# Patient Record
Sex: FEMALE | Race: WHITE | NOT HISPANIC OR LATINO | Employment: OTHER | URBAN - METROPOLITAN AREA
[De-identification: names, ages, dates, MRNs, and addresses within clinical notes are randomized per-mention and may not be internally consistent; named-entity substitution may affect disease eponyms.]

---

## 2017-01-13 ENCOUNTER — OFFICE VISIT (OUTPATIENT)
Dept: OPTOMETRY | Facility: CLINIC | Age: 67
End: 2017-01-13
Payer: MEDICARE

## 2017-01-13 DIAGNOSIS — H04.123 DRY EYE SYNDROME, BILATERAL: ICD-10-CM

## 2017-01-13 DIAGNOSIS — H52.7 REFRACTIVE ERROR: ICD-10-CM

## 2017-01-13 DIAGNOSIS — Z98.890 S/P LASIK (LASER ASSISTED IN SITU KERATOMILEUSIS) OF BOTH EYES: ICD-10-CM

## 2017-01-13 DIAGNOSIS — H25.13 NUCLEAR SCLEROSIS, BILATERAL: Primary | ICD-10-CM

## 2017-01-13 PROCEDURE — 99212 OFFICE O/P EST SF 10 MIN: CPT | Mod: PBBFAC,PO | Performed by: OPTOMETRIST

## 2017-01-13 PROCEDURE — 99999 PR PBB SHADOW E&M-EST. PATIENT-LVL II: CPT | Mod: PBBFAC,,, | Performed by: OPTOMETRIST

## 2017-01-13 PROCEDURE — 92015 DETERMINE REFRACTIVE STATE: CPT | Mod: ,,, | Performed by: OPTOMETRIST

## 2017-01-13 PROCEDURE — 92014 COMPRE OPH EXAM EST PT 1/>: CPT | Mod: S$PBB,,, | Performed by: OPTOMETRIST

## 2017-01-13 NOTE — PATIENT INSTRUCTIONS
What Are Dry Eyes?  Do your eyes ever sting, burn, or feel scratchy? To be comfortable, your eyes need to be lubricated, or bathed, with tears. Normally, there is always a film of tears on the surface of your eyes. But if your eyes dont produce enough tears, the surface gets irritated. This is known as dry eyes.    Not Enough Lubricating Tears  When you cry, your eyes make reflex tears. Each time you blink, another kind of tears, called lubricating tears, spread over the surface of your eyes. These tears keep the eyes moist and comfortable. You arent aware of these tears because they stay on the surface of the eyes. But without them, your eyes get dry. Then they burn or sting and feel scratchy. They may also water. This doesnt relieve the dryness, however, because the eyes water with reflex tears, not lubricating tears.  What Causes Dry Eyes?  · Aging  · Heaters and air conditioners  · Wind, smoke, or dry weather  · Allergies such as hay fever  · Medications  · Eyelid problems, injuries to the eye, or diseases like rheumatoid arthritis  How Lubricating Tears Flow  Lubricating tears flow from glands in the upper eyelid over the surface of the eye. From the eye, the tears drain into canals that lead to the nose.  Treating Dry Eye  Artificial tears are the most common treatment for dry eyes. If they dont relieve your symptoms, your eye doctor may put in plugs. Or you may have surgery to stop the draining and increase the tear film.  Artificial tears  Artificial tears, or lubricating eye drops, replace your natural lubricating tears. You can buy most lubricating eye drops without a prescription. And you can use them as often as needed. Lubricating eye drops are not the same as eye drops used to relieve redness or itching. Check with your eye doctor or pharmacist to be sure you buy the right drops.  Some lubricating eye drops have chemicals called preservatives. This makes them last longer. Your eyes may be  sensitive to these drops. Or you may need to use them often. If so, you may want to buy lubricating eye drops made without preservatives. Your eye doctor may also suggest using a lubricating eye ointment at night.  Medicine  Your doctor may prescribe medicine such as cyclosporine to treat your eye condition. It can help increase your eyes' ability to make tears.  Plugs         Closing the puncta with plugs can help keep the tear film on your eye. The plug acts like a stopper in a sink. It allows only a small amount of tears to drain out of your eye. Your eye doctor may first try short-term (temporary) plugs that dissolve in a few days. If these help, he or she may then put in long-term plugs. Your eyes will be numbed with drops when the plugs are inserted. You shouldnt feel any pain. And you shouldnt feel the plugs once theyre in.   Surgery  If artificial tears or plugs dont relieve your dry eyes, surgery may be an option. Your eye doctor may do minor outpatient surgery to narrow or block the openings to the drainage canals. If your dry eyes are caused by eyelid problems, your eye doctor may recommend other kinds of surgery.  © 8668-3591 [employer]. [address] [place] [address], [place] [address]. All rights reserved. This information is not intended as a substitute for professional medical care. Always follow your healthcare professional's instructions.

## 2017-04-10 RX ORDER — ALENDRONATE SODIUM 35 MG/1
TABLET ORAL
Qty: 12 TABLET | Refills: 0 | Status: SHIPPED | OUTPATIENT
Start: 2017-04-10 | End: 2017-07-06 | Stop reason: SDUPTHER

## 2017-04-13 ENCOUNTER — OFFICE VISIT (OUTPATIENT)
Dept: SPORTS MEDICINE | Facility: CLINIC | Age: 67
End: 2017-04-13
Payer: MEDICARE

## 2017-04-13 ENCOUNTER — HOSPITAL ENCOUNTER (OUTPATIENT)
Dept: RADIOLOGY | Facility: HOSPITAL | Age: 67
Discharge: HOME | End: 2017-04-13
Attending: ORTHOPAEDIC SURGERY
Payer: MEDICARE

## 2017-04-13 VITALS
BODY MASS INDEX: 25.58 KG/M2 | DIASTOLIC BLOOD PRESSURE: 91 MMHG | HEART RATE: 79 BPM | SYSTOLIC BLOOD PRESSURE: 148 MMHG | WEIGHT: 163 LBS | HEIGHT: 67 IN

## 2017-04-13 DIAGNOSIS — M17.10 ARTHRITIS OF KNEE: Primary | ICD-10-CM

## 2017-04-13 DIAGNOSIS — M25.561 RIGHT KNEE PAIN, UNSPECIFIED CHRONICITY: ICD-10-CM

## 2017-04-13 PROCEDURE — 99999 PR PBB SHADOW E&M-EST. PATIENT-LVL III: CPT | Mod: PBBFAC,,, | Performed by: ORTHOPAEDIC SURGERY

## 2017-04-13 PROCEDURE — 73564 X-RAY EXAM KNEE 4 OR MORE: CPT | Mod: 26,50,, | Performed by: RADIOLOGY

## 2017-04-13 PROCEDURE — 99214 OFFICE O/P EST MOD 30 MIN: CPT | Mod: S$PBB,,, | Performed by: ORTHOPAEDIC SURGERY

## 2017-04-13 PROCEDURE — 99213 OFFICE O/P EST LOW 20 MIN: CPT | Mod: PBBFAC,PO | Performed by: ORTHOPAEDIC SURGERY

## 2017-04-13 PROCEDURE — 73564 X-RAY EXAM KNEE 4 OR MORE: CPT | Mod: TC,50,PO

## 2017-04-13 RX ORDER — MELOXICAM 15 MG/1
15 TABLET ORAL DAILY
Qty: 30 TABLET | Refills: 0 | Status: SHIPPED | OUTPATIENT
Start: 2017-04-13 | End: 2017-05-13

## 2017-04-13 NOTE — MR AVS SNAPSHOT
[facility]  [address]  [facility] [address]  Phone: [phone]                  Windy Lindo   2017 11:30 AM   Appointment    Description:  Female : 1950   Provider:  Gavino Canales MD   Department:  [facility]                To Do List           Future Appointments        Provider Department Dept Phone    2017 11:30 AM Gavino Canales MD [facility] [phone]      Goals (5 Years of Data)     None      [facility]     [facility] On Call Nurse Care Line -  Assistance  Unless otherwise directed by your provider, please contact [facility], our nurse care line that is available for  assistance.     Registered nurses in the [facility] Center provide: appointment scheduling, clinical advisement, health education, and other advisory services.  Call: [phone] (toll free)               Medications           Message regarding Medications     Verify the changes and/or additions to your medication regime listed below are the same as discussed with your clinician today.  If any of these changes or additions are incorrect, please notify your healthcare provider.             Verify that the below list of medications is an accurate representation of the medications you are currently taking.  If none reported, the list may be blank. If incorrect, please contact your healthcare provider. Carry this list with you in case of emergency.           Current Medications     alendronate (FOSAMAX) 35 MG tablet TAKE 1 TABLET (35 MG TOTAL) BY MOUTH EVERY 7 DAYS.    fish oil-omega-3 fatty acids 300-1,000 mg capsule Take 2 g by mouth once daily.    glucosamine-chondroitin 500-400 mg tablet Take 1 tablet by mouth Daily.    LACTOBACILLUS ACIDOPHILUS (PROBIOTIC ORAL) Take by mouth once daily.    MULTIVITAMIN W-MINERALS/LUTEIN (CENTRUM SILVER ORAL) Take by mouth.           Clinical Reference Information           Allergies as  of 4/13/2017     No Known Allergies      Immunizations Administered on Date of Encounter - 4/13/2017     None      Language Assistance Services     ATTENTION: Language assistance services are available, free of charge. Please call [phone].      ATENCIÓN: Si dimitri bradshaw, tiene a engel disposición servicios gratuitos de asistencia lingüística. Llame al [phone].     CHÚ Ý: N?u b?n nói Ti?ng Vi?t, có các d?ch v? h? tr? ngôn ng? mi?n phí dành cho b?n. G?i s? [phone].         [facility] complies with applicable Federal civil rights laws and does not discriminate on the basis of race, color, national origin, age, disability, or sex.

## 2017-04-18 NOTE — PROGRESS NOTES
CC: Right knee pain    66 y.o. Female with a history of right knee pain who is hear today requesting visco injections for her knee pain    She has tried cortisone injections and physical therapy.      She reports that the pain and weakness. It does not bother her at night.    + mechanical symptoms, no instability    Is affecting ADLs.  Pain is 2/10 at it's worst.     Review of Systems   Constitution: Negative. Negative for chills, fever and night sweats.   HENT: Negative for congestion and headaches.    Eyes: Negative for blurred vision, left vision loss and right vision loss.   Cardiovascular: Negative for chest pain and syncope.   Respiratory: Negative for cough and shortness of breath.    Endocrine: Negative for polydipsia, polyphagia and polyuria.   Hematologic/Lymphatic: Negative for bleeding problem. Does not bruise/bleed easily.   Skin: Negative for dry skin, itching and rash.   Musculoskeletal: Negative for falls. Positive for right knee pain and  muscle weakness.   Gastrointestinal: Negative for abdominal pain and bowel incontinence.   Genitourinary: Negative for bladder incontinence and nocturia.   Neurological: Negative for disturbances in coordination, loss of balance and seizures.   Psychiatric/Behavioral: Negative for depression. The patient does not have insomnia.    Allergic/Immunologic: Negative for hives and persistent infections.     PAST MEDICAL HISTORY:   Past Medical History:   Diagnosis Date    Arthritis     Eye injury 4 yrs    hit od     PAST SURGICAL HISTORY:   Past Surgical History:   Procedure Laterality Date    APPENDECTOMY      LASIK  7 yrs    ou    TONSILLECTOMY       FAMILY HISTORY:   Family History   Problem Relation Age of Onset    Hypertension Mother     Glaucoma Mother     Diabetes Mother     Cataracts Mother     Cancer Mother 74     lung    Hypertension Father     Diabetes Brother     Cancer Brother 66     mesothelioma    No Known Problems Sister     No Known  "Problems Maternal Aunt     No Known Problems Maternal Uncle     No Known Problems Paternal Aunt     No Known Problems Paternal Uncle     No Known Problems Maternal Grandmother     No Known Problems Maternal Grandfather     No Known Problems Paternal Grandmother     No Known Problems Paternal Grandfather     Amblyopia Neg Hx     Blindness Neg Hx     Macular degeneration Neg Hx     Retinal detachment Neg Hx     Strabismus Neg Hx     Stroke Neg Hx     Thyroid disease Neg Hx      SOCIAL HISTORY:   Social History     Social History    Marital status: Single     Spouse name: N/A    Number of children: N/A    Years of education: N/A     Occupational History     [employer]     Social History Main Topics    Smoking status: Never Smoker    Smokeless tobacco: Never Used    Alcohol use Yes    Drug use: No    Sexual activity: No     Other Topics Concern    Not on file     Social History Narrative       MEDICATIONS:   Current Outpatient Prescriptions:     alendronate (FOSAMAX) 35 MG tablet, TAKE 1 TABLET (35 MG TOTAL) BY MOUTH EVERY 7 DAYS., Disp: 12 tablet, Rfl: 0    fish oil-omega-3 fatty acids 300-1,000 mg capsule, Take 2 g by mouth once daily., Disp: , Rfl:     glucosamine-chondroitin 500-400 mg tablet, Take 1 tablet by mouth Daily., Disp: , Rfl:     LACTOBACILLUS ACIDOPHILUS (PROBIOTIC ORAL), Take by mouth once daily., Disp: , Rfl:     MULTIVITAMIN W-MINERALS/LUTEIN (CENTRUM SILVER ORAL), Take by mouth., Disp: , Rfl:     meloxicam (MOBIC) 15 MG tablet, Take 1 tablet (15 mg total) by mouth once daily., Disp: 30 tablet, Rfl: 0  ALLERGIES: Review of patient's allergies indicates:  No Known Allergies    VITAL SIGNS:   BP (!) 148/91  Pulse 79  Ht 5' 7" (1.702 m)  Wt 73.9 kg (163 lb)  BMI 25.53 kg/m2     PHYSICAL EXAMINATION  VITAL SIGNS:   BP (!) 148/91  Pulse 79  Ht 5' 7" (1.702 m)  Wt 73.9 kg (163 lb)  BMI 25.53 kg/m2   General:  The patient is alert and oriented x 3.  Mood is pleasant.  " Observation of ears, eyes and nose reveal no gross abnormalities.  No labored breathing observed.    Right KNEE EXAMINATION     OBSERVATION / INSPECTION   Gait:   Nonantalgic   Alignment:  Neutral   Scars:   None   Muscle atrophy: Mild  Effusion:  None   Warmth:  None   Discoloration:   none     TENDERNESS / CREPITUS (T / C):          T / C      T / C   Patella   - / -   Lateral joint line   + / -   Peripatellar medial  -  Medial joint line    - / -   Peripatellar lateral +  Medial plica   - / -   Patellar tendon -   Popliteal fossa   - / -   Quad tendon   -   Gastrocnemius   -   Prepatellar Bursa - / -   Quadricep   -   Tibial tubercle  -  Thigh/hamstring  -   Pes anserine/HS -  Fibula    -   ITB   - / -  Tibia     -   Tib/fib joint  - / -  LCL    -     MFC   - / -   MCL: Proximal  -    LFC   - / -    Distal   -          ROM: (* = pain)  PASSIVE   ACTIVE    Left :   5 / 0 / 145   5 / 0 / 145     Right :    5 / 0 / 145   5 / 0 / 145    Patellofemoral examination:  See above noted areas of tenderness.   Patella position    Subluxation / dislocation: Centered           Sup. / Inf;   Normal   Crepitus (PF):    Absent   Patellar Mobility:       Medial-lateral:   Normal    Superior-inferior:  Normal    Inferior tilt   Normal    Patellar tendon:  Normal   Lateral tilt:    Normal   J-sign:     None   Patellofemoral grind:   No pain       MENISCAL SIGNS:     Pain on terminal extension:  +  Pain on terminal flexion:  +  Leonels maneuver:  + for pain  Squat     + posterior joint pain    LIGAMENT EXAMINATION:  ACL / Lachman:  normal (-1 to 2mm)    PCL-Post.  drawer: normal 0 to 2mm  MCL- Valgus:  normal 0 to 2mm  LCL- Varus:  normal 0 to 2mm  Pivot shift: normal (Equal)   Dial Test: difference c/w other side   At 30° flexion: normal (< 5°)    At 90° flexion: normal (< 5°)   Reverse Pivot Shift:   normal (Equal)     STRENGTH: (* = with pain) PAINFUL SIDE   Quadricep   5/5   Hamstrin/5    EXTREMITY NEURO-VASCULAR  EXAMINATION:   Sensation:  Grossly intact to light touch all dermatomal regions.   Motor Function:  Fully intact motor function at hip, knee, foot and ankle    DTRs;  quadriceps and  achilles 2+.  No clonus and downgoing Babinski.    Vascular status:  DP and PT pulses 2+, brisk capillary refill, symmetric.     Other Findings:    X-rays (5/12/16): Moderate to severe DJD and the joint space narrowing bilaterally.  No acute fractures or dislocation.  No bone destruction identified        ASSESSMENT:    Right Knee Pain  Right Knee DJD    PLAN:   1. Euflexxa injections  2. PT  3. NSAID    All questions were answered, pt will contact us for questions or concerns in the interim.

## 2017-04-26 ENCOUNTER — OFFICE VISIT (OUTPATIENT)
Dept: SPORTS MEDICINE | Facility: CLINIC | Age: 67
End: 2017-04-26
Payer: MEDICARE

## 2017-04-26 VITALS — HEIGHT: 67 IN | WEIGHT: 163 LBS | BODY MASS INDEX: 25.58 KG/M2

## 2017-04-26 DIAGNOSIS — M17.11 PRIMARY OSTEOARTHRITIS OF RIGHT KNEE: Primary | ICD-10-CM

## 2017-04-26 PROCEDURE — 99999 PR PBB SHADOW E&M-EST. PATIENT-LVL III: CPT | Mod: PBBFAC,,, | Performed by: PHYSICIAN ASSISTANT

## 2017-04-26 PROCEDURE — 20610 DRAIN/INJ JOINT/BURSA W/O US: CPT | Mod: S$PBB,RT,, | Performed by: PHYSICIAN ASSISTANT

## 2017-04-26 PROCEDURE — 20610 DRAIN/INJ JOINT/BURSA W/O US: CPT | Mod: PBBFAC,PO | Performed by: PHYSICIAN ASSISTANT

## 2017-04-26 PROCEDURE — 99499 UNLISTED E&M SERVICE: CPT | Mod: S$PBB,,, | Performed by: PHYSICIAN ASSISTANT

## 2017-04-26 PROCEDURE — 99213 OFFICE O/P EST LOW 20 MIN: CPT | Mod: PBBFAC,PO | Performed by: PHYSICIAN ASSISTANT

## 2017-04-26 RX ORDER — HYALURONATE SODIUM 20 MG/2 ML
2 SYRINGE (ML) INTRAARTICULAR
Status: COMPLETED | OUTPATIENT
Start: 2017-04-26 | End: 2017-04-26

## 2017-04-26 RX ADMIN — Medication 20 MG: at 03:04

## 2017-04-26 NOTE — PROGRESS NOTES
Patient is here for follow up of knee arthritis. Pt is requesting Euflexxa right knee injection #1.  [facility] Has failed other conservative modalities including NSAIDS, activity modification, weight loss.    The prior shot was tolerated well.    PHYSICAL EXAMINATION:     General: The patient is alert and oriented x 3. Mood is pleasant.   Observation of ears, eyes and nose reveals no gross abnormalities. No   labored breathing observed.     No signs of infection or adverse reaction to knee.    PROCEDURE NOTE:  Injection Procedure  A time out was performed, including verification of patient ID, procedure, site and side, availability of information and equipment, review of safety issues, and agreement with consent, the procedure site was marked.    After time out was performed, the patient was prepped aseptically with povidone-iodine swabsticks. A diagnostic and therapeutic injection of 2cc Euflexxa was given under sterile technique using a 22g x 1.5 needle from the Superolateral  aspect of the right Knee Joint in the supine position.      Windy Lindo had no adverse reactions to the medication. Pain decreased. She was instructed to apply ice to the joint for 20 minutes and avoid strenuous activities for 24-36 hours following the injection. She was warned of possible blood sugar and/or blood pressure changes during that time. Following that time, she can resume regular activities.    She was reminded to call the clinic immediately for any adverse side effects as explained in clinic today.      RTC 1 week for 2nd injection.  All questions were answered, pt will contact us for questions or concerns in the interim.

## 2017-04-26 NOTE — LETTER
April 26, 2017      Gavino Canales MD  [address]  [facility] [address]           [facility]  [address]  [facility] [address]  Phone: [phone]          Patient: Windy Lindo   MR Number: [id number]   YOB: 1950   Date of Visit: 4/26/2017       Dear Dr. Gavino Canales:    Thank you for referring Windy Lindo to me for evaluation. Attached you will find relevant portions of my assessment and plan of care.    If you have questions, please do not hesitate to call me. I look forward to following Windy Lindo along with you.    Sincerely,    Skye Christie PA-C    Enclosure  CC:  No Recipients    If you would like to receive this communication electronically, please contact [email or web address] or [phone] to request more information on [employer] Link access.    For providers and/or their staff who would like to refer a patient to [facility], please contact us through our one-stop-shop provider referral line, [facility], at [phone].    If you feel you have received this communication in error or would no longer like to receive these types of communications, please e-mail [email or web address]

## 2017-05-03 ENCOUNTER — OFFICE VISIT (OUTPATIENT)
Dept: SPORTS MEDICINE | Facility: CLINIC | Age: 67
End: 2017-05-03
Payer: MEDICARE

## 2017-05-03 VITALS
HEART RATE: 86 BPM | DIASTOLIC BLOOD PRESSURE: 79 MMHG | BODY MASS INDEX: 25.58 KG/M2 | WEIGHT: 163 LBS | HEIGHT: 67 IN | SYSTOLIC BLOOD PRESSURE: 130 MMHG

## 2017-05-03 DIAGNOSIS — M17.11 PRIMARY OSTEOARTHRITIS OF RIGHT KNEE: Primary | ICD-10-CM

## 2017-05-03 PROCEDURE — 99999 PR PBB SHADOW E&M-EST. PATIENT-LVL III: CPT | Mod: PBBFAC,,, | Performed by: PHYSICIAN ASSISTANT

## 2017-05-03 PROCEDURE — 20610 DRAIN/INJ JOINT/BURSA W/O US: CPT | Mod: S$PBB,RT,, | Performed by: PHYSICIAN ASSISTANT

## 2017-05-03 PROCEDURE — 99213 OFFICE O/P EST LOW 20 MIN: CPT | Mod: PBBFAC,25,PO | Performed by: PHYSICIAN ASSISTANT

## 2017-05-03 PROCEDURE — 99499 UNLISTED E&M SERVICE: CPT | Mod: S$PBB,,, | Performed by: PHYSICIAN ASSISTANT

## 2017-05-03 PROCEDURE — 20610 DRAIN/INJ JOINT/BURSA W/O US: CPT | Mod: PBBFAC,PO | Performed by: PHYSICIAN ASSISTANT

## 2017-05-03 RX ORDER — HYALURONATE SODIUM 20 MG/2 ML
2 SYRINGE (ML) INTRAARTICULAR
Status: COMPLETED | OUTPATIENT
Start: 2017-05-03 | End: 2017-05-03

## 2017-05-03 RX ADMIN — Medication 20 MG: at 01:05

## 2017-05-03 NOTE — PROGRESS NOTES
Patient is here for follow up of knee arthritis. Pt is requesting Euflexxa right knee injection #2.  [facility] Has failed other conservative modalities including NSAIDS, activity modification, weight loss.    The prior shot was tolerated well.    PHYSICAL EXAMINATION:     General: The patient is alert and oriented x 3. Mood is pleasant.   Observation of ears, eyes and nose reveals no gross abnormalities. No   labored breathing observed.     No signs of infection or adverse reaction to knee.    PROCEDURE NOTE:  Injection Procedure  A time out was performed, including verification of patient ID, procedure, site and side, availability of information and equipment, review of safety issues, and agreement with consent, the procedure site was marked.    After time out was performed, the patient was prepped aseptically with povidone-iodine swabsticks. A diagnostic and therapeutic injection of 2cc Euflexxa was given under sterile technique using a 22g x 1.5 needle from the Superolateral  aspect of the right Knee Joint in the supine position.      Windy Lindo had no adverse reactions to the medication. Pain decreased. She was instructed to apply ice to the joint for 20 minutes and avoid strenuous activities for 24-36 hours following the injection. She was warned of possible blood sugar and/or blood pressure changes during that time. Following that time, she can resume regular activities.    She was reminded to call the clinic immediately for any adverse side effects as explained in clinic today.      RTC 1 week for 3rd injection.  All questions were answered, pt will contact us for questions or concerns in the interim.

## 2017-05-03 NOTE — MR AVS SNAPSHOT
[facility]  [address]  [facility] [address]  Phone: [phone]                  Windy Lindo   5/3/2017 2:00 PM   Appointment    Description:  Female : 1950   Provider:  Skye Christie PA-C   Department:  [facility]                To Do List           Future Appointments        Provider Department Dept Phone    5/3/2017 2:00 PM Skye Christie PA-C [facility] [phone]    5/10/2017 3:15 PM Skye Christie PA-C [facility] [phone]      Goals (5 Years of Data)     None      [facility] On Call     [facility] On Call Nurse Care Line -  Assistance  Unless otherwise directed by your provider, please contact [facility], our nurse care line that is available for  assistance.     Registered nurses in the [facility] provide: appointment scheduling, clinical advisement, health education, and other advisory services.  Call: [phone] (toll free)               Medications           Message regarding Medications     Verify the changes and/or additions to your medication regime listed below are the same as discussed with your clinician today.  If any of these changes or additions are incorrect, please notify your healthcare provider.             Verify that the below list of medications is an accurate representation of the medications you are currently taking.  If none reported, the list may be blank. If incorrect, please contact your healthcare provider. Carry this list with you in case of emergency.           Current Medications     alendronate (FOSAMAX) 35 MG tablet TAKE 1 TABLET (35 MG TOTAL) BY MOUTH EVERY 7 DAYS.    fish oil-omega-3 fatty acids 300-1,000 mg capsule Take 2 g by mouth once daily.    glucosamine-chondroitin 500-400 mg tablet Take 1 tablet by mouth Daily.    LACTOBACILLUS ACIDOPHILUS (PROBIOTIC ORAL) Take by mouth once daily.    meloxicam (MOBIC) 15 MG tablet Take 1 tablet (15 mg  total) by mouth once daily.    MULTIVITAMIN W-MINERALS/LUTEIN (CENTRUM SILVER ORAL) Take by mouth.           Clinical Reference Information           Allergies as of 5/3/2017     No Known Allergies      Immunizations Administered on Date of Encounter - 5/3/2017     None      Language Assistance Services     ATTENTION: Language assistance services are available, free of charge. Please call [phone].      ATENCIÓN: Si habla kodyañol, tiene a engel disposición servicios gratuitos de asistencia lingüística. Llame al [phone].     CHÚ Ý: N?u b?n nói Ti?ng Vi?t, có các d?ch v? h? tr? ngôn ng? mi?n phí dành cho b?n. G?i s? [phone].         [facility] complies with applicable Federal civil rights laws and does not discriminate on the basis of race, color, national origin, age, disability, or sex.

## 2017-05-03 NOTE — LETTER
May 3, 2017      Gavino Canales MD  [address]  [facility] [address]           [facility]  [address]  [facility] [address]  Phone: [phone]          Patient: Windy Lindo   MR Number: [id number]   YOB: 1950   Date of Visit: 5/3/2017       Dear Dr. Gavino Canales:    Thank you for referring Windy Lindo to me for evaluation. Attached you will find relevant portions of my assessment and plan of care.    If you have questions, please do not hesitate to call me. I look forward to following Windy Lindo along with you.    Sincerely,    Skye Christie PA-C    Enclosure  CC:  No Recipients    If you would like to receive this communication electronically, please contact [email or web address] or [phone] to request more information on [facility] Link access.    For providers and/or their staff who would like to refer a patient to [facility], please contact us through our one-stop-shop provider referral line, [facility], at [phone].    If you feel you have received this communication in error or would no longer like to receive these types of communications, please e-mail [email or web address]

## 2017-05-10 ENCOUNTER — OFFICE VISIT (OUTPATIENT)
Dept: SPORTS MEDICINE | Facility: CLINIC | Age: 67
End: 2017-05-10
Payer: MEDICARE

## 2017-05-10 VITALS
DIASTOLIC BLOOD PRESSURE: 87 MMHG | BODY MASS INDEX: 25.58 KG/M2 | HEIGHT: 67 IN | WEIGHT: 163 LBS | SYSTOLIC BLOOD PRESSURE: 141 MMHG | HEART RATE: 80 BPM

## 2017-05-10 DIAGNOSIS — M17.11 PRIMARY OSTEOARTHRITIS OF RIGHT KNEE: Primary | ICD-10-CM

## 2017-05-10 PROCEDURE — 99213 OFFICE O/P EST LOW 20 MIN: CPT | Mod: PBBFAC,PO | Performed by: PHYSICIAN ASSISTANT

## 2017-05-10 PROCEDURE — 20610 DRAIN/INJ JOINT/BURSA W/O US: CPT | Mod: S$PBB,RT,, | Performed by: PHYSICIAN ASSISTANT

## 2017-05-10 PROCEDURE — 99999 PR PBB SHADOW E&M-EST. PATIENT-LVL III: CPT | Mod: PBBFAC,,, | Performed by: PHYSICIAN ASSISTANT

## 2017-05-10 PROCEDURE — 99499 UNLISTED E&M SERVICE: CPT | Mod: S$PBB,,, | Performed by: PHYSICIAN ASSISTANT

## 2017-05-10 PROCEDURE — 20610 DRAIN/INJ JOINT/BURSA W/O US: CPT | Mod: PBBFAC,PO | Performed by: PHYSICIAN ASSISTANT

## 2017-05-10 RX ORDER — HYALURONATE SODIUM 20 MG/2 ML
2 SYRINGE (ML) INTRAARTICULAR
Status: COMPLETED | OUTPATIENT
Start: 2017-05-10 | End: 2017-05-10

## 2017-05-10 RX ADMIN — Medication 20 MG: at 03:05

## 2017-05-10 NOTE — MR AVS SNAPSHOT
[facility]  [address]  [place] [address]  Phone: [phone]                  Windy Lindo   5/10/2017 3:15 PM   Appointment    Description:  Female : 1950   Provider:  Skye Christie PA-C   Department:  [facility]                To Do List           Future Appointments        Provider Department Dept Phone    5/10/2017 3:15 PM Skye Christie PA-C [facility] [phone]      Goals (5 Years of Data)     None      [facility]     [facility] On Call Nurse Care Line -  Assistance  Unless otherwise directed by your provider, please contact [facility], our nurse care line that is available for  assistance.     Registered nurses in the [facility] provide: appointment scheduling, clinical advisement, health education, and other advisory services.  Call: [phone] (toll free)               Medications           Message regarding Medications     Verify the changes and/or additions to your medication regime listed below are the same as discussed with your clinician today.  If any of these changes or additions are incorrect, please notify your healthcare provider.             Verify that the below list of medications is an accurate representation of the medications you are currently taking.  If none reported, the list may be blank. If incorrect, please contact your healthcare provider. Carry this list with you in case of emergency.           Current Medications     alendronate (FOSAMAX) 35 MG tablet TAKE 1 TABLET (35 MG TOTAL) BY MOUTH EVERY 7 DAYS.    fish oil-omega-3 fatty acids 300-1,000 mg capsule Take 2 g by mouth once daily.    glucosamine-chondroitin 500-400 mg tablet Take 1 tablet by mouth Daily.    LACTOBACILLUS ACIDOPHILUS (PROBIOTIC ORAL) Take by mouth once daily.    meloxicam (MOBIC) 15 MG tablet Take 1 tablet (15 mg total) by mouth once daily.    MULTIVITAMIN W-MINERALS/LUTEIN (CENTRUM SILVER  ORAL) Take by mouth.           Clinical Reference Information           Allergies as of 5/10/2017     No Known Allergies      Immunizations Administered on Date of Encounter - 5/10/2017     None      Language Assistance Services     ATTENTION: Language assistance services are available, free of charge. Please call [phone].      ATENCIÓN: Si dimitri bradshaw, tiene a engel disposición servicios gratuitos de asistencia lingüística. Llame al [phone].     CHÚ Ý: N?u b?n nói Ti?ng Vi?t, có các d?ch v? h? tr? ngôn ng? mi?n phí dành cho b?n. G?i s? [phone].         [facility] complies with applicable Federal civil rights laws and does not discriminate on the basis of race, color, national origin, age, disability, or sex.

## 2017-05-10 NOTE — PROGRESS NOTES
Patient is here for follow up of knee arthritis. Pt is requesting Euflexxa right knee injection #3.  [facility] Has failed other conservative modalities including NSAIDS, activity modification, weight loss.    The prior shot was tolerated well.    PHYSICAL EXAMINATION:     General: The patient is alert and oriented x 3. Mood is pleasant.   Observation of ears, eyes and nose reveals no gross abnormalities. No   labored breathing observed.     No signs of infection or adverse reaction to knee.    PROCEDURE NOTE:  Injection Procedure  A time out was performed, including verification of patient ID, procedure, site and side, availability of information and equipment, review of safety issues, and agreement with consent, the procedure site was marked.    After time out was performed, the patient was prepped aseptically with povidone-iodine swabsticks. A diagnostic and therapeutic injection of 2cc Euflexxa was given under sterile technique using a 22g x 1.5 needle from the Superolateral  aspect of the right Knee Joint in the supine position.      Windy Lindo had no adverse reactions to the medication. Pain decreased. She was instructed to apply ice to the joint for 20 minutes and avoid strenuous activities for 24-36 hours following the injection. She was warned of possible blood sugar and/or blood pressure changes during that time. Following that time, she can resume regular activities.    She was reminded to call the clinic immediately for any adverse side effects as explained in clinic today.      RTC in 6 weeks with Skye Christie PA-C. If no improvement, steroid injection prior to vacation.  All questions were answered, pt will contact us for questions or concerns in the interim.

## 2017-05-10 NOTE — LETTER
May 10, 2017      Gavino Canales MD  [address]  [facility] [address]           [facility]  [address]  [facility] [address]  Phone: [phone]          Patient: Windy Lindo   MR Number: [id number]   YOB: 1950   Date of Visit: 5/10/2017       Dear Dr. Gavino Canales:    Thank you for referring Windy Lindo to me for evaluation. Attached you will find relevant portions of my assessment and plan of care.    If you have questions, please do not hesitate to call me. I look forward to following Windy Lindo along with you.    Sincerely,    Skye Christie PA-C    Enclosure  CC:  No Recipients    If you would like to receive this communication electronically, please contact [email or web address] or [phone] to request more information on [facility] access.    For providers and/or their staff who would like to refer a patient to [facility], please contact us through our one-stop-shop provider referral line, [facility], at [phone].    If you feel you have received this communication in error or would no longer like to receive these types of communications, please e-mail [email or web address]

## 2017-05-14 ENCOUNTER — PATIENT MESSAGE (OUTPATIENT)
Dept: SPORTS MEDICINE | Facility: CLINIC | Age: 67
End: 2017-05-14

## 2017-05-15 DIAGNOSIS — M25.561 RIGHT KNEE PAIN: ICD-10-CM

## 2017-05-15 DIAGNOSIS — M17.11 OSTEOARTHRITIS OF RIGHT KNEE: Primary | ICD-10-CM

## 2017-05-15 RX ORDER — MELOXICAM 15 MG/1
15 TABLET ORAL DAILY
Qty: 30 TABLET | Refills: 0 | Status: SHIPPED | OUTPATIENT
Start: 2017-05-15 | End: 2017-06-12 | Stop reason: SDUPTHER

## 2017-06-12 ENCOUNTER — PATIENT MESSAGE (OUTPATIENT)
Dept: SPORTS MEDICINE | Facility: CLINIC | Age: 67
End: 2017-06-12

## 2017-06-12 DIAGNOSIS — M17.11 OSTEOARTHRITIS OF RIGHT KNEE: ICD-10-CM

## 2017-06-12 DIAGNOSIS — M25.561 RIGHT KNEE PAIN: ICD-10-CM

## 2017-06-12 RX ORDER — MELOXICAM 15 MG/1
15 TABLET ORAL DAILY
Qty: 30 TABLET | Refills: 1 | Status: SHIPPED | OUTPATIENT
Start: 2017-06-12 | End: 2017-09-26 | Stop reason: SDUPTHER

## 2017-06-16 ENCOUNTER — OFFICE VISIT (OUTPATIENT)
Dept: SPORTS MEDICINE | Facility: CLINIC | Age: 67
End: 2017-06-16
Payer: MEDICARE

## 2017-06-16 VITALS
SYSTOLIC BLOOD PRESSURE: 143 MMHG | HEIGHT: 67 IN | WEIGHT: 163 LBS | BODY MASS INDEX: 25.58 KG/M2 | HEART RATE: 89 BPM | DIASTOLIC BLOOD PRESSURE: 91 MMHG

## 2017-06-16 DIAGNOSIS — M25.561 ACUTE PAIN OF RIGHT KNEE: Primary | ICD-10-CM

## 2017-06-16 DIAGNOSIS — M17.11 PRIMARY OSTEOARTHRITIS OF RIGHT KNEE: ICD-10-CM

## 2017-06-16 PROCEDURE — 99214 OFFICE O/P EST MOD 30 MIN: CPT | Mod: PBBFAC,PO | Performed by: PHYSICIAN ASSISTANT

## 2017-06-16 PROCEDURE — 1126F AMNT PAIN NOTED NONE PRSNT: CPT | Mod: ,,, | Performed by: PHYSICIAN ASSISTANT

## 2017-06-16 PROCEDURE — 1159F MED LIST DOCD IN RCRD: CPT | Mod: ,,, | Performed by: PHYSICIAN ASSISTANT

## 2017-06-16 PROCEDURE — 99999 PR PBB SHADOW E&M-EST. PATIENT-LVL IV: CPT | Mod: PBBFAC,,, | Performed by: PHYSICIAN ASSISTANT

## 2017-06-16 PROCEDURE — 99213 OFFICE O/P EST LOW 20 MIN: CPT | Mod: S$PBB,,, | Performed by: PHYSICIAN ASSISTANT

## 2017-06-16 RX ORDER — DICLOFENAC SODIUM 10 MG/G
2 GEL TOPICAL 3 TIMES DAILY
Qty: 1 TUBE | Refills: 2 | Status: SHIPPED | OUTPATIENT
Start: 2017-06-16 | End: 2017-10-25

## 2017-06-16 NOTE — PROGRESS NOTES
CC: Right knee pain    66 y.o. Female with a history of increased right knee pain x 6 months.  She had her Euflexxa series on 5/10/2017 with complete relief of pain. She has been taking Mobic 15mg once a day. She has taken celebrex in the past without relief of pain.     She has tried cortisone injections and physical therapy. She has been working on her HEP.     She reports that the pain and weakness. It does not bother her at night.    + mechanical symptoms, no instability  Is affecting ADLs.  Pain is 0/10  Today    Review of Systems   Constitution: Negative. Negative for chills, fever and night sweats.   HENT: Negative for congestion and headaches.    Eyes: Negative for blurred vision, left vision loss and right vision loss.   Cardiovascular: Negative for chest pain and syncope.   Respiratory: Negative for cough and shortness of breath.    Endocrine: Negative for polydipsia, polyphagia and polyuria.   Hematologic/Lymphatic: Negative for bleeding problem. Does not bruise/bleed easily.   Skin: Negative for dry skin, itching and rash.   Musculoskeletal: Negative for falls. Positive for right knee pain and  muscle weakness.   Gastrointestinal: Negative for abdominal pain and bowel incontinence.   Genitourinary: Negative for bladder incontinence and nocturia.   Neurological: Negative for disturbances in coordination, loss of balance and seizures.   Psychiatric/Behavioral: Negative for depression. The patient does not have insomnia.    Allergic/Immunologic: Negative for hives and persistent infections.     PAST MEDICAL HISTORY:   Past Medical History:   Diagnosis Date    Arthritis     Eye injury 4 yrs    hit od     PAST SURGICAL HISTORY:   Past Surgical History:   Procedure Laterality Date    APPENDECTOMY      LASIK  7 yrs    ou    TONSILLECTOMY       FAMILY HISTORY:   Family History   Problem Relation Age of Onset    Hypertension Mother     Glaucoma Mother     Diabetes Mother     Cataracts Mother     Cancer  "Mother 74     lung    Hypertension Father     Diabetes Brother     Cancer Brother 66     mesothelioma    No Known Problems Sister     No Known Problems Maternal Aunt     No Known Problems Maternal Uncle     No Known Problems Paternal Aunt     No Known Problems Paternal Uncle     No Known Problems Maternal Grandmother     No Known Problems Maternal Grandfather     No Known Problems Paternal Grandmother     No Known Problems Paternal Grandfather     Amblyopia Neg Hx     Blindness Neg Hx     Macular degeneration Neg Hx     Retinal detachment Neg Hx     Strabismus Neg Hx     Stroke Neg Hx     Thyroid disease Neg Hx      SOCIAL HISTORY:   Social History     Social History    Marital status: Single     Spouse name: N/A    Number of children: N/A    Years of education: N/A     Occupational History     [employer]     Social History Main Topics    Smoking status: Never Smoker    Smokeless tobacco: Never Used    Alcohol use Yes    Drug use: No    Sexual activity: No     Other Topics Concern    Not on file     Social History Narrative    No narrative on file       MEDICATIONS:   Current Outpatient Prescriptions:     alendronate (FOSAMAX) 35 MG tablet, TAKE 1 TABLET (35 MG TOTAL) BY MOUTH EVERY 7 DAYS., Disp: 12 tablet, Rfl: 0    fish oil-omega-3 fatty acids 300-1,000 mg capsule, Take 2 g by mouth once daily., Disp: , Rfl:     glucosamine-chondroitin 500-400 mg tablet, Take 1 tablet by mouth Daily., Disp: , Rfl:     LACTOBACILLUS ACIDOPHILUS (PROBIOTIC ORAL), Take by mouth once daily., Disp: , Rfl:     meloxicam (MOBIC) 15 MG tablet, Take 1 tablet (15 mg total) by mouth once daily., Disp: 30 tablet, Rfl: 1    MULTIVITAMIN W-MINERALS/LUTEIN (CENTRUM SILVER ORAL), Take by mouth., Disp: , Rfl:   ALLERGIES: Review of patient's allergies indicates:  No Known Allergies    VITAL SIGNS:   BP (!) 143/91   Pulse 89   Ht 5' 7" (1.702 m)   Wt 73.9 kg (163 lb)   BMI 25.53 kg/m²      PHYSICAL " "EXAMINATION  VITAL SIGNS:   BP (!) 143/91   Pulse 89   Ht 5' 7" (1.702 m)   Wt 73.9 kg (163 lb)   BMI 25.53 kg/m²    General:  The patient is alert and oriented x 3.  Mood is pleasant.  Observation of ears, eyes and nose reveal no gross abnormalities.  No labored breathing observed.    Right KNEE EXAMINATION     OBSERVATION / INSPECTION   Gait:   Nonantalgic   Alignment:  Neutral   Scars:   None   Muscle atrophy: Mild  Effusion:  None   Warmth:  None   Discoloration:   none     TENDERNESS / CREPITUS (T / C):          T / C      T / C   Patella   - / -   Lateral joint line   - / -   Peripatellar medial  -  Medial joint line    - / -   Peripatellar lateral -  Medial plica   - / -   Patellar tendon -   Popliteal fossa   - / -   Quad tendon   -   Gastrocnemius   -   Prepatellar Bursa - / -   Quadricep   -   Tibial tubercle  -  Thigh/hamstring  -   Pes anserine/HS -  Fibula    -   ITB   - / -  Tibia     -   Tib/fib joint  - / -  LCL    -     MFC   - / -   MCL: Proximal  -    LFC   - / -    Distal   -          ROM: (* = pain)  PASSIVE   ACTIVE    Left :   5 / 0 / 145   5 / 0 / 145     Right :    5 / 0 / 130   5 / 0 / 130    Patellofemoral examination:  See above noted areas of tenderness.   Patella position    Subluxation / dislocation: Centered           Sup. / Inf;   Normal   Crepitus (PF):    Absent   Patellar Mobility:       Medial-lateral:   Normal    Superior-inferior:  Normal    Inferior tilt   Normal    Patellar tendon:  Normal   Lateral tilt:    Normal   J-sign:     None   Patellofemoral grind:   No pain       MENISCAL SIGNS:     Pain on terminal extension:  -  Pain on terminal flexion:  -  Leonels maneuver:  + for pain  Squat     + posterior joint pain    LIGAMENT EXAMINATION:  ACL / Lachman:  normal (-1 to 2mm)    PCL-Post.  drawer: normal 0 to 2mm  MCL- Valgus:  normal 0 to 2mm  LCL- Varus:  normal 0 to 2mm  Pivot shift: normal (Equal)   Dial Test: difference c/w other side   At 30° flexion: normal (< " 5°)    At 90° flexion: normal (< 5°)   Reverse Pivot Shift:   normal (Equal)     STRENGTH: (* = with pain) PAINFUL SIDE   Quadricep   5/5   Hamstrin/5    EXTREMITY NEURO-VASCULAR EXAMINATION:   Sensation:  Grossly intact to light touch all dermatomal regions.   Motor Function:  Fully intact motor function at hip, knee, foot and ankle    DTRs;  quadriceps and  achilles 2+.  No clonus and downgoing Babinski.    Vascular status:  DP and PT pulses 2+, brisk capillary refill, symmetric.     Other Findings:    Findings:     XR KNEE ORTHO BILAT WITH FLEXION.    Right knee: Severe joint space narrowing of the lateral compartment with subluxation.  Lateral subluxation of EE patella with joint space narrowing of the lateral patellar compartment.  Moderate marginal osteophyte formation.  No evidence of fracture or dislocation.  No soft tissue abnormality.      Left knee: Moderate joint space narrowing of the lateral compartment and mild joint space narrowing of the medial compartment.  Subluxation of the patella with severe joint space narrowing of the lateral patellar compartment.  Mild marginal osteophyte formation.  No evidence of fracture or dislocation.  No soft tissue abnormality.        ASSESSMENT:    Right Knee Pain  Right Knee DJD    PLAN:   1. RTC in 6 months for Euflexxa series  2. NSAIDs prn pain  3. Voltaren gel    All questions were answered, pt will contact us for questions or concerns in the interim.

## 2017-07-07 RX ORDER — ALENDRONATE SODIUM 35 MG/1
TABLET ORAL
Qty: 4 TABLET | Refills: 0 | Status: SHIPPED | OUTPATIENT
Start: 2017-07-07 | End: 2017-08-02 | Stop reason: SDUPTHER

## 2017-08-02 DIAGNOSIS — M81.0 OSTEOPOROSIS, UNSPECIFIED OSTEOPOROSIS TYPE, UNSPECIFIED PATHOLOGICAL FRACTURE PRESENCE: Primary | ICD-10-CM

## 2017-08-03 RX ORDER — ALENDRONATE SODIUM 35 MG/1
TABLET ORAL
Qty: 4 TABLET | Refills: 0 | Status: SHIPPED | OUTPATIENT
Start: 2017-08-03 | End: 2017-08-28 | Stop reason: SDUPTHER

## 2017-08-06 ENCOUNTER — HOSPITAL ENCOUNTER (EMERGENCY)
Facility: HOSPITAL | Age: 67
Discharge: HOME | End: 2017-08-06
Attending: EMERGENCY MEDICINE
Payer: MEDICARE

## 2017-08-06 ENCOUNTER — PATIENT MESSAGE (OUTPATIENT)
Dept: FAMILY MEDICINE | Facility: CLINIC | Age: 67
End: 2017-08-06

## 2017-08-06 VITALS
SYSTOLIC BLOOD PRESSURE: 165 MMHG | HEART RATE: 89 BPM | DIASTOLIC BLOOD PRESSURE: 77 MMHG | RESPIRATION RATE: 20 BRPM | OXYGEN SATURATION: 100 % | HEIGHT: 67 IN | BODY MASS INDEX: 25.11 KG/M2 | TEMPERATURE: 98 F | WEIGHT: 160 LBS

## 2017-08-06 DIAGNOSIS — K92.2 LOWER GI BLEEDING: Primary | ICD-10-CM

## 2017-08-06 LAB
ABO + RH BLD: NORMAL
ALBUMIN SERPL BCP-MCNC: 3.7 G/DL
ALP SERPL-CCNC: 80 U/L
ALT SERPL W/O P-5'-P-CCNC: 10 U/L
ANION GAP SERPL CALC-SCNC: 12 MMOL/L
ANISOCYTOSIS BLD QL SMEAR: SLIGHT
AST SERPL-CCNC: 15 U/L
BASOPHILS # BLD AUTO: 0.01 K/UL
BASOPHILS NFR BLD: 0.1 %
BILIRUB SERPL-MCNC: 0.3 MG/DL
BLD GP AB SCN CELLS X3 SERPL QL: NORMAL
BUN SERPL-MCNC: 22 MG/DL
CALCIUM SERPL-MCNC: 10 MG/DL
CHLORIDE SERPL-SCNC: 106 MMOL/L
CO2 SERPL-SCNC: 22 MMOL/L
CREAT SERPL-MCNC: 0.8 MG/DL
DIFFERENTIAL METHOD: ABNORMAL
EOSINOPHIL # BLD AUTO: 0 K/UL
EOSINOPHIL NFR BLD: 0.1 %
ERYTHROCYTE [DISTWIDTH] IN BLOOD BY AUTOMATED COUNT: 13.5 %
EST. GFR  (AFRICAN AMERICAN): >60 ML/MIN/1.73 M^2
EST. GFR  (NON AFRICAN AMERICAN): >60 ML/MIN/1.73 M^2
GLUCOSE SERPL-MCNC: 137 MG/DL
HCT VFR BLD AUTO: 41.9 %
HGB BLD-MCNC: 14.3 G/DL
INR PPP: 0.9
LYMPHOCYTES # BLD AUTO: 1.9 K/UL
LYMPHOCYTES NFR BLD: 18.5 %
MCH RBC QN AUTO: 30.7 PG
MCHC RBC AUTO-ENTMCNC: 34.1 G/DL
MCV RBC AUTO: 90 FL
MONOCYTES # BLD AUTO: 0.7 K/UL
MONOCYTES NFR BLD: 6.3 %
NEUTROPHILS # BLD AUTO: 7.8 K/UL
NEUTROPHILS NFR BLD: 75 %
PLATELET # BLD AUTO: 192 K/UL
PLATELET BLD QL SMEAR: ABNORMAL
PMV BLD AUTO: 13.1 FL
POTASSIUM SERPL-SCNC: 4 MMOL/L
PROT SERPL-MCNC: 6.8 G/DL
PROTHROMBIN TIME: 9.6 SEC
RBC # BLD AUTO: 4.66 M/UL
SODIUM SERPL-SCNC: 140 MMOL/L
WBC # BLD AUTO: 10.42 K/UL

## 2017-08-06 PROCEDURE — 86900 BLOOD TYPING SEROLOGIC ABO: CPT

## 2017-08-06 PROCEDURE — 93005 ELECTROCARDIOGRAM TRACING: CPT

## 2017-08-06 PROCEDURE — 94761 N-INVAS EAR/PLS OXIMETRY MLT: CPT

## 2017-08-06 PROCEDURE — 99284 EMERGENCY DEPT VISIT MOD MDM: CPT | Mod: 25

## 2017-08-06 PROCEDURE — 86901 BLOOD TYPING SEROLOGIC RH(D): CPT

## 2017-08-06 PROCEDURE — 85025 COMPLETE CBC W/AUTO DIFF WBC: CPT

## 2017-08-06 PROCEDURE — 99284 EMERGENCY DEPT VISIT MOD MDM: CPT | Mod: ,,, | Performed by: EMERGENCY MEDICINE

## 2017-08-06 PROCEDURE — 85610 PROTHROMBIN TIME: CPT

## 2017-08-06 PROCEDURE — 93010 ELECTROCARDIOGRAM REPORT: CPT | Mod: ,,, | Performed by: INTERNAL MEDICINE

## 2017-08-06 PROCEDURE — 87045 FECES CULTURE AEROBIC BACT: CPT

## 2017-08-06 PROCEDURE — 87046 STOOL CULTR AEROBIC BACT EA: CPT | Mod: 59

## 2017-08-06 PROCEDURE — 87427 SHIGA-LIKE TOXIN AG IA: CPT | Mod: 59

## 2017-08-06 PROCEDURE — 80053 COMPREHEN METABOLIC PANEL: CPT

## 2017-08-06 RX ORDER — CHOLECALCIFEROL (VITAMIN D3) 25 MCG
1000 TABLET ORAL DAILY
COMMUNITY
End: 2018-09-14

## 2017-08-06 RX ORDER — ASPIRIN 81 MG/1
81 TABLET ORAL DAILY
COMMUNITY
End: 2017-10-25

## 2017-08-06 NOTE — ED TRIAGE NOTES
Windy Lindo, a 66 y.o. female presents to the ED with complaints of diarrhea since 5 pm yesterday and states she has had 3 episodes since; pt states first episode was dark red and every episode the color gets lighter. Pt states she is not hungry but her stomach is growling. Denies emesis. Denies any physical pain.    Chief Complaint   Patient presents with    Rectal Bleeding     pt with c/o diarrhea and rectal bleeding x3.     Diarrhea     Review of patient's allergies indicates:  No Known Allergies  Past Medical History:   Diagnosis Date    Arthritis     Eye injury 4 yrs    hit od

## 2017-08-06 NOTE — ED PROVIDER NOTES
Encounter Date: 8/6/2017    SCRIBE #1 NOTE: I, Jagdish Freeman, am scribing for, and in the presence of,  Dr. Oneil. I have scribed the following portions of the note - the EKG reading.       History     Chief Complaint   Patient presents with    Rectal Bleeding     pt with c/o diarrhea and rectal bleeding x3.     Diarrhea     67 yo F w/ PMH HLD coming in with LGIB since 1700 this evening. She reported initial diarrhea episode was dark red. Subsequent episodes have become more bright. Denies any abdominal pain or cramping. Pt does take meloxicam chronically for lower extremity joint pain. Never had any episodes like this before. Pt otherwise feels well. Never had colonoscopy.           Review of patient's allergies indicates:  No Known Allergies  Past Medical History:   Diagnosis Date    Arthritis     Eye injury 4 yrs    hit od     Past Surgical History:   Procedure Laterality Date    APPENDECTOMY      LASIK  7 yrs    ou    TONSILLECTOMY       Family History   Problem Relation Age of Onset    Hypertension Mother     Glaucoma Mother     Diabetes Mother     Cataracts Mother     Cancer Mother 74     lung    Hypertension Father     Diabetes Brother     Cancer Brother 66     mesothelioma    No Known Problems Sister     No Known Problems Maternal Aunt     No Known Problems Maternal Uncle     No Known Problems Paternal Aunt     No Known Problems Paternal Uncle     No Known Problems Maternal Grandmother     No Known Problems Maternal Grandfather     No Known Problems Paternal Grandmother     No Known Problems Paternal Grandfather     Amblyopia Neg Hx     Blindness Neg Hx     Macular degeneration Neg Hx     Retinal detachment Neg Hx     Strabismus Neg Hx     Stroke Neg Hx     Thyroid disease Neg Hx      Social History   Substance Use Topics    Smoking status: Never Smoker    Smokeless tobacco: Never Used    Alcohol use Yes     Review of Systems   Constitutional: Negative for fever.   HENT:  Negative for sore throat.    Respiratory: Negative for shortness of breath.    Cardiovascular: Negative for chest pain.   Gastrointestinal: Negative for nausea.   Genitourinary: Negative for dysuria.   Musculoskeletal: Negative for back pain.   Skin: Negative for rash.   Neurological: Negative for weakness.   Hematological: Does not bruise/bleed easily.       Physical Exam     Initial Vitals [08/06/17 0018]   BP Pulse Resp Temp SpO2   (!) 196/104 99 18 98.1 °F (36.7 °C) (!) 94 %      MAP       134.67         Physical Exam    Constitutional: She appears well-developed and well-nourished. She is not diaphoretic. No distress.   HENT:   Head: Normocephalic and atraumatic.   Eyes: EOM are normal. Pupils are equal, round, and reactive to light.   Neck: Normal range of motion. Neck supple. No thyromegaly present. No tracheal deviation present.   Cardiovascular: Normal rate, regular rhythm, normal heart sounds and intact distal pulses. Exam reveals no gallop and no friction rub.    No murmur heard.  Pulmonary/Chest: Breath sounds normal. No stridor. No respiratory distress. She has no wheezes. She has no rales.   Abdominal: Soft. Bowel sounds are normal. She exhibits no distension. There is no tenderness. There is no rebound.   Genitourinary: Rectal exam shows guaiac positive stool. Guaiac positive stool. : Acceptable.  Musculoskeletal: Normal range of motion. She exhibits no edema.   Neurological: She is alert and oriented to person, place, and time. No cranial nerve deficit.   Skin: Skin is warm and dry. No erythema.   Psychiatric: She has a normal mood and affect. Her behavior is normal. Thought content normal.         ED Course   Procedures  Labs Reviewed   CBC W/ AUTO DIFFERENTIAL - Abnormal; Notable for the following:        Result Value    MPV 13.1 (*)     Gran # 7.8 (*)     Gran% 75.0 (*)     All other components within normal limits   COMPREHENSIVE METABOLIC PANEL - Abnormal; Notable for the  "following:     CO2 22 (*)     Glucose 137 (*)     All other components within normal limits   ENTEROHEMORRHAGIC E.COLI   CULTURE, STOOL   PROTIME-INR   STOOL EXAM-OVA,CYSTS,PARASITES   TYPE & SCREEN     EKG Readings: (Independently Interpreted)   NSR with no ST abnormalities.          Medical Decision Making:   History:   Old Medical Records: I decided to obtain old medical records.  Initial Assessment:   67 yo F with LGIB  Differential Diagnosis:   Diverticulosis vs hemorrhoid vs other LGIB source.   Independently Interpreted Test(s):   I have ordered and independently interpreted EKG Reading(s) - see prior notes  Clinical Tests:   Lab Tests: Ordered and Reviewed  Medical Tests: Ordered and Reviewed            Scribe Attestation:   Scribe #1: I performed the above scribed service and the documentation accurately describes the services I performed. I attest to the accuracy of the note.    Attending Attestation:   Physician Attestation Statement for Resident:  As the supervising MD   Physician Attestation Statement: I have personally seen and examined this patient.   I agree with the above history. -: 67 yo f, on NSAIDS and ASA, here for 3 episodes BRBPR.  Initial episode "dark" - and when asked by pt if it was black and tarry, she says' "maybe" but told my resident that first episode fredi.  Subsequent episodes bloody diarrhea.  + lower abd cramping.  No recent raw meat or travel.  On exam, abd soft/nt/nd.  Rectal exam with bright red blood, no melena.  Suspect that this is likely bloody diarrhea, ? Colitis vs infectious diarrhea.  Diverticular bleed seems less likely.  Also, upper GI bleed seems less likely given that pt with BRBPR and normal H/H and normal BUN/creatinine ratio.  Will discuss risk/benefits of admit to obs for serial H/H vs d/c home with strict return precautions, close PCP f/u Mon, outpatient colonoscopy   As the supervising MD I agree with the above PE.    As the supervising MD I agree with the " above treatment, course, plan, and disposition.          Physician Attestation for Scribe:  Physician Attestation Statement for Scribe #1: I, Dr. Oneil, reviewed documentation, as scribed by Jagdish Freeman in my presence, and it is both accurate and complete.                 ED Course     Clinical Impression:   The encounter diagnosis was Lower GI bleeding.                           Naya Oneil MD  08/07/17 0863

## 2017-08-07 LAB
E COLI SXT1 STL QL IA: NEGATIVE
E COLI SXT2 STL QL IA: NEGATIVE

## 2017-08-08 ENCOUNTER — OFFICE VISIT (OUTPATIENT)
Dept: FAMILY MEDICINE | Facility: CLINIC | Age: 67
End: 2017-08-08
Payer: MEDICARE

## 2017-08-08 VITALS
HEIGHT: 67 IN | BODY MASS INDEX: 25.07 KG/M2 | SYSTOLIC BLOOD PRESSURE: 120 MMHG | HEART RATE: 97 BPM | OXYGEN SATURATION: 97 % | TEMPERATURE: 99 F | DIASTOLIC BLOOD PRESSURE: 80 MMHG | WEIGHT: 159.75 LBS

## 2017-08-08 DIAGNOSIS — K92.1 BLOODY STOOL: Primary | ICD-10-CM

## 2017-08-08 DIAGNOSIS — Z23 NEED FOR PNEUMOCOCCAL VACCINATION: ICD-10-CM

## 2017-08-08 PROCEDURE — 99214 OFFICE O/P EST MOD 30 MIN: CPT | Mod: S$PBB,,, | Performed by: NURSE PRACTITIONER

## 2017-08-08 PROCEDURE — 3008F BODY MASS INDEX DOCD: CPT | Mod: ,,, | Performed by: NURSE PRACTITIONER

## 2017-08-08 PROCEDURE — 1159F MED LIST DOCD IN RCRD: CPT | Mod: ,,, | Performed by: NURSE PRACTITIONER

## 2017-08-08 PROCEDURE — 99999 PR PBB SHADOW E&M-EST. PATIENT-LVL IV: CPT | Mod: PBBFAC,,, | Performed by: NURSE PRACTITIONER

## 2017-08-08 PROCEDURE — 99214 OFFICE O/P EST MOD 30 MIN: CPT | Mod: PBBFAC,PO | Performed by: NURSE PRACTITIONER

## 2017-08-08 PROCEDURE — 90732 PPSV23 VACC 2 YRS+ SUBQ/IM: CPT | Mod: PBBFAC,PO

## 2017-08-08 PROCEDURE — G0009 ADMIN PNEUMOCOCCAL VACCINE: HCPCS | Mod: PBBFAC,PO

## 2017-08-08 PROCEDURE — 1126F AMNT PAIN NOTED NONE PRSNT: CPT | Mod: ,,, | Performed by: NURSE PRACTITIONER

## 2017-08-08 RX ORDER — AMOXICILLIN 500 MG/1
CAPSULE ORAL
COMMUNITY
Start: 2017-05-16 | End: 2017-09-26 | Stop reason: SDUPTHER

## 2017-08-08 RX ORDER — OXYCODONE AND ACETAMINOPHEN 5; 325 MG/1; MG/1
TABLET ORAL
COMMUNITY
Start: 2017-05-16 | End: 2017-08-08

## 2017-08-08 NOTE — PROGRESS NOTES
Subjective:       Patient ID: Windy Lindo is a 66 y.o. female.    Chief Complaint: Hospital Follow Up    65 yo female presents for follow up for bloody diarrhea. She went to the ER and test results were negative. She was taking aspirin and Mobic, but has since stopped using Mobic. She has not seen any blood in the stool for the last day. She states she has not had a colonoscopy. She has no other concerns at this time. She denies chest pain, SOB or dizziness.       Rectal Bleeding   This is a recurrent problem. The current episode started in the past 7 days. The problem has been rapidly improving. Pertinent negatives include no arthralgias, chest pain, headaches, joint swelling, neck pain, vomiting or weakness. The symptoms are aggravated by eating (she recently started eating just salads to lose weight). She has tried nothing for the symptoms.       Past Medical History:   Diagnosis Date    Arthritis     Eye injury 4 yrs    hit od       Social History     Social History    Marital status: Single     Spouse name: N/A    Number of children: N/A    Years of education: N/A     Occupational History     C[employer]     Social History Main Topics    Smoking status: Never Smoker    Smokeless tobacco: Never Used    Alcohol use Yes    Drug use: No    Sexual activity: No     Other Topics Concern    Not on file     Social History Narrative    No narrative on file       Past Surgical History:   Procedure Laterality Date    APPENDECTOMY      LASIK  7 yrs    ou    TONSILLECTOMY         Review of Systems   Constitutional: Negative for activity change and unexpected weight change.   HENT: Negative for hearing loss, rhinorrhea and trouble swallowing.    Eyes: Negative for discharge and visual disturbance.   Respiratory: Negative for chest tightness and wheezing.    Cardiovascular: Negative for chest pain and palpitations.   Gastrointestinal: Positive for blood in stool, diarrhea and hematochezia. Negative for constipation  "and vomiting.   Endocrine: Negative for polydipsia and polyuria.   Genitourinary: Negative for difficulty urinating, dysuria, hematuria and menstrual problem.   Musculoskeletal: Negative for arthralgias, joint swelling and neck pain.   Neurological: Negative for weakness and headaches.   Psychiatric/Behavioral: Negative for confusion and dysphoric mood.   All other systems reviewed and are negative.      Objective:   /80 (BP Location: Right arm, Patient Position: Sitting, BP Method: Manual)   Pulse 97   Temp 98.7 °F (37.1 °C) (Oral)   Ht 5' 7" (1.702 m)   Wt 72.4 kg (159 lb 11.6 oz)   SpO2 97%   BMI 25.02 kg/m²      Physical Exam   Constitutional: She is oriented to person, place, and time. She appears well-developed and well-nourished.   HENT:   Head: Normocephalic and atraumatic.   Cardiovascular: Normal rate, regular rhythm and normal heart sounds.    Pulmonary/Chest: Effort normal and breath sounds normal. No respiratory distress. She has no decreased breath sounds.   Abdominal: Soft. Bowel sounds are normal. She exhibits no distension and no mass. There is no tenderness. There is no rigidity and no guarding.   Neurological: She is alert and oriented to person, place, and time.   Skin: She is not diaphoretic. No pallor.   Psychiatric: She has a normal mood and affect. Her speech is normal and behavior is normal.       Assessment:       1. Bloody stool    2. Need for pneumococcal vaccination        Plan:       Windy was seen today for hospital follow up.    Diagnoses and all orders for this visit:    Bloody stool  -     Case request GI: ESOPHAGOGASTRODUODENOSCOPY (EGD)    Need for pneumococcal vaccination  -     (In Office Administered) Pneumococcal Polysaccharide Vaccine (23 Valent) (SQ/IM)        Will order colonoscopy. Encouraged to take medication with food, if she decides to restart NSAIDs.   Return if symptoms worsen or fail to improve.    "

## 2017-08-08 NOTE — PATIENT INSTRUCTIONS
Uncertain Causes of Diarrhea (Adult)    Diarrhea is when stools are loose and watery. This can be caused by:  · Viral infections  · Bacterial infections  · Food poisoning  · Parasites  · Irritable bowel syndrome (IBS)  · Inflammatory bowel diseases such as ulcerative colitis, Crohn's disease, and celiac disease  · Food intolerance, such as to lactose, the sugar found in milk and milk products  · Reaction to medicines like antibiotics, laxatives, cancer drugs, and antacids  Along with diarrhea, you may also have:  · Abdominal pain and cramping  · Nausea and vomiting  · Loss of bowel control  · Fever and chills  · Bloody stools  In some cases, antibiotics may help to treat diarrhea. You may have a stool sample test. This is done to see what is causing your diarrhea, and if antibiotics will help treat it. The results of a stool sample test may take up to 2 days. The healthcare provider may not give you antibiotics until he or she has the stool test results.  Diarrhea can cause dehydration. This is the loss of too much water and other fluids from the body. When this occurs, body fluid must be replaced. This can be done with oral rehydration solutions. Oral rehydration solutions are available at drugstores and grocery stores without a prescription.  Home care  Follow all instructions given by your healthcare provider. Rest at home for the next 24 hours, or until you feel better. Avoid caffeine, tobacco, and alcohol. These can make diarrhea, cramping, and pain worse.  If taking medicines:  · Dont take over-the-counter diarrhea or nausea medicines unless your healthcare provider tells you to.  · You may use acetaminophen or NSAID medicines like ibuprofen or naproxen to reduce pain and fever. Dont use these if you have chronic liver or kidney disease, or ever had a stomach ulcer or gastrointestinal bleeding. Don't use NSAID medicines if you are already taking one for another condition (like arthritis) or are on daily  aspirin therapy (such as for heart disease or after a stroke). Talk with your healthcare provider first.  · If antibiotics were prescribed, be sure you take them until they are finished. Dont stop taking them even when you feel better. Antibiotics must be taken as a full course.  To prevent the spread of illness:  · Remember that washing with soap and water and using alcohol-based  is the best way to prevent the spread of infection.  · Clean the toilet after each use.  · Wash your hands before eating.  · Wash your hands before and after preparing food. Keep in mind that people with diarrhea or vomiting should not prepare food for others.  · Wash your hands after using cutting boards, countertops, and knives that have been in contact with raw foods.  · Wash and then peel fruits and vegetables.  · Keep uncooked meats away from cooked and ready-to-eat foods.  · Use a food thermometer when cooking. Cook poultry to at least 165°F (74°C). Cook ground meat (beef, veal, pork, lamb) to at least 160°F (71°C). Cook fresh beef, veal, lamb, and pork to at least 145°F (63°C).  · Dont eat raw or undercooked eggs (poached or ac side up), poultry, meat, or unpasteurized milk and juices.  Food and drinks  The main goal while treating vomiting or diarrhea is to prevent dehydration. This is done by taking small amounts of liquids often.  · Keep in mind that liquids are more important than food right now.  · Drink only small amounts of liquids at a time.  · Dont force yourself to eat, especially if you are having cramping, vomiting, or diarrhea. Dont eat large amounts at a time, even if you are hungry.  · If you eat, avoid fatty, greasy, spicy, or fried foods.  · Dont eat dairy foods or drink milk if you have diarrhea. These can make diarrhea worse.  During the first 24 hours you can try:  · Oral rehydration solutions. Do not use sports drinks. They have too much sugar and not enough electrolytes.  · Soft drinks without  caffeine  · Ginger ale  · Water (plain or flavored)  · Decaf tea or coffee  · Clear broth, consommé, or bouillon  · Gelatin, popsicles, or frozen fruit juice bars  The second 24 hours, if you are feeling better, you can add:  · Hot cereal, plain toast, bread, rolls, or crackers  · Plain noodles, rice, mashed potatoes, chicken noodle soup, or rice soup  · Unsweetened canned fruit (no pineapple)  · Bananas  As you recover:  · Limit fat intake to less than 15 grams per day. Dont eat margarine, butter, oils, mayonnaise, sauces, gravies, fried foods, peanut butter, meat, poultry, or fish.  · Limit fiber. Dont eat raw or cooked vegetables, fresh fruits except bananas, or bran cereals.  · Limit caffeine and chocolate.  · Limit dairy.  · Dont use spices or seasonings except salt.  · Go back to your normal diet over time, as you feel better and your symptoms improve.  · If the symptoms come back, go back to a simple diet or clear liquids.  Follow-up care  Follow up with your healthcare provider, or as advised. If a stool sample was taken or cultures were done, call the healthcare provider for the results as instructed.  Call 911  Call 911 if you have any of these symptoms:  · Trouble breathing  · Confusion  · Extreme drowsiness or trouble walking  · Loss of consciousness  · Rapid heart rate  · Chest pain  · Stiff neck  · Seizure  When to seek medical advice  Call your healthcare provider right away if any of these occur:  · Abdominal pain that gets worse  · Constant lower right abdominal pain  · Continued vomiting and inability to keep liquids down  · Diarrhea more than 5 times a day  · Blood in vomit or stool  · Dark urine or no urine for 8 hours, dry mouth and tongue, tiredness, weakness, or dizziness  · Drowsiness  · New rash  · You dont get better in 2 to 3 days  · Fever of 100.4°F (38°C) or higher that doesnt get lower with medicine  Date Last Reviewed: 1/3/2016  © 5747-7443 [employer]. [address] [place] [address]  [place] [address]. All rights reserved. This information is not intended as a substitute for professional medical care. Always follow your healthcare professional's instructions.        Treating Diarrhea    Diarrhea happens when you have loose, watery, or frequent bowel movements. It is a common problem with many causes. Most cases of diarrhea clear up on their own. But certain cases may need treatment. Be sure to see your healthcare provider if your symptoms do not improve within a few days.  Getting relief  Treatment of diarrhea depends on its cause. Diarrhea caused by bacterial or parasite infection is often treated with antibiotics. Diarrhea caused by other factors, such as a stomach virus, often improves with simple home treatment. The tips below may also help relieve your symptoms.  · Drink plenty of fluids. This helps prevent too much fluid loss (dehydration). Water, clear soups, and electrolyte solutions are good choices. Avoid alcohol, coffee, tea, and milk. These can irritate your intestines and make symptoms worse.  · Suck on ice chips if drinking makes you queasy.  · Return to your normal diet slowly. You may want to eat bland foods at first, such as rice and toast. Also, you may need to avoid certain foods for a while, such as dairy products. These can make symptoms worse. Ask your healthcare provider if there are any other foods you should avoid.  · If you were prescribed antibiotics, take them as directed.  · Do not take anti-diarrhea medicines without asking your healthcare provider first.  Call your healthcare provider   Call your healthcare provider if you have any of the following:   · A fever of 100.4°F (38.0°C) or higher, or as directed by your healthcare provider  · Severe pain  · Worsening diarrhea or diarrhea for more than 2 days  · Bloody vomit or stool  · Signs of dehydration (dizziness, dry mouth and tongue, rapid pulse, dark urine)  Date Last Reviewed: 7/1/2016  © 7684-4533 [employer]. [address] [place] [address], [place] [address]. All rights reserved. This information is not intended as a substitute for professional medical care. Always follow your healthcare professional's instructions.        Lower GI Bleeding (Stable)  You have signs of blood in your stool. This is called rectal bleeding. The bleeding may have begun in another part of your gastrointestinal (GI) tract. If the blood is bright red, it is likely coming from the lower part of the GI tract. If the blood is black or dark, it might be coming from higher up in the GI tract. Very small amounts of GI bleeding may not be visible and can only be discovered during a test on your stool. Possible causes of lower GI bleeding include:  · Hemorrhoids  · Anal fissures  · Diverticulitis  · Inflammatory bowel disease (Crohn's disease or ulcerative colitis)  · Polyps (growths) in the intestine  Note: Iron supplements and medicines for diarrhea or upset stomach can cause black stools. Foods such as licorice and red beets can also discolor the stool and be mistaken for bleeding. These are not bleeding and are not a cause for alarm.  Home care  You have not lost a large amount of blood and your condition appears stable at this time. You may resume normal activity as long as you feel well.  Avoid NSAIDs, such as aspirin, ibuprofen, or naproxen. They can irritate the stomach and cause further bleeding. If you are taking these medicines for other medical reasons, talk to your healthcare provider before you stop them.   Follow-up care  Follow up with your healthcare provider as advised. Further tests may be needed to find the cause of your bleeding.  When to seek medical advice  Call your healthcare provider for any of the following:  · Large amount of rectal bleeding   · Increasing abdominal pain  · Weakness, dizziness  Call 911  Get emergency medical care if any of the following occur:  · Loss of consciousness  · Vomiting blood  Date  Last Reviewed: 6/24/2015  © 6178-0178 [employer]. [address] [place] [address], [place] [address]. All rights reserved. This information is not intended as a substitute for professional medical care. Always follow your healthcare professional's instructions.

## 2017-08-09 ENCOUNTER — PATIENT MESSAGE (OUTPATIENT)
Dept: FAMILY MEDICINE | Facility: CLINIC | Age: 67
End: 2017-08-09

## 2017-08-09 LAB — BACTERIA STL CULT: NORMAL

## 2017-08-12 ENCOUNTER — PATIENT MESSAGE (OUTPATIENT)
Dept: FAMILY MEDICINE | Facility: CLINIC | Age: 67
End: 2017-08-12

## 2017-08-16 DIAGNOSIS — K92.1 BLOODY STOOL: Primary | ICD-10-CM

## 2017-08-16 RX ORDER — POLYETHYLENE GLYCOL 3350, SODIUM SULFATE ANHYDROUS, SODIUM BICARBONATE, SODIUM CHLORIDE, POTASSIUM CHLORIDE 236; 22.74; 6.74; 5.86; 2.97 G/4L; G/4L; G/4L; G/4L; G/4L
POWDER, FOR SOLUTION ORAL
Qty: 1 BOTTLE | Refills: 0 | Status: ON HOLD | OUTPATIENT
Start: 2017-08-16 | End: 2017-09-07 | Stop reason: ALTCHOICE

## 2017-08-28 DIAGNOSIS — M81.0 OSTEOPOROSIS, UNSPECIFIED OSTEOPOROSIS TYPE, UNSPECIFIED PATHOLOGICAL FRACTURE PRESENCE: ICD-10-CM

## 2017-08-28 RX ORDER — ALENDRONATE SODIUM 35 MG/1
35 TABLET ORAL WEEKLY
Qty: 4 TABLET | Refills: 0 | Status: SHIPPED | OUTPATIENT
Start: 2017-08-28 | End: 2017-09-23 | Stop reason: SDUPTHER

## 2017-09-07 ENCOUNTER — ANESTHESIA EVENT (OUTPATIENT)
Dept: ENDOSCOPY | Facility: HOSPITAL | Age: 67
End: 2017-09-07
Payer: MEDICARE

## 2017-09-07 ENCOUNTER — SURGERY (OUTPATIENT)
Age: 67
End: 2017-09-07

## 2017-09-07 ENCOUNTER — HOSPITAL ENCOUNTER (OUTPATIENT)
Facility: HOSPITAL | Age: 67
Discharge: HOME | End: 2017-09-07
Attending: INTERNAL MEDICINE | Admitting: INTERNAL MEDICINE
Payer: MEDICARE

## 2017-09-07 ENCOUNTER — ANESTHESIA (OUTPATIENT)
Dept: ENDOSCOPY | Facility: HOSPITAL | Age: 67
End: 2017-09-07
Payer: MEDICARE

## 2017-09-07 VITALS
DIASTOLIC BLOOD PRESSURE: 77 MMHG | RESPIRATION RATE: 16 BRPM | BODY MASS INDEX: 24.91 KG/M2 | SYSTOLIC BLOOD PRESSURE: 147 MMHG | OXYGEN SATURATION: 100 % | TEMPERATURE: 98 F | WEIGHT: 155 LBS | HEART RATE: 62 BPM | HEIGHT: 66 IN

## 2017-09-07 DIAGNOSIS — K92.1 BLOODY STOOLS: Primary | ICD-10-CM

## 2017-09-07 PROCEDURE — 63600175 PHARM REV CODE 636 W HCPCS: Performed by: NURSE ANESTHETIST, CERTIFIED REGISTERED

## 2017-09-07 PROCEDURE — 43235 EGD DIAGNOSTIC BRUSH WASH: CPT | Performed by: INTERNAL MEDICINE

## 2017-09-07 PROCEDURE — 27201012 HC FORCEPS, HOT/COLD, DISP: Performed by: INTERNAL MEDICINE

## 2017-09-07 PROCEDURE — 37000008 HC ANESTHESIA 1ST 15 MINUTES: Performed by: INTERNAL MEDICINE

## 2017-09-07 PROCEDURE — 45385 COLONOSCOPY W/LESION REMOVAL: CPT | Mod: ,,, | Performed by: INTERNAL MEDICINE

## 2017-09-07 PROCEDURE — D9220A PRA ANESTHESIA: Mod: ANES,,, | Performed by: ANESTHESIOLOGY

## 2017-09-07 PROCEDURE — 45380 COLONOSCOPY AND BIOPSY: CPT | Performed by: INTERNAL MEDICINE

## 2017-09-07 PROCEDURE — 25000003 PHARM REV CODE 250: Performed by: INTERNAL MEDICINE

## 2017-09-07 PROCEDURE — 37000009 HC ANESTHESIA EA ADD 15 MINS: Performed by: INTERNAL MEDICINE

## 2017-09-07 PROCEDURE — 27201089 HC SNARE, DISP (ANY): Performed by: INTERNAL MEDICINE

## 2017-09-07 PROCEDURE — 88305 TISSUE EXAM BY PATHOLOGIST: CPT | Performed by: PATHOLOGY

## 2017-09-07 PROCEDURE — 45380 COLONOSCOPY AND BIOPSY: CPT | Mod: 59,,, | Performed by: INTERNAL MEDICINE

## 2017-09-07 PROCEDURE — D9220A PRA ANESTHESIA: Mod: CRNA,,, | Performed by: NURSE ANESTHETIST, CERTIFIED REGISTERED

## 2017-09-07 PROCEDURE — 43235 EGD DIAGNOSTIC BRUSH WASH: CPT | Mod: 51,,, | Performed by: INTERNAL MEDICINE

## 2017-09-07 PROCEDURE — 45385 COLONOSCOPY W/LESION REMOVAL: CPT | Performed by: INTERNAL MEDICINE

## 2017-09-07 RX ORDER — LIDOCAINE HCL/PF 100 MG/5ML
SYRINGE (ML) INTRAVENOUS
Status: DISCONTINUED | OUTPATIENT
Start: 2017-09-07 | End: 2017-09-07

## 2017-09-07 RX ORDER — PROPOFOL 10 MG/ML
VIAL (ML) INTRAVENOUS
Status: DISCONTINUED | OUTPATIENT
Start: 2017-09-07 | End: 2017-09-07

## 2017-09-07 RX ORDER — SODIUM CHLORIDE 9 MG/ML
INJECTION, SOLUTION INTRAVENOUS CONTINUOUS
Status: DISCONTINUED | OUTPATIENT
Start: 2017-09-07 | End: 2017-09-07 | Stop reason: HOSPADM

## 2017-09-07 RX ORDER — PROPOFOL 10 MG/ML
VIAL (ML) INTRAVENOUS CONTINUOUS PRN
Status: DISCONTINUED | OUTPATIENT
Start: 2017-09-07 | End: 2017-09-07

## 2017-09-07 RX ADMIN — PROPOFOL 160 MCG/KG/MIN: 10 INJECTION, EMULSION INTRAVENOUS at 11:09

## 2017-09-07 RX ADMIN — SODIUM CHLORIDE: 900 INJECTION, SOLUTION INTRAVENOUS at 10:09

## 2017-09-07 RX ADMIN — LIDOCAINE HYDROCHLORIDE 60 MG: 20 INJECTION, SOLUTION INTRAVENOUS at 11:09

## 2017-09-07 RX ADMIN — PROPOFOL 60 MG: 10 INJECTION, EMULSION INTRAVENOUS at 11:09

## 2017-09-07 NOTE — ANESTHESIA POSTPROCEDURE EVALUATION
"Anesthesia Post Evaluation    Patient: Windy Lindo    Procedure(s) Performed: Procedure(s) (LRB):  ESOPHAGOGASTRODUODENOSCOPY (EGD) (N/A)  COLONOSCOPY (N/A)    Final Anesthesia Type: general  Patient location during evaluation: PACU  Patient participation: Yes- Able to Participate  Level of consciousness: awake and alert and oriented  Post-procedure vital signs: reviewed and stable  Pain management: adequate  Airway patency: patent  PONV status at discharge: No PONV  Anesthetic complications: no      Cardiovascular status: stable  Respiratory status: unassisted, spontaneous ventilation and room air  Hydration status: euvolemic  Follow-up not needed.        Visit Vitals  BP (!) 147/77 (Patient Position: Lying)   Pulse 62   Temp 36.8 °C (98.2 °F) (Oral)   Resp 16   Ht 5' 6" (1.676 m)   Wt 70.3 kg (155 lb)   SpO2 100%   Breastfeeding? No   BMI 25.02 kg/m²       Pain/Cortney Score: Pain Assessment Performed: Yes (9/7/2017 12:24 PM)  Cortney Score: 10 (9/7/2017 12:24 PM)      "

## 2017-09-07 NOTE — PATIENT INSTRUCTIONS
Discharge Summary/Instructions after an Endoscopic Procedure  Patient Name: Windy Lindo  Patient MRN: [id number]  Patient YOB: 1950 Thursday, September 07, 2017  Albino Fenton MD  RESTRICTIONS:  During your procedure today, you received medications for sedation.  These   medications may affect your judgment, balance and coordination.  Therefore,   for 24 hours, you have the following restrictions:   - DO NOT drive a car, operate machinery, make legal/financial decisions,   sign important papers or drink alcohol.    ACTIVITY:  The following day: return to full activity including work, except no heavy   lifting, straining or running for 3 days if polyps were removed.  DIET:  Eat and drink normally unless instructed otherwise.  TREATMENT FOR COMMON SIDE EFFECTS:  - Mild abdominal pain, belching, bloating or excessive gas: rest, eat   lightly and use a heating pad.  - Sore Throat: treat with throat lozenges and/or gargle with warm salt   water.  SYMPTOMS TO WATCH FOR AND REPORT TO YOUR PHYSICIAN:  1. Abdominal pain or bloating, other than gas cramps.  2. Chest pain.  3. Back pain.  4. Chills or fever occurring within 24 hours after the procedure.  5. Rectal bleeding, which would show as bright red, maroon, or black stools.   (A tablespoon of blood from the rectum is not serious, especially if   hemorrhoids are present.)  6. Vomiting.  7. Weakness or dizziness.  8. Because air was used during the procedure, expelling large amounts of air   from your rectum or belching is normal.  9. If a bowel prep was taken, you may not have a bowel movement for 1-3   days.  This is normal.  GO DIRECTLY TO THE EMERGENCY ROOM IF YOU HAVE ANY OF THE FOLLOWING:   Difficulty breathing   Chills and/or fever over 101 F   Persistent vomiting and/or vomiting blood   Severe abdominal pain   Severe chest pain   Black, tarry stools   Bleeding- more than one tablespoon  Your doctor recommends these additional instructions:  If any  biopsies were taken, your doctors clinic will call you in 1 to 2   weeks with any results.  You have a contact number available for emergencies.  The signs and symptoms   of potential delayed complications were discussed with you.  You may return   to normal activities tomorrow.  Written discharge instructions were   provided to you.   You are being discharged to home.   Resume your previous diet.   Continue your present medications.   We are waiting for your pathology results.   Your physician has recommended a repeat colonoscopy in five years for   surveillance.  For questions, problems or results please call your physician - Albino Fenton MD at Work:  [phone].  [facility], EMERGENCY ROOM PHONE NUMBER: [phone]  IF A COMPLICATION OR EMERGENCY SITUATION ARISES AND YOU ARE UNABLE TO REACH   YOUR PHYSICIAN - GO DIRECTLY TO THE EMERGENCY ROOM.  Albino Fenton MD  9/7/2017 11:51:18 AM  This report has been verified and signed electronically.

## 2017-09-07 NOTE — H&P
Short Stay Endoscopy History and Physical    PCP - Trisha Higginbotham MD    Procedure - EGD/Colonoscopy  Sedation: GA  ASA - per anesthesia  Mallampati - per anesthesia  History of Anesthesia problems - no  Family history Anesthesia problems -  no     HPI:  This is a 67 y.o. female here for evaluation of : Recent episode of bloody stool    Reflux - no  Dysphagia - no  Abdominal pain - no  Diarrhea - no    ROS:  Constitutional: No fevers, chills, No weight loss  ENT: No allergies  CV: No chest pain  Pulm: No cough, No shortness of breath  Ophtho: No vision changes  GI: see HPI    Medical History:  has a past medical history of Arthritis and Eye injury (4 yrs).    Surgical History:  has a past surgical history that includes LASIK (7 yrs); Appendectomy; and Tonsillectomy.    Family History: family history includes Cancer (age of onset: 66) in her brother; Cancer (age of onset: 74) in her mother; Cataracts in her mother; Diabetes in her brother and mother; Glaucoma in her mother; Hypertension in her father and mother; No Known Problems in her maternal aunt, maternal grandfather, maternal grandmother, maternal uncle, paternal aunt, paternal grandfather, paternal grandmother, paternal uncle, and sister.. Otherwise no colon cancer, inflammatory bowel disease, or GI malignancies.    Social History:  reports that she has never smoked. She has never used smokeless tobacco. She reports that she drinks alcohol. She reports that she does not use drugs.    Review of patient's allergies indicates:  No Known Allergies    Medications:   Prescriptions Prior to Admission   Medication Sig Dispense Refill Last Dose    alendronate (FOSAMAX) 35 MG tablet Take 1 tablet (35 mg total) by mouth once a week. 4 tablet 0     amoxicillin (AMOXIL) 500 MG capsule    Taking    aspirin (ECOTRIN) 81 MG EC tablet Take 81 mg by mouth once daily.   Not Taking    diclofenac sodium 1 % Gel Apply 2 g topically 3 (three) times daily. 1 Tube 2 Taking    fish  oil-omega-3 fatty acids 300-1,000 mg capsule Take 2 g by mouth once daily.   Taking    glucosamine-chondroitin 500-400 mg tablet Take 1 tablet by mouth Daily.   Taking    LACTOBACILLUS ACIDOPHILUS (PROBIOTIC ORAL) Take by mouth once daily.   Taking    meloxicam (MOBIC) 15 MG tablet Take 1 tablet (15 mg total) by mouth once daily. (Patient taking differently: Take 7.5 mg by mouth once daily. ) 30 tablet 1 Taking    MULTIVITAMIN W-MINERALS/LUTEIN (CENTRUM SILVER ORAL) Take by mouth.   Taking    vitamin D 1000 units Tab Take 1,000 Units by mouth once daily.   Taking       Objective Findings:    Vital Signs: Per nursing notes.    Physical Exam:  General Appearance: Well appearing in no acute distress  Head:   Normocephalic, without obvious abnormality  Eyes:    No scleral icterus  Airway: Open  Neck: No restriction in mobility  Lungs: CTA bilaterally in anterior and posterior fields, no wheezes, no crackles.  Heart:  Regular rate and rhythm, S1, S2 normal, no murmurs heard  Abdomen: Soft, non tender, non distended      Labs:  Lab Results   Component Value Date    WBC 10.42 08/06/2017    HGB 14.3 08/06/2017    HCT 41.9 08/06/2017     08/06/2017    CHOL 173 05/06/2016    TRIG 63 05/06/2016    HDL 74 05/06/2016    ALT 10 08/06/2017    AST 15 08/06/2017     08/06/2017    K 4.0 08/06/2017     08/06/2017    CREATININE 0.8 08/06/2017    BUN 22 08/06/2017    CO2 22 (L) 08/06/2017    TSH 1.924 05/06/2016    INR 0.9 08/06/2017         I have explained the risks and benefits of endoscopy procedures to the patient including but not limited to bleeding, perforation, infection, and death.    Thank you so much for allowing me to participate in the care of Windy Fenton MD

## 2017-09-07 NOTE — PATIENT INSTRUCTIONS
Discharge Summary/Instructions after an Endoscopic Procedure  Patient Name: Windy Lindo  Patient MRN: [id number]  Patient YOB: 1950 Thursday, September 07, 2017  Albino Fenton MD  RESTRICTIONS:  During your procedure today, you received medications for sedation.  These   medications may affect your judgment, balance and coordination.  Therefore,   for 24 hours, you have the following restrictions:   - DO NOT drive a car, operate machinery, make legal/financial decisions,   sign important papers or drink alcohol.    ACTIVITY:  The following day: return to full activity including work, except no heavy   lifting, straining or running for 3 days if polyps were removed.  DIET:  Eat and drink normally unless instructed otherwise.  TREATMENT FOR COMMON SIDE EFFECTS:  - Mild abdominal pain, belching, bloating or excessive gas: rest, eat   lightly and use a heating pad.  - Sore Throat: treat with throat lozenges and/or gargle with warm salt   water.  SYMPTOMS TO WATCH FOR AND REPORT TO YOUR PHYSICIAN:  1. Abdominal pain or bloating, other than gas cramps.  2. Chest pain.  3. Back pain.  4. Chills or fever occurring within 24 hours after the procedure.  5. Rectal bleeding, which would show as bright red, maroon, or black stools.   (A tablespoon of blood from the rectum is not serious, especially if   hemorrhoids are present.)  6. Vomiting.  7. Weakness or dizziness.  8. Because air was used during the procedure, expelling large amounts of air   from your rectum or belching is normal.  9. If a bowel prep was taken, you may not have a bowel movement for 1-3   days.  This is normal.  GO DIRECTLY TO THE EMERGENCY ROOM IF YOU HAVE ANY OF THE FOLLOWING:   Difficulty breathing   Chills and/or fever over 101 F   Persistent vomiting and/or vomiting blood   Severe abdominal pain   Severe chest pain   Black, tarry stools   Bleeding- more than one tablespoon  Your doctor recommends these additional instructions:  If any  biopsies were taken, your doctors clinic will call you in 1 to 2   weeks with any results.  You have a contact number available for emergencies.  The signs and symptoms   of potential delayed complications were discussed with you.  You may return   to normal activities tomorrow.  Written discharge instructions were   provided to you.   You are being discharged to home.   Resume your previous diet.   Continue your present medications.  For questions, problems or results please call your physician - Albino Fenton MD at Work:  [phone].  [facility], EMERGENCY ROOM PHONE NUMBER: [phone]  IF A COMPLICATION OR EMERGENCY SITUATION ARISES AND YOU ARE UNABLE TO REACH   YOUR PHYSICIAN - GO DIRECTLY TO THE EMERGENCY ROOM.  Albino Fenton MD  9/7/2017 11:34:50 AM  This report has been verified and signed electronically.

## 2017-09-07 NOTE — ANESTHESIA PREPROCEDURE EVALUATION
09/07/2017  Windy Lindo is a 67 y.o., female.    Anesthesia Evaluation    I have reviewed the Patient Summary Reports.     I have reviewed the Medications.     Review of Systems  Anesthesia Hx:  History of prior surgery of interest to airway management or planning:  Denies Personal Hx of Anesthesia complications.   Social:  Non-Smoker, No Alcohol Use    Hematology/Oncology:  Hematology Normal   Oncology Normal     EENT/Dental:EENT/Dental Normal   Cardiovascular:  Cardiovascular Normal Exercise tolerance: good     Pulmonary:  Pulmonary Normal    Renal/:  Renal/ Normal     Hepatic/GI:   Bloody stool   Musculoskeletal:   Arthritis     Neurological:  Neurology Normal    Dermatological:  Skin Normal    Psych:  Psychiatric Normal           Physical Exam  General:  Well nourished    Airway/Jaw/Neck:  Airway Findings: Mouth Opening: Normal Tongue: Normal  General Airway Assessment: Adult, Good  Mallampati: II  TM Distance: Normal, at least 6 cm     Eyes/Ears/Nose:  EYES/EARS/NOSE FINDINGS: Normal   Dental:  Dental Findings: In tact   Chest/Lungs:  Chest/Lungs Findings: Clear to auscultation, Normal Respiratory Rate     Heart/Vascular:  Heart Findings: Rate: Normal  Rhythm: Regular Rhythm  Sounds: Normal  Heart murmur: negative       Mental Status:  Mental Status Findings:  Cooperative, Alert and Oriented         Anesthesia Plan  Type of Anesthesia, risks & benefits discussed:  Anesthesia Type:  general  Patient's Preference:   Intra-op Monitoring Plan:   Intra-op Monitoring Plan Comments:   Post Op Pain Control Plan:   Post Op Pain Control Plan Comments:   Induction:   IV  Beta Blocker:  Patient is not currently on a Beta-Blocker (No further documentation required).       Informed Consent: Patient understands risks and agrees with Anesthesia plan.  Questions answered. Anesthesia consent signed with  patient.  ASA Score: 1     Day of Surgery Review of History & Physical:    H&P update referred to the provider.     Anesthesia Plan Notes:   67F NSAID use, bloody stool for EGD/Cscope under GA NC TIVA        Ready For Surgery From Anesthesia Perspective.

## 2017-09-07 NOTE — TRANSFER OF CARE
"Anesthesia Transfer of Care Note    Patient: Windy Lindo    Procedure(s) Performed: Procedure(s) (LRB):  ESOPHAGOGASTRODUODENOSCOPY (EGD) (N/A)  COLONOSCOPY (N/A)    Patient location: PACU    Anesthesia Type: general    Transport from OR: Transported from OR on room air with adequate spontaneous ventilation    Post pain: adequate analgesia    Post assessment: tolerated procedure well and no apparent anesthetic complications    Post vital signs: stable    Level of consciousness: awake, alert and oriented    Nausea/Vomiting: no nausea/vomiting    Complications: none    Transfer of care protocol was followed      Last vitals:   Visit Vitals  BP (!) 163/86 (BP Location: Left arm, Patient Position: Lying)   Pulse 83   Temp 37.1 °C (98.8 °F) (Temporal)   Resp 16   Ht 5' 6" (1.676 m)   Wt 70.3 kg (155 lb)   SpO2 100%   Breastfeeding? No   BMI 25.02 kg/m²     "

## 2017-09-12 ENCOUNTER — TELEPHONE (OUTPATIENT)
Dept: GASTROENTEROLOGY | Facility: CLINIC | Age: 67
End: 2017-09-12

## 2017-09-12 NOTE — TELEPHONE ENCOUNTER
----- Message from Albino Fenton MD sent at 9/12/2017  2:53 PM CDT -----  Please notify patient, the polyps were benign.

## 2017-09-14 ENCOUNTER — TELEPHONE (OUTPATIENT)
Dept: ENDOSCOPY | Facility: HOSPITAL | Age: 67
End: 2017-09-14

## 2017-09-23 DIAGNOSIS — M81.0 OSTEOPOROSIS, UNSPECIFIED OSTEOPOROSIS TYPE, UNSPECIFIED PATHOLOGICAL FRACTURE PRESENCE: ICD-10-CM

## 2017-09-23 RX ORDER — ALENDRONATE SODIUM 35 MG/1
35 TABLET ORAL WEEKLY
Qty: 4 TABLET | Refills: 0 | Status: SHIPPED | OUTPATIENT
Start: 2017-09-23 | End: 2017-10-09 | Stop reason: SDUPTHER

## 2017-09-26 ENCOUNTER — OFFICE VISIT (OUTPATIENT)
Dept: SPORTS MEDICINE | Facility: CLINIC | Age: 67
End: 2017-09-26
Payer: MEDICARE

## 2017-09-26 VITALS
HEIGHT: 66 IN | DIASTOLIC BLOOD PRESSURE: 87 MMHG | WEIGHT: 155 LBS | SYSTOLIC BLOOD PRESSURE: 160 MMHG | HEART RATE: 76 BPM | BODY MASS INDEX: 24.91 KG/M2

## 2017-09-26 DIAGNOSIS — M25.561 CHRONIC PAIN OF RIGHT KNEE: Primary | ICD-10-CM

## 2017-09-26 DIAGNOSIS — G89.29 CHRONIC PAIN OF RIGHT KNEE: Primary | ICD-10-CM

## 2017-09-26 DIAGNOSIS — M17.11 OSTEOARTHRITIS OF RIGHT KNEE: ICD-10-CM

## 2017-09-26 PROCEDURE — 99214 OFFICE O/P EST MOD 30 MIN: CPT | Mod: S$PBB,,, | Performed by: ORTHOPAEDIC SURGERY

## 2017-09-26 PROCEDURE — 1125F AMNT PAIN NOTED PAIN PRSNT: CPT | Mod: ,,, | Performed by: ORTHOPAEDIC SURGERY

## 2017-09-26 PROCEDURE — 99999 PR PBB SHADOW E&M-EST. PATIENT-LVL III: CPT | Mod: PBBFAC,,, | Performed by: ORTHOPAEDIC SURGERY

## 2017-09-26 PROCEDURE — 99213 OFFICE O/P EST LOW 20 MIN: CPT | Mod: PBBFAC,PO | Performed by: ORTHOPAEDIC SURGERY

## 2017-09-26 PROCEDURE — 1159F MED LIST DOCD IN RCRD: CPT | Mod: ,,, | Performed by: ORTHOPAEDIC SURGERY

## 2017-09-26 NOTE — PROGRESS NOTES
CC: Right knee pain    67 y.o. Female with a history of chronic right knee pain.     She has tried Euflexxa series and received 6 weeks relieve.  She has tried cortisone injections and has received 4 weeks relief.  She has failed physical therapy.  She has failed Mobic and Cellebrex.  She is not allowed to take NSAIDs, because a month ago she had blood in her stool.  She is not taking tyenol for the pain, without relief.    She reports that the pain and weakness. It does not bother her at night.    + mechanical symptoms, no instability    Is affecting ADLs.  Pain is 4/10 today    Review of Systems   Constitution: Negative. Negative for chills, fever and night sweats.   HENT: Negative for congestion and headaches.    Eyes: Negative for blurred vision, left vision loss and right vision loss.   Cardiovascular: Negative for chest pain and syncope.   Respiratory: Negative for cough and shortness of breath.    Endocrine: Negative for polydipsia, polyphagia and polyuria.   Hematologic/Lymphatic: Negative for bleeding problem. Does not bruise/bleed easily.   Skin: Negative for dry skin, itching and rash.   Musculoskeletal: Negative for falls. Positive for right knee pain and  muscle weakness.   Gastrointestinal: Negative for abdominal pain and bowel incontinence.   Genitourinary: Negative for bladder incontinence and nocturia.   Neurological: Negative for disturbances in coordination, loss of balance and seizures.   Psychiatric/Behavioral: Negative for depression. The patient does not have insomnia.    Allergic/Immunologic: Negative for hives and persistent infections.     PAST MEDICAL HISTORY:   Past Medical History:   Diagnosis Date    Arthritis     Eye injury 4 yrs    hit od     PAST SURGICAL HISTORY:   Past Surgical History:   Procedure Laterality Date    APPENDECTOMY      COLONOSCOPY N/A 9/7/2017    Procedure: COLONOSCOPY;  Surgeon: Albino Fenton MD;  Location: [facility] ([address] [place] [address]);  Service: Endoscopy;   Laterality: N/A;    LASIK  7 yrs    ou    TONSILLECTOMY       FAMILY HISTORY:   Family History   Problem Relation Age of Onset    Hypertension Mother     Glaucoma Mother     Diabetes Mother     Cataracts Mother     Cancer Mother 74     lung    Hypertension Father     Diabetes Brother     Cancer Brother 66     mesothelioma    No Known Problems Sister     No Known Problems Maternal Aunt     No Known Problems Maternal Uncle     No Known Problems Paternal Aunt     No Known Problems Paternal Uncle     No Known Problems Maternal Grandmother     No Known Problems Maternal Grandfather     No Known Problems Paternal Grandmother     No Known Problems Paternal Grandfather     Amblyopia Neg Hx     Blindness Neg Hx     Macular degeneration Neg Hx     Retinal detachment Neg Hx     Strabismus Neg Hx     Stroke Neg Hx     Thyroid disease Neg Hx      SOCIAL HISTORY:   Social History     Social History    Marital status: Single     Spouse name: N/A    Number of children: N/A    Years of education: N/A     Occupational History     [employer]     Social History Main Topics    Smoking status: Never Smoker    Smokeless tobacco: Never Used    Alcohol use 0.6 oz/week     1 Glasses of wine per week    Drug use: No    Sexual activity: No     Other Topics Concern    Not on file     Social History Narrative    No narrative on file       MEDICATIONS:   Current Outpatient Prescriptions:     acetaminophen (TYLENOL) suppository, Place 325 mg rectally every 4 (four) hours as needed for Temperature greater than., Disp: , Rfl:     alendronate (FOSAMAX) 35 MG tablet, TAKE 1 TABLET (35 MG TOTAL) BY MOUTH ONCE A WEEK., Disp: 4 tablet, Rfl: 0    aspirin (ECOTRIN) 81 MG EC tablet, Take 81 mg by mouth once daily., Disp: , Rfl:     diclofenac sodium 1 % Gel, Apply 2 g topically 3 (three) times daily., Disp: 1 Tube, Rfl: 2    fish oil-omega-3 fatty acids 300-1,000 mg capsule, Take 2 g by mouth once daily., Disp: , Rfl:      "glucosamine-chondroitin 500-400 mg tablet, Take 1 tablet by mouth Daily., Disp: , Rfl:     LACTOBACILLUS ACIDOPHILUS (PROBIOTIC ORAL), Take by mouth once daily., Disp: , Rfl:     MULTIVITAMIN W-MINERALS/LUTEIN (CENTRUM SILVER ORAL), Take by mouth., Disp: , Rfl:     vitamin D 1000 units Tab, Take 1,000 Units by mouth once daily., Disp: , Rfl:   ALLERGIES: Review of patient's allergies indicates:  No Known Allergies    VITAL SIGNS:   BP (!) 160/87   Pulse 76   Ht 5' 6" (1.676 m)   Wt 70.3 kg (155 lb)   BMI 25.02 kg/m²      PHYSICAL EXAMINATION  VITAL SIGNS:   BP (!) 160/87   Pulse 76   Ht 5' 6" (1.676 m)   Wt 70.3 kg (155 lb)   BMI 25.02 kg/m²    General:  The patient is alert and oriented x 3.  Mood is pleasant.  Observation of ears, eyes and nose reveal no gross abnormalities.  No labored breathing observed.    Right KNEE EXAMINATION     OBSERVATION / INSPECTION   Gait:   Antalgic   Alignment:  Neutral   Scars:   None   Muscle atrophy: Mild  Effusion:  None   Warmth:  None   Discoloration:   none     TENDERNESS / CREPITUS (T / C):          T / C      T / C   Patella   - / -   Lateral joint line   + / -   Peripatellar medial  -  Medial joint line    - / -   Peripatellar lateral -  Medial plica   - / -   Patellar tendon -   Popliteal fossa   - / -   Quad tendon   -   Gastrocnemius   -   Prepatellar Bursa - / -   Quadricep   -   Tibial tubercle  -  Thigh/hamstring  -   Pes anserine/HS -  Fibula    -   ITB   - / -  Tibia     -   Tib/fib joint  - / -  LCL    -     MFC   - / -   MCL: Proximal  -    LFC   - / -    Distal   -          ROM: (* = pain)  PASSIVE   ACTIVE    Left :   5 / 0 / 145   5 / 0 / 145     Right :    5 / 0 / 130   5 / 0 / 130    Patellofemoral examination:  See above noted areas of tenderness.   Patella position    Subluxation / dislocation: Centered           Sup. / Inf;   Normal   Crepitus (PF):    Absent   Patellar Mobility:       Medial-lateral:   Normal    Superior-inferior:  Normal "    Inferior tilt   Normal    Patellar tendon:  Normal   Lateral tilt:    Normal   J-sign:     None   Patellofemoral grind:   No pain       MENISCAL SIGNS:     Pain on terminal extension:  -  Pain on terminal flexion:  -  Leonels maneuver:  + for pain  Squat     + posterior joint pain    LIGAMENT EXAMINATION:  ACL / Lachman:  normal (-1 to 2mm)    PCL-Post.  drawer: normal 0 to 2mm  MCL- Valgus:  normal 0 to 2mm  LCL- Varus:  normal 0 to 2mm  Pivot shift: normal (Equal)   Dial Test: difference c/w other side   At 30° flexion: normal (< 5°)    At 90° flexion: normal (< 5°)   Reverse Pivot Shift:   normal (Equal)     STRENGTH: (* = with pain) PAINFUL SIDE   Quadricep   5/5   Hamstrin/5    EXTREMITY NEURO-VASCULAR EXAMINATION:   Sensation:  Grossly intact to light touch all dermatomal regions.   Motor Function:  Fully intact motor function at hip, knee, foot and ankle    DTRs;  quadriceps and  achilles 2+.  No clonus and downgoing Babinski.    Vascular status:  DP and PT pulses 2+, brisk capillary refill, symmetric.     XRAY (17):  Right knee: Severe joint space narrowing of the lateral compartment with subluxation.  Lateral subluxation of EE patella with joint space narrowing of the lateral patellar compartment.  Moderate marginal osteophyte formation.  No evidence of fracture or dislocation.  No soft tissue abnormality.    Left knee: Moderate joint space narrowing of the lateral compartment and mild joint space narrowing of the medial compartment.  Subluxation of the patella with severe joint space narrowing of the lateral patellar compartment.  Mild marginal osteophyte formation.  No evidence of fracture or dislocation.  No soft tissue abnormality.     ASSESSMENT:    Right Knee Pain  Right Knee DJD, worse in lateral compartment    PLAN:    1. Refer to Dr. Evans Busch to discuss right TKA    All questions were answered, pt will contact us for questions or concerns in the interim.

## 2017-10-09 DIAGNOSIS — M81.0 OSTEOPOROSIS, UNSPECIFIED OSTEOPOROSIS TYPE, UNSPECIFIED PATHOLOGICAL FRACTURE PRESENCE: ICD-10-CM

## 2017-10-09 RX ORDER — ALENDRONATE SODIUM 35 MG/1
35 TABLET ORAL WEEKLY
Qty: 4 TABLET | Refills: 0 | Status: SHIPPED | OUTPATIENT
Start: 2017-10-09 | End: 2017-11-16 | Stop reason: SDUPTHER

## 2017-10-25 ENCOUNTER — OFFICE VISIT (OUTPATIENT)
Dept: ORTHOPEDICS | Facility: CLINIC | Age: 67
End: 2017-10-25
Payer: MEDICARE

## 2017-10-25 VITALS — WEIGHT: 160 LBS | BODY MASS INDEX: 25.71 KG/M2 | HEIGHT: 66 IN

## 2017-10-25 DIAGNOSIS — M17.11 PRIMARY OSTEOARTHRITIS OF RIGHT KNEE: Primary | ICD-10-CM

## 2017-10-25 PROCEDURE — 99213 OFFICE O/P EST LOW 20 MIN: CPT | Mod: PBBFAC | Performed by: ORTHOPAEDIC SURGERY

## 2017-10-25 PROCEDURE — 99203 OFFICE O/P NEW LOW 30 MIN: CPT | Mod: S$PBB,,, | Performed by: ORTHOPAEDIC SURGERY

## 2017-10-25 PROCEDURE — 99999 PR PBB SHADOW E&M-EST. PATIENT-LVL III: CPT | Mod: PBBFAC,,, | Performed by: ORTHOPAEDIC SURGERY

## 2017-10-25 RX ORDER — DEXTROMETHORPHAN HYDROBROMIDE, GUAIFENESIN 5; 100 MG/5ML; MG/5ML
650 LIQUID ORAL EVERY 8 HOURS
Status: ON HOLD | COMMUNITY
End: 2017-12-05 | Stop reason: HOSPADM

## 2017-10-25 NOTE — LETTER
October 25, 2017      Gavino Canales MD  [address]  [facility] [address]           [facility] - Orthoped[facility]  [address]  [facility] [address]  Phone: [phone]          Patient: Windy Lindo   MR Number: [id number]   YOB: 1950   Date of Visit: 10/25/2017       Dear Dr. Gavino Canales:    Thank you for referring Windy Lindo to me for evaluation. Attached you will find relevant portions of my assessment and plan of care.    If you have questions, please do not hesitate to call me. I look forward to following Windy Lindo along with you.    Sincerely,    Evans Busch MD    Enclosure  CC:  No Recipients    If you would like to receive this communication electronically, please contact [email or web address] or [phone] to request more information on [employer] Link access.    For providers and/or their staff who would like to refer a patient to [facility], please contact us through our one-stop-shop provider referral line, [facility], at [phone].    If you feel you have received this communication in error or would no longer like to receive these types of communications, please e-mail [email or web address]

## 2017-10-25 NOTE — PROGRESS NOTES
CHIEF COMPLAINT: Right knee pain.                                                          HISTORY OF PRESENT ILLNESS:  The patient is a 67 y.o. female  who presents  for evaluation of her right knee pain. Her ADL's are now markedly limited.    Pain Duration:  > 30 years  Pain Quality: dull  Pain Context:worsening  Pain Timing: intermittent - with any weight bearing activities  Pain Location:lateral  Pain Severity: severe  Modifying Factors: failed NSAID's, injections, and PT  Associated Signs and Symptoms: recurrent swelling    She  presents for further treatment recommendations.    She denies radicular pain or low back pain.  She denies distal paresthesias, lower extremity edema, or calf pain concerning for vascular claudication.  She has no history of discreet prior trauma.                                                                                                                       PAST MEDICAL HISTORY:    Past Medical History:   Diagnosis Date    Arthritis     Eye injury 4 yrs    hit od                                                                                                            PAST SURGICAL HISTORY:    Past Surgical History:   Procedure Laterality Date    APPENDECTOMY      COLONOSCOPY N/A 9/7/2017    Procedure: COLONOSCOPY;  Surgeon: Albino Fenton MD;  Location: [facility] ([address] [place] [address]);  Service: Endoscopy;  Laterality: N/A;    ESOPHAGOGASTRODUODENOSCOPY  09/07/2017    LASIK  7 yrs    ou    TONSILLECTOMY                                                                                                                 SOCIAL HISTORY:  Reviewed per EPIC history for tobacco or alcohol use and she   is an active  67 y.o.  female                                                                             FAMILY MEDICAL HISTORY:  family history includes Cancer (age of onset: 66) in her brother; Cancer (age of onset: 74) in her mother; Cataracts in her mother; Diabetes in her brother and  mother; Glaucoma in her mother; Hypertension in her father and mother; No Known Problems in her maternal aunt, maternal grandfather, maternal grandmother, maternal uncle, paternal aunt, paternal grandfather, paternal grandmother, paternal uncle, and sister.                                                                                                                                                   PHYSICAL EXAMINATION:                                                        GENERAL:  A well-developed, well-nourished 67 y.o. female who is alert and       oriented in no acute distress.      Gait: She  walks with a limp and a markedly hypervalgus alignment.                   EXTREMITIES:  Examination of lower extremities reveals there is no visible mass or deformity.    Left knee:  ROM 5-120    Ligamentously stable to varus/valgus stress.    Anterior and posterior drawers negative.    No pain over pes bursa.    No warmth    No erythema    Effusion Yes    Right knee:  ROM  - marked hypervalgus    Ligamentously stable to varus/valgus stress. Marked lateral crepitation    Anterior and posterior drawers negative.    No pain over pes bursa.    No warmth    No erythema    Effusion Yes    The skin over both lower extremities is normal and unremarkable.  She has a  painless range of motion of the hips and ankles bilaterally.   Sensation is intact in both lower extremities.    There are no motor deficits in the lower extremities bilaterally.   Pedal pulses are palpable distally bilaterally.    She has no calf tenderness to palpation nor edema.                                    She has imaging which was reviewed with the patient and shows severe osteoarthritis.  Radiographs show advanced DJD with joint space narrowing, sclerosis, and osteophytes.                                                                               IMPRESSION: Osteoarthritis right knee.                             The conservative options  including NSAIDs, activity modification, physical therapy, corticosteroid injection, and viscosupplimentation were discussed.  These have failed.  She is interested in knee replacement.  The surgical process of knee replacement was discussed in detail with the patient including a detailed discussion of the procedure itself (including visual model, x-ray review, and literature review). The typical perioperative and post-operative course was discussed and perioperative risks were discussed to the patient's satisfaction.  Risks and complications discussed included but were not limited to the risks of anesthetic complications, infection, bleeding, wound healing complications, aseptic loosening, instability, limb length inequality, neurologic dysfunction including numbness,  DVT, pulmonary embolism, perioperative medical risks (cardiac, pulmonary, renal, neurologic), and death and the patient elects to proceed.   I have discussed anticoagulation with aspirin and coumadin and in low risk patients I have recommended aspirin twice a day. We will initiate pre-operative medical evaluation and clearance and set a provisional date for surgical intervention according to the patient's schedule on 12/5/17 for right knee replacement.

## 2017-11-14 NOTE — PRE ADMISSION SCREENING
Anesthesia Assessment: Preoperative EQUATION    Planned Procedure: Procedure(s) (LRB):  REPLACEMENT-KNEE-TOTAL (Right)  Requested Anesthesia Type:Regional  Surgeon: Evans Busch MD  Service: Orthopedics  Known or anticipated Date of Surgery:12/5/2017    Other important co-morbidities: GI bleed 8/2017         Plan:    Testing:  Hematology Profile, BMP and PT/INR   Pre-anesthesia  visit       Visit focus: possible regional anesthesia and/or nerve block and discharge disposition     Consultation: Perioperative Hospitalist     Meli Merchant RN 11/16/2017

## 2017-11-16 ENCOUNTER — ANESTHESIA EVENT (OUTPATIENT)
Dept: SURGERY | Facility: HOSPITAL | Age: 67
DRG: 470 | End: 2017-11-16
Payer: MEDICARE

## 2017-11-16 ENCOUNTER — PATIENT MESSAGE (OUTPATIENT)
Dept: FAMILY MEDICINE | Facility: CLINIC | Age: 67
End: 2017-11-16

## 2017-11-16 DIAGNOSIS — M81.0 OSTEOPOROSIS, UNSPECIFIED OSTEOPOROSIS TYPE, UNSPECIFIED PATHOLOGICAL FRACTURE PRESENCE: ICD-10-CM

## 2017-11-16 DIAGNOSIS — M79.606 PAIN OF LOWER EXTREMITY, UNSPECIFIED LATERALITY: Primary | ICD-10-CM

## 2017-11-16 RX ORDER — ALENDRONATE SODIUM 35 MG/1
35 TABLET ORAL WEEKLY
Qty: 4 TABLET | Refills: 0 | Status: SHIPPED | OUTPATIENT
Start: 2017-11-16 | End: 2017-12-15 | Stop reason: SDUPTHER

## 2017-11-16 NOTE — ANESTHESIA PREPROCEDURE EVALUATION
Anesthesia Assessment: Preoperative EQUATION    Planned Procedure: Procedure(s) (LRB):  REPLACEMENT-KNEE-TOTAL (Right)  Requested Anesthesia Type:Regional  Surgeon: Evans Busch MD  Service: Orthopedics  Known or anticipated Date of Surgery:12/5/2017    Other important co-morbidities: GI bleed 8/2017         Plan:    Testing:  Hematology Profile, BMP and PT/INR   Pre-anesthesia  visit       Visit focus: possible regional anesthesia and/or nerve block and discharge disposition     Consultation:IM Perioperative Hospitalist     Meli Merchant RN 11/16/2017 11/16/2017  Windy Lindo is a 67 y.o., female.    Pre-op Assessment         Review of Systems  Anesthesia Hx:  No problems with previous Anesthesia Oral surgeries-last sx several months ago and appendix/tonsil(50 years ago)   Social:  Non-Smoker, Social Alcohol Use Wine per night but none in 2 months   Hematology/Oncology:  Hematology Normal   Oncology Normal     EENT/Dental:  EENT/Dental Normal Active Dental Problems (root amputaion in 3/2017)    Cardiovascular:   Exercise tolerance: good Housework, cooks, grocery shopping, mow the lawn.  Denies chest pain or sob   Pulmonary:  Pulmonary Normal  Possible Obstructive Sleep Apnea , (STOP/BANG) Symptoms A - Age > 50    Renal/:  Renal/ Normal     Hepatic/GI:   Denies GERD. Denies Liver Disease. Bloody stool  9/2017. EGD and colonoscopy in chart-no real source of the bleeding per patient.  Attributed to aleve and meloxicam use   Musculoskeletal:   Arthritis   Musculoskeletal General/Symptoms: Functional capacity is ambulatory without assistance. Leg instability-fell x2 Joint Disease:  Arthritis, Osteoarthritis    Neurological:   Denies CVA. Denies Seizures.  Pain , onset is chronic , location of knee , quality of aching/dull, sharp , severity is a 2 , precipitating factors are walking , alleviating  factors are rest. Osteoarthritis    Endocrine:  Endocrine Normal    Dermatological:  Skin Normal    Psych:  Psychiatric Normal           Physical Exam  General:  Well nourished    Airway/Jaw/Neck:  Airway Findings: Mouth Opening: Normal Tongue: Normal  General Airway Assessment: Adult, Good  Jaw/Neck Findings:  Neck ROM: Normal ROM      Dental:  Dental Findings: (lower fixed bridge bottom left and right) In tact        Mental Status:  Mental Status Findings:  Cooperative, Alert and Oriented           Labs reviewed    Discharge disposition: Dawn Etienne [phone] and [phone]    Please refer to , Internal Medicine, perioperative risk assessment and recommendations.     Meli Merchant, RN 11/29/2017

## 2017-11-17 DIAGNOSIS — M81.0 OSTEOPOROSIS, UNSPECIFIED OSTEOPOROSIS TYPE, UNSPECIFIED PATHOLOGICAL FRACTURE PRESENCE: ICD-10-CM

## 2017-11-17 RX ORDER — ALENDRONATE SODIUM 35 MG/1
35 TABLET ORAL WEEKLY
Qty: 4 TABLET | Refills: 0 | Status: SHIPPED | OUTPATIENT
Start: 2017-11-17 | End: 2018-02-12

## 2017-11-29 ENCOUNTER — INITIAL CONSULT (OUTPATIENT)
Dept: INTERNAL MEDICINE | Facility: CLINIC | Age: 67
End: 2017-11-29
Payer: MEDICARE

## 2017-11-29 ENCOUNTER — HOSPITAL ENCOUNTER (OUTPATIENT)
Dept: PREADMISSION TESTING | Facility: HOSPITAL | Age: 67
Discharge: HOME | End: 2017-11-29
Attending: ANESTHESIOLOGY
Payer: MEDICARE

## 2017-11-29 ENCOUNTER — HOSPITAL ENCOUNTER (OUTPATIENT)
Dept: RADIOLOGY | Facility: HOSPITAL | Age: 67
Discharge: HOME | End: 2017-11-29
Attending: HOSPITALIST
Payer: MEDICARE

## 2017-11-29 VITALS
HEIGHT: 66 IN | DIASTOLIC BLOOD PRESSURE: 79 MMHG | OXYGEN SATURATION: 98 % | BODY MASS INDEX: 25.81 KG/M2 | SYSTOLIC BLOOD PRESSURE: 154 MMHG | WEIGHT: 160.63 LBS | HEART RATE: 78 BPM | TEMPERATURE: 98 F

## 2017-11-29 DIAGNOSIS — R03.0 ELEVATED BP WITHOUT DIAGNOSIS OF HYPERTENSION: ICD-10-CM

## 2017-11-29 DIAGNOSIS — R09.89 CHEST SOUNDS ABNORMAL ON PERCUSSION OR AUSCULTATION: ICD-10-CM

## 2017-11-29 DIAGNOSIS — I83.93 VARICOSE VEINS OF BOTH LOWER EXTREMITIES: ICD-10-CM

## 2017-11-29 PROBLEM — K92.1 BLOODY STOOLS: Status: RESOLVED | Noted: 2017-09-07 | Resolved: 2017-11-29

## 2017-11-29 PROCEDURE — 99999 PR PBB SHADOW E&M-EST. PATIENT-LVL III: CPT | Mod: PBBFAC,,, | Performed by: HOSPITALIST

## 2017-11-29 PROCEDURE — 99213 OFFICE O/P EST LOW 20 MIN: CPT | Mod: PBBFAC,25 | Performed by: HOSPITALIST

## 2017-11-29 PROCEDURE — 71020 XR CHEST PA AND LATERAL: CPT | Mod: 26,,, | Performed by: RADIOLOGY

## 2017-11-29 PROCEDURE — 99214 OFFICE O/P EST MOD 30 MIN: CPT | Mod: S$PBB,,, | Performed by: HOSPITALIST

## 2017-11-29 PROCEDURE — 71020 XR CHEST PA AND LATERAL: CPT | Mod: TC

## 2017-11-29 RX ORDER — MELOXICAM 7.5 MG/1
7.5 TABLET ORAL
Status: ON HOLD | COMMUNITY
End: 2017-12-05 | Stop reason: HOSPADM

## 2017-11-29 NOTE — LETTER
November 29, 2017      Rhonda G Leopold, MD  [address]  [place] [address]           [facility]  [address] [facility] [address]  Phone: [phone]          Patient: Windy Lindo   MR Number: [id number]   YOB: 1950   Date of Visit: 11/29/2017       Dear Dr. Rhonda G Leopold:    Thank you for referring Windy Lindo to me for evaluation. Attached you will find relevant portions of my assessment and plan of care.    If you have questions, please do not hesitate to call me. I look forward to following Windy Lindo along with you.    Sincerely,    Nahed Diana MD    Enclosure  CC:  MD Evans Lawrence MD    If you would like to receive this communication electronically, please contact [email or web address] or [phone] to request more information on [employer] Link access.    For providers and/or their staff who would like to refer a patient to [facility], please contact us through our one-stop-shop provider referral line, [facility], at [phone].    If you feel you have received this communication in error or would no longer like to receive these types of communications, please e-mail [email or web address]

## 2017-11-29 NOTE — HPI
History of present illness- I had the pleasure of meeting this pleasant 67 y.o. lady in the pre op clinic prior to her elective Orthopedic surgery. The patient is new to me .  I have obtained the history by speaking to the patient and by reviewing the electronic health records.    Events leading up to surgery / History of presenting illness -    She has been troubled with moderate-severe  Rt knee   pain for 3-4 years . Pain increases with activity and decreases with resting.    Relevant health conditions of significance for the perioperative period/ History of presenting illness -    Aug 2017 -painless  rectal bleeding Bloody stools  Had EGD , colonoscopy   Colonoscopy- Diverticulosis  EGD- Small hiatal hernia  Bleeding was attributed to  Meloxicam, Aleeve  Since then off of Aleeve   Uses as needed Meloxicam     Occasional acid reflux with certain foods , late night eating   Pepcid helps    Checks BP every morning   //75  Meloxicam use, Coffee could be contributing    Gets yearly Physicals and labs    Not known to have heart disease , Diabetes Mellitus, Lung disease

## 2017-11-29 NOTE — PROGRESS NOTES
[facility] - Pre Op Consult  Progress Note    Patient Name: Windy Lindo  MRN: [id number]  Date of Evaluation- 11/29/2017  PCP- Trisha Higginbotham MD    Future cases for Windy Lindo [[id number]]     Case ID Status Date Time Win Procedure Provider Location    [id number] [facility] 12/5/2017  7:00  REPLACEMENT-KNEE-TOTAL Evans Busch MD [[id number]] [facility] OR 2ND FLR      Rt     HPI:  History of present illness- I had the pleasure of meeting this pleasant 67 y.o. lady in the pre op clinic prior to her elective Orthopedic surgery. The patient is new to me .  I have obtained the history by speaking to the patient and by reviewing the electronic health records.    Events leading up to surgery / History of presenting illness -    She has been troubled with moderate-severe  Rt knee   pain for 3-4 years . Pain increases with activity and decreases with resting.    Relevant health conditions of significance for the perioperative period/ History of presenting illness -    Aug 2017 -painless  rectal bleeding Bloody stools  Had EGD , colonoscopy   Colonoscopy- Diverticulosis  EGD- Small hiatal hernia  Bleeding was attributed to  Meloxicam, Aleeve  Since then off of Aleeve   Uses as needed Meloxicam     Occasional acid reflux with certain foods , late night eating   Pepcid helps    Checks BP every morning   //75  Meloxicam use, Coffee could be contributing    Gets yearly Physicals and labs    Not known to have heart disease , Diabetes Mellitus, Lung disease         Subjective/ Objective:          Chief complaint-Preoperative evaluation, Perioperative Medical management, complication reduction plan     Active cardiac conditions- none    Revised cardiac risk index predictors- none    Functional capacity -Examples of physical activity, does a lot of house work, loves yard work, cooking , road trips , goes to casinos  can take a flight of stairs holding on to the railing, does a stationary bike ,less active due to knee problems- She can  undertake all the above activities without  chest pain,chest tightness, Shortness of breath ,dizziness,lightheadedness making her exercise tolerance more,than 4 Mets.       Review of Systems   Constitutional: Negative for chills and fever.        No unusual weight changes   HENT:        STOPBANG score 1/8    Age over 50      Eyes:        No new visual changes   Respiratory:        No cough , phlegm  No hemoptysis    Cardiovascular:        As noted   Gastrointestinal:        No overt GI/ blood losses  Bowel movements- Regular  Had Fresh rectal bleeding Aug 2017    Endocrine:        Prednisone use > 20 mg daily for 3 weeks   Genitourinary: Negative for dysuria.        No urinary hesitancy    Musculoskeletal:        As above      Skin: Negative for rash.   Neurological: Negative for syncope.        No unilateral weakness   Hematological:        Current use of Anticoagulants  None   Psychiatric/Behavioral:        No Depression       No vascular stenting     Past Surgical History:   Procedure Laterality Date    APPENDECTOMY      COLONOSCOPY N/A 9/7/2017    Procedure: COLONOSCOPY;  Surgeon: Albino Fenton MD;  Location: [address];  Service: Endoscopy;  Laterality: N/A;    ESOPHAGOGASTRODUODENOSCOPY  09/07/2017    LASIK  7 yrs    ou    TONSILLECTOMY         No anesthesia, bleeding, cardiac problems , PONV with previous surgeries/procedures.  Medications and Allergies reviewed in epic.   FH- No anesthesia, thrombosis / bleeding ,  in family   Lives alone, friend going to stay for about a week post op     Physical Exam   HENT:   Head: Normocephalic.       Physical Exam   HENT:   Head: Normocephalic.     Constitutional- Vitals - Body mass index is 25.92 kg/m².,   Vitals:    11/29/17 0955   BP: (!) 154/79   Pulse: 78   Temp: 98 °F (36.7 °C)     General appearance-Conscious,Coherent  Eyes- No conjunctival icterus,pupils  round , reactive to light  and  Bilateral cataracts  ENT-Oral cavity- moist  , Hearing  "grossly normal   Neck- No thyromegaly ,Trachea -central, No jugular venous distension,   No Carotid Bruit   Cardiovascular -Heart Sounds- Normal  and  no murmur   , No gallop rhythm   Respiratory - Decreased Air entry Rt mid , lower , Normal Respiratory Effort and  no wheeze    Peripheral pitting pedal edema-- varicose veins right lower extremity  and  varicose veins left lower extremity, no calf pain   Gastrointestinal -Soft abdomen, No palpable masses, Non Tender,Liver,Spleen not palpable. No-- free fluid and shifting dullness  Musculoskeletal- No finger Clubbing. Strength grossly normal   Lymphatic-No Palpable cervical, axillary,Inguinal lymphadenopathy   Psychiatric - normal effect,Orientation  Rt Dorsalis pedis pulses-palpable    Lt Dorsalis pedis pulses- palpable   Rt Posterior tibial pulses -palpable   Left posterior tibial pulses -palpable   Miscellaneous -  no renal bruit and osteoarthritic nodes   Blood pressure (!) 154/79, pulse 78, temperature 98 °F (36.7 °C), temperature source Oral, height 5' 6" (1.676 m), weight 72.8 kg (160 lb 9.6 oz), SpO2 98 %.      Investigations  Lab and Imaging have been reviewed in [facility].    Review of Medicine tests    EKG- I had independently reviewed the EKG from--8/6/2017   It was reported to be showing     Normal sinus rhythm  Normal ECG  No previous ECGs available    Review of clinical lab tests-Date--- 8/6/2017 Creatinine-0.8   Glucose monitor at the house  ( 80- before ,100-  1-2 hours post breakfast )   Date--8/6/2017 Hemoglobin--N  Platelet count--N      Review of old records- Was done and information gathered regards to events leading to surgery and health conditions of significance in the perioperative period.        Preoperative cardiac risk assessment-  The patient does not have any active cardiac conditions . Revised cardiac risk index predictors-0 ---.Functional capacity is more than 4 Mets. She will be undergoing a Orthopedic procedure that carries a intermediate " "risk     The estimated risk of the rate of adverse cardiac outcomes  0.4%    No further cardiac work up is indicated prior to proceeding with the surgery     Orders Placed This Encounter    X-Ray Chest PA And Lateral       American Society of Anesthesiologists Physical status classification ( ASA ) class: 2     Postoperative pulmonary complication risk assessment:     BP (!) 154/79 Comment: rt  Pulse 78   Temp 98 °F (36.7 °C) (Oral)   Ht 5' 6" (1.676 m)   Wt 72.8 kg (160 lb 9.6 oz)   SpO2 98%   BMI 25.92 kg/m²      ARISCAT ( Canet) risk index- risk class -  Low, if duration of surgery is under 3 hours, intermediate, if duration of surgery is over 3 hours      [facility] failure [facility]- percentage risk of respiratory failure: 0.5 %    Assessment/Plan:     Elevated BP without diagnosis of hypertension  Suggested reduction of salt use     Chest sounds abnormal on percussion or auscultation  Decreased Air entry Rt mid , lower   Normal basal percussion    Varicose veins of both lower extremities  Increased risk of thrombosis   Compression stocking use discussed        Preventive perioperative care    Thromboembolic prophylaxis:  Her risk factors for thrombosis include surgical procedure and age.I suggest  thromboembolic prophylaxis ( mechanical/pharmacological, weighing the risk benefits of pharmacological agent use considering yessica procedural bleeding )  during the perioperative period.I suggested being active in the post operative period. The patient is a candidate for extended DVT prophylaxis     Postoperative pulmonary complication prophylaxis-Risk factors for post operative pulmonary complications include age over 65 years- I suggest incentive spirometry use, early ambulation and end tidal carbon dioxide monitoring  , oral care , head end of bed elevation     Renal complication prophylaxis- I suggest keeping her well hydrated .I suggested drinking 2 litre's of water a day      Surgical site " Infection Prophylaxis-I  suggest appropriate antibiotic for Prophylaxis against Surgical site infections      In view of Regional Anesthesia, Joint replacement  the patient  is at risk of postoperative urinary retention.  I suggest avoidance / minimizing the of  Benzodiazepines,Anticholinergic medication,antihistamines ( Benadryl) , if possible in the perioperative period. I suggest using the minimum possible use of opioids for the minimum period of time in the perioperative period. Benadryl avoidance suggested      This visit was focused on Preoperative evaluation, Perioperative Medical management, complication reduction plans. I suggest that the patient follows up with primary care or relevant sub specialists for ongoing health care.    I appreciate the opportunity to be involved in this patients care. Please feel free to contact me if there were any questions about this consultation.    Patient is optimized    Nahed Diana MD  Perioperative Medicine  [facility]   Pager [phone]  ------    11/29- 18 43     CBC - Hb,HCT,PLT- N  BMP - Creat - 0.8  INR -N    CXR - personally reviewed reportedly showed   Heart size is normal. Lungs are clear. There is aortic plaque and DJD.  No acute process seen  Spoke to patient     Suggested hydrated     Patient was instructed to call and update me about any changes to health, changes to medication, office visits , hospitalizations between now and surgery   -----    12/4- 7 52     Called to follow up   Doing good   No changes to Medication, Health   /77 - yesterday   Call, if needed - suggested

## 2017-11-29 NOTE — OUTPATIENT SUBJECTIVE & OBJECTIVE
Outpatient Subjective & Objective     Chief complaint-Preoperative evaluation, Perioperative Medical management, complication reduction plan     Active cardiac conditions- none    Revised cardiac risk index predictors- none    Functional capacity -Examples of physical activity, does a lot of house work, loves yard work, cooking , road trips , goes to casinos  can take a flight of stairs holding on to the railing, does a stationary bike ,less active due to knee problems- She can undertake all the above activities without  chest pain,chest tightness, Shortness of breath ,dizziness,lightheadedness making her exercise tolerance more,than 4 Mets.       Review of Systems   Constitutional: Negative for chills and fever.        No unusual weight changes   HENT:        STOPBANG score 1/8    Age over 50      Eyes:        No new visual changes   Respiratory:        No cough , phlegm  No hemoptysis    Cardiovascular:        As noted   Gastrointestinal:        No overt GI/ blood losses  Bowel movements- Regular  Had Fresh rectal bleeding Aug 2017    Endocrine:        Prednisone use > 20 mg daily for 3 weeks   Genitourinary: Negative for dysuria.        No urinary hesitancy    Musculoskeletal:        As above      Skin: Negative for rash.   Neurological: Negative for syncope.        No unilateral weakness   Hematological:        Current use of Anticoagulants  None   Psychiatric/Behavioral:        No Depression       No vascular stenting     Past Surgical History:   Procedure Laterality Date    APPENDECTOMY      COLONOSCOPY N/A 9/7/2017    Procedure: COLONOSCOPY;  Surgeon: Albino Fenton MD;  Location: [address]);  Service: Endoscopy;  Laterality: N/A;    ESOPHAGOGASTRODUODENOSCOPY  09/07/2017    LASIK  7 yrs    ou    TONSILLECTOMY         No anesthesia, bleeding, cardiac problems , PONV with previous surgeries/procedures.  Medications and Allergies reviewed in epic.   FH- No anesthesia, thrombosis / bleeding ,   "in family   Lives alone, friend going to stay for about a week post op     Physical Exam   HENT:   Head: Normocephalic.       Physical Exam   HENT:   Head: Normocephalic.     Constitutional- Vitals - Body mass index is 25.92 kg/m².,   Vitals:    11/29/17 0955   BP: (!) 154/79   Pulse: 78   Temp: 98 °F (36.7 °C)     General appearance-Conscious,Coherent  Eyes- No conjunctival icterus,pupils  round , reactive to light  and  Bilateral cataracts  ENT-Oral cavity- moist  , Hearing grossly normal   Neck- No thyromegaly ,Trachea -central, No jugular venous distension,   No Carotid Bruit   Cardiovascular -Heart Sounds- Normal  and  no murmur   , No gallop rhythm   Respiratory - Decreased Air entry Rt mid , lower , Normal Respiratory Effort and  no wheeze    Peripheral pitting pedal edema-- varicose veins right lower extremity  and  varicose veins left lower extremity, no calf pain   Gastrointestinal -Soft abdomen, No palpable masses, Non Tender,Liver,Spleen not palpable. No-- free fluid and shifting dullness  Musculoskeletal- No finger Clubbing. Strength grossly normal   Lymphatic-No Palpable cervical, axillary,Inguinal lymphadenopathy   Psychiatric - normal effect,Orientation  Rt Dorsalis pedis pulses-palpable    Lt Dorsalis pedis pulses- palpable   Rt Posterior tibial pulses -palpable   Left posterior tibial pulses -palpable   Miscellaneous -  no renal bruit and osteoarthritic nodes   Blood pressure (!) 154/79, pulse 78, temperature 98 °F (36.7 °C), temperature source Oral, height 5' 6" (1.676 m), weight 72.8 kg (160 lb 9.6 oz), SpO2 98 %.      Investigations  Lab and Imaging have been reviewed in epic.    Review of Medicine tests    EKG- I had independently reviewed the EKG from--8/6/2017   It was reported to be showing     Normal sinus rhythm  Normal ECG  No previous ECGs available    Review of clinical lab tests-Date--- 8/6/2017 Creatinine-0.8   Glucose monitor at the house  ( 80- before ,100-  1-2 hours post breakfast " )   Date--8/6/2017 Hemoglobin--N  Platelet count--N      Review of old records- Was done and information gathered regards to events leading to surgery and health conditions of significance in the perioperative period.    Outpatient Subjective & Objective

## 2017-11-29 NOTE — DISCHARGE INSTRUCTIONS
Your surgery has been scheduled for:__________________________________________    You should report to:  ____[facility], located on the [place] side of the first floor of the           [facility] ([phone])  ____[facility], located on the [facility] side of the            Second floor of the [facility] ([phone])  ____3rd Floor [facility] located on the [facility] side of the [facility] [phone]  Please Note   - Tell your doctor if you take Aspirin, products containing Aspirin, herbal medications  or blood thinners, such as Coumadin, Ticlid, or Plavix.  (Consult your provider regarding holding or stopping before surgery).  - Arrange for someone to drive you home following surgery.  You will not be allowed to leave the surgical facility alone or drive yourself home following sedation and anesthesia.  Before Surgery  - Stop taking all herbal medications 14days prior to surgery  - No Motrin/Advil (Ibuprofen) 7 days before surgery  - No Aleve (Naproxen) 7 days before surgery  - Stop Taking Asprin, products containing Asprin _____days before surgery  - Stop taking blood thinners_______days before surgery  - Refrain from drinking alcoholic beverages for 24hours before and after surgery  - Stop or limit smoking _________days before surgery  Night before Surgery  - DO NOT EAT OR DRINK ANYTHING AFTER MIDNIGHT, INCLUDING GUM, HARD CANDY, MINTS, OR CHEWING TOBACCO.  - Take a shower or bath (shower is recommended).  Bathe with Hibiclens soap or an antibacterial soap from the neck down.  If not supplied by your surgeon, hibiclens soap will need to be purchased over the counter in pharmacy.  Rinse soap off thoroughly.  - Shampoo your hair with your regular shampoo  The Day of Surgery  - Take another bath or shower with hibiclens or any antibacterial soap, to reduce the chance of infection.  - Take heart and blood  pressure medications with a small sip of water, as advised by the perioperative team.  - Do not take fluid pills  - You may brush your teeth and rinse your mouth, but do not swall any additional water.   - Do not apply perfumes, powder, body lotions or deodorant on the day of surgery.  - Nail polish should be removed.  - Do not wear makeup or moisturizer  - Wear comfortable clothes, such as a button front shirt and loose fitting pants.  - Leave all jewelry, including body piercings, and valuables at home.    - Bring any devices you will neeed after surgery such as crutches or canes.  - If you have sleep apnea, please bring your CPAP machine  In the event that your physical condition changes including the onset of a cold or respiratory illness, or if you have to delay or cancel your surgery, please notify your surgeon.Anesthesia: Regional Anesthesia  Youre scheduled for surgery. During surgery, youll receive medication called anesthesia to keep you comfortable and pain-free. Your surgeon has decided that youll receive regional anesthesia. This sheet tells you what to expect with this type of anesthesia.  What Is Regional Anesthesia?  Regional anesthesia numbs one region of your body. The anesthesia may be given around nerves or into veins in your arms, neck, or legs (nerve block or Lennie block). Or it may be sent into the spinal fluid (spinal anesthesia) or into the space just outside the spinal fluid (epidural anesthesia). Sedatives may also be given to relax you.  Nerve Block or Lennie Block  A small area of the body, such as an arm or leg, can be numbed using a nerve block or Yznaga block.  · Nerve block: During a nerve block, your skin is numbed. A needle is then inserted near nerves that serve the area to be numbed. Anesthetic is sent through the needle.  · IV regional or Lennie block: For this type of block, an IV line is put into a vein. The blood flow to the area to be numbed is blocked for a short time.  Anesthetic is sent through the IV.  Spinal Anesthesia  Spinal anesthesia numbs your body from about the waist down.  · Anesthetic is injected into the spinal fluid. This is a substance that surrounds the spinal cord in your spinal column. The anesthetic blocks pain traveling from the body to the brain.  · To receive the anesthetic, your skin is numbed at the injection site.  · A needle is then inserted into the spinal fluid. Anesthetic is sent through the needle.  Anesthesia Tools and Medications  · Local anesthetic given through a needle numbs one region of your body.  · Electrocardiography leads (electrodes) record your heart rate and rhythm.  · A blood pressure cuff monitors your blood pressure.  · A pulse oximeter on the end of the finger measures your blood oxygen level.  · Sedatives may be given through an IV to relax you and keep you comfortable. You may stay awake or sleep slightly.  · Oxygen may be given to you through a facemask.  Risks and Possible Complications  Regional anesthesia carries some risks. These include:  · Nausea and vomiting  · Headache  · Backache  · Decreased blood pressure  · Allergic reaction to the anesthetic  · Ongoing numbness (rare)  · Irregular heartbeat (rare)  · Cardiac arrest (rare)   © 7268-1688 [facility], [address] [place] [address], [place] [address]. All rights reserved. This information is not intended as a substitute for professional medical care. Always follow your healthcare professional's instructions.

## 2017-11-30 ENCOUNTER — OFFICE VISIT (OUTPATIENT)
Dept: ORTHOPEDICS | Facility: CLINIC | Age: 67
End: 2017-11-30
Payer: MEDICARE

## 2017-11-30 VITALS — WEIGHT: 161.63 LBS | HEIGHT: 66 IN | BODY MASS INDEX: 25.97 KG/M2

## 2017-11-30 DIAGNOSIS — Z01.818 PREOP EXAMINATION: ICD-10-CM

## 2017-11-30 DIAGNOSIS — M17.11 PRIMARY OSTEOARTHRITIS OF RIGHT KNEE: Primary | ICD-10-CM

## 2017-11-30 PROCEDURE — 99999 PR PBB SHADOW E&M-EST. PATIENT-LVL III: CPT | Mod: PBBFAC,,, | Performed by: NURSE PRACTITIONER

## 2017-11-30 PROCEDURE — 99499 UNLISTED E&M SERVICE: CPT | Mod: S$PBB,,, | Performed by: NURSE PRACTITIONER

## 2017-11-30 PROCEDURE — 99213 OFFICE O/P EST LOW 20 MIN: CPT | Mod: PBBFAC | Performed by: NURSE PRACTITIONER

## 2017-11-30 RX ORDER — ACETAMINOPHEN 500 MG
1000 TABLET ORAL EVERY 6 HOURS
Status: CANCELLED | OUTPATIENT
Start: 2017-11-30 | End: 2017-12-02

## 2017-11-30 RX ORDER — SODIUM CHLORIDE 9 MG/ML
INJECTION, SOLUTION INTRAVENOUS CONTINUOUS
Status: CANCELLED | OUTPATIENT
Start: 2017-11-30 | End: 2017-12-01

## 2017-11-30 RX ORDER — MIDAZOLAM HYDROCHLORIDE 5 MG/ML
1 INJECTION INTRAMUSCULAR; INTRAVENOUS EVERY 5 MIN PRN
Status: CANCELLED | OUTPATIENT
Start: 2017-11-30

## 2017-11-30 RX ORDER — RAMELTEON 8 MG/1
8 TABLET ORAL NIGHTLY PRN
Status: CANCELLED | OUTPATIENT
Start: 2017-11-30

## 2017-11-30 RX ORDER — OXYCODONE HYDROCHLORIDE 5 MG/1
15 TABLET ORAL
Status: CANCELLED | OUTPATIENT
Start: 2017-11-30

## 2017-11-30 RX ORDER — OXYCODONE HYDROCHLORIDE 5 MG/1
10 TABLET ORAL
Status: CANCELLED | OUTPATIENT
Start: 2017-11-30

## 2017-11-30 RX ORDER — SODIUM CHLORIDE 9 MG/ML
INJECTION, SOLUTION INTRAVENOUS
Status: CANCELLED | OUTPATIENT
Start: 2017-11-30

## 2017-11-30 RX ORDER — PREGABALIN 25 MG/1
75 CAPSULE ORAL NIGHTLY
Status: CANCELLED | OUTPATIENT
Start: 2017-11-30

## 2017-11-30 RX ORDER — POLYETHYLENE GLYCOL 3350 17 G/17G
17 POWDER, FOR SOLUTION ORAL DAILY
Status: CANCELLED | OUTPATIENT
Start: 2017-11-30

## 2017-11-30 RX ORDER — ONDANSETRON 2 MG/ML
4 INJECTION INTRAMUSCULAR; INTRAVENOUS EVERY 12 HOURS PRN
Status: CANCELLED | OUTPATIENT
Start: 2017-11-30

## 2017-11-30 RX ORDER — ASPIRIN 325 MG
325 TABLET, DELAYED RELEASE (ENTERIC COATED) ORAL 2 TIMES DAILY
Status: CANCELLED | OUTPATIENT
Start: 2017-11-30

## 2017-11-30 RX ORDER — MORPHINE SULFATE 10 MG/ML
2 INJECTION, SOLUTION INTRAMUSCULAR; INTRAVENOUS
Status: CANCELLED | OUTPATIENT
Start: 2017-11-30

## 2017-11-30 RX ORDER — ONDANSETRON 2 MG/ML
4 INJECTION INTRAMUSCULAR; INTRAVENOUS EVERY 8 HOURS PRN
Status: CANCELLED | OUTPATIENT
Start: 2017-11-30

## 2017-11-30 RX ORDER — AMOXICILLIN 250 MG
1 CAPSULE ORAL 2 TIMES DAILY
Status: CANCELLED | OUTPATIENT
Start: 2017-11-30

## 2017-11-30 RX ORDER — OXYCODONE HYDROCHLORIDE 5 MG/1
5 TABLET ORAL
Status: CANCELLED | OUTPATIENT
Start: 2017-11-30

## 2017-11-30 RX ORDER — PREGABALIN 25 MG/1
75 CAPSULE ORAL
Status: CANCELLED | OUTPATIENT
Start: 2017-11-30

## 2017-11-30 RX ORDER — CELECOXIB 100 MG/1
400 CAPSULE ORAL
Status: CANCELLED | OUTPATIENT
Start: 2017-11-30

## 2017-11-30 RX ORDER — BISACODYL 10 MG
10 SUPPOSITORY, RECTAL RECTAL EVERY 12 HOURS PRN
Status: CANCELLED | OUTPATIENT
Start: 2017-11-30

## 2017-11-30 RX ORDER — MUPIROCIN 20 MG/G
1 OINTMENT TOPICAL
Status: CANCELLED | OUTPATIENT
Start: 2017-11-30

## 2017-11-30 RX ORDER — ACETAMINOPHEN 10 MG/ML
1000 INJECTION, SOLUTION INTRAVENOUS ONCE
Status: CANCELLED | OUTPATIENT
Start: 2017-11-30 | End: 2017-11-30

## 2017-11-30 RX ORDER — FENTANYL CITRATE 50 UG/ML
25 INJECTION, SOLUTION INTRAMUSCULAR; INTRAVENOUS EVERY 5 MIN PRN
Status: CANCELLED | OUTPATIENT
Start: 2017-11-30

## 2017-11-30 RX ORDER — CELECOXIB 100 MG/1
200 CAPSULE ORAL DAILY
Status: CANCELLED | OUTPATIENT
Start: 2017-11-30

## 2017-11-30 RX ORDER — SODIUM CHLORIDE 0.9 % (FLUSH) 0.9 %
3 SYRINGE (ML) INJECTION EVERY 8 HOURS PRN
Status: CANCELLED | OUTPATIENT
Start: 2017-11-30

## 2017-11-30 RX ORDER — LIDOCAINE HYDROCHLORIDE 10 MG/ML
1 INJECTION, SOLUTION EPIDURAL; INFILTRATION; INTRACAUDAL; PERINEURAL
Status: CANCELLED | OUTPATIENT
Start: 2017-11-30

## 2017-11-30 RX ORDER — FAMOTIDINE 20 MG/1
20 TABLET, FILM COATED ORAL 2 TIMES DAILY
Status: CANCELLED | OUTPATIENT
Start: 2017-11-30

## 2017-11-30 RX ORDER — ROPIVACAINE HYDROCHLORIDE 2 MG/ML
8 INJECTION, SOLUTION EPIDURAL; INFILTRATION; PERINEURAL CONTINUOUS
Status: CANCELLED | OUTPATIENT
Start: 2017-11-30

## 2017-11-30 RX ORDER — NALOXONE HCL 0.4 MG/ML
0.02 VIAL (ML) INJECTION
Status: CANCELLED | OUTPATIENT
Start: 2017-11-30

## 2017-11-30 NOTE — H&P
CC: Right knee pain    Windy Lindo is a 67 y.o. female with a several year history of Right knee pain. Pain is worse with activity and weight bearing.  Patient has experienced interference of activities of daily living due to decreased range of motion and an increase in joint pain and swelling.  Patient has failed non-operative treatment including NSAIDs, corticosteroid injections, viscosupplement injections, and activity modification.  Windy Lindo currently ambulates independently.     Past Medical History:   Diagnosis Date    Arthritis     Eye injury 4 yrs    hit od       Past Surgical History:   Procedure Laterality Date    APPENDECTOMY      COLONOSCOPY N/A 9/7/2017    Procedure: COLONOSCOPY;  Surgeon: Albino Fenton MD;  Location: [facility] ([address] [place] [address]);  Service: Endoscopy;  Laterality: N/A;    ESOPHAGOGASTRODUODENOSCOPY  09/07/2017    LASIK  7 yrs    ou    TONSILLECTOMY         Family History   Problem Relation Age of Onset    Hypertension Mother     Glaucoma Mother     Diabetes Mother     Cataracts Mother     Cancer Mother 74     lung    Heart disease Mother 55    Hypertension Father     Heart disease Father      onset early 60;s, Aortic valve replacement ,Rheumatic fever as a child     Diabetes Brother     Cancer Brother 66     mesothelioma    No Known Problems Sister     No Known Problems Maternal Aunt     No Known Problems Maternal Uncle     No Known Problems Paternal Aunt     No Known Problems Paternal Uncle     No Known Problems Maternal Grandmother     No Known Problems Maternal Grandfather     No Known Problems Paternal Grandmother     No Known Problems Paternal Grandfather     Amblyopia Neg Hx     Blindness Neg Hx     Macular degeneration Neg Hx     Retinal detachment Neg Hx     Strabismus Neg Hx     Stroke Neg Hx     Thyroid disease Neg Hx        Review of patient's allergies indicates:  No Known Allergies      Current Outpatient Prescriptions:      "acetaminophen (TYLENOL ARTHRITIS PAIN) 650 MG TbSR, Take 650 mg by mouth every 8 (eight) hours. Pt states takes two every morning and 2 in evening if needed, Disp: , Rfl:     alendronate (FOSAMAX) 35 MG tablet, Take 1 tablet (35 mg total) by mouth once a week. Needs appointment for future refills (Patient taking differently: Take 35 mg by mouth once a week. Needs appointment for future refills Takes on Monday), Disp: 4 tablet, Rfl: 0    meloxicam (MOBIC) 7.5 MG tablet, Take 7.5 mg by mouth as needed for Pain., Disp: , Rfl:     vitamin D 1000 units Tab, Take 1,000 Units by mouth once daily., Disp: , Rfl:     Review of Systems:  Constitutional: no fever or chills  Eyes: no visual changes  ENT: no nasal congestion or sore throat  Respiratory: no cough or shortness of breath  Cardiovascular: no chest pain or palpitations  Gastrointestinal: no nausea or vomiting, tolerating diet  Genitourinary: no hematuria or dysuria  Integument/Breast: no rash or pruritis  Hematologic/Lymphatic: no easy bruising or lymphadenopathy  Musculoskeletal: positive for right knee pain worse with activity  Neurological: no seizures or tremors  Behavioral/Psych: no auditory or visual hallucinations  Endocrine: no heat or cold intolerance    PE:  Ht 5' 6" (1.676 m)   Wt 73.3 kg (161 lb 9.6 oz)   BMI 26.08 kg/m²   General: Pleasant, cooperative, NAD   Gait: antalgic  HEENT: NCAT, sclera nonicteric   Lungs: Respirations clear bilaterally; equal and unlabored.   CV: S1S2; 2+ bilateral upper and lower extremity pulses.   Skin: Intact throughout with no rashes, erythema, or lesions  Extremities: No LE edema,  no erythema or warmth of the skin in either lower extremity.    Right knee exam:  Knee Range of Motion:normal, pain with passive range of motion  Effusion:none  Condition of skin:intact  Location of tenderness:Medial joint line   Strength:normal  Stability: stable to testing    Hip Examination:normal    Radiographs: Radiographs reveal Severe " joint space narrowing of the lateral compartment with subluxation.  Lateral subluxation of EE patella with joint space narrowing of the lateral patellar compartment.  Moderate marginal osteophyte formation     Knee Alignment: normal    Diagnosis: osteoarthritis Right knee    Plan: Right total knee arthroplasty    Due to the serious nature of total joint infection and the high prevalence of community acquired MRSA, vancomycin will be used perioperatively.

## 2017-11-30 NOTE — PROGRESS NOTES
Windy Lindo is a 67 y.o. year old here today for a pre-operative visit in preparation for a Right total knee arthroplasty to be performed by  Dr. Busch on 12/5/17.  she was last seen and treated in the clinic on 10/25/2017. she will be medically optimized by the pre op center. There has been no significant change in medical status since last visit. No fever, chills, malaise, or unexplained weight change.      Allergies, Medications, past medical and surgical history reviewed.    Focused examination performed.    Patient declined to see Dr. Busch today in clinic. All questions answered. Patient encouraged to call with questions. Contact information given.     Pre, yessica, and post operative procedures and expectations discussed. Questions were answered. Windy Lindo has been educated and is ready to proceed with surgery. Approximately 30 minutes was spent discussing surgical outcomes, plans, procedures pre, yessica, and post operative expections and care.  Surgical consent signed.    Windy VANESSA Guicho will contact us if there are any questions, concerns, or changes in medical status prior to surgery.

## 2017-12-05 ENCOUNTER — HOSPITAL ENCOUNTER (INPATIENT)
Facility: HOSPITAL | Age: 67
LOS: 1 days | Discharge: HOME HEALTH | DRG: 470 | End: 2017-12-06
Attending: ORTHOPAEDIC SURGERY | Admitting: ORTHOPAEDIC SURGERY
Payer: MEDICARE

## 2017-12-05 ENCOUNTER — ANESTHESIA (OUTPATIENT)
Dept: SURGERY | Facility: HOSPITAL | Age: 67
DRG: 470 | End: 2017-12-05
Payer: MEDICARE

## 2017-12-05 DIAGNOSIS — M17.11 PRIMARY OSTEOARTHRITIS OF RIGHT KNEE: Primary | ICD-10-CM

## 2017-12-05 DIAGNOSIS — Z01.818 PREOP EXAMINATION: ICD-10-CM

## 2017-12-05 LAB
ANION GAP SERPL CALC-SCNC: 7 MMOL/L
BUN SERPL-MCNC: 14 MG/DL
CALCIUM SERPL-MCNC: 8.8 MG/DL
CHLORIDE SERPL-SCNC: 112 MMOL/L
CO2 SERPL-SCNC: 23 MMOL/L
CREAT SERPL-MCNC: 0.7 MG/DL
EST. GFR  (AFRICAN AMERICAN): >60 ML/MIN/1.73 M^2
EST. GFR  (NON AFRICAN AMERICAN): >60 ML/MIN/1.73 M^2
GLUCOSE SERPL-MCNC: 112 MG/DL
POTASSIUM SERPL-SCNC: 4.2 MMOL/L
SODIUM SERPL-SCNC: 142 MMOL/L

## 2017-12-05 PROCEDURE — C1713 ANCHOR/SCREW BN/BN,TIS/BN: HCPCS | Performed by: ORTHOPAEDIC SURGERY

## 2017-12-05 PROCEDURE — 88305 TISSUE EXAM BY PATHOLOGIST: CPT | Performed by: PATHOLOGY

## 2017-12-05 PROCEDURE — 80048 BASIC METABOLIC PNL TOTAL CA: CPT

## 2017-12-05 PROCEDURE — 0SRC0J9 REPLACEMENT OF RIGHT KNEE JOINT WITH SYNTHETIC SUBSTITUTE, CEMENTED, OPEN APPROACH: ICD-10-PCS | Performed by: ORTHOPAEDIC SURGERY

## 2017-12-05 PROCEDURE — 97165 OT EVAL LOW COMPLEX 30 MIN: CPT

## 2017-12-05 PROCEDURE — 63600175 PHARM REV CODE 636 W HCPCS: Performed by: NURSE PRACTITIONER

## 2017-12-05 PROCEDURE — 37000009 HC ANESTHESIA EA ADD 15 MINS: Performed by: ORTHOPAEDIC SURGERY

## 2017-12-05 PROCEDURE — 76942 ECHO GUIDE FOR BIOPSY: CPT | Mod: 26,,, | Performed by: ANESTHESIOLOGY

## 2017-12-05 PROCEDURE — 27447 TOTAL KNEE ARTHROPLASTY: CPT | Mod: RT,GC,, | Performed by: ORTHOPAEDIC SURGERY

## 2017-12-05 PROCEDURE — 76942 ECHO GUIDE FOR BIOPSY: CPT | Performed by: ANESTHESIOLOGY

## 2017-12-05 PROCEDURE — 94761 N-INVAS EAR/PLS OXIMETRY MLT: CPT

## 2017-12-05 PROCEDURE — 94799 UNLISTED PULMONARY SVC/PX: CPT

## 2017-12-05 PROCEDURE — 11000001 HC ACUTE MED/SURG PRIVATE ROOM

## 2017-12-05 PROCEDURE — 25000003 PHARM REV CODE 250: Performed by: NURSE ANESTHETIST, CERTIFIED REGISTERED

## 2017-12-05 PROCEDURE — 64450 NJX AA&/STRD OTHER PN/BRANCH: CPT | Performed by: ANESTHESIOLOGY

## 2017-12-05 PROCEDURE — 64448 NJX AA&/STRD FEM NRV NFS IMG: CPT | Mod: 59,RT,, | Performed by: ANESTHESIOLOGY

## 2017-12-05 PROCEDURE — 36000710: Performed by: ORTHOPAEDIC SURGERY

## 2017-12-05 PROCEDURE — 71000033 HC RECOVERY, INTIAL HOUR: Performed by: ORTHOPAEDIC SURGERY

## 2017-12-05 PROCEDURE — 36000711: Performed by: ORTHOPAEDIC SURGERY

## 2017-12-05 PROCEDURE — 97530 THERAPEUTIC ACTIVITIES: CPT

## 2017-12-05 PROCEDURE — 27200688 HC TRAY, SPINAL-HYPER/ ISOBARIC: Performed by: ANESTHESIOLOGY

## 2017-12-05 PROCEDURE — 97535 SELF CARE MNGMENT TRAINING: CPT

## 2017-12-05 PROCEDURE — 25000003 PHARM REV CODE 250: Performed by: NURSE PRACTITIONER

## 2017-12-05 PROCEDURE — 36415 COLL VENOUS BLD VENIPUNCTURE: CPT

## 2017-12-05 PROCEDURE — D9220A PRA ANESTHESIA: Mod: ANES,,, | Performed by: ANESTHESIOLOGY

## 2017-12-05 PROCEDURE — 37000008 HC ANESTHESIA 1ST 15 MINUTES: Performed by: ORTHOPAEDIC SURGERY

## 2017-12-05 PROCEDURE — 25000003 PHARM REV CODE 250: Performed by: ANESTHESIOLOGY

## 2017-12-05 PROCEDURE — 27800517 HC TRAY,EPIDURAL-CONTINUOUS: Performed by: ANESTHESIOLOGY

## 2017-12-05 PROCEDURE — 63600175 PHARM REV CODE 636 W HCPCS

## 2017-12-05 PROCEDURE — D9220A PRA ANESTHESIA: Mod: CRNA,,, | Performed by: NURSE ANESTHETIST, CERTIFIED REGISTERED

## 2017-12-05 PROCEDURE — 27200750 HC INSULATED NEEDLE/ STIMUPLEX: Performed by: ANESTHESIOLOGY

## 2017-12-05 PROCEDURE — 88305 TISSUE EXAM BY PATHOLOGIST: CPT | Mod: 26,,, | Performed by: PATHOLOGY

## 2017-12-05 PROCEDURE — C1776 JOINT DEVICE (IMPLANTABLE): HCPCS | Performed by: ORTHOPAEDIC SURGERY

## 2017-12-05 PROCEDURE — 3E0T3BZ INTRODUCTION OF ANESTHETIC AGENT INTO PERIPHERAL NERVES AND PLEXI, PERCUTANEOUS APPROACH: ICD-10-PCS | Performed by: ANESTHESIOLOGY

## 2017-12-05 PROCEDURE — 88311 DECALCIFY TISSUE: CPT | Mod: 26,,, | Performed by: PATHOLOGY

## 2017-12-05 PROCEDURE — 71000039 HC RECOVERY, EACH ADD'L HOUR: Performed by: ORTHOPAEDIC SURGERY

## 2017-12-05 PROCEDURE — 25000003 PHARM REV CODE 250

## 2017-12-05 PROCEDURE — 97162 PT EVAL MOD COMPLEX 30 MIN: CPT

## 2017-12-05 PROCEDURE — 27201423 OPTIME MED/SURG SUP & DEVICES STERILE SUPPLY: Performed by: ORTHOPAEDIC SURGERY

## 2017-12-05 PROCEDURE — 63600175 PHARM REV CODE 636 W HCPCS: Performed by: NURSE ANESTHETIST, CERTIFIED REGISTERED

## 2017-12-05 DEVICE — CEMENT BONE SIMPLE P INDIVID: Type: IMPLANTABLE DEVICE | Site: KNEE | Status: FUNCTIONAL

## 2017-12-05 DEVICE — INSERT TRIATHLON SZ5 11MM: Type: IMPLANTABLE DEVICE | Site: KNEE | Status: FUNCTIONAL

## 2017-12-05 DEVICE — COMP FEM POST STAB CEM SZ 4 RT: Type: IMPLANTABLE DEVICE | Site: KNEE | Status: FUNCTIONAL

## 2017-12-05 DEVICE — BASEPLATE TIB TOTAL STAB SZ 5: Type: IMPLANTABLE DEVICE | Site: KNEE | Status: FUNCTIONAL

## 2017-12-05 DEVICE — PATELLA TRIATHLON 33X9 SYMTRC: Type: IMPLANTABLE DEVICE | Site: KNEE | Status: FUNCTIONAL

## 2017-12-05 RX ORDER — ONDANSETRON HYDROCHLORIDE 8 MG/1
8 TABLET, FILM COATED ORAL EVERY 8 HOURS PRN
Qty: 60 TABLET | Refills: 0 | Status: SHIPPED | OUTPATIENT
Start: 2017-12-05 | End: 2018-06-15

## 2017-12-05 RX ORDER — SODIUM CHLORIDE 9 MG/ML
INJECTION, SOLUTION INTRAVENOUS CONTINUOUS
Status: ACTIVE | OUTPATIENT
Start: 2017-12-05 | End: 2017-12-06

## 2017-12-05 RX ORDER — SODIUM CHLORIDE 0.9 % (FLUSH) 0.9 %
3 SYRINGE (ML) INJECTION
Status: DISCONTINUED | OUTPATIENT
Start: 2017-12-05 | End: 2017-12-06 | Stop reason: HOSPADM

## 2017-12-05 RX ORDER — NALOXONE HCL 0.4 MG/ML
0.02 VIAL (ML) INJECTION
Status: DISCONTINUED | OUTPATIENT
Start: 2017-12-05 | End: 2017-12-06 | Stop reason: HOSPADM

## 2017-12-05 RX ORDER — RAMELTEON 8 MG/1
8 TABLET ORAL NIGHTLY PRN
Status: DISCONTINUED | OUTPATIENT
Start: 2017-12-05 | End: 2017-12-06 | Stop reason: HOSPADM

## 2017-12-05 RX ORDER — OXYCODONE HYDROCHLORIDE 5 MG/1
10 TABLET ORAL
Status: DISCONTINUED | OUTPATIENT
Start: 2017-12-05 | End: 2017-12-06 | Stop reason: HOSPADM

## 2017-12-05 RX ORDER — LABETALOL HYDROCHLORIDE 5 MG/ML
5 INJECTION, SOLUTION INTRAVENOUS
Status: COMPLETED | OUTPATIENT
Start: 2017-12-05 | End: 2017-12-05

## 2017-12-05 RX ORDER — ASPIRIN 325 MG
325 TABLET, DELAYED RELEASE (ENTERIC COATED) ORAL 2 TIMES DAILY
Status: DISCONTINUED | OUTPATIENT
Start: 2017-12-05 | End: 2017-12-06 | Stop reason: HOSPADM

## 2017-12-05 RX ORDER — OXYCODONE HYDROCHLORIDE 5 MG/1
15 TABLET ORAL
Status: DISCONTINUED | OUTPATIENT
Start: 2017-12-05 | End: 2017-12-06 | Stop reason: HOSPADM

## 2017-12-05 RX ORDER — AMOXICILLIN 250 MG
1 CAPSULE ORAL 2 TIMES DAILY
Status: DISCONTINUED | OUTPATIENT
Start: 2017-12-05 | End: 2017-12-06 | Stop reason: HOSPADM

## 2017-12-05 RX ORDER — SODIUM CHLORIDE 9 MG/ML
INJECTION, SOLUTION INTRAVENOUS
Status: COMPLETED | OUTPATIENT
Start: 2017-12-05 | End: 2017-12-05

## 2017-12-05 RX ORDER — FENTANYL CITRATE 50 UG/ML
25 INJECTION, SOLUTION INTRAMUSCULAR; INTRAVENOUS EVERY 5 MIN PRN
Status: DISCONTINUED | OUTPATIENT
Start: 2017-12-05 | End: 2017-12-05 | Stop reason: HOSPADM

## 2017-12-05 RX ORDER — ONDANSETRON 2 MG/ML
4 INJECTION INTRAMUSCULAR; INTRAVENOUS EVERY 8 HOURS PRN
Status: DISCONTINUED | OUTPATIENT
Start: 2017-12-05 | End: 2017-12-06 | Stop reason: HOSPADM

## 2017-12-05 RX ORDER — PROPOFOL 10 MG/ML
VIAL (ML) INTRAVENOUS
Status: DISCONTINUED | OUTPATIENT
Start: 2017-12-05 | End: 2017-12-05

## 2017-12-05 RX ORDER — FAMOTIDINE 20 MG/1
20 TABLET, FILM COATED ORAL 2 TIMES DAILY
Status: DISCONTINUED | OUTPATIENT
Start: 2017-12-05 | End: 2017-12-06 | Stop reason: HOSPADM

## 2017-12-05 RX ORDER — OXYCODONE HYDROCHLORIDE 5 MG/1
5 TABLET ORAL
Status: DISCONTINUED | OUTPATIENT
Start: 2017-12-05 | End: 2017-12-06 | Stop reason: HOSPADM

## 2017-12-05 RX ORDER — MORPHINE SULFATE 2 MG/ML
2 INJECTION, SOLUTION INTRAMUSCULAR; INTRAVENOUS
Status: DISCONTINUED | OUTPATIENT
Start: 2017-12-05 | End: 2017-12-06 | Stop reason: HOSPADM

## 2017-12-05 RX ORDER — ASPIRIN 325 MG
325 TABLET, DELAYED RELEASE (ENTERIC COATED) ORAL 2 TIMES DAILY
Qty: 60 TABLET | Refills: 0 | Status: SHIPPED | OUTPATIENT
Start: 2017-12-05 | End: 2018-06-15 | Stop reason: ALTCHOICE

## 2017-12-05 RX ORDER — BISACODYL 10 MG
10 SUPPOSITORY, RECTAL RECTAL EVERY 12 HOURS PRN
Status: DISCONTINUED | OUTPATIENT
Start: 2017-12-05 | End: 2017-12-06 | Stop reason: HOSPADM

## 2017-12-05 RX ORDER — SODIUM CHLORIDE 9 MG/ML
INJECTION, SOLUTION INTRAVENOUS CONTINUOUS PRN
Status: DISCONTINUED | OUTPATIENT
Start: 2017-12-05 | End: 2017-12-05

## 2017-12-05 RX ORDER — PREGABALIN 150 MG/1
150 CAPSULE ORAL NIGHTLY
Status: DISCONTINUED | OUTPATIENT
Start: 2017-12-05 | End: 2017-12-06 | Stop reason: HOSPADM

## 2017-12-05 RX ORDER — ASPIRIN 81 MG
100 TABLET, DELAYED RELEASE (ENTERIC COATED) ORAL 2 TIMES DAILY
Qty: 60 TABLET | Refills: 0 | Status: SHIPPED | OUTPATIENT
Start: 2017-12-05 | End: 2018-06-15

## 2017-12-05 RX ORDER — ROPIVACAINE HYDROCHLORIDE 2 MG/ML
INJECTION, SOLUTION EPIDURAL; INFILTRATION; PERINEURAL
Status: COMPLETED
Start: 2017-12-05 | End: 2017-12-05

## 2017-12-05 RX ORDER — HYDROCODONE BITARTRATE AND ACETAMINOPHEN 10; 325 MG/1; MG/1
1 TABLET ORAL EVERY 4 HOURS PRN
Qty: 45 TABLET | Refills: 0 | Status: SHIPPED | OUTPATIENT
Start: 2017-12-05 | End: 2017-12-19 | Stop reason: SDUPTHER

## 2017-12-05 RX ORDER — ACETAMINOPHEN 10 MG/ML
INJECTION, SOLUTION INTRAVENOUS
Status: COMPLETED
Start: 2017-12-05 | End: 2017-12-05

## 2017-12-05 RX ORDER — KETAMINE HYDROCHLORIDE 100 MG/ML
INJECTION, SOLUTION INTRAMUSCULAR; INTRAVENOUS
Status: DISCONTINUED | OUTPATIENT
Start: 2017-12-05 | End: 2017-12-05

## 2017-12-05 RX ORDER — CELECOXIB 100 MG/1
200 CAPSULE ORAL DAILY
Status: DISCONTINUED | OUTPATIENT
Start: 2017-12-06 | End: 2017-12-06 | Stop reason: HOSPADM

## 2017-12-05 RX ORDER — CELECOXIB 200 MG/1
400 CAPSULE ORAL
Status: COMPLETED | OUTPATIENT
Start: 2017-12-05 | End: 2017-12-05

## 2017-12-05 RX ORDER — MUPIROCIN 20 MG/G
1 OINTMENT TOPICAL
Status: COMPLETED | OUTPATIENT
Start: 2017-12-05 | End: 2017-12-05

## 2017-12-05 RX ORDER — DEXAMETHASONE SODIUM PHOSPHATE 4 MG/ML
INJECTION, SOLUTION INTRA-ARTICULAR; INTRALESIONAL; INTRAMUSCULAR; INTRAVENOUS; SOFT TISSUE
Status: DISCONTINUED | OUTPATIENT
Start: 2017-12-05 | End: 2017-12-05

## 2017-12-05 RX ORDER — POLYETHYLENE GLYCOL 3350 17 G/17G
17 POWDER, FOR SOLUTION ORAL DAILY
Status: DISCONTINUED | OUTPATIENT
Start: 2017-12-05 | End: 2017-12-06 | Stop reason: HOSPADM

## 2017-12-05 RX ORDER — LIDOCAINE HYDROCHLORIDE 10 MG/ML
1 INJECTION, SOLUTION EPIDURAL; INFILTRATION; INTRACAUDAL; PERINEURAL
Status: COMPLETED | OUTPATIENT
Start: 2017-12-05 | End: 2017-12-05

## 2017-12-05 RX ORDER — LIDOCAINE HCL/PF 100 MG/5ML
SYRINGE (ML) INTRAVENOUS
Status: DISCONTINUED | OUTPATIENT
Start: 2017-12-05 | End: 2017-12-05

## 2017-12-05 RX ORDER — ACETAMINOPHEN 10 MG/ML
1000 INJECTION, SOLUTION INTRAVENOUS ONCE
Status: COMPLETED | OUTPATIENT
Start: 2017-12-05 | End: 2017-12-05

## 2017-12-05 RX ORDER — CEFAZOLIN SODIUM 2 G/50ML
2 SOLUTION INTRAVENOUS
Status: COMPLETED | OUTPATIENT
Start: 2017-12-05 | End: 2017-12-05

## 2017-12-05 RX ORDER — ACETAMINOPHEN 500 MG
1000 TABLET ORAL EVERY 6 HOURS
Status: DISCONTINUED | OUTPATIENT
Start: 2017-12-05 | End: 2017-12-06 | Stop reason: HOSPADM

## 2017-12-05 RX ORDER — PREGABALIN 75 MG/1
75 CAPSULE ORAL
Status: COMPLETED | OUTPATIENT
Start: 2017-12-05 | End: 2017-12-05

## 2017-12-05 RX ORDER — ROPIVACAINE HYDROCHLORIDE 2 MG/ML
8 INJECTION, SOLUTION EPIDURAL; INFILTRATION; PERINEURAL CONTINUOUS
Status: DISCONTINUED | OUTPATIENT
Start: 2017-12-05 | End: 2017-12-06 | Stop reason: HOSPADM

## 2017-12-05 RX ORDER — MIDAZOLAM HYDROCHLORIDE 1 MG/ML
1 INJECTION INTRAMUSCULAR; INTRAVENOUS EVERY 5 MIN PRN
Status: DISCONTINUED | OUTPATIENT
Start: 2017-12-05 | End: 2017-12-05 | Stop reason: HOSPADM

## 2017-12-05 RX ORDER — PROPOFOL 10 MG/ML
VIAL (ML) INTRAVENOUS CONTINUOUS PRN
Status: DISCONTINUED | OUTPATIENT
Start: 2017-12-05 | End: 2017-12-05

## 2017-12-05 RX ORDER — CHOLECALCIFEROL (VITAMIN D3) 25 MCG
1000 TABLET ORAL DAILY
Status: DISCONTINUED | OUTPATIENT
Start: 2017-12-06 | End: 2017-12-06 | Stop reason: HOSPADM

## 2017-12-05 RX ORDER — ONDANSETRON 2 MG/ML
4 INJECTION INTRAMUSCULAR; INTRAVENOUS EVERY 8 HOURS PRN
Status: DISCONTINUED | OUTPATIENT
Start: 2017-12-05 | End: 2017-12-05 | Stop reason: HOSPADM

## 2017-12-05 RX ORDER — MIDAZOLAM HYDROCHLORIDE 1 MG/ML
INJECTION, SOLUTION INTRAMUSCULAR; INTRAVENOUS
Status: DISCONTINUED | OUTPATIENT
Start: 2017-12-05 | End: 2017-12-05

## 2017-12-05 RX ORDER — ONDANSETRON 2 MG/ML
INJECTION INTRAMUSCULAR; INTRAVENOUS
Status: DISCONTINUED | OUTPATIENT
Start: 2017-12-05 | End: 2017-12-05

## 2017-12-05 RX ORDER — LABETALOL HYDROCHLORIDE 5 MG/ML
INJECTION, SOLUTION INTRAVENOUS
Status: COMPLETED
Start: 2017-12-05 | End: 2017-12-05

## 2017-12-05 RX ORDER — ONDANSETRON 2 MG/ML
4 INJECTION INTRAMUSCULAR; INTRAVENOUS EVERY 12 HOURS PRN
Status: DISCONTINUED | OUTPATIENT
Start: 2017-12-05 | End: 2017-12-05

## 2017-12-05 RX ORDER — SODIUM CHLORIDE 0.9 % (FLUSH) 0.9 %
3 SYRINGE (ML) INJECTION EVERY 8 HOURS PRN
Status: DISCONTINUED | OUTPATIENT
Start: 2017-12-05 | End: 2017-12-06 | Stop reason: HOSPADM

## 2017-12-05 RX ADMIN — MIDAZOLAM HYDROCHLORIDE 1 MG: 1 INJECTION, SOLUTION INTRAMUSCULAR; INTRAVENOUS at 06:12

## 2017-12-05 RX ADMIN — PREGABALIN 150 MG: 150 CAPSULE ORAL at 08:12

## 2017-12-05 RX ADMIN — ROPIVACAINE HYDROCHLORIDE 5 ML: 2 INJECTION, SOLUTION EPIDURAL; INFILTRATION at 09:12

## 2017-12-05 RX ADMIN — PROPOFOL 75 MCG/KG/MIN: 10 INJECTION, EMULSION INTRAVENOUS at 07:12

## 2017-12-05 RX ADMIN — SODIUM CHLORIDE: 0.9 INJECTION, SOLUTION INTRAVENOUS at 06:12

## 2017-12-05 RX ADMIN — ACETAMINOPHEN 1000 MG: 10 INJECTION, SOLUTION INTRAVENOUS at 09:12

## 2017-12-05 RX ADMIN — CELECOXIB 400 MG: 200 CAPSULE ORAL at 05:12

## 2017-12-05 RX ADMIN — OXYCODONE HYDROCHLORIDE 5 MG: 5 TABLET ORAL at 08:12

## 2017-12-05 RX ADMIN — LIDOCAINE HYDROCHLORIDE 20 MG: 10 INJECTION, SOLUTION EPIDURAL; INFILTRATION; INTRACAUDAL; PERINEURAL at 05:12

## 2017-12-05 RX ADMIN — FAMOTIDINE 20 MG: 20 TABLET, FILM COATED ORAL at 08:12

## 2017-12-05 RX ADMIN — SODIUM CHLORIDE: 0.9 INJECTION, SOLUTION INTRAVENOUS at 05:12

## 2017-12-05 RX ADMIN — ASPIRIN 325 MG: 325 TABLET, DELAYED RELEASE ORAL at 04:12

## 2017-12-05 RX ADMIN — CEFAZOLIN SODIUM 2 G: 2 SOLUTION INTRAVENOUS at 11:12

## 2017-12-05 RX ADMIN — PROPOFOL 30 MG: 10 INJECTION, EMULSION INTRAVENOUS at 07:12

## 2017-12-05 RX ADMIN — ROPIVACAINE HYDROCHLORIDE 8 ML/HR: 2 INJECTION, SOLUTION EPIDURAL; INFILTRATION at 08:12

## 2017-12-05 RX ADMIN — SODIUM CHLORIDE: 0.9 INJECTION, SOLUTION INTRAVENOUS at 08:12

## 2017-12-05 RX ADMIN — VANCOMYCIN HYDROCHLORIDE 1000 MG: 1 INJECTION, POWDER, LYOPHILIZED, FOR SOLUTION INTRAVENOUS at 05:12

## 2017-12-05 RX ADMIN — LABETALOL HYDROCHLORIDE 5 MG: 5 INJECTION, SOLUTION INTRAVENOUS at 10:12

## 2017-12-05 RX ADMIN — OXYCODONE HYDROCHLORIDE 10 MG: 5 TABLET ORAL at 09:12

## 2017-12-05 RX ADMIN — ONDANSETRON 4 MG: 2 INJECTION INTRAMUSCULAR; INTRAVENOUS at 06:12

## 2017-12-05 RX ADMIN — OXYCODONE HYDROCHLORIDE 5 MG: 5 TABLET ORAL at 04:12

## 2017-12-05 RX ADMIN — ACETAMINOPHEN 1000 MG: 500 TABLET ORAL at 06:12

## 2017-12-05 RX ADMIN — SODIUM CHLORIDE, SODIUM GLUCONATE, SODIUM ACETATE, POTASSIUM CHLORIDE, MAGNESIUM CHLORIDE, SODIUM PHOSPHATE, DIBASIC, AND POTASSIUM PHOSPHATE: .53; .5; .37; .037; .03; .012; .00082 INJECTION, SOLUTION INTRAVENOUS at 08:12

## 2017-12-05 RX ADMIN — RAMELTEON 8 MG: 8 TABLET, FILM COATED ORAL at 11:12

## 2017-12-05 RX ADMIN — CEFAZOLIN SODIUM 2 G: 2 SOLUTION INTRAVENOUS at 03:12

## 2017-12-05 RX ADMIN — DEXAMETHASONE SODIUM PHOSPHATE 8 MG: 4 INJECTION, SOLUTION INTRAMUSCULAR; INTRAVENOUS at 07:12

## 2017-12-05 RX ADMIN — KETAMINE HYDROCHLORIDE 20 MG: 100 INJECTION, SOLUTION, CONCENTRATE INTRAMUSCULAR; INTRAVENOUS at 07:12

## 2017-12-05 RX ADMIN — LIDOCAINE HYDROCHLORIDE 40 MG: 20 INJECTION, SOLUTION INTRAVENOUS at 07:12

## 2017-12-05 RX ADMIN — STANDARDIZED SENNA CONCENTRATE AND DOCUSATE SODIUM 1 TABLET: 8.6; 5 TABLET, FILM COATED ORAL at 09:12

## 2017-12-05 RX ADMIN — FAMOTIDINE 20 MG: 20 TABLET, FILM COATED ORAL at 09:12

## 2017-12-05 RX ADMIN — SODIUM CHLORIDE: 0.9 INJECTION, SOLUTION INTRAVENOUS at 04:12

## 2017-12-05 RX ADMIN — STANDARDIZED SENNA CONCENTRATE AND DOCUSATE SODIUM 1 TABLET: 8.6; 5 TABLET, FILM COATED ORAL at 08:12

## 2017-12-05 RX ADMIN — POLYETHYLENE GLYCOL 3350 17 G: 17 POWDER, FOR SOLUTION ORAL at 09:12

## 2017-12-05 RX ADMIN — OXYCODONE HYDROCHLORIDE 10 MG: 5 TABLET ORAL at 11:12

## 2017-12-05 RX ADMIN — Medication 2 G: at 07:12

## 2017-12-05 RX ADMIN — PREGABALIN 75 MG: 75 CAPSULE ORAL at 05:12

## 2017-12-05 RX ADMIN — SODIUM CHLORIDE, SODIUM GLUCONATE, SODIUM ACETATE, POTASSIUM CHLORIDE, MAGNESIUM CHLORIDE, SODIUM PHOSPHATE, DIBASIC, AND POTASSIUM PHOSPHATE: .53; .5; .37; .037; .03; .012; .00082 INJECTION, SOLUTION INTRAVENOUS at 07:12

## 2017-12-05 RX ADMIN — LABETALOL HYDROCHLORIDE 5 MG: 5 INJECTION, SOLUTION INTRAVENOUS at 11:12

## 2017-12-05 RX ADMIN — VANCOMYCIN HYDROCHLORIDE 1000 MG: 1 INJECTION, POWDER, LYOPHILIZED, FOR SOLUTION INTRAVENOUS at 06:12

## 2017-12-05 RX ADMIN — MUPIROCIN 1 G: 20 OINTMENT TOPICAL at 05:12

## 2017-12-05 RX ADMIN — ACETAMINOPHEN 1000 MG: 500 TABLET ORAL at 11:12

## 2017-12-05 RX ADMIN — MIDAZOLAM HYDROCHLORIDE 2 MG: 1 INJECTION, SOLUTION INTRAMUSCULAR; INTRAVENOUS at 06:12

## 2017-12-05 NOTE — ANESTHESIA PREPROCEDURE EVALUATION
12/05/2017  Windy Lindo is a 67 y.o., female     Pre-operative evaluation for Procedure(s) (LRB):  REPLACEMENT-KNEE-TOTAL (Right)    Windy Lindo is a 67 y.o. female     LDA:     Prev airway:     Drips:     Patient Active Problem List   Diagnosis    Right knee pain    Elevated BP without diagnosis of hypertension    Chest sounds abnormal on percussion or auscultation    Varicose veins of both lower extremities    Primary osteoarthritis of right knee       Review of patient's allergies indicates:  No Known Allergies     No current facility-administered medications on file prior to encounter.      Current Outpatient Prescriptions on File Prior to Encounter   Medication Sig Dispense Refill    acetaminophen (TYLENOL ARTHRITIS PAIN) 650 MG TbSR Take 650 mg by mouth every 8 (eight) hours. Pt states takes two every morning and 2 in evening if needed      vitamin D 1000 units Tab Take 1,000 Units by mouth once daily.         Past Surgical History:   Procedure Laterality Date    APPENDECTOMY      COLONOSCOPY N/A 9/7/2017    Procedure: COLONOSCOPY;  Surgeon: Albino Fenton MD;  Location: [address];  Service: Endoscopy;  Laterality: N/A;    ESOPHAGOGASTRODUODENOSCOPY  09/07/2017    LASIK  7 yrs    ou    TONSILLECTOMY         Social History     Social History    Marital status: Single     Spouse name: N/A    Number of children: N/A    Years of education: N/A     Occupational History     [employer]     Social History Main Topics    Smoking status: Never Smoker    Smokeless tobacco: Never Used    Alcohol use 0.6 oz/week     1 Glasses of wine per week      Comment: glass of wine a night     Drug use: No    Sexual activity: No     Other Topics Concern    Not on file     Social History Narrative    No narrative on file         Vital Signs Range (Last 24H):  Temp:  [36.9 °C (98.5 °F)]    Pulse:  [90]   Resp:  [16]   BP: (156)/(83)   SpO2:  [95 %]       CBC: No results for input(s): WBC, RBC, HGB, HCT, PLT, MCV, MCH, MCHC in the last 72 hours.    CMP: No results for input(s): NA, K, CL, CO2, BUN, CREATININE, GLU, MG, PHOS, CALCIUM, ALBUMIN, PROT, ALKPHOS, ALT, AST, BILITOT in the last 72 hours.    INR  No results for input(s): INR, PROTIME, APTT in the last 72 hours.    Invalid input(s): PT        Diagnostic Studies:      EKD Echo:      .    Anesthesia Evaluation         Review of Systems      Physical Exam  General:  Well nourished    Airway/Jaw/Neck:  Airway Findings: Mouth Opening: Normal Tongue: Normal  General Airway Assessment: Adult  Mallampati: II  Improves to II with phonation.  TM Distance: Normal, at least 6 cm            Mental Status:  Mental Status Findings:  Cooperative, Alert and Oriented         Anesthesia Plan  Type of Anesthesia, risks & benefits discussed:  Anesthesia Type:  general, CSE, spinal  Patient's Preference:   Intra-op Monitoring Plan: standard ASA monitors  Intra-op Monitoring Plan Comments:   Post Op Pain Control Plan: multimodal analgesia and peripheral nerve block  Post Op Pain Control Plan Comments:   Induction:   IV  Beta Blocker:  Patient is not currently on a Beta-Blocker (No further documentation required).       Informed Consent: Patient understands risks and agrees with Anesthesia plan.  Questions answered. Anesthesia consent signed with patient.  ASA Score: 2     Day of Surgery Review of History & Physical:    H&P update referred to the surgeon.         Ready For Surgery From Anesthesia Perspective.

## 2017-12-05 NOTE — PLAN OF CARE
Op day s/p right TKA. PT/OT ordered to eval and treat. PT/OT recommended HH and DME. Patient currently lives alone but stated she has a friend (Dawn Etienne) that can assist her at home after discharge if needed. CM completed discharge assessment and planning with patient. Patient verbalized understanding. All questions and concerns addressed. SW and CM will continue to follow for any additional needs. Plan A to discharge home with home health as soon as medically stable. Plan B to discharge home with support and plans for outpatient rehab.    Patient requested [facility].    PCP: Trisha Higginbotham MD    Pharmacy:   [facility]/pharmacy #[id number] - [place], [place] - [address]  [address] [address]  Phone: [phone] Fax: [phone]    Payor: MEDICARE / Plan: MEDICARE PART A & B / Product Type: [facility] /      12/05/17 1320   Discharge Assessment   Assessment Type Discharge Planning Assessment   Confirmed/corrected address and phone number on facesheet? Yes   Assessment information obtained from? Patient;Medical Record   Expected Length of Stay (days) 2   Communicated expected length of stay with patient/caregiver yes   Prior to hospitilization cognitive status: Alert/Oriented   Prior to hospitalization functional status: Independent   Current cognitive status: Alert/Oriented   Current Functional Status: Assistive Equipment   Lives With alone   Able to Return to Prior Arrangements yes   Is patient able to care for self after discharge? Yes   Who are your caregiver(s) and their phone number(s)? friend- Dawn Etienne [phone]   Patient's perception of discharge disposition home health   Readmission Within The Last 30 Days no previous admission in last 30 days   Patient currently being followed by outpatient case management? No   Patient currently receives any other outside agency services? No   Equipment Currently Used at Home raised toilet   Do you have any problems affording any of your  prescribed medications? No   Is the patient taking medications as prescribed? yes   Does the patient have transportation home? Yes   Transportation Available family or friend will provide   Does the patient receive services at the [facility]? No   Discharge Plan A Home Health;Home   Discharge Plan B Home;Other  (outpatient rehab)   Patient/Family In Agreement With Plan yes

## 2017-12-05 NOTE — PLAN OF CARE
VSS. Patient states pain is tolerable. No N&V. Foot pumps  in place throughout duration in PACU. Ropivacaine infusing to adductor catheter. Polar ice in place.  Report given to MEGAN Bar on 5th floor.

## 2017-12-05 NOTE — PLAN OF CARE
CM met with patient to discuss DME. Patient requested a rolling walker and transfer tub bench. Patient verbalized understanding of out of pocket costs for tub bench. Patient refused bedside commode and stated she has a raised toilet seat.    CM ordered a rolling walker and transfer tub bench from [facility] with [facility]. DME for bedside delivery.    Summer Hayden RN, CM [facility]  [phone]

## 2017-12-05 NOTE — ANESTHESIA RELEASE NOTE
"Anesthesia Release from PACU Note    Patient: Windy Lindo    Procedure(s) Performed: Procedure(s) (LRB):  REPLACEMENT-KNEE-TOTAL (Right)    Anesthesia type: regional and spinal    Post pain: Adequate analgesia    Post assessment: no apparent anesthetic complications    Last Vitals:   Visit Vitals  BP (!) 152/77   Pulse 63   Temp 36.7 °C (98.1 °F) (Oral)   Resp 20   Ht 5' 6" (1.676 m)   Wt 73.5 kg (162 lb)   SpO2 97%   BMI 26.15 kg/m²       Post vital signs: stable    Level of consciousness: awake, alert  and oriented    Nausea/Vomiting: no nausea/no vomiting    Complications: none    Airway Patency: patent    Respiratory: unassisted, spontaneous ventilation, room air    Cardiovascular: stable and blood pressure at baseline    Hydration: euvolemic  "

## 2017-12-05 NOTE — ANESTHESIA PROCEDURE NOTES
Spinal    Diagnosis: knee pain  Patient location during procedure: OR  Start time: 12/5/2017 7:06 AM  Timeout: 12/5/2017 7:05 AM  End time: 12/5/2017 7:10 AM  Staffing  Anesthesiologist: ADALBERTO MCRAE  Resident/CRNA: SADI CASTELLANOS  Performed: anesthesiologist   Preanesthetic Checklist  Completed: patient identified, site marked, surgical consent, pre-op evaluation, timeout performed, IV checked, risks and benefits discussed and monitors and equipment checked  Spinal Block  Patient position: sitting  Prep: ChloraPrep  Patient monitoring: heart rate, cardiac monitor and continuous pulse ox  Approach: midline  Location: L3-4  Injection technique: single shot  CSF Fluid: clear free-flowing CSF  Needle  Needle type: Amol   Needle gauge: 25 G  Needle length: 3.5 in  Additional Documentation: negative aspiration for heme and no paresthesia on injection  Needle localization: anatomical landmarks  Assessment  Sensory level: T8   Dermatomal levels determined by alcohol wipe  Ease of block: easy  Patient's tolerance of the procedure: comfortable throughout block and no complaints  Medications:  Bolus administered: 2.2 mL of 2.0 mepivacaine

## 2017-12-05 NOTE — ANESTHESIA PROCEDURE NOTES
IPACK Single Injection Block    Patient location during procedure: pre-op   Block not for primary anesthetic.  Reason for block: at surgeon's request and post-op pain management   Post-op Pain Location: right knee pain  Start time: 12/5/2017 6:45 AM  Timeout: 12/5/2017 6:45 AM   End time: 12/5/2017 6:49 AM  Staffing  Anesthesiologist: GRACIELA LION  Other anesthesia staff: AMANDEEP CAMPBELL  Performed: other anesthesia staff   Preanesthetic Checklist  Completed: patient identified, site marked, surgical consent, pre-op evaluation, timeout performed, IV checked, risks and benefits discussed and monitors and equipment checked  Peripheral Block  Patient position: supine  Prep: ChloraPrep  Patient monitoring: heart rate, cardiac monitor, continuous pulse ox, continuous capnometry and frequent blood pressure checks  Block type: I PACK  Laterality: right  Injection technique: single shot  Needle  Needle type: Stimuplex   Needle gauge: 21 G  Needle length: 4 in  Needle localization: anatomical landmarks and ultrasound guidance   -ultrasound image captured on disc.  Assessment  Injection assessment: negative aspiration, negative parasthesia and local visualized surrounding nerve  Paresthesia pain: none  Heart rate change: no  Slow fractionated injection: yes  Medications:  Bolus administered: 20 mL of 0.25 ropivacaine  Epinephrine added: 3.75 mcg/mL (1/300,000)  Additional Notes  VSS.  [facility] RN monitoring vitals throughout procedure.  Patient tolerated procedure well.

## 2017-12-05 NOTE — PROGRESS NOTES
Dr. Brewer with [facility] notified of pts -190. MD aware pt does not take HTN medication at home and that pt states pain is minimal. New orders received, See MAR.   VSS otherwise. Will continue to monitor.

## 2017-12-05 NOTE — PLAN OF CARE
Problem: Physical Therapy Goal  Goal: Physical Therapy Goal  Goals to be met by: 17     Patient will increase functional independence with mobility by performin. Supine to sit with Supervision  2. Sit to supine with Supervision   3. Sit to stand transfer with Stand-by Assistance  4. Gait  x 150 feet with Stand-by Assistance using Rolling Walker.   5. Ascend/descend 4 stair with bilateral Handrails Contact Guard Assistance    6. Lower extremity exercise program x 30 reps per handout, with independence    Outcome: Ongoing (interventions implemented as appropriate)  PT eval complete and POC initiated.

## 2017-12-05 NOTE — OP NOTE
12/05/2017    PREOPERATIVE DIAGNOSIS:  Osteoarthritis right knee.    POSTOPERATIVE DIAGNOSIS:  Osteoarthritis right knee.    PROCEDURE:  Right total knee replacement. (CPT# 34745)    SURGEON:  Evans Busch M.D.    ASSISTANT:   Carlos A.    ANESTHESIA:  spinal.    ESTIMATED BLOOD LOSS:  100 mL    Complications: None    Specimen: Bone    INDICATIONS:  Windy Lindo is a 67 y.o. year-old with a longstanding history of right knee pain.  She has failed extensive conservative care to this point.  Preoperative imaging revealed degenerative joint disease and treatment options were discussed.  She elected to proceed with knee replacement.    Risks and complications were discussed including, but not limited to risks of anesthetic complications, infections, wound healing complications, aseptic loosening, instability, DVT, pulmonary embolism and death among others and she elected to proceed.    COMPONENTS USED:  The CrossCurrent triathlon system with size femur 4, tibia universal 5, 11  mm polyethylene, 33 mm patella.    DESCRIPTION OF PROCEDURE:  The patient was taken to the Operating Room where anesthesia was administered by the Anesthesia Department. She was then placed in the supine position and all superficial neurovascular structures were well padded.  The right lower extremity was then sterilely prepped and draped in the normal fashion.    Under tourniquet control, a 20 cm longitudinal incision was made over the anterior aspect of the knee.  Subcutaneous tissue was sharply dissected down to the deep fascia, which was incised along the line of the incision.  A standard medial parapatellar arthrotomy was made.  The proximal medial tibial plateau was then subperiosteally exposed protecting the medial collateral ligament.  A portion of the infrapatellar fat pad was excised.  The patella was everted and the knee was flexed to 90 degrees.    The extramedullary tibial alignment guide was then placed and the proximal tibial osteotomy  was made approximately 2 mm distal to the most shallow surface of the tibial plateau.  This was done perpendicular to the axis of the tibia with approximately 3 degrees posterior slope.    A drill was then used to gain access into the intramedullary canal of the distal femur. The distal femoral cutting guide was placed and the 10 mm distal femoral cut was made in 5 degrees of valgus.  Femur was sized to a size 4.  The size 4 cutting guide was applied and the posterior femoral condyle cuts were made.  At this time, the cutting guide was removed and the cruciate ligaments, osteophytes, menisci and any debris was removed from the joint space.  Flexion and extension gaps were checked and found to be equal and stable at 11 mm. The femoral cutting guide was reapplied and the posterior chamfer, anterior, and anterior chamfer cuts were made. The notch cutting guide for the posterior stabilize housing was placed and the notch cuts were made.    We then turned our attention back towards the proximal tibia.  This was sized to a size 5, placed in neutral rotation, and the keel was punched and reamed in the standard fashion.  Trial sizes of the 4 femur, 5 tibia, and 11 mm polyethylene liner were then placed.    The patellar cutting guide was then used to remove the dorsal 8 mm of the patellar surface to a final thickness of 14 mm.  This was sized to a size 33. Drill holes were placed and patellar trial was placed.    At this time, the knee was placed through range of motion.  Excellent patellofemoral tracking and stability were noted throughout.  Full extension was easily obtained and intraoperative range of motion was from approximately 0 to 130 degrees.    Trial components were removed and the bony surfaces were thoroughly irrigated in a pulse lavage fashion and dried.  Two batches of cement were mixed and the tibial, femoral and patellar components were cemented into position, impacted and held in place as the cement  polymerized.  Any excess cement was removed using Veyo elevators.  The 11 mm polyethylene was then locked into position.    The wound was once again thoroughly irrigated.  The tourniquet was deflated and any significant bleeding was stopped using electrocautery.  Tranexamic acid was placed in the wound as well to aid in hemostasis.    The quadriceps tendon and parapatellar retinaculum were then closed with interrupted figure-of-eight sutures of #1 Vicryl.  Subcutaneous tissue was closed with interrupted inverted stitches #3-0 Vicryl.  Skin was approximated using staples.  Sterile dressing was applied and she was returned to the Postanesthesia Care Unit in stable condition.    As the attending surgeon I was physically present for the key/critical portions of the procedure.

## 2017-12-05 NOTE — ANESTHESIA POSTPROCEDURE EVALUATION
"Anesthesia Post Evaluation    Patient: Windy Lindo    Procedure(s) Performed: Procedure(s) (LRB):  REPLACEMENT-KNEE-TOTAL (Right)    Final Anesthesia Type: spinal  Patient location during evaluation: PACU  Patient participation: Yes- Able to Participate  Level of consciousness: awake and alert and oriented  Post-procedure vital signs: reviewed and stable  Pain management: adequate  Airway patency: patent  PONV status at discharge: No PONV  Anesthetic complications: no      Cardiovascular status: blood pressure returned to baseline and hemodynamically stable  Respiratory status: unassisted, spontaneous ventilation and room air  Hydration status: euvolemic          Visit Vitals  BP (!) 152/77   Pulse 63   Temp 36.7 °C (98.1 °F) (Oral)   Resp 20   Ht 5' 6" (1.676 m)   Wt 73.5 kg (162 lb)   SpO2 97%   BMI 26.15 kg/m²       Pain/Cortney Score: Pain Assessment Performed: Yes (12/5/2017 11:30 AM)  Presence of Pain: complains of pain/discomfort (12/5/2017 11:30 AM)  Pain Rating Prior to Med Admin: 7 (12/5/2017  9:53 AM)  Cortney Score: 10 (12/5/2017 11:30 AM)      "

## 2017-12-05 NOTE — PLAN OF CARE
Medicare discharge appeals right discussed with patient. IMM signed and placed in patient's chart. Patient verbalized understanding of IMM rights.     12/05/17 1325   Medicare Message   Important Message from Medicare regarding Discharge Appeal Rights Given to patient/caregiver;Explained to patient/caregiver;Signed/date by patient/caregiver   Date IMM was signed 12/05/17   Time IMM was signed 1329

## 2017-12-05 NOTE — PT/OT/SLP EVAL
Occupational Therapy   Evaluation    Name: Windy Lindo  MRN: [id number]  Admitting Diagnosis:  Primary osteoarthritis of right knee Day of Surgery    Recommendations:     Discharge recommendations: Home with HH     Barriers to discharge: Inaccessible home environment     Equipment recommendations: rolling walker and bedside commode    History:     Occupational Profile:  Living Environment: Friend is staying with patient in H with 4 ANDERSON with R HRs    Previous level of function: (I)    Equipment Owned: none     Past Medical History:   Diagnosis Date    Arthritis     Eye injury 4 yrs    hit od       Past Surgical History:   Procedure Laterality Date    APPENDECTOMY      COLONOSCOPY N/A 9/7/2017    Procedure: COLONOSCOPY;  Surgeon: Albino Fenton MD;  [facility] [place] [address]);  Service: Endoscopy;  Laterality: N/A;    ESOPHAGOGASTRODUODENOSCOPY  09/07/2017    LASIK  7 yrs    ou    TONSILLECTOMY         Subjective     Communicated with: RN prior to session.    Pt agreeable to Evaluation.    Pain/Comfort:  Pain level: 4/10 in R knee    Objective:     Pt found supine in bed with blood pressure cuff, schaefer catheter, telemetry, PNC, PCEA, pulse ox, Peripheral IV and FCD.    Orthopedic Precautions: RLE weight bearing as tolerated    Occupational Performance:    Bed Mobility:    Supine to sit: David  Sit to supine: David    Transfers:    Sit<>Stand: David with SW    Ambulation:    David with SW 3 forward, backward, and side steps    Activities of Daily Living:  UE dressing: Maximum Assistance to andre gown around back  LE dressing: Total Assistance to andre socks   Pt educated on LE dressing techniques and need for AD with LE dressing  Toileting: Maximum Assistance    Patient left supine in bed with all lines intact and RN notified.    [facility] 6 Click:  [facility] Total Score: 16    Education:    Assessment:     Windy Lindo is a 67 y.o. female with a medical diagnosis of Primary osteoarthritis of right knee.  She  presents with decreased function of R LE impeding her ability to perform ADLs and functional mobility and would benefit from OT services to maximize functional (I) and safety.     Performance deficits affecting function are weakness, impaired endurance, impaired self care skills, impaired functional mobilty, gait instability, impaired balance, decreased lower extremity function, decreased safety awareness, pain, impaired skin, decreased ROM, edema, orthopedic precautions.    Rehab Prognosis:  Good    Plan:     Patient to be seen QD to address the above listed problems via self-care/home management, therapeutic activities, therapeutic exercises    Plan of Care Reviewed with: patient    This Plan of care has been discussed with the patient who was involved in its development and understands and is in agreement with the identified goals and treatment plan    GOALS:    Occupational Therapy Goals        Problem: Occupational Therapy Goal    Goal Priority Disciplines Outcome Interventions   Occupational Therapy Goal     OT, PT/OT     Description:  Goals to be met by: 7 days    Patient will increase functional independence with ADLs by performing:    UE Dressing with Supervision.  LE Dressing with Supervision with AD as needed.  Grooming while standing with Supervision.  Toileting from bedside commode with Supervision for hygiene and clothing management.   Stand pivot transfers with Supervision.  Toilet transfer to bedside commode with Supervision.                         Time Tracking:     OT Received On: 12/5/2017  OT Start Time: 1025  OT Stop Time: 1045   OT Total Time: 20 minutes     Billable Minutes:  Evaluation 12 minutes  Self Care/Home Management 8 minutes    CHRISTA Austin  12/5/2017

## 2017-12-05 NOTE — PLAN OF CARE
Problem: Occupational Therapy Goal  Goal: Occupational Therapy Goal  Goals to be met by: 7 days    Patient will increase functional independence with ADLs by performing:    UE Dressing with Supervision.  LE Dressing with Supervision with AD as needed.  Grooming while standing with Supervision.  Toileting from bedside commode with Supervision for hygiene and clothing management.   Stand pivot transfers with Supervision.  Toilet transfer to bedside commode with Supervision.       CHRISTA Austin  12/5/2017

## 2017-12-05 NOTE — H&P (VIEW-ONLY)
CC: Right knee pain    Windy Lindo is a 67 y.o. female with a several year history of Right knee pain. Pain is worse with activity and weight bearing.  Patient has experienced interference of activities of daily living due to decreased range of motion and an increase in joint pain and swelling.  Patient has failed non-operative treatment including NSAIDs, corticosteroid injections, viscosupplement injections, and activity modification.  Windy Lindo currently ambulates independently.     Past Medical History:   Diagnosis Date    Arthritis     Eye injury 4 yrs    hit od       Past Surgical History:   Procedure Laterality Date    APPENDECTOMY      COLONOSCOPY N/A 9/7/2017    Procedure: COLONOSCOPY;  Surgeon: Albino Fenton MD;  Location: [facility] ([address] [place] [address]);  Service: Endoscopy;  Laterality: N/A;    ESOPHAGOGASTRODUODENOSCOPY  09/07/2017    LASIK  7 yrs    ou    TONSILLECTOMY         Family History   Problem Relation Age of Onset    Hypertension Mother     Glaucoma Mother     Diabetes Mother     Cataracts Mother     Cancer Mother 74     lung    Heart disease Mother 55    Hypertension Father     Heart disease Father      onset early 60;s, Aortic valve replacement ,Rheumatic fever as a child     Diabetes Brother     Cancer Brother 66     mesothelioma    No Known Problems Sister     No Known Problems Maternal Aunt     No Known Problems Maternal Uncle     No Known Problems Paternal Aunt     No Known Problems Paternal Uncle     No Known Problems Maternal Grandmother     No Known Problems Maternal Grandfather     No Known Problems Paternal Grandmother     No Known Problems Paternal Grandfather     Amblyopia Neg Hx     Blindness Neg Hx     Macular degeneration Neg Hx     Retinal detachment Neg Hx     Strabismus Neg Hx     Stroke Neg Hx     Thyroid disease Neg Hx        Review of patient's allergies indicates:  No Known Allergies      Current Outpatient Prescriptions:      "acetaminophen (TYLENOL ARTHRITIS PAIN) 650 MG TbSR, Take 650 mg by mouth every 8 (eight) hours. Pt states takes two every morning and 2 in evening if needed, Disp: , Rfl:     alendronate (FOSAMAX) 35 MG tablet, Take 1 tablet (35 mg total) by mouth once a week. Needs appointment for future refills (Patient taking differently: Take 35 mg by mouth once a week. Needs appointment for future refills Takes on Monday), Disp: 4 tablet, Rfl: 0    meloxicam (MOBIC) 7.5 MG tablet, Take 7.5 mg by mouth as needed for Pain., Disp: , Rfl:     vitamin D 1000 units Tab, Take 1,000 Units by mouth once daily., Disp: , Rfl:     Review of Systems:  Constitutional: no fever or chills  Eyes: no visual changes  ENT: no nasal congestion or sore throat  Respiratory: no cough or shortness of breath  Cardiovascular: no chest pain or palpitations  Gastrointestinal: no nausea or vomiting, tolerating diet  Genitourinary: no hematuria or dysuria  Integument/Breast: no rash or pruritis  Hematologic/Lymphatic: no easy bruising or lymphadenopathy  Musculoskeletal: positive for right knee pain worse with activity  Neurological: no seizures or tremors  Behavioral/Psych: no auditory or visual hallucinations  Endocrine: no heat or cold intolerance    PE:  Ht 5' 6" (1.676 m)   Wt 73.3 kg (161 lb 9.6 oz)   BMI 26.08 kg/m²   General: Pleasant, cooperative, NAD   Gait: antalgic  HEENT: NCAT, sclera nonicteric   Lungs: Respirations clear bilaterally; equal and unlabored.   CV: S1S2; 2+ bilateral upper and lower extremity pulses.   Skin: Intact throughout with no rashes, erythema, or lesions  Extremities: No LE edema,  no erythema or warmth of the skin in either lower extremity.    Right knee exam:  Knee Range of Motion:normal, pain with passive range of motion  Effusion:none  Condition of skin:intact  Location of tenderness:Medial joint line   Strength:normal  Stability: stable to testing    Hip Examination:normal    Radiographs: Radiographs reveal Severe " joint space narrowing of the lateral compartment with subluxation.  Lateral subluxation of EE patella with joint space narrowing of the lateral patellar compartment.  Moderate marginal osteophyte formation     Knee Alignment: normal    Diagnosis: osteoarthritis Right knee    Plan: Right total knee arthroplasty    Due to the serious nature of total joint infection and the high prevalence of community acquired MRSA, vancomycin will be used perioperatively.

## 2017-12-05 NOTE — TRANSFER OF CARE
"Anesthesia Transfer of Care Note    Patient: Windy Lindo    Procedure(s) Performed: Procedure(s) (LRB):  REPLACEMENT-KNEE-TOTAL (Right)    Patient location: PACU    Anesthesia Type: spinal    Transport from OR: Transported from OR on room air with adequate spontaneous ventilation    Post pain: adequate analgesia    Post assessment: no apparent anesthetic complications    Post vital signs: stable    Level of consciousness: awake, alert and oriented    Nausea/Vomiting: no nausea/vomiting    Complications: none    Transfer of care protocol was followed      Last vitals:   Visit Vitals  /71 (BP Location: Right arm, Patient Position: Lying)   Pulse 79   Temp 36.9 °C (98.5 °F) (Oral)   Resp 19   Ht 5' 6" (1.676 m)   Wt 73.5 kg (162 lb)   SpO2 100%   BMI 26.15 kg/m²     "

## 2017-12-05 NOTE — ANESTHESIA PROCEDURE NOTES
Adductor Canal Catheter    Patient location during procedure: pre-op   Block not for primary anesthetic.  Reason for block: at surgeon's request and post-op pain management   Post-op Pain Location: right knee pain  Start time: 12/5/2017 6:35 AM  Timeout: 12/5/2017 6:34 AM   End time: 12/5/2017 6:45 AM  Staffing  Anesthesiologist: GRACIELA LION  Other anesthesia staff: AMANDEEP CAMPBELL  Performed: other anesthesia staff   Preanesthetic Checklist  Completed: patient identified, site marked, surgical consent, pre-op evaluation, timeout performed, IV checked, risks and benefits discussed and monitors and equipment checked  Peripheral Block  Patient position: supine  Prep: ChloraPrep and site prepped and draped  Patient monitoring: heart rate, cardiac monitor, continuous pulse ox, continuous capnometry and frequent blood pressure checks  Block type: adductor canal  Laterality: right  Injection technique: continuous  Needle  Needle type: Tuohy   Needle gauge: 17 G  Needle length: 3.5 in  Needle localization: anatomical landmarks and ultrasound guidance  Catheter type: spring wound  Catheter size: 19 G  Test dose: lidocaine 1.5% with Epi 1-to-200,000 and negative   -ultrasound image captured on disc.  Assessment  Injection assessment: negative aspiration, negative parasthesia and local visualized surrounding nerve  Paresthesia pain: none  Heart rate change: no  Slow fractionated injection: yes  Medications:  Bolus administered: 20 mL of 0.25 ropivacaine  Epinephrine added: 3.75 mcg/mL (1/300,000)  Additional Notes  VSS.  [facility] RN monitoring vitals throughout procedure.  Patient tolerated procedure well.

## 2017-12-06 VITALS
WEIGHT: 162 LBS | OXYGEN SATURATION: 98 % | BODY MASS INDEX: 26.03 KG/M2 | TEMPERATURE: 98 F | DIASTOLIC BLOOD PRESSURE: 64 MMHG | HEIGHT: 66 IN | HEART RATE: 75 BPM | SYSTOLIC BLOOD PRESSURE: 111 MMHG | RESPIRATION RATE: 18 BRPM

## 2017-12-06 PROCEDURE — 25000003 PHARM REV CODE 250: Performed by: NURSE PRACTITIONER

## 2017-12-06 PROCEDURE — 99231 SBSQ HOSP IP/OBS SF/LOW 25: CPT | Mod: ,,, | Performed by: ANESTHESIOLOGY

## 2017-12-06 PROCEDURE — 63600175 PHARM REV CODE 636 W HCPCS: Performed by: NURSE PRACTITIONER

## 2017-12-06 PROCEDURE — 25000003 PHARM REV CODE 250: Performed by: STUDENT IN AN ORGANIZED HEALTH CARE EDUCATION/TRAINING PROGRAM

## 2017-12-06 PROCEDURE — 97535 SELF CARE MNGMENT TRAINING: CPT

## 2017-12-06 RX ADMIN — OXYCODONE HYDROCHLORIDE 10 MG: 5 TABLET ORAL at 05:12

## 2017-12-06 RX ADMIN — ROPIVACAINE HYDROCHLORIDE 8 ML/HR: 2 INJECTION, SOLUTION EPIDURAL; INFILTRATION at 06:12

## 2017-12-06 RX ADMIN — ASPIRIN 325 MG: 325 TABLET, DELAYED RELEASE ORAL at 08:12

## 2017-12-06 RX ADMIN — ACETAMINOPHEN 1000 MG: 500 TABLET ORAL at 11:12

## 2017-12-06 RX ADMIN — ACETAMINOPHEN 1000 MG: 500 TABLET ORAL at 05:12

## 2017-12-06 RX ADMIN — OXYCODONE HYDROCHLORIDE 10 MG: 5 TABLET ORAL at 08:12

## 2017-12-06 RX ADMIN — VITAMIN D, TAB 1000IU (100/BT) 1000 UNITS: 25 TAB at 08:12

## 2017-12-06 RX ADMIN — CELECOXIB 200 MG: 100 CAPSULE ORAL at 08:12

## 2017-12-06 RX ADMIN — FAMOTIDINE 20 MG: 20 TABLET, FILM COATED ORAL at 08:12

## 2017-12-06 RX ADMIN — OXYCODONE HYDROCHLORIDE 10 MG: 5 TABLET ORAL at 02:12

## 2017-12-06 RX ADMIN — Medication: at 12:12

## 2017-12-06 RX ADMIN — OXYCODONE HYDROCHLORIDE 10 MG: 5 TABLET ORAL at 12:12

## 2017-12-06 NOTE — PLAN OF CARE
Problem: Physical Therapy Goal  Goal: Physical Therapy Goal  Goals to be met by: 17     Patient will increase functional independence with mobility by performin. Supine to sit with Supervision met  2. Sit to supine with Supervision met  3. Sit to stand transfer with Stand-by Assistance met  4. Gait  x 150 feet with Stand-by Assistance using Rolling Walker.   5. Ascend/descend 4 stair with bilateral Handrails Contact Guard Assistance  met  6. Lower extremity exercise program x 30 reps per handout, with independence     Patient goals remain appropriate.  Patient will continue to benefit from further PT services.  Basilio Amato, PTA

## 2017-12-06 NOTE — ASSESSMENT & PLAN NOTE
67 y.o. female POD1 s/p R TKA    Pain control: per APS  PT/OT: WBAT RLE  DVT PPx:  BID, FCDs at all times when not ambulating  Abx: postop Ancef    Dispo: f/u PT recs

## 2017-12-06 NOTE — PLAN OF CARE
8:55 AM  SW received notification that pt will need home health at discharge. Pt's preference is [facility]. Faxed facesheet to [facility].     Pending orders.     Rabia Kay LMSW   [facility]  Ext [phone]

## 2017-12-06 NOTE — SUBJECTIVE & OBJECTIVE
"Principal Problem:Primary osteoarthritis of right knee    Principal Orthopedic Problem: Same    Interval History: Patient seen and examined at bedside.  No acute events overnight.  Pain controlled.      Review of patient's allergies indicates:  No Known Allergies    Current Facility-Administered Medications   Medication    0.9%  NaCl infusion    acetaminophen tablet 1,000 mg    aspirin EC tablet 325 mg    bisacodyl suppository 10 mg    celecoxib capsule 200 mg    famotidine tablet 20 mg    morphine injection 2 mg    morphine injection 2 mg    morphine injection 2 mg    naloxone 0.4 mg/mL injection 0.02 mg    ondansetron injection 4 mg    oxyCODONE immediate release tablet 10 mg    oxyCODONE immediate release tablet 15 mg    oxyCODONE immediate release tablet 5 mg    polyethylene glycol packet 17 g    pregabalin capsule 150 mg    ramelteon tablet 8 mg    ropivacaine (PF) 2 mg/ml (0.2%) infusion    ropivacaine 0.2% ON-Q C-BLOC 400 ML (SELECT A FLOW)    senna-docusate 8.6-50 mg per tablet 1 tablet    sodium chloride 0.9% flush 3 mL    sodium chloride 0.9% flush 3 mL    vitamin D 1000 units tablet 1,000 Units     Objective:     Vital Signs (Most Recent):  Temp: 97.8 °F (36.6 °C) (12/06/17 0451)  Pulse: 68 (12/06/17 0451)  Resp: 18 (12/06/17 0451)  BP: (!) 148/74 (12/06/17 0451)  SpO2: 98 % (12/06/17 0451) Vital Signs (24h Range):  Temp:  [97.8 °F (36.6 °C)-98.6 °F (37 °C)] 97.8 °F (36.6 °C)  Pulse:  [62-91] 68  Resp:  [16-21] 18  SpO2:  [93 %-100 %] 98 %  BP: (123-194)/() 148/74     Weight: 73.5 kg (162 lb)  Height: 5' 6" (167.6 cm)  Body mass index is 26.15 kg/m².      Intake/Output Summary (Last 24 hours) at 12/06/17 0559  Last data filed at 12/05/17 1130   Gross per 24 hour   Intake             2923 ml   Output              950 ml   Net             1973 ml       Ortho/SPM Exam     AAOx4  NAD  RRR  No increased WOB  Aquacel c/d/i  SILT and motor intact T/SP/DP  WWP extremities  FCDs in " place and functioning      Significant Labs: All pertinent labs within the past 24 hours have been reviewed.    Significant Imaging: I have reviewed and interpreted all pertinent imaging results/findings.

## 2017-12-06 NOTE — NURSING
Pt meets all discharge criteria and plan of care. Pt given prescriptions and verbalizes understanding discharge instructions. All questions and concerns  answered. Pt leaving in wheelchair with hospital staff.

## 2017-12-06 NOTE — PROGRESS NOTES
Pt and family present, consent to converting adductor PNC to On Q.  Site CDI.  Educated regarding continued pain management, fall risk, signs of complications, continued monitoring, as well as discontinuing catheter on 12/8  Two numbers obtained for APS to follow up.  Understanding verbalized.

## 2017-12-06 NOTE — PLAN OF CARE
POD 1 s/p right TKA. PT/OT recommended HH and DME. Plans to discharge patient home today with [facility] and DME. Patient verbalized understanding of today's discharge plan. All questions and concerns addressed. SW and CM will continue to follow for any additional needs. Discharge and follow-up instructions to be completed by the bedside nurse.    Future Appointments  Date Time Provider Department Center   12/19/2017 10:15 AM Bita Pena NP [facility]      12/06/17 1243   Final Note   Assessment Type Final Discharge Note   Discharge Disposition Home-Health  ([facility])   What phone number can be called within the next 1-3 days to see how you are doing after discharge? ([phone])   Hospital Follow Up  Appt(s) scheduled? Yes   Discharge plans and expectations educations in teach back method with documentation complete? Yes

## 2017-12-06 NOTE — PROGRESS NOTES
"[facility]  Orthopedics  Progress Note    Patient Name: Windy Lindo  MRN: [id number]  Admission Date: 12/5/2017  Hospital Length of Stay: 1 days  Attending Provider: Evans Busch MD  Primary Care Provider: Trisha Higginbotham MD  Follow-up For: Procedure(s) (LRB):  REPLACEMENT-KNEE-TOTAL (Right)    Post-Operative Day: 1 Day Post-Op  Subjective:     Principal Problem:Primary osteoarthritis of right knee    Principal Orthopedic Problem: Same    Interval History: Patient seen and examined at bedside.  No acute events overnight.  Pain controlled.      Review of patient's allergies indicates:  No Known Allergies    Current Facility-Administered Medications   Medication    0.9%  NaCl infusion    acetaminophen tablet 1,000 mg    aspirin EC tablet 325 mg    bisacodyl suppository 10 mg    celecoxib capsule 200 mg    famotidine tablet 20 mg    morphine injection 2 mg    morphine injection 2 mg    morphine injection 2 mg    naloxone 0.4 mg/mL injection 0.02 mg    ondansetron injection 4 mg    oxyCODONE immediate release tablet 10 mg    oxyCODONE immediate release tablet 15 mg    oxyCODONE immediate release tablet 5 mg    polyethylene glycol packet 17 g    pregabalin capsule 150 mg    ramelteon tablet 8 mg    ropivacaine (PF) 2 mg/ml (0.2%) infusion    ropivacaine 0.2% ON-Q C-BLOC 400 ML (SELECT A FLOW)    senna-docusate 8.6-50 mg per tablet 1 tablet    sodium chloride 0.9% flush 3 mL    sodium chloride 0.9% flush 3 mL    vitamin D 1000 units tablet 1,000 Units     Objective:     Vital Signs (Most Recent):  Temp: 97.8 °F (36.6 °C) (12/06/17 0451)  Pulse: 68 (12/06/17 0451)  Resp: 18 (12/06/17 0451)  BP: (!) 148/74 (12/06/17 0451)  SpO2: 98 % (12/06/17 0451) Vital Signs (24h Range):  Temp:  [97.8 °F (36.6 °C)-98.6 °F (37 °C)] 97.8 °F (36.6 °C)  Pulse:  [62-91] 68  Resp:  [16-21] 18  SpO2:  [93 %-100 %] 98 %  BP: (123-194)/() 148/74     Weight: 73.5 kg (162 lb)  Height: 5' 6" (167.6 " cm)  Body mass index is 26.15 kg/m².      Intake/Output Summary (Last 24 hours) at 12/06/17 0559  Last data filed at 12/05/17 1130   Gross per 24 hour   Intake             2923 ml   Output              950 ml   Net             1973 ml       Ortho/SPM Exam     AAOx4  NAD  RRR  No increased WOB  Aquacel c/d/i  SILT and motor intact T/SP/DP  WWP extremities  FCDs in place and functioning      Significant Labs: All pertinent labs within the past 24 hours have been reviewed.    Significant Imaging: I have reviewed and interpreted all pertinent imaging results/findings.    Assessment/Plan:     * Primary osteoarthritis of right knee    67 y.o. female POD1 s/p R TKA    Pain control: per APS  PT/OT: WBAT RLE  DVT PPx:  BID, FCDs at all times when not ambulating  Abx: postop Ancef    Dispo: f/u PT recs                Jagdish Strauss MD  Orthopedics  [facility]

## 2017-12-06 NOTE — PROGRESS NOTES
Acute Pain Service and Perioperative Surgical Home Progress Note    HPI  Windy Lindo is a 67 y.o., female, who presented for right TKR.    Interval history    No acute events overnight. Pt had no complaints this     Surgery:  Procedure(s) (LRB):  REPLACEMENT-KNEE-TOTAL (Right)    Post Op Day #: 1    Catheter type: Perineural Adductor Canal    Infusion type: Ropivacaine 0.2%  8 ml/hr basal    Problem List:    Active Hospital Problems    Diagnosis  POA    *Primary osteoarthritis of right knee [M17.11]  Yes      Resolved Hospital Problems    Diagnosis Date Resolved POA   No resolved problems to display.       Subjective:       General appearance of alert, oriented, no complaints   Pain with rest: 1    Numbers   Pain with movement: 1    Numbers   Side Effects    1. Pruritis No    2. Nausea No    3. Motor Blockade No, 0=Ability to raise lower extremities off bed    4. Sedation No, 1=awake and alert    Schedule Medications:    acetaminophen  1,000 mg Oral Q6H    aspirin  325 mg Oral BID    celecoxib  200 mg Oral Daily    famotidine  20 mg Oral BID    polyethylene glycol  17 g Oral Daily    pregabalin  150 mg Oral QHS    senna-docusate 8.6-50 mg  1 tablet Oral BID    vitamin D  1,000 Units Oral Daily        Continuous Infusions:   ropivacaine (PF) 2 mg/ml (0.2%) 8 mL/hr (12/06/17 0624)    ropivacaine          PRN Medications:  bisacodyl, morphine, morphine, morphine, naloxone, ondansetron, oxyCODONE, oxyCODONE, oxyCODONE, ramelteon, ropivacaine, sodium chloride 0.9%, sodium chloride 0.9%       Antibiotics:  Antibiotics     None             Objective:     Catheter site clean, dry, intact          Vital Signs (Most Recent):  Temp: 36.7 °C (98 °F) (12/06/17 1138)  Pulse: 70 (12/06/17 1138)  Resp: 18 (12/06/17 1138)  BP: (!) 150/66 (12/06/17 1138)  SpO2: 97 % (12/06/17 1138) Vital Signs Range (Last 24H):  Temp:  [35.8 °C (96.4 °F)-37 °C (98.6 °F)]   Pulse:  [68-78]   Resp:  [16-18]   BP: (123-191)/(64-88)    SpO2:  [97 %-100 %]          I & O (Last 24H):No intake or output data in the 24 hours ending 12/06/17 1239    Physical Exam:    GA: Alert, comfortable, no acute distress.   Pulmonary: Clear to auscultation A/P/L. No wheezing, crackles, or rhonchi.  Cardiac: RRR S1 & S2 w/o rubs/murmurs/gallops.   Abdominal:Bowel sounds present. No tenderness to palpation or distension. No appreciable hepatosplenomegaly.   Skin: No jaundice, rashes, or visible lesions.         Laboratory:  CBC: No results for input(s): WBC, RBC, HGB, HCT, PLT, MCV, MCH, MCHC in the last 72 hours.    BMP:   Recent Labs      12/05/17   0853   NA  142   K  4.2   CO2  23   CL  112*   BUN  14   CREATININE  0.7   GLU  112*   CALCIUM  8.8       No results for input(s): PT, INR, PROTIME, APTT in the last 72 hours.      Anticoagulant dose aspirin 325 BID.    Assessment:         Pain control adequate    Plan:     1) Pain:    Adductor canal perineural catheter in place and infusing. Good level. Multimodal pain regimen with acetaminophen, Celebrex, Lyrica, and prn oxycodone given  Will continue to monitor. Plan to D/C perineural catheter in AM or home with On-Q if patient discharged today.   2) FEN/GI: Tolerating liquids, advance diet as tolerated. + flatus. - BM.   3) Dispo: Pt working well with PT/OT. Plan for D/C today. Pt will go home with ON-Q ball.           Evaluator Jagdish Brewer Jr., MD

## 2017-12-06 NOTE — DISCHARGE SUMMARY
[facility]  Orthopedics  Discharge Summary      Patient Name: Windy Lindo  MRN: [id number]  Admission Date: 12/5/2017  Hospital Length of Stay: 1 days  Discharge Date and Time: 12/6/2017  2:05 PM  Attending Physician: Christina att. providers found   Discharging Provider: Jagdish Strauss MD  Primary Care Provider: Trisha Higginbotham MD    HPI: Windy Lindo is a 67 y.o. female with a several year history of Right knee pain. Pain is worse with activity and weight bearing.  Patient has experienced interference of activities of daily living due to decreased range of motion and an increase in joint pain and swelling.  Patient has failed non-operative treatment including NSAIDs, corticosteroid injections, viscosupplement injections, and activity modification.  Windy Lindo currently ambulates independently.     Procedure(s) (LRB):  REPLACEMENT-KNEE-TOTAL (Right)      Hospital Course: Patient presented for above procedure.  Tolerated it well and was discharged home POD1 after voiding, tolerating diet, ambulating, pain controlled.  Discharge instructions, follow-up appointment, and med rec are below.      Consults         Status Ordering Provider     Inpatient consult to Social Work  Once     Provider:  (Not yet assigned)    Acknowledged BITA PENA          Significant Diagnostic Studies: Labs:   BMP:   Recent Labs  Lab 12/05/17  0853   *      K 4.2   *   CO2 23   BUN 14   CREATININE 0.7   CALCIUM 8.8    and CBC No results for input(s): WBC, HGB, HCT, PLT in the last 48 hours.    Pending Diagnostic Studies:     None        Final Active Diagnoses:    Diagnosis Date Noted POA    PRINCIPAL PROBLEM:  Primary osteoarthritis of right knee [M17.11] 12/05/2017 Yes      Problems Resolved During this Admission:    Diagnosis Date Noted Date Resolved POA      Discharged Condition: good    Disposition: Home or Self Care    Follow Up:  Follow-up Information     Bita Pena NP On  "12/19/2017.    Specialty:  Orthopedic Surgery  Contact information:  [address]  [facility] [id number]  [phone]                 Patient Instructions:     WALKER FOR HOME USE   Order Specific Question Answer Comments   Type of Walker: Adult (5'4"-6'6")    With wheels? Yes    Height: 5' 6" (1.676 m)    Weight: 73.5 kg (162 lb)    Length of need (1-99 months): 99    Does patient have medical equipment at home? raised toilet    Please check all that apply: Patient is unable to safely ambulate without equipment.    Please check all that apply: Walker will be used for gait training.    Vendor: [employer]ME [employer]   Expected Date of Delivery: 12/5/2017      TRANSFER TUB BENCH FOR HOME USE   Order Specific Question Answer Comments   Type of Transfer Tub Bench: Unpadded    Height: 5' 6" (1.676 m)    Weight: 73.5 kg (162 lb)    Does patient have medical equipment at home? raised toilet    Length of need (1-99 months): 99    Vendor: [employer]   Expected Date of Delivery: 12/5/2017      3 IN 1 COMMODE FOR HOME USE   Order Specific Question Answer Comments   Type: Standard    Height: 5' 6" (1.676 m)    Weight: 73.3 kg (161 lb 9.6 oz)    Does patient have medical equipment at home? raised toilet    Length of need (1-99 months): 99    Vendor: Other (use comments)    Expected Date of Delivery: 12/6/2017      TRANSFER TUB BENCH FOR HOME USE   Order Specific Question Answer Comments   Type of Transfer Tub Bench: Unpadded    Height: 5' 6" (1.676 m)    Weight: 73.3 kg (161 lb 9.6 oz)    Does patient have medical equipment at home? raised toilet    Length of need (1-99 months): 99    Vendor: Other (use comments) Duplicate   Expected Date of Delivery: 12/6/2017      WALKER FOR HOME USE   Order Specific Question Answer Comments   Type of Walker: Adult (5'4"-6'6")    With wheels? No    Height: 5' 6" (1.676 m)    Weight: 73.3 kg (161 lb 9.6 oz)    Length of need (1-99 months): 99    Does patient have medical " equipment at home? raised toilet    Please check all that apply: Walker will be used for gait training.    Vendor: Other (use comments) Duplicate   Expected Date of Delivery: 12/6/2017      Diet general     Call MD for:  temperature >100.4     Call MD for:  persistent nausea and vomiting or diarrhea     Call MD for:  severe uncontrolled pain     Call MD for:  redness, tenderness, or signs of infection (pain, swelling, redness, odor or green/yellow discharge around incision site)     Call MD for:  difficulty breathing or increased cough     Call MD for:  severe persistent headache     Call MD for:  worsening rash     Call MD for:  persistent dizziness, light-headedness, or visual disturbances     Call MD for:  increased confusion or weakness       Medications:  Reconciled Home Medications:   Discharge Medication List as of 12/6/2017 11:13 AM      START taking these medications    Details   aspirin (ECOTRIN) 325 MG EC tablet Take 1 tablet (325 mg total) by mouth 2 (two) times daily., Starting Tue 12/5/2017, Until u 1/4/2018, Print      docusate sodium 100 mg capsule Take 100 mg by mouth 2 (two) times daily., Starting Tue 12/5/2017, Print      hydrocodone-acetaminophen 10-325mg (NORCO)  mg Tab Take 1 tablet by mouth every 4 (four) hours as needed for Pain., Starting Tue 12/5/2017, Print      ondansetron (ZOFRAN) 8 MG tablet Take 1 tablet (8 mg total) by mouth every 8 (eight) hours as needed for Nausea., Starting Tue 12/5/2017, Print         CONTINUE these medications which have NOT CHANGED    Details   alendronate (FOSAMAX) 35 MG tablet Take 1 tablet (35 mg total) by mouth once a week. Needs appointment for future refills, Starting Fri 11/17/2017, Normal      vitamin D 1000 units Tab Take 1,000 Units by mouth once daily., Historical Med         STOP taking these medications       acetaminophen (TYLENOL ARTHRITIS PAIN) 650 MG TbSR Comments:   Reason for Stopping:         meloxicam (MOBIC) 7.5 MG tablet Comments:    Reason for Stopping:               Jagdish Strauss MD  Orthopedics  [facility]

## 2017-12-06 NOTE — PT/OT/SLP PROGRESS
Occupational Therapy   Treatment    Name: Windy Lindo  MRN: [id number]  Admitting Diagnosis:  Primary osteoarthritis of right knee  1 Day Post-Op    Recommendations:     Discharge Recommendations: home health OT  Discharge Equipment Recommendations:  walker, rolling  Barriers to discharge:  None    Subjective     Communicated with: RN prior to session.  Pain/Comfort:  · Pain Rating 1: 0/10    Objective:     Patient found with: FCD, perineural catheter    General Precautions: Standard, fall   Orthopedic Precautions:RLE weight bearing as tolerated   Braces: N/A     Occupational Performance:    Bed Mobility:    · Patient completed Supine to Sit with stand by assistance     Functional Mobility/Transfers:  · Patient completed Sit <> Stand Transfer with stand by assistance  with  rolling walker   · Patient completed Bed <> Chair Transfer using Stand Pivot technique with stand by assistance with rolling walker  · Patient completed Toilet Transfer Stand Pivot technique with stand by assistance with  rolling walker  · Patient completed  Shower Transfer Stand Pivot technique with stand by assistance with rolling walker    Activities of Daily Living:  · UB Dressing: modified independence to don t-shirt.  · LB Dressing: minimum assistance to don underwear, pants, socks, and shoes.    Patient left up in chair with all lines intact, call button in reach and RN notified    AMPA 6 Click:  AMPA Total Score: 23    Treatment & Education:  Educated pt on car T/F's and bathing.  Education:    Assessment:     Windy Lindo is a 67 y.o. female with a medical diagnosis of Primary osteoarthritis of right knee.  She was able to perform supine/sit, sit/stand, bed/chair, shower chair, and toilet T/F c SBA and RW.  Was able to perform UB/LB dressing c SBA/min A.  Educated pt on car T/F's and bathing.  Performance deficits affecting function are weakness, impaired self care skills, impaired functional mobilty, orthopedic precautions.       Rehab Prognosis:  Good; patient would benefit from acute skilled OT services to address these deficits and reach maximum level of function.       Plan:     Patient to be seen daily to address the above listed problems via self-care/home management, therapeutic activities, therapeutic exercises  · Plan of Care Expires:    · Plan of Care Reviewed with: patient    This Plan of care has been discussed with the patient who was involved in its development and understands and is in agreement with the identified goals and treatment plan    GOALS:    Occupational Therapy Goals        Problem: Occupational Therapy Goal    Goal Priority Disciplines Outcome Interventions   Occupational Therapy Goal     OT, PT/OT     Description:  Goals to be met by: 7 days    Patient will increase functional independence with ADLs by performing:    UE Dressing with Supervision.  LE Dressing with Supervision with AD as needed.  Grooming while standing with Supervision.  Toileting from bedside commode with Supervision for hygiene and clothing management.   Stand pivot transfers with Supervision.  Toilet transfer to bedside commode with Supervision.                         Time Tracking:     OT Date of Treatment: 12/06/17  OT Start Time: 1005  OT Stop Time: 1030  OT Total Time (min): 25 min    Billable Minutes:Self Care/Home Management CHRISTA Black  12/6/2017

## 2017-12-06 NOTE — ADDENDUM NOTE
Addendum  created 12/06/17 1247 by Kiarra Arora RN    Sign clinical note, Visit Navigator SmartForm Flowsheet section accepted

## 2017-12-06 NOTE — PLAN OF CARE
[facility]    HOME HEALTH ORDERS  FACE TO FACE ENCOUNTER    Patient Name: Windy Lindo  YOB: 1950    PCP: Trisha Higginbotham MD   PCP Address: [address] / [place] [address]  PCP Phone Number: [phone]  PCP Fax: [phone]    Encounter Date: 12/06/2017    Admit to Home Health    Diagnoses:  Active Hospital Problems    Diagnosis  POA    *Primary osteoarthritis of right knee [M17.11]  Yes      Resolved Hospital Problems    Diagnosis Date Resolved POA   No resolved problems to display.       Future Appointments  Date Time Provider Department Center   12/19/2017 10:15 AM Bita Pena NP [facility]     Follow-up Information     Bita Pena NP On 12/19/2017.    Specialty:  Orthopedic Surgery  Contact information:  [address]  [facility] [address]  [phone]                     I have seen and examined this patient face to face today. My clinical findings that support the need for the home health skilled services and home bound status are the following:  Weakness/numbness causing balance and gait disturbance due to Joint Replacement making it taxing to leave home.    Allergies:Review of patient's allergies indicates:  No Known Allergies    Diet: regular diet    Activities: activity as tolerated    Home Health Admitting Clinician:   SN/PT to complete comprehensive assessment including routine vital signs. Instruct on disease process and s/s of complications to report to MD. Follow specific home health arthoplasty protocol. Review/verify medication list sent home with the patient at time of discharge  and instruct patient/caregiver as needed. If coumadin ordered, coumadin clinic to manage INR with INR draws 2x per week with a goal to maintain INR between 1.8 and 2.2. Frequency may be adjusted depending on start of care date.    Notify MD if SBP > 160 or < 90; DBP > 90 or < 50; HR > 120 or < 50; Temp > 101    Home Medical Equipment:  Walker, 3-1  bedside commode, transfer tub bench    CONSULTS:    Physical Therapy may admit if patient not on coumadin, PT to perform comprehensive assessment if performing admit visit and generate therapy plan of care. Evaluate for home safety and equipment needs; Establish/upgrade home exercise program. Perform/instruct on therapeutic exercises, gait training, transfer training, and Range of Motion.      OTHER: (only select if patient needs other therapy disciplines)  Occupational Therapy to evaluate and treat. Evaluate home environment for safety and equipment needs. Perform/Instruct on transfers, ADL training, ROM, and therapeutic exercises.    MISCELLANEOUS CARE:  Routine Skin for Bedridden Patients: Instruct patient/caregiver to apply moisture barrier cream to all skin folds and wet areas in perineal area daily and after baths and all bowel movements.    WOUND CARE ORDERS:  Assess Surgical Incision/DSRG each TX  Aquacel AG drsg applied post-op leave on 14 days post op. Call MD if any drainage reaches border to border of drsg horizontally, s/s of infection, temp >101, induration, swelling or redness.  If dressing is removed per MD order, then apply island dressing, change/teach caregiver to perform daily dressing change if island dressing present.    Medications: Review discharge medications with patient and family and provide education.      Discharge Medication List as of 12/6/2017 11:13 AM      START taking these medications    Details   aspirin (ECOTRIN) 325 MG EC tablet Take 1 tablet (325 mg total) by mouth 2 (two) times daily., Starting Tue 12/5/2017, Until Thu 1/4/2018, Print      docusate sodium 100 mg capsule Take 100 mg by mouth 2 (two) times daily., Starting Tue 12/5/2017, Print      hydrocodone-acetaminophen 10-325mg (NORCO)  mg Tab Take 1 tablet by mouth every 4 (four) hours as needed for Pain., Starting Tue 12/5/2017, Print      ondansetron (ZOFRAN) 8 MG tablet Take 1 tablet (8 mg total) by mouth every 8  (eight) hours as needed for Nausea., Starting Tue 12/5/2017, Print         CONTINUE these medications which have NOT CHANGED    Details   alendronate (FOSAMAX) 35 MG tablet Take 1 tablet (35 mg total) by mouth once a week. Needs appointment for future refills, Starting Fri 11/17/2017, Normal      vitamin D 1000 units Tab Take 1,000 Units by mouth once daily., Historical Med         STOP taking these medications       acetaminophen (TYLENOL ARTHRITIS PAIN) 650 MG TbSR Comments:   Reason for Stopping:         meloxicam (MOBIC) 7.5 MG tablet Comments:   Reason for Stopping:               I certify that this patient is confined to her home and needs intermittent skilled nursing care, physical therapy and occupational therapy.

## 2017-12-06 NOTE — PLAN OF CARE
Problem: Patient Care Overview  Goal: Plan of Care Review  Outcome: Ongoing (interventions implemented as appropriate)  Patient is awake, alert and oriented. Patient had some pain and BP issues in the beginning of the shift.  After pain medication and relaxation patient BP has lowered and she is resting comfertably . Patient is ambulatory and independent. Remained free from injury and falls this shift. Bed is in the low and locked position with side rail up x 2. Call light is within reach. Informed to call if need assistance. Will continue to monitor.

## 2017-12-06 NOTE — PT/OT/SLP PROGRESS
Physical Therapy Treatment    Patient Name:  Windy Lindo   MRN:  [id number]    Recommendations:     Discharge Recommendations:  home health PT   Discharge Equipment Recommendations: walker, rolling (patient has elevated seat for commode)   Barriers to discharge: None    Assessment:     Windy Lindo is a 67 y.o. female admitted with a medical diagnosis of Primary osteoarthritis of right knee.  She presents with the following impairments/functional limitations:  weakness, gait instability, impaired balance, impaired functional mobilty, decreased lower extremity function, decreased ROM Patient tolerated increase distance with gait training well. Patient demonstrated good R LE stability with gait.  Patient required less assistance with mobility and transfers.  Patient will require family assistance upon discharge from hospital.      Rehab Prognosis:  Good; patient would benefit from acute skilled PT services to address these deficits and reach maximum level of function.      Recent Surgery: Procedure(s) (LRB):  REPLACEMENT-KNEE-TOTAL (Right) 1 Day Post-Op    Plan:     During this hospitalization, patient to be seen BID to address the above listed problems via gait training, therapeutic activities, neuromuscular re-education, therapeutic exercises  · Plan of Care Expires:      Plan of Care Reviewed with: patient    Subjective     Communicated with NSG prior to session.  Patient found supine upon PT entry to room, agreeable to treatment.      Chief Complaint: none  Patient comments/goals: going home  Pain/Comfort:  · Pain Rating 1: 0/10    Patients cultural, spiritual, [employer]ism conflicts given the current situation:      Objective:     Patient found with: FCD, perineural catheter (polar ice)     General Precautions: Standard, fall   Orthopedic Precautions:RLE weight bearing as tolerated   Braces:       Functional Mobility:  · Bed Mobility:     · Supine to Sit: supervision  · Sit to Supine: supervision  · Transfers:      · Sit to Stand:  stand by assistance with rolling walker  · Gait: amb 30 feet, 10 feet and 105 feet with RW CGA/SBA.  No LOB, occasional VC for walker mgmnt, sequence, safety and heel strike  · Balance: patient demonstrated good seated and standing balance  Stairs:  Ascend and descend 6 steps with R HR B UE support CGA. VC's for sequence and technique        AM-PAC 6 CLICK MOBILITY  Turning over in bed (including adjusting bedclothes, sheets and blankets)?: 4  Sitting down on and standing up from a chair with arms (e.g., wheelchair, bedside commode, etc.): 4  Moving from lying on back to sitting on the side of the bed?: 4  Moving to and from a bed to a chair (including a wheelchair)?: 4  Need to walk in hospital room?: 3  Climbing 3-5 steps with a railing?: 3  Total Score: 22       Therapeutic Activities and Exercises:   Patient performed TKR protocol exercises x15-20 reps   Patient given HEP  R knee PROM 92 degrees flexion  Patient educated on car transfers.  Patient stated she felt comfortable going home and had no concerns        Patient left up in chair with NSG present..    GOALS:    Physical Therapy Goals        Problem: Physical Therapy Goal    Goal Priority Disciplines Outcome Goal Variances Interventions   Physical Therapy Goal     PT/OT, PT Ongoing (interventions implemented as appropriate)     Description:  Goals to be met by: 17     Patient will increase functional independence with mobility by performin. Supine to sit with Supervision met  2. Sit to supine with Supervision met  3. Sit to stand transfer with Stand-by Assistance met  4. Gait  x 150 feet with Stand-by Assistance using Rolling Walker.   5. Ascend/descend 4 stair with bilateral Handrails Contact Guard Assistance  met  6. Lower extremity exercise program x 30 reps per handout, with independence                       Time Tracking:     PT Received On: 17  PT Start Time: 0815     PT Stop Time: 0900  PT Total Time (min): 45  min     Billable Minutes: Gait Training 20, Therapeutic Activity 15 and Therapeutic Exercise 10    Treatment Type: Treatment  PT/PTA: PTA     PTA Visit Number: 1     Basilio Amato II, PTA  12/06/2017

## 2017-12-07 NOTE — TREATMENT PLAN
Called Ms. Lindo and left a message. Wanted to follow up to make sure that her pain was under control and the PNC is infusing well. Will follow up tomorrow to make she the nerve catheter will be removed.    Jagdish Brewer MD  CA-3

## 2017-12-08 NOTE — TREATMENT PLAN
Called Ms. Lindo Pt has removed the catheter with the tip intact. She states everything is going well.     Jagdish Brewer MD  CA-3

## 2017-12-13 ENCOUNTER — TELEPHONE (OUTPATIENT)
Dept: ORTHOPEDICS | Facility: CLINIC | Age: 67
End: 2017-12-13

## 2017-12-13 DIAGNOSIS — Z96.651 STATUS POST RIGHT KNEE REPLACEMENT: Primary | ICD-10-CM

## 2017-12-13 NOTE — TELEPHONE ENCOUNTER
----- Message from Darian Ribeiro sent at 12/12/2017  4:27 PM CST -----  Contact: [place], [facility]/[phone]      ----- Message -----  From: Veronica Brewer  Sent: 12/12/2017   3:34 PM  To: Narayan MADRIGAL Staff    Gavino called requesting for an order for this patient to be sent over for Outpatient Physical Therapy at the [facility] for the week of 12/25. Requesting to speak with Carleen Mancia could be reached at [phone]

## 2017-12-15 DIAGNOSIS — M81.0 OSTEOPOROSIS, UNSPECIFIED OSTEOPOROSIS TYPE, UNSPECIFIED PATHOLOGICAL FRACTURE PRESENCE: ICD-10-CM

## 2017-12-15 RX ORDER — ALENDRONATE SODIUM 35 MG/1
35 TABLET ORAL WEEKLY
Qty: 4 TABLET | Refills: 0 | Status: SHIPPED | OUTPATIENT
Start: 2017-12-15 | End: 2018-01-13 | Stop reason: SDUPTHER

## 2017-12-19 ENCOUNTER — OFFICE VISIT (OUTPATIENT)
Dept: ORTHOPEDICS | Facility: CLINIC | Age: 67
End: 2017-12-19
Payer: MEDICARE

## 2017-12-19 ENCOUNTER — HOSPITAL ENCOUNTER (OUTPATIENT)
Dept: RADIOLOGY | Facility: HOSPITAL | Age: 67
Discharge: HOME | End: 2017-12-19
Attending: NURSE PRACTITIONER
Payer: MEDICARE

## 2017-12-19 VITALS
HEIGHT: 66 IN | DIASTOLIC BLOOD PRESSURE: 78 MMHG | WEIGHT: 162.81 LBS | SYSTOLIC BLOOD PRESSURE: 133 MMHG | HEART RATE: 73 BPM | TEMPERATURE: 97 F | BODY MASS INDEX: 26.17 KG/M2

## 2017-12-19 DIAGNOSIS — Z96.651 S/P TOTAL KNEE REPLACEMENT USING CEMENT, RIGHT: ICD-10-CM

## 2017-12-19 DIAGNOSIS — G89.18 POST-OP PAIN: Primary | ICD-10-CM

## 2017-12-19 PROCEDURE — 99213 OFFICE O/P EST LOW 20 MIN: CPT | Mod: PBBFAC,25 | Performed by: NURSE PRACTITIONER

## 2017-12-19 PROCEDURE — 73560 X-RAY EXAM OF KNEE 1 OR 2: CPT | Mod: TC,LT

## 2017-12-19 PROCEDURE — 73562 X-RAY EXAM OF KNEE 3: CPT | Mod: 26,RT,, | Performed by: RADIOLOGY

## 2017-12-19 PROCEDURE — 99024 POSTOP FOLLOW-UP VISIT: CPT | Mod: ,,, | Performed by: NURSE PRACTITIONER

## 2017-12-19 PROCEDURE — 73560 X-RAY EXAM OF KNEE 1 OR 2: CPT | Mod: 26,59,LT, | Performed by: RADIOLOGY

## 2017-12-19 PROCEDURE — 99999 PR PBB SHADOW E&M-EST. PATIENT-LVL III: CPT | Mod: PBBFAC,,, | Performed by: NURSE PRACTITIONER

## 2017-12-19 RX ORDER — HYDROCODONE BITARTRATE AND ACETAMINOPHEN 10; 325 MG/1; MG/1
1 TABLET ORAL EVERY 4 HOURS PRN
Qty: 60 TABLET | Refills: 0 | Status: SHIPPED | OUTPATIENT
Start: 2017-12-19 | End: 2018-06-15

## 2017-12-27 ENCOUNTER — CLINICAL SUPPORT (OUTPATIENT)
Dept: REHABILITATION | Facility: HOSPITAL | Age: 67
End: 2017-12-27
Payer: MEDICARE

## 2017-12-27 DIAGNOSIS — R53.1 WEAKNESS: ICD-10-CM

## 2017-12-27 PROCEDURE — G8979 MOBILITY GOAL STATUS: HCPCS | Mod: CJ | Performed by: PHYSICAL THERAPIST

## 2017-12-27 PROCEDURE — 97014 ELECTRIC STIMULATION THERAPY: CPT | Performed by: PHYSICAL THERAPIST

## 2017-12-27 PROCEDURE — 97161 PT EVAL LOW COMPLEX 20 MIN: CPT | Performed by: PHYSICAL THERAPIST

## 2017-12-27 PROCEDURE — G8978 MOBILITY CURRENT STATUS: HCPCS | Mod: CK | Performed by: PHYSICAL THERAPIST

## 2017-12-27 NOTE — PROGRESS NOTES
"Physician:Bita Pena NP  Diagnosis: s/p R TKA (DOS: 12-05-17)  Encounter Diagnosis   Name Primary?    Weakness       Orders:  Physical Therapy evaluate and treat  Treatment start time: 10:50  Treatment end time: 11:40    Subjective:  Pt states R knee feels "good" and rates pain as 0/10.  States having had 2 wks home PT.  States doing HEP and icing as instructed.    Chief complaint:  pain  Radicular symptoms:  none  Aggravating factors:   Flexion; walking  Easing factors:  rest     Current functional status:   ADL limited by walker    Patients structured exercise routine:    Home PT ex  Exercise routine prior to onset :     walking    Special tests:  na    Work:     retired                             Pts goals:  Independent gait; full ROM    OBJECTIVE:  Postural examination:  Pelvis level     Functional assessment:   - walking:   Ambulates with a RW, mild limp present             AROM:  0-105 deg flexion, 115 passive     MMT:   4/5 hip abductors, 4-/5 quads and 4+/5 hams    Tone:  Decreased quads; POOR QS ability; MIN lag with SLR    Flexibility testing:   Tight hams    Special tests:   Neg Pedro's    Palpation:   No TTP    Joint mobility: good    Swelling:  mild    Other: incision nearly healed      History  Co-morbidities and personal factors that may impact the plan of care Examination  Body Structures and Functions, activity limitations and participation restrictions that may impact the plan of care    Clinical Presentation   Co-morbidities:   none        Personal Factors:   no deficits Body Regions:   lower extremities    Body Systems:    ROM  strength  balance            Participation Restrictions:   None       Activity limitations:   Learning and applying knowledge  no deficits    General Tasks and Commands  no deficits    Communication  no deficits    Mobility  no deficits    Self care  no deficits    Domestic Life  no deficits    Interactions/Relationships  no deficits    Life Areas  no " deficits    Community and Social Life  no deficits         stable and uncomplicated                      low   low  low Decision Making/ Complexity Score:  Low/52% limitation           TREATMENT:    Today's treatment:  Pat MOBS/PROM x 5 min, NMES quads x 10 min, HEP and CP x 10 min.    Pt was provided with a written copy of exercises to perform as tolerated, including: SLR x 3 ways, QS, AP, HSS, GS, hams curls, adductor squeeze and heel slides.    Exercises were reviewed and pt was able to demonstrate them prior to the end of the session.     Pt was provided educational information, including: icing for pain.    Discussed insurance limitations with pt.     ASSESSMENT:  PT diagnosis: weakness and decreased ROM s/p R TKA   Patient can benefit from outpatient physical therapy and a home program  Prognosis is Good.    No cultural, environmental or spiritual barriers identified to treatment or learning.     Medical necessity is demonstrated by the following  IMPAIRMENTS:  Pain limits function of effected part for some activities, Unable to participate fully in daily activities, Weakness and Edema    GOALS:    4_   weeks. Pt agrees with goals set.  1. Independent with HEP.  2. Report decreased    R knee    pain  <   / =  2/10 with adls such as walking  3. Increased MMT  for  R LE to 4+/5 to 5/5 with ADL    4. Increased arom  for  R knee to 0-120 deg flexion with functional activities    PLAN:  Outpatient physical therapy 2 times weekly to include: pt ed, hep, therapeutic exercises, neuromuscular re-education/ balance exercises, joint mobilizations, modalities prn, and aquatic therapy.    Cont PT for  12         weeks.   I certify the need for these services   furnished under this plan of treatment and while under my   care.____________________________________ Physician/Referring Practitioner Date   of Signature

## 2017-12-29 ENCOUNTER — CLINICAL SUPPORT (OUTPATIENT)
Dept: REHABILITATION | Facility: HOSPITAL | Age: 67
End: 2017-12-29
Payer: MEDICARE

## 2017-12-29 DIAGNOSIS — R53.1 WEAKNESS: Primary | ICD-10-CM

## 2017-12-29 PROCEDURE — 97110 THERAPEUTIC EXERCISES: CPT

## 2017-12-29 PROCEDURE — 97140 MANUAL THERAPY 1/> REGIONS: CPT

## 2017-12-29 NOTE — PROGRESS NOTES
"                                                    Physical Therapy Progress Note     Name: Windy Lindo  Clinic Number: [id number]  Diagnosis:   Encounter Diagnosis   Name Primary?    Weakness Yes     Physician: Bita Pena NP  Treatment Orders: PT Evaluation and Treatment  Past Medical History:   Diagnosis Date    Arthritis     Eye injury 4 yrs    hit od       Precautions: standard    Evaluation date: 5/20/16  Visit #: 5/20  Authorization period expiration: 12/31/16    Subjective     Pt reports: no pain when arrived for tx.     Pain Scale: 0/10    Objective     PROM R knee 0-115'    Therapeutic exercise: Windy received therapeutic exercises to develop LLE strengthening/flexibility and hip stabilization     Supine:  HS 30x/3" in flexion  Quad Sets 30x/3"  Stationary bike x 10' half revolution to full revolution  SAQ 3x10 2#/30x   SLR hip flexion 2x15 with 3 sec hold  DKTC with red Tball with downward force for HS activation 3x10/3"  Bridges 2x10/3" core engagement   Knee fall outs with green TB 2x10   CP x 10' R knee     Piriformis stretch 2x45 sec np    Sidelying:  Reverse clams 2x15 np    Prone:    Sitting:  Hip ER with YTB 2x10    Standing:  Step ups in 4" steps 1x10    Manual therapy: Patella MOBS and PROM x 10'    Written Home Exercises Provided:   Pt demo good understanding of the education provided during the initial evaluation. Karen demonstrated good return demonstration of activities.     Pt. education:  · Posture reeducation, body mechanics, HEP,activity modification/avoidance  · No spiritual or educational barriers to learning provided  · Pt has no cultural, educational or language barriers to learning provided.    Assessment      Pt tolerating tx well. Demonstrating improved PROM knee flexion and extention. Continued tx focused on BLE strengthening/flexibility and hip stabilization. VC/TC for quad activation and core engagement.  Pt will increase weight bearing interventions during the next " tx visit.  Pt will continue to benefit from skilled outpatient physical therapy to address the remaining functional deficits, provide pt/family education, and to maximize pt's level of independence in the home and community environment.     Anticipated barriers to physical therapy: None    Goals:    Short Term Goals: 4 weeks  1. Pt. to report decreased right knee pain </= 6/10 at worst to increase tolerance for prolonged standing  2. Pt. to demonstrate proper gait mechanics requiring min. to no verbal cues from PT  3. Pt. to demonstrate an increase in right knee A/PROM to 2-120 deg. to improve ease with transitional activities.  4. Pt. to demonstrate increased MMT for bilateral gluteus medius to 3/5 to increase tolerance for community ambulation  5. Pt. to demonstrate increased MMT for right quadriceps to 3/5 to increase tolerance to squatting  6. Pt. to demonstrate increased MMT for right hip flexor to 3/5 to increase toe clearance during gait/stair negotiation.  7. Pt to report increased south. to community ambulation with less pain and LOB  8. Pt to tolerate HEP to improve ROM and independence with ADL's  Long Term Goals: 8 weeks  1. Pt. to report decreased right knee pain </= 3/10 at worst to increase tolerance for stair negotiation  2. Pt. to demonstrate proper gait mechanics requiring no verbal cues from PT  3. Pt. to demonstrate an increase in right knee A/PROM to 2-130 deg. to improve ease with transitional activities.  4. Pt. to demonstrate increased MMT for bilateral gluteus medius to 3+/5 to increase tolerance for community ambulation  5. Pt. to demonstrate increased MMT for right quadriceps to 3+/5 to increase tolerance to squatting  6. Pt. to demonstrate increased MMT for right hip flexor to 3+/5 to increase toe clearance during gait/stair negotiation.  7. Pt to report increased south. to community ambulation with less pain and LOB  8. Pt will report at CK at least 40% < 60% impaired, limited or restricted  on LE FOTO score to demonstrate increase in LE function with every day tasks.   9. Pt to be Independent with HEP to improve ROM and independence with ADL's    · Pt's spiritual, cultural and educational needs considered and pt agreeable to plan of care and goals as stated below:        Plan   Continue with established Plan of Care towards PT goals.  Gavino Osman PTA, STS

## 2018-01-03 ENCOUNTER — CLINICAL SUPPORT (OUTPATIENT)
Dept: REHABILITATION | Facility: HOSPITAL | Age: 68
End: 2018-01-03
Payer: MEDICARE

## 2018-01-03 DIAGNOSIS — R53.1 WEAKNESS: Primary | ICD-10-CM

## 2018-01-03 PROCEDURE — 97110 THERAPEUTIC EXERCISES: CPT

## 2018-01-03 PROCEDURE — 97140 MANUAL THERAPY 1/> REGIONS: CPT

## 2018-01-03 NOTE — PROGRESS NOTES
"                                                    Physical Therapy Progress Note     Name: Windy Lindo  Clinic Number: [id number]  Diagnosis:   Encounter Diagnosis   Name Primary?    Weakness Yes     Physician: Bita Pena NP  Treatment Orders: PT Evaluation and Treatment  Past Medical History:   Diagnosis Date    Arthritis     Eye injury 4 yrs    hit od   Time in 1055  Time out 1155    Precautions: standard    Evaluation date: 5/20/16  Visit #: 6/20  Authorization period expiration: 12/31/16    Subjective     Pt reports: no pain in R knee when arrived for tx. Stated compliant with HEP     Pain Scale: 0/10    Objective     PROM R knee 0-115'    Therapeutic exercise: Windy received therapeutic exercises to develop LLE strengthening/flexibility and hip stabilization     Supine:    Stationary bike x 10' half revolution to full revolution    HS 30x/3" in flexion  Quad Sets 30x/3"  SAQ 3x10 3#/30x   SLR hip flexion 1# 2x15 with 3 sec hold  DKTC with red Tball with downward force for HS activation 3x10/3"  Bridges 2x10/3" core engagement   Knee fall outs with green TB 2x10   CP x 10' R knee     Piriformis stretch 2x45 sec np    Sidelying:  Reverse clams 2x15 np    Prone:    Sitting:  Hip ER with YTB 2x10    Standing:  Step ups in 4" steps 1x10    Manual therapy: Patella MOBS and PROM x 10'    Written Home Exercises Provided:   Pt demo good understanding of the education provided during the initial evaluation. Karen demonstrated good return demonstration of activities.     Pt. education:  · Posture reeducation, body mechanics, HEP,activity modification/avoidance  · No spiritual or educational barriers to learning provided  · Pt has no cultural, educational or language barriers to learning provided.    Assessment      Pt tolerating tx well. Demonstrating improved PROM knee flexion and extention along with increased wt with several therapeutic exercise w/o problems,. Continued tx focused on BLE " strengthening/flexibility and hip stabilization. VC/TC for quad activation and core engagement.  Pt will increase weight bearing interventions during the next tx visit.  Pt will continue to benefit from skilled outpatient physical therapy to address the remaining functional deficits, provide pt/family education, and to maximize pt's level of independence in the home and community environment.     Anticipated barriers to physical therapy: None    Goals:    Short Term Goals: 4 weeks  1. Pt. to report decreased right knee pain </= 6/10 at worst to increase tolerance for prolonged standing  2. Pt. to demonstrate proper gait mechanics requiring min. to no verbal cues from PT  3. Pt. to demonstrate an increase in right knee A/PROM to 2-120 deg. to improve ease with transitional activities.  4. Pt. to demonstrate increased MMT for bilateral gluteus medius to 3/5 to increase tolerance for community ambulation  5. Pt. to demonstrate increased MMT for right quadriceps to 3/5 to increase tolerance to squatting  6. Pt. to demonstrate increased MMT for right hip flexor to 3/5 to increase toe clearance during gait/stair negotiation.  7. Pt to report increased south. to community ambulation with less pain and LOB  8. Pt to tolerate HEP to improve ROM and independence with ADL's  Long Term Goals: 8 weeks  1. Pt. to report decreased right knee pain </= 3/10 at worst to increase tolerance for stair negotiation  2. Pt. to demonstrate proper gait mechanics requiring no verbal cues from PT  3. Pt. to demonstrate an increase in right knee A/PROM to 2-130 deg. to improve ease with transitional activities.  4. Pt. to demonstrate increased MMT for bilateral gluteus medius to 3+/5 to increase tolerance for community ambulation  5. Pt. to demonstrate increased MMT for right quadriceps to 3+/5 to increase tolerance to squatting  6. Pt. to demonstrate increased MMT for right hip flexor to 3+/5 to increase toe clearance during gait/stair  negotiation.  7. Pt to report increased south. to community ambulation with less pain and LOB  8. Pt will report at CK at least 40% < 60% impaired, limited or restricted on LE FOTO score to demonstrate increase in LE function with every day tasks.   9. Pt to be Independent with HEP to improve ROM and independence with ADL's    · Pt's spiritual, cultural and educational needs considered and pt agreeable to plan of care and goals as stated below:        Plan   Continue with established Plan of Care towards PT goals.  Gavino Osman PTA, STS

## 2018-01-10 ENCOUNTER — CLINICAL SUPPORT (OUTPATIENT)
Dept: REHABILITATION | Facility: HOSPITAL | Age: 68
End: 2018-01-10
Payer: MEDICARE

## 2018-01-10 DIAGNOSIS — R53.1 WEAKNESS: Primary | ICD-10-CM

## 2018-01-10 PROCEDURE — 97110 THERAPEUTIC EXERCISES: CPT

## 2018-01-10 PROCEDURE — 97140 MANUAL THERAPY 1/> REGIONS: CPT

## 2018-01-10 PROCEDURE — 97014 ELECTRIC STIMULATION THERAPY: CPT

## 2018-01-10 NOTE — PROGRESS NOTES
"                                                    Physical Therapy Progress Note     Name: Windy Lindo  Clinic Number: [id number]  Diagnosis:   Encounter Diagnosis   Name Primary?    Weakness Yes     Physician: Bita Pena NP  Treatment Orders: PT Evaluation and Treatment  Past Medical History:   Diagnosis Date    Arthritis     Eye injury 4 yrs    hit od   Time in 1055  Time out 1155    Precautions: standard    Evaluation date: 5/20/16  Visit #: 6/20  Authorization period expiration: 12/31/16    Subjective     Pt reports: no pain in R knee today. " Im feeling great".     Pain Scale: 0/10    Objective     PROM R knee 0-120'    Therapeutic exercise: Windy received therapeutic exercises to develop LLE strengthening/flexibility and hip stabilization     Supine:    Stationary bike x 10' full revolution    Manual therapy: [place]ella MOBS, PROM and prone contract/relax knee flexion x 10'    HS 30x/3" in flexion  Quad Sets 30x/3"  SAQ 3x10 3#/30x   SL R hip abd AA  2x10  SLR hip flexion 2# 2x15 with 3 sec hold  GTB B clamshells 3x10/3"   Bridges 2x10/3" core engagement   DKTC with red Tball with downward force for HS activation 3x10/3"        CP x 10' R knee     Written Home Exercises Provided:   Pt demo good understanding of the education provided during the initial evaluation. Karen demonstrated good return demonstration of activities.     Pt. education:  · Posture reeducation, body mechanics, HEP,activity modification/avoidance  · No spiritual or educational barriers to learning provided  · Pt has no cultural, educational or language barriers to learning provided.    Assessment      Pt tolerating tx well. Demonstrating improved ROM tolerating full revolution on bike entire 10' w/o problems. Improved PROM knee flexion. Continued tx focused on BLE strengthening/flexibility and hip stabilization. VC/TC for quad activation and core engagement.  Pt will increase weight bearing interventions during the next tx visit. "  Pt will continue to benefit from skilled outpatient physical therapy to address the remaining functional deficits, provide pt/family education, and to maximize pt's level of independence in the home and community environment.     Anticipated barriers to physical therapy: None    Goals:    Short Term Goals: 4 weeks  1. Pt. to report decreased right knee pain </= 6/10 at worst to increase tolerance for prolonged standing  2. Pt. to demonstrate proper gait mechanics requiring min. to no verbal cues from PT  3. Pt. to demonstrate an increase in right knee A/PROM to 2-120 deg. to improve ease with transitional activities.  4. Pt. to demonstrate increased MMT for bilateral gluteus medius to 3/5 to increase tolerance for community ambulation  5. Pt. to demonstrate increased MMT for right quadriceps to 3/5 to increase tolerance to squatting  6. Pt. to demonstrate increased MMT for right hip flexor to 3/5 to increase toe clearance during gait/stair negotiation.  7. Pt to report increased south. to community ambulation with less pain and LOB  8. Pt to tolerate HEP to improve ROM and independence with ADL's  Long Term Goals: 8 weeks  1. Pt. to report decreased right knee pain </= 3/10 at worst to increase tolerance for stair negotiation  2. Pt. to demonstrate proper gait mechanics requiring no verbal cues from PT  3. Pt. to demonstrate an increase in right knee A/PROM to 2-130 deg. to improve ease with transitional activities.  4. Pt. to demonstrate increased MMT for bilateral gluteus medius to 3+/5 to increase tolerance for community ambulation  5. Pt. to demonstrate increased MMT for right quadriceps to 3+/5 to increase tolerance to squatting  6. Pt. to demonstrate increased MMT for right hip flexor to 3+/5 to increase toe clearance during gait/stair negotiation.  7. Pt to report increased south. to community ambulation with less pain and LOB  8. Pt will report at CK at least 40% < 60% impaired, limited or restricted on LE FOTO  score to demonstrate increase in LE function with every day tasks.   9. Pt to be Independent with HEP to improve ROM and independence with ADL's    · Pt's spiritual, cultural and educational needs considered and pt agreeable to plan of care and goals as stated below:        Plan   Continue with established Plan of Care towards PT goals.  Gavino Osman PTA, STS

## 2018-01-12 ENCOUNTER — CLINICAL SUPPORT (OUTPATIENT)
Dept: REHABILITATION | Facility: HOSPITAL | Age: 68
End: 2018-01-12
Payer: MEDICARE

## 2018-01-12 DIAGNOSIS — R53.1 WEAKNESS: ICD-10-CM

## 2018-01-12 PROCEDURE — 97110 THERAPEUTIC EXERCISES: CPT | Performed by: PHYSICAL THERAPIST

## 2018-01-12 PROCEDURE — 97014 ELECTRIC STIMULATION THERAPY: CPT | Performed by: PHYSICAL THERAPIST

## 2018-01-12 NOTE — PROGRESS NOTES
" Outpatient Physical Therapy Progress Note     Time in: 10:30  Time out: 11:30  Ther ex time: 35 min    Visit # 4    Subjective:  Pt states R knee feels "better" and rates pain as 0/10.  States doing HEP as instructed.    Objective:  Mild swelling.  AROM is 0-110 deg flexion, 118 passive.  QS ability increased.  Mild lag with SLR.  Gait is independent with a MIN limp.    Treatment:  There ex and modalities for increased ROM/strength and improved ADL to include:  bike x 10 min, pat MOBS/PROM x 8 min, leg press and toe raises with 70#, mini squats, standing hip abd/ext with 3#, hams curls with 3#, SAQ with 3#, SLR with 3#, QS, standing theraband TKE, GTR, HEP review and NMES quads/CP x 10 min.     Assessment:  Progressing well.     Will the patient continue to benefit from skilled PT intervention?     yes    Medical necessity is demonstrated by:   Pain limits function of effected part for all activities  Unable to participate fully in daily activities  weakness      Progress towards goals: good    New/Revised Goals:    Plan:  Continue with established Plan of Care toward PT goals.        "

## 2018-01-13 DIAGNOSIS — M81.0 OSTEOPOROSIS, UNSPECIFIED OSTEOPOROSIS TYPE, UNSPECIFIED PATHOLOGICAL FRACTURE PRESENCE: ICD-10-CM

## 2018-01-13 RX ORDER — ALENDRONATE SODIUM 35 MG/1
35 TABLET ORAL WEEKLY
Qty: 2 TABLET | Refills: 0 | Status: SHIPPED | OUTPATIENT
Start: 2018-01-13 | End: 2018-02-12 | Stop reason: SDUPTHER

## 2018-01-15 ENCOUNTER — PATIENT MESSAGE (OUTPATIENT)
Dept: ORTHOPEDICS | Facility: CLINIC | Age: 68
End: 2018-01-15

## 2018-01-15 DIAGNOSIS — Z96.651 STATUS POST RIGHT KNEE REPLACEMENT: Primary | ICD-10-CM

## 2018-01-15 DIAGNOSIS — Z96.659 STATUS POST KNEE REPLACEMENT, UNSPECIFIED LATERALITY: Primary | ICD-10-CM

## 2018-01-15 RX ORDER — AMOXICILLIN 500 MG/1
2000 CAPSULE ORAL
Qty: 4 CAPSULE | Refills: 0 | Status: SHIPPED | OUTPATIENT
Start: 2018-01-15 | End: 2018-04-04 | Stop reason: SDUPTHER

## 2018-01-15 NOTE — PROGRESS NOTES
Orders for physical therapy continuation entered as requested at [facility].

## 2018-01-15 NOTE — PROGRESS NOTES
Amoxil rx sent to pharmacy for dental appt. Will continue physical therapy when she sends me the location.

## 2018-01-23 ENCOUNTER — CLINICAL SUPPORT (OUTPATIENT)
Dept: REHABILITATION | Facility: HOSPITAL | Age: 68
End: 2018-01-23
Payer: MEDICARE

## 2018-01-23 DIAGNOSIS — R53.1 WEAKNESS: Primary | ICD-10-CM

## 2018-01-23 PROCEDURE — 97014 ELECTRIC STIMULATION THERAPY: CPT

## 2018-01-23 PROCEDURE — 97140 MANUAL THERAPY 1/> REGIONS: CPT

## 2018-01-23 PROCEDURE — 97110 THERAPEUTIC EXERCISES: CPT

## 2018-01-23 NOTE — PROGRESS NOTES
" Outpatient Physical Therapy Progress Note     Time in: 1020  Time out: 1120  Ther ex time: 35 min    Visit # 6    Subjective:  Pt reporting no pain in R knee when arrived for tx. Pain scale 0/10.  States doing HEP as instructed.    Objective:  Mild swelling.  AROM is 0-115 deg flexion, 120 passive.  QS ability increased.    Gait is independent with a MIN limp.    Treatment:  There ex and modalities for increased ROM/strength and improved ADL to include:QS 30x/3", HS 30x/3", SLR with 3#, SAQ w/3#, pat MOBS/PROM x 10', bike x 10 min for increased ROM and endurance, leg press and toe raises with 70#, mini squats, standing hip abd/ext with 3#, hams curls with 3#, standing theraband TKE, HEP review and NMES quads/CP x 10 min.     Assessment:  Pt tolerating tx well. VC/TC for correcting form/technique along with quad activation. Improved AROM and PROM knee flexion. Increased activation on R quad with improved SLR. Continue progress patient as tolerated.      Will the patient continue to benefit from skilled PT intervention?     yes    Medical necessity is demonstrated by:   Pain limits function of effected part for all activities  Unable to participate fully in daily activities  weakness      Progress towards goals: good    New/Revised Goals:    Plan:  Continue with established Plan of Care toward PT goals.        "

## 2018-01-25 ENCOUNTER — CLINICAL SUPPORT (OUTPATIENT)
Dept: REHABILITATION | Facility: HOSPITAL | Age: 68
End: 2018-01-25
Payer: MEDICARE

## 2018-01-25 PROCEDURE — 97110 THERAPEUTIC EXERCISES: CPT

## 2018-01-25 PROCEDURE — 97014 ELECTRIC STIMULATION THERAPY: CPT

## 2018-01-25 NOTE — PROGRESS NOTES
" Outpatient Physical Therapy Progress Note     Time in: 1020  Time out: 1120  Ther ex time: 35 min    Visit # 7    Subjective:  Pt reporting no pain in R knee when arrived for tx. Pain scale 0/10.  States doing HEP as instructed.    Objective:  Mild swelling.  AROM is 0-115 deg flexion, 120 passive.  QS ability increased.  Gait is independent with a MIN limp.    Treatment:  There ex and modalities for increased ROM/strength and improved ADL to include: bike x 10 min for increased ROM and endurance seat level 3, QS 30x/3", HS 30x/3", SLR with 3#, SAQ w/3#,  Prone TKE bolster 30x/3", B RTB clamshells, SL R hip external rotation, pat MOBS/PROM x 5',  leg press and toe raises with 70#, mini squats np, standing hip abd/ext with 3# np, hams curls with 3# np,  HEP review and NMES quads/CP x 10 min.     Assessment:  Pt tolerating tx well with decrease in seat level on stationary bike for increased knee flexion. VC/TC for correcting form/technique along with quad activation.  Increased activation on R quad with improved SLR. Continue progress patient as tolerated.      Will the patient continue to benefit from skilled PT intervention?     yes    Medical necessity is demonstrated by:   Pain limits function of effected part for all activities  Unable to participate fully in daily activities  weakness      Progress towards goals: good    New/Revised Goals:    Plan:  Continue with established Plan of Care toward PT goals.        "

## 2018-01-30 ENCOUNTER — PATIENT MESSAGE (OUTPATIENT)
Dept: RESEARCH | Facility: HOSPITAL | Age: 68
End: 2018-01-30

## 2018-01-30 ENCOUNTER — CLINICAL SUPPORT (OUTPATIENT)
Dept: REHABILITATION | Facility: HOSPITAL | Age: 68
End: 2018-01-30
Payer: MEDICARE

## 2018-01-30 DIAGNOSIS — R53.1 WEAKNESS: ICD-10-CM

## 2018-01-30 PROCEDURE — 97110 THERAPEUTIC EXERCISES: CPT | Performed by: PHYSICAL THERAPIST

## 2018-01-30 PROCEDURE — 97014 ELECTRIC STIMULATION THERAPY: CPT | Performed by: PHYSICAL THERAPIST

## 2018-01-30 NOTE — PROGRESS NOTES
" Outpatient Physical Therapy Progress Note     Time in: 10:20  Time out: 11:30  Ther ex time: 40 min    Visit # 7    Subjective:  Pt states R knee feels "OK" and rates pain as 2/10.  States having difficulty standing a long time due to pain. States doing HEP as instructed.    Objective:  Mild swelling; no redness or warmth.  AROM is 0-118 deg flexion, 125 passive.  QS ability increased.  No lag with SLR.  Gait is independent with a MIN limp.  Gross strength is 4/5 R LE.    Treatment:  There ex and modalities for increased ROM/strength and improved ADL to include:  bike x 10 min, pat MOBS/PROM x 5 min, leg press and toe raises with 80#, mini squats, standing hip abd/ext with 3#, hams curls with 3#, SAQ with 3#, SLR with 3#, QS, MH abd/TKE with 2/4 plates, standing theraband TKE, pilates [place] abd/add, ball mini squats, LAQ with 3#, HEP review and NMES quads/CP x 10 min.     Assessment:  Tolerated exercises well.  Continues to have swelling with activity.     Will the patient continue to benefit from skilled PT intervention?     yes    Medical necessity is demonstrated by:   Pain limits function of effected part for all activities  Unable to participate fully in daily activities  weakness      Progress towards goals: good    New/Revised Goals:    Plan:  Continue with established Plan of Care toward PT goals.        "

## 2018-02-01 ENCOUNTER — CLINICAL SUPPORT (OUTPATIENT)
Dept: REHABILITATION | Facility: HOSPITAL | Age: 68
End: 2018-02-01
Payer: MEDICARE

## 2018-02-01 DIAGNOSIS — R53.1 WEAKNESS: Primary | ICD-10-CM

## 2018-02-01 PROCEDURE — 97140 MANUAL THERAPY 1/> REGIONS: CPT

## 2018-02-01 PROCEDURE — 97110 THERAPEUTIC EXERCISES: CPT

## 2018-02-01 NOTE — PROGRESS NOTES
" Outpatient Physical Therapy Progress Note     Time in: 1420  Time out: 1520  Ther ex time: 40 min    Visit # 7    Subjective:  Pt reporting no pain in R knee when arrived for tx.  States doing HEP as instructed. Pain scale 0/10    Objective:  Mild swelling; no redness or warmth.  AROM is 0-118 deg flexion, 125 passive.  QS ability increased.  No lag with SLR.  Gait is independent with a MIN limp.  Gross strength is 4/5 R LE.    Treatment:  There ex and modalities for increased ROM/strength and improved ADL to include:  bike x 10 min,  QS 3x10/3", pat MOBS/PROM x 10 min, leg press and toe raises with 90#, mini squats, standing hip abd/ext with 3#, hams curls with 3#, SAQ with 3#, SLR with 3#, QS, MH abd/TKE with 2/4 plates, standing theraband TKE, pilates [place] abd/add, ball mini squats, LAQ with 3#, HEP review and NMES quads/CP x 10 min.     Assessment:  Pt tolerating tx well with increased wt with several exerice. Continues with min swelling with activity. VC/TC for correcting form and technique. Progress as tolerated.      Will the patient continue to benefit from skilled PT intervention?     yes    Medical necessity is demonstrated by:   Pain limits function of effected part for all activities  Unable to participate fully in daily activities  weakness      Progress towards goals: good    New/Revised Goals:    Plan:  Continue with established Plan of Care toward PT goals.        "

## 2018-02-05 ENCOUNTER — OFFICE VISIT (OUTPATIENT)
Dept: ORTHOPEDICS | Facility: CLINIC | Age: 68
End: 2018-02-05
Payer: MEDICARE

## 2018-02-05 ENCOUNTER — PATIENT MESSAGE (OUTPATIENT)
Dept: ORTHOPEDICS | Facility: CLINIC | Age: 68
End: 2018-02-05

## 2018-02-05 VITALS — WEIGHT: 166 LBS | BODY MASS INDEX: 26.79 KG/M2

## 2018-02-05 DIAGNOSIS — Z98.890 POSTOPERATIVE STATE: Primary | ICD-10-CM

## 2018-02-05 PROCEDURE — 99212 OFFICE O/P EST SF 10 MIN: CPT | Mod: PBBFAC | Performed by: ORTHOPAEDIC SURGERY

## 2018-02-05 PROCEDURE — 99024 POSTOP FOLLOW-UP VISIT: CPT | Mod: POP,,, | Performed by: ORTHOPAEDIC SURGERY

## 2018-02-05 PROCEDURE — 99999 PR PBB SHADOW E&M-EST. PATIENT-LVL II: CPT | Mod: PBBFAC,,, | Performed by: ORTHOPAEDIC SURGERY

## 2018-02-05 RX ORDER — MELOXICAM 7.5 MG/1
7.5 TABLET ORAL DAILY
COMMUNITY
End: 2018-06-15

## 2018-02-05 NOTE — PROGRESS NOTES
Windy Lindo presents for post-operative evaluation.  She is status-post  Right TKA 8 weeks ago. She reports continued numbness at the surgical site and swelling after standing for 10 or more minutes. She has been icing it with some relief. Still in therapy 2 days a week. Taking meloxicam for swelling and pain.     The wound is healing well with no signs of erythema or warmth.  There is no drainage.  No clinical signs or symptoms of infection are present. .  ROM 0-120 and painless.    Post-operative radiographs were obtained at her last visit and show satisfactory position of the prosthesis.    We will continue post-operative physical therapy.    Follow-up in 6 weeks.    I have personally interviewed and evaluated the patient.  I have reviewed the resident physician's note and I concur with the findings.

## 2018-02-06 ENCOUNTER — CLINICAL SUPPORT (OUTPATIENT)
Dept: REHABILITATION | Facility: HOSPITAL | Age: 68
End: 2018-02-06
Payer: MEDICARE

## 2018-02-06 DIAGNOSIS — R53.1 WEAKNESS: Primary | ICD-10-CM

## 2018-02-06 PROCEDURE — 97014 ELECTRIC STIMULATION THERAPY: CPT

## 2018-02-06 PROCEDURE — 97110 THERAPEUTIC EXERCISES: CPT

## 2018-02-06 PROCEDURE — 97140 MANUAL THERAPY 1/> REGIONS: CPT

## 2018-02-06 NOTE — PROGRESS NOTES
" Outpatient Physical Therapy Progress Note     Time in: 1025  Time out: 1125  Ther ex time: 40 min    Visit # 10    Subjective:  Pt reporting no pain in R knee when arrived for tx.  States doing HEP as instructed. Pain scale 0/10    Objective:  min swelling; no redness or warmth.  AROM is 0-118 deg flexion, 125 passive.  QS ability increased.  No lag with SLR.  Gait is independent with a MIN limp.  Gross strength is 4/5 R LE.    Treatment:  There ex and modalities for increased ROM/strength and improved ADL to include:  Bike for increased ROM and endurance  x 10 min,  QS 3x10/3", pat MOBS/PROM x 10 min, leg press and toe raises with 90#, mini squats, standing hip abd/ext with 3#, hams curls with 3#, SAQ with 3#, SLR with 3#, QS, MH abd/TKE with 2/4 plates, standing theraband TKE np, pilates [place] abd/add np, ball mini squats, LAQ with 3#, HEP review and NMES quads/CP x 10 min.     Assessment:  Pt tolerating tx well. Pt still with min apprehension with FWB R LE with stairs. Planned to start with step ups next tx.  VC/TC for correcting form and technique. Progress as tolerated.      Will the patient continue to benefit from skilled PT intervention?     yes    Medical necessity is demonstrated by:   Pain limits function of effected part for all activities  Unable to participate fully in daily activities  weakness      Progress towards goals: good    New/Revised Goals:    Plan:  Continue with established Plan of Care toward PT goals.        "

## 2018-02-08 ENCOUNTER — CLINICAL SUPPORT (OUTPATIENT)
Dept: REHABILITATION | Facility: HOSPITAL | Age: 68
End: 2018-02-08
Payer: MEDICARE

## 2018-02-08 DIAGNOSIS — R53.1 WEAKNESS: Primary | ICD-10-CM

## 2018-02-08 PROCEDURE — 97110 THERAPEUTIC EXERCISES: CPT

## 2018-02-08 PROCEDURE — 97014 ELECTRIC STIMULATION THERAPY: CPT

## 2018-02-08 PROCEDURE — 97140 MANUAL THERAPY 1/> REGIONS: CPT

## 2018-02-08 NOTE — PROGRESS NOTES
" Outpatient Physical Therapy Progress Note     Time in: 1020  Time out: 1120  Ther ex time: 40 min    Visit # 12    Subjective:  Pt reporting no pain in R knee when arrived for tx.  States doing HEP as instructed. Pain scale 0/10    Objective:  min swelling; no redness or warmth.  AROM is 0-118 deg flexion, 125 passive.  QS ability increased.  No lag with SLR.  Gait is independent with a MIN limp.  Gross strength is 4/5 R LE.    Treatment:  There ex and modalities for increased ROM/strength and improved ADL to include:  QS 3x10/3", pat MOBS/PROM x 10 min, R LE lateral step ups,  leg press and toe raises with 90#, R SL press 40#, stair trg - up/down 1 flight 2x reciprocal pattern, SLR with 3#,  R SL hip abd with AA, R calm shells,  R SL bridges 2x10,  mini squats, standing hip abd/ext with 3#, hams curls with 3#,  pilates [place] abd/add, LAQ with 3#, NMES quads/CP x 10 min.     Assessment:  Pt tolerating tx well. Improved stair technique with no apprehension. VC for technique. R hip weakness noted on stairs. Increased hip strengthening activity today.   Planned to start with step ups next tx.  VC/TC for correcting form and technique. Progress as tolerated.      Will the patient continue to benefit from skilled PT intervention?     yes    Medical necessity is demonstrated by:   Pain limits function of effected part for all activities  Unable to participate fully in daily activities  weakness      Progress towards goals: good    New/Revised Goals:    Plan:  Continue with established Plan of Care toward PT goals.        "

## 2018-02-09 ENCOUNTER — PATIENT MESSAGE (OUTPATIENT)
Dept: SPORTS MEDICINE | Facility: CLINIC | Age: 68
End: 2018-02-09

## 2018-02-12 DIAGNOSIS — M81.0 OSTEOPOROSIS, UNSPECIFIED OSTEOPOROSIS TYPE, UNSPECIFIED PATHOLOGICAL FRACTURE PRESENCE: ICD-10-CM

## 2018-02-12 RX ORDER — ALENDRONATE SODIUM 35 MG/1
35 TABLET ORAL WEEKLY
Qty: 4 TABLET | Refills: 0 | Status: SHIPPED | OUTPATIENT
Start: 2018-02-12 | End: 2018-03-12 | Stop reason: SDUPTHER

## 2018-02-14 ENCOUNTER — OFFICE VISIT (OUTPATIENT)
Dept: OPTOMETRY | Facility: CLINIC | Age: 68
End: 2018-02-14
Payer: MEDICARE

## 2018-02-14 DIAGNOSIS — Z98.890 S/P LASIK (LASER ASSISTED IN SITU KERATOMILEUSIS) OF BOTH EYES: ICD-10-CM

## 2018-02-14 DIAGNOSIS — H52.7 REFRACTIVE ERROR: ICD-10-CM

## 2018-02-14 DIAGNOSIS — H25.13 NUCLEAR SCLEROSIS, BILATERAL: Primary | ICD-10-CM

## 2018-02-14 PROCEDURE — 92014 COMPRE OPH EXAM EST PT 1/>: CPT | Mod: S$PBB,,, | Performed by: OPTOMETRIST

## 2018-02-14 PROCEDURE — 99211 OFF/OP EST MAY X REQ PHY/QHP: CPT | Mod: PBBFAC,PO | Performed by: OPTOMETRIST

## 2018-02-14 PROCEDURE — 99999 PR PBB SHADOW E&M-EST. PATIENT-LVL I: CPT | Mod: PBBFAC,,, | Performed by: OPTOMETRIST

## 2018-02-14 NOTE — PROGRESS NOTES
Subjective:       Patient ID: Windy Lindo is a 67 y.o. female      Chief Complaint   Patient presents with    Concerns About Ocular Health     History of Present Illness  Dls: 1/13/17 Dr. Vela    Pt states no changes in vision. Pt wears pal's.   Pt states no tearing no itching no burning no pain no ha's no floaters.     Eye meds:  systane ou prn        Assessment/Plan:     1. Nuclear sclerosis, bilateral  Educated pt on presence of cataracts and effects on vision. No surgery at this time. Recheck in one year.    2. S/P LASIK (laser assisted in situ keratomileusis) of both eyes  Stable, no signs of ectasia.    3. Refractive error  Pt declined refraction. Continue with current spectacles.    Follow-up in about 1 year (around 2/14/2019) for Annual eye exam.

## 2018-02-15 ENCOUNTER — CLINICAL SUPPORT (OUTPATIENT)
Dept: REHABILITATION | Facility: HOSPITAL | Age: 68
End: 2018-02-15
Payer: MEDICARE

## 2018-02-15 DIAGNOSIS — R53.1 WEAKNESS: Primary | ICD-10-CM

## 2018-02-15 PROCEDURE — 97110 THERAPEUTIC EXERCISES: CPT

## 2018-02-15 PROCEDURE — 97014 ELECTRIC STIMULATION THERAPY: CPT

## 2018-02-15 NOTE — PROGRESS NOTES
" Outpatient Physical Therapy Progress Note     Time in: 1020  Time out: 1120  Ther ex time: 40 min    Visit # 13    Subjective:  Pt stated no pain in R knee when arrived for tx.  States doing HEP as instructed. Pain scale 0/10    Objective:  min swelling; no redness or warmth.  AROM is 0-118 deg flexion, 125 passive.  QS ability increased.  No lag with SLR.  Gait is independent with a MIN limp.  Gross strength is 4/5 R LE.    Treatment:  There ex and modalities for increased ROM/strength and improved ADL to include:  Stationary bike x 10' QS 3x10/3", stair trg - up/down 1 flight 1x reciprocal pattern, GTB R clamshells, Standing SLS on R with L hip hikes VC/TC, R SL hip abd with AA, R SL bridges, leg press and toe raises with 80#, R SL press 60#,  SLR with 3#, R SL bridges 2x10, B standing hip abd/ext with 3#, hams curls with 3#,  pilates [place] abd/add, LAQ with 3#, NMES quads/CP x 10 min.     Assessment:  Pt tolerating tx well. Improved stair technique but continues with noted R trendelenburg. Continue with R hip and quad strengthening. Progressing well with R Knee, VC/TC for correcting form and technique. Progress as tolerated.      Will the patient continue to benefit from skilled PT intervention?     yes    Medical necessity is demonstrated by:   Pain limits function of effected part for all activities  Unable to participate fully in daily activities  weakness      Progress towards goals: good    New/Revised Goals:    Plan:  Continue with established Plan of Care toward PT goals.        "

## 2018-02-20 ENCOUNTER — CLINICAL SUPPORT (OUTPATIENT)
Dept: REHABILITATION | Facility: HOSPITAL | Age: 68
End: 2018-02-20
Payer: MEDICARE

## 2018-02-20 DIAGNOSIS — R53.1 WEAKNESS: ICD-10-CM

## 2018-02-20 PROCEDURE — 97110 THERAPEUTIC EXERCISES: CPT | Performed by: PHYSICAL THERAPIST

## 2018-02-20 PROCEDURE — 97164 PT RE-EVAL EST PLAN CARE: CPT | Performed by: PHYSICAL THERAPIST

## 2018-02-20 PROCEDURE — 97014 ELECTRIC STIMULATION THERAPY: CPT | Performed by: PHYSICAL THERAPIST

## 2018-02-20 NOTE — PROGRESS NOTES
" Outpatient Physical Therapy Progress Note     Time in: 10:20  Time out: 11:30  Ther ex time: 30 min    Visit # 12    Subjective:  Pt states R knee feels "pretty good" and rates pain as 0/10.  States doing HEP as instructed.    Objective:  Mild swelling; no redness or warmth.  AROM is 0-120 deg flexion, 125 passive.  QS ability increased.  No lag with SLR.  Gait is independent with a MIN limp.  Gross strength is 4/5 R hip abductors, 4+/5 quads and 5/5 hams.  Decreased quad tone.  No lag with SLR.    Treatment:  There ex and modalities for increased ROM/strength and improved ADL to include:  bike x 10 min, pat MOBS/PROM x 5 min, leg press and toe raises with 80#, mini squats, standing hip abd/ext with 3#, hams curls with 3#, SAQ with 3#, SLR with 3#, QS, MH abd/TKE with 2/4 plates, standing theraband TKE, pilates [place] abd/add, ball mini squats, LAQ with 3#, HEP review and NMES quads/CP x 10 min.     Assessment:  Progressing well and without significant pain.     Will the patient continue to benefit from skilled PT intervention?     yes    Medical necessity is demonstrated by:   Pain limits function of effected part for all activities  Unable to participate fully in daily activities  weakness      Progress towards goals: good    New/Revised Goals:  4+/5 to 5/5 strength  Plan:  Continue with established Plan of Care toward PT goals.        "

## 2018-02-22 ENCOUNTER — CLINICAL SUPPORT (OUTPATIENT)
Dept: REHABILITATION | Facility: HOSPITAL | Age: 68
End: 2018-02-22
Payer: MEDICARE

## 2018-02-22 DIAGNOSIS — R53.1 WEAKNESS: Primary | ICD-10-CM

## 2018-02-22 PROCEDURE — 97014 ELECTRIC STIMULATION THERAPY: CPT

## 2018-02-22 PROCEDURE — 97140 MANUAL THERAPY 1/> REGIONS: CPT

## 2018-02-22 PROCEDURE — 97110 THERAPEUTIC EXERCISES: CPT

## 2018-02-22 NOTE — PROGRESS NOTES
Outpatient Physical Therapy Progress Note     Time in: 0920  Time out: 1020      Visit # 13    Subjective:  Pt denies pain R knee pain when arrived for tx.  States doing HEP as instructed.    Objective:  Mild swelling; no redness or warmth.  AROM is 0-120 deg flexion, 125 passive.  QS ability increased.  No lag with SLR.  Gait is independent with a MIN limp.  Gross strength is 4/5 R hip abductors, 4+/5 quads and 5/5 hams.  Decreased quad tone.  No lag with SLR.    Treatment:  There ex and modalities for increased ROM/strength and improved ADL to include:  bike x 10 min, pat MOBS/PROM x 15 min, leg press and toe raises with 80#, R SL press 50#, GTB hip abd walk 2 laps, Bridges, pilates [place] abd/add,  mini squats, standing hip abd/ext with 3# np, hams curls with 3# np, SAQ with 3# np, SLR with 3# np, QS, MH abd/TKE with 2/4 plates np, standing theraband TKE, ball mini squats np, LAQ with 3# np, HEP review and NMES quads/CP x 10 min.     Assessment:  Pt tolerating tx well. Continued R knee with progressing with R Knee ROM and knee/hip/core strengthening. VC/TC for correcting form/technique with core and quad activation. Continue to progress as tolerated.      Will the patient continue to benefit from skilled PT intervention?     yes    Medical necessity is demonstrated by:   Pain limits function of effected part for all activities  Unable to participate fully in daily activities  weakness      Progress towards goals: good    New/Revised Goals:  4+/5 to 5/5 strength  Plan:  Continue with established Plan of Care toward PT goals.

## 2018-02-27 ENCOUNTER — CLINICAL SUPPORT (OUTPATIENT)
Dept: REHABILITATION | Facility: HOSPITAL | Age: 68
End: 2018-02-27
Payer: MEDICARE

## 2018-02-27 DIAGNOSIS — R53.1 WEAKNESS: Primary | ICD-10-CM

## 2018-02-27 PROCEDURE — 97014 ELECTRIC STIMULATION THERAPY: CPT

## 2018-02-27 PROCEDURE — 97110 THERAPEUTIC EXERCISES: CPT

## 2018-02-27 PROCEDURE — 97140 MANUAL THERAPY 1/> REGIONS: CPT

## 2018-02-27 NOTE — PROGRESS NOTES
Outpatient Physical Therapy Progress Note     Time in: 1015  Time out: 1115      Visit # 14    Subjective:  Pt denies pain R knee pain when arrived for tx.  States doing HEP as instructed.    Objective:  AROM is 0-120 deg flexion, 125 passive.  Gait is independent with a MIN limp.       Treatment:  There ex and modalities for increased ROM/strength and improved ADL to include:  bike x 10 min for increased ROM, pat MOBS/PROM x 10 min, leg press and toe raises with 80#, R SL press 60#, B Tandem stance ball bounce 20x ea, GTB hip abd walk 2 laps, GTB B clamshells, B SL hip abd AA R LE,  pilates [place] abd/add,  mini squats, standing hip abd/ext with 3# np, hams curls with 3# np, SAQ with 3# np, SLR with 3# np, QS, MH abd/TKE with 2/4 plates np, standing theraband TKE, ball mini squats np, LAQ with 3# np, HEP review and NMES quads/CP x 10 min.     Assessment:  Pt tolerating tx well. Continued with R Knee, hip and core strengthening and stabilization. Demonstrating min improvement with R hip strengthening but still weak. VC/TC for correcting form/technique with core and quad activation. Continue to progress as tolerated.      Will the patient continue to benefit from skilled PT intervention?     yes    Medical necessity is demonstrated by:   Pain limits function of effected part for all activities  Unable to participate fully in daily activities  weakness      Progress towards goals: good    New/Revised Goals:  4+/5 to 5/5 strength  Plan:  Continue with established Plan of Care toward PT goals.

## 2018-03-01 ENCOUNTER — CLINICAL SUPPORT (OUTPATIENT)
Dept: REHABILITATION | Facility: HOSPITAL | Age: 68
End: 2018-03-01
Payer: MEDICARE

## 2018-03-01 DIAGNOSIS — R53.1 WEAKNESS: Primary | ICD-10-CM

## 2018-03-01 PROCEDURE — 97110 THERAPEUTIC EXERCISES: CPT

## 2018-03-01 PROCEDURE — 97140 MANUAL THERAPY 1/> REGIONS: CPT

## 2018-03-01 PROCEDURE — 97014 ELECTRIC STIMULATION THERAPY: CPT

## 2018-03-01 NOTE — PROGRESS NOTES
" Outpatient Physical Therapy Progress Note     Time in: 1025  Time out: 1125      Visit # 15    Subjective:  Pt denies pain R knee pain when arrived for tx.  States doing HEP as instructed.    Objective:  AROM is 0-120 deg flexion, 125 passive.  Gait is independent with a MIN limp.       Treatment:  There ex and modalities for increased ROM/strength and improved ADL to include:  Bike x 10 min for increased ROM, SLR with 3#, SL R hip abd AA R LE, Standing B LE kickouts 45' RTB,  leg press and toe raises with 80#, R SL press 60#, B side steups 6" 30x ea, GTB B clamshells,  Bridges GTB hip abd 15x,  B Tandem stance ball bounce 20x ea, GTB hip abd walk 2 laps, pilates [place] abd/add,  HEP review and NMES quads/CP x 10 min.     Assessment:  Pt tolerating tx well. Slow progress with R hip and glut strengthening.  VC/TC for correcting form/technique along with continued core, glut medius strengthening. No R knee pain during tx  Continue to progress as tolerated.      Will the patient continue to benefit from skilled PT intervention?     yes    Medical necessity is demonstrated by:   Pain limits function of effected part for all activities  Unable to participate fully in daily activities  weakness      Progress towards goals: good    New/Revised Goals:  4+/5 to 5/5 strength  Plan:  Continue with established Plan of Care toward PT goals.        "

## 2018-03-02 ENCOUNTER — OFFICE VISIT (OUTPATIENT)
Dept: ORTHOPEDICS | Facility: CLINIC | Age: 68
End: 2018-03-02
Payer: MEDICARE

## 2018-03-02 VITALS — HEIGHT: 66 IN | BODY MASS INDEX: 26.22 KG/M2 | WEIGHT: 163.13 LBS

## 2018-03-02 DIAGNOSIS — Z96.659 STATUS POST KNEE REPLACEMENT, UNSPECIFIED LATERALITY: Primary | ICD-10-CM

## 2018-03-02 PROCEDURE — 99213 OFFICE O/P EST LOW 20 MIN: CPT | Mod: PBBFAC | Performed by: NURSE PRACTITIONER

## 2018-03-02 PROCEDURE — 99999 PR PBB SHADOW E&M-EST. PATIENT-LVL III: CPT | Mod: PBBFAC,,, | Performed by: NURSE PRACTITIONER

## 2018-03-02 PROCEDURE — 99024 POSTOP FOLLOW-UP VISIT: CPT | Mod: POP,,, | Performed by: NURSE PRACTITIONER

## 2018-03-05 NOTE — PROGRESS NOTES
"Windy Lindo presents for 3 month post-operative visit following a right total knee arthroplasty performed by Dr. Busch on 12/5/2017. Pt reports that her pain is better, but she continues to be as limited in her activity as she was prior to surgery.      Exam:   Height 5' 6" (1.676 m), weight 74 kg (163 lb 2.3 oz).   Ambulating well without assistive device.  Incision is completley healed. ROM:0-120    Post-operative radiographs reviewed today revealing a well fixed and aligned prosthesis.    A/P:  10 weeks s/p right total knee replacement  - Outpatient PT: completed  - Follow up in 2 months with Dr. Busch as requested by patient to ensure that's she's improving. Pt will call clinic with problems/concerns.     "

## 2018-03-12 DIAGNOSIS — M81.0 OSTEOPOROSIS, UNSPECIFIED OSTEOPOROSIS TYPE, UNSPECIFIED PATHOLOGICAL FRACTURE PRESENCE: ICD-10-CM

## 2018-03-12 RX ORDER — ALENDRONATE SODIUM 35 MG/1
35 TABLET ORAL WEEKLY
Qty: 2 TABLET | Refills: 0 | Status: SHIPPED | OUTPATIENT
Start: 2018-03-12 | End: 2018-04-07 | Stop reason: SDUPTHER

## 2018-04-04 DIAGNOSIS — Z96.659 STATUS POST KNEE REPLACEMENT, UNSPECIFIED LATERALITY: ICD-10-CM

## 2018-04-04 RX ORDER — AMOXICILLIN 500 MG/1
2000 CAPSULE ORAL
Qty: 4 CAPSULE | Refills: 0 | Status: CANCELLED | OUTPATIENT
Start: 2018-04-04

## 2018-04-04 RX ORDER — AMOXICILLIN 500 MG/1
2000 CAPSULE ORAL
Qty: 4 CAPSULE | Refills: 0 | Status: SHIPPED | OUTPATIENT
Start: 2018-04-04 | End: 2018-06-15 | Stop reason: ALTCHOICE

## 2018-04-07 DIAGNOSIS — M81.0 OSTEOPOROSIS, UNSPECIFIED OSTEOPOROSIS TYPE, UNSPECIFIED PATHOLOGICAL FRACTURE PRESENCE: ICD-10-CM

## 2018-04-07 RX ORDER — ALENDRONATE SODIUM 35 MG/1
35 TABLET ORAL
Qty: 4 TABLET | Refills: 0 | Status: SHIPPED | OUTPATIENT
Start: 2018-04-07 | End: 2018-05-04 | Stop reason: SDUPTHER

## 2018-04-11 ENCOUNTER — OFFICE VISIT (OUTPATIENT)
Dept: PODIATRY | Facility: CLINIC | Age: 68
End: 2018-04-11
Payer: MEDICARE

## 2018-04-11 VITALS
HEIGHT: 66 IN | BODY MASS INDEX: 26.2 KG/M2 | SYSTOLIC BLOOD PRESSURE: 134 MMHG | WEIGHT: 163 LBS | DIASTOLIC BLOOD PRESSURE: 78 MMHG

## 2018-04-11 DIAGNOSIS — M19.079 ARTHRITIS OF FOOT: ICD-10-CM

## 2018-04-11 DIAGNOSIS — S93.402S INVERSION SPRAIN OF LEFT ANKLE, SEQUELA: ICD-10-CM

## 2018-04-11 DIAGNOSIS — M21.6X9 ACQUIRED CAVUS FOOT DEFORMITY: ICD-10-CM

## 2018-04-11 DIAGNOSIS — M79.672 FOOT PAIN, LEFT: Primary | ICD-10-CM

## 2018-04-11 PROCEDURE — 99999 PR PBB SHADOW E&M-EST. PATIENT-LVL III: CPT | Mod: PBBFAC,,, | Performed by: PODIATRIST

## 2018-04-11 PROCEDURE — 99214 OFFICE O/P EST MOD 30 MIN: CPT | Mod: S$PBB,,, | Performed by: PODIATRIST

## 2018-04-11 PROCEDURE — 99213 OFFICE O/P EST LOW 20 MIN: CPT | Mod: PBBFAC,PO | Performed by: PODIATRIST

## 2018-04-11 NOTE — PROGRESS NOTES
Subjective:      Patient ID: Windy Lindo is a 67 y.o. female.    Chief Complaint: Ankle Pain (left pcp Dr. Higginbotham 8-8-17) and Foot Pain (left on top )  Windy Lindo is a 67 y.o. female who presents to the podiatry clinic tenderness to the lateral aspect of the Lt. Foot.  Relates total knee replacement 4 months ago for chronic right knee pain.  Patient relates that both the right knee and left ankle pain have been present for several years.  She states that last week her midfoot was painful and swollen.  Patient also relates that she joined the gym 1 month ago.  Describes pain as aching and rates as a 0/10 currently.  States symptoms are exacerbated with direct pressure to the site with wearing shoe gear and alleviated with removal of shoes.  She has tried to self treat with bracing but relates it made ankles feel worse.  Previously seen by my colleague, new to me.    Current shoe gear:  SAS    Past Medical History:   Diagnosis Date    Arthritis     Eye injury 4 yrs    hit od       Past Surgical History:   Procedure Laterality Date    APPENDECTOMY      COLONOSCOPY N/A 9/7/2017    Procedure: COLONOSCOPY;  Surgeon: Albino Fenton MD;  Location: [facility] ([address] [place] [address]);  Service: Endoscopy;  Laterality: N/A;    ESOPHAGOGASTRODUODENOSCOPY  09/07/2017    KNEE SURGERY Right 12/05/2017    TKR    LASIK  7 yrs    ou    TONSILLECTOMY         Family History   Problem Relation Age of Onset    Hypertension Mother     Glaucoma Mother     Diabetes Mother     Cataracts Mother     Cancer Mother 74     lung    Heart disease Mother 55    Hypertension Father     Heart disease Father      onset early 60;s, Aortic valve replacement ,Rheumatic fever as a child     Diabetes Brother     Cancer Brother 66     mesothelioma    No Known Problems Sister     No Known Problems Maternal Aunt     No Known Problems Maternal Uncle     No Known Problems Paternal Aunt     No Known Problems Paternal Uncle     No Known Problems  Maternal Grandmother     No Known Problems Maternal Grandfather     No Known Problems Paternal Grandmother     No Known Problems Paternal Grandfather     Amblyopia Neg Hx     Blindness Neg Hx     Macular degeneration Neg Hx     Retinal detachment Neg Hx     Strabismus Neg Hx     Stroke Neg Hx     Thyroid disease Neg Hx        Social History     Social History    Marital status: Single     Spouse name: N/A    Number of children: N/A    Years of education: N/A     Occupational History     [employer]     Social History Main Topics    Smoking status: Never Smoker    Smokeless tobacco: Never Used    Alcohol use 0.6 oz/week     1 Glasses of wine per week      Comment: glass of wine a night     Drug use: No    Sexual activity: No     Other Topics Concern    None     Social History Narrative    None       Current Outpatient Prescriptions   Medication Sig Dispense Refill    alendronate (FOSAMAX) 35 MG tablet Take 1 tablet (35 mg total) by mouth every 7 days. Needs appointment for future refills 4 tablet 0    amoxicillin (AMOXIL) 500 MG capsule Take 4 capsules (2,000 mg total) by mouth On call Procedure. 4 capsule 0    vitamin D 1000 units Tab Take 1,000 Units by mouth once daily.      aspirin (ECOTRIN) 325 MG EC tablet Take 1 tablet (325 mg total) by mouth 2 (two) times daily. 60 tablet 0    docusate sodium 100 mg capsule Take 100 mg by mouth 2 (two) times daily. 60 tablet 0    hydrocodone-acetaminophen 10-325mg (NORCO)  mg Tab Take 1 tablet by mouth every 4 (four) hours as needed for Pain. 60 tablet 0    meloxicam (MOBIC) 7.5 MG tablet Take 7.5 mg by mouth once daily.      ondansetron (ZOFRAN) 8 MG tablet Take 1 tablet (8 mg total) by mouth every 8 (eight) hours as needed for Nausea. 60 tablet 0     No current facility-administered medications for this visit.        Review of patient's allergies indicates:  No Known Allergies    Review of Systems   Constitution: Negative for chills, fever and  "weakness.   Cardiovascular: Negative for claudication and leg swelling.   Respiratory: Negative for cough and shortness of breath.    Skin: Negative for color change, dry skin, itching, nail changes and rash.   Musculoskeletal: Positive for arthritis, joint pain and myalgias. Negative for falls, joint swelling and muscle weakness.   Gastrointestinal: Negative for diarrhea, nausea and vomiting.   Neurological: Negative for numbness, paresthesias and tremors.   Psychiatric/Behavioral: Negative for altered mental status and hallucinations.           Objective:       Vitals:    04/11/18 0843   BP: 134/78   Weight: 73.9 kg (163 lb)   Height: 5' 6" (1.676 m)   PainSc: 0-No pain       Physical Exam   Constitutional: She is oriented to person, place, and time. She appears well-developed and well-nourished.  Non-toxic appearance. She does not have a sickly appearance. No distress.   Cardiovascular:   Pulses:       Dorsalis pedis pulses are 2+ on the right side, and 2+ on the left side.        Posterior tibial pulses are 2+ on the right side, and 2+ on the left side.   CFT <3 seconds bilateral.  Pedal hair growth present bilateral.   No varicosities noted bilateral.  No bilateral lower extremity edema.  Toes are warm to touch bilateral.     Pulmonary/Chest: No respiratory distress.   Musculoskeletal: She exhibits no edema or tenderness.        Right ankle: She exhibits decreased range of motion. She exhibits no swelling. No tenderness. No lateral malleolus, no medial malleolus, no AITFL, no CF ligament and no posterior TFL tenderness found. Achilles tendon exhibits no pain, no defect and normal Reynolds's test results.        Left ankle: She exhibits decreased range of motion. She exhibits no swelling. No tenderness. No lateral malleolus, no medial malleolus, no AITFL, no CF ligament and no posterior TFL tenderness found. Achilles tendon exhibits no pain, no defect and normal Reynolds's test results.        Right foot: There " is deformity. There is no bony tenderness.        Left foot: There is deformity. There is no bony tenderness.   Muscle strength 5/5 in all muscle groups bilateral.  No tenderness nor crepitation with ROM of foot/ankle joints bilateral.  No tenderness with palpation of bilateral foot and ankle.  Bilateral pes cavus foot type. Visibly and palpably prominent styloid process of the Lt. 5th metatarsal.  No pain at the site of the Lt. Peroneus brevis insertion.  Bony prominences noted about bilateral dorsal midfoot.  Bilateral hallux abducto valgus with the Rt. >Lt.  Rigid contracture of toes 2-5 bilateral.      Neurological: She is alert and oriented to person, place, and time. She has normal strength and normal reflexes. She displays no atrophy and no tremor. No sensory deficit. She exhibits normal muscle tone.   Light touch intact bilateral.     Skin: Skin is warm, dry and intact. No abrasion, no bruising, no burn, no ecchymosis, no laceration, no lesion, no petechiae and no rash noted. She is not diaphoretic. No cyanosis or erythema. No pallor. Nails show no clubbing.   Pedal skin has normal turgor, temperature, and texture bilateral.  Toenails x 10 appear normotrophic.  Focal hyperkeratosis noted to the plantar and lateral aspect of the Lt. Styloid process.  No intralesional petechiae noted.   Examination of the skin reveals no evidence of significant maceration, rashes, open lesions, suspicious appearing nevi or other concerning lesions.    Psychiatric: Her mood appears not anxious. Her affect is not inappropriate. Her speech is not slurred. She is not combative. She is communicative. She is attentive.   Nursing note reviewed.            Assessment:       Encounter Diagnoses   Name Primary?    Foot pain, left Yes    Arthritis of foot     Acquired cavus foot deformity     Inversion sprain of left ankle, sequela          Plan:       Windy was seen today for ankle pain and foot pain.    Diagnoses and all orders for  this visit:    Foot pain, left    Arthritis of foot    Acquired cavus foot deformity    Inversion sprain of left ankle, sequela      I counseled the patient on her conditions, their implications and medical management.    Patient education. Discussed non-surgical treatment options, including injection, supportive shoegear, inserts.     Recommend icing the lateral aspect of the Lt. Foot and ankle up to 20 minutes daily when symptomatic.    Discussed avoidance of wearing flats as this will provide no support and will exacerbate symptoms.      Recommended wearing insoles that accommodate pes cavus foot type and to resist over supination of the foot.  Recommended OTC insoles and more supportive shoe types.      Instructed to continue wearing the SAS shoes for both stability purposes and to accommodate multiple foot deformities.      Rx topical pain cream from [facility] to be delivered to patient home.      RTC in 1-2 month for follow up.

## 2018-04-11 NOTE — PATIENT INSTRUCTIONS
Over the counter pain cream: Arnica Gel, Biofreeze, Bengay, tiger balm, two old goat, lidocaine gel,  Absorbine Veterinary Liniment Gel Topical Analgesic Sore Muscle and Joint Pain Relief    Recommend lotions: eucerin, eucerin for diabetics, aquaphor, A&D ointment, gold bond for diabetics, sween, Parrott's Bees all purpose baby ointment,  urea 40 with aloe (found on amazon.com)    Shoe recommendations: (try 6pm.com, zappos.Wireless Generation , nordstromrack.Wireless Generation, or shoes.Wireless Generation for discounted prices) you can visit DSW shoes in [employer]  or [employer] in the [facility] (there are also several shoe brand outlets in the [facility])    Running shoes for supinators: Asics Gel Cumulus or Gel Numbus; Saucony Grid Cohesion or Xodus, Mizuno Wave [place] or Wave [place]; Dickerson Run Intuition or Instinct; Hoka One Challenger, or New Balance Vazee Pave    sofft brand, clarks, crocs, aerosoles, naturalizers, SAS, ecco, born, kumar christopher, rockports (dress shoes)    Vionic, burkenstocks, fitflops, propet (sandals)  Nike comfort thong sandals, crocs, propet (house shoes)    Nail Home remedy:  Vicks Vapor rub to nails for easier managability    Occasional soaks for 15-20 mins in luke warm water with 1 cup of listerine and 1 cup of apple cider vinegar are ok You may add several drops of oil of oregano or tea tree oil as well        What Is Arthritis in the Foot?  Degenerative arthritis is a condition that slowly wears away joints, the area where bones meet and move. In the beginning, you may notice that the affected joint seems stiff. It may even ache. As the joint lining (cartilage) breaks down, the bones rub against each other, causing pain and swelling. Over time, small pieces of rough or splintered bone (bone spurs) develop, and the joints range of motion becomes limited. But movement doesnt have to cause pain. The effects of arthritis can be reduced.    The big-toe joint  When arthritis affects your big toe, your foot hurts when it pushes off the ground.  Arthritis often appears in the big-toe joint along with a bunion (a bony bump at the side of the joint) or a bone spur on top of the joint.    Other joints  When arthritis affects the rear or midfoot joints, you feel pain when you put weight on your foot. Arthritis may affect the joint where the ankle and foot meet. It may also affect other joints nearby.  Date Last Reviewed: 7/1/2016 © 2000-2017 [employer]. [address] [place] [address], [place] [address]. All rights reserved. This information is not intended as a substitute for professional medical care. Always follow your healthcare professional's instructions.      Strain, Sprain, or Contusion  Strains, sprains, and contusions are common injuries. These injuries are similar, but involve different types of body tissue. Most of these injuries happen during sports or active play. But they can occur at any time. A strain, sprain, or contusion can be painful. With the right treatment, most heal with no lasting problems.              What is a strain?  A strain is an injury to a muscle or to a tendon (tissue that connects muscle to bone). It is sometimes called a pulled muscle. A strain happens when a muscle or tendon is stretched too far or is partially torn. Symptoms of a strain are pain, swelling, and having a problem moving or using the injured area. The hamstring (thigh muscle), calf muscle, and Achilles tendon are commonly strained.   What is a sprain?  A sprain is an injury to a ligament (tissue that connects bones to other bones). Joints contain many ligaments. A sprain happens when a joint is twisted or pulled and the ligament stretches or tears. Symptoms of a sprain are pain, swelling, and having a problem moving or using the injured area. Ankles, knees, and wrists are the joints most commonly sprained.   What is a contusion?  A contusion is commonly called a bruise. It is injury to tissue that causes bleeding without breaking the skin. It is  often a result of being hit by a blunt object such as a ball or bat. Symptoms of a contusion are discoloration of the skin, pain (which can be severe), and swelling. Contusions usually arent serious and dont need medical attention. But a large, painful, or very swollen bruise, or a bruise that limits movement of a joint such as the knee should be seen by a doctor.   How are strains, sprains, and contusions diagnosed?  An examination is also done. An X-ray (test that creates images of bones) may be done to rule out broken bones.  How are strains, sprains, and contusions treated?  · Strains and sprains can take up to months to heal. If not treated and allowed to heal, a strain or sprain can lead to long-term problems. These include lasting pain and stiffness. So it is important to follow the doctors instructions.  · The pain of a contusion often resolves within the first week. But the swelling and discoloration may take weeks to go away.  Treatment consists of one or more of the following:  · RICE (which stands for Rest, Ice, Compression, and Elevation)  ¨ Rest. As much as possible, you should not use the injured area.  ¨ Ice. Put ice on the injured area 3-4 times a day for 20 minutes at a time. Use an ice pack or bag of frozen peas wrapped in a thin towel.   ¨ Compression. If instructed, wrap the area to keep swelling down. Use an elastic bandage. .  ¨ Elevation.  Raise the injured body part above the level of your heart.  · Medications to relieve inflammation and pain. These will likely be NSAIDs (non-steroidal anti-inflammatory drugs). NSAIDs include ibuprofen and naproxen.   · Physical therapy (PT) to strengthen the injured area. This is especially helpful for moderate to severe strains or sprains.  · Casting of the affected area to keep it still and allow the strain or sprain to heal.  · Surgery may be needed if the strain or sprain is severe and there is tearing. During surgery, the torn muscle, tendon, or  ligament is repaired.  What are the long-term concerns?  If allowed to heal, most strains, sprains, and contusions cause no further problems. Strains or sprains that are not treated and dont heal properly can lead to pain or stiffness that doesnt go away. Be sure to follow your childs treatment plan. Your childs doctor can tell you more about the expected outcome based on your childs injury.     Preventing strains, sprains, and contusions  If playing sports or doing other athletic activity, be sure you:  · Has proper training.  · Wears protective gear.  · Warms up before activity and cools down afterward.  · Uses proper equipment.   © 4428-7703 [employer]. [address] [place] [address]. All rights reserved. This information is not intended as a substitute for professional medical care. Always follow your healthcare professional's instructions.          Toe Extension (Flexibility)    These instructions are for your right foot. Switch sides for your left foot.  1. Sit in a chair. Rest your right ankle on your left knee.  2. Hold your toes with your right hand. Gently bend the toes backward. Feel a stretch in the undersides of the toes and ball of the foot. Hold for 30 to 60 seconds.  3. Then gently bend the toes in the other direction. Gently press on them until your foot is pointed. Hold for 30 to 60 seconds.  4. Repeat 5 times, or as instructed.  © 9810-2975 [employer]. [address] [place] [address]. All rights reserved. This information is not intended as a substitute for professional medical care. Always follow your healthcare professional's instructions.      Ankle Alphabet (Flexibility)    These instructions are for your right foot. Switch sides for your left foot.  5. Sit on the floor with your legs straight in front of you.  6. Rest your right calf on a rolled-up towel. Use your foot to write the letters of the alphabet in mid-air.  7. Repeat this  exercise 3 times a day, or as instructed.  Date Last Reviewed: 3/10/2016  © 8103-6276 [employer]. [address] [place] [address]. All rights reserved. This information is not intended as a substitute for professional medical care. Always follow your healthcare professional's instructions.        Calf Raise (Strength)    8. Stand up straight with both feet flat on the floor, slightly apart. Hold onto a sturdy chair, railing, counter, or table.  9. Raise both heels so youre standing on the balls of your feet. Dont lock your knees or arch your back. Hold for 5 seconds. Then slowly lower your heels back down to the floor.  10. Repeat 10 times, or as instructed.  11. Do this exercise 3 times a day, or as instructed.     Challenge yourself  As you become stronger, do this exercise on one foot at a time.   Date Last Reviewed: 3/10/2016  © 0557-4503 [employer]. [address] [place] [address]. All rights reserved. This information is not intended as a substitute for professional medical care. Always follow your healthcare professional's instructions.        Bent-Knee Calf Stretch  This exercise is designed to stretch and strengthen your feet and ankles. Before beginning the exercise, read through all the instructions. While exercising, breathe normally and dont bounce. If you feel any pain, stop the exercise. If pain persists, inform your healthcare provider:    · Stand an arms length away from a wall. Place the palms of your hands on the wall. Step forward about 12 inches with your ______ foot.  · Keeping toes pointed forward and both heels on the floor, bend both knees and lean forward. Hold for ______ seconds. Relax.  · Repeat ______ times. Do ______ sets a day.  Date Last Reviewed: 8/16/2015  © 9225-9686 [employer]. [address] [place] [address]. All rights reserved. This information is not intended as a substitute for professional medical  care. Always follow your healthcare professional's instructions.        Arch retraining    These exercises are for your right foot. Switch sides for your left foot.  12. Sit in a chair or stand with both feet flat on the floor. Press down with the ball of your right foot, but only on the left side of the foot, just under the big toe.  13. Then pull the bottom of your big toe back toward your heel. This should pull up the arch of your foot. Dont flex your toes while doing this. It is a subtle movement of the arch.  14. Hold for 5 seconds. Relax.  Date Last Reviewed: 3/10/2016  © 8909-3101 [employer]. [address] [place] [address]. All rights reserved. This information is not intended as a substitute for professional medical care. Always follow your healthcare professional's instructions.        Ankle Dorsiflexion/Plantarflexion (Flexibility)    15. Sit on the floor or in bed with your legs straight in front of you.  16. Point both feet. Then flex both feet.  17. Do this 10 to 30 times in a row.  18. Repeat this exercise 2 times a day, or as instructed.  Date Last Reviewed: 5/1/2016  © 5254-1750 [employer]. [address] [place] [address]. All rights reserved. This information is not intended as a substitute for professional medical care. Always follow your healthcare professional's instructions.

## 2018-05-04 DIAGNOSIS — M81.0 OSTEOPOROSIS, UNSPECIFIED OSTEOPOROSIS TYPE, UNSPECIFIED PATHOLOGICAL FRACTURE PRESENCE: ICD-10-CM

## 2018-05-04 RX ORDER — ALENDRONATE SODIUM 35 MG/1
TABLET ORAL
Qty: 4 TABLET | Refills: 0 | Status: SHIPPED | OUTPATIENT
Start: 2018-05-04 | End: 2018-05-29 | Stop reason: SDUPTHER

## 2018-05-07 ENCOUNTER — OFFICE VISIT (OUTPATIENT)
Dept: ORTHOPEDICS | Facility: CLINIC | Age: 68
End: 2018-05-07
Payer: MEDICARE

## 2018-05-07 DIAGNOSIS — Z96.651 STATUS POST TOTAL RIGHT KNEE REPLACEMENT: Primary | ICD-10-CM

## 2018-05-07 PROCEDURE — 99212 OFFICE O/P EST SF 10 MIN: CPT | Mod: S$PBB,,, | Performed by: ORTHOPAEDIC SURGERY

## 2018-05-07 NOTE — PROGRESS NOTES
CC:  Right knee replacement    Hx:  Windy Lindo presents for routine follow up of right knee replacement.  Her procedure was performed 5 months ago.  She has done well postoperatively and she denies effusions, warmth, or fever.  No complaints of pain.  No complaints of swelling.    ROS:  Denies fevers/chills.  Denies distal edema or distal paresthesias.  Denies warmth or erythema in the knee.      PE:  Skin is unremarkable over both knees other than well healed anterior incision.  No warmth, erythema, or effusion bilaterally.  No distal edema bilaterally.  No pain ROM either hip.  ROM 0-125      IMP: S/P Knee replacement    Plan: Dental prophylaxis was discussed.    She may follow up in 6 months for PRO's.

## 2018-05-23 ENCOUNTER — OFFICE VISIT (OUTPATIENT)
Dept: PODIATRY | Facility: CLINIC | Age: 68
End: 2018-05-23
Payer: MEDICARE

## 2018-05-23 VITALS
BODY MASS INDEX: 26.2 KG/M2 | WEIGHT: 163 LBS | DIASTOLIC BLOOD PRESSURE: 68 MMHG | HEIGHT: 66 IN | SYSTOLIC BLOOD PRESSURE: 158 MMHG

## 2018-05-23 DIAGNOSIS — M79.672 FOOT PAIN, LEFT: Primary | ICD-10-CM

## 2018-05-23 DIAGNOSIS — M21.6X9 ACQUIRED CAVUS FOOT DEFORMITY: ICD-10-CM

## 2018-05-23 DIAGNOSIS — S93.402S INVERSION SPRAIN OF LEFT ANKLE, SEQUELA: ICD-10-CM

## 2018-05-23 DIAGNOSIS — M19.079 ARTHRITIS OF FOOT: ICD-10-CM

## 2018-05-23 PROCEDURE — 99213 OFFICE O/P EST LOW 20 MIN: CPT | Mod: PBBFAC,PO | Performed by: PODIATRIST

## 2018-05-23 PROCEDURE — 99213 OFFICE O/P EST LOW 20 MIN: CPT | Mod: S$PBB,,, | Performed by: PODIATRIST

## 2018-05-23 PROCEDURE — 99999 PR PBB SHADOW E&M-EST. PATIENT-LVL III: CPT | Mod: PBBFAC,,, | Performed by: PODIATRIST

## 2018-05-23 RX ORDER — LIDOCAINE AND PRILOCAINE 25; 25 MG/G; MG/G
CREAM TOPICAL
COMMUNITY
Start: 2018-04-12 | End: 2018-06-15

## 2018-05-23 NOTE — PATIENT INSTRUCTIONS
Recommend lotions: eucerin, eucerin for diabetics, aquaphor, A&D ointment, gold bond for diabetics, sween, Yermo's Bees all purpose baby ointment,  urea 40 with aloe (found on amazon.com)    Shoe recommendations: (try 6pm.com, zappos.com , nordstromrack.[place], or shoes.[place] [employer] for discounted prices) you can visit DSW shoes in [place]  or [employer] in the [facility] (there are also several shoe brand outlets in the [facility])    Asics (GT 2000 or gel foundations), new balance stability type shoes, saucony (stabil c3),  Mack (GTS or Beast or transcend), vionic, propet (tennis shoe)    sofft brand, clarks, crocs, aerosoles, naturalizers, SAS, ecco, born, kumar christopher, rockports (dress shoes)    Vionic, burkenstocks, fitflops, propet (sandals)  Nike comfort thong sandals, crocs, propet (house shoes)    Nail Home remedy:  Vicks Vapor rub to nails for easier managability    Occasional soaks for 15-20 mins in luke warm water with 1 cup of listerine and 1 cup of apple cider vinegar are ok You may add several drops of oil of oregano or tea tree oil as well      Wearing Proper Shoes                    You walk on your feet every day, forcing them to support the weight of your body. Repeated stress on your feet can cause damage over time. The right shoes can help protect your feet. The wrong shoes can cause more foot problems. Read the information below to help you find a shoe that fits your foot needs.      A good shoe fit will cover your foot outline. A shoe that doesnt cover the outline is a bad fit.   Whats your foot shape?  To get a good fit, you need to know the shape of your foot. Do this simple test: While standing, place your foot on a piece of paper and trace around it. Is your foot straight or curved? Do you have a foot problem, such as a bunion, that causes your foot outline to show a bulge on the side of your big toe?  Finding your fit  Bring your foot outline to the shoe store to help you find the right shoe. Place  a shoe you like on top of the outline to see if it matches the shape. The shoe should cover the outline. (If you have a bunion, the shoe may not cover the bulge on the outline. Look for soft leather shoes to stretch over the bunion.) Once youve found a pair of proper shoes, put them on. Walk around. Be sure the shoes dont rub or pinch. If the shoes feel good, youve found your fit!  The right shoe for you  A good shoe has features that provide comfort and support. It must also be the right size and shape for your feet. Look for a shoe made of breathable fabric and lining, such as leather or canvas. Be sure that shoes have enough tread to prevent slipping. Go to a good shoe store for help finding the right shoe.  Good shoe features  An ideal shoe has the following:  · Laces for support. If tying laces is a problem for you, try shoes with Velcro fasteners or stephen.  · A front of the shoe (toe box) with ½ inch space in front of your longest toes.  · An arch shape that supports your foot.  · No more than 1½ inches of heel.  · A stiff, snug back of the shoe to keep your foot from sliding around.  · A smooth lining with no rough seams.  Shoe shopping tips  Below are some dos and donts for when you go to the shoe store.  Do:  · Select the shoes that feel right. Wear them around the house. Then bring them to your foot healthcare provider to check for fit. If they dont fit well, return them.  · Shop late in the day, when your feet will be slightly bigger.  · Each time you buy shoes, have both your feet measured while you are standing. Foot size changes with time.  · Pick shoes to suit their purpose. High heels are OK for an occasional night on the town. But for everyday wear, choose a more sensible shoe.  · Try on shoes while wearing any inserts specially made for your feet (orthoses).  · Try on both the right and left shoes. If your feet are different sizes, pick a pair that fits the larger foot.  Dont:  · Dont buy  shoes based on shoe size alone. Always try on shoes, as sizes differ from brand to brand and within brands.  · Dont expect shoes to break in. If they dont fit at the store, dont buy them.  · Dont buy a shoe that doesnt match your foot shape.  What about socks?  Always wear socks with shoes. Socks help absorb sweat and reduce friction and blistering. When shopping for shoes, choose soft, padded socks with seams that dont irritate your feet.  If you have foot problems  Some foot problems cause deformities. This can make it hard to find a good fit. Look for shoes made of soft leather to stretch over the deformity. If you have bunions, buy shoes with a wider toe box. To fit hammertoes, look for shoes with a tall toe box. If you have arch problems, you may need inserts. In some cases, youll need to have custom footwear or orthoses made for your feet.  Suggested footwear  Ask your healthcare provider what kind of footwear you need. He or she may recommend a certain brand or shoe store.  Date Last Reviewed: 8/1/2016  © 2731-8652 [employer]. [address] [place] [address]. All rights reserved. This information is not intended as a substitute for professional medical care. Always follow your healthcare professional's instructions.        Step-by-Step:  Inspecting Your Feet   Date Last Reviewed: 10/1/2016  © 0435-3627 [employer]. [address] [place] [address]. All rights reserved. This information is not intended as a substitute for professional medical care. Always follow your healthcare professional's instructions.

## 2018-05-23 NOTE — PROGRESS NOTES
Subjective:      Patient ID: Windy Lindo is a 67 y.o. female.    Chief Complaint: Foot Pain (left foot;11/29/17 [place]) and Ankle Pain (left ankle)  Windy Lindo is a 67 y.o. female who RTC for follow up of pain to the lateral aspect of the Lt. Foot.   She has been stretching and bracing and relates 85 % improvement.    Current shoe gear:  SAS    Past Medical History:   Diagnosis Date    Arthritis     Eye injury 4 yrs    hit od       Past Surgical History:   Procedure Laterality Date    APPENDECTOMY      COLONOSCOPY N/A 9/7/2017    Procedure: COLONOSCOPY;  Surgeon: Albino Fenton MD;  Location: [facility] ([address] [place] [address]);  Service: Endoscopy;  Laterality: N/A;    ESOPHAGOGASTRODUODENOSCOPY  09/07/2017    KNEE SURGERY Right 12/05/2017    TKR    LASIK  7 yrs    ou    TONSILLECTOMY         Family History   Problem Relation Age of Onset    Hypertension Mother     Glaucoma Mother     Diabetes Mother     Cataracts Mother     Cancer Mother 74        lung    Heart disease Mother 55    Hypertension Father     Heart disease Father         onset early 60;s, Aortic valve replacement ,Rheumatic fever as a child     Diabetes Brother     Cancer Brother 66        mesothelioma    No Known Problems Sister     No Known Problems Maternal Aunt     No Known Problems Maternal Uncle     No Known Problems Paternal Aunt     No Known Problems Paternal Uncle     No Known Problems Maternal Grandmother     No Known Problems Maternal Grandfather     No Known Problems Paternal Grandmother     No Known Problems Paternal Grandfather     Amblyopia Neg Hx     Blindness Neg Hx     Macular degeneration Neg Hx     Retinal detachment Neg Hx     Strabismus Neg Hx     Stroke Neg Hx     Thyroid disease Neg Hx        Social History     Social History    Marital status: Single     Spouse name: N/A    Number of children: N/A    Years of education: N/A     Occupational History     [employer]     Social History Main Topics     Smoking status: Never Smoker    Smokeless tobacco: Never Used    Alcohol use 0.6 oz/week     1 Glasses of wine per week      Comment: glass of wine a night     Drug use: No    Sexual activity: No     Other Topics Concern    None     Social History Narrative    None       Current Outpatient Prescriptions   Medication Sig Dispense Refill    alendronate (FOSAMAX) 35 MG tablet TAKE 1 TABLET BY MOUTH EVERY 7 DAYS 4 tablet 0    amoxicillin (AMOXIL) 500 MG capsule Take 4 capsules (2,000 mg total) by mouth On call Procedure. 4 capsule 0    docusate sodium 100 mg capsule Take 100 mg by mouth 2 (two) times daily. 60 tablet 0    hydrocodone-acetaminophen 10-325mg (NORCO)  mg Tab Take 1 tablet by mouth every 4 (four) hours as needed for Pain. 60 tablet 0    lidocaine-prilocaine (EMLA) cream       meloxicam (MOBIC) 7.5 MG tablet Take 7.5 mg by mouth once daily.      ondansetron (ZOFRAN) 8 MG tablet Take 1 tablet (8 mg total) by mouth every 8 (eight) hours as needed for Nausea. 60 tablet 0    vitamin D 1000 units Tab Take 1,000 Units by mouth once daily.      aspirin (ECOTRIN) 325 MG EC tablet Take 1 tablet (325 mg total) by mouth 2 (two) times daily. 60 tablet 0     No current facility-administered medications for this visit.        Review of patient's allergies indicates:  No Known Allergies    Review of Systems   Constitution: Negative for chills, fever and weakness.   Cardiovascular: Negative for claudication and leg swelling.   Respiratory: Negative for cough and shortness of breath.    Skin: Negative for color change, dry skin, itching, nail changes and rash.   Musculoskeletal: Positive for arthritis, joint pain and myalgias. Negative for falls, joint swelling and muscle weakness.   Gastrointestinal: Negative for diarrhea, nausea and vomiting.   Neurological: Negative for numbness, paresthesias and tremors.   Psychiatric/Behavioral: Negative for altered mental status and hallucinations.          "  Objective:       Vitals:    05/23/18 0951 05/23/18 1014   BP: (!) 160/86 (!) 158/68   Weight: 73.9 kg (163 lb)    Height: 5' 6" (1.676 m)    PainSc: 0-No pain        Physical Exam   Constitutional: She is oriented to person, place, and time. She appears well-developed and well-nourished.  Non-toxic appearance. She does not have a sickly appearance. No distress.   Cardiovascular:   Pulses:       Dorsalis pedis pulses are 2+ on the right side, and 2+ on the left side.        Posterior tibial pulses are 2+ on the right side, and 2+ on the left side.   CFT <3 seconds bilateral.  Pedal hair growth present bilateral.   No varicosities noted bilateral.  No bilateral lower extremity edema.  Toes are warm to touch bilateral.     Pulmonary/Chest: No respiratory distress.   Musculoskeletal: She exhibits no edema or tenderness.        Right ankle: She exhibits decreased range of motion. She exhibits no swelling. No tenderness. No lateral malleolus, no medial malleolus, no AITFL, no CF ligament and no posterior TFL tenderness found. Achilles tendon exhibits no pain, no defect and normal Reynolds's test results.        Left ankle: She exhibits decreased range of motion. She exhibits no swelling. No tenderness. No lateral malleolus, no medial malleolus, no AITFL, no CF ligament and no posterior TFL tenderness found. Achilles tendon exhibits no pain, no defect and normal Reynolds's test results.        Right foot: There is deformity. There is no bony tenderness.        Left foot: There is deformity. There is no bony tenderness.   Muscle strength 5/5 in all muscle groups bilateral.  No tenderness nor crepitation with ROM of foot/ankle joints bilateral.  No tenderness with palpation of bilateral foot and ankle.  Bilateral pes cavus foot type. Visibly and palpably prominent styloid process of the Lt. 5th metatarsal.  No pain at the site of the Lt. Peroneus brevis insertion.  Bony prominences noted about bilateral dorsal midfoot.  " Bilateral hallux abducto valgus with the Rt. >Lt.  Rigid contracture of toes 2-5 bilateral.      Neurological: She is alert and oriented to person, place, and time. She has normal strength and normal reflexes. She displays no atrophy and no tremor. No sensory deficit. She exhibits normal muscle tone.   Light touch intact bilateral.     Skin: Skin is warm, dry and intact. No abrasion, no bruising, no burn, no ecchymosis, no laceration, no lesion, no petechiae and no rash noted. She is not diaphoretic. No cyanosis or erythema. No pallor. Nails show no clubbing.   Pedal skin has normal turgor, temperature, and texture bilateral.  Toenails x 10 appear normotrophic.  Focal hyperkeratosis noted to the plantar and lateral aspect of the Lt. Styloid process.  No intralesional petechiae noted.   Examination of the skin reveals no evidence of significant maceration, rashes, open lesions, suspicious appearing nevi or other concerning lesions.    Psychiatric: Her mood appears not anxious. Her affect is not inappropriate. Her speech is not slurred. She is not combative. She is communicative. She is attentive.   Nursing note reviewed.            Assessment:       Encounter Diagnoses   Name Primary?    Foot pain, left Yes    Arthritis of foot     Acquired cavus foot deformity     Inversion sprain of left ankle, sequela          Plan:       Windy was seen today for foot pain and ankle pain.    Diagnoses and all orders for this visit:    Foot pain, left    Arthritis of foot    Acquired cavus foot deformity    Inversion sprain of left ankle, sequela      I counseled the patient on her conditions, their implications and medical management.    Patient education. Discussed non-surgical treatment options, including injection, supportive shoegear, inserts.     Significant difference in pain reaction on physical exam today than on previous encounter.    Encouraged patient to stretch aggressively and continue to ice as needed. Mobic PRN  pain.  Discussed wearing appropriate shoe gear and avoiding flats, slippers, sandals, and going barefoot.    RTC PRN

## 2018-05-29 DIAGNOSIS — M81.0 OSTEOPOROSIS, UNSPECIFIED OSTEOPOROSIS TYPE, UNSPECIFIED PATHOLOGICAL FRACTURE PRESENCE: ICD-10-CM

## 2018-05-29 RX ORDER — ALENDRONATE SODIUM 35 MG/1
TABLET ORAL
Qty: 4 TABLET | Refills: 0 | Status: SHIPPED | OUTPATIENT
Start: 2018-05-29 | End: 2018-06-15 | Stop reason: SDUPTHER

## 2018-05-30 ENCOUNTER — PATIENT MESSAGE (OUTPATIENT)
Dept: FAMILY MEDICINE | Facility: CLINIC | Age: 68
End: 2018-05-30

## 2018-05-30 DIAGNOSIS — E78.5 HYPERLIPIDEMIA, UNSPECIFIED HYPERLIPIDEMIA TYPE: Primary | ICD-10-CM

## 2018-06-12 ENCOUNTER — LAB VISIT (OUTPATIENT)
Dept: LAB | Facility: HOSPITAL | Age: 68
End: 2018-06-12
Attending: FAMILY MEDICINE
Payer: MEDICARE

## 2018-06-12 DIAGNOSIS — E78.5 HYPERLIPIDEMIA, UNSPECIFIED HYPERLIPIDEMIA TYPE: ICD-10-CM

## 2018-06-12 LAB
ALBUMIN SERPL BCP-MCNC: 3.6 G/DL
ALP SERPL-CCNC: 80 U/L
ALT SERPL W/O P-5'-P-CCNC: 7 U/L
ANION GAP SERPL CALC-SCNC: 5 MMOL/L
AST SERPL-CCNC: 15 U/L
BASOPHILS # BLD AUTO: 0.02 K/UL
BASOPHILS NFR BLD: 0.4 %
BILIRUB SERPL-MCNC: 0.7 MG/DL
BUN SERPL-MCNC: 16 MG/DL
CALCIUM SERPL-MCNC: 9.8 MG/DL
CHLORIDE SERPL-SCNC: 109 MMOL/L
CHOLEST SERPL-MCNC: 196 MG/DL
CHOLEST/HDLC SERPL: 2.7 {RATIO}
CO2 SERPL-SCNC: 28 MMOL/L
CREAT SERPL-MCNC: 0.7 MG/DL
DIFFERENTIAL METHOD: ABNORMAL
EOSINOPHIL # BLD AUTO: 0.1 K/UL
EOSINOPHIL NFR BLD: 1.1 %
ERYTHROCYTE [DISTWIDTH] IN BLOOD BY AUTOMATED COUNT: 13.8 %
EST. GFR  (AFRICAN AMERICAN): >60 ML/MIN/1.73 M^2
EST. GFR  (NON AFRICAN AMERICAN): >60 ML/MIN/1.73 M^2
GLUCOSE SERPL-MCNC: 85 MG/DL
HCT VFR BLD AUTO: 45.2 %
HDLC SERPL-MCNC: 72 MG/DL
HDLC SERPL: 36.7 %
HGB BLD-MCNC: 14.2 G/DL
IMM GRANULOCYTES # BLD AUTO: 0.03 K/UL
IMM GRANULOCYTES NFR BLD AUTO: 0.6 %
LDLC SERPL CALC-MCNC: 111.6 MG/DL
LYMPHOCYTES # BLD AUTO: 1.6 K/UL
LYMPHOCYTES NFR BLD: 29.6 %
MCH RBC QN AUTO: 30.2 PG
MCHC RBC AUTO-ENTMCNC: 31.4 G/DL
MCV RBC AUTO: 96 FL
MONOCYTES # BLD AUTO: 0.6 K/UL
MONOCYTES NFR BLD: 11 %
NEUTROPHILS # BLD AUTO: 3.1 K/UL
NEUTROPHILS NFR BLD: 57.3 %
NONHDLC SERPL-MCNC: 124 MG/DL
NRBC BLD-RTO: 0 /100 WBC
PLATELET # BLD AUTO: 165 K/UL
PMV BLD AUTO: 13.4 FL
POTASSIUM SERPL-SCNC: 4.5 MMOL/L
PROT SERPL-MCNC: 6.4 G/DL
RBC # BLD AUTO: 4.7 M/UL
SODIUM SERPL-SCNC: 142 MMOL/L
TRIGL SERPL-MCNC: 62 MG/DL
TSH SERPL DL<=0.005 MIU/L-ACNC: 2.37 UIU/ML
WBC # BLD AUTO: 5.37 K/UL

## 2018-06-12 PROCEDURE — 80061 LIPID PANEL: CPT

## 2018-06-12 PROCEDURE — 36415 COLL VENOUS BLD VENIPUNCTURE: CPT | Mod: PO

## 2018-06-12 PROCEDURE — 84443 ASSAY THYROID STIM HORMONE: CPT

## 2018-06-12 PROCEDURE — 80053 COMPREHEN METABOLIC PANEL: CPT

## 2018-06-12 PROCEDURE — 85025 COMPLETE CBC W/AUTO DIFF WBC: CPT

## 2018-06-15 ENCOUNTER — OFFICE VISIT (OUTPATIENT)
Dept: FAMILY MEDICINE | Facility: CLINIC | Age: 68
End: 2018-06-15
Payer: MEDICARE

## 2018-06-15 VITALS
WEIGHT: 170.88 LBS | BODY MASS INDEX: 27.46 KG/M2 | HEART RATE: 75 BPM | DIASTOLIC BLOOD PRESSURE: 80 MMHG | HEIGHT: 66 IN | TEMPERATURE: 98 F | SYSTOLIC BLOOD PRESSURE: 120 MMHG | OXYGEN SATURATION: 99 %

## 2018-06-15 DIAGNOSIS — Z23 NEED FOR TD VACCINE: ICD-10-CM

## 2018-06-15 DIAGNOSIS — M17.11 PRIMARY OSTEOARTHRITIS OF RIGHT KNEE: ICD-10-CM

## 2018-06-15 DIAGNOSIS — Z12.31 ENCOUNTER FOR SCREENING MAMMOGRAM FOR BREAST CANCER: ICD-10-CM

## 2018-06-15 DIAGNOSIS — M81.0 OSTEOPOROSIS, UNSPECIFIED OSTEOPOROSIS TYPE, UNSPECIFIED PATHOLOGICAL FRACTURE PRESENCE: Primary | ICD-10-CM

## 2018-06-15 PROCEDURE — 99214 OFFICE O/P EST MOD 30 MIN: CPT | Mod: PBBFAC,PO,25 | Performed by: FAMILY MEDICINE

## 2018-06-15 PROCEDURE — 99214 OFFICE O/P EST MOD 30 MIN: CPT | Mod: S$PBB,,, | Performed by: FAMILY MEDICINE

## 2018-06-15 PROCEDURE — 90714 TD VACC NO PRESV 7 YRS+ IM: CPT | Mod: PBBFAC,PO

## 2018-06-15 PROCEDURE — 99999 PR PBB SHADOW E&M-EST. PATIENT-LVL IV: CPT | Mod: PBBFAC,,, | Performed by: FAMILY MEDICINE

## 2018-06-15 RX ORDER — ALENDRONATE SODIUM 35 MG/1
35 TABLET ORAL
Qty: 12 TABLET | Refills: 4 | Status: SHIPPED | OUTPATIENT
Start: 2018-06-15 | End: 2018-09-14

## 2018-06-18 NOTE — PROGRESS NOTES
Office Visit for Annual Exam    Patient Name: Windy Lindo    : 1950  MRN: [id number]    Subjective:  Windy is a 67 y.o. female who presents today for     1. Annual physical - no issues/complaints. Pt is doing well on current medication regimen  2. Osteoporosis - pt has been on fosamax for many years without break. Pt was never advised that she may need to stop fosamax.       Review of Systems   Constitutional: Negative for chills and fever.   HENT: Negative for congestion and hearing loss.    Eyes: Negative for blurred vision and discharge.   Respiratory: Negative for cough and wheezing.    Cardiovascular: Negative for chest pain and palpitations.   Gastrointestinal: Negative for abdominal pain, blood in stool, constipation, diarrhea, heartburn, nausea and vomiting.   Genitourinary: Negative for dysuria and hematuria.   Musculoskeletal: Negative for myalgias and neck pain.   Skin: Negative for itching and rash.   Neurological: Positive for weakness. Negative for dizziness and headaches.   Endo/Heme/Allergies: Negative for polydipsia.   Psychiatric/Behavioral: Negative for depression.       Active Problem List  Patient Active Problem List   Diagnosis    Right knee pain    Elevated BP without diagnosis of hypertension    Chest sounds abnormal on percussion or auscultation    Varicose veins of both lower extremities    Primary osteoarthritis of right knee    Weakness       Past Surgical History  Past Surgical History:   Procedure Laterality Date    APPENDECTOMY      COLONOSCOPY N/A 2017    Procedure: COLONOSCOPY;  Surgeon: Albino Fenton MD;  Location: [address];  Service: Endoscopy;  Laterality: N/A;    ESOPHAGOGASTRODUODENOSCOPY  2017    KNEE SURGERY Right 2017    TKR    LASIK  7 yrs    ou    TONSILLECTOMY         Family History  Family History   Problem Relation Age of Onset    Hypertension Mother     Glaucoma Mother     Diabetes Mother     Cataracts Mother      "Cancer Mother 74        lung    Heart disease Mother 55    Hypertension Father     Heart disease Father         onset early 60;s, Aortic valve replacement ,Rheumatic fever as a child     Diabetes Brother     Cancer Brother 66        mesothelioma    No Known Problems Sister     No Known Problems Maternal Aunt     No Known Problems Maternal Uncle     No Known Problems Paternal Aunt     No Known Problems Paternal Uncle     No Known Problems Maternal Grandmother     No Known Problems Maternal Grandfather     No Known Problems Paternal Grandmother     No Known Problems Paternal Grandfather     Amblyopia Neg Hx     Blindness Neg Hx     Macular degeneration Neg Hx     Retinal detachment Neg Hx     Strabismus Neg Hx     Stroke Neg Hx     Thyroid disease Neg Hx        Social History  Social History     Social History    Marital status: Single     Spouse name: N/A    Number of children: N/A    Years of education: N/A     Occupational History     [employer]     Social History Main Topics    Smoking status: Never Smoker    Smokeless tobacco: Never Used    Alcohol use 0.6 oz/week     1 Glasses of wine per week      Comment: glass of wine a night     Drug use: No    Sexual activity: No     Other Topics Concern    Not on file     Social History Narrative    No narrative on file       Medications and Allergies  Reviewed and updated.   Current Outpatient Prescriptions   Medication Sig    alendronate (FOSAMAX) 35 MG tablet Take 1 tablet (35 mg total) by mouth every 7 days.    vitamin D 1000 units Tab Take 1,000 Units by mouth once daily.     No current facility-administered medications for this visit.        Physical Exam  /80 (BP Location: Left arm, Patient Position: Sitting, BP Method: Medium (Manual))   Pulse 75   Temp 98 °F (36.7 °C) (Oral)   Ht 5' 6" (1.676 m)   Wt 77.5 kg (170 lb 13.7 oz)   SpO2 99%   BMI 27.58 kg/m²   Physical Exam   Constitutional: She is oriented to person, place, and " time. She appears well-developed and well-nourished.   HENT:   Head: Normocephalic and atraumatic.   Eyes: Conjunctivae and EOM are normal. Pupils are equal, round, and reactive to light.   Neck: Normal range of motion. Neck supple.   Cardiovascular: Normal rate, regular rhythm and normal heart sounds.  Exam reveals no gallop and no friction rub.    No murmur heard.  Pulmonary/Chest: Breath sounds normal. No respiratory distress.   Abdominal: Soft. Bowel sounds are normal. She exhibits no distension. There is no tenderness.   Musculoskeletal: Normal range of motion.   Lymphadenopathy:     She has no cervical adenopathy.   Neurological: She is alert and oriented to person, place, and time.   Skin: Skin is warm.   Psychiatric: She has a normal mood and affect.         Assessment/Plan:  Windy Lindo is a 67 y.o. female who presents today for :    Osteoporosis, unspecified osteoporosis type, unspecified pathological fracture presence  -     alendronate (FOSAMAX) 35 MG tablet; Take 1 tablet (35 mg total) by mouth every 7 days.  Dispense: 12 tablet; Refill: 4  -     DXA Bone Density Spine And Hip; Future; Expected date: 06/18/2018  F/u with bone scan and see if pt needs to continue fosamax  The current medical regimen is effective;  continue present plan and medications.    Encounter for screening mammogram for breast cancer  -     Mammo Digital Screening Bilat with CAD; Future; Expected date: 06/15/2018    Need for Td vaccine  -     (In Office Administered) Td Vaccine - Preservative Free    Primary osteoarthritis of right knee  Conservative treatment   Continue strengthening exercise   Keep active.       Follow-up in about 6 months (around 12/15/2018), or if symptoms worsen or fail to improve.      Answers for HPI/ROS submitted by the patient on 6/10/2018   activity change: Yes  unexpected weight change: No  rhinorrhea: No  trouble swallowing: No  visual disturbance: No  chest tightness: No  polyuria: No  difficulty  urinating: No  menstrual problem: No  joint swelling: No  arthralgias: Yes  confusion: No  dysphoric mood: No

## 2018-06-28 ENCOUNTER — PATIENT MESSAGE (OUTPATIENT)
Dept: FAMILY MEDICINE | Facility: CLINIC | Age: 68
End: 2018-06-28

## 2018-07-18 ENCOUNTER — OFFICE VISIT (OUTPATIENT)
Dept: DERMATOLOGY | Facility: CLINIC | Age: 68
End: 2018-07-18
Payer: MEDICARE

## 2018-07-18 DIAGNOSIS — L91.8 SKIN TAG: ICD-10-CM

## 2018-07-18 DIAGNOSIS — L82.1 SEBORRHEIC KERATOSIS: Primary | ICD-10-CM

## 2018-07-18 DIAGNOSIS — D22.9 BENIGN NEVUS: ICD-10-CM

## 2018-07-18 PROCEDURE — 99201 PR OFFICE/OUTPT VISIT,NEW,LEVL I: CPT | Mod: S$PBB,,, | Performed by: DERMATOLOGY

## 2018-07-18 PROCEDURE — 99999 PR PBB SHADOW E&M-EST. PATIENT-LVL II: CPT | Mod: PBBFAC,,, | Performed by: DERMATOLOGY

## 2018-07-18 PROCEDURE — 99212 OFFICE O/P EST SF 10 MIN: CPT | Mod: PBBFAC | Performed by: DERMATOLOGY

## 2018-07-18 NOTE — PROGRESS NOTES
Subjective:       Patient ID:  Windy Lindo is a 67 y.o. female who presents for   Chief Complaint   Patient presents with    Lesion     neck and chest     Patient complains of lesion(s)  Location: spots on chest and neck  Duration: years  Symptoms: some on neck catch on jewelry and neighbor just had skin cancer so pt concerned  Relieving factors/Previous treatments: none          Review of Systems   Skin: Positive for activity-related sunscreen use. Negative for daily sunscreen use and recent sunburn.   Hematologic/Lymphatic: Does not bruise/bleed easily.        Objective:    Physical Exam   Constitutional: She appears well-developed and well-nourished. No distress.   Neurological: She is alert and oriented to person, place, and time. She is not disoriented.   Psychiatric: She has a normal mood and affect.   Skin:   Areas Examined (abnormalities noted in diagram):   Head / Face Inspection Performed  Neck Inspection Performed  Chest / Axilla Inspection Performed                   Diagram Legend     Erythematous scaling macule/papule c/w actinic keratosis       Vascular papule c/w angioma      Pigmented verrucoid papule/plaque c/w seborrheic keratosis      Yellow umbilicated papule c/w sebaceous hyperplasia      Irregularly shaped tan macule c/w lentigo     1-2 mm smooth white papules consistent with Milia      Movable subcutaneous cyst with punctum c/w epidermal inclusion cyst      Subcutaneous movable cyst c/w pilar cyst      Firm pink to brown papule c/w dermatofibroma      Pedunculated fleshy papule(s) c/w skin tag(s)      Evenly pigmented macule c/w junctional nevus     Mildly variegated pigmented, slightly irregular-bordered macule c/w mildly atypical nevus      Flesh colored to evenly pigmented papule c/w intradermal nevus       Pink pearly papule/plaque c/w basal cell carcinoma      Erythematous hyperkeratotic cursted plaque c/w SCC      Surgical scar with no sign of skin cancer recurrence      Open and  closed comedones      Inflammatory papules and pustules      Verrucoid papule consistent consistent with wart     Erythematous eczematous patches and plaques     Dystrophic onycholytic nail with subungual debris c/w onychomycosis     Umbilicated papule    Erythematous-base heme-crusted tan verrucoid plaque consistent with inflamed seborrheic keratosis     Erythematous Silvery Scaling Plaque c/w Psoriasis     See annotation      Assessment / Plan:        Seborrheic keratosis  These are benign inherited growths without a malignant potential. Reassurance given to patient. No treatment is necessary.       Skin tags  Reassurance given to patient. No treatment is necessary.   Treatment of benign, asymptomatic lesions may be considered cosmetic.      Benign nevus - right neck  Reassurance given to patient. No treatment is necessary.                Follow-up if symptoms worsen or fail to improve.

## 2018-08-08 NOTE — PLAN OF CARE
Problem: Occupational Therapy Goal  Goal: Occupational Therapy Goal  Goals to be met by: 7 days    Patient will increase functional independence with ADLs by performing:    UE Dressing with Supervision.  LE Dressing with Supervision with AD as needed.  Grooming while standing with Supervision.  Toileting from bedside commode with Supervision for hygiene and clothing management.   Stand pivot transfers with Supervision.  Toilet transfer to bedside commode with Supervision.        Cont. POC       Detail Level: Zone Detail Level: Generalized

## 2018-09-14 ENCOUNTER — OFFICE VISIT (OUTPATIENT)
Dept: PODIATRY | Facility: CLINIC | Age: 68
End: 2018-09-14
Payer: MEDICARE

## 2018-09-14 ENCOUNTER — PATIENT MESSAGE (OUTPATIENT)
Dept: PODIATRY | Facility: CLINIC | Age: 68
End: 2018-09-14

## 2018-09-14 ENCOUNTER — PATIENT MESSAGE (OUTPATIENT)
Dept: ORTHOPEDICS | Facility: CLINIC | Age: 68
End: 2018-09-14

## 2018-09-14 ENCOUNTER — HOSPITAL ENCOUNTER (OUTPATIENT)
Dept: RADIOLOGY | Facility: HOSPITAL | Age: 68
Discharge: HOME | End: 2018-09-14
Attending: PODIATRIST
Payer: MEDICARE

## 2018-09-14 VITALS — WEIGHT: 170 LBS | BODY MASS INDEX: 27.32 KG/M2 | HEIGHT: 66 IN

## 2018-09-14 DIAGNOSIS — M79.672 LEFT FOOT PAIN: Primary | ICD-10-CM

## 2018-09-14 DIAGNOSIS — M79.672 LEFT FOOT PAIN: ICD-10-CM

## 2018-09-14 DIAGNOSIS — M21.6X9 ACQUIRED CAVUS FOOT DEFORMITY: ICD-10-CM

## 2018-09-14 PROCEDURE — 99213 OFFICE O/P EST LOW 20 MIN: CPT | Mod: S$PBB,,, | Performed by: PODIATRIST

## 2018-09-14 PROCEDURE — 73630 X-RAY EXAM OF FOOT: CPT | Mod: 26,LT,, | Performed by: RADIOLOGY

## 2018-09-14 PROCEDURE — 73630 X-RAY EXAM OF FOOT: CPT | Mod: TC,FY,PO,LT

## 2018-09-14 PROCEDURE — 99213 OFFICE O/P EST LOW 20 MIN: CPT | Mod: PBBFAC,25,PO | Performed by: PODIATRIST

## 2018-09-14 PROCEDURE — 99999 PR PBB SHADOW E&M-EST. PATIENT-LVL III: CPT | Mod: PBBFAC,,, | Performed by: PODIATRIST

## 2018-09-14 RX ORDER — TRAMADOL HYDROCHLORIDE 50 MG/1
50 TABLET ORAL EVERY 12 HOURS PRN
Qty: 14 TABLET | Refills: 0 | Status: SHIPPED | OUTPATIENT
Start: 2018-09-14 | End: 2018-09-24

## 2018-09-14 RX ORDER — ALENDRONATE SODIUM 35 MG/1
35 TABLET ORAL
COMMUNITY
End: 2019-12-04

## 2018-09-14 RX ORDER — CHOLECALCIFEROL (VITAMIN D3) 25 MCG
1000 TABLET ORAL DAILY
COMMUNITY

## 2018-09-15 NOTE — PROGRESS NOTES
Subjective:      Patient ID: Windy Lindo is a 68 y.o. female.    Chief Complaint: Foot Pain (left) and Ankle Pain  Windy Lindo is a 68 y.o. female Presents with left anterior ankle pain worse when she stands. States this started 5 days ago when she heard a pop on her left foot. Pain is worse during WB. Rates pain at 8/10 when standing ambulating with cane.     Current shoe gear:  SAS    Past Medical History:   Diagnosis Date    Arthritis     Eye injury 4 yrs    hit od       Past Surgical History:   Procedure Laterality Date    APPENDECTOMY      COLONOSCOPY N/A 9/7/2017    Procedure: COLONOSCOPY;  Surgeon: Albino Fenton MD;  Location: [facility] (4TH FLR);  Service: Endoscopy;  Laterality: N/A;    COLONOSCOPY N/A 9/7/2017    Performed by Albino Fenton MD at [facility] ENDO (4TH FLR)    ESOPHAGOGASTRODUODENOSCOPY  09/07/2017    ESOPHAGOGASTRODUODENOSCOPY (EGD) N/A 9/7/2017    Performed by Albino Fenton MD at [facility] (4TH FLR)    KNEE SURGERY Right 12/05/2017    TKR    LASIK  7 yrs    ou    REPLACEMENT-KNEE-TOTAL Right 12/5/2017    Performed by Evans Busch MD at [facility] OR 2ND FLR    TONSILLECTOMY         Family History   Problem Relation Age of Onset    Hypertension Mother     Glaucoma Mother     Diabetes Mother     Cataracts Mother     Cancer Mother 74        lung    Heart disease Mother 55    Hypertension Father     Heart disease Father         onset early 60;s, Aortic valve replacement ,Rheumatic fever as a child     Diabetes Brother     Cancer Brother 66        mesothelioma    No Known Problems Sister     No Known Problems Maternal Aunt     No Known Problems Maternal Uncle     No Known Problems Paternal Aunt     No Known Problems Paternal Uncle     No Known Problems Maternal Grandmother     No Known Problems Maternal Grandfather     No Known Problems Paternal Grandmother     No Known Problems Paternal Grandfather     Amblyopia Neg Hx     Blindness Neg Hx     Macular  degeneration Neg Hx     Retinal detachment Neg Hx     Strabismus Neg Hx     Stroke Neg Hx     Thyroid disease Neg Hx     Melanoma Neg Hx        Social History     Socioeconomic History    Marital status: Single     Spouse name: None    Number of children: None    Years of education: None    Highest education level: None   Social Needs    Financial resource strain: None    Food insecurity - worry: None    Food insecurity - inability: None    Transportation needs - medical: None    Transportation needs - non-medical: None   Occupational History     Employer: [employer] – [place]   Tobacco Use    Smoking status: Never Smoker    Smokeless tobacco: Never Used   Substance and Sexual Activity    Alcohol use: Yes     Alcohol/week: 0.6 oz     Types: 1 Glasses of wine per week     Comment: glass of wine a night     Drug use: No    Sexual activity: No   Other Topics Concern    Are you pregnant or think you may be? Not Asked    Breast-feeding Not Asked   Social History Narrative    None       Current Outpatient Medications   Medication Sig Dispense Refill    alendronate (FOSAMAX) 35 MG tablet Take 35 mg by mouth every 7 days.      vitamin D (VITAMIN D3) 1000 units Tab Take 1,000 Units by mouth once daily.      traMADol (ULTRAM) 50 mg tablet Take 1 tablet (50 mg total) by mouth every 12 (twelve) hours as needed for Pain. 14 tablet 0     No current facility-administered medications for this visit.        Review of patient's allergies indicates:  No Known Allergies    Review of Systems   Constitution: Negative for chills, fever and weakness.   Cardiovascular: Negative for claudication and leg swelling.   Respiratory: Negative for cough and shortness of breath.    Skin: Negative for color change, dry skin, itching, nail changes and rash.   Musculoskeletal: Positive for arthritis, joint pain and myalgias. Negative for falls, joint swelling and muscle weakness.   Gastrointestinal: Negative for diarrhea, nausea and vomiting.  "  Neurological: Negative for numbness, paresthesias and tremors.   Psychiatric/Behavioral: Negative for altered mental status and hallucinations.           Objective:       Vitals:    09/14/18 1000   Weight: 77.1 kg (170 lb)   Height: 5' 6" (1.676 m)   PainSc: 10-Worst pain ever   PainLoc: Ankle       Physical Exam   Constitutional: She is oriented to person, place, and time. She appears well-developed and well-nourished.  Non-toxic appearance. She does not have a sickly appearance. No distress.   Cardiovascular:   Pulses:       Dorsalis pedis pulses are 2+ on the right side, and 2+ on the left side.        Posterior tibial pulses are 2+ on the right side, and 2+ on the left side.   CFT <3 seconds bilateral.  Pedal hair growth present bilateral.   No varicosities noted bilateral.  No bilateral lower extremity edema.  Toes are warm to touch bilateral.     Pulmonary/Chest: No respiratory distress.   Musculoskeletal: She exhibits no edema or tenderness.        Right ankle: She exhibits normal range of motion and no swelling. No tenderness. No lateral malleolus, no medial malleolus, no AITFL, no CF ligament and no posterior TFL tenderness found. Achilles tendon exhibits no pain, no defect and normal Reynolds's test results.        Left ankle: She exhibits normal range of motion and no swelling. No tenderness. No lateral malleolus, no medial malleolus, no AITFL, no CF ligament and no posterior TFL tenderness found. Achilles tendon exhibits no pain, no defect and normal Reynolds's test results.        Right foot: There is deformity. There is no bony tenderness.        Left foot: There is deformity. There is no bony tenderness.   Muscle strength 5/5 in all muscle groups bilateral.  No tenderness nor crepitation with ROM of foot/ankle joints bilateral.  No tenderness with palpation of bilateral foot and ankle.  Bilateral pes cavus foot type. Visibly and palpably prominent styloid process of the Lt. 5th metatarsal.  No pain " at the site of the Lt. Peroneus brevis insertion.  Bony prominences noted about bilateral dorsal midfoot.  Bilateral hallux abducto valgus with the Rt. >Lt.  Rigid contracture of toes 2-5 bilateral.      Neurological: She is alert and oriented to person, place, and time. She has normal strength and normal reflexes. She displays no atrophy and no tremor. No sensory deficit. She exhibits normal muscle tone.   Light touch intact bilateral.     Skin: Skin is warm, dry and intact. No abrasion, no bruising, no burn, no ecchymosis, no laceration, no lesion, no petechiae and no rash noted. She is not diaphoretic. No cyanosis or erythema. No pallor. Nails show no clubbing.   Pedal skin has normal turgor, temperature, and texture bilateral.  Toenails x 10 appear normotrophic.  Focal hyperkeratosis noted to the plantar and lateral aspect of the Lt. Styloid process.  No intralesional petechiae noted.   Examination of the skin reveals no evidence of significant maceration, rashes, open lesions, suspicious appearing nevi or other concerning lesions.    Psychiatric: Her mood appears not anxious. Her affect is not inappropriate. Her speech is not slurred. She is not combative. She is communicative. She is attentive.   Nursing note reviewed.            Assessment:       Encounter Diagnoses   Name Primary?    Left foot pain Yes    Acquired cavus foot deformity          Plan:       Windy was seen today for foot pain and ankle pain.    Diagnoses and all orders for this visit:    Left foot pain  -     X-Ray Foot Complete Left; Future  -     US Extremity Non Vascular Complete Left; Future    Acquired cavus foot deformity    Other orders  -     traMADol (ULTRAM) 50 mg tablet; Take 1 tablet (50 mg total) by mouth every 12 (twelve) hours as needed for Pain.      I counseled the patient on her conditions, their implications and medical management.    Weightbearing left foot xray to assess underlying deformity and for underlying osseous  pathology.     Nonvascular US ordered to r/o tendon rupture. Unable to order MRI 2/2 right knee replacement.    Tramadol prescribed for pain, cannot take meloxicam 2/2 h/o GI Bleed    CAM boot dispensed and applied to left  foot. Advised to use at all times while weightbearing and ambulating even for few minutes. Advised to use sneaker style shoe in opposite foot to maintain balance. Ok to remove  at night but reapply if patient is to get up in the middle of the night. Verbalized understanding. Advised to use for 3 -4 weeks.     F/u 6 weeks.     Karen Thompson DPM             Yes

## 2018-09-17 ENCOUNTER — HOSPITAL ENCOUNTER (OUTPATIENT)
Dept: RADIOLOGY | Facility: HOSPITAL | Age: 68
Discharge: HOME | End: 2018-09-17
Attending: PODIATRIST
Payer: MEDICARE

## 2018-09-17 DIAGNOSIS — M79.672 LEFT FOOT PAIN: ICD-10-CM

## 2018-09-17 PROCEDURE — 76881 US COMPL JOINT R-T W/IMG: CPT | Mod: TC

## 2018-09-17 PROCEDURE — 76881 US COMPL JOINT R-T W/IMG: CPT | Mod: 26,,, | Performed by: RADIOLOGY

## 2018-09-18 ENCOUNTER — TELEPHONE (OUTPATIENT)
Dept: PODIATRY | Facility: CLINIC | Age: 68
End: 2018-09-18

## 2018-09-19 ENCOUNTER — PATIENT MESSAGE (OUTPATIENT)
Dept: PODIATRY | Facility: CLINIC | Age: 68
End: 2018-09-19

## 2018-09-20 ENCOUNTER — TELEPHONE (OUTPATIENT)
Dept: PODIATRY | Facility: CLINIC | Age: 68
End: 2018-09-20

## 2018-09-20 NOTE — TELEPHONE ENCOUNTER
Contacted patient regarding nonvascular US results.Pt. Reports pain is overall improving. Instructed to continue icing area.

## 2018-09-30 ENCOUNTER — PATIENT MESSAGE (OUTPATIENT)
Dept: ADMINISTRATIVE | Facility: OTHER | Age: 68
End: 2018-09-30

## 2018-10-26 ENCOUNTER — OFFICE VISIT (OUTPATIENT)
Dept: PODIATRY | Facility: CLINIC | Age: 68
End: 2018-10-26
Payer: MEDICARE

## 2018-10-26 VITALS
DIASTOLIC BLOOD PRESSURE: 76 MMHG | HEIGHT: 66 IN | WEIGHT: 170 LBS | SYSTOLIC BLOOD PRESSURE: 124 MMHG | BODY MASS INDEX: 27.32 KG/M2

## 2018-10-26 DIAGNOSIS — M25.572 CHRONIC PAIN OF LEFT ANKLE: Primary | ICD-10-CM

## 2018-10-26 DIAGNOSIS — M77.50 TENDONITIS OF ANKLE: ICD-10-CM

## 2018-10-26 DIAGNOSIS — G89.29 CHRONIC PAIN OF LEFT ANKLE: Primary | ICD-10-CM

## 2018-10-26 PROCEDURE — 99999 PR PBB SHADOW E&M-EST. PATIENT-LVL III: CPT | Mod: PBBFAC,,, | Performed by: PODIATRIST

## 2018-10-26 PROCEDURE — 99213 OFFICE O/P EST LOW 20 MIN: CPT | Mod: PBBFAC,PO | Performed by: PODIATRIST

## 2018-10-26 PROCEDURE — 99213 OFFICE O/P EST LOW 20 MIN: CPT | Mod: S$PBB,,, | Performed by: PODIATRIST

## 2018-10-29 NOTE — PROGRESS NOTES
Subjective:      Patient ID: Windy Lindo is a 68 y.o. female.    Chief Complaint: Foot Pain (left PCp Dr. Higginbotham 6/15/18)  Windy Lindo is a 68 y.o. female Presents with left anterior ankle pain worse when she stands. States this started 5 days ago when she heard a pop on her left foot. Pain is worse during WB. Rates pain at 8/10 when standing ambulating with cane.     10/26/18: F/u left anterior ankle pain. Nonvascular US ordered prior visit, unremarkable for rupture. Reports pain improved 2/10 to left anterior ankle. Still has some residual pain after long periods of WB. Cannot tolerate CAM boot.     Current shoe gear:  SAS    Past Medical History:   Diagnosis Date    Arthritis     Eye injury 4 yrs    hit od       Past Surgical History:   Procedure Laterality Date    APPENDECTOMY      COLONOSCOPY N/A 9/7/2017    Procedure: COLONOSCOPY;  Surgeon: Albino Fenton MD;  Location: [facility] ([address] [place] [address]);  Service: Endoscopy;  Laterality: N/A;    COLONOSCOPY N/A 9/7/2017    Performed by Albino Fenton MD at [facility] (4TH FLR)    ESOPHAGOGASTRODUODENOSCOPY  09/07/2017    ESOPHAGOGASTRODUODENOSCOPY (EGD) N/A 9/7/2017    Performed by Albino Fenton MD at [facility] (4TH FLR)    KNEE SURGERY Right 12/05/2017    TKR    LASIK  7 yrs    ou    REPLACEMENT-KNEE-TOTAL Right 12/5/2017    Performed by Evans Busch MD at [facility] OR 2ND FLR    TONSILLECTOMY         Family History   Problem Relation Age of Onset    Hypertension Mother     Glaucoma Mother     Diabetes Mother     Cataracts Mother     Cancer Mother 74        lung    Heart disease Mother 55    Hypertension Father     Heart disease Father         onset early 60;s, Aortic valve replacement ,Rheumatic fever as a child     Diabetes Brother     Cancer Brother 66        mesothelioma    No Known Problems Sister     No Known Problems Maternal Aunt     No Known Problems Maternal Uncle     No Known Problems Paternal Aunt     No Known Problems  Paternal Uncle     No Known Problems Maternal Grandmother     No Known Problems Maternal Grandfather     No Known Problems Paternal Grandmother     No Known Problems Paternal Grandfather     Amblyopia Neg Hx     Blindness Neg Hx     Macular degeneration Neg Hx     Retinal detachment Neg Hx     Strabismus Neg Hx     Stroke Neg Hx     Thyroid disease Neg Hx     Melanoma Neg Hx        Social History     Socioeconomic History    Marital status: Single     Spouse name: None    Number of children: None    Years of education: None    Highest education level: None   Social Needs    Financial resource strain: None    Food insecurity - worry: None    Food insecurity - inability: None    Transportation needs - medical: None    Transportation needs - non-medical: None   Occupational History     Employer: [employer] [place]   Tobacco Use    Smoking status: Never Smoker    Smokeless tobacco: Never Used   Substance and Sexual Activity    Alcohol use: Yes     Alcohol/week: 0.6 oz     Types: 1 Glasses of wine per week     Comment: glass of wine a night     Drug use: No    Sexual activity: No   Other Topics Concern    Are you pregnant or think you may be? Not Asked    Breast-feeding Not Asked   Social History Narrative    None       Current Outpatient Medications   Medication Sig Dispense Refill    alendronate (FOSAMAX) 35 MG tablet Take 35 mg by mouth every 7 days.      multivit-min-FA-lycopen-lutein (CENTRUM SILVER) 0.4-300-250 mg-mcg-mcg Tab Take 1 tablet by mouth once daily.      vitamin D (VITAMIN D3) 1000 units Tab Take 1,000 Units by mouth once daily.       No current facility-administered medications for this visit.        Review of patient's allergies indicates:  No Known Allergies    Review of Systems   Constitution: Negative for chills, fever and weakness.   Cardiovascular: Negative for claudication and leg swelling.   Respiratory: Negative for cough and shortness of breath.    Skin: Negative for color  "change, dry skin, itching, nail changes and rash.   Musculoskeletal: Positive for arthritis, joint pain and myalgias. Negative for falls, joint swelling and muscle weakness.   Gastrointestinal: Negative for diarrhea, nausea and vomiting.   Neurological: Negative for numbness, paresthesias and tremors.   Psychiatric/Behavioral: Negative for altered mental status and hallucinations.           Objective:       Vitals:    10/26/18 1022   BP: 124/76   Weight: 77.1 kg (170 lb)   Height: 5' 6" (1.676 m)   PainSc:   3   PainLoc: Foot       Physical Exam   Constitutional: She is oriented to person, place, and time. She appears well-developed and well-nourished.  Non-toxic appearance. She does not have a sickly appearance. No distress.   Cardiovascular:   Pulses:       Dorsalis pedis pulses are 2+ on the right side, and 2+ on the left side.        Posterior tibial pulses are 2+ on the right side, and 2+ on the left side.   CFT <3 seconds bilateral.  Pedal hair growth present bilateral.   No varicosities noted bilateral.  No bilateral lower extremity edema.  Toes are warm to touch bilateral.     Pulmonary/Chest: No respiratory distress.   Musculoskeletal: She exhibits no edema or tenderness.        Right ankle: She exhibits normal range of motion and no swelling. No tenderness. No lateral malleolus, no medial malleolus, no AITFL, no CF ligament and no posterior TFL tenderness found. Achilles tendon exhibits no pain, no defect and normal Reynolds's test results.        Left ankle: She exhibits normal range of motion and no swelling. No tenderness. No lateral malleolus, no medial malleolus, no AITFL, no CF ligament and no posterior TFL tenderness found. Achilles tendon exhibits no pain, no defect and normal Reynolds's test results.        Right foot: There is deformity. There is no bony tenderness.        Left foot: There is deformity. There is no bony tenderness.   Muscle strength 5/5 in all muscle groups bilateral.  No " tenderness nor crepitation with ROM of foot/ankle joints bilateral.  No tenderness with palpation of bilateral foot and ankle.  Bilateral pes cavus foot type. Visibly and palpably prominent styloid process of the Lt. 5th metatarsal.  No pain at the site of the Lt. Peroneus brevis insertion.  Bony prominences noted about bilateral dorsal midfoot.  Bilateral hallux abducto valgus with the Rt. >Lt.  Rigid contracture of toes 2-5 bilateral.      Neurological: She is alert and oriented to person, place, and time. She has normal strength and normal reflexes. She displays no atrophy and no tremor. No sensory deficit. She exhibits normal muscle tone.   Light touch intact bilateral.     Skin: Skin is warm, dry and intact. No abrasion, no bruising, no burn, no ecchymosis, no laceration, no lesion, no petechiae and no rash noted. She is not diaphoretic. No cyanosis or erythema. No pallor. Nails show no clubbing.   Pedal skin has normal turgor, temperature, and texture bilateral.  Toenails x 10 appear normotrophic.  Focal hyperkeratosis noted to the plantar and lateral aspect of the Lt. Styloid process.  No intralesional petechiae noted.   Examination of the skin reveals no evidence of significant maceration, rashes, open lesions, suspicious appearing nevi or other concerning lesions.    Psychiatric: Her mood appears not anxious. Her affect is not inappropriate. Her speech is not slurred. She is not combative. She is communicative. She is attentive.   Nursing note reviewed.            Assessment:       Encounter Diagnoses   Name Primary?    Chronic pain of left ankle Yes    Tendonitis of ankle          Plan:       Windy was seen today for foot pain.    Diagnoses and all orders for this visit:    Chronic pain of left ankle  -     Ambulatory consult to Physical Therapy    Tendonitis of ankle      I counseled the patient on her conditions, their implications and medical management.    Pain overall improved to left anterior  ankle.    PT referral placed for ROM exercises.     Discussed conservative treatment with shoes of adequate dimensions, material, and style to alleviate symptoms and delay or prevent surgical intervention.    F/u 6 weeks.     Karen Thompson DPM

## 2018-11-12 ENCOUNTER — CLINICAL SUPPORT (OUTPATIENT)
Dept: REHABILITATION | Facility: HOSPITAL | Age: 68
End: 2018-11-12
Attending: PODIATRIST
Payer: MEDICARE

## 2018-11-12 DIAGNOSIS — G89.29 CHRONIC PAIN OF LEFT ANKLE: Primary | ICD-10-CM

## 2018-11-12 DIAGNOSIS — M25.572 CHRONIC PAIN OF LEFT ANKLE: Primary | ICD-10-CM

## 2018-11-12 PROCEDURE — 97110 THERAPEUTIC EXERCISES: CPT

## 2018-11-12 PROCEDURE — 97161 PT EVAL LOW COMPLEX 20 MIN: CPT

## 2018-11-12 PROCEDURE — 97140 MANUAL THERAPY 1/> REGIONS: CPT

## 2018-11-12 PROCEDURE — G8978 MOBILITY CURRENT STATUS: HCPCS | Mod: CL

## 2018-11-12 PROCEDURE — G8979 MOBILITY GOAL STATUS: HCPCS | Mod: CK

## 2018-11-12 NOTE — PROGRESS NOTES
Physical Therapy Initial Evaluation     Name: Windy Lindo  Clinic Number: [id number]    Windy is a 68 y.o. female evaluated on 11/12/2018.     Diagnosis:   Encounter Diagnosis   Name Primary?    Chronic pain of left ankle Yes     Physician: Karen Thompson DPM  Treatment Orders: PT Eval and Treat    Past Medical History:   Diagnosis Date    Arthritis     Eye injury 4 yrs    hit od     Current Outpatient Medications   Medication Sig    alendronate (FOSAMAX) 35 MG tablet Take 35 mg by mouth every 7 days.    multivit-min-FA-lycopen-lutein (CENTRUM SILVER) 0.4-300-250 mg-mcg-mcg Tab Take 1 tablet by mouth once daily.    vitamin D (VITAMIN D3) 1000 units Tab Take 1,000 Units by mouth once daily.     No current facility-administered medications for this visit.      Review of patient's allergies indicates:  No Known Allergies  Precautions: standard    Time In: 9:56  Time Out: 10:57    Evaluation Date: 11/12/18  Visit # authorized: 1/6  Authorization period: 12/31/18  Plan of care Expiration: 12/28/18    Subjective     Patient reports that 3 months ago she felt something pop and went to [facility].  She had ultrasound and x-ray and MD said it was a ligament but it wasn't torn.  Pt was walking from neighbor's house on even ground when it popped.  Pt reports that she does not have much control of 3rd, 4th, and 5th toes.  Pt can feel and has sensation but she just can't move them, ever since it happened. No previous injuries or surgeries.  Pt reports she had a R TKA 11 months ago, which is doing great.  Pt was a member at a gym but hasn't been since the ankle injury.  Pt likes hot baths c/ epsom salt.    Diagnostic Imaging: x-ray- L ankle 9/14/18  FINDINGS:  There is generalized osteopenia.  No acute fractures or dislocations or erosive changes.  There is DJD of the 1st metatarsophalangeal joint.  There is also arthritic changes of the tarsal joints.  Mild plantar  calcaneal spur as well as Achilles enthesopathy noted.    Onset: sudden  Pain Scale: Windy rates pain on a scale of 0-10 to be 10 at worst; 3 currently; 0 at best.  Aggravating Factors: walking  Relieving Factors: ice and rest  Prior Therapy: PT for knee  Functional Deficits Leading to Referral: pain and limitations with functional mobility  Prior functional status: Independent  Occupation:  Retired                      Patient Goals: Get back to moving around and relieve pain.    Objective     Observation: L ankle inverted during standing.      Palpation: NO TTP noted    Range of Motion: Ankle (degrees)   Left Right   Dorsiflexion: 10 WNL   Plantarflexion 30 WNL   Inversion 20 WNL   Eversion 15 WNL     Strength: Ankle    Left Right   Gastrocnemius 3-/5 *pain 4+/5   Soleus 3+/5 *pain 4+/5   Dorsiflexion 5/5 5/5   Inversion 4-/5 5/5   Eversion 4-/5 5/5   EDL trace 5/5     Strength: Knee   Left Right   Quadriceps 4+/5 4+/5   Hamstrings 4+/5 4+/5       Strength: Hip    Left Right   Iliopsoas 4/5 4/5   PGM 4/5 4/5   IR 4/5 4/5   ER 4/5 4/5   Ext 4/5 4/5         Sensation: intact to light touch      Gait: [place] ambulated with none.  Level of Assistance: independent  Patient displays antalgic gait c/ trendelenburg noted and decreased step length when LLE in stance 2/2 to pain.       TREATMENT     PT Evaluation Completed? Yes  Discussed Plan of Care with patient: Yes    Windy received 10 minutes of therapeutic exercise:     Ankle 4-way c/ OTB x 20  Standing heel raises 2 x 10    Windy received 10 minutes of manual therapy:     Kinesiotaping applied to L ankle to provide support  PROM to L ankle      Written Home Exercises Provided: ankle 4 way c/ OTB and standing heel raises (see handout)  Windy demo good understanding of the education provided. Patient demo good return demo of skill of exercises.    ASSESSMENT     History  Co-morbidities and personal factors that may impact the plan of care Examination  Body Structures  and Functions, activity limitations and participation restrictions that may impact the plan of care    Clinical Presentation   Co-morbidities:   HTN        Personal Factors:   no deficits Body Regions:   lower extremities    Body Systems:    gross symmetry  ROM  strength  balance  gait            Participation Restrictions:   walking     Activity limitations:   Mobility  walking               stable and uncomplicated                      low   low  low Decision Making/ Complexity Score:  low     Pt is a 68 year old female that presents to clinic c/ antalgic gait patter.  Pt has a PT diagnosis of L ankle pain and weakness.  During static standing and NWB, pt's L ankle is supinated.  PT applied kinesiotape to provide support.  Pt has a MMT grade of trace for L EDL.  Pt is highly motivated to return to PLOF.  Pt prognosis is Good.  Pt will benefit from skilled outpatient physical therapy to address the above stated deficits, provide pt/family education and to maximize pt's level of independence.     Medical necessity is demonstrated by the following IMPAIRMENTS/PROBLEMS:  1. Decreased ROM/joint mobility   2. Decreased strength  3. Pain/tenderness  4. Functional limitations     Pt's spiritual, cultural and educational needs considered and pt agreeable to plan of care and goals as stated below:     Anticipated Barriers for physical therapy: none    Short Term GOALS: 3 weeks. Pt agrees with goals set.  1. Patient demonstrates independence with HEP.   2. Patient demonstrates independence with Postural Awareness.   3. Patient demonstrates independence with body mechanics.     Long Term GOALS: 6 weeks. Pt agrees with goals set.  1. Patient demonstrates increased ankle ROM to WNL to improve tolerance to functional activities pain free.   2. Patient demonstrates increased strength BLE's to 4+/5 or greater to improve tolerance to functional activities pain free.   3. Patient demonstrates improved overall function per FOTO Ankle  Survey to 41% Limitation or less.    Functional Limitations Reports - G Codes  Category: mobility  Tool: FOTO Ankle Survey   Score: 62% Limitation    TEST SCORE  Modifier  Impairment Limitation Restriction    80/80  CH  0 % impaired, limited or restricted   64-79/80  CI  @ least 1% but less than 20% impaired, limited or restricted   49-63/80  CJ  @ least 20%<40% impaired, limited or restricted   33-48/80  CK  @ least 40%<60% impaired, limited or restricted   17-32/80  CL  @ least 60% <80% impaired, limited or restricted   1-16/80  CM  @ least 80%<100% impaired limited or restricted   0/80  CN  100% impaired, limited or restricted     Current/  CL = 62% Limitation  Goal/ : CK = 41% Limitation    PLAN     Outpatient physical therapy 1 times weekly to include: pt ed, hep, therapeutic exercises, neuromuscular re-education/ balance exercises, joint mobilizations, aquatic therapy and modalities prn. Cont PT for  6 weeks. Pt may be seen by PTA as part of the rehabilitation team.     Therapist: Toñito Jay, PT,DPT  11/12/2018

## 2018-11-12 NOTE — PLAN OF CARE
Physical Therapy Initial Evaluation     Name: Windy Lindo  Clinic Number: [id number]    Windy is a 68 y.o. female evaluated on 11/12/2018.     Diagnosis:   Encounter Diagnosis   Name Primary?    Chronic pain of left ankle Yes     Physician: Karen Thompson DPM  Treatment Orders: PT Eval and Treat    Past Medical History:   Diagnosis Date    Arthritis     Eye injury 4 yrs    hit od     Current Outpatient Medications   Medication Sig    alendronate (FOSAMAX) 35 MG tablet Take 35 mg by mouth every 7 days.    multivit-min-FA-lycopen-lutein (CENTRUM SILVER) 0.4-300-250 mg-mcg-mcg Tab Take 1 tablet by mouth once daily.    vitamin D (VITAMIN D3) 1000 units Tab Take 1,000 Units by mouth once daily.     No current facility-administered medications for this visit.      Review of patient's allergies indicates:  No Known Allergies  Precautions: standard    Time In: 9:56  Time Out: 10:57    Evaluation Date: 11/12/18  Visit # authorized: 1/6  Authorization period: 12/31/18  Plan of care Expiration: 12/28/18    Subjective     Patient reports that 3 months ago she felt something pop and went to [facility].  She had ultrasound and x-ray and MD said it was a ligament but it wasn't torn.  Pt was walking from neighbor's house on even ground when it popped.  Pt reports that she does not have much control of 3rd, 4th, and 5th toes.  Pt can feel and has sensation but she just can't move them, ever since it happened. No previous injuries or surgeries.  Pt reports she had a R TKA 11 months ago, which is doing great.  Pt was a member at a gym but hasn't been since the ankle injury.  Pt likes hot baths c/ epsom salt.    Diagnostic Imaging: x-ray- L ankle 9/14/18  FINDINGS:  There is generalized osteopenia.  No acute fractures or dislocations or erosive changes.  There is DJD of the 1st metatarsophalangeal joint.  There is also arthritic changes of the tarsal joints.  Mild plantar  calcaneal spur as well as Achilles enthesopathy noted.    Onset: sudden  Pain Scale: Windy rates pain on a scale of 0-10 to be 10 at worst; 3 currently; 0 at best.  Aggravating Factors: walking  Relieving Factors: ice and rest  Prior Therapy: PT for knee  Functional Deficits Leading to Referral: pain and limitations with functional mobility  Prior functional status: Independent  Occupation:  Retired                      Patient Goals: Get back to moving around and relieve pain.    Objective     Observation: L ankle inverted during standing.      Palpation: NO TTP noted    Range of Motion: Ankle (degrees)   Left Right   Dorsiflexion: 10 WNL   Plantarflexion 30 WNL   Inversion 20 WNL   Eversion 15 WNL     Strength: Ankle    Left Right   Gastrocnemius 3-/5 *pain 4+/5   Soleus 3+/5 *pain 4+/5   Dorsiflexion 5/5 5/5   Inversion 4-/5 5/5   Eversion 4-/5 5/5   EDL trace 5/5     Strength: Knee   Left Right   Quadriceps 4+/5 4+/5   Hamstrings 4+/5 4+/5       Strength: Hip    Left Right   Iliopsoas 4/5 4/5   PGM 4/5 4/5   IR 4/5 4/5   ER 4/5 4/5   Ext 4/5 4/5         Sensation: intact to light touch      Gait: [place] ambulated with none.  Level of Assistance: independent  Patient displays antalgic gait c/ trendelenburg noted and decreased step length when LLE in stance 2/2 to pain.       TREATMENT     PT Evaluation Completed? Yes  Discussed Plan of Care with patient: Yes    Windy received 10 minutes of therapeutic exercise:     Ankle 4-way c/ OTB x 20  Standing heel raises 2 x 10    Windy received 10 minutes of manual therapy:     Kinesiotaping applied to L ankle to provide support  PROM to L ankle      Written Home Exercises Provided: ankle 4 way c/ OTB and standing heel raises (see handout)  Windy demo good understanding of the education provided. Patient demo good return demo of skill of exercises.    ASSESSMENT     History  Co-morbidities and personal factors that may impact the plan of care Examination  Body Structures  and Functions, activity limitations and participation restrictions that may impact the plan of care    Clinical Presentation   Co-morbidities:   HTN        Personal Factors:   no deficits Body Regions:   lower extremities    Body Systems:    gross symmetry  ROM  strength  balance  gait            Participation Restrictions:   walking     Activity limitations:   Mobility  walking               stable and uncomplicated                      low   low  low Decision Making/ Complexity Score:  low     Pt is a 68 year old female that presents to clinic c/ antalgic gait patter.  Pt has a PT diagnosis of L ankle pain and weakness.  During static standing and NWB, pt's L ankle is supinated.  PT applied kinesiotape to provide support.  Pt has a MMT grade of trace for L EDL.  Pt is highly motivated to return to PLOF.  Pt prognosis is Good.  Pt will benefit from skilled outpatient physical therapy to address the above stated deficits, provide pt/family education and to maximize pt's level of independence.     Medical necessity is demonstrated by the following IMPAIRMENTS/PROBLEMS:  1. Decreased ROM/joint mobility   2. Decreased strength  3. Pain/tenderness  4. Functional limitations     Pt's spiritual, cultural and educational needs considered and pt agreeable to plan of care and goals as stated below:     Anticipated Barriers for physical therapy: none    Short Term GOALS: 3 weeks. Pt agrees with goals set.  1. Patient demonstrates independence with HEP.   2. Patient demonstrates independence with Postural Awareness.   3. Patient demonstrates independence with body mechanics.     Long Term GOALS: 6 weeks. Pt agrees with goals set.  1. Patient demonstrates increased ankle ROM to WNL to improve tolerance to functional activities pain free.   2. Patient demonstrates increased strength BLE's to 4+/5 or greater to improve tolerance to functional activities pain free.   3. Patient demonstrates improved overall function per FOTO Ankle  Survey to 41% Limitation or less.    Functional Limitations Reports - G Codes  Category: mobility  Tool: FOTO Ankle Survey   Score: 62% Limitation    TEST SCORE  Modifier  Impairment Limitation Restriction    80/80  CH  0 % impaired, limited or restricted   64-79/80  CI  @ least 1% but less than 20% impaired, limited or restricted   49-63/80  CJ  @ least 20%<40% impaired, limited or restricted   33-48/80  CK  @ least 40%<60% impaired, limited or restricted   17-32/80  CL  @ least 60% <80% impaired, limited or restricted   1-16/80  CM  @ least 80%<100% impaired limited or restricted   0/80  CN  100% impaired, limited or restricted     Current/  CL = 62% Limitation  Goal/ : CK = 41% Limitation    PLAN     Outpatient physical therapy 1 times weekly to include: pt ed, hep, therapeutic exercises, neuromuscular re-education/ balance exercises, joint mobilizations, aquatic therapy and modalities prn. Cont PT for  6 weeks. Pt may be seen by PTA as part of the rehabilitation team.     Therapist: Toñito Jay, PT,DPT  11/12/2018

## 2018-11-21 ENCOUNTER — CLINICAL SUPPORT (OUTPATIENT)
Dept: REHABILITATION | Facility: HOSPITAL | Age: 68
End: 2018-11-21
Attending: PODIATRIST
Payer: MEDICARE

## 2018-11-21 PROCEDURE — 97110 THERAPEUTIC EXERCISES: CPT

## 2018-11-21 NOTE — PROGRESS NOTES
Physical Therapy Daily Treatment Note     Name: Windy Lindo  Clinic Number: [id number]    Therapy Diagnosis: No diagnosis found.  Physician: Karen Thompson DPM    Visit Date: 11/21/2018    Physician Orders: PT eval and treat  Medical Diagnosis: M25.572,G89.29 (ICD-10-CM) - Chronic pain of left ankle  Evaluation Date: 11/12/18  Authorization Period Expiration: 12/31/18  Plan of Care Certification Period: 12/28/18  Visit #/Visits authorized: 2/6    Time In: 1400  Time Out: 1505  Total Billable Time: 60 minutes    Precautions: Standard    Subjective     Pt reports: taping felt good.  Lasted 4 days.  No new c/o.  Denies pain currently.  Relays only has pain with standing extended periods of time  She was compliant with home exercise program.  Response to previous treatment: no adverse reaction  Functional change: none    Pain: 0/10  Location: left ankles     Objective     Windy received therapeutic exercises to develop strength, endurance and ROM for 45 minutes including:    Ankle 4-way c/ OTB x 20  Standing heel raises 2 x 10  Towel crunches L 2x1'  Toe yoga L 2x1'  Inv/ev 2x1'  Rocker board (sitting) PF/DF/IV/EV 2x1'  Step ups on airex L leading 2x10    Windy received the following manual therapy techniques: PROM were applied to the: L ankle for 10 minutes, including:    PROM to L ankle    Kinesiotaping applied to L ankle to provide support    Windy received cold pack for 10 minutes to L ankle.      Home Exercises Provided and Patient Education Provided     Education provided:   - taping; cont HEP regularly    Written Home Exercises Provided: Patient instructed to cont prior HEP.  Exercises were reviewed and Windy was able to demonstrate them prior to the end of the session.  Windy demonstrated good  understanding of the education provided.     See EMR under Patient Instructions for exercises provided prior visit.    Assessment     Pt tolerates new therex without difficulites or c/o increased pain.    Windy is  progressing well towards her goals.   Pt prognosis is Good.     Pt will continue to benefit from skilled outpatient physical therapy to address the deficits listed in the problem list box on initial evaluation, provide pt/family education and to maximize pt's level of independence in the home and community environment.     Pt's spiritual, cultural and educational needs considered and pt agreeable to plan of care and goals.    Anticipated barriers to physical therapy: none    Goals:   Short Term GOALS: 3 weeks. Pt agrees with goals set.  1. Patient demonstrates independence with HEP.   2. Patient demonstrates independence with Postural Awareness.   3. Patient demonstrates independence with body mechanics.      Long Term GOALS: 6 weeks. Pt agrees with goals set.  1. Patient demonstrates increased ankle ROM to WNL to improve tolerance to functional activities pain free.   2. Patient demonstrates increased strength BLE's to 4+/5 or greater to improve tolerance to functional activities pain free.   3. Patient demonstrates improved overall function per FOTO Ankle Survey to 41% Limitation or less.    Plan     Cont POC to progress towards established goals    Suzanna Zamora, PTA

## 2018-11-28 ENCOUNTER — CLINICAL SUPPORT (OUTPATIENT)
Dept: REHABILITATION | Facility: HOSPITAL | Age: 68
End: 2018-11-28
Attending: PODIATRIST
Payer: MEDICARE

## 2018-11-28 DIAGNOSIS — M25.572 CHRONIC PAIN OF LEFT ANKLE: Primary | ICD-10-CM

## 2018-11-28 DIAGNOSIS — G89.29 CHRONIC PAIN OF LEFT ANKLE: Primary | ICD-10-CM

## 2018-11-28 PROCEDURE — 97110 THERAPEUTIC EXERCISES: CPT

## 2018-11-28 PROCEDURE — 97140 MANUAL THERAPY 1/> REGIONS: CPT

## 2018-11-28 PROCEDURE — G8978 MOBILITY CURRENT STATUS: HCPCS | Mod: CK

## 2018-11-28 PROCEDURE — G8979 MOBILITY GOAL STATUS: HCPCS | Mod: CK

## 2018-11-28 NOTE — PROGRESS NOTES
Physical Therapy Daily Note         Name: Windy Lindo  Clinic Number: [id number]  Diagnosis: No diagnosis found.  Physician: Karen Thompson DPM  Treatment Orders: PT Eval and Treat  Past Medical History:   Diagnosis Date    Arthritis     Eye injury 4 yrs    hit od     Current Outpatient Medications   Medication Sig    alendronate (FOSAMAX) 35 MG tablet Take 35 mg by mouth every 7 days.    multivit-min-FA-lycopen-lutein (CENTRUM SILVER) 0.4-300-250 mg-mcg-mcg Tab Take 1 tablet by mouth once daily.    vitamin D (VITAMIN D3) 1000 units Tab Take 1,000 Units by mouth once daily.     No current facility-administered medications for this visit.      Review of patient's allergies indicates:  No Known Allergies    Medical Diagnosis: M25.572,G89.29 (ICD-10-CM) - Chronic pain of left ankle  Evaluation Date: 11/12/18  Authorization Period Expiration: 12/31/18  Plan of Care Certification Period: 12/28/18  Visit #/Visits authorized: 3/6     Time In: 11:35  Time Out: 12:25  Total Billable Time: 30 minutes     Precautions: Standard    Subjective     Pt reports no pain today. She has been doing exercises at home and feels she is improving.   Pain Scale: Windy rates pain at L on a scale of 0-10 to be 0 currently.    Objective     Windy received individual therapeutic exercises to develop strength, endurance, ROM, flexibility, posture and core stabilization for 40 minutes including:    Ankle 4-way c/ OTB x 20  Standing heel raises 2 x 10  Towel crunches L 2x1'  Toe yoga L 2x1'  Inv/ev 2x1'  Rocker board (sitting) PF/DF/IV/EV 2x1'  Step ups on airex L leading 2x10  Balance tandem airex 2xea 30 sec  Calf stretch 2x1 min  palloff press otb      Windy received the following manual therapy techniques: PROM were applied to the: L ankle for 10 minutes including:  PROM to L ankle    Kinesiotaping applied to L ankle to provide support    Home Exercises Provided and Patient Education  Provided      Education provided:   - taping; cont HEP regularly     Written Home Exercises Provided: Patient instructed to cont prior HEP.  Exercises were reviewed and Windy was able to demonstrate them prior to the end of the session.  Windy demonstrated good  understanding of the education provided.      See EMR under Patient Instructions for exercises provided prior visit.    Functional Limitations Reports - G Codes  Category: mobility  Tool: FOTO Ankle Survey   Score: 62% Limitation    Current/  CL = 62% Limitation  Goal/ : CK = 41% Limitation      Assessment     Patient tolerated there-ex without difficulty or pain. Pt demos decreased balance during tandem stance and requires several taps of UE support. Added calf stretching and palloff press this visit as well with good tolerance. This is a 68 y.o. female referred to outpatient physical therapy and presents with a medical diagnosis of L ankle pain and demonstrates limitations as described in the problem list. Pt prognosis is Good. Pt will continue to benefit from skilled outpatient physical therapy to address the deficits listed in the problem list, provide pt/family education and to maximize pt's level of independence in the home and community environment.     Anticipated barriers to physical therapy: none     Goals:   Short Term GOALS: 3 weeks. Pt agrees with goals set.  1. Patient demonstrates independence with HEP.   2. Patient demonstrates independence with Postural Awareness.   3. Patient demonstrates independence with body mechanics.      Long Term GOALS: 6 weeks. Pt agrees with goals set.  1. Patient demonstrates increased ankle ROM to WNL to improve tolerance to functional activities pain free.   2. Patient demonstrates increased strength BLE's to 4+/5 or greater to improve tolerance to functional activities pain free.   3. Patient demonstrates improved overall function per FOTO Ankle Survey to 41% Limitation or less.   Plan     Continue  with established Plan of Care towards PT goals.    Therapist: Laura Desouza, PT, DPT  11/28/2018

## 2018-12-04 ENCOUNTER — CLINICAL SUPPORT (OUTPATIENT)
Dept: REHABILITATION | Facility: HOSPITAL | Age: 68
End: 2018-12-04
Attending: PODIATRIST
Payer: MEDICARE

## 2018-12-04 DIAGNOSIS — G89.29 CHRONIC PAIN OF LEFT ANKLE: Primary | ICD-10-CM

## 2018-12-04 DIAGNOSIS — M25.572 CHRONIC PAIN OF LEFT ANKLE: Primary | ICD-10-CM

## 2018-12-04 PROCEDURE — 97110 THERAPEUTIC EXERCISES: CPT

## 2018-12-04 PROCEDURE — 97140 MANUAL THERAPY 1/> REGIONS: CPT

## 2018-12-04 NOTE — PROGRESS NOTES
Physical Therapy Daily Note         Name: Windy Lindo  Clinic Number: [id number]  Diagnosis:   Encounter Diagnosis   Name Primary?    Chronic pain of left ankle Yes     Physician: Karen Thompson DPM  Treatment Orders: PT Eval and Treat  Past Medical History:   Diagnosis Date    Arthritis     Eye injury 4 yrs    hit od     Current Outpatient Medications   Medication Sig    alendronate (FOSAMAX) 35 MG tablet Take 35 mg by mouth every 7 days.    multivit-min-FA-lycopen-lutein (CENTRUM SILVER) 0.4-300-250 mg-mcg-mcg Tab Take 1 tablet by mouth once daily.    vitamin D (VITAMIN D3) 1000 units Tab Take 1,000 Units by mouth once daily.     No current facility-administered medications for this visit.      Review of patient's allergies indicates:  No Known Allergies    Medical Diagnosis: M25.572,G89.29 (ICD-10-CM) - Chronic pain of left ankle  Evaluation Date: 11/12/18  Authorization Period Expiration: 12/31/18  Plan of Care Certification Period: 12/28/18  Visit #/Visits authorized: 4/6     Time In: 10:54  Time Out: 12:00  Total Billable Time: 66 minutes     Precautions: Standard    Subjective     Pt reports no pain today. She has been doing exercises at home and feels she is improving.   Pain Scale: Windy rates pain at L on a scale of 0-10 to be 0 currently.    Objective     Windy received individual therapeutic exercises to develop strength, endurance, ROM, flexibility, posture and core stabilization for 50 minutes including:    Bike x 5 minutes-    Ankle 4-way c/ GTB x 20-  Standing heel raises 3 x 10-  Towel crunches L 2x1'-  Toe yoga L 2x1'-  Inv/ev 2x1' NP  Rocker board (Standing) PF/DF/IV/EV 2x1'-  Step ups on airex L leading 2x10-  Lateral step ups on airex 2 x 10-  Balance tandem airex 2xea 30 sec-  Calf stretch 2x1 min-  palloff press otb      Windy received the following manual therapy techniques: PROM were applied to the: L ankle for 16 minutes  including:  PROM to L ankle    Kinesiotaping applied to L ankle to provide support    Home Exercises Provided and Patient Education Provided      Education provided:   - taping; cont HEP regularly     Written Home Exercises Provided: Patient instructed to cont prior HEP.  Exercises were reviewed and Windy was able to demonstrate them prior to the end of the session.  Windy demonstrated good  understanding of the education provided.      See EMR under Patient Instructions for exercises provided prior visit.    Functional Limitations Reports - G Codes  Category: mobility  Tool: FOTO Ankle Survey   Score: 62% Limitation (intake)    42% limitation (12/4/18)   Current/  CK = 42% Limitation  Goal/ : CK = 41% Limitation      Assessment     Patient tolerated there-ex without difficulty or pain. Pt still has difficulty c/ tandem stance balance but this has definitely improved.  PT progressed resistance to GTB for ankle 4 way and also provided pt c/ a GTB for HEP. This is a 68 y.o. female referred to outpatient physical therapy and presents with a medical diagnosis of L ankle pain and demonstrates limitations as described in the problem list. Pt prognosis is Good. Pt will continue to benefit from skilled outpatient physical therapy to address the deficits listed in the problem list, provide pt/family education and to maximize pt's level of independence in the home and community environment.     Anticipated barriers to physical therapy: none     Goals:   Short Term GOALS: 3 weeks. Pt agrees with goals set.  1. Patient demonstrates independence with HEP.   2. Patient demonstrates independence with Postural Awareness.   3. Patient demonstrates independence with body mechanics.      Long Term GOALS: 6 weeks. Pt agrees with goals set.  1. Patient demonstrates increased ankle ROM to WNL to improve tolerance to functional activities pain free.   2. Patient demonstrates increased strength BLE's to 4+/5 or greater to improve  tolerance to functional activities pain free.   3. Patient demonstrates improved overall function per FOTO Ankle Survey to 41% Limitation or less.     Plan     Continue with established Plan of Care towards PT goals.    Therapist: Toñito Jay PT, DPT  12/4/2018

## 2018-12-07 ENCOUNTER — OFFICE VISIT (OUTPATIENT)
Dept: PODIATRY | Facility: CLINIC | Age: 68
End: 2018-12-07
Payer: MEDICARE

## 2018-12-07 VITALS — BODY MASS INDEX: 27.32 KG/M2 | HEIGHT: 66 IN | WEIGHT: 170 LBS

## 2018-12-07 DIAGNOSIS — M25.572 CHRONIC PAIN OF LEFT ANKLE: Primary | ICD-10-CM

## 2018-12-07 DIAGNOSIS — M77.50 TENDONITIS OF ANKLE: ICD-10-CM

## 2018-12-07 DIAGNOSIS — G89.29 CHRONIC PAIN OF LEFT ANKLE: Primary | ICD-10-CM

## 2018-12-07 PROCEDURE — 99212 OFFICE O/P EST SF 10 MIN: CPT | Mod: S$PBB,,, | Performed by: PODIATRIST

## 2018-12-07 PROCEDURE — 99999 PR PBB SHADOW E&M-EST. PATIENT-LVL III: CPT | Mod: PBBFAC,,, | Performed by: PODIATRIST

## 2018-12-07 PROCEDURE — 99213 OFFICE O/P EST LOW 20 MIN: CPT | Mod: PBBFAC,PO | Performed by: PODIATRIST

## 2018-12-11 ENCOUNTER — CLINICAL SUPPORT (OUTPATIENT)
Dept: REHABILITATION | Facility: HOSPITAL | Age: 68
End: 2018-12-11
Attending: PODIATRIST
Payer: MEDICARE

## 2018-12-11 DIAGNOSIS — G89.29 CHRONIC PAIN OF LEFT ANKLE: ICD-10-CM

## 2018-12-11 DIAGNOSIS — M25.572 CHRONIC PAIN OF LEFT ANKLE: ICD-10-CM

## 2018-12-11 PROCEDURE — 97110 THERAPEUTIC EXERCISES: CPT

## 2018-12-11 PROCEDURE — 97140 MANUAL THERAPY 1/> REGIONS: CPT

## 2018-12-11 NOTE — PROGRESS NOTES
Physical Therapy Daily Note         Name: Windy Lindo  Clinic Number: [id number]  Diagnosis:   Encounter Diagnosis   Name Primary?    Chronic pain of left ankle      Physician: Karen Thompson DPM  Treatment Orders: PT Eval and Treat  Past Medical History:   Diagnosis Date    Arthritis     Eye injury 4 yrs    hit od     Current Outpatient Medications   Medication Sig    alendronate (FOSAMAX) 35 MG tablet Take 35 mg by mouth every 7 days.    multivit-min-FA-lycopen-lutein (CENTRUM SILVER) 0.4-300-250 mg-mcg-mcg Tab Take 1 tablet by mouth once daily.    vitamin D (VITAMIN D3) 1000 units Tab Take 1,000 Units by mouth once daily.     No current facility-administered medications for this visit.      Review of patient's allergies indicates:  No Known Allergies    Medical Diagnosis: M25.572,G89.29 (ICD-10-CM) - Chronic pain of left ankle  Evaluation Date: 11/12/18  Authorization Period Expiration: 12/31/18  Plan of Care Certification Period: 12/28/18  Visit #/Visits authorized: 5/6     Time In: 11:00  Time Out: 11:57   Total Billable Time: 35 minutes     Precautions: Standard    Subjective     Pt reports that she took the tape off yesterday, because she knew she was coming to therapy.  Pain Scale: Windy rates pain at L on a scale of 0-10 to be 2 currently.    Objective     Windy received individual therapeutic exercises to develop strength, endurance, ROM, flexibility, posture and core stabilization for 41 minutes including:    Bike x 6 minutes    Ankle 4-way c/ GTB x 20  Standing heel raises 3 x 10  Towel crunches L 2x1'  Toe yoga L 2x1'  Inv/ev 2x1' NP  Rocker board (Standing) PF/DF/IV/EV 2x1'  Step ups on airex L leading 2x10  Lateral step ups on airex 2 x 10  Balance tandem airex 2xea 30 sec  Calf stretch 2x1 min  palloff press otb      Windy received the following manual therapy techniques: PROM were applied to the: L ankle for 16 minutes including:  PROM to  L ankle    Kinesiotaping applied to L ankle to provide support    Home Exercises Provided and Patient Education Provided      Education provided:   - taping; cont HEP regularly     Written Home Exercises Provided: Patient instructed to cont prior HEP.  Exercises were reviewed and Windy was able to demonstrate them prior to the end of the session.  Windy demonstrated good  understanding of the education provided.      See EMR under Patient Instructions for exercises provided prior visit.    Functional Limitations Reports - G Codes  Category: mobility  Tool: FOTO Ankle Survey   Score: 62% Limitation (intake)    42% limitation (12/4/18)   Current/  CK = 42% Limitation  Goal/ : CK = 41% Limitation      Assessment     Patient tolerated there-ex without difficulty or pain. Pt has improved tandem balance since last visit. This is a 68 y.o. female referred to outpatient physical therapy and presents with a medical diagnosis of L ankle pain and demonstrates limitations as described in the problem list. Pt prognosis is Good. Pt will continue to benefit from skilled outpatient physical therapy to address the deficits listed in the problem list, provide pt/family education and to maximize pt's level of independence in the home and community environment.     Anticipated barriers to physical therapy: none     Goals:   Short Term GOALS: 3 weeks. Pt agrees with goals set.  1. Patient demonstrates independence with HEP.   2. Patient demonstrates independence with Postural Awareness.   3. Patient demonstrates independence with body mechanics.      Long Term GOALS: 6 weeks. Pt agrees with goals set.  1. Patient demonstrates increased ankle ROM to WNL to improve tolerance to functional activities pain free.   2. Patient demonstrates increased strength BLE's to 4+/5 or greater to improve tolerance to functional activities pain free.   3. Patient demonstrates improved overall function per FOTO Ankle Survey to 41% Limitation or  less.     Plan     Discharge at next session    Therapist: Toñito Jay PT, DPT  12/11/2018

## 2018-12-16 NOTE — PROGRESS NOTES
Subjective:      Patient ID: Windy Lindo is a 68 y.o. female.    Chief Complaint: Foot Pain (left foot (PCP Dr Higginbotham)) and Foot Problem  Windy Lindo is a 68 y.o. female Presents with left anterior ankle pain worse when she stands. States this started 5 days ago when she heard a pop on her left foot. Pain is worse during WB. Rates pain at 8/10 when standing ambulating with cane.     10/26/18: F/u left anterior ankle pain. Nonvascular US ordered prior visit, unremarkable for rupture. Reports pain improved 2/10 to left anterior ankle. Still has some residual pain after long periods of WB. Cannot tolerate CAM boot.     12/7/18: Presents for f/u left anterior ankle pain. Reports pain 80% improved. States PT has helped a lot. Presents today with strapping to left anterior ankle.     Current shoe gear:  SAS    Past Medical History:   Diagnosis Date    Arthritis     Eye injury 4 yrs    hit od       Past Surgical History:   Procedure Laterality Date    APPENDECTOMY      COLONOSCOPY N/A 9/7/2017    Procedure: COLONOSCOPY;  Surgeon: Albino Fenton MD;  Location: [facility] ([address] [place] [address]);  Service: Endoscopy;  Laterality: N/A;    COLONOSCOPY N/A 9/7/2017    Performed by Albino Fenton MD at [facility] (4TH FLR)    ESOPHAGOGASTRODUODENOSCOPY  09/07/2017    ESOPHAGOGASTRODUODENOSCOPY (EGD) N/A 9/7/2017    Performed by Albino Fenton MD at [facility] (4TH FLR)    KNEE SURGERY Right 12/05/2017    TKR    LASIK  7 yrs    ou    REPLACEMENT-KNEE-TOTAL Right 12/5/2017    Performed [facility] Evans [facility] at [facility] OR 2ND FLR    TONSILLECTOMY         Family History   Problem Relation Age of Onset    Hypertension Mother     Glaucoma Mother     Diabetes Mother     Cataracts Mother     Cancer Mother 74        lung    Heart disease Mother 55    Hypertension Father     Heart disease Father         onset early 60;s, Aortic valve replacement ,Rheumatic fever as a child     Diabetes Brother     Cancer Brother 66         mesothelioma    No Known Problems Sister     No Known Problems Maternal Aunt     No Known Problems Maternal Uncle     No Known Problems Paternal Aunt     No Known Problems Paternal Uncle     No Known Problems Maternal Grandmother     No Known Problems Maternal Grandfather     No Known Problems Paternal Grandmother     No Known Problems Paternal Grandfather     Amblyopia Neg Hx     Blindness Neg Hx     Macular degeneration Neg Hx     Retinal detachment Neg Hx     Strabismus Neg Hx     Stroke Neg Hx     Thyroid disease Neg Hx     Melanoma Neg Hx        Social History     Socioeconomic History    Marital status: Single     Spouse name: None    Number of children: None    Years of education: None    Highest education level: None   Social Needs    Financial resource strain: None    Food insecurity - worry: None    Food insecurity - inability: None    Transportation needs - medical: None    Transportation needs - non-medical: None   Occupational History     [place] [employer] [place]   Tobacco Use    Smoking status: Never Smoker    Smokeless tobacco: Never Used   Substance and Sexual Activity    Alcohol use: Yes     Alcohol/week: 0.6 oz     Types: 1 Glasses of wine per week     Comment: glass of wine a night     Drug use: No    Sexual activity: No   Other Topics Concern    Are you pregnant or think you may be? Not Asked    Breast-feeding Not Asked   Social History Narrative    None       Current Outpatient Medications   Medication Sig Dispense Refill    alendronate (FOSAMAX) 35 MG tablet Take 35 mg by mouth every 7 days.      multivit-min-FA-lycopen-lutein (CENTRUM SILVER) 0.4-300-250 mg-mcg-mcg Tab Take 1 tablet by mouth once daily.      vitamin D (VITAMIN D3) 1000 units Tab Take 1,000 Units by mouth once daily.       No current facility-administered medications for this visit.        Review of patient's allergies indicates:  No Known Allergies    Review of Systems   Constitution: Negative for  "chills, fever and weakness.   Cardiovascular: Negative for claudication and leg swelling.   Respiratory: Negative for cough and shortness of breath.    Skin: Negative for color change, dry skin, itching, nail changes and rash.   Musculoskeletal: Positive for arthritis, joint pain and myalgias. Negative for falls, joint swelling and muscle weakness.   Gastrointestinal: Negative for diarrhea, nausea and vomiting.   Neurological: Negative for numbness, paresthesias and tremors.   Psychiatric/Behavioral: Negative for altered mental status and hallucinations.           Objective:       Vitals:    12/07/18 0912   Weight: 77.1 kg (170 lb)   Height: 5' 6" (1.676 m)   PainSc:   2       Physical Exam   Constitutional: She is oriented to person, place, and time. She appears well-developed and well-nourished.  Non-toxic appearance. She does not have a sickly appearance. No distress.   Cardiovascular:   Pulses:       Dorsalis pedis pulses are 2+ on the right side, and 2+ on the left side.        Posterior tibial pulses are 2+ on the right side, and 2+ on the left side.   CFT <3 seconds bilateral.  Pedal hair growth present bilateral.   No varicosities noted bilateral.  No bilateral lower extremity edema.  Toes are warm to touch bilateral.     Pulmonary/Chest: No respiratory distress.   Musculoskeletal: She exhibits no edema or tenderness.        Right ankle: She exhibits normal range of motion and no swelling. No tenderness. No lateral malleolus, no medial malleolus, no AITFL, no CF ligament and no posterior TFL tenderness found. Achilles tendon exhibits no pain, no defect and normal Reynolds's test results.        Left ankle: She exhibits normal range of motion and no swelling. No tenderness. No lateral malleolus, no medial malleolus, no AITFL, no CF ligament and no posterior TFL tenderness found. Achilles tendon exhibits no pain, no defect and normal Reynolds's test results.        Right foot: There is deformity. There is no " bony tenderness.        Left foot: There is deformity. There is no bony tenderness.   Muscle strength 5/5 in all muscle groups bilateral.  No tenderness nor crepitation with ROM of foot/ankle joints bilateral.  No tenderness with palpation of bilateral foot and ankle.  Bilateral pes cavus foot type. Visibly and palpably prominent styloid process of the Lt. 5th metatarsal.  No pain at the site of the Lt. Peroneus brevis insertion.  Bony prominences noted about bilateral dorsal midfoot.  Bilateral hallux abducto valgus with the Rt. >Lt.  Rigid contracture of toes 2-5 bilateral.      Neurological: She is alert and oriented to person, place, and time. She has normal strength and normal reflexes. She displays no atrophy and no tremor. No sensory deficit. She exhibits normal muscle tone.   Light touch intact bilateral.     Skin: Skin is warm, dry and intact. No abrasion, no bruising, no burn, no ecchymosis, no laceration, no lesion, no petechiae and no rash noted. She is not diaphoretic. No cyanosis or erythema. No pallor. Nails show no clubbing.   Pedal skin has normal turgor, temperature, and texture bilateral.  Toenails x 10 appear normotrophic.  Focal hyperkeratosis noted to the plantar and lateral aspect of the Lt. Styloid process.  No intralesional petechiae noted.   Examination of the skin reveals no evidence of significant maceration, rashes, open lesions, suspicious appearing nevi or other concerning lesions.    Psychiatric: Her mood appears not anxious. Her affect is not inappropriate. Her speech is not slurred. She is not combative. She is communicative. She is attentive.   Nursing note reviewed.            Assessment:       Encounter Diagnoses   Name Primary?    Chronic pain of left ankle Yes    Tendonitis of ankle          Plan:       Windy was seen today for foot pain and foot problem.    Diagnoses and all orders for this visit:    Chronic pain of left ankle    Tendonitis of ankle      I counseled the  patient on her conditions, their implications and medical management.    Pain overall improved to left anterior ankle.    Continue with PT.     Discussed conservative treatment with shoes of adequate dimensions, material, and style to alleviate symptoms and delay or prevent surgical intervention.    F/u 6 weeks.     Karen Thompson DPM

## 2018-12-18 ENCOUNTER — CLINICAL SUPPORT (OUTPATIENT)
Dept: REHABILITATION | Facility: HOSPITAL | Age: 68
End: 2018-12-18
Attending: PODIATRIST
Payer: MEDICARE

## 2018-12-18 DIAGNOSIS — M25.572 CHRONIC PAIN OF LEFT ANKLE: ICD-10-CM

## 2018-12-18 DIAGNOSIS — G89.29 CHRONIC PAIN OF LEFT ANKLE: ICD-10-CM

## 2018-12-18 PROCEDURE — 97110 THERAPEUTIC EXERCISES: CPT

## 2018-12-18 PROCEDURE — G8980 MOBILITY D/C STATUS: HCPCS | Mod: CK

## 2018-12-18 PROCEDURE — 97140 MANUAL THERAPY 1/> REGIONS: CPT

## 2018-12-18 PROCEDURE — G8979 MOBILITY GOAL STATUS: HCPCS | Mod: CK

## 2018-12-18 NOTE — PROGRESS NOTES
Physical Therapy Daily Note         Name: Windy Lindo  Clinic Number: [id number]  Diagnosis:   Encounter Diagnosis   Name Primary?    Chronic pain of left ankle      Physician: Karen Thompson DPM  Treatment Orders: PT Eval and Treat  Past Medical History:   Diagnosis Date    Arthritis     Eye injury 4 yrs    hit od     Current Outpatient Medications   Medication Sig    alendronate (FOSAMAX) 35 MG tablet Take 35 mg by mouth every 7 days.    multivit-min-FA-lycopen-lutein (CENTRUM SILVER) 0.4-300-250 mg-mcg-mcg Tab Take 1 tablet by mouth once daily.    vitamin D (VITAMIN D3) 1000 units Tab Take 1,000 Units by mouth once daily.     No current facility-administered medications for this visit.      Review of patient's allergies indicates:  No Known Allergies    Medical Diagnosis: M25.572,G89.29 (ICD-10-CM) - Chronic pain of left ankle  Evaluation Date: 11/12/18  Authorization Period Expiration: 12/31/18  Plan of Care Certification Period: 12/28/18  Visit #/Visits authorized: 6/6     Time In: 11:00  Time Out: 12:00   Total Billable Time: 53 minutes     Precautions: Standard    Subjective     Pt reports that she definitely has felt improvements since beginning therapy.  She is now able to walk more than a mile without symptoms.  Pain Scale: Windy rates pain at L on a scale of 0-10 to be 0 currently.    Objective    Initial  Range of Motion: Ankle (degrees)    Left Right   Dorsiflexion: 10 WNL   Plantarflexion 30 WNL   Inversion 20 WNL   Eversion 15 WNL      Strength: Ankle     Left Right   Gastrocnemius 3-/5 *pain 4+/5   Soleus 3+/5 *pain 4+/5   Dorsiflexion 5/5 5/5   Inversion 4-/5 5/5   Eversion 4-/5 5/5   EDL trace 5/5        Discharge    Range of Motion: Ankle (degrees)    Left Right   Dorsiflexion: 18 WNL   Plantarflexion 40 WNL   Inversion 25 WNL   Eversion 20 WNL      Strength: Ankle     Left Right   Gastrocnemius 4+/5  4+/5   Soleus 4+/5  4+/5    Dorsiflexion 5/5 5/5   Inversion 4+/5 5/5   Eversion 4+/5 5/5   EDL 4/5 5/5              Windy received individual therapeutic exercises to develop strength, endurance, ROM, flexibility, posture and core stabilization for 38 minutes including:    Bike x 6 minutes-    Ankle 4-way c/ GTB x 20  Standing heel raises 3 x 10-  Towel crunches L 2x1'  Toe yoga L 2x1'  Inv/ev 2x1' NP  Rocker board (Standing) PF/DF/IV/EV 2x1'-  Step ups on airex L leading 2x10-  Lateral step ups on airex 2 x 10-  Balance tandem airex 2xea 30 sec  Calf stretch 2x1 min-  palloff press otb      Windy received the following manual therapy techniques: PROM were applied to the: L ankle for 15 minutes including:  PROM to L ankle    Kinesiotaping applied to L ankle to provide support    Home Exercises Provided and Patient Education Provided      Education provided:   - taping; cont HEP regularly     Written Home Exercises Provided: Patient instructed to cont prior HEP.  Exercises were reviewed and Windy was able to demonstrate them prior to the end of the session.  Windy demonstrated good  understanding of the education provided.      See EMR under Patient Instructions for exercises provided prior visit.    Functional Limitations Reports - G Codes  Category: mobility  Tool: FOTO Ankle Survey   Score: 62% Limitation (intake)    42% limitation (12/4/18)   Discharge/  CK = 42% Limitation   Goal/ : CK = 41% Limitation      Assessment     Pt has made significant improvements since initial evaluation.  Pt has met all goals established by PT and is at previous level of function.  Pt has improved LE strength and ankle ROM and is now able to ambulate for over a mile without symptoms.  Pt is now discharged from outpatient PT c/ HEP.  PT encouraged pt to contact PT c/ any questions or concerns.  Anticipated barriers to physical therapy: none     Goals:   Short Term GOALS: 3 weeks. Pt agrees with goals set.  1. Patient demonstrates independence with  HEP.  MET 12/18/18  2. Patient demonstrates independence with Postural Awareness. MET 12/18/18  3. Patient demonstrates independence with body mechanics. MET 12/18/18     Long Term GOALS: 6 weeks. Pt agrees with goals set.  1. Patient demonstrates increased ankle ROM to WNL to improve tolerance to functional activities pain free. MET 12/18/18  2. Patient demonstrates increased strength BLE's to 4+/5 or greater to improve tolerance to functional activities pain free.  MET 12/18/18  3. Patient demonstrates improved overall function per FOTO Ankle Survey to 41% Limitation or less. MET 12/18/18    Plan     Discharge from outpatient PT c/ HEP    Therapist: Toñito Jay, PT, DPT  12/18/2018

## 2019-01-18 ENCOUNTER — OFFICE VISIT (OUTPATIENT)
Dept: PODIATRY | Facility: CLINIC | Age: 69
End: 2019-01-18
Payer: MEDICARE

## 2019-01-18 VITALS — BODY MASS INDEX: 27.32 KG/M2 | WEIGHT: 170 LBS | HEIGHT: 66 IN

## 2019-01-18 DIAGNOSIS — M77.50 TENDONITIS OF ANKLE: Primary | ICD-10-CM

## 2019-01-18 DIAGNOSIS — M20.40 HAMMER TOE, UNSPECIFIED LATERALITY: ICD-10-CM

## 2019-01-18 PROCEDURE — 99999 PR PBB SHADOW E&M-EST. PATIENT-LVL II: CPT | Mod: PBBFAC,,, | Performed by: PODIATRIST

## 2019-01-18 PROCEDURE — 99212 PR OFFICE/OUTPT VISIT, EST, LEVL II, 10-19 MIN: ICD-10-PCS | Mod: S$PBB,,, | Performed by: PODIATRIST

## 2019-01-18 PROCEDURE — 99212 OFFICE O/P EST SF 10 MIN: CPT | Mod: S$PBB,,, | Performed by: PODIATRIST

## 2019-01-18 PROCEDURE — 99999 PR PBB SHADOW E&M-EST. PATIENT-LVL II: ICD-10-PCS | Mod: PBBFAC,,, | Performed by: PODIATRIST

## 2019-01-18 PROCEDURE — 99212 OFFICE O/P EST SF 10 MIN: CPT | Mod: PBBFAC,PO | Performed by: PODIATRIST

## 2019-01-24 NOTE — PROGRESS NOTES
Subjective:      Patient ID: Windy Lindo is a 68 y.o. female.    Chief Complaint: Foot Pain (left ankle pain (PCP Dr Chairez)) and Ankle Pain  Windy Lindo is a 68 y.o. female Presents with left anterior ankle pain worse when she stands. States this started 5 days ago when she heard a pop on her left foot. Pain is worse during WB. Rates pain at 8/10 when standing ambulating with cane.     10/26/18: F/u left anterior ankle pain. Nonvascular US ordered prior visit, unremarkable for rupture. Reports pain improved 2/10 to left anterior ankle. Still has some residual pain after long periods of WB. Cannot tolerate CAM boot.     12/7/18: Presents for f/u left anterior ankle pain. Reports pain 80% improved. States PT has helped a lot. Presents today with strapping to left anterior ankle.     1/18/19: F/u left anterior ankle pain. Reports pain has resolved. States PT helped a great deal, has completed PT.     Current shoe gear:  SAS    Past Medical History:   Diagnosis Date    Arthritis     Eye injury 4 yrs    hit od       Past Surgical History:   Procedure Laterality Date    APPENDECTOMY      COLONOSCOPY N/A 9/7/2017    [facility]formed [facility] at [facility] (4TH FLR)    ESOPHAGOGASTRODUODENOSCOPY  09/07/2017    ESOPHAGOGASTRODUODENOSCOPY (EGD) N/A 9/7/2017    Performed by Alb[facility]enton [facility] at [facility] (4TH FLR)    KNEE SURGERY Right 12/05/2017    TKR    LASIK  7 yrs    ou    REPLACEMENT-KNEE-TOTAL Right 12/5/2017    Performed by Evans Busch MD at [facility] OR 2ND FLR    TONSILLECTOMY         Family History   Problem Relation Age of Onset    Hypertension Mother     Glaucoma Mother     Diabetes Mother     Cataracts Mother     Cancer Mother 74        lung    Heart disease Mother 55    Hypertension Father     Heart disease Father         onset early 60;s, Aortic valve replacement ,Rheumatic fever as a child     Diabetes Brother     Cancer Brother 66        mesothelioma    No Known Problems  Sister     No Known Problems Maternal Aunt     No Known Problems Maternal Uncle     No Known Problems Paternal Aunt     No Known Problems Paternal Uncle     No Known Problems Maternal Grandmother     No Known Problems Maternal Grandfather     No Known Problems Paternal Grandmother     No Known Problems Paternal Grandfather     Amblyopia Neg Hx     Blindness Neg Hx     Macular degeneration Neg Hx     Retinal detachment Neg Hx     Strabismus Neg Hx     Stroke Neg Hx     Thyroid disease Neg Hx     Melanoma Neg Hx        Social History     Socioeconomic History    Marital status: Single     Spouse name: None    Number of children: None    Years of education: None    Highest education level: None   Social Needs    Financial resource strain: None    Food insecurity - worry: None    Food insecurity - inability: None    Transportation needs - medical: None    Transportation needs - non-medical: None   Occupational History     Employer: [employer] [place]   Tobacco Use    Smoking status: Never Smoker    Smokeless tobacco: Never Used   Substance and Sexual Activity    Alcohol use: Yes     Alcohol/week: 0.6 oz     Types: 1 Glasses of wine per week     Comment: glass of wine a night     Drug use: No    Sexual activity: No   Other Topics Concern    Are you pregnant or think you may be? Not Asked    Breast-feeding Not Asked   Social History Narrative    None       Current Outpatient Medications   Medication Sig Dispense Refill    alendronate (FOSAMAX) 35 MG tablet Take 35 mg by mouth every 7 days.      multivit-min-FA-lycopen-lutein (CENTRUM SILVER) 0.4-300-250 mg-mcg-mcg Tab Take 1 tablet by mouth once daily.      vitamin D (VITAMIN D3) 1000 units Tab Take 1,000 Units by mouth once daily.       No current facility-administered medications for this visit.        Review of patient's allergies indicates:  No Known Allergies    Review of Systems   Constitution: Negative for chills, fever and weakness.  "  Cardiovascular: Negative for claudication and leg swelling.   Respiratory: Negative for cough and shortness of breath.    Skin: Negative for color change, dry skin, itching, nail changes and rash.   Musculoskeletal: Positive for arthritis, joint pain and myalgias. Negative for falls, joint swelling and muscle weakness.   Gastrointestinal: Negative for diarrhea, nausea and vomiting.   Neurological: Negative for numbness, paresthesias and tremors.   Psychiatric/Behavioral: Negative for altered mental status and hallucinations.           Objective:       Vitals:    01/18/19 0842   Weight: 77.1 kg (170 lb)   Height: 5' 6" (1.676 m)   PainSc: 0-No pain       Physical Exam   Constitutional: She is oriented to person, place, and time. She appears well-developed and well-nourished.  Non-toxic appearance. She does not have a sickly appearance. No distress.   Cardiovascular:   Pulses:       Dorsalis pedis pulses are 2+ on the right side, and 2+ on the left side.        Posterior tibial pulses are 2+ on the right side, and 2+ on the left side.   CFT <3 seconds bilateral.  Pedal hair growth present bilateral.   No varicosities noted bilateral.  No bilateral lower extremity edema.  Toes are warm to touch bilateral.     Pulmonary/Chest: No respiratory distress.   Musculoskeletal: She exhibits no edema or tenderness.        Right ankle: She exhibits normal range of motion and no swelling. No tenderness. No lateral malleolus, no medial malleolus, no AITFL, no CF ligament and no posterior TFL tenderness found. Achilles tendon exhibits no pain, no defect and normal Reynolds's test results.        Left ankle: She exhibits normal range of motion and no swelling. No tenderness. No lateral malleolus, no medial malleolus, no AITFL, no CF ligament and no posterior TFL tenderness found. Achilles tendon exhibits no pain, no defect and normal Reynolds's test results.        Right foot: There is deformity. There is no bony tenderness.        " Left foot: There is deformity. There is no bony tenderness.   Muscle strength 5/5 in all muscle groups bilateral.  No tenderness nor crepitation with ROM of foot/ankle joints bilateral.  No tenderness with palpation of bilateral foot and ankle.  Bilateral pes cavus foot type. Visibly and palpably prominent styloid process of the Lt. 5th metatarsal.  No pain at the site of the Lt. Peroneus brevis insertion.  Bony prominences noted about bilateral dorsal midfoot.  Bilateral hallux abducto valgus with the Rt. >Lt.  Rigid contracture of toes 2-5 bilateral.      Neurological: She is alert and oriented to person, place, and time. She has normal strength and normal reflexes. She displays no atrophy and no tremor. No sensory deficit. She exhibits normal muscle tone.   Light touch intact bilateral.     Skin: Skin is warm, dry and intact. No abrasion, no bruising, no burn, no ecchymosis, no laceration, no lesion, no petechiae and no rash noted. She is not diaphoretic. No cyanosis or erythema. No pallor. Nails show no clubbing.   Pedal skin has normal turgor, temperature, and texture bilateral.  Toenails x 10 appear normotrophic.  Focal hyperkeratosis noted to the plantar and lateral aspect of the Lt. Styloid process.  No intralesional petechiae noted.   Examination of the skin reveals no evidence of significant maceration, rashes, open lesions, suspicious appearing nevi or other concerning lesions.    Psychiatric: Her mood appears not anxious. Her affect is not inappropriate. Her speech is not slurred. She is not combative. She is communicative. She is attentive.   Nursing note reviewed.            Assessment:       Encounter Diagnoses   Name Primary?    Tendonitis of ankle Yes    Hammer toe, unspecified laterality          Plan:       Windy was seen today for foot pain and ankle pain.    Diagnoses and all orders for this visit:    Tendonitis of ankle    Hammer toe, unspecified laterality      I counseled the patient on her  conditions, their implications and medical management.    Pain resolved left anterior ankle     Discussed conservative treatment with shoes of adequate dimensions, material, and style to alleviate symptoms and delay or prevent surgical intervention.    Conservative treatments for HT deformity discussed include padding, hammertoe crest  Pads, extra-depth shoes; Surgical treatments discussed, including HT correction and generic post-op course. Patient opting to continue with conservative care.     F/u prn     Karen Thompson DPM

## 2019-02-13 ENCOUNTER — OFFICE VISIT (OUTPATIENT)
Dept: OPTOMETRY | Facility: CLINIC | Age: 69
End: 2019-02-13
Payer: MEDICARE

## 2019-02-13 DIAGNOSIS — H04.123 DRY EYE SYNDROME, BILATERAL: ICD-10-CM

## 2019-02-13 DIAGNOSIS — H25.13 NUCLEAR SCLEROSIS, BILATERAL: Primary | ICD-10-CM

## 2019-02-13 PROCEDURE — 99999 PR PBB SHADOW E&M-EST. PATIENT-LVL II: CPT | Mod: PBBFAC,,, | Performed by: OPTOMETRIST

## 2019-02-13 PROCEDURE — 99212 OFFICE O/P EST SF 10 MIN: CPT | Mod: PBBFAC,PO | Performed by: OPTOMETRIST

## 2019-02-13 PROCEDURE — 99999 PR PBB SHADOW E&M-EST. PATIENT-LVL II: ICD-10-PCS | Mod: PBBFAC,,, | Performed by: OPTOMETRIST

## 2019-02-13 PROCEDURE — 92014 PR EYE EXAM, EST PATIENT,COMPREHESV: ICD-10-PCS | Mod: S$PBB,,, | Performed by: OPTOMETRIST

## 2019-02-13 PROCEDURE — 92014 COMPRE OPH EXAM EST PT 1/>: CPT | Mod: S$PBB,,, | Performed by: OPTOMETRIST

## 2019-02-13 NOTE — PROGRESS NOTES
Subjective:       Patient ID: Windy Lindo is a 68 y.o. female      Chief Complaint   Patient presents with    Concerns About Ocular Health     History of Present Illness  Dls: 2/14/18 Dr. Vela     67 y/o female presents today with c/o burning os x 1 week on and off.   Pt states no changes in vision. Pt wears pal''s     No tearing  No itching  No pain  No ha's  No floaters  No flashes    Eye meds   systane OU BID PRN        Assessment/Plan:     1. Nuclear sclerosis, bilateral  Educated pt on presence of cataracts and effects on vision. No surgery at this time. Recheck in one year.    2. Dry eye syndrome, bilateral  Recommend artificial tears (Systane/Refresh/Blink/Thera Tears). 1 drop 4x per day and PRN. Discussed different drop options - AT/PFAT/gel/ointment. Chronicity of disease and treatment discussed.      Follow-up in about 1 year (around 2/13/2020).

## 2019-06-10 ENCOUNTER — HOSPITAL ENCOUNTER (OUTPATIENT)
Dept: RADIOLOGY | Facility: CLINIC | Age: 69
Discharge: HOME | End: 2019-06-10
Attending: FAMILY MEDICINE
Payer: MEDICARE

## 2019-06-10 ENCOUNTER — HOSPITAL ENCOUNTER (OUTPATIENT)
Dept: RADIOLOGY | Facility: HOSPITAL | Age: 69
Discharge: HOME | End: 2019-06-10
Attending: FAMILY MEDICINE
Payer: MEDICARE

## 2019-06-10 DIAGNOSIS — M81.0 OSTEOPOROSIS, UNSPECIFIED OSTEOPOROSIS TYPE, UNSPECIFIED PATHOLOGICAL FRACTURE PRESENCE: ICD-10-CM

## 2019-06-10 DIAGNOSIS — Z12.31 ENCOUNTER FOR SCREENING MAMMOGRAM FOR BREAST CANCER: ICD-10-CM

## 2019-06-10 PROCEDURE — 77063 BREAST TOMOSYNTHESIS BI: CPT | Mod: 26,,, | Performed by: RADIOLOGY

## 2019-06-10 PROCEDURE — 77080 DXA BONE DENSITY AXIAL: CPT | Mod: 26,,, | Performed by: INTERNAL MEDICINE

## 2019-06-10 PROCEDURE — 77063 MAMMO DIGITAL SCREENING BILAT WITH TOMOSYNTHESIS_CAD: ICD-10-PCS | Mod: 26,,, | Performed by: RADIOLOGY

## 2019-06-10 PROCEDURE — 77067 SCR MAMMO BI INCL CAD: CPT | Mod: 26,,, | Performed by: RADIOLOGY

## 2019-06-10 PROCEDURE — 77067 MAMMO DIGITAL SCREENING BILAT WITH TOMOSYNTHESIS_CAD: ICD-10-PCS | Mod: 26,,, | Performed by: RADIOLOGY

## 2019-06-10 PROCEDURE — 77080 DXA BONE DENSITY AXIAL: CPT | Mod: TC

## 2019-06-10 PROCEDURE — 77080 DEXA BONE DENSITY SPINE HIP: ICD-10-PCS | Mod: 26,,, | Performed by: INTERNAL MEDICINE

## 2019-06-10 PROCEDURE — 77067 SCR MAMMO BI INCL CAD: CPT | Mod: TC

## 2019-07-18 ENCOUNTER — PATIENT MESSAGE (OUTPATIENT)
Dept: FAMILY MEDICINE | Facility: CLINIC | Age: 69
End: 2019-07-18

## 2019-07-18 DIAGNOSIS — E78.5 HYPERLIPIDEMIA, UNSPECIFIED HYPERLIPIDEMIA TYPE: Primary | ICD-10-CM

## 2019-08-20 ENCOUNTER — LAB VISIT (OUTPATIENT)
Dept: LAB | Facility: HOSPITAL | Age: 69
End: 2019-08-20
Attending: FAMILY MEDICINE
Payer: MEDICARE

## 2019-08-20 DIAGNOSIS — E78.5 HYPERLIPIDEMIA, UNSPECIFIED HYPERLIPIDEMIA TYPE: ICD-10-CM

## 2019-08-20 LAB
ALBUMIN SERPL BCP-MCNC: 3.7 G/DL (ref 3.5–5.2)
ALP SERPL-CCNC: 78 U/L (ref 55–135)
ALT SERPL W/O P-5'-P-CCNC: 9 U/L (ref 10–44)
ANION GAP SERPL CALC-SCNC: 9 MMOL/L (ref 8–16)
AST SERPL-CCNC: 15 U/L (ref 10–40)
BASOPHILS # BLD AUTO: 0.02 K/UL (ref 0–0.2)
BASOPHILS NFR BLD: 0.3 % (ref 0–1.9)
BILIRUB SERPL-MCNC: 0.5 MG/DL (ref 0.1–1)
BUN SERPL-MCNC: 16 MG/DL (ref 8–23)
CALCIUM SERPL-MCNC: 10.1 MG/DL (ref 8.7–10.5)
CHLORIDE SERPL-SCNC: 106 MMOL/L (ref 95–110)
CHOLEST SERPL-MCNC: 201 MG/DL (ref 120–199)
CHOLEST/HDLC SERPL: 2.5 {RATIO} (ref 2–5)
CO2 SERPL-SCNC: 27 MMOL/L (ref 23–29)
CREAT SERPL-MCNC: 0.7 MG/DL (ref 0.5–1.4)
DIFFERENTIAL METHOD: ABNORMAL
EOSINOPHIL # BLD AUTO: 0.1 K/UL (ref 0–0.5)
EOSINOPHIL NFR BLD: 1.3 % (ref 0–8)
ERYTHROCYTE [DISTWIDTH] IN BLOOD BY AUTOMATED COUNT: 13.2 % (ref 11.5–14.5)
EST. GFR  (AFRICAN AMERICAN): >60 ML/MIN/1.73 M^2
EST. GFR  (NON AFRICAN AMERICAN): >60 ML/MIN/1.73 M^2
GLUCOSE SERPL-MCNC: 87 MG/DL (ref 70–110)
HCT VFR BLD AUTO: 45.2 % (ref 37–48.5)
HDLC SERPL-MCNC: 79 MG/DL (ref 40–75)
HDLC SERPL: 39.3 % (ref 20–50)
HGB BLD-MCNC: 14.4 G/DL (ref 12–16)
IMM GRANULOCYTES # BLD AUTO: 0.02 K/UL (ref 0–0.04)
IMM GRANULOCYTES NFR BLD AUTO: 0.3 % (ref 0–0.5)
LDLC SERPL CALC-MCNC: 106.8 MG/DL (ref 63–159)
LYMPHOCYTES # BLD AUTO: 1.8 K/UL (ref 1–4.8)
LYMPHOCYTES NFR BLD: 28.2 % (ref 18–48)
MCH RBC QN AUTO: 30.6 PG (ref 27–31)
MCHC RBC AUTO-ENTMCNC: 31.9 G/DL (ref 32–36)
MCV RBC AUTO: 96 FL (ref 82–98)
MONOCYTES # BLD AUTO: 0.6 K/UL (ref 0.3–1)
MONOCYTES NFR BLD: 9.5 % (ref 4–15)
NEUTROPHILS # BLD AUTO: 3.7 K/UL (ref 1.8–7.7)
NEUTROPHILS NFR BLD: 60.4 % (ref 38–73)
NONHDLC SERPL-MCNC: 122 MG/DL
NRBC BLD-RTO: 0 /100 WBC
PLATELET # BLD AUTO: 162 K/UL (ref 150–350)
PMV BLD AUTO: 13.8 FL (ref 9.2–12.9)
POTASSIUM SERPL-SCNC: 4.2 MMOL/L (ref 3.5–5.1)
PROT SERPL-MCNC: 6.6 G/DL (ref 6–8.4)
RBC # BLD AUTO: 4.71 M/UL (ref 4–5.4)
SODIUM SERPL-SCNC: 142 MMOL/L (ref 136–145)
TRIGL SERPL-MCNC: 76 MG/DL (ref 30–150)
TSH SERPL DL<=0.005 MIU/L-ACNC: 1.77 UIU/ML (ref 0.4–4)
WBC # BLD AUTO: 6.2 K/UL (ref 3.9–12.7)

## 2019-08-20 PROCEDURE — 80053 COMPREHEN METABOLIC PANEL: CPT

## 2019-08-20 PROCEDURE — 36415 COLL VENOUS BLD VENIPUNCTURE: CPT | Mod: PO

## 2019-08-20 PROCEDURE — 80061 LIPID PANEL: CPT

## 2019-08-20 PROCEDURE — 85025 COMPLETE CBC W/AUTO DIFF WBC: CPT

## 2019-08-20 PROCEDURE — 84443 ASSAY THYROID STIM HORMONE: CPT

## 2019-08-26 ENCOUNTER — OFFICE VISIT (OUTPATIENT)
Dept: FAMILY MEDICINE | Facility: CLINIC | Age: 69
End: 2019-08-26
Payer: MEDICARE

## 2019-08-26 ENCOUNTER — TELEPHONE (OUTPATIENT)
Dept: FAMILY MEDICINE | Facility: CLINIC | Age: 69
End: 2019-08-26

## 2019-08-26 VITALS
HEIGHT: 66 IN | HEART RATE: 73 BPM | SYSTOLIC BLOOD PRESSURE: 124 MMHG | BODY MASS INDEX: 28.74 KG/M2 | OXYGEN SATURATION: 99 % | WEIGHT: 178.81 LBS | DIASTOLIC BLOOD PRESSURE: 78 MMHG | TEMPERATURE: 98 F

## 2019-08-26 DIAGNOSIS — M17.11 PRIMARY OSTEOARTHRITIS OF RIGHT KNEE: ICD-10-CM

## 2019-08-26 DIAGNOSIS — M81.0 OSTEOPOROSIS, UNSPECIFIED OSTEOPOROSIS TYPE, UNSPECIFIED PATHOLOGICAL FRACTURE PRESENCE: ICD-10-CM

## 2019-08-26 DIAGNOSIS — H61.21 EXCESSIVE CERUMEN IN RIGHT EAR CANAL: Primary | ICD-10-CM

## 2019-08-26 DIAGNOSIS — R01.1 HEART MURMUR: ICD-10-CM

## 2019-08-26 PROCEDURE — 69210 EAR CERUMEN REMOVAL: ICD-10-PCS | Mod: S$PBB,,, | Performed by: FAMILY MEDICINE

## 2019-08-26 PROCEDURE — 99214 PR OFFICE/OUTPT VISIT, EST, LEVL IV, 30-39 MIN: ICD-10-PCS | Mod: S$PBB,25,, | Performed by: FAMILY MEDICINE

## 2019-08-26 PROCEDURE — 69210 REMOVE IMPACTED EAR WAX UNI: CPT | Mod: PBBFAC,PO | Performed by: FAMILY MEDICINE

## 2019-08-26 PROCEDURE — 99214 OFFICE O/P EST MOD 30 MIN: CPT | Mod: S$PBB,25,, | Performed by: FAMILY MEDICINE

## 2019-08-26 PROCEDURE — 99999 PR PBB SHADOW E&M-EST. PATIENT-LVL III: CPT | Mod: PBBFAC,,, | Performed by: FAMILY MEDICINE

## 2019-08-26 PROCEDURE — 99213 OFFICE O/P EST LOW 20 MIN: CPT | Mod: PBBFAC,PO | Performed by: FAMILY MEDICINE

## 2019-08-26 PROCEDURE — 99999 PR PBB SHADOW E&M-EST. PATIENT-LVL III: ICD-10-PCS | Mod: PBBFAC,,, | Performed by: FAMILY MEDICINE

## 2019-08-26 RX ORDER — MELOXICAM 7.5 MG/1
7.5 TABLET ORAL DAILY
Qty: 90 TABLET | Refills: 1 | Status: SHIPPED | OUTPATIENT
Start: 2019-08-26 | End: 2019-12-30 | Stop reason: SDUPTHER

## 2019-08-26 RX ORDER — MELOXICAM 7.5 MG/1
7.5 TABLET ORAL DAILY
COMMUNITY
End: 2019-08-26 | Stop reason: SDUPTHER

## 2019-08-26 NOTE — PROGRESS NOTES
Routine Office Visit    Patient Name: Windy Lindo    : 1950  MRN: [id number]    Subjective:  Windy is a 69 y.o. female who presents today for     1. Annual physical  2. Arthritis - Patient states that she takes her medication as needed for her arthritis. She wakes up with finger and joint stiffness. She takes 1/2 meloxicam 7.5 daily for pain. No side effects from medication. Patient recently joined a gym to start exercising regularly.     Answers for HPI/ROS submitted by the patient on 2019   activity change: No  unexpected weight change: No  rhinorrhea: No  trouble swallowing: No  visual disturbance: Yes  chest tightness: No  polyuria: No  difficulty urinating: No  menstrual problem: No  joint swelling: No  arthralgias: Yes  confusion: No  dysphoric mood: No    Review of Systems   Constitutional: Negative for chills and fever.   HENT: Negative for congestion and hearing loss.    Eyes: Negative for blurred vision and discharge.   Respiratory: Negative for cough and wheezing.    Cardiovascular: Negative for chest pain and palpitations.   Gastrointestinal: Negative for abdominal pain, blood in stool, constipation, diarrhea, heartburn, nausea and vomiting.   Genitourinary: Negative for dysuria and hematuria.   Musculoskeletal: Negative for myalgias and neck pain.   Skin: Negative for itching and rash.   Neurological: Positive for weakness. Negative for dizziness and headaches.   Endo/Heme/Allergies: Negative for polydipsia.   Psychiatric/Behavioral: Negative for depression.       Active Problem List  Patient Active Problem List   Diagnosis    Right knee pain    Elevated BP without diagnosis of hypertension    Chest sounds abnormal on percussion or auscultation    Varicose veins of both lower extremities    Primary osteoarthritis of right knee    Weakness    Chronic pain of left ankle    Nuclear sclerosis, bilateral       Past Surgical History  Past Surgical History:   Procedure Laterality Date     APPENDECTOMY      COLONOSCOPY N/A 9/7/2017    Performed by [facility]enton [facility] at [facility] ENDO (4TH FLR)    ESOPHAGOGASTRODUODENOSCOPY  09/07/2017    ESOPHAGOGASTRODUODENOSCOPY (EGD) N/A 9/7/2017    Performed by Albino Fenton MD at [facility] ENDO (4TH FLR)    KNEE SURGERY Right 12/05/2017    TKR    LASIK  7 yrs    ou    REPLACEMENT-KNEE-TOTAL Right 12/5/2017    Performed by Evans Busch MD at [facility] OR 2ND FLR    TONSILLECTOMY         Family History  Family History   Problem Relation Age of Onset    Hypertension Mother     Glaucoma Mother     Diabetes Mother     Cataracts Mother     Cancer Mother 74        lung    Heart disease Mother 55    Hypertension Father     Heart disease Father         onset early 60;s, Aortic valve replacement ,Rheumatic fever as a child     Diabetes Brother     Cancer Brother 66        mesothelioma    No Known Problems Sister     No Known Problems Maternal Aunt     No Known Problems Maternal Uncle     No Known Problems Paternal Aunt     No Known Problems Paternal Uncle     No Known Problems Maternal Grandmother     No Known Problems Maternal Grandfather     No Known Problems Paternal Grandmother     No Known Problems Paternal Grandfather     Amblyopia Neg Hx     Blindness Neg Hx     Macular degeneration Neg Hx     Retinal detachment Neg Hx     Strabismus Neg Hx     Stroke Neg Hx     Thyroid disease Neg Hx     Melanoma Neg Hx        Social History  Social History     Socioeconomic History    Marital status: Single     Spouse name: Not on file    Number of children: Not on file    Years of education: Not on file    Highest education level: Not on file   Occupational History     Employer: [employer]   Social Needs    Financial resource strain: Not hard at all    Food insecurity:     Worry: Never true     Inability: Never true    Transportation needs:     Medical: No     Non-medical: No   Tobacco Use    Smoking status: Never Smoker    Smokeless tobacco: Never  "Used   Substance and Sexual Activity    Alcohol use: Yes     Alcohol/week: 0.6 oz     Types: 1 Glasses of wine per week     Frequency: 4 or more times a week     Drinks per session: 1 or 2     Binge frequency: Never     Comment: glass of wine a night     Drug use: No    Sexual activity: Never   Lifestyle    Physical activity:     Days per week: 4 days     Minutes per session: 150+ min    Stress: Not at all   Relationships    Social connections:     Talks on phone: More than three times a week     Gets together: More than three times a week     Attends [employer] service: Not on file     Active member of club or organization: No     Attends meetings of clubs or organizations: Never     Relationship status: Never    Other Topics Concern    Are you pregnant or think you may be? Not Asked    Breast-feeding Not Asked   Social History Narrative    Not on file       Medications and Allergies  Reviewed and updated.   Current Outpatient Medications   Medication Sig    alendronate (FOSAMAX) 35 MG tablet Take 35 mg by mouth every 7 days.    meloxicam (MOBIC) 7.5 MG tablet Take 1 tablet (7.5 mg total) by mouth once daily. Takes half of one pill daily.    multivit-min-FA-lycopen-lutein (CENTRUM SILVER) 0.4-300-250 mg-mcg-mcg Tab Take 1 tablet by mouth once daily.    vitamin D (VITAMIN D3) 1000 units Tab Take 1,000 Units by mouth once daily.     No current facility-administered medications for this visit.        Physical Exam  /78 (BP Location: Right arm, Patient Position: Sitting, BP Method: Medium (Manual))   Pulse 73   Temp 97.9 °F (36.6 °C) (Oral)   Ht 5' 6" (1.676 m)   Wt 81.1 kg (178 lb 12.7 oz)   SpO2 99%   BMI 28.86 kg/m²   Physical Exam   Constitutional: She is oriented to person, place, and time. She appears well-developed and well-nourished.   HENT:   Head: Normocephalic and atraumatic.   Eyes: Pupils are equal, round, and reactive to light. Conjunctivae and EOM are normal.   Neck: Normal " range of motion. Neck supple.   Cardiovascular: Normal rate, regular rhythm and normal heart sounds. Exam reveals no gallop and no friction rub.   No murmur heard.  Pulmonary/Chest: Breath sounds normal. No respiratory distress.   Abdominal: Soft. Bowel sounds are normal. She exhibits no distension. There is no tenderness.   Musculoskeletal: Normal range of motion.   Lymphadenopathy:     She has no cervical adenopathy.   Neurological: She is alert and oriented to person, place, and time.   Skin: Skin is warm.   Psychiatric: She has a normal mood and affect.         Assessment/Plan:  Windy Lindo is a 69 y.o. female who presents today for :    Problem List Items Addressed This Visit        Orthopedic    Primary osteoarthritis of right knee    Relevant Medications    meloxicam (MOBIC) 7.5 MG tablet  The current medical regimen is effective;  continue present plan and medications.        Other Visit Diagnoses     Excessive cerumen in right ear canal    -  Primary    Relevant Orders    Ear Cerumen Removal (Completed)    Heart murmur        Relevant Orders    Transthoracic echo (TTE) 2D with Color Flow    Osteoporosis, unspecified osteoporosis type, unspecified pathological fracture presence      Noted in chart  Continue exercises and strength training          I addressed all major concerns as it related to health maintenance.  All were ordered and scheduled based on the patients wishes.  Any additional health maintenance will be readdressed at the next physical if declined or deferred by the patient.  I reviewed patient's labs with patient   The patient is asked to make an attempt to improve diet and exercise patterns to aid in medical management of this problem.        Follow up in about 1 year (around 8/26/2020) for chronic conditions follow-up.

## 2019-08-26 NOTE — PROCEDURES
Ear Cerumen Removal  Date/Time: 8/26/2019 1:26 PM  Performed by: Trisha Higginbotham MD  Authorized by: Trisha Higginbotham MD     Consent Done?:  Yes (Verbal)    Local anesthetic:  None  Location details:  Right ear  Procedure type: curette    Cerumen  Removal Results:  Cerumen completely removed  Patient tolerance:  Patient tolerated the procedure well with no immediate complications

## 2019-08-26 NOTE — TELEPHONE ENCOUNTER
----- Message from Trisha Higginbotham MD sent at 8/26/2019  2:19 PM CDT -----  Please contact patient to notify that dexa bone scan and mammogram are in the chart.

## 2019-09-13 DIAGNOSIS — M81.0 OSTEOPOROSIS, UNSPECIFIED OSTEOPOROSIS TYPE, UNSPECIFIED PATHOLOGICAL FRACTURE PRESENCE: ICD-10-CM

## 2019-09-13 RX ORDER — ALENDRONATE SODIUM 35 MG/1
TABLET ORAL
Qty: 12 TABLET | Refills: 0 | Status: SHIPPED | OUTPATIENT
Start: 2019-09-13 | End: 2019-12-04 | Stop reason: SDUPTHER

## 2019-09-23 ENCOUNTER — HOSPITAL ENCOUNTER (OUTPATIENT)
Dept: CARDIOLOGY | Facility: CLINIC | Age: 69
Discharge: HOME | End: 2019-09-23
Attending: FAMILY MEDICINE
Payer: MEDICARE

## 2019-09-23 VITALS
SYSTOLIC BLOOD PRESSURE: 132 MMHG | DIASTOLIC BLOOD PRESSURE: 74 MMHG | HEART RATE: 70 BPM | HEIGHT: 66 IN | BODY MASS INDEX: 28.61 KG/M2 | WEIGHT: 178 LBS

## 2019-09-23 DIAGNOSIS — R01.1 HEART MURMUR: ICD-10-CM

## 2019-09-23 LAB
ASCENDING AORTA: 3.83 CM
BSA FOR ECHO PROCEDURE: 1.94 M2
CV ECHO LV RWT: 0.27 CM
DOP CALC LVOT AREA: 3 CM2
DOP CALC LVOT DIAMETER: 1.95 CM
DOP CALC LVOT PEAK VEL: 1.27 M/S
DOP CALC LVOT STROKE VOLUME: 62.8 CM3
DOP CALCLVOT PEAK VEL VTI: 21.04 CM
E WAVE DECELERATION TIME: 110.58 MSEC
E/A RATIO: 0.74
E/E' RATIO: 8.75 M/S
ECHO LV POSTERIOR WALL: 0.67 CM (ref 0.6–1.1)
FRACTIONAL SHORTENING: 41 % (ref 28–44)
INTERVENTRICULAR SEPTUM: 0.72 CM (ref 0.6–1.1)
IVRT: 0.06 MSEC
LA MAJOR: 5.18 CM
LA MINOR: 5.11 CM
LA WIDTH: 4.19 CM
LEFT ATRIUM SIZE: 4.36 CM
LEFT ATRIUM VOLUME INDEX: 42 ML/M2
LEFT ATRIUM VOLUME: 79.89 CM3
LEFT INTERNAL DIMENSION IN SYSTOLE: 2.89 CM (ref 2.1–4)
LEFT VENTRICLE DIASTOLIC VOLUME INDEX: 58.72 ML/M2
LEFT VENTRICLE DIASTOLIC VOLUME: 111.77 ML
LEFT VENTRICLE MASS INDEX: 57 G/M2
LEFT VENTRICLE SYSTOLIC VOLUME INDEX: 16.7 ML/M2
LEFT VENTRICLE SYSTOLIC VOLUME: 31.85 ML
LEFT VENTRICULAR INTERNAL DIMENSION IN DIASTOLE: 4.88 CM (ref 3.5–6)
LEFT VENTRICULAR MASS: 108.99 G
LV LATERAL E/E' RATIO: 7.78 M/S
LV SEPTAL E/E' RATIO: 10 M/S
MV PEAK A VEL: 0.94 M/S
MV PEAK E VEL: 0.7 M/S
PISA TR MAX VEL: 2.48 M/S
PULM VEIN S/D RATIO: 1.5
PV PEAK D VEL: 0.56 M/S
PV PEAK S VEL: 0.84 M/S
RA MAJOR: 4.19 CM
RA PRESSURE: 3 MMHG
RA WIDTH: 3.89 CM
RIGHT VENTRICULAR END-DIASTOLIC DIMENSION: 3.12 CM
RV TISSUE DOPPLER FREE WALL SYSTOLIC VELOCITY 1 (APICAL 4 CHAMBER VIEW): 13.37 CM/S
SINUS: 2.91 CM
STJ: 2.61 CM
TDI LATERAL: 0.09 M/S
TDI SEPTAL: 0.07 M/S
TDI: 0.08 M/S
TR MAX PG: 25 MMHG
TRICUSPID ANNULAR PLANE SYSTOLIC EXCURSION: 2.03 CM
TV REST PULMONARY ARTERY PRESSURE: 28 MMHG

## 2019-09-23 PROCEDURE — 93306 TRANSTHORACIC ECHO (TTE) COMPLETE (CUPID ONLY): ICD-10-PCS | Mod: 26,S$PBB,, | Performed by: INTERNAL MEDICINE

## 2019-09-23 PROCEDURE — 93306 TTE W/DOPPLER COMPLETE: CPT | Mod: PBBFAC | Performed by: INTERNAL MEDICINE

## 2019-09-26 ENCOUNTER — TELEPHONE (OUTPATIENT)
Dept: FAMILY MEDICINE | Facility: CLINIC | Age: 69
End: 2019-09-26

## 2019-09-26 NOTE — TELEPHONE ENCOUNTER
----- Message from Trisha Higginbotham MD sent at 9/26/2019  2:33 PM CDT -----  Ultrasound of the heart shows normal ejection fraction.   There is some changes in the heart due to age

## 2019-09-26 NOTE — PROGRESS NOTES
Ultrasound of the heart shows normal ejection fraction.   There is some changes in the heart due to age

## 2019-12-04 DIAGNOSIS — M81.0 OSTEOPOROSIS, UNSPECIFIED OSTEOPOROSIS TYPE, UNSPECIFIED PATHOLOGICAL FRACTURE PRESENCE: ICD-10-CM

## 2019-12-04 RX ORDER — ALENDRONATE SODIUM 35 MG/1
TABLET ORAL
Qty: 12 TABLET | Refills: 0 | Status: SHIPPED | OUTPATIENT
Start: 2019-12-04 | End: 2020-02-23

## 2019-12-30 ENCOUNTER — PATIENT MESSAGE (OUTPATIENT)
Dept: FAMILY MEDICINE | Facility: CLINIC | Age: 69
End: 2019-12-30

## 2019-12-30 DIAGNOSIS — M17.11 PRIMARY OSTEOARTHRITIS OF RIGHT KNEE: ICD-10-CM

## 2019-12-31 RX ORDER — MELOXICAM 7.5 MG/1
7.5 TABLET ORAL DAILY
Qty: 90 TABLET | Refills: 1 | Status: SHIPPED | OUTPATIENT
Start: 2019-12-31 | End: 2020-06-23

## 2020-01-14 ENCOUNTER — OFFICE VISIT (OUTPATIENT)
Dept: OPTOMETRY | Facility: CLINIC | Age: 70
End: 2020-01-14
Payer: MEDICARE

## 2020-01-14 DIAGNOSIS — H25.13 NUCLEAR SCLEROSIS, BILATERAL: Primary | ICD-10-CM

## 2020-01-14 DIAGNOSIS — H52.7 REFRACTIVE ERROR: ICD-10-CM

## 2020-01-14 DIAGNOSIS — Z98.890 S/P LASIK (LASER ASSISTED IN SITU KERATOMILEUSIS) OF BOTH EYES: ICD-10-CM

## 2020-01-14 PROCEDURE — 99212 OFFICE O/P EST SF 10 MIN: CPT | Mod: PBBFAC,PO | Performed by: OPTOMETRIST

## 2020-01-14 PROCEDURE — 92015 DETERMINE REFRACTIVE STATE: CPT | Mod: ,,, | Performed by: OPTOMETRIST

## 2020-01-14 PROCEDURE — 92015 PR REFRACTION: ICD-10-PCS | Mod: ,,, | Performed by: OPTOMETRIST

## 2020-01-14 PROCEDURE — 99999 PR PBB SHADOW E&M-EST. PATIENT-LVL II: CPT | Mod: PBBFAC,,, | Performed by: OPTOMETRIST

## 2020-01-14 PROCEDURE — 99999 PR PBB SHADOW E&M-EST. PATIENT-LVL II: ICD-10-PCS | Mod: PBBFAC,,, | Performed by: OPTOMETRIST

## 2020-01-14 PROCEDURE — 92014 PR EYE EXAM, EST PATIENT,COMPREHESV: ICD-10-PCS | Mod: S$PBB,,, | Performed by: OPTOMETRIST

## 2020-01-14 PROCEDURE — 92014 COMPRE OPH EXAM EST PT 1/>: CPT | Mod: S$PBB,,, | Performed by: OPTOMETRIST

## 2020-01-14 NOTE — PROGRESS NOTES
Subjective:       Patient ID: Windy Lindo is a 69 y.o. female      Chief Complaint   Patient presents with    Concerns About Ocular Health     History of Present Illness  Dls: 2/13/19 Dr. Vela     70 y/o female presents today with c/o c/o blurry vision at distance and near od.   Pt wears pal's.    + tearing  No itching  No burning  + pain  No ha's  + floaters  No flashes    Eye meds  systane ou bid       Assessment/Plan:     1. Nuclear sclerosis, bilateral  Educated pt on presence of cataracts and effects on vision. No surgery at this time. Recheck in one year.    2. S/P LASIK (laser assisted in situ keratomileusis) of both eyes  Stable, no signs of ectasia.    3. Refractive error  Educated patient on refractive error and discussed lens options. Dispensed updated spectacle Rx. Educated about adaptation period to new specs.    Eyeglass Final Rx     Eyeglass Final Rx       Sphere Cylinder Axis Add    Right -2.00 +2.00 180 +2.50    Left +0.50 +1.25 110 +2.50    Expiration Date:  1/14/2021    Comments:  ANTIMETROPIA                    Follow up in about 1 year (around 1/14/2021) for Cataract check.

## 2020-02-22 DIAGNOSIS — M81.0 OSTEOPOROSIS, UNSPECIFIED OSTEOPOROSIS TYPE, UNSPECIFIED PATHOLOGICAL FRACTURE PRESENCE: ICD-10-CM

## 2020-02-23 RX ORDER — ALENDRONATE SODIUM 35 MG/1
TABLET ORAL
Qty: 12 TABLET | Refills: 0 | Status: SHIPPED | OUTPATIENT
Start: 2020-02-23 | End: 2020-05-13

## 2020-04-09 ENCOUNTER — PATIENT MESSAGE (OUTPATIENT)
Dept: ADMINISTRATIVE | Facility: HOSPITAL | Age: 70
End: 2020-04-09

## 2020-05-13 DIAGNOSIS — M81.0 OSTEOPOROSIS, UNSPECIFIED OSTEOPOROSIS TYPE, UNSPECIFIED PATHOLOGICAL FRACTURE PRESENCE: ICD-10-CM

## 2020-05-13 RX ORDER — ALENDRONATE SODIUM 35 MG/1
TABLET ORAL
Qty: 12 TABLET | Refills: 0 | Status: SHIPPED | OUTPATIENT
Start: 2020-05-13 | End: 2020-08-03

## 2020-08-11 ENCOUNTER — PATIENT OUTREACH (OUTPATIENT)
Dept: ADMINISTRATIVE | Facility: HOSPITAL | Age: 70
End: 2020-08-11

## 2020-08-11 ENCOUNTER — PATIENT MESSAGE (OUTPATIENT)
Dept: FAMILY MEDICINE | Facility: CLINIC | Age: 70
End: 2020-08-11

## 2020-08-11 DIAGNOSIS — E78.5 HYPERLIPIDEMIA, UNSPECIFIED HYPERLIPIDEMIA TYPE: Primary | ICD-10-CM

## 2020-08-11 DIAGNOSIS — Z12.31 ENCOUNTER FOR SCREENING MAMMOGRAM FOR MALIGNANT NEOPLASM OF BREAST: Primary | ICD-10-CM

## 2020-08-11 RX ORDER — ALENDRONATE SODIUM 35 MG/1
TABLET ORAL
COMMUNITY
Start: 2020-05-13 | End: 2022-08-16

## 2020-08-11 NOTE — PROGRESS NOTES
CLARITA not seen in 12 months - patient declined to schedule at this time. [facility]

## 2020-09-08 ENCOUNTER — PATIENT MESSAGE (OUTPATIENT)
Dept: FAMILY MEDICINE | Facility: CLINIC | Age: 70
End: 2020-09-08

## 2020-09-10 ENCOUNTER — LAB VISIT (OUTPATIENT)
Dept: LAB | Facility: HOSPITAL | Age: 70
End: 2020-09-10
Attending: FAMILY MEDICINE
Payer: MEDICARE

## 2020-09-10 DIAGNOSIS — E78.5 HYPERLIPIDEMIA, UNSPECIFIED HYPERLIPIDEMIA TYPE: ICD-10-CM

## 2020-09-10 LAB
ALBUMIN SERPL BCP-MCNC: 3.7 G/DL (ref 3.5–5.2)
ALP SERPL-CCNC: 83 U/L (ref 55–135)
ALT SERPL W/O P-5'-P-CCNC: 11 U/L (ref 10–44)
ANION GAP SERPL CALC-SCNC: 5 MMOL/L (ref 8–16)
AST SERPL-CCNC: 15 U/L (ref 10–40)
BASOPHILS # BLD AUTO: 0.03 K/UL (ref 0–0.2)
BASOPHILS NFR BLD: 0.5 % (ref 0–1.9)
BILIRUB SERPL-MCNC: 0.7 MG/DL (ref 0.1–1)
BUN SERPL-MCNC: 20 MG/DL (ref 8–23)
CALCIUM SERPL-MCNC: 9.4 MG/DL (ref 8.7–10.5)
CHLORIDE SERPL-SCNC: 108 MMOL/L (ref 95–110)
CHOLEST SERPL-MCNC: 196 MG/DL (ref 120–199)
CHOLEST/HDLC SERPL: 2.5 {RATIO} (ref 2–5)
CO2 SERPL-SCNC: 28 MMOL/L (ref 23–29)
CREAT SERPL-MCNC: 0.7 MG/DL (ref 0.5–1.4)
DIFFERENTIAL METHOD: ABNORMAL
EOSINOPHIL # BLD AUTO: 0.1 K/UL (ref 0–0.5)
EOSINOPHIL NFR BLD: 1 % (ref 0–8)
ERYTHROCYTE [DISTWIDTH] IN BLOOD BY AUTOMATED COUNT: 13.4 % (ref 11.5–14.5)
EST. GFR  (AFRICAN AMERICAN): >60 ML/MIN/1.73 M^2
EST. GFR  (NON AFRICAN AMERICAN): >60 ML/MIN/1.73 M^2
GLUCOSE SERPL-MCNC: 91 MG/DL (ref 70–110)
HCT VFR BLD AUTO: 45.2 % (ref 37–48.5)
HDLC SERPL-MCNC: 80 MG/DL (ref 40–75)
HDLC SERPL: 40.8 % (ref 20–50)
HGB BLD-MCNC: 14 G/DL (ref 12–16)
IMM GRANULOCYTES # BLD AUTO: 0.01 K/UL (ref 0–0.04)
IMM GRANULOCYTES NFR BLD AUTO: 0.2 % (ref 0–0.5)
LDLC SERPL CALC-MCNC: 100.2 MG/DL (ref 63–159)
LYMPHOCYTES # BLD AUTO: 1.6 K/UL (ref 1–4.8)
LYMPHOCYTES NFR BLD: 26.4 % (ref 18–48)
MCH RBC QN AUTO: 31 PG (ref 27–31)
MCHC RBC AUTO-ENTMCNC: 31 G/DL (ref 32–36)
MCV RBC AUTO: 100 FL (ref 82–98)
MONOCYTES # BLD AUTO: 0.7 K/UL (ref 0.3–1)
MONOCYTES NFR BLD: 11.7 % (ref 4–15)
NEUTROPHILS # BLD AUTO: 3.7 K/UL (ref 1.8–7.7)
NEUTROPHILS NFR BLD: 60.2 % (ref 38–73)
NONHDLC SERPL-MCNC: 116 MG/DL
NRBC BLD-RTO: 0 /100 WBC
PLATELET # BLD AUTO: 166 K/UL (ref 150–350)
PMV BLD AUTO: 13.4 FL (ref 9.2–12.9)
POTASSIUM SERPL-SCNC: 4.8 MMOL/L (ref 3.5–5.1)
PROT SERPL-MCNC: 6.5 G/DL (ref 6–8.4)
RBC # BLD AUTO: 4.52 M/UL (ref 4–5.4)
SODIUM SERPL-SCNC: 141 MMOL/L (ref 136–145)
TRIGL SERPL-MCNC: 79 MG/DL (ref 30–150)
TSH SERPL DL<=0.005 MIU/L-ACNC: 1.32 UIU/ML (ref 0.4–4)
WBC # BLD AUTO: 6.13 K/UL (ref 3.9–12.7)

## 2020-09-10 PROCEDURE — 80061 LIPID PANEL: CPT

## 2020-09-10 PROCEDURE — 85025 COMPLETE CBC W/AUTO DIFF WBC: CPT

## 2020-09-10 PROCEDURE — 36415 COLL VENOUS BLD VENIPUNCTURE: CPT | Mod: PO

## 2020-09-10 PROCEDURE — 80053 COMPREHEN METABOLIC PANEL: CPT

## 2020-09-10 PROCEDURE — 84443 ASSAY THYROID STIM HORMONE: CPT

## 2020-10-23 ENCOUNTER — PATIENT MESSAGE (OUTPATIENT)
Dept: FAMILY MEDICINE | Facility: CLINIC | Age: 70
End: 2020-10-23

## 2020-10-23 DIAGNOSIS — M81.0 OSTEOPOROSIS, UNSPECIFIED OSTEOPOROSIS TYPE, UNSPECIFIED PATHOLOGICAL FRACTURE PRESENCE: ICD-10-CM

## 2020-10-23 RX ORDER — ALENDRONATE SODIUM 35 MG/1
TABLET ORAL
Qty: 12 TABLET | Refills: 0 | Status: SHIPPED | OUTPATIENT
Start: 2020-10-23 | End: 2021-01-14

## 2020-10-26 ENCOUNTER — OFFICE VISIT (OUTPATIENT)
Dept: FAMILY MEDICINE | Facility: CLINIC | Age: 70
End: 2020-10-26
Payer: MEDICARE

## 2020-10-26 VITALS
HEART RATE: 85 BPM | DIASTOLIC BLOOD PRESSURE: 76 MMHG | OXYGEN SATURATION: 98 % | HEIGHT: 66 IN | TEMPERATURE: 98 F | BODY MASS INDEX: 29.12 KG/M2 | WEIGHT: 181.19 LBS | SYSTOLIC BLOOD PRESSURE: 130 MMHG

## 2020-10-26 DIAGNOSIS — Z00.00 ANNUAL PHYSICAL EXAM: Primary | ICD-10-CM

## 2020-10-26 DIAGNOSIS — Z23 NEED FOR PROPHYLACTIC VACCINATION AND INOCULATION AGAINST INFLUENZA: ICD-10-CM

## 2020-10-26 DIAGNOSIS — Z23 NEED FOR SHINGLES VACCINE: ICD-10-CM

## 2020-10-26 DIAGNOSIS — E78.5 HYPERLIPIDEMIA, UNSPECIFIED HYPERLIPIDEMIA TYPE: ICD-10-CM

## 2020-10-26 DIAGNOSIS — R01.1 HEART MURMUR: ICD-10-CM

## 2020-10-26 DIAGNOSIS — M17.11 PRIMARY OSTEOARTHRITIS OF RIGHT KNEE: ICD-10-CM

## 2020-10-26 PROCEDURE — 99214 OFFICE O/P EST MOD 30 MIN: CPT | Mod: PBBFAC,PO | Performed by: FAMILY MEDICINE

## 2020-10-26 PROCEDURE — 99397 PER PM REEVAL EST PAT 65+ YR: CPT | Mod: S$PBB,,, | Performed by: FAMILY MEDICINE

## 2020-10-26 PROCEDURE — 99397 PR PREVENTIVE VISIT,EST,65 & OVER: ICD-10-PCS | Mod: S$PBB,,, | Performed by: FAMILY MEDICINE

## 2020-10-26 PROCEDURE — 90750 HZV VACC RECOMBINANT IM: CPT | Mod: PBBFAC,PO

## 2020-10-26 PROCEDURE — G0008 ADMIN INFLUENZA VIRUS VAC: HCPCS | Mod: PBBFAC,PO

## 2020-10-26 PROCEDURE — 99999 PR PBB SHADOW E&M-EST. PATIENT-LVL IV: ICD-10-PCS | Mod: PBBFAC,,, | Performed by: FAMILY MEDICINE

## 2020-10-26 PROCEDURE — 99999 PR PBB SHADOW E&M-EST. PATIENT-LVL IV: CPT | Mod: PBBFAC,,, | Performed by: FAMILY MEDICINE

## 2020-10-26 PROCEDURE — 90694 VACC AIIV4 NO PRSRV 0.5ML IM: CPT | Mod: PBBFAC,PO

## 2020-10-26 RX ORDER — VITAMIN B COMPLEX
1 CAPSULE ORAL DAILY
COMMUNITY

## 2020-10-26 RX ORDER — MELOXICAM 15 MG/1
15 TABLET ORAL DAILY
Qty: 90 TABLET | Refills: 1 | Status: SHIPPED | OUTPATIENT
Start: 2020-10-26 | End: 2021-09-08

## 2020-10-26 NOTE — PROGRESS NOTES
Patient tolerated Shingrix and Flu Shot well. Patient advised to wait 15 minutes. Gave patient VIS information.

## 2020-10-26 NOTE — PROGRESS NOTES
Routine Office Visit    Patient Name: Windy Lindo    : 1950  MRN: [id number]    Subjective:  Windy is a 70 y.o. female who presents today for     1. Annual physical  2. Arthritis - Patient states that she takes her medication as needed for her arthritis. She wakes up with finger and joint stiffness. She has been taking meloxicam daily. She continues to have pain. She would like to try to increase meloxicam. No side effects from medication. Patient recently joined a gym to start exercising regularly.        Review of Systems   Constitutional: Negative for chills and fever.   HENT: Negative for congestion.    Eyes: Negative for blurred vision.   Respiratory: Negative for cough.    Cardiovascular: Negative for chest pain.   Gastrointestinal: Negative for abdominal pain, constipation, diarrhea, heartburn, nausea and vomiting.   Genitourinary: Negative for dysuria.   Musculoskeletal: Negative for myalgias.   Skin: Negative for itching and rash.   Neurological: Negative for dizziness and headaches.   Psychiatric/Behavioral: Negative for depression.       Active Problem List  Patient Active Problem List   Diagnosis    Right knee pain    Elevated BP without diagnosis of hypertension    Chest sounds abnormal on percussion or auscultation    Varicose veins of both lower extremities    Primary osteoarthritis of right knee    Weakness    Chronic pain of left ankle    Nuclear sclerosis, bilateral    Refractive error    S/P LASIK (laser assisted in situ keratomileusis) of both eyes    Hyperlipidemia    Heart murmur       Past Surgical History  Past Surgical History:   Procedure Laterality Date    APPENDECTOMY      COLONOSCOPY N/A 2017    Procedure: COLONOSCOPY;  Surgeon: Albino Fenton MD;  Location: [address]);  Service: Endoscopy;  Laterality: N/A;    ESOPHAGOGASTRODUODENOSCOPY  2017    KNEE SURGERY Right 2017    TKR    LASIK  7 yrs    ou    TONSILLECTOMY         Family  History  Family History   Problem Relation Age of Onset    Hypertension Mother     Glaucoma Mother     Diabetes Mother     Cataracts Mother     Cancer Mother 74        lung    Heart disease Mother 55    Hypertension Father     Heart disease Father         onset early 60;s, Aortic valve replacement ,Rheumatic fever as a child     Diabetes Brother     Cancer Brother 66        mesothelioma    No Known Problems Sister     No Known Problems Maternal Aunt     No Known Problems Maternal Uncle     No Known Problems Paternal Aunt     No Known Problems Paternal Uncle     No Known Problems Maternal Grandmother     No Known Problems Maternal Grandfather     No Known Problems Paternal Grandmother     No Known Problems Paternal Grandfather     Amblyopia Neg Hx     Blindness Neg Hx     Macular degeneration Neg Hx     Retinal detachment Neg Hx     Strabismus Neg Hx     Stroke Neg Hx     Thyroid disease Neg Hx     Melanoma Neg Hx        Social History  Social History     Socioeconomic History    Marital status: Single     Spouse name: Not on file    Number of children: Not on file    Years of education: Not on file    Highest education level: Not on file   Occupational History     Employer: [employer] [place] [employer]   Social Needs    Financial resource strain: Not hard at all    Food insecurity     Worry: Never true     Inability: Never true    Transportation needs     Medical: No     Non-medical: No   Tobacco Use    Smoking status: Never Smoker    Smokeless tobacco: Never Used   Substance and Sexual Activity    Alcohol use: Yes     Alcohol/week: 1.0 standard drinks     Types: 1 Glasses of wine per week     Frequency: 4 or more times a week     Drinks per session: 1 or 2     Binge frequency: Never     Comment: glass of wine a night     Drug use: No    Sexual activity: Never   Lifestyle    Physical activity     Days per week: 4 days     Minutes per session: 150+ min    Stress: Not at all   Relationships    Social  "connections     Talks on phone: More than three times a week     Gets together: More than three times a week     Attends Ho[employer] service: Not on file     Active member of club or organization: No     Attends meetings of clubs or organizations: Never     Relationship status: Never    Other Topics Concern    Are you pregnant or think you may be? Not Asked    Breast-feeding Not Asked   Social History Narrative    Not on file       Medications and Allergies  Reviewed and updated.   Current Outpatient Medications   Medication Sig    alendronate (FOSAMAX) 35 MG tablet TAKE 1 TABLET BY MOUTH 1 TIME A WEEK AS DIRECTED    b complex vitamins capsule Take 1 capsule by mouth once daily.    glucosam/chond/hyalu/CF borate (MOVE FREE JOINT HEALTH ORAL) Take by mouth.    meloxicam (MOBIC) 15 MG tablet Take 1 tablet (15 mg total) by mouth once daily.    multivit-min-FA-lycopen-lutein (CENTRUM SILVER) 0.4-300-250 mg-mcg-mcg Tab Take 1 tablet by mouth once daily.    vitamin D (VITAMIN D3) 1000 units Tab Take 1,000 Units by mouth once daily.    alendronate (FOSAMAX) 35 MG tablet      No current facility-administered medications for this visit.        Physical Exam  /76 (BP Location: Left arm, Patient Position: Sitting, BP Method: Large (Manual))   Pulse 85   Temp 98.2 °F (36.8 °C) (Oral)   Ht 5' 6" (1.676 m)   Wt 82.2 kg (181 lb 3.5 oz)   SpO2 98%   BMI 29.25 kg/m²   Physical Exam  Constitutional:       Appearance: She is well-developed.   HENT:      Head: Normocephalic and atraumatic.   Eyes:      Conjunctiva/sclera: Conjunctivae normal.      Pupils: Pupils are equal, round, and reactive to light.   Neck:      Musculoskeletal: Normal range of motion and neck supple.   Cardiovascular:      Rate and Rhythm: Normal rate and regular rhythm.      Heart sounds: Normal heart sounds. No murmur. No friction rub. No gallop.    Pulmonary:      Effort: No respiratory distress.      Breath sounds: Normal breath " sounds.   Abdominal:      General: Bowel sounds are normal. There is no distension.      Palpations: Abdomen is soft.      Tenderness: There is no abdominal tenderness.   Musculoskeletal: Normal range of motion.   Lymphadenopathy:      Cervical: No cervical adenopathy.   Skin:     General: Skin is warm.   Neurological:      Mental Status: She is alert and oriented to person, place, and time.           Assessment/Plan:  Windy Lindo is a 70 y.o. female who presents today for :    Problem List Items Addressed This Visit        Cardiac/Vascular    Heart murmur    Hyperlipidemia  The current medical regimen is effective;  continue present plan and medications.         ID    RESOLVED: Need for prophylactic vaccination and inoculation against influenza    Relevant Orders    Influenza (FLUAD) - Quadrivalent (Adjuvanted) *Preferred* (65+) (PF) (Completed)    RESOLVED: Need for shingles vaccine    Relevant Orders    (In Office Administered) Zoster Recombinant Vaccine (Completed)       Orthopedic    Primary osteoarthritis of right knee    Relevant Medications    meloxicam (MOBIC) 15 MG tablet  Increase meloxicam from 7.5 to 15mg        Other Visit Diagnoses     Annual physical exam    -  Primary  Health Maintenance       Date Due Completion Date    Shingles Vaccine (1 of 2) 08/17/1999 ---    Influenza Vaccine (1) 08/01/2020 9/18/2019    Mammogram 06/10/2021 6/10/2019    Override on 2/4/2013: Done    DEXA SCAN 06/10/2022 6/10/2019    Colorectal Cancer Screening 09/07/2022 9/7/2017    Override on 1/16/2013: Declined    Lipid Panel 09/10/2025 9/10/2020    Override on 2/4/2013: Done    TETANUS VACCINE 06/15/2028 6/15/2018        I addressed all major concerns as it related to health maintenance.  All were ordered and scheduled based on the patients wishes.  Any additional health maintenance will be readdressed at the next physical if declined or deferred by the patient.            Follow up in about 1 year (around 10/26/2021),  or if symptoms worsen or fail to improve, for yearly exam.

## 2020-11-11 ENCOUNTER — PATIENT MESSAGE (OUTPATIENT)
Dept: FAMILY MEDICINE | Facility: CLINIC | Age: 70
End: 2020-11-11

## 2020-12-28 ENCOUNTER — CLINICAL SUPPORT (OUTPATIENT)
Dept: FAMILY MEDICINE | Facility: CLINIC | Age: 70
End: 2020-12-28
Payer: MEDICARE

## 2020-12-28 DIAGNOSIS — Z23 NEED FOR SHINGLES VACCINE: Primary | ICD-10-CM

## 2020-12-28 PROCEDURE — 90750 HZV VACC RECOMBINANT IM: CPT | Mod: PBBFAC,PO | Performed by: FAMILY MEDICINE

## 2020-12-28 PROCEDURE — 99499 UNLISTED E&M SERVICE: CPT | Mod: S$PBB,,, | Performed by: FAMILY MEDICINE

## 2020-12-28 PROCEDURE — 99499 NO LOS: ICD-10-PCS | Mod: S$PBB,,, | Performed by: FAMILY MEDICINE

## 2020-12-28 NOTE — PROGRESS NOTES
Shingrix vaccination administered. Tolerated well, instructed to wait 15 min for observation. No reaction noted at discharge.

## 2021-01-19 ENCOUNTER — OFFICE VISIT (OUTPATIENT)
Dept: OPTOMETRY | Facility: CLINIC | Age: 71
End: 2021-01-19
Payer: MEDICARE

## 2021-01-19 DIAGNOSIS — H25.13 NUCLEAR SCLEROSIS, BILATERAL: Primary | ICD-10-CM

## 2021-01-19 DIAGNOSIS — H52.7 REFRACTIVE ERROR: ICD-10-CM

## 2021-01-19 DIAGNOSIS — Z98.890 S/P LASIK (LASER ASSISTED IN SITU KERATOMILEUSIS) OF BOTH EYES: ICD-10-CM

## 2021-01-19 PROCEDURE — 99999 PR PBB SHADOW E&M-EST. PATIENT-LVL II: CPT | Mod: PBBFAC,,, | Performed by: OPTOMETRIST

## 2021-01-19 PROCEDURE — 99999 PR PBB SHADOW E&M-EST. PATIENT-LVL II: ICD-10-PCS | Mod: PBBFAC,,, | Performed by: OPTOMETRIST

## 2021-01-19 PROCEDURE — 99212 OFFICE O/P EST SF 10 MIN: CPT | Mod: PBBFAC,PO | Performed by: OPTOMETRIST

## 2021-01-19 PROCEDURE — 92014 PR EYE EXAM, EST PATIENT,COMPREHESV: ICD-10-PCS | Mod: S$PBB,,, | Performed by: OPTOMETRIST

## 2021-01-19 PROCEDURE — 92014 COMPRE OPH EXAM EST PT 1/>: CPT | Mod: S$PBB,,, | Performed by: OPTOMETRIST

## 2021-02-02 ENCOUNTER — HOSPITAL ENCOUNTER (OUTPATIENT)
Dept: RADIOLOGY | Facility: HOSPITAL | Age: 71
Discharge: HOME | End: 2021-02-02
Attending: FAMILY MEDICINE
Payer: MEDICARE

## 2021-02-02 DIAGNOSIS — Z12.31 ENCOUNTER FOR SCREENING MAMMOGRAM FOR MALIGNANT NEOPLASM OF BREAST: ICD-10-CM

## 2021-02-02 PROCEDURE — 77063 BREAST TOMOSYNTHESIS BI: CPT | Mod: 26,,, | Performed by: RADIOLOGY

## 2021-02-02 PROCEDURE — 77067 MAMMO DIGITAL SCREENING BILAT WITH TOMO: ICD-10-PCS | Mod: 26,,, | Performed by: RADIOLOGY

## 2021-02-02 PROCEDURE — 77067 SCR MAMMO BI INCL CAD: CPT | Mod: 26,,, | Performed by: RADIOLOGY

## 2021-02-02 PROCEDURE — 77063 MAMMO DIGITAL SCREENING BILAT WITH TOMO: ICD-10-PCS | Mod: 26,,, | Performed by: RADIOLOGY

## 2021-02-02 PROCEDURE — 77067 SCR MAMMO BI INCL CAD: CPT | Mod: TC,PO

## 2021-02-25 ENCOUNTER — IMMUNIZATION (OUTPATIENT)
Dept: INTERNAL MEDICINE | Facility: CLINIC | Age: 71
End: 2021-02-25
Payer: MEDICARE

## 2021-02-25 DIAGNOSIS — Z23 NEED FOR VACCINATION: Primary | ICD-10-CM

## 2021-02-25 PROCEDURE — 91300 COVID-19, MRNA, LNP-S, PF, 30 MCG/0.3 ML DOSE VACCINE: CPT | Mod: PBBFAC | Performed by: INTERNAL MEDICINE

## 2021-03-11 ENCOUNTER — PATIENT MESSAGE (OUTPATIENT)
Dept: FAMILY MEDICINE | Facility: CLINIC | Age: 71
End: 2021-03-11

## 2021-03-18 ENCOUNTER — IMMUNIZATION (OUTPATIENT)
Dept: INTERNAL MEDICINE | Facility: CLINIC | Age: 71
End: 2021-03-18
Payer: MEDICARE

## 2021-03-18 DIAGNOSIS — Z23 NEED FOR VACCINATION: Primary | ICD-10-CM

## 2021-03-18 PROCEDURE — 91300 COVID-19, MRNA, LNP-S, PF, 30 MCG/0.3 ML DOSE VACCINE: CPT | Mod: PBBFAC

## 2021-03-18 PROCEDURE — 0002A COVID-19, MRNA, LNP-S, PF, 30 MCG/0.3 ML DOSE VACCINE: CPT | Mod: PBBFAC

## 2021-10-01 ENCOUNTER — IMMUNIZATION (OUTPATIENT)
Dept: INTERNAL MEDICINE | Facility: CLINIC | Age: 71
End: 2021-10-01
Payer: MEDICARE

## 2021-10-01 DIAGNOSIS — Z23 NEED FOR VACCINATION: Primary | ICD-10-CM

## 2021-10-01 PROCEDURE — 0003A COVID-19, MRNA, LNP-S, PF, 30 MCG/0.3 ML DOSE VACCINE: CPT | Mod: CV19,PBBFAC | Performed by: INTERNAL MEDICINE

## 2021-10-01 PROCEDURE — 91300 COVID-19, MRNA, LNP-S, PF, 30 MCG/0.3 ML DOSE VACCINE: CPT | Mod: PBBFAC

## 2021-11-04 ENCOUNTER — OFFICE VISIT (OUTPATIENT)
Dept: PODIATRY | Facility: CLINIC | Age: 71
End: 2021-11-04
Payer: MEDICARE

## 2021-11-04 VITALS — HEIGHT: 66 IN | BODY MASS INDEX: 29.12 KG/M2 | WEIGHT: 181.19 LBS

## 2021-11-04 DIAGNOSIS — M79.671 RIGHT FOOT PAIN: ICD-10-CM

## 2021-11-04 DIAGNOSIS — L84 CORN OR CALLUS: Primary | ICD-10-CM

## 2021-11-04 PROCEDURE — 99213 OFFICE O/P EST LOW 20 MIN: CPT | Mod: S$PBB,,, | Performed by: PODIATRIST

## 2021-11-04 PROCEDURE — 99213 OFFICE O/P EST LOW 20 MIN: CPT | Mod: PBBFAC,PO | Performed by: PODIATRIST

## 2021-11-04 PROCEDURE — 99213 PR OFFICE/OUTPT VISIT, EST, LEVL III, 20-29 MIN: ICD-10-PCS | Mod: S$PBB,,, | Performed by: PODIATRIST

## 2021-11-04 PROCEDURE — 99999 PR PBB SHADOW E&M-EST. PATIENT-LVL III: ICD-10-PCS | Mod: PBBFAC,,, | Performed by: PODIATRIST

## 2021-11-04 PROCEDURE — 99999 PR PBB SHADOW E&M-EST. PATIENT-LVL III: CPT | Mod: PBBFAC,,, | Performed by: PODIATRIST

## 2021-11-04 RX ORDER — DOXYCYCLINE 100 MG/1
100 CAPSULE ORAL EVERY 12 HOURS
Qty: 20 CAPSULE | Refills: 0 | Status: SHIPPED | OUTPATIENT
Start: 2021-11-04 | End: 2022-04-04

## 2021-11-16 NOTE — ASSESSMENT & PLAN NOTE
Decreased Air entry Rt mid , lower   Normal basal percussion   Island Pedicle Flap Text: The defect edges were debeveled with a #15 scalpel blade.  Given the location of the defect, shape of the defect and the proximity to free margins an island pedicle advancement flap was deemed most appropriate.  Using a sterile surgical marker, an appropriate advancement flap was drawn incorporating the defect, outlining the appropriate donor tissue and placing the expected incisions within the relaxed skin tension lines where possible.    The area thus outlined was incised deep to adipose tissue with a #15 scalpel blade.  The skin margins were undermined to an appropriate distance in all directions around the primary defect and laterally outward around the island pedicle utilizing iris scissors.  There was minimal undermining beneath the pedicle flap.

## 2021-11-17 ENCOUNTER — OFFICE VISIT (OUTPATIENT)
Dept: PODIATRY | Facility: CLINIC | Age: 71
End: 2021-11-17
Payer: MEDICARE

## 2021-11-17 VITALS — WEIGHT: 181.19 LBS | HEIGHT: 66 IN | BODY MASS INDEX: 29.12 KG/M2

## 2021-11-17 DIAGNOSIS — L84 CORN OR CALLUS: Primary | ICD-10-CM

## 2021-11-17 PROCEDURE — 99213 OFFICE O/P EST LOW 20 MIN: CPT | Mod: PBBFAC,PO | Performed by: PODIATRIST

## 2021-11-17 PROCEDURE — 99212 OFFICE O/P EST SF 10 MIN: CPT | Mod: S$PBB,,, | Performed by: PODIATRIST

## 2021-11-17 PROCEDURE — 99999 PR PBB SHADOW E&M-EST. PATIENT-LVL III: ICD-10-PCS | Mod: PBBFAC,,, | Performed by: PODIATRIST

## 2021-11-17 PROCEDURE — 99212 PR OFFICE/OUTPT VISIT, EST, LEVL II, 10-19 MIN: ICD-10-PCS | Mod: S$PBB,,, | Performed by: PODIATRIST

## 2021-11-17 PROCEDURE — 99999 PR PBB SHADOW E&M-EST. PATIENT-LVL III: CPT | Mod: PBBFAC,,, | Performed by: PODIATRIST

## 2021-12-10 DIAGNOSIS — E78.5 HYPERLIPIDEMIA, UNSPECIFIED HYPERLIPIDEMIA TYPE: Primary | ICD-10-CM

## 2021-12-10 DIAGNOSIS — M81.0 OSTEOPOROSIS, UNSPECIFIED OSTEOPOROSIS TYPE, UNSPECIFIED PATHOLOGICAL FRACTURE PRESENCE: ICD-10-CM

## 2021-12-10 DIAGNOSIS — M17.11 PRIMARY OSTEOARTHRITIS OF RIGHT KNEE: ICD-10-CM

## 2021-12-10 RX ORDER — MELOXICAM 15 MG/1
TABLET ORAL
Qty: 90 TABLET | Refills: 0 | Status: SHIPPED | OUTPATIENT
Start: 2021-12-10 | End: 2022-03-08 | Stop reason: SDUPTHER

## 2021-12-10 RX ORDER — ALENDRONATE SODIUM 35 MG/1
TABLET ORAL
Qty: 12 TABLET | Refills: 0 | Status: SHIPPED | OUTPATIENT
Start: 2021-12-10 | End: 2022-03-08 | Stop reason: SDUPTHER

## 2022-01-07 ENCOUNTER — PATIENT OUTREACH (OUTPATIENT)
Dept: ADMINISTRATIVE | Facility: HOSPITAL | Age: 72
End: 2022-01-07
Payer: MEDICARE

## 2022-01-07 DIAGNOSIS — N95.8 OTHER SPECIFIED MENOPAUSAL AND PERIMENOPAUSAL DISORDERS: Primary | ICD-10-CM

## 2022-01-07 RX ORDER — AMOXICILLIN 500 MG/1
1000 CAPSULE ORAL 2 TIMES DAILY
COMMUNITY
Start: 2021-08-18 | End: 2023-09-10

## 2022-01-07 NOTE — PROGRESS NOTES
Medicare Not Seen in 12 months gap report - patient already scheduled in 03/2022.    Overdue Bone Mineral Density and Fasting Labs - Left message for patient to call our office. Please schedule.

## 2022-01-10 ENCOUNTER — HOSPITAL ENCOUNTER (OUTPATIENT)
Dept: RADIOLOGY | Facility: CLINIC | Age: 72
Discharge: HOME | End: 2022-01-10
Attending: FAMILY MEDICINE
Payer: MEDICARE

## 2022-01-10 DIAGNOSIS — N95.8 OTHER SPECIFIED MENOPAUSAL AND PERIMENOPAUSAL DISORDERS: ICD-10-CM

## 2022-01-10 PROCEDURE — 77080 DXA BONE DENSITY AXIAL: CPT | Mod: 26,,, | Performed by: INTERNAL MEDICINE

## 2022-01-10 PROCEDURE — 77080 DXA BONE DENSITY AXIAL: CPT | Mod: TC,PO

## 2022-01-10 PROCEDURE — 77080 DEXA BONE DENSITY SPINE HIP: ICD-10-PCS | Mod: 26,,, | Performed by: INTERNAL MEDICINE

## 2022-02-16 ENCOUNTER — OFFICE VISIT (OUTPATIENT)
Dept: OPTOMETRY | Facility: CLINIC | Age: 72
End: 2022-02-16
Payer: MEDICARE

## 2022-02-16 DIAGNOSIS — H52.7 REFRACTIVE ERROR: ICD-10-CM

## 2022-02-16 DIAGNOSIS — H25.13 NUCLEAR SCLEROSIS, BILATERAL: Primary | ICD-10-CM

## 2022-02-16 DIAGNOSIS — H04.123 DRY EYE SYNDROME, BILATERAL: ICD-10-CM

## 2022-02-16 DIAGNOSIS — Z98.890 S/P LASIK (LASER ASSISTED IN SITU KERATOMILEUSIS) OF BOTH EYES: ICD-10-CM

## 2022-02-16 PROCEDURE — 92014 PR EYE EXAM, EST PATIENT,COMPREHESV: ICD-10-PCS | Mod: S$GLB,,, | Performed by: OPTOMETRIST

## 2022-02-16 PROCEDURE — 99999 PR PBB SHADOW E&M-EST. PATIENT-LVL III: ICD-10-PCS | Mod: PBBFAC,,, | Performed by: OPTOMETRIST

## 2022-02-16 PROCEDURE — 92015 PR REFRACTION: ICD-10-PCS | Mod: S$GLB,,, | Performed by: OPTOMETRIST

## 2022-02-16 PROCEDURE — 92015 DETERMINE REFRACTIVE STATE: CPT | Mod: S$GLB,,, | Performed by: OPTOMETRIST

## 2022-02-16 PROCEDURE — 99999 PR PBB SHADOW E&M-EST. PATIENT-LVL III: CPT | Mod: PBBFAC,,, | Performed by: OPTOMETRIST

## 2022-02-16 PROCEDURE — 92014 COMPRE OPH EXAM EST PT 1/>: CPT | Mod: S$GLB,,, | Performed by: OPTOMETRIST

## 2022-02-16 NOTE — PROGRESS NOTES
Subjective:       Patient ID: Windy Lindo is a 71 y.o. female      Chief Complaint   Patient presents with    Concerns About Ocular Health     History of Present Illness  DLS: 01/19/2021 Dr. Vela     72 Y/o female is here for ocular health check. Pt with C/o pt states change in vision when playing games on computer pt states she is losing focus and is starting to see double pt also states eyes are getting tired a lot while on computer   Pt wears progressive lens   Pt denies pain and discomfort   Pt see's floaters occasionally     Eye med: Systane complete OU BID         Assessment/Plan:     1. Nuclear sclerosis, bilateral  NVS per pt. .gvplanac    2. S/P LASIK (laser assisted in situ keratomileusis) of both eyes  Stable, no signs of ectasia.    3. Dry eye syndrome, bilateral  AT BID - QID OU. 20/20/20 rule with prolonged near work/digital devices.    4. Refractive error  Educated patient on refractive error and discussed lens options. Dispensed updated spectacle Rx. Educated about adaptation period to new specs.    Eyeglass Final Rx     Eyeglass Final Rx       Sphere Cylinder Axis Add    Right -2.75 +2.00 165 +2.50    Left [place] +1.50 110 +2.50    Expiration Date: 2/17/2023                  Follow up in about 1 year (around 2/16/2023).

## 2022-02-21 ENCOUNTER — LAB VISIT (OUTPATIENT)
Dept: LAB | Facility: HOSPITAL | Age: 72
End: 2022-02-21
Attending: FAMILY MEDICINE
Payer: MEDICARE

## 2022-02-21 DIAGNOSIS — E78.5 HYPERLIPIDEMIA, UNSPECIFIED HYPERLIPIDEMIA TYPE: ICD-10-CM

## 2022-02-21 LAB
ALBUMIN SERPL BCP-MCNC: 3.7 G/DL (ref 3.5–5.2)
ALP SERPL-CCNC: 97 U/L (ref 55–135)
ALT SERPL W/O P-5'-P-CCNC: 12 U/L (ref 10–44)
ANION GAP SERPL CALC-SCNC: 9 MMOL/L (ref 8–16)
AST SERPL-CCNC: 15 U/L (ref 10–40)
BASOPHILS # BLD AUTO: 0.02 K/UL (ref 0–0.2)
BASOPHILS NFR BLD: 0.3 % (ref 0–1.9)
BILIRUB SERPL-MCNC: 0.6 MG/DL (ref 0.1–1)
BUN SERPL-MCNC: 18 MG/DL (ref 8–23)
CALCIUM SERPL-MCNC: 10 MG/DL (ref 8.7–10.5)
CHLORIDE SERPL-SCNC: 103 MMOL/L (ref 95–110)
CHOLEST SERPL-MCNC: 205 MG/DL (ref 120–199)
CHOLEST/HDLC SERPL: 3 {RATIO} (ref 2–5)
CO2 SERPL-SCNC: 27 MMOL/L (ref 23–29)
CREAT SERPL-MCNC: 0.8 MG/DL (ref 0.5–1.4)
DIFFERENTIAL METHOD: ABNORMAL
EOSINOPHIL # BLD AUTO: 0.1 K/UL (ref 0–0.5)
EOSINOPHIL NFR BLD: 1.8 % (ref 0–8)
ERYTHROCYTE [DISTWIDTH] IN BLOOD BY AUTOMATED COUNT: 13.1 % (ref 11.5–14.5)
EST. GFR  (AFRICAN AMERICAN): >60 ML/MIN/1.73 M^2
EST. GFR  (NON AFRICAN AMERICAN): >60 ML/MIN/1.73 M^2
GLUCOSE SERPL-MCNC: 84 MG/DL (ref 70–110)
HCT VFR BLD AUTO: 45.6 % (ref 37–48.5)
HDLC SERPL-MCNC: 68 MG/DL (ref 40–75)
HDLC SERPL: 33.2 % (ref 20–50)
HGB BLD-MCNC: 14.5 G/DL (ref 12–16)
IMM GRANULOCYTES # BLD AUTO: 0.02 K/UL (ref 0–0.04)
IMM GRANULOCYTES NFR BLD AUTO: 0.3 % (ref 0–0.5)
LDLC SERPL CALC-MCNC: 117.8 MG/DL (ref 63–159)
LYMPHOCYTES # BLD AUTO: 1.8 K/UL (ref 1–4.8)
LYMPHOCYTES NFR BLD: 29.6 % (ref 18–48)
MCH RBC QN AUTO: 30.5 PG (ref 27–31)
MCHC RBC AUTO-ENTMCNC: 31.8 G/DL (ref 32–36)
MCV RBC AUTO: 96 FL (ref 82–98)
MONOCYTES # BLD AUTO: 0.7 K/UL (ref 0.3–1)
MONOCYTES NFR BLD: 11.9 % (ref 4–15)
NEUTROPHILS # BLD AUTO: 3.4 K/UL (ref 1.8–7.7)
NEUTROPHILS NFR BLD: 56.1 % (ref 38–73)
NONHDLC SERPL-MCNC: 137 MG/DL
NRBC BLD-RTO: 0 /100 WBC
PLATELET # BLD AUTO: 190 K/UL (ref 150–450)
PMV BLD AUTO: 13.5 FL (ref 9.2–12.9)
POTASSIUM SERPL-SCNC: 4.3 MMOL/L (ref 3.5–5.1)
PROT SERPL-MCNC: 6.7 G/DL (ref 6–8.4)
RBC # BLD AUTO: 4.76 M/UL (ref 4–5.4)
SODIUM SERPL-SCNC: 139 MMOL/L (ref 136–145)
TRIGL SERPL-MCNC: 96 MG/DL (ref 30–150)
TSH SERPL DL<=0.005 MIU/L-ACNC: 1.8 UIU/ML (ref 0.4–4)
WBC # BLD AUTO: 6.04 K/UL (ref 3.9–12.7)

## 2022-02-21 PROCEDURE — 80053 COMPREHEN METABOLIC PANEL: CPT | Performed by: FAMILY MEDICINE

## 2022-02-21 PROCEDURE — 84443 ASSAY THYROID STIM HORMONE: CPT | Performed by: FAMILY MEDICINE

## 2022-02-21 PROCEDURE — 36415 COLL VENOUS BLD VENIPUNCTURE: CPT | Mod: PO | Performed by: FAMILY MEDICINE

## 2022-02-21 PROCEDURE — 80061 LIPID PANEL: CPT | Performed by: FAMILY MEDICINE

## 2022-02-21 PROCEDURE — 85025 COMPLETE CBC W/AUTO DIFF WBC: CPT | Performed by: FAMILY MEDICINE

## 2022-03-08 DIAGNOSIS — M17.11 PRIMARY OSTEOARTHRITIS OF RIGHT KNEE: ICD-10-CM

## 2022-03-08 DIAGNOSIS — M81.0 OSTEOPOROSIS, UNSPECIFIED OSTEOPOROSIS TYPE, UNSPECIFIED PATHOLOGICAL FRACTURE PRESENCE: ICD-10-CM

## 2022-03-08 RX ORDER — MELOXICAM 15 MG/1
15 TABLET ORAL DAILY
Qty: 90 TABLET | Refills: 0 | Status: SHIPPED | OUTPATIENT
Start: 2022-03-08 | End: 2022-08-30

## 2022-03-08 RX ORDER — ALENDRONATE SODIUM 35 MG/1
TABLET ORAL
Qty: 12 TABLET | Refills: 0 | Status: SHIPPED | OUTPATIENT
Start: 2022-03-08 | End: 2022-04-04 | Stop reason: SDUPTHER

## 2022-04-04 ENCOUNTER — OFFICE VISIT (OUTPATIENT)
Dept: FAMILY MEDICINE | Facility: CLINIC | Age: 72
End: 2022-04-04
Payer: MEDICARE

## 2022-04-04 VITALS
HEIGHT: 66 IN | SYSTOLIC BLOOD PRESSURE: 130 MMHG | DIASTOLIC BLOOD PRESSURE: 80 MMHG | OXYGEN SATURATION: 95 % | HEART RATE: 65 BPM | TEMPERATURE: 98 F | BODY MASS INDEX: 27.99 KG/M2 | WEIGHT: 174.19 LBS

## 2022-04-04 DIAGNOSIS — E78.5 HYPERLIPIDEMIA, UNSPECIFIED HYPERLIPIDEMIA TYPE: ICD-10-CM

## 2022-04-04 DIAGNOSIS — Z12.31 ENCOUNTER FOR SCREENING MAMMOGRAM FOR BREAST CANCER: ICD-10-CM

## 2022-04-04 DIAGNOSIS — Z00.00 ANNUAL PHYSICAL EXAM: Primary | ICD-10-CM

## 2022-04-04 PROCEDURE — 99999 PR PBB SHADOW E&M-EST. PATIENT-LVL IV: CPT | Mod: PBBFAC,,, | Performed by: FAMILY MEDICINE

## 2022-04-04 PROCEDURE — 99999 PR PBB SHADOW E&M-EST. PATIENT-LVL IV: ICD-10-PCS | Mod: PBBFAC,,, | Performed by: FAMILY MEDICINE

## 2022-04-04 PROCEDURE — 99397 PR PREVENTIVE VISIT,EST,65 & OVER: ICD-10-PCS | Mod: S$GLB,,, | Performed by: FAMILY MEDICINE

## 2022-04-04 PROCEDURE — 99397 PER PM REEVAL EST PAT 65+ YR: CPT | Mod: S$GLB,,, | Performed by: FAMILY MEDICINE

## 2022-04-04 NOTE — PROGRESS NOTES
"Routine Office Visit    Windy Lindo  1950  [id number]      Subjective     Windy is a 71 y.o. female who presents today for:      1. Annual physical  2. Arthritis - Patient states that she takes her medication as needed for her arthritis. She wakes up with finger and joint stiffness. She also takes tumeric and glucosamine chondroitin.  She has also followed up with podiatry. She has some neuropathy of her feet after walking or standing throughout the day.     Objective     Review of Systems   Constitutional: Negative for chills and fever.   HENT: Negative for congestion.    Eyes: Negative for blurred vision.   Respiratory: Negative for cough.    Cardiovascular: Negative for chest pain.   Gastrointestinal: Negative for abdominal pain, constipation, diarrhea, heartburn, nausea and vomiting.   Genitourinary: Negative for dysuria.   Musculoskeletal: Negative for myalgias.   Skin: Negative for itching and rash.   Neurological: Negative for dizziness and headaches.   Psychiatric/Behavioral: Negative for depression.       /80 (BP Location: Left arm, Patient Position: Sitting, BP Method: Medium (Manual))   Pulse 65   Temp 97.6 °F (36.4 °C) (Oral)   Ht 5' 6" (1.676 m)   Wt 79 kg (174 lb 2.6 oz)   SpO2 95%   BMI 28.11 kg/m²   Physical Exam  Constitutional:       Appearance: She is well-developed.   HENT:      Head: Normocephalic and atraumatic.   Eyes:      Conjunctiva/sclera: Conjunctivae normal.      Pupils: Pupils are equal, round, and reactive to light.   Cardiovascular:      Rate and Rhythm: Normal rate and regular rhythm.      Heart sounds: Normal heart sounds. No murmur heard.    No friction rub. No gallop.   Pulmonary:      Effort: No respiratory distress.      Breath sounds: Normal breath sounds.   Abdominal:      General: Bowel sounds are normal. There is no distension.      Palpations: Abdomen is soft.      Tenderness: There is no abdominal tenderness.   Musculoskeletal:         General: Normal " range of motion.      Cervical back: Normal range of motion and neck supple.   Lymphadenopathy:      Cervical: No cervical adenopathy.   Skin:     General: Skin is warm.   Neurological:      Mental Status: She is alert and oriented to person, place, and time.           Assessment     Problem List Items Addressed This Visit        Cardiac/Vascular    Hyperlipidemia  The patient is asked to make an attempt to improve diet and exercise patterns to aid in medical management of this problem.        Other Visit Diagnoses     Annual physical exam    -  Primary  Health Maintenance       Date Due Completion Date    Influenza Vaccine (1) 09/01/2021 10/26/2020    Mammogram 02/02/2022 2/2/2021    COVID-19 Vaccine (4 - Booster for Pfizer series) 03/01/2022 10/1/2021    Colorectal Cancer Screening 09/07/2022 9/7/2017    DEXA Scan 01/10/2024 1/10/2022    Lipid Panel 02/21/2027 2/21/2022    Override on 2/4/2013: Done    TETANUS VACCINE 06/15/2028 6/15/2018        I addressed all major concerns as it related to health maintenance.  All were ordered and scheduled based on the patients wishes.  Any additional health maintenance will be readdressed at the next physical if declined or deferred by the patient.      Encounter for screening mammogram for breast cancer        Relevant Orders    Mammo Digital Screening Bilat w/ Aldo          Follow up in about 1 year (around 4/4/2023), or if symptoms worsen or fail to improve, for yearly exam.

## 2022-05-04 ENCOUNTER — HOSPITAL ENCOUNTER (OUTPATIENT)
Dept: RADIOLOGY | Facility: HOSPITAL | Age: 72
Discharge: HOME | End: 2022-05-04
Attending: FAMILY MEDICINE
Payer: MEDICARE

## 2022-05-04 DIAGNOSIS — Z12.31 ENCOUNTER FOR SCREENING MAMMOGRAM FOR BREAST CANCER: ICD-10-CM

## 2022-05-04 PROCEDURE — 77063 BREAST TOMOSYNTHESIS BI: CPT | Mod: 26,,, | Performed by: RADIOLOGY

## 2022-05-04 PROCEDURE — 77067 SCR MAMMO BI INCL CAD: CPT | Mod: TC,PO

## 2022-05-04 PROCEDURE — 77067 MAMMO DIGITAL SCREENING BILAT WITH TOMO: ICD-10-PCS | Mod: 26,,, | Performed by: RADIOLOGY

## 2022-05-04 PROCEDURE — 77067 SCR MAMMO BI INCL CAD: CPT | Mod: 26,,, | Performed by: RADIOLOGY

## 2022-05-04 PROCEDURE — 77063 MAMMO DIGITAL SCREENING BILAT WITH TOMO: ICD-10-PCS | Mod: 26,,, | Performed by: RADIOLOGY

## 2022-05-04 PROCEDURE — 77063 BREAST TOMOSYNTHESIS BI: CPT | Mod: TC,PO

## 2022-05-06 ENCOUNTER — OFFICE VISIT (OUTPATIENT)
Dept: OPTOMETRY | Facility: CLINIC | Age: 72
End: 2022-05-06
Payer: MEDICARE

## 2022-05-06 ENCOUNTER — PATIENT MESSAGE (OUTPATIENT)
Dept: OPTOMETRY | Facility: CLINIC | Age: 72
End: 2022-05-06

## 2022-05-06 DIAGNOSIS — H11.32 SUBCONJUNCTIVAL HEMORRHAGE OF LEFT EYE: Primary | ICD-10-CM

## 2022-05-06 PROCEDURE — 92014 PR EYE EXAM, EST PATIENT,COMPREHESV: ICD-10-PCS | Mod: S$GLB,,, | Performed by: OPTOMETRIST

## 2022-05-06 PROCEDURE — 92014 COMPRE OPH EXAM EST PT 1/>: CPT | Mod: S$GLB,,, | Performed by: OPTOMETRIST

## 2022-05-06 PROCEDURE — 99999 PR PBB SHADOW E&M-EST. PATIENT-LVL II: ICD-10-PCS | Mod: PBBFAC,,, | Performed by: OPTOMETRIST

## 2022-05-06 PROCEDURE — 99999 PR PBB SHADOW E&M-EST. PATIENT-LVL II: CPT | Mod: PBBFAC,,, | Performed by: OPTOMETRIST

## 2022-05-06 NOTE — PATIENT INSTRUCTIONS
Subconjunctival Hemorrhage    A subconjunctival hemorrhage is when a blood vessel breaks open in the white of the eye. It causes a bright red patch in the white of the eye. It is similar to a bruise on the skin. This type of hemorrhage is common. It can look quite alarming, but it is usually harmless.    Understanding the conjunctiva  The conjunctiva is the thin layer that covers the inside of the eyelids and the surface of the eye. It has many tiny blood vessels that bring oxygen and nutrients to the eye. The sclera is the white part of the eye that lies beneath the conjunctiva. Sometimes a blood vessel in the conjunctiva breaks open and bleeds. The blood then collects under the conjunctiva and turns part of the eye red. Over several weeks, your body then absorbs the blood.    What causes subconjunctival hemorrhage?  In many cases the cause isnt known. But some health conditions may make it more likely. These include:  Eye injury  Eye surgery  High blood pressure  Inflammation of the conjunctiva  Contact lens use  Diabetes  Arteriosclerosis  Tumor of the conjunctiva  Diseases that affect blood clotting  Violent sneezing, coughing, or vomiting  Certain medicines that can increase bleeding, such as aspirin  Pushing hard during childbirth  Straining during constipation    Symptoms of subconjunctival hemorrhage  The main symptom is a red patch on the eye. You may notice it after waking up in the morning. In most cases just 1 eye will have a hemorrhage. It usually happens once and then goes away. But some health conditions may cause repeat hemorrhages. You may feel like you have something in your eye, but this is not common.    Diagnosing subconjunctival hemorrhage  Your health care provider will ask about your health history. You may have a physical exam. This includes a basic eye exam. Your health care provider will make sure you dont have other causes of red eye that may need other treatment.  You will need to see  an eye doctor if you have had an eye injury. This doctor might use a special lighted microscope to look closely at your eye. This helps show the doctor if the injury hurt the eye itself and not just its outer layer.  If this is not your first subconjunctival hemorrhage, your doctor may need to find the cause. For example, you may need blood tests to check for a blood clotting disorder.  Treatment for subconjunctival hemorrhage  In most cases you will not need treatment. The red patch will usually go away on its own in a few weeks. It will turn from red to brown and then yellow. There are no treatments to speed up this process. Your doctor may suggest you use a warm compress and artificial tears eye drops to help relieve some of the redness.  If your subconjunctival hemorrhage was caused by a health condition, that condition will be treated. For example, you may need a blood pressure medicine to treat high blood pressure.    When to call your health care provider  Call your health care provider right away if you have any of these:  Hemorrhage that doesnt go away in 2 to 3 weeks  Eye pain  Loss of eyesight  Another subconjunctival hemorrhage       © 3061-4757 [employer]. [address] [place] [address], [place] [address]. All rights reserved. This information is not intended as a substitute for professional medical care. Always follow your healthcare professional's instructions.

## 2022-05-06 NOTE — PROGRESS NOTES
Subjective:       Patient ID: Windy Lindo is a 71 y.o. female      Chief Complaint   Patient presents with    Eye Problem     History of Present Illness     Dls 02/16/22 Dr. Vela    Pt states she woke up this morning with red eye OS. Has been rinsing with systane. Watery/itchy OS. No pain. No va changes.          Assessment/Plan:     1. Subconjunctival hemorrhage of left eye  Discussed diagnosis with patient. Educated patient that it can take 2-3 weeks for symptoms to resolve. Artificial tears QID for comfort. Can alternate between warm and cold compresses. RTC if no improvement in symptoms.       Follow up if symptoms worsen or fail to improve.

## 2022-05-10 ENCOUNTER — PES CALL (OUTPATIENT)
Dept: ADMINISTRATIVE | Facility: CLINIC | Age: 72
End: 2022-05-10
Payer: MEDICARE

## 2022-05-12 ENCOUNTER — PES CALL (OUTPATIENT)
Dept: ADMINISTRATIVE | Facility: CLINIC | Age: 72
End: 2022-05-12
Payer: MEDICARE

## 2022-05-13 ENCOUNTER — PES CALL (OUTPATIENT)
Dept: ADMINISTRATIVE | Facility: CLINIC | Age: 72
End: 2022-05-13
Payer: MEDICARE

## 2022-06-06 ENCOUNTER — PES CALL (OUTPATIENT)
Dept: ADMINISTRATIVE | Facility: CLINIC | Age: 72
End: 2022-06-06
Payer: MEDICARE

## 2022-07-05 ENCOUNTER — PES CALL (OUTPATIENT)
Dept: ADMINISTRATIVE | Facility: CLINIC | Age: 72
End: 2022-07-05
Payer: MEDICARE

## 2022-07-19 ENCOUNTER — PES CALL (OUTPATIENT)
Dept: ADMINISTRATIVE | Facility: CLINIC | Age: 72
End: 2022-07-19
Payer: MEDICARE

## 2022-07-22 ENCOUNTER — PES CALL (OUTPATIENT)
Dept: ADMINISTRATIVE | Facility: CLINIC | Age: 72
End: 2022-07-22
Payer: MEDICARE

## 2022-07-23 ENCOUNTER — PES CALL (OUTPATIENT)
Dept: ADMINISTRATIVE | Facility: CLINIC | Age: 72
End: 2022-07-23
Payer: MEDICARE

## 2022-08-16 RX ORDER — ALENDRONATE SODIUM 35 MG/1
TABLET ORAL
Qty: 12 TABLET | Refills: 0 | Status: SHIPPED | OUTPATIENT
Start: 2022-08-16 | End: 2022-11-07

## 2022-09-27 ENCOUNTER — PATIENT MESSAGE (OUTPATIENT)
Dept: FAMILY MEDICINE | Facility: CLINIC | Age: 72
End: 2022-09-27
Payer: MEDICARE

## 2022-09-28 NOTE — TELEPHONE ENCOUNTER
Sounds like Patient wants an injection.   Please schedule an appointment with me for tomorrow at 1:20

## 2022-09-29 ENCOUNTER — OFFICE VISIT (OUTPATIENT)
Dept: FAMILY MEDICINE | Facility: CLINIC | Age: 72
End: 2022-09-29
Payer: MEDICARE

## 2022-09-29 VITALS
BODY MASS INDEX: 28.53 KG/M2 | OXYGEN SATURATION: 97 % | WEIGHT: 177.5 LBS | TEMPERATURE: 98 F | SYSTOLIC BLOOD PRESSURE: 140 MMHG | DIASTOLIC BLOOD PRESSURE: 88 MMHG | HEIGHT: 66 IN | HEART RATE: 75 BPM

## 2022-09-29 DIAGNOSIS — M25.511 PAIN IN JOINT OF RIGHT SHOULDER: ICD-10-CM

## 2022-09-29 DIAGNOSIS — Z23 FLU VACCINE NEED: Primary | ICD-10-CM

## 2022-09-29 DIAGNOSIS — E78.5 HYPERLIPIDEMIA, UNSPECIFIED HYPERLIPIDEMIA TYPE: ICD-10-CM

## 2022-09-29 DIAGNOSIS — M17.11 PRIMARY OSTEOARTHRITIS OF RIGHT KNEE: ICD-10-CM

## 2022-09-29 PROCEDURE — 1125F PR PAIN SEVERITY QUANTIFIED, PAIN PRESENT: ICD-10-PCS | Mod: CPTII,S$GLB,, | Performed by: FAMILY MEDICINE

## 2022-09-29 PROCEDURE — 90694 FLU VACCINE - QUADRIVALENT - ADJUVANTED: ICD-10-PCS | Mod: S$GLB,,, | Performed by: FAMILY MEDICINE

## 2022-09-29 PROCEDURE — G0008 ADMIN INFLUENZA VIRUS VAC: HCPCS | Mod: S$GLB,,, | Performed by: FAMILY MEDICINE

## 2022-09-29 PROCEDURE — 99214 PR OFFICE/OUTPT VISIT, EST, LEVL IV, 30-39 MIN: ICD-10-PCS | Mod: 25,S$GLB,, | Performed by: FAMILY MEDICINE

## 2022-09-29 PROCEDURE — 3008F PR BODY MASS INDEX (BMI) DOCUMENTED: ICD-10-PCS | Mod: CPTII,S$GLB,, | Performed by: FAMILY MEDICINE

## 2022-09-29 PROCEDURE — 1160F RVW MEDS BY RX/DR IN RCRD: CPT | Mod: CPTII,S$GLB,, | Performed by: FAMILY MEDICINE

## 2022-09-29 PROCEDURE — 1159F PR MEDICATION LIST DOCUMENTED IN MEDICAL RECORD: ICD-10-PCS | Mod: CPTII,S$GLB,, | Performed by: FAMILY MEDICINE

## 2022-09-29 PROCEDURE — 3008F BODY MASS INDEX DOCD: CPT | Mod: CPTII,S$GLB,, | Performed by: FAMILY MEDICINE

## 2022-09-29 PROCEDURE — 3288F PR FALLS RISK ASSESSMENT DOCUMENTED: ICD-10-PCS | Mod: CPTII,S$GLB,, | Performed by: FAMILY MEDICINE

## 2022-09-29 PROCEDURE — 1101F PR PT FALLS ASSESS DOC 0-1 FALLS W/OUT INJ PAST YR: ICD-10-PCS | Mod: CPTII,S$GLB,, | Performed by: FAMILY MEDICINE

## 2022-09-29 PROCEDURE — 1159F MED LIST DOCD IN RCRD: CPT | Mod: CPTII,S$GLB,, | Performed by: FAMILY MEDICINE

## 2022-09-29 PROCEDURE — 90694 VACC AIIV4 NO PRSRV 0.5ML IM: CPT | Mod: S$GLB,,, | Performed by: FAMILY MEDICINE

## 2022-09-29 PROCEDURE — 90471 IMMUNIZATION ADMIN: CPT | Mod: S$GLB,,, | Performed by: FAMILY MEDICINE

## 2022-09-29 PROCEDURE — 20605 DRAIN/INJ JOINT/BURSA W/O US: CPT | Mod: RT,S$GLB,, | Performed by: FAMILY MEDICINE

## 2022-09-29 PROCEDURE — 99999 PR PBB SHADOW E&M-EST. PATIENT-LVL IV: ICD-10-PCS | Mod: PBBFAC,,, | Performed by: FAMILY MEDICINE

## 2022-09-29 PROCEDURE — 1160F PR REVIEW ALL MEDS BY PRESCRIBER/CLIN PHARMACIST DOCUMENTED: ICD-10-PCS | Mod: CPTII,S$GLB,, | Performed by: FAMILY MEDICINE

## 2022-09-29 PROCEDURE — 1101F PT FALLS ASSESS-DOCD LE1/YR: CPT | Mod: CPTII,S$GLB,, | Performed by: FAMILY MEDICINE

## 2022-09-29 PROCEDURE — 3288F FALL RISK ASSESSMENT DOCD: CPT | Mod: CPTII,S$GLB,, | Performed by: FAMILY MEDICINE

## 2022-09-29 PROCEDURE — G0008 PR ADMIN INFLUENZA VIRUS VAC: ICD-10-PCS | Mod: S$GLB,,, | Performed by: FAMILY MEDICINE

## 2022-09-29 PROCEDURE — 99999 PR PBB SHADOW E&M-EST. PATIENT-LVL IV: CPT | Mod: PBBFAC,,, | Performed by: FAMILY MEDICINE

## 2022-09-29 PROCEDURE — 90471 PR IMMUNIZ ADMIN,1 SINGLE/COMB VAC/TOXOID: ICD-10-PCS | Mod: S$GLB,,, | Performed by: FAMILY MEDICINE

## 2022-09-29 PROCEDURE — 20605 INTERMEDIATE JOINT ASPIRATION/INJECTION: R ACROMIOCLAVICULAR: ICD-10-PCS | Mod: RT,S$GLB,, | Performed by: FAMILY MEDICINE

## 2022-09-29 PROCEDURE — 1125F AMNT PAIN NOTED PAIN PRSNT: CPT | Mod: CPTII,S$GLB,, | Performed by: FAMILY MEDICINE

## 2022-09-29 PROCEDURE — 99214 OFFICE O/P EST MOD 30 MIN: CPT | Mod: 25,S$GLB,, | Performed by: FAMILY MEDICINE

## 2022-09-29 RX ORDER — NABUMETONE 500 MG/1
500 TABLET, FILM COATED ORAL 2 TIMES DAILY
Qty: 60 TABLET | Refills: 0 | Status: SHIPPED | OUTPATIENT
Start: 2022-09-29 | End: 2023-04-04

## 2022-09-29 RX ORDER — MELOXICAM 15 MG/1
15 TABLET ORAL DAILY
Qty: 90 TABLET | Refills: 0 | Status: SHIPPED | OUTPATIENT
Start: 2022-09-29 | End: 2023-02-22

## 2022-09-29 RX ORDER — TRIAMCINOLONE ACETONIDE 40 MG/ML
80 INJECTION, SUSPENSION INTRA-ARTICULAR; INTRAMUSCULAR
Status: DISCONTINUED | OUTPATIENT
Start: 2022-09-29 | End: 2022-09-29 | Stop reason: HOSPADM

## 2022-09-29 RX ADMIN — TRIAMCINOLONE ACETONIDE 80 MG: 40 INJECTION, SUSPENSION INTRA-ARTICULAR; INTRAMUSCULAR at 01:09

## 2022-09-29 NOTE — PROGRESS NOTES
Patient tolerated injection well, instructed to remain in clinic for 15mins.to monitor for allergic reaction. Verbalized understanding.

## 2022-09-29 NOTE — PROGRESS NOTES
"Routine Office Visit    Windy Lindo  1950  [id number]      Subjective     Windy is a 72 y.o. female who presents today for:    Right shoulder pain - chronic condition for patient. She has noticed some popping sensation. Patient has days when pain is severe and days when it is ok. Patient has been doing home exercises at home. She has been using resistant band exercises. Patient has taken tylenol with relief of symptoms. She feels it may be related to weather. No trauma. No injuries. No lifting.   Arthritis - Patient feels mobic is no longer controlling her flare ups. She has symptoms that periodically change. It causes difficulties with ADLs    Objective     Review of Systems   Constitutional:  Negative for chills and fever.   HENT:  Negative for congestion.    Eyes:  Negative for blurred vision.   Respiratory:  Negative for cough.    Cardiovascular:  Negative for chest pain.   Gastrointestinal:  Negative for abdominal pain, constipation, diarrhea, heartburn, nausea and vomiting.   Genitourinary:  Negative for dysuria.   Musculoskeletal:  Negative for myalgias.   Skin:  Negative for itching and rash.   Neurological:  Negative for dizziness and headaches.   Psychiatric/Behavioral:  Negative for depression.      BP (!) 140/88 (BP Location: Left arm, Patient Position: Sitting, BP Method: Medium (Manual))   Pulse 75   Temp 97.9 °F (36.6 °C) (Oral)   Ht 5' 6" (1.676 m)   Wt 80.5 kg (177 lb 7.5 oz)   SpO2 97%   BMI 28.64 kg/m²   Physical Exam  Constitutional:       Appearance: She is well-developed.   HENT:      Head: Normocephalic and atraumatic.   Eyes:      Conjunctiva/sclera: Conjunctivae normal.      Pupils: Pupils are equal, round, and reactive to light.   Cardiovascular:      Rate and Rhythm: Normal rate and regular rhythm.      Heart sounds: Normal heart sounds. No murmur heard.    No friction rub. No gallop.   Pulmonary:      Effort: No respiratory distress.      Breath sounds: Normal breath sounds. "   Abdominal:      General: Bowel sounds are normal. There is no distension.      Palpations: Abdomen is soft.      Tenderness: There is no abdominal tenderness.   Musculoskeletal:         General: Normal range of motion.      Cervical back: Normal range of motion and neck supple.   Lymphadenopathy:      Cervical: No cervical adenopathy.   Skin:     General: Skin is warm.   Neurological:      Mental Status: She is alert and oriented to person, place, and time.           Assessment     Health Maintenance         Date Due Completion Date    COVID-19 Vaccine (4 - Booster for Pfizer series) 11/26/2021 10/1/2021    Influenza Vaccine (1) 09/01/2022 10/26/2020    Colorectal Cancer Screening 09/07/2022 9/7/2017    Mammogram 05/04/2023 5/4/2022    DEXA Scan 01/10/2024 1/10/2022    Lipid Panel 02/21/2027 2/21/2022    Override on 2/4/2013: Done    TETANUS VACCINE 06/15/2028 6/15/2018              Problem List Items Addressed This Visit          Cardiac/Vascular    Hyperlipidemia    Relevant Orders    Comprehensive Metabolic Panel    CBC Auto Differential    Lipid Panel    TSH  The current medical regimen is effective;  continue present plan and medications.         Orthopedic    Primary osteoarthritis of right knee    Relevant Medications    meloxicam (MOBIC) 15 MG tablet  Advised to use relafen prn severe pain  Common side effects of this medication were discussed with the patient. Questions regarding medications were discussed during this visit.        Other Visit Diagnoses       Flu vaccine need    -  Primary    Relevant Orders    Influenza - Quadrivalent (Adjuvanted) (Completed)    Pain in joint of right shoulder        Relevant Medications    triamcinolone acetonide injection 80 mg    Other Relevant Orders    Intermediate Joint Aspiration/Injection: R acromioclavicular (Completed)                  Follow up in about 6 months (around 3/29/2023), or if symptoms worsen or fail to improve.

## 2022-09-29 NOTE — PROCEDURES
Intermediate Joint Aspiration/Injection: R acromioclavicular    Date/Time: 9/29/2022 1:20 PM  Performed by: Trisha Higginbotham MD  Authorized by: Trisha Higginbotham MD     Consent Done?:  Yes (Verbal)  Indications:  Arthritis and pain  Timeout: Prior to procedure the correct patient, procedure, and site was verified      Location:  Shoulder  Site:  R acromioclavicular  Ultrasonic Guidance for needle placement: No  Needle size:  25 G  Medications:  80 mg triamcinolone acetonide 40 mg/mL  Patient tolerance:  Patient tolerated the procedure well with no immediate complications

## 2022-10-05 ENCOUNTER — TELEPHONE (OUTPATIENT)
Dept: FAMILY MEDICINE | Facility: CLINIC | Age: 72
End: 2022-10-05
Payer: MEDICARE

## 2022-10-28 ENCOUNTER — PES CALL (OUTPATIENT)
Dept: ADMINISTRATIVE | Facility: CLINIC | Age: 72
End: 2022-10-28
Payer: MEDICARE

## 2022-10-30 NOTE — PROGRESS NOTES
"Windy Lindo presents for initial post-operative visit following a right total knee arthroplasty performed by Dr. Busch on 12/5/2017. Tolerating pain medication well.    //  Exam:   Blood pressure 133/78, pulse 73, temperature 97 °F (36.1 °C), height 5' 6" (1.676 m), weight 73.9 kg (162 lb 13 oz).   Ambulating well with assistive device.  Incision is clean and dry without drainage or erythema. ROM:-5-100    Initial post-operative radiographs reviewed today revealing a well fixed and aligned prosthesis.    A/P:  2 weeks s/p right total knee replacement  - The patient was advised to keep the incision clean and dry for the next 24 hours after which she may wash the area with antibacterial soap in the shower. Will not submerge until the incision is completely healed.   - Outpatient PT: ongoing  - Continue aspirin for 1 month from surgery.  - Pain medication refilled  - Follow up in 4 weeks with Dr. Busch. Pt will call clinic with problems/concerns.     " [id number]

## 2022-11-05 ENCOUNTER — HOSPITAL ENCOUNTER (EMERGENCY)
Facility: HOSPITAL | Age: 72
Discharge: HOME | End: 2022-11-05
Attending: EMERGENCY MEDICINE
Payer: MEDICARE

## 2022-11-05 ENCOUNTER — PATIENT MESSAGE (OUTPATIENT)
Dept: FAMILY MEDICINE | Facility: CLINIC | Age: 72
End: 2022-11-05
Payer: MEDICARE

## 2022-11-05 ENCOUNTER — NURSE TRIAGE (OUTPATIENT)
Dept: ADMINISTRATIVE | Facility: CLINIC | Age: 72
End: 2022-11-05
Payer: MEDICARE

## 2022-11-05 VITALS
OXYGEN SATURATION: 98 % | DIASTOLIC BLOOD PRESSURE: 80 MMHG | WEIGHT: 175 LBS | RESPIRATION RATE: 18 BRPM | HEART RATE: 86 BPM | TEMPERATURE: 98 F | HEIGHT: 66 IN | BODY MASS INDEX: 28.12 KG/M2 | SYSTOLIC BLOOD PRESSURE: 182 MMHG

## 2022-11-05 DIAGNOSIS — R42 DIZZINESS: ICD-10-CM

## 2022-11-05 DIAGNOSIS — H81.10 BENIGN PAROXYSMAL POSITIONAL VERTIGO, UNSPECIFIED LATERALITY: Primary | ICD-10-CM

## 2022-11-05 LAB
ALBUMIN SERPL BCP-MCNC: 3.6 G/DL (ref 3.5–5.2)
ALP SERPL-CCNC: 82 U/L (ref 55–135)
ALT SERPL W/O P-5'-P-CCNC: 12 U/L (ref 10–44)
ANION GAP SERPL CALC-SCNC: 11 MMOL/L (ref 8–16)
AST SERPL-CCNC: 15 U/L (ref 10–40)
BASOPHILS # BLD AUTO: 0.01 K/UL (ref 0–0.2)
BASOPHILS NFR BLD: 0.1 % (ref 0–1.9)
BILIRUB SERPL-MCNC: 0.4 MG/DL (ref 0.1–1)
BUN SERPL-MCNC: 18 MG/DL (ref 8–23)
CALCIUM SERPL-MCNC: 9.7 MG/DL (ref 8.7–10.5)
CHLORIDE SERPL-SCNC: 105 MMOL/L (ref 95–110)
CO2 SERPL-SCNC: 22 MMOL/L (ref 23–29)
CREAT SERPL-MCNC: 0.7 MG/DL (ref 0.5–1.4)
DIFFERENTIAL METHOD: NORMAL
EOSINOPHIL # BLD AUTO: 0 K/UL (ref 0–0.5)
EOSINOPHIL NFR BLD: 0.4 % (ref 0–8)
ERYTHROCYTE [DISTWIDTH] IN BLOOD BY AUTOMATED COUNT: 13.9 % (ref 11.5–14.5)
EST. GFR  (NO RACE VARIABLE): >60 ML/MIN/1.73 M^2
GLUCOSE SERPL-MCNC: 104 MG/DL (ref 70–110)
HCT VFR BLD AUTO: 44.7 % (ref 37–48.5)
HCV AB SERPL QL IA: NORMAL
HGB BLD-MCNC: 14.3 G/DL (ref 12–16)
HIV 1+2 AB+HIV1 P24 AG SERPL QL IA: NORMAL
IMM GRANULOCYTES # BLD AUTO: 0.03 K/UL (ref 0–0.04)
IMM GRANULOCYTES NFR BLD AUTO: 0.4 % (ref 0–0.5)
LYMPHOCYTES # BLD AUTO: 1.6 K/UL (ref 1–4.8)
LYMPHOCYTES NFR BLD: 19.8 % (ref 18–48)
MAGNESIUM SERPL-MCNC: 2 MG/DL (ref 1.6–2.6)
MCH RBC QN AUTO: 30.7 PG (ref 27–31)
MCHC RBC AUTO-ENTMCNC: 32 G/DL (ref 32–36)
MCV RBC AUTO: 96 FL (ref 82–98)
MONOCYTES # BLD AUTO: 0.7 K/UL (ref 0.3–1)
MONOCYTES NFR BLD: 9.2 % (ref 4–15)
NEUTROPHILS # BLD AUTO: 5.5 K/UL (ref 1.8–7.7)
NEUTROPHILS NFR BLD: 70.1 % (ref 38–73)
NRBC BLD-RTO: 0 /100 WBC
PLATELET # BLD AUTO: 204 K/UL (ref 150–450)
PMV BLD AUTO: 12.6 FL (ref 9.2–12.9)
POTASSIUM SERPL-SCNC: 4.1 MMOL/L (ref 3.5–5.1)
PROT SERPL-MCNC: 6.8 G/DL (ref 6–8.4)
RBC # BLD AUTO: 4.66 M/UL (ref 4–5.4)
SODIUM SERPL-SCNC: 138 MMOL/L (ref 136–145)
TSH SERPL DL<=0.005 MIU/L-ACNC: 1.52 UIU/ML (ref 0.4–4)
WBC # BLD AUTO: 7.91 K/UL (ref 3.9–12.7)

## 2022-11-05 PROCEDURE — 93005 ELECTROCARDIOGRAM TRACING: CPT

## 2022-11-05 PROCEDURE — 85025 COMPLETE CBC W/AUTO DIFF WBC: CPT | Performed by: PHYSICIAN ASSISTANT

## 2022-11-05 PROCEDURE — 25000003 PHARM REV CODE 250: Performed by: PHYSICIAN ASSISTANT

## 2022-11-05 PROCEDURE — 63600175 PHARM REV CODE 636 W HCPCS: Performed by: PHYSICIAN ASSISTANT

## 2022-11-05 PROCEDURE — 93010 ELECTROCARDIOGRAM REPORT: CPT | Mod: ,,, | Performed by: INTERNAL MEDICINE

## 2022-11-05 PROCEDURE — 96361 HYDRATE IV INFUSION ADD-ON: CPT

## 2022-11-05 PROCEDURE — 84443 ASSAY THYROID STIM HORMONE: CPT | Performed by: PHYSICIAN ASSISTANT

## 2022-11-05 PROCEDURE — 93010 EKG 12-LEAD: ICD-10-PCS | Mod: ,,, | Performed by: INTERNAL MEDICINE

## 2022-11-05 PROCEDURE — 86803 HEPATITIS C AB TEST: CPT | Performed by: PHYSICIAN ASSISTANT

## 2022-11-05 PROCEDURE — 80053 COMPREHEN METABOLIC PANEL: CPT | Performed by: PHYSICIAN ASSISTANT

## 2022-11-05 PROCEDURE — 99285 EMERGENCY DEPT VISIT HI MDM: CPT | Mod: 25

## 2022-11-05 PROCEDURE — 83735 ASSAY OF MAGNESIUM: CPT | Performed by: PHYSICIAN ASSISTANT

## 2022-11-05 PROCEDURE — 87389 HIV-1 AG W/HIV-1&-2 AB AG IA: CPT | Performed by: PHYSICIAN ASSISTANT

## 2022-11-05 PROCEDURE — 99284 EMERGENCY DEPT VISIT MOD MDM: CPT | Mod: ,,, | Performed by: PHYSICIAN ASSISTANT

## 2022-11-05 PROCEDURE — 96374 THER/PROPH/DIAG INJ IV PUSH: CPT

## 2022-11-05 PROCEDURE — 99284 PR EMERGENCY DEPT VISIT,LEVEL IV: ICD-10-PCS | Mod: ,,, | Performed by: PHYSICIAN ASSISTANT

## 2022-11-05 RX ORDER — LORAZEPAM 2 MG/ML
0.5 INJECTION INTRAMUSCULAR
Status: COMPLETED | OUTPATIENT
Start: 2022-11-05 | End: 2022-11-05

## 2022-11-05 RX ORDER — MECLIZINE HYDROCHLORIDE 25 MG/1
25 TABLET ORAL EVERY 8 HOURS PRN
Qty: 16 TABLET | Refills: 0 | Status: SHIPPED | OUTPATIENT
Start: 2022-11-05 | End: 2022-11-21 | Stop reason: SDUPTHER

## 2022-11-05 RX ORDER — MECLIZINE HCL 12.5 MG 12.5 MG/1
25 TABLET ORAL
Status: COMPLETED | OUTPATIENT
Start: 2022-11-05 | End: 2022-11-05

## 2022-11-05 RX ADMIN — MECLIZINE HYDROCHLORIDE 25 MG: 12.5 TABLET ORAL at 04:11

## 2022-11-05 RX ADMIN — LORAZEPAM 0.5 MG: 2 INJECTION INTRAMUSCULAR; INTRAVENOUS at 07:11

## 2022-11-05 RX ADMIN — SODIUM CHLORIDE 1000 ML: 0.9 INJECTION, SOLUTION INTRAVENOUS at 05:11

## 2022-11-05 NOTE — TELEPHONE ENCOUNTER
Pt c/o having vertigo/ dizziness for last 3-4 days with headache. Stated that she was underneath her sink tossing and turning 5 days ago and has been feeling off balance since then. Care advice to be seen in UC/ED. Verbalized understanding. Encounter routed to provider.       Reason for Disposition   [1] Dizziness (vertigo) present now AND [2] age > 59  (Exception: prior physician evaluation for this AND no different/worse than usual)    Additional Information   Negative: [1] Weakness (i.e., paralysis, loss of muscle strength) of the face, arm or leg on one side of the body AND [2] sudden onset AND [3] present now   Negative: [1] Numbness (i.e., loss of sensation) of the face, arm or leg on one side of the body AND [2] sudden onset AND [3] present now   Negative: [1] Loss of speech or garbled speech AND [2] sudden onset AND [3] present now   Negative: Difficult to awaken or acting confused (e.g., disoriented, slurred speech)   Negative: Sounds like a life-threatening emergency to the triager   Negative: SEVERE dizziness (vertigo) (e.g., unable to walk without assistance)   Negative: Severe headache (e.g., excruciating)  (Exception: similar to previous migraines)   Negative: [1] Dizziness (vertigo) present now AND [2] one or more STROKE RISK FACTORS (i.e., hypertension, diabetes, prior stroke/TIA, heart attack)  (Exception: prior physician evaluation for this AND no different/worse than usual)    Protocols used: Dizziness - Vertigo-A-

## 2022-11-05 NOTE — ED PROVIDER NOTES
"Encounter Date: 11/5/2022       History     Chief Complaint   Patient presents with    Dizziness     Started 8d ago got better but never went away, cont wobbling when walking     4:20 PM  Patient is a 72-year-old female with a history arthritis, vertigo, nuclear sclerosis, who presents the [facility] ED with headache and dizziness for 3-4 days.    Per triage note "Pt c/o having vertigo/ dizziness for last 3-4 days with headache. Stated that she was underneath her sink tossing and turning 5 days ago and has been feeling off balance since then."    Patient states last week, she was trying to fix something under her sink.  She recalls having acute onset of dizziness.  She notes that she has had this intermittently throughout the past few years.  Lately she has noted it with movement, watching TV for a prolonged period of time, and even just sitting.  She describes it as feeling as if she is spinning.  She has had arthralgias to her left leg and is able to ambulate at her baseline.  She has not lost any consciousness.  She has had associated headaches, but not all the time with her dizziness.  She last had a headache this morning and states that it will come on rapidly and then resolve.  She tried over-the-counter Benadryl as suggested by the pharmacist which sedated her.  She has chronic tinnitus.  She denies any fever, chills, cough, sore throat, vomiting, dysuria, diarrhea.  She is here with neighbor who drove her to the emergency department.    Future Appointments  11/5/2022  4:25 PM    [facility] CT5  LIMIT 650* [facility] CT SCAN        [facility]  4/4/2023   1:20 PM    Trisha Higginbotham MD            [facility]      Review of patient's allergies indicates:  No Known Allergies  Past Medical History:   Diagnosis Date    Arthritis     Eye injury 4 yrs    hit od    Nuclear sclerosis, bilateral 2/13/2019     Past Surgical History:   Procedure Laterality Date    APPENDECTOMY      COLONOSCOPY N/A 9/7/2017    Procedure: " COLONOSCOPY;  Surgeon: Albino Fenton MD;  Location: [facility] ([address] [place] [address]);  Service: Endoscopy;  Laterality: N/A;    ESOPHAGOGASTRODUODENOSCOPY  09/07/2017    KNEE SURGERY Right 12/05/2017    TKR    LASIK  7 yrs    ou    TONSILLECTOMY       Family History   Problem Relation Age of Onset    Hypertension Mother     Glaucoma Mother     Diabetes Mother     Cataracts Mother     Cancer Mother 74        lung    Heart disease Mother 55    Hypertension Father     Heart disease Father         onset early 60;s, Aortic valve replacement ,Rheumatic fever as a child     Diabetes Brother     Cancer Brother 66        mesothelioma    No Known Problems Sister     No Known Problems Maternal Aunt     No Known Problems Maternal Uncle     No Known Problems Paternal Aunt     No Known Problems Paternal Uncle     No Known Problems Maternal Grandmother     No Known Problems Maternal Grandfather     No Known Problems Paternal Grandmother     No Known Problems Paternal Grandfather     Amblyopia Neg Hx     Blindness Neg Hx     Macular degeneration Neg Hx     Retinal detachment Neg Hx     Strabismus Neg Hx     Stroke Neg Hx     Thyroid disease Neg Hx     Melanoma Neg Hx      Social History     Tobacco Use    Smoking status: Never    Smokeless tobacco: Never   Substance Use Topics    Alcohol use: Yes     Alcohol/week: 1.0 standard drink     Types: 1 Glasses of wine per week     Comment: glass of wine a night     Drug use: No     Review of Systems   Constitutional:  Negative for chills, diaphoresis and fever.   HENT:  Positive for tinnitus. Negative for sore throat.    Respiratory:  Negative for cough and shortness of breath.    Cardiovascular:  Negative for chest pain.   Gastrointestinal:  Negative for abdominal pain, diarrhea, nausea and vomiting.   Genitourinary:  Negative for dysuria.   Musculoskeletal:  Negative for back pain.   Skin:  Negative for rash.   Neurological:  Positive for dizziness and headaches. Negative for seizures, weakness  and light-headedness.   Hematological:  Does not bruise/bleed easily.     Physical Exam     Initial Vitals [11/05/22 1531]   BP Pulse Resp Temp SpO2   (!) 208/109 100 18 98.6 °F (37 °C) 99 %      MAP       --         Physical Exam    Vitals reviewed.  Constitutional: She appears well-developed and well-nourished. She is not diaphoretic. She is cooperative.  Non-toxic appearance. She does not have a sickly appearance. She does not appear ill. No distress.   HENT:   Head: Normocephalic and atraumatic. Head is without raccoon's eyes.   Nose: Nose normal. No epistaxis.   Mouth/Throat: No trismus in the jaw.   Eyes: Conjunctivae and EOM are normal. Pupils are equal, round, and reactive to light. Right conjunctiva is not injected. Left conjunctiva is not injected. Right eye exhibits no nystagmus. Left eye exhibits no nystagmus.   Neck:   Normal range of motion.  Pulmonary/Chest: No accessory muscle usage. No tachypnea. No respiratory distress.   Abdominal: She exhibits no distension.   Musculoskeletal:         General: Normal range of motion.      Cervical back: Normal range of motion.     Neurological: She is alert. She has normal strength. Gait (2/2 to LLE arthralgias, baseline per patient and neighbor) abnormal.   Oriented x4.  No facial asymmetry.  Clear speech.  Normal finger-to-nose and rapid alternating hand movements.  Negative pronator and Romberg's.   Skin: Skin is warm and dry. No erythema. No pallor.       ED Course   Procedures  Labs Reviewed   COMPREHENSIVE METABOLIC PANEL - Abnormal; Notable for the following components:       Result Value    CO2 22 (*)     All other components within normal limits   HIV 1 / 2 ANTIBODY    Narrative:     Release to patient->Immediate   HEPATITIS C ANTIBODY    Narrative:     Release to patient->Immediate   CBC W/ AUTO DIFFERENTIAL   TSH   MAGNESIUM          Imaging Results              MRI Brain Without Contrast (Final result)  Result time 11/05/22 19:52:27      Final result  by Didier Kelley MD (11/05/22 19:52:27)                   Impression:      No evidence of acute intracranial pathology.    Electronically signed by resident: Fawad Ferreira  Date:    11/05/2022  Time:    19:37    Electronically signed by: Didier Kelley MD  Date:    11/05/2022  Time:    19:52               Narrative:    EXAMINATION:  MRI BRAIN WITHOUT CONTRAST    CLINICAL HISTORY:  Dizziness, non-specific;    TECHNIQUE:  Multiplanar multisequence MR imaging of the brain was performed without intravenous contrast.    COMPARISON:  None    FINDINGS:  The brain parenchyma demonstrates mild patchy T2/FLAIR hyperintensity in the supratentorial white matter, nonspecific but could reflect chronic microvascular ischemic changes.    No hemorrhage or edema. No mass.  No diffusion restriction to indicate acute infarction. No mass effect or midline shift.    Age-related mild generalized cerebral volume loss.  Ventricles are midline without distortion by mass effect or acute hydrocephalus.    No extra-axial blood or fluid collections are identified.    The sellar region and craniocervical junction are unremarkable.    Limited evaluation of the maxillary sinuses due to susceptibility artifact.  Mastoid air cells and remaining paranasal sinuses are unremarkable.    The T2 skull base flow voids are preserved.    Bone marrow signal intensity unremarkable.  Craniocervical junction is within normal limits.                                       Medications   meclizine tablet 25 mg (25 mg Oral Given 11/5/22 1640)   sodium chloride 0.9% bolus 1,000 mL (0 mLs Intravenous Stopped 11/5/22 1833)   LORazepam injection 0.5 mg (0.5 mg Intravenous Given 11/5/22 1908)     Medical Decision Making:   History:   Old Medical Records: I decided to obtain old medical records.  Old Records Summarized: records from clinic visits and records from previous admission(s).  Initial Assessment:   Patient is a 72-year-old female with a history arthritis, vertigo,  nuclear sclerosis, who presents the [facility] [place] with headache and ataxia for 3-4 days.  Differential Diagnosis:   Includes but is not central vertical, peripheral vertigo, electrolyte abnormalities, dehydration, anemia, CHARLENE, thyroid dysfunction.  Independently Interpreted Test(s):   I have ordered and independently interpreted EKG Reading(s) - see summary below  Clinical Tests:   Lab Tests: Reviewed and Ordered  Radiological Study: Ordered and Reviewed  Medical Tests: Ordered and Reviewed  ED Management:  ECG with sinus rhythm with PACs. Normal intervals. No STEMI.     Will initiate work up, give medication, obtain MRI, and reassess.            ED Course as of 11/06/22 0029   Sat Nov 05, 2022   1619 BP(!): 208/109 [CL]   1619 Temp: 98.6 °F (37 °C) [CL]   1619 Pulse: 100 [CL]   1619 Resp: 18 [CL]   1619 SpO2: 99 % [CL]   1824 WBC: 7.91 [CL]   1824 Hemoglobin: 14.3 [CL]   1824 Platelets: 204 [CL]   1824 Sodium: 138 [CL]   1824 Potassium: 4.1 [CL]   1824 Chloride: 105 [CL]   1824 Glucose: 104 [CL]   1824 BUN: 18 [CL]   1824 Creatinine: 0.7 [CL]   1824 BILIRUBIN TOTAL: 0.4 [CL]   1824 AST: 15 [CL]   1824 ALT: 12 [CL]   1824 TSH: 1.520 [CL]   1824 Magnesium: 2.0 [CL]   1824 Hepatitis C Ab: Non-reactive [CL]   1824 HIV 1/2 Ag/Ab: Non-reactive [CL]   2001 MRI Brain Without Contrast  No evidence of acute intracranial pathology. [CL]      ED Course User Index  [CL] Renetta Roy PA-C          Patient was able to independently ambulate at her baseline to the restroom without any difficulties.  She reports feeling improved after medication here.  We will treat her for peripheral vertigo.  Likely due to benign positional paroxysmal vertigo.  Discussed etiology.  She does not have any dizziness now.  Told her that she can look up somersault technique if her dizziness returns.  Continue meclizine every 8 hours.  Referral placed to ENT.  Follow-up.  Stay hydrated.  Return to ED precautions given.  All of her questions were answered.   Patient and her neighbor are comfortable with plan, and patient is stable for discharge.     I have reviewed patient's chart and discussed this case with my supervising MD.     Renetta Roy PA-C  Emergent Department  [facility]  Spectralink #[phone] or #[phone]    Clinical Impression:   Final diagnoses:  [R42] Dizziness  [H81.10] Benign paroxysmal positional vertigo, unspecified laterality (Primary)        ED Disposition Condition    Discharge Stable          ED Prescriptions       Medication Sig Dispense Start Date End Date Auth. Provider    meclizine (ANTIVERT) 25 mg tablet Take 1 tablet (25 mg total) by mouth every 8 (eight) hours as needed. 16 tablet 11/5/2022 -- Renetta Roy PA-C          Follow-up Information       Follow up With Specialties Details Why Contact Info Additional Information    [facility] Otolaryngology Schedule an appointment as soon as possible for a visit   [address] [facility] [address] [phone] Ear, Nose & Throat Services - [facility], 4th Floor Please park in [facility] and use Clinic elevator    [facility] - Emergency Dept Emergency Medicine  If symptoms worsen [address] [facility] [id number] [phone]           Future Appointments   Date Time Provider Department Center   4/4/2023  1:20 PM [facility]          Renetta Roy PA-C  11/06/22 0029

## 2022-11-06 NOTE — DISCHARGE INSTRUCTIONS
Your MRI was negative.  You do not have any electrolyte abnormalities.  Your are not anemic.  Your kidney function is normal.  Watch [employer] video for vertigo on youtube and perform if your dizziness returns.   Take meclizine every 8 hours as needed.    Stay hydrated by drinking 2-3 Franco on a daily basis.    Follow-up with ENT.  A referral was placed, but you can call sooner if needed.    Return to the emergency department for new or worsening symptoms.  Future Appointments   Date Time Provider Department Center   4/4/2023  1:20 PM Trisha Higginbotham MD [facility]     Imaging Results              MRI Brain Without Contrast (Final result)  Result time 11/05/22 19:52:27      Final result by Didier Kelley MD (11/05/22 19:52:27)                   Impression:      No evidence of acute intracranial pathology.    Electronically signed by resident: Fawad Ferreira  Date:    11/05/2022  Time:    19:37    Electronically signed by: Didier Kelley MD  Date:    11/05/2022  Time:    19:52               Narrative:    EXAMINATION:  MRI BRAIN WITHOUT CONTRAST    CLINICAL HISTORY:  Dizziness, non-specific;    TECHNIQUE:  Multiplanar multisequence MR imaging of the brain was performed without intravenous contrast.    COMPARISON:  None    FINDINGS:  The brain parenchyma demonstrates mild patchy T2/FLAIR hyperintensity in the supratentorial white matter, nonspecific but could reflect chronic microvascular ischemic changes.    No hemorrhage or edema. No mass.  No diffusion restriction to indicate acute infarction. No mass effect or midline shift.    Age-related mild generalized cerebral volume loss.  Ventricles are midline without distortion by mass effect or acute hydrocephalus.    No extra-axial blood or fluid collections are identified.    The sellar region and craniocervical junction are unremarkable.    Limited evaluation of the maxillary sinuses due to susceptibility artifact.  Mastoid air cells and remaining paranasal sinuses  are unremarkable.    The T2 skull base flow voids are preserved.    Bone marrow signal intensity unremarkable.  Craniocervical junction is within normal limits.

## 2022-11-08 ENCOUNTER — PATIENT MESSAGE (OUTPATIENT)
Dept: FAMILY MEDICINE | Facility: CLINIC | Age: 72
End: 2022-11-08
Payer: MEDICARE

## 2022-11-11 ENCOUNTER — PATIENT MESSAGE (OUTPATIENT)
Dept: OTOLARYNGOLOGY | Facility: CLINIC | Age: 72
End: 2022-11-11
Payer: MEDICARE

## 2022-11-15 ENCOUNTER — TELEPHONE (OUTPATIENT)
Dept: OTOLARYNGOLOGY | Facility: CLINIC | Age: 72
End: 2022-11-15
Payer: MEDICARE

## 2022-11-20 ENCOUNTER — PATIENT MESSAGE (OUTPATIENT)
Dept: FAMILY MEDICINE | Facility: CLINIC | Age: 72
End: 2022-11-20
Payer: MEDICARE

## 2022-11-21 ENCOUNTER — PATIENT MESSAGE (OUTPATIENT)
Dept: FAMILY MEDICINE | Facility: CLINIC | Age: 72
End: 2022-11-21
Payer: MEDICARE

## 2022-11-21 RX ORDER — MECLIZINE HYDROCHLORIDE 25 MG/1
25 TABLET ORAL EVERY 8 HOURS PRN
Qty: 90 TABLET | Refills: 0 | Status: SHIPPED | OUTPATIENT
Start: 2022-11-21 | End: 2022-11-21 | Stop reason: SDUPTHER

## 2022-11-21 RX ORDER — MECLIZINE HYDROCHLORIDE 25 MG/1
25 TABLET ORAL EVERY 8 HOURS PRN
Qty: 90 TABLET | Refills: 0 | Status: SHIPPED | OUTPATIENT
Start: 2022-11-21 | End: 2023-04-04

## 2022-11-21 NOTE — TELEPHONE ENCOUNTER
No new care gaps identified.  [facility] Embedded Care Gaps. Reference number: [id number]. 11/21/2022   7:05:24 AM CST

## 2022-12-22 ENCOUNTER — TELEPHONE (OUTPATIENT)
Dept: OTOLARYNGOLOGY | Facility: CLINIC | Age: 72
End: 2022-12-22
Payer: MEDICARE

## 2022-12-22 NOTE — TELEPHONE ENCOUNTER
Tried to call pt to reschedule an appointment. Left message for pt to call back when ready to reschedule.

## 2023-01-26 ENCOUNTER — PES CALL (OUTPATIENT)
Dept: ADMINISTRATIVE | Facility: CLINIC | Age: 73
End: 2023-01-26
Payer: MEDICARE

## 2023-01-26 RX ORDER — ALENDRONATE SODIUM 35 MG/1
TABLET ORAL
Qty: 12 TABLET | Refills: 0 | Status: SHIPPED | OUTPATIENT
Start: 2023-01-26 | End: 2023-04-17

## 2023-02-09 ENCOUNTER — TELEPHONE (OUTPATIENT)
Dept: ADMINISTRATIVE | Facility: CLINIC | Age: 73
End: 2023-02-09
Payer: MEDICARE

## 2023-02-09 NOTE — TELEPHONE ENCOUNTER
Called pt; informed pt I was calling to reschedule pt's canceled virtual EAWV appointment from 2/9/23; pt stated she did not want to reschedule at this time because she has a lot going on; pt stated she would call the office back when she was ready to reschedule; pt stated it would be ok if we call her back in three months if we do not hear from her; provided pt with my name and number so she could call me back directly when she was ready to reschedule.

## 2023-02-14 ENCOUNTER — PES CALL (OUTPATIENT)
Dept: ADMINISTRATIVE | Facility: CLINIC | Age: 73
End: 2023-02-14
Payer: MEDICARE

## 2023-02-22 ENCOUNTER — OFFICE VISIT (OUTPATIENT)
Dept: OPTOMETRY | Facility: CLINIC | Age: 73
End: 2023-02-22
Payer: MEDICARE

## 2023-02-22 DIAGNOSIS — H52.7 REFRACTIVE ERROR: ICD-10-CM

## 2023-02-22 DIAGNOSIS — Z98.890 S/P LASIK (LASER ASSISTED IN SITU KERATOMILEUSIS) OF BOTH EYES: ICD-10-CM

## 2023-02-22 DIAGNOSIS — H25.13 NUCLEAR SCLEROSIS, BILATERAL: Primary | ICD-10-CM

## 2023-02-22 PROCEDURE — 1160F RVW MEDS BY RX/DR IN RCRD: CPT | Mod: CPTII,S$GLB,, | Performed by: OPTOMETRIST

## 2023-02-22 PROCEDURE — 99999 PR PBB SHADOW E&M-EST. PATIENT-LVL II: ICD-10-PCS | Mod: PBBFAC,,, | Performed by: OPTOMETRIST

## 2023-02-22 PROCEDURE — 3288F PR FALLS RISK ASSESSMENT DOCUMENTED: ICD-10-PCS | Mod: CPTII,S$GLB,, | Performed by: OPTOMETRIST

## 2023-02-22 PROCEDURE — 1126F PR PAIN SEVERITY QUANTIFIED, NO PAIN PRESENT: ICD-10-PCS | Mod: CPTII,S$GLB,, | Performed by: OPTOMETRIST

## 2023-02-22 PROCEDURE — 1159F MED LIST DOCD IN RCRD: CPT | Mod: CPTII,S$GLB,, | Performed by: OPTOMETRIST

## 2023-02-22 PROCEDURE — 92014 COMPRE OPH EXAM EST PT 1/>: CPT | Mod: S$GLB,,, | Performed by: OPTOMETRIST

## 2023-02-22 PROCEDURE — 3288F FALL RISK ASSESSMENT DOCD: CPT | Mod: CPTII,S$GLB,, | Performed by: OPTOMETRIST

## 2023-02-22 PROCEDURE — 92015 DETERMINE REFRACTIVE STATE: CPT | Mod: S$GLB,,, | Performed by: OPTOMETRIST

## 2023-02-22 PROCEDURE — 99999 PR PBB SHADOW E&M-EST. PATIENT-LVL II: CPT | Mod: PBBFAC,,, | Performed by: OPTOMETRIST

## 2023-02-22 PROCEDURE — 92014 PR EYE EXAM, EST PATIENT,COMPREHESV: ICD-10-PCS | Mod: S$GLB,,, | Performed by: OPTOMETRIST

## 2023-02-22 PROCEDURE — 1159F PR MEDICATION LIST DOCUMENTED IN MEDICAL RECORD: ICD-10-PCS | Mod: CPTII,S$GLB,, | Performed by: OPTOMETRIST

## 2023-02-22 PROCEDURE — 1126F AMNT PAIN NOTED NONE PRSNT: CPT | Mod: CPTII,S$GLB,, | Performed by: OPTOMETRIST

## 2023-02-22 PROCEDURE — 92015 PR REFRACTION: ICD-10-PCS | Mod: S$GLB,,, | Performed by: OPTOMETRIST

## 2023-02-22 PROCEDURE — 1101F PR PT FALLS ASSESS DOC 0-1 FALLS W/OUT INJ PAST YR: ICD-10-PCS | Mod: CPTII,S$GLB,, | Performed by: OPTOMETRIST

## 2023-02-22 PROCEDURE — 1160F PR REVIEW ALL MEDS BY PRESCRIBER/CLIN PHARMACIST DOCUMENTED: ICD-10-PCS | Mod: CPTII,S$GLB,, | Performed by: OPTOMETRIST

## 2023-02-22 PROCEDURE — 1101F PT FALLS ASSESS-DOCD LE1/YR: CPT | Mod: CPTII,S$GLB,, | Performed by: OPTOMETRIST

## 2023-02-22 NOTE — PROGRESS NOTES
Subjective:       Patient ID: Windy Lindo is a 72 y.o. female      Chief Complaint   Patient presents with    Concerns About Ocular Health     History of Present Illness  Dls: 5/6/22 Dr. Vela     71 y/o female presents today with c/o blurry vision at distance and near ou.  Pt wears pal's.     No tearing  No itching  + ou burning  No pain  +  ha's  + ou floaters  No flashes    Eye meds  Systane ou  BID          Assessment/Plan:     1. Nuclear sclerosis, bilateral  NVS per pt. Educated pt on presence of cataracts and effects on vision. No surgery at this time. Recheck in one year, sooner PRN.    2. S/P LASIK (laser assisted in situ keratomileusis) of both eyes  Stable, no signs of ectasia.    3. Refractive error  Educated patient on refractive error and discussed lens options. Dispensed updated spectacle Rx. Educated about adaptation period to new specs.    Eyeglass Final Rx       Eyeglass Final Rx         Sphere Cylinder Axis Add    Right -2.50 +1.25 180 +2.75    Left -0.25 +1.25 105 +2.75      Expiration Date: 2/22/2024                      Follow up in about 1 year (around 2/22/2024) for Cataract check.

## 2023-02-23 ENCOUNTER — OFFICE VISIT (OUTPATIENT)
Dept: OTOLARYNGOLOGY | Facility: CLINIC | Age: 73
End: 2023-02-23
Payer: MEDICARE

## 2023-02-23 ENCOUNTER — CLINICAL SUPPORT (OUTPATIENT)
Dept: AUDIOLOGY | Facility: CLINIC | Age: 73
End: 2023-02-23
Payer: MEDICARE

## 2023-02-23 ENCOUNTER — PATIENT MESSAGE (OUTPATIENT)
Dept: FAMILY MEDICINE | Facility: CLINIC | Age: 73
End: 2023-02-23
Payer: MEDICARE

## 2023-02-23 VITALS
WEIGHT: 181.19 LBS | HEART RATE: 86 BPM | HEIGHT: 66 IN | BODY MASS INDEX: 29.12 KG/M2 | DIASTOLIC BLOOD PRESSURE: 92 MMHG | SYSTOLIC BLOOD PRESSURE: 186 MMHG

## 2023-02-23 DIAGNOSIS — H81.11 BPPV (BENIGN PAROXYSMAL POSITIONAL VERTIGO), RIGHT: Primary | ICD-10-CM

## 2023-02-23 DIAGNOSIS — R42 DIZZINESS AND GIDDINESS: ICD-10-CM

## 2023-02-23 DIAGNOSIS — H90.3 BILATERAL HIGH FREQUENCY SENSORINEURAL HEARING LOSS: ICD-10-CM

## 2023-02-23 DIAGNOSIS — H90.3 SENSORINEURAL HEARING LOSS (SNHL) OF BOTH EARS: Primary | ICD-10-CM

## 2023-02-23 PROCEDURE — 92567 TYMPANOMETRY: CPT | Mod: S$GLB,,,

## 2023-02-23 PROCEDURE — 3077F PR MOST RECENT SYSTOLIC BLOOD PRESSURE >= 140 MM HG: ICD-10-PCS | Mod: CPTII,S$GLB,, | Performed by: OTOLARYNGOLOGY

## 2023-02-23 PROCEDURE — 1101F PR PT FALLS ASSESS DOC 0-1 FALLS W/OUT INJ PAST YR: ICD-10-PCS | Mod: CPTII,S$GLB,, | Performed by: OTOLARYNGOLOGY

## 2023-02-23 PROCEDURE — 3288F FALL RISK ASSESSMENT DOCD: CPT | Mod: CPTII,S$GLB,, | Performed by: OTOLARYNGOLOGY

## 2023-02-23 PROCEDURE — 3080F PR MOST RECENT DIASTOLIC BLOOD PRESSURE >= 90 MM HG: ICD-10-PCS | Mod: CPTII,S$GLB,, | Performed by: OTOLARYNGOLOGY

## 2023-02-23 PROCEDURE — 1126F AMNT PAIN NOTED NONE PRSNT: CPT | Mod: CPTII,S$GLB,, | Performed by: OTOLARYNGOLOGY

## 2023-02-23 PROCEDURE — 3008F BODY MASS INDEX DOCD: CPT | Mod: CPTII,S$GLB,, | Performed by: OTOLARYNGOLOGY

## 2023-02-23 PROCEDURE — 3008F PR BODY MASS INDEX (BMI) DOCUMENTED: ICD-10-PCS | Mod: CPTII,S$GLB,, | Performed by: OTOLARYNGOLOGY

## 2023-02-23 PROCEDURE — 3288F PR FALLS RISK ASSESSMENT DOCUMENTED: ICD-10-PCS | Mod: CPTII,S$GLB,, | Performed by: OTOLARYNGOLOGY

## 2023-02-23 PROCEDURE — 92567 PR TYMPA2METRY: ICD-10-PCS | Mod: S$GLB,,,

## 2023-02-23 PROCEDURE — 3080F DIAST BP >= 90 MM HG: CPT | Mod: CPTII,S$GLB,, | Performed by: OTOLARYNGOLOGY

## 2023-02-23 PROCEDURE — 92557 PR COMPREHENSIVE HEARING TEST: ICD-10-PCS | Mod: S$GLB,,,

## 2023-02-23 PROCEDURE — 99203 OFFICE O/P NEW LOW 30 MIN: CPT | Mod: S$GLB,,, | Performed by: OTOLARYNGOLOGY

## 2023-02-23 PROCEDURE — 1101F PT FALLS ASSESS-DOCD LE1/YR: CPT | Mod: CPTII,S$GLB,, | Performed by: OTOLARYNGOLOGY

## 2023-02-23 PROCEDURE — 92557 COMPREHENSIVE HEARING TEST: CPT | Mod: S$GLB,,,

## 2023-02-23 PROCEDURE — 99203 PR OFFICE/OUTPT VISIT, NEW, LEVL III, 30-44 MIN: ICD-10-PCS | Mod: S$GLB,,, | Performed by: OTOLARYNGOLOGY

## 2023-02-23 PROCEDURE — 3077F SYST BP >= 140 MM HG: CPT | Mod: CPTII,S$GLB,, | Performed by: OTOLARYNGOLOGY

## 2023-02-23 PROCEDURE — 1126F PR PAIN SEVERITY QUANTIFIED, NO PAIN PRESENT: ICD-10-PCS | Mod: CPTII,S$GLB,, | Performed by: OTOLARYNGOLOGY

## 2023-02-23 PROCEDURE — 1159F MED LIST DOCD IN RCRD: CPT | Mod: CPTII,S$GLB,, | Performed by: OTOLARYNGOLOGY

## 2023-02-23 PROCEDURE — 1159F PR MEDICATION LIST DOCUMENTED IN MEDICAL RECORD: ICD-10-PCS | Mod: CPTII,S$GLB,, | Performed by: OTOLARYNGOLOGY

## 2023-02-23 NOTE — LETTER
February 23, 2023        Renetta Roy PA-C  [address]  [facility] [address]             [facility] - Otolaryngology  [address]   [place] [address]  Phone: [phone]   Patient: Windy Lindo   MR Number: [id number]   YOB: 1950   Date of Visit: 2/23/2023       Dear Dr. Roy:    Thank you for referring Windy Lindo to me for evaluation. Below are the relevant portions of my assessment and plan of care.            If you have questions, please do not hesitate to call me. I look forward to following Windy along with you.    Sincerely,      Flash Quintanilla MD           CC    No Recipients

## 2023-02-23 NOTE — PROGRESS NOTES
Ms. Windy Lindo, a 72 y.o. female, was seen in the clinic today for an audiological evaluation. Ms. Lindo reported 1 episode of vertigo in November 2022 and history of intermittent bilateral tinnitus over the past several years. Denied suspected change in hearing, otalgia, and aural fullness. Previous audiogram from 1/28/2015 revealed normal hearing sloping to moderately severe sensorineural hearing loss for the right ear and normal hearing sloping to severe sensorineural hearing loss for the left ear.    Audiological testing revealed mild low frequency sensorineural hearing loss rising to normal hearing sloping to severe sensorineural hearing loss bilaterally. Conductive component noted at 1000 Hz bilaterally. A speech reception threshold was obtained at 25 dBHL for the right ear and at 20 dBHL for the left ear. Speech discrimination was 90% for the right ear and 100% for the left ear.      Tympanometry testing revealed a Type C tympanogram for the right ear and a Type A tympanogram for the left ear.      Recommendations:  1. Otologic evaluation  2. Annual audiological evaluation or sooner if hearing changes  3. Hearing protection when in noise   4. Hearing aid consultation following medical clearance    Please click on link to view Audiogram:  Document on 2/23/2023 10:41 AM by ADE Ruiz: Audiogram

## 2023-02-23 NOTE — PROGRESS NOTES
Subjective:       Patient ID: Windy Lindo is a 72 y.o. female.    Chief Complaint: Follow-up ([facility]- Audio @ 10- Vertigo)    Follow-up       Windy Lindo is a 72 y.o. female had episode of vertigo. Positional, occurring with turning to right side and laying down. lasted 2 wks.  Resolved with epley maneuver. Episode occurred 3 months ago.  No recurrences since.  Has Vit D def on replacement.  Has had vertigo similar to this in the past.     Review of Systems   Constitutional: Negative.    HENT:  Positive for sinus pressure/congestion.    Eyes:  Positive for photophobia.   Respiratory: Negative.     Gastrointestinal: Negative.    Endocrine: Negative.    Integumentary:  Negative.   Allergic/Immunologic: Negative.    Neurological:  Positive for dizziness.   Hematological: Negative.    Psychiatric/Behavioral: Negative.         Objective:      Physical Exam  Vitals and nursing note reviewed.   Constitutional:       Appearance: Normal appearance.   HENT:      Head: Normocephalic and atraumatic.      Right Ear: Tympanic membrane, ear canal and external ear normal. There is no impacted cerumen.      Left Ear: Tympanic membrane, ear canal and external ear normal. There is no impacted cerumen.   Neurological:      Mental Status: She is alert.         Data Reviewed:    Audiogram tracings independently reviewed and discussed with patient.  There is a high frequency SNHL with overall normal pure tone average, and speech discrimination is 100%.  Tympanometry is type A in both ears.    Assessment:       Problem List Items Addressed This Visit    None  Visit Diagnoses       BPPV (benign paroxysmal positional vertigo), right    -  Primary    Bilateral high frequency sensorineural hearing loss                Plan:        Vertigo resolved at this point   Discussed BPPV and treatment   F/u prn.

## 2023-02-24 ENCOUNTER — TELEPHONE (OUTPATIENT)
Dept: FAMILY MEDICINE | Facility: CLINIC | Age: 73
End: 2023-02-24

## 2023-02-24 VITALS — DIASTOLIC BLOOD PRESSURE: 89 MMHG | SYSTOLIC BLOOD PRESSURE: 141 MMHG

## 2023-02-27 ENCOUNTER — CLINICAL SUPPORT (OUTPATIENT)
Dept: FAMILY MEDICINE | Facility: CLINIC | Age: 73
End: 2023-02-27
Payer: MEDICARE

## 2023-02-27 VITALS — HEART RATE: 70 BPM | SYSTOLIC BLOOD PRESSURE: 142 MMHG | OXYGEN SATURATION: 99 % | DIASTOLIC BLOOD PRESSURE: 70 MMHG

## 2023-02-27 DIAGNOSIS — R00.2 HEART PALPITATIONS: Primary | ICD-10-CM

## 2023-02-27 DIAGNOSIS — R03.0 ELEVATED BLOOD PRESSURE READING WITHOUT DIAGNOSIS OF HYPERTENSION: Primary | ICD-10-CM

## 2023-02-27 DIAGNOSIS — I10 HYPERTENSION, UNSPECIFIED TYPE: ICD-10-CM

## 2023-02-27 PROCEDURE — 99999 PR PBB SHADOW E&M-EST. PATIENT-LVL III: ICD-10-PCS | Mod: PBBFAC,,,

## 2023-02-27 PROCEDURE — 99999 PR PBB SHADOW E&M-EST. PATIENT-LVL III: CPT | Mod: PBBFAC,,,

## 2023-02-27 RX ORDER — HYDROCHLOROTHIAZIDE 12.5 MG/1
12.5 TABLET ORAL DAILY
Qty: 30 TABLET | Refills: 11 | Status: SHIPPED | OUTPATIENT
Start: 2023-02-27 | End: 2023-04-24

## 2023-02-27 NOTE — PROGRESS NOTES
Windy Lindo 72 y.o. female is here today for Blood Pressure check.   History of HTN no.    Review of patient's allergies indicates:  No Known Allergies  Creatinine   Date Value Ref Range Status   11/05/2022 0.7 0.5 - 1.4 mg/dL Final     Sodium   Date Value Ref Range Status   11/05/2022 138 136 - 145 mmol/L Final     Potassium   Date Value Ref Range Status   11/05/2022 4.1 3.5 - 5.1 mmol/L Final   ]  Patient verifies taking blood pressure medications on a regular basis at the same time of the day.     Current Outpatient Medications:     alendronate (FOSAMAX) 35 MG tablet, TAKE 1 TABLET BY MOUTH 1 TIME A WEEK AS DIRECTED, Disp: 12 tablet, Rfl: 0    amoxicillin (AMOXIL) 500 MG capsule, Take 1,000 mg by mouth 2 (two) times daily., Disp: , Rfl:     b complex vitamins capsule, Take 1 capsule by mouth once daily., Disp: , Rfl:     glucosam/chond/hyalu/CF borate (MOVE [facility] JOINT [facility] ORAL), Take by mouth., Disp: , Rfl:     meclizine (ANTIVERT) 25 mg tablet, Take 1 tablet (25 mg total) by mouth every 8 (eight) hours as needed for Dizziness. (Patient not taking: Reported on 2/23/2023), Disp: 90 tablet, Rfl: 0    meloxicam (MOBIC) 15 MG tablet, TAKE 1 TABLET(15 MG) BY MOUTH EVERY DAY, Disp: 90 tablet, Rfl: 0    multivit-min-FA-lycopen-lutein 0.4-300-250 mg-mcg-mcg Tab, Take 1 tablet by mouth once daily., Disp: , Rfl:     nabumetone (RELAFEN) 500 MG tablet, Take 1 tablet (500 mg total) by mouth 2 (two) times daily. (Patient not taking: Reported on 2/23/2023), Disp: 60 tablet, Rfl: 0    vitamin D (VITAMIN D3) 1000 units Tab, Take 1,000 Units by mouth once daily., Disp: , Rfl:   Does patient have record of home blood pressure readings no.   Last dose of blood pressure medication was taken at N/A not  on blood pressure meds.  Patient is asymptomatic.   Complains of none.    Vitals:    02/27/23 0850 02/27/23 0907   BP: (!) 156/86 (!) 142/70   BP Location: Left arm Right arm   Patient Position: Sitting Sitting   BP Method:  Medium (Manual) Medium (Manual)   Pulse: 72 70   SpO2: 99% 99%         Dr. Higginbotham informed of nurse visit.

## 2023-03-01 ENCOUNTER — OFFICE VISIT (OUTPATIENT)
Dept: CARDIOLOGY | Facility: CLINIC | Age: 73
End: 2023-03-01
Payer: MEDICARE

## 2023-03-01 VITALS
BODY MASS INDEX: 29.25 KG/M2 | HEIGHT: 66 IN | OXYGEN SATURATION: 98 % | DIASTOLIC BLOOD PRESSURE: 86 MMHG | SYSTOLIC BLOOD PRESSURE: 142 MMHG | RESPIRATION RATE: 15 BRPM | HEART RATE: 101 BPM

## 2023-03-01 DIAGNOSIS — E78.5 HYPERLIPIDEMIA, UNSPECIFIED HYPERLIPIDEMIA TYPE: Primary | ICD-10-CM

## 2023-03-01 DIAGNOSIS — I10 HYPERTENSION, UNSPECIFIED TYPE: ICD-10-CM

## 2023-03-01 DIAGNOSIS — R00.2 HEART PALPITATIONS: ICD-10-CM

## 2023-03-01 PROCEDURE — 3077F PR MOST RECENT SYSTOLIC BLOOD PRESSURE >= 140 MM HG: ICD-10-PCS | Mod: CPTII,S$GLB,, | Performed by: INTERNAL MEDICINE

## 2023-03-01 PROCEDURE — 1126F PR PAIN SEVERITY QUANTIFIED, NO PAIN PRESENT: ICD-10-PCS | Mod: CPTII,S$GLB,, | Performed by: INTERNAL MEDICINE

## 2023-03-01 PROCEDURE — 3077F SYST BP >= 140 MM HG: CPT | Mod: CPTII,S$GLB,, | Performed by: INTERNAL MEDICINE

## 2023-03-01 PROCEDURE — 3008F BODY MASS INDEX DOCD: CPT | Mod: CPTII,S$GLB,, | Performed by: INTERNAL MEDICINE

## 2023-03-01 PROCEDURE — 1126F AMNT PAIN NOTED NONE PRSNT: CPT | Mod: CPTII,S$GLB,, | Performed by: INTERNAL MEDICINE

## 2023-03-01 PROCEDURE — 1101F PR PT FALLS ASSESS DOC 0-1 FALLS W/OUT INJ PAST YR: ICD-10-PCS | Mod: CPTII,S$GLB,, | Performed by: INTERNAL MEDICINE

## 2023-03-01 PROCEDURE — 1101F PT FALLS ASSESS-DOCD LE1/YR: CPT | Mod: CPTII,S$GLB,, | Performed by: INTERNAL MEDICINE

## 2023-03-01 PROCEDURE — 1160F RVW MEDS BY RX/DR IN RCRD: CPT | Mod: CPTII,S$GLB,, | Performed by: INTERNAL MEDICINE

## 2023-03-01 PROCEDURE — 99999 PR PBB SHADOW E&M-EST. PATIENT-LVL IV: CPT | Mod: PBBFAC,,, | Performed by: INTERNAL MEDICINE

## 2023-03-01 PROCEDURE — 99204 PR OFFICE/OUTPT VISIT, NEW, LEVL IV, 45-59 MIN: ICD-10-PCS | Mod: S$GLB,,, | Performed by: INTERNAL MEDICINE

## 2023-03-01 PROCEDURE — 3079F PR MOST RECENT DIASTOLIC BLOOD PRESSURE 80-89 MM HG: ICD-10-PCS | Mod: CPTII,S$GLB,, | Performed by: INTERNAL MEDICINE

## 2023-03-01 PROCEDURE — 3288F PR FALLS RISK ASSESSMENT DOCUMENTED: ICD-10-PCS | Mod: CPTII,S$GLB,, | Performed by: INTERNAL MEDICINE

## 2023-03-01 PROCEDURE — 3008F PR BODY MASS INDEX (BMI) DOCUMENTED: ICD-10-PCS | Mod: CPTII,S$GLB,, | Performed by: INTERNAL MEDICINE

## 2023-03-01 PROCEDURE — 1159F PR MEDICATION LIST DOCUMENTED IN MEDICAL RECORD: ICD-10-PCS | Mod: CPTII,S$GLB,, | Performed by: INTERNAL MEDICINE

## 2023-03-01 PROCEDURE — 99999 PR PBB SHADOW E&M-EST. PATIENT-LVL IV: ICD-10-PCS | Mod: PBBFAC,,, | Performed by: INTERNAL MEDICINE

## 2023-03-01 PROCEDURE — 1159F MED LIST DOCD IN RCRD: CPT | Mod: CPTII,S$GLB,, | Performed by: INTERNAL MEDICINE

## 2023-03-01 PROCEDURE — 3288F FALL RISK ASSESSMENT DOCD: CPT | Mod: CPTII,S$GLB,, | Performed by: INTERNAL MEDICINE

## 2023-03-01 PROCEDURE — 3079F DIAST BP 80-89 MM HG: CPT | Mod: CPTII,S$GLB,, | Performed by: INTERNAL MEDICINE

## 2023-03-01 PROCEDURE — 1160F PR REVIEW ALL MEDS BY PRESCRIBER/CLIN PHARMACIST DOCUMENTED: ICD-10-PCS | Mod: CPTII,S$GLB,, | Performed by: INTERNAL MEDICINE

## 2023-03-01 PROCEDURE — 99204 OFFICE O/P NEW MOD 45 MIN: CPT | Mod: S$GLB,,, | Performed by: INTERNAL MEDICINE
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
## 2023-03-09 ENCOUNTER — PES CALL (OUTPATIENT)
Dept: ADMINISTRATIVE | Facility: CLINIC | Age: 73
End: 2023-03-09
Payer: MEDICARE

## 2023-03-14 ENCOUNTER — PES CALL (OUTPATIENT)
Dept: ADMINISTRATIVE | Facility: CLINIC | Age: 73
End: 2023-03-14
Payer: MEDICARE

## 2023-03-20 ENCOUNTER — PATIENT MESSAGE (OUTPATIENT)
Dept: FAMILY MEDICINE | Facility: CLINIC | Age: 73
End: 2023-03-20
Payer: MEDICARE

## 2023-03-20 DIAGNOSIS — E78.5 HYPERLIPIDEMIA, UNSPECIFIED HYPERLIPIDEMIA TYPE: ICD-10-CM

## 2023-03-20 DIAGNOSIS — I10 HYPERTENSION, UNSPECIFIED TYPE: Primary | ICD-10-CM

## 2023-03-20 DIAGNOSIS — R01.1 HEART MURMUR: ICD-10-CM

## 2023-03-21 ENCOUNTER — PATIENT OUTREACH (OUTPATIENT)
Dept: ADMINISTRATIVE | Facility: HOSPITAL | Age: 73
End: 2023-03-21
Payer: MEDICARE

## 2023-03-21 ENCOUNTER — OFFICE VISIT (OUTPATIENT)
Dept: PODIATRY | Facility: CLINIC | Age: 73
End: 2023-03-21
Payer: MEDICARE

## 2023-03-21 ENCOUNTER — HOSPITAL ENCOUNTER (OUTPATIENT)
Dept: RADIOLOGY | Facility: HOSPITAL | Age: 73
Discharge: HOME | End: 2023-03-21
Attending: PODIATRIST
Payer: MEDICARE

## 2023-03-21 VITALS — BODY MASS INDEX: 29.09 KG/M2 | WEIGHT: 181 LBS | HEIGHT: 66 IN

## 2023-03-21 DIAGNOSIS — M21.619 BUNION: ICD-10-CM

## 2023-03-21 DIAGNOSIS — M79.671 RIGHT FOOT PAIN: Primary | ICD-10-CM

## 2023-03-21 DIAGNOSIS — M79.671 RIGHT FOOT PAIN: ICD-10-CM

## 2023-03-21 DIAGNOSIS — M21.621 TAILOR'S BUNION OF RIGHT FOOT: ICD-10-CM

## 2023-03-21 PROCEDURE — 73630 X-RAY EXAM OF FOOT: CPT | Mod: TC,FY,PO,RT

## 2023-03-21 PROCEDURE — 3288F PR FALLS RISK ASSESSMENT DOCUMENTED: ICD-10-PCS | Mod: CPTII,S$GLB,, | Performed by: PODIATRIST

## 2023-03-21 PROCEDURE — 73630 XR FOOT COMPLETE 3 VIEW RIGHT: ICD-10-PCS | Mod: 26,RT,, | Performed by: RADIOLOGY

## 2023-03-21 PROCEDURE — 1159F MED LIST DOCD IN RCRD: CPT | Mod: CPTII,S$GLB,, | Performed by: PODIATRIST

## 2023-03-21 PROCEDURE — 1125F AMNT PAIN NOTED PAIN PRSNT: CPT | Mod: CPTII,S$GLB,, | Performed by: PODIATRIST

## 2023-03-21 PROCEDURE — 1101F PT FALLS ASSESS-DOCD LE1/YR: CPT | Mod: CPTII,S$GLB,, | Performed by: PODIATRIST

## 2023-03-21 PROCEDURE — 1159F PR MEDICATION LIST DOCUMENTED IN MEDICAL RECORD: ICD-10-PCS | Mod: CPTII,S$GLB,, | Performed by: PODIATRIST

## 2023-03-21 PROCEDURE — 3288F FALL RISK ASSESSMENT DOCD: CPT | Mod: CPTII,S$GLB,, | Performed by: PODIATRIST

## 2023-03-21 PROCEDURE — 99214 OFFICE O/P EST MOD 30 MIN: CPT | Mod: S$GLB,,, | Performed by: PODIATRIST

## 2023-03-21 PROCEDURE — 99999 PR PBB SHADOW E&M-EST. PATIENT-LVL III: ICD-10-PCS | Mod: PBBFAC,,, | Performed by: PODIATRIST

## 2023-03-21 PROCEDURE — 1125F PR PAIN SEVERITY QUANTIFIED, PAIN PRESENT: ICD-10-PCS | Mod: CPTII,S$GLB,, | Performed by: PODIATRIST

## 2023-03-21 PROCEDURE — 99999 PR PBB SHADOW E&M-EST. PATIENT-LVL III: CPT | Mod: PBBFAC,,, | Performed by: PODIATRIST

## 2023-03-21 PROCEDURE — 73630 X-RAY EXAM OF FOOT: CPT | Mod: 26,RT,, | Performed by: RADIOLOGY

## 2023-03-21 PROCEDURE — 1101F PR PT FALLS ASSESS DOC 0-1 FALLS W/OUT INJ PAST YR: ICD-10-PCS | Mod: CPTII,S$GLB,, | Performed by: PODIATRIST

## 2023-03-21 PROCEDURE — 99214 PR OFFICE/OUTPT VISIT, EST, LEVL IV, 30-39 MIN: ICD-10-PCS | Mod: S$GLB,,, | Performed by: PODIATRIST

## 2023-03-21 PROCEDURE — 3008F PR BODY MASS INDEX (BMI) DOCUMENTED: ICD-10-PCS | Mod: CPTII,S$GLB,, | Performed by: PODIATRIST

## 2023-03-21 PROCEDURE — 3008F BODY MASS INDEX DOCD: CPT | Mod: CPTII,S$GLB,, | Performed by: PODIATRIST

## 2023-03-21 NOTE — PROGRESS NOTES
Subjective:      Patient ID: Windy Lindo is a 72 y.o. female.    Chief Complaint: Bunions (Right foot), Foot Problem, and Foot Pain    Windy is a 72 y.o. female who presents to the podiatry clinic  with complaint of  right foot bunion pain   Onset of the symptoms was several weeks ago. Precipitating event: none known. Current symptoms include: ability to bear weight, but with some pain. Aggravating factors: any weight bearing. Symptoms have progressed to a point and plateaued. Patient has had no prior foot problems. Evaluation to date: none. Treatment to date: none. colin.      Review of Systems   Constitutional: Negative for chills.   Cardiovascular:  Negative for chest pain and claudication.   Respiratory:  Negative for cough.    Skin:  Positive for color change, dry skin and nail changes.   Musculoskeletal:  Positive for joint pain.   Gastrointestinal:  Negative for nausea.   Neurological:  Positive for paresthesias. Negative for numbness.   Psychiatric/Behavioral:  The patient is not nervous/anxious.          Objective:      Physical Exam  Constitutional:       Appearance: She is well-developed.      Comments: Oriented to time, place, and person.   Cardiovascular:      Comments: DP and PT pulses are palpable bilaterally. 3 sec capillary refill time and toes and feet are warm to touch proximally .  There is  hair growth on the feet and toes b/l. There is no edema b/l. No spider veins or varicosities present b/l.     Musculoskeletal:      Comments: Equinus noted b/l ankles with < 10 deg DF noted. MMT 5/5 in DF/PF/Inv/Ev resistance with no reproduction of pain in any direction. Passive range of motion of ankle and pedal joints is painless b/l.    Decreased stride, station of gait.  apropulsive toe off.  Increased angle and base of gait.      Patient has hammertoes of digits 2-5 bilateral partially reducible without symptom today.     Visible and palpable bunion without pain at dorsomedial 1st metatarsal head  right and left.  Hallux abducted right and left partially reducible, tracks laterally without being track bound.  No ecchymosis, erythema, edema, or cardinal signs infection or signs of trauma same foot.     Fat pad atrophy to heels and met heads bilateral       Feet:      Right foot:      Skin integrity: No callus or dry skin.      Left foot:      Skin integrity: No callus or dry skin.   Lymphadenopathy:      Comments: Negative lymphadenopathy bilateral popliteal fossa and tarsal tunnel.   Skin:     Comments: No open lesions, lacerations or wounds noted.Interdigital spaces clean, dry and intact b/l. No erythema noted to b/l foot.    Focal hyperkeratotic lesion right 2nd toe. Mild erythema noted.        Neurological:      Mental Status: She is alert.      Comments: Light touch, proprioception, and sharp/dull sensation are all intact bilaterally. Protective threshold with the [place]-Wienstein monofilament is intact bilaterally.    Psychiatric:         Behavior: Behavior is cooperative.             Assessment:       Encounter Diagnoses   Name Primary?    Right foot pain Yes    Bunion     Tailor's bunion of right foot          Plan:       Windy was seen today for bunions, foot problem and foot pain.    Diagnoses and all orders for this visit:    Right foot pain  -     X-Ray Foot Complete Right; Future    Bunion    Tailor's bunion of right foot      I counseled the patient on her conditions, their implications and medical management.    Right  foot xray to assess underlying deformity and for underlying osseous pathology.     Discussed conservative treatment with shoes of adequate dimensions, material, and style to alleviate symptoms and delay or prevent surgical intervention.    Conservative treatments for bunion deformity  discussed include padding, wide width shoe. ; Surgical treatments discussed and generic post-op course. Patient opting to pursue surgical option. Appt. Scheduled with Dr. Rufina cates.   RTC PRN

## 2023-03-22 ENCOUNTER — LAB VISIT (OUTPATIENT)
Dept: LAB | Facility: HOSPITAL | Age: 73
End: 2023-03-22
Attending: FAMILY MEDICINE
Payer: MEDICARE

## 2023-03-22 DIAGNOSIS — R01.1 HEART MURMUR: ICD-10-CM

## 2023-03-22 DIAGNOSIS — E78.5 HYPERLIPIDEMIA, UNSPECIFIED HYPERLIPIDEMIA TYPE: ICD-10-CM

## 2023-03-22 DIAGNOSIS — I10 HYPERTENSION, UNSPECIFIED TYPE: ICD-10-CM

## 2023-03-22 LAB
ALBUMIN SERPL BCP-MCNC: 3.7 G/DL (ref 3.5–5.2)
ALP SERPL-CCNC: 93 U/L (ref 55–135)
ALT SERPL W/O P-5'-P-CCNC: 11 U/L (ref 10–44)
ANION GAP SERPL CALC-SCNC: 9 MMOL/L (ref 8–16)
AST SERPL-CCNC: 15 U/L (ref 10–40)
BASOPHILS # BLD AUTO: 0.04 K/UL (ref 0–0.2)
BASOPHILS NFR BLD: 0.6 % (ref 0–1.9)
BILIRUB SERPL-MCNC: 0.5 MG/DL (ref 0.1–1)
BUN SERPL-MCNC: 20 MG/DL (ref 8–23)
CALCIUM SERPL-MCNC: 10 MG/DL (ref 8.7–10.5)
CHLORIDE SERPL-SCNC: 108 MMOL/L (ref 95–110)
CHOLEST SERPL-MCNC: 217 MG/DL (ref 120–199)
CHOLEST/HDLC SERPL: 3.1 {RATIO} (ref 2–5)
CO2 SERPL-SCNC: 26 MMOL/L (ref 23–29)
CREAT SERPL-MCNC: 0.7 MG/DL (ref 0.5–1.4)
DIFFERENTIAL METHOD: ABNORMAL
EOSINOPHIL # BLD AUTO: 0.1 K/UL (ref 0–0.5)
EOSINOPHIL NFR BLD: 1.5 % (ref 0–8)
ERYTHROCYTE [DISTWIDTH] IN BLOOD BY AUTOMATED COUNT: 13.3 % (ref 11.5–14.5)
EST. GFR  (NO RACE VARIABLE): >60 ML/MIN/1.73 M^2
GLUCOSE SERPL-MCNC: 86 MG/DL (ref 70–110)
HCT VFR BLD AUTO: 45.9 % (ref 37–48.5)
HDLC SERPL-MCNC: 69 MG/DL (ref 40–75)
HDLC SERPL: 31.8 % (ref 20–50)
HGB BLD-MCNC: 14.4 G/DL (ref 12–16)
IMM GRANULOCYTES # BLD AUTO: 0.02 K/UL (ref 0–0.04)
IMM GRANULOCYTES NFR BLD AUTO: 0.3 % (ref 0–0.5)
LDLC SERPL CALC-MCNC: 132.8 MG/DL (ref 63–159)
LYMPHOCYTES # BLD AUTO: 1.9 K/UL (ref 1–4.8)
LYMPHOCYTES NFR BLD: 28.3 % (ref 18–48)
MCH RBC QN AUTO: 29.8 PG (ref 27–31)
MCHC RBC AUTO-ENTMCNC: 31.4 G/DL (ref 32–36)
MCV RBC AUTO: 95 FL (ref 82–98)
MONOCYTES # BLD AUTO: 0.7 K/UL (ref 0.3–1)
MONOCYTES NFR BLD: 10.6 % (ref 4–15)
NEUTROPHILS # BLD AUTO: 3.9 K/UL (ref 1.8–7.7)
NEUTROPHILS NFR BLD: 58.7 % (ref 38–73)
NONHDLC SERPL-MCNC: 148 MG/DL
NRBC BLD-RTO: 0 /100 WBC
PLATELET # BLD AUTO: 181 K/UL (ref 150–450)
PMV BLD AUTO: 13.4 FL (ref 9.2–12.9)
POTASSIUM SERPL-SCNC: 4.1 MMOL/L (ref 3.5–5.1)
PROT SERPL-MCNC: 6.6 G/DL (ref 6–8.4)
RBC # BLD AUTO: 4.83 M/UL (ref 4–5.4)
SODIUM SERPL-SCNC: 143 MMOL/L (ref 136–145)
TRIGL SERPL-MCNC: 76 MG/DL (ref 30–150)
TSH SERPL DL<=0.005 MIU/L-ACNC: 1.69 UIU/ML (ref 0.4–4)
WBC # BLD AUTO: 6.71 K/UL (ref 3.9–12.7)

## 2023-03-22 PROCEDURE — 85025 COMPLETE CBC W/AUTO DIFF WBC: CPT | Performed by: FAMILY MEDICINE

## 2023-03-22 PROCEDURE — 36415 COLL VENOUS BLD VENIPUNCTURE: CPT | Mod: PO | Performed by: FAMILY MEDICINE

## 2023-03-22 PROCEDURE — 80053 COMPREHEN METABOLIC PANEL: CPT | Performed by: FAMILY MEDICINE

## 2023-03-22 PROCEDURE — 80061 LIPID PANEL: CPT | Performed by: FAMILY MEDICINE

## 2023-03-22 PROCEDURE — 84443 ASSAY THYROID STIM HORMONE: CPT | Performed by: FAMILY MEDICINE

## 2023-03-23 ENCOUNTER — PATIENT MESSAGE (OUTPATIENT)
Dept: ADMINISTRATIVE | Facility: HOSPITAL | Age: 73
End: 2023-03-23
Payer: MEDICARE

## 2023-03-24 ENCOUNTER — TELEPHONE (OUTPATIENT)
Dept: FAMILY MEDICINE | Facility: CLINIC | Age: 73
End: 2023-03-24
Payer: MEDICARE

## 2023-03-24 VITALS — SYSTOLIC BLOOD PRESSURE: 123 MMHG | DIASTOLIC BLOOD PRESSURE: 80 MMHG

## 2023-03-30 ENCOUNTER — OFFICE VISIT (OUTPATIENT)
Dept: PODIATRY | Facility: CLINIC | Age: 73
End: 2023-03-30
Payer: MEDICARE

## 2023-03-30 VITALS — HEIGHT: 66 IN | WEIGHT: 180.63 LBS | BODY MASS INDEX: 29.03 KG/M2

## 2023-03-30 DIAGNOSIS — M21.621 TAILOR'S BUNION OF RIGHT FOOT: ICD-10-CM

## 2023-03-30 DIAGNOSIS — M20.11 HAV (HALLUX ABDUCTO VALGUS), RIGHT: Primary | ICD-10-CM

## 2023-03-30 DIAGNOSIS — M79.671 RIGHT FOOT PAIN: ICD-10-CM

## 2023-03-30 PROCEDURE — 1159F PR MEDICATION LIST DOCUMENTED IN MEDICAL RECORD: ICD-10-PCS | Mod: CPTII,S$GLB,, | Performed by: PODIATRIST

## 2023-03-30 PROCEDURE — 1159F MED LIST DOCD IN RCRD: CPT | Mod: CPTII,S$GLB,, | Performed by: PODIATRIST

## 2023-03-30 PROCEDURE — 1160F RVW MEDS BY RX/DR IN RCRD: CPT | Mod: CPTII,S$GLB,, | Performed by: PODIATRIST

## 2023-03-30 PROCEDURE — 1101F PT FALLS ASSESS-DOCD LE1/YR: CPT | Mod: CPTII,S$GLB,, | Performed by: PODIATRIST

## 2023-03-30 PROCEDURE — 3288F PR FALLS RISK ASSESSMENT DOCUMENTED: ICD-10-PCS | Mod: CPTII,S$GLB,, | Performed by: PODIATRIST

## 2023-03-30 PROCEDURE — 3008F BODY MASS INDEX DOCD: CPT | Mod: CPTII,S$GLB,, | Performed by: PODIATRIST

## 2023-03-30 PROCEDURE — 99214 OFFICE O/P EST MOD 30 MIN: CPT | Mod: S$GLB,,, | Performed by: PODIATRIST

## 2023-03-30 PROCEDURE — 1126F PR PAIN SEVERITY QUANTIFIED, NO PAIN PRESENT: ICD-10-PCS | Mod: CPTII,S$GLB,, | Performed by: PODIATRIST

## 2023-03-30 PROCEDURE — 3008F PR BODY MASS INDEX (BMI) DOCUMENTED: ICD-10-PCS | Mod: CPTII,S$GLB,, | Performed by: PODIATRIST

## 2023-03-30 PROCEDURE — 99999 PR PBB SHADOW E&M-EST. PATIENT-LVL III: ICD-10-PCS | Mod: PBBFAC,,, | Performed by: PODIATRIST

## 2023-03-30 PROCEDURE — 99999 PR PBB SHADOW E&M-EST. PATIENT-LVL III: CPT | Mod: PBBFAC,,, | Performed by: PODIATRIST

## 2023-03-30 PROCEDURE — 99214 PR OFFICE/OUTPT VISIT, EST, LEVL IV, 30-39 MIN: ICD-10-PCS | Mod: S$GLB,,, | Performed by: PODIATRIST

## 2023-03-30 PROCEDURE — 1101F PR PT FALLS ASSESS DOC 0-1 FALLS W/OUT INJ PAST YR: ICD-10-PCS | Mod: CPTII,S$GLB,, | Performed by: PODIATRIST

## 2023-03-30 PROCEDURE — 1126F AMNT PAIN NOTED NONE PRSNT: CPT | Mod: CPTII,S$GLB,, | Performed by: PODIATRIST

## 2023-03-30 PROCEDURE — 3288F FALL RISK ASSESSMENT DOCD: CPT | Mod: CPTII,S$GLB,, | Performed by: PODIATRIST

## 2023-03-30 PROCEDURE — 1160F PR REVIEW ALL MEDS BY PRESCRIBER/CLIN PHARMACIST DOCUMENTED: ICD-10-PCS | Mod: CPTII,S$GLB,, | Performed by: PODIATRIST

## 2023-03-30 NOTE — PROGRESS NOTES
Subjective:      Patient ID: Windy Lindo is a 72 y.o. female.    Chief Complaint: Surgical Consult      72 y.o. female presenting with right foot HAV and tailor's bunion pain. Walking in [place] today. Points 1st MPJ and 5th MPJ area for pain. Aching pain. Pain continues to get worse. Pain gets worse with pressure. Denies acute foot injury. Known PMH of osteoporosis. Not DM. Does not smoke. Known to Dr. Thompson. Here for surgical consultation.     Level of ambulation/Occupation:   Smoking status:   Rebecca-D Def:   DM:  Other:     Review of Systems   Constitutional: Negative for decreased appetite and malaise/fatigue.   Cardiovascular:  Negative for claudication and leg swelling.   Musculoskeletal:  Negative for arthritis, joint pain, joint swelling and muscle weakness.        R foot pain    Neurological:  Negative for numbness and weakness.   Psychiatric/Behavioral:  Negative for altered mental status.            Past Medical History:   Diagnosis Date    Arthritis     Eye injury 4 yrs    hit od    Nuclear sclerosis, bilateral 2/13/2019       Past Surgical History:   Procedure Laterality Date    APPENDECTOMY      COLONOSCOPY N/A 9/7/2017    Procedure: COLONOSCOPY;  Surgeon: Albino Fenton MD;  Location: [facility] ([address] [place] [address]);  Service: Endoscopy;  Laterality: N/A;    ESOPHAGOGASTRODUODENOSCOPY  09/07/2017    KNEE SURGERY Right 12/05/2017    TKR    LASIK  7 yrs    ou    TONSILLECTOMY         Family History   Problem Relation Age of Onset    Hypertension Mother     Glaucoma Mother     Diabetes Mother     Cataracts Mother     Cancer Mother 74        lung    Heart disease Mother 55    Hypertension Father     Heart disease Father         onset early 60;s, Aortic valve replacement ,Rheumatic fever as a child     No Known Problems Sister     Diabetes Brother     Cancer Brother 66        mesothelioma    No Known Problems Maternal Aunt     No Known Problems Maternal Uncle     No Known Problems Paternal Aunt     No Known Problems  Paternal Uncle     No Known Problems Maternal Grandmother     No Known Problems Maternal Grandfather     No Known Problems Paternal Grandmother     No Known Problems Paternal Grandfather     No Known Problems Other     Amblyopia Neg Hx     Blindness Neg Hx     Macular degeneration Neg Hx     Retinal detachment Neg Hx     Strabismus Neg Hx     Stroke Neg Hx     Thyroid disease Neg Hx     Melanoma Neg Hx        Social History     Socioeconomic History    Marital status: Single   Occupational History     Employer: [employer] [place]   Tobacco Use    Smoking status: Never    Smokeless tobacco: Never   Substance and Sexual Activity    Alcohol use: Yes     Alcohol/week: 1.0 standard drink     Types: 1 Glasses of wine per week     Comment: glass of wine a night     Drug use: No    Sexual activity: Never     Social Determinants of Health     Financial Resource Strain: Low Risk     Difficulty of Paying Living Expenses: Not hard at all   Food Insecurity: No Food Insecurity    Worried About Running Out of Food in the Last Year: Never true    Ran Out of Food in the Last Year: Never true   Transportation Needs: No Transportation Needs    Lack of Transportation (Medical): No    Lack of Transportation (Non-Medical): No   Physical Activity: Sufficiently Active    Days of Exercise per Week: 5 days    Minutes of Exercise per Session: 30 min   Stress: No Stress Concern Present    Feeling of Stress : Not at all   Social Connections: Unknown    Frequency of Communication with Friends and Family: More than three times a week    Frequency of Social Gatherings with Friends and Family: More than three times a week    Active Member of Clubs or Organizations: No    Attends Club or Organization Meetings: Patient refused    Marital Status: Never    Housing Stability: Unknown    Unable to Pay for Housing in the Last Year: No    Unstable Housing in the Last Year: No       Current Outpatient Medications   Medication Sig Dispense Refill    alendronate  "(FOSAMAX) 35 MG tablet TAKE 1 TABLET BY MOUTH 1 TIME A WEEK AS DIRECTED 12 tablet 0    amoxicillin (AMOXIL) 500 MG capsule Take 1,000 mg by mouth 2 (two) times daily.      b complex vitamins capsule Take 1 capsule by mouth once daily.      glucosam/chond/hyalu/CF borate (MOVE FREE JOINT HEALTH ORAL) Take by mouth.      hydroCHLOROthiazide (HYDRODIURIL) 12.5 MG Tab Take 1 tablet (12.5 mg total) by mouth once daily. 30 tablet 11    meclizine (ANTIVERT) 25 mg tablet Take 1 tablet (25 mg total) by mouth every 8 (eight) hours as needed for Dizziness. 90 tablet 0    meloxicam (MOBIC) 15 MG tablet TAKE 1 TABLET(15 MG) BY MOUTH EVERY DAY 90 tablet 0    multivit-min-FA-lycopen-lutein 0.4-300-250 mg-mcg-mcg Tab Take 1 tablet by mouth once daily.      nabumetone (RELAFEN) 500 MG tablet Take 1 tablet (500 mg total) by mouth 2 (two) times daily. 60 tablet 0    vitamin D (VITAMIN D3) 1000 units Tab Take 1,000 Units by mouth once daily.       No current facility-administered medications for this visit.       Review of patient's allergies indicates:  No Known Allergies    Vitals:    03/30/23 1322   Weight: 81.9 kg (180 lb 9.6 oz)   Height: 5' 6" (1.676 m)   PainSc: 0-No pain       Objective:      Physical Exam  Constitutional:       General: She is not in acute distress.     Appearance: She is well-developed.   HENT:      Nose: Nose normal.   Eyes:      Conjunctiva/sclera: Conjunctivae normal.   Pulmonary:      Effort: Pulmonary effort is normal.   Chest:      Chest wall: No tenderness.   Abdominal:      Tenderness: There is no abdominal tenderness.   Musculoskeletal:      Cervical back: Normal range of motion.   Neurological:      Mental Status: She is alert and oriented to person, place, and time.   Psychiatric:         Behavior: Behavior normal.       Vascular: Distal DP/PT pulses palpable 1/4. No vericosities noted to LEs. Hair growth present LE, warm to touch LE, No edema noted to LE.    Dermatologic: No open lesions, " lacerations or wounds. Interdigital spaces clean, dry and intact. No erythema, rubor, calor noted LE    Musculoskeletal: MMT 5/5 in DF/PF/Inv/Ev resistance with no reproduction of pain in any direction. Passive range of motion of ankle and pedal joints is painless. No calf tenderness LE, Compartments soft/compressible.   R foot: HAV, trackbound, ttp 1st met head, decreased ROM of 1st MPJ. Pain lateral 5th met head, diffuse pain over ball of foot.     Neurological: Light touch, proprioception, and sharp/dull sensation are all intact. Protective threshold with the [place]-Wienstein monofilament is intact. Vibratory sensation intact.               Assessment:       Encounter Diagnoses   Name Primary?    Hav (hallux abducto valgus), right Yes    Tailor's bunion of right foot     Right foot pain          Plan:       Windy was seen today for surgical consult.    Diagnoses and all orders for this visit:    Hav (hallux abducto valgus), right    Tailor's bunion of right foot    Right foot pain      I counseled the patient on her conditions, their implications and medical management.    72 y.o. female with right foot HAV, tailor's bunion.    -right foot xrays reviewed. Severe DALTON. TSP 6. Osteoporosis. Increased 4th IM. Decreased joint space at 1st MPJ.  -discussed the different treatment options with the patient.  We discussed MIS bunionectomy/1st metatarsal osteotomy versus 1st MPJ fusion with 5th metatarsal osteotomy.  Pros and cons of which procedures were thoroughly explained to patient. She would like to look up those procedures before making the decision.     -I reviewed imaging, clinical history, and diagnosis as above with the patient. I attempted to use layman's terms to educate the patient as well as utilize foot models and/or pictures. I personally went through imaging with the patient.    -The nature of the condition, options for management, as well as potential risks and complications were discussed in detail with  patient. Patient was amenable to my recommendations and left my office fully informed and will follow up as instructed or sooner if necessary.    -f/u prn     Note dictated with voice recognition software, please excuse any grammatical errors.

## 2023-04-04 ENCOUNTER — OFFICE VISIT (OUTPATIENT)
Dept: FAMILY MEDICINE | Facility: CLINIC | Age: 73
End: 2023-04-04
Payer: MEDICARE

## 2023-04-04 VITALS
OXYGEN SATURATION: 96 % | TEMPERATURE: 98 F | HEIGHT: 66 IN | BODY MASS INDEX: 28.63 KG/M2 | SYSTOLIC BLOOD PRESSURE: 120 MMHG | WEIGHT: 178.13 LBS | DIASTOLIC BLOOD PRESSURE: 70 MMHG | HEART RATE: 79 BPM

## 2023-04-04 DIAGNOSIS — M17.11 PRIMARY OSTEOARTHRITIS OF RIGHT KNEE: ICD-10-CM

## 2023-04-04 DIAGNOSIS — Z12.31 ENCOUNTER FOR SCREENING MAMMOGRAM FOR BREAST CANCER: ICD-10-CM

## 2023-04-04 DIAGNOSIS — E78.5 HYPERLIPIDEMIA, UNSPECIFIED HYPERLIPIDEMIA TYPE: ICD-10-CM

## 2023-04-04 DIAGNOSIS — I10 HYPERTENSION, UNSPECIFIED TYPE: Primary | ICD-10-CM

## 2023-04-04 PROCEDURE — 1126F AMNT PAIN NOTED NONE PRSNT: CPT | Mod: CPTII,S$GLB,, | Performed by: FAMILY MEDICINE

## 2023-04-04 PROCEDURE — 3288F PR FALLS RISK ASSESSMENT DOCUMENTED: ICD-10-PCS | Mod: CPTII,S$GLB,, | Performed by: FAMILY MEDICINE

## 2023-04-04 PROCEDURE — 99999 PR PBB SHADOW E&M-EST. PATIENT-LVL V: ICD-10-PCS | Mod: PBBFAC,,, | Performed by: FAMILY MEDICINE

## 2023-04-04 PROCEDURE — 3078F DIAST BP <80 MM HG: CPT | Mod: CPTII,S$GLB,, | Performed by: FAMILY MEDICINE

## 2023-04-04 PROCEDURE — 99214 OFFICE O/P EST MOD 30 MIN: CPT | Mod: S$GLB,,, | Performed by: FAMILY MEDICINE

## 2023-04-04 PROCEDURE — 1101F PT FALLS ASSESS-DOCD LE1/YR: CPT | Mod: CPTII,S$GLB,, | Performed by: FAMILY MEDICINE

## 2023-04-04 PROCEDURE — 1160F RVW MEDS BY RX/DR IN RCRD: CPT | Mod: CPTII,S$GLB,, | Performed by: FAMILY MEDICINE

## 2023-04-04 PROCEDURE — 1101F PR PT FALLS ASSESS DOC 0-1 FALLS W/OUT INJ PAST YR: ICD-10-PCS | Mod: CPTII,S$GLB,, | Performed by: FAMILY MEDICINE

## 2023-04-04 PROCEDURE — 1126F PR PAIN SEVERITY QUANTIFIED, NO PAIN PRESENT: ICD-10-PCS | Mod: CPTII,S$GLB,, | Performed by: FAMILY MEDICINE

## 2023-04-04 PROCEDURE — 99214 PR OFFICE/OUTPT VISIT, EST, LEVL IV, 30-39 MIN: ICD-10-PCS | Mod: S$GLB,,, | Performed by: FAMILY MEDICINE

## 2023-04-04 PROCEDURE — 99999 PR PBB SHADOW E&M-EST. PATIENT-LVL V: CPT | Mod: PBBFAC,,, | Performed by: FAMILY MEDICINE

## 2023-04-04 PROCEDURE — 3074F SYST BP LT 130 MM HG: CPT | Mod: CPTII,S$GLB,, | Performed by: FAMILY MEDICINE

## 2023-04-04 PROCEDURE — 3288F FALL RISK ASSESSMENT DOCD: CPT | Mod: CPTII,S$GLB,, | Performed by: FAMILY MEDICINE

## 2023-04-04 PROCEDURE — 1160F PR REVIEW ALL MEDS BY PRESCRIBER/CLIN PHARMACIST DOCUMENTED: ICD-10-PCS | Mod: CPTII,S$GLB,, | Performed by: FAMILY MEDICINE

## 2023-04-04 PROCEDURE — 3008F PR BODY MASS INDEX (BMI) DOCUMENTED: ICD-10-PCS | Mod: CPTII,S$GLB,, | Performed by: FAMILY MEDICINE

## 2023-04-04 PROCEDURE — 3078F PR MOST RECENT DIASTOLIC BLOOD PRESSURE < 80 MM HG: ICD-10-PCS | Mod: CPTII,S$GLB,, | Performed by: FAMILY MEDICINE

## 2023-04-04 PROCEDURE — 1159F PR MEDICATION LIST DOCUMENTED IN MEDICAL RECORD: ICD-10-PCS | Mod: CPTII,S$GLB,, | Performed by: FAMILY MEDICINE

## 2023-04-04 PROCEDURE — 1159F MED LIST DOCD IN RCRD: CPT | Mod: CPTII,S$GLB,, | Performed by: FAMILY MEDICINE

## 2023-04-04 PROCEDURE — 3008F BODY MASS INDEX DOCD: CPT | Mod: CPTII,S$GLB,, | Performed by: FAMILY MEDICINE

## 2023-04-04 PROCEDURE — 3074F PR MOST RECENT SYSTOLIC BLOOD PRESSURE < 130 MM HG: ICD-10-PCS | Mod: CPTII,S$GLB,, | Performed by: FAMILY MEDICINE

## 2023-04-04 RX ORDER — METHYLPREDNISOLONE 4 MG/1
TABLET ORAL
Qty: 21 EACH | Refills: 0 | Status: SHIPPED | OUTPATIENT
Start: 2023-04-04 | End: 2023-04-25

## 2023-04-04 NOTE — PROGRESS NOTES
Routine Office Visit    Windy Lindo  1950  [id number]      Subjective     Windy is a 72 y.o. female who presents today for:    Hypertension - Patient has been monitoring bp at home. It is usually in the 120s-130s/80s.   Vertigo - Patient has been evaluated by ENT for vertigo.   Right shoulder pain - Right shoulder pain - chronic condition for patient. She had improvement after injection on last visit.  Patient has days when pain is severe and days when it is ok. Patient has been doing home exercises at home. She has been using resistant band exercises. Patient has taken tylenol with relief of symptoms. She feels it may be related to weather. No trauma. No injuries. No lifting. Mobic minimally helps   Knee pain - Patient has history of knee surgery. She has been cycling at home to help keep up her strength. Knee is starting to hurt again, especially after fall due to vertigo. She would like evaluation by orthopedics.     Objective     Answers submitted by the patient for this visit:  Review of Systems Questionnaire (Submitted on 3/30/2023)  activity change: No  unexpected weight change: No  rhinorrhea: No  trouble swallowing: No  visual disturbance: No  chest tightness: No  polyuria: No  difficulty urinating: No  menstrual problem: No  joint swelling: Yes  arthralgias: Yes  confusion: No  dysphoric mood: No    Review of Systems   Constitutional:  Negative for chills and fever.   HENT:  Negative for congestion.    Eyes:  Negative for blurred vision.   Respiratory:  Negative for cough.    Cardiovascular:  Negative for chest pain.   Gastrointestinal:  Negative for abdominal pain, constipation, diarrhea, heartburn, nausea and vomiting.   Genitourinary:  Negative for dysuria.   Musculoskeletal:  Negative for myalgias.   Skin:  Negative for itching and rash.   Neurological:  Negative for dizziness and headaches.   Psychiatric/Behavioral:  Negative for depression.      /70   Pulse 79   Temp 98 °F (36.7 °C)  "(Oral)   Ht 5' 6" (1.676 m)   Wt 80.8 kg (178 lb 2.1 oz)   SpO2 96%   BMI 28.75 kg/m²   Physical Exam  Constitutional:       Appearance: She is well-developed.   HENT:      Head: Normocephalic and atraumatic.   Eyes:      Conjunctiva/sclera: Conjunctivae normal.      Pupils: Pupils are equal, round, and reactive to light.   Cardiovascular:      Rate and Rhythm: Normal rate and regular rhythm.      Heart sounds: Normal heart sounds. No murmur heard.    No friction rub. No gallop.   Pulmonary:      Effort: No respiratory distress.      Breath sounds: Normal breath sounds.   Abdominal:      General: Bowel sounds are normal. There is no distension.      Palpations: Abdomen is soft.      Tenderness: There is no abdominal tenderness.   Musculoskeletal:         General: Normal range of motion.      Cervical back: Normal range of motion and neck supple.   Lymphadenopathy:      Cervical: No cervical adenopathy.   Skin:     General: Skin is warm.   Neurological:      Mental Status: She is alert and oriented to person, place, and time.           Assessment     Health Maintenance         Date Due Completion Date    COVID-19 Vaccine (4 - Booster for Pfizer series) 11/26/2021 10/1/2021    Colorectal Cancer Screening 09/07/2022 9/7/2017    Mammogram 05/04/2023 5/4/2022    DEXA Scan 01/10/2024 1/10/2022    Lipid Panel 03/22/2028 3/22/2023    Override on 2/4/2013: Done    TETANUS VACCINE 06/15/2028 6/15/2018              Problem List Items Addressed This Visit          Cardiac/Vascular    Hyperlipidemia  The current medical regimen is effective;  continue present plan and medications.      Hypertension - Primary  The current medical regimen is effective;  continue present plan and medications.         Orthopedic    Primary osteoarthritis of right knee    Relevant Medications    methylPREDNISolone (MEDROL DOSEPACK) 4 mg tablet  Common side effects of this medication were discussed with the patient. Questions regarding medications " were discussed during this visit.        Other Relevant Orders    Ambulatory referral/consult to Orthopedics     Other Visit Diagnoses       Encounter for screening mammogram for breast cancer        Relevant Orders    Mammo Digital Screening Bilat w/ [facility]          I addressed all major concerns as it related to health maintenance.  All were ordered and scheduled based on the patients wishes.  Any additional health maintenance will be readdressed at the next physical if declined or deferred by the patient.           Follow up in about 6 months (around 10/4/2023), or if symptoms worsen or fail to improve, for yearly exam.

## 2023-04-05 ENCOUNTER — HOSPITAL ENCOUNTER (OUTPATIENT)
Dept: CARDIOLOGY | Facility: HOSPITAL | Age: 73
Discharge: HOME | End: 2023-04-05
Attending: INTERNAL MEDICINE
Payer: MEDICARE

## 2023-04-05 ENCOUNTER — CLINICAL SUPPORT (OUTPATIENT)
Dept: CARDIOLOGY | Facility: HOSPITAL | Age: 73
End: 2023-04-05
Attending: INTERNAL MEDICINE
Payer: MEDICARE

## 2023-04-05 VITALS
DIASTOLIC BLOOD PRESSURE: 70 MMHG | HEIGHT: 66 IN | SYSTOLIC BLOOD PRESSURE: 120 MMHG | HEART RATE: 92 BPM | BODY MASS INDEX: 29.09 KG/M2 | WEIGHT: 181 LBS

## 2023-04-05 DIAGNOSIS — R00.2 HEART PALPITATIONS: ICD-10-CM

## 2023-04-05 LAB
ASCENDING AORTA: 3.32 CM
AV INDEX (PROSTH): 0.89
AV MEAN GRADIENT: 11 MMHG
AV PEAK GRADIENT: 17 MMHG
AV VALVE AREA: 2.95 CM2
AV VELOCITY RATIO: 0.79
BSA FOR ECHO PROCEDURE: 1.96 M2
CV ECHO LV RWT: 0.41 CM
DOP CALC AO PEAK VEL: 2.06 M/S
DOP CALC AO VTI: 39.6 CM
DOP CALC LVOT AREA: 3.3 CM2
DOP CALC LVOT DIAMETER: 2.06 CM
DOP CALC LVOT PEAK VEL: 1.63 M/S
DOP CALC LVOT STROKE VOLUME: 116.96 CM3
DOP CALCLVOT PEAK VEL VTI: 35.11 CM
E WAVE DECELERATION TIME: 246.94 MSEC
E/A RATIO: 0.75
E/E' RATIO: 13.83 M/S
ECHO LV POSTERIOR WALL: 1.05 CM (ref 0.6–1.1)
EJECTION FRACTION: 65 %
FRACTIONAL SHORTENING: 30 % (ref 28–44)
INTERVENTRICULAR SEPTUM: 1.05 CM (ref 0.6–1.1)
LA MAJOR: 4.75 CM
LA MINOR: 4.74 CM
LA WIDTH: 4.32 CM
LEFT ATRIUM SIZE: 3.73 CM
LEFT ATRIUM VOLUME INDEX MOD: 38.2 ML/M2
LEFT ATRIUM VOLUME INDEX: 33.8 ML/M2
LEFT ATRIUM VOLUME MOD: 73.4 CM3
LEFT ATRIUM VOLUME: 64.99 CM3
LEFT INTERNAL DIMENSION IN SYSTOLE: 3.55 CM (ref 2.1–4)
LEFT VENTRICLE DIASTOLIC VOLUME INDEX: 63.89 ML/M2
LEFT VENTRICLE DIASTOLIC VOLUME: 122.66 ML
LEFT VENTRICLE MASS INDEX: 104 G/M2
LEFT VENTRICLE SYSTOLIC VOLUME INDEX: 27.5 ML/M2
LEFT VENTRICLE SYSTOLIC VOLUME: 52.8 ML
LEFT VENTRICULAR INTERNAL DIMENSION IN DIASTOLE: 5.08 CM (ref 3.5–6)
LEFT VENTRICULAR MASS: 199.49 G
LV LATERAL E/E' RATIO: 11.86 M/S
LV SEPTAL E/E' RATIO: 16.6 M/S
MV A" WAVE DURATION": 7.99 MSEC
MV PEAK A VEL: 1.1 M/S
MV PEAK E VEL: 0.83 M/S
MV STENOSIS PRESSURE HALF TIME: 71.61 MS
MV VALVE AREA P 1/2 METHOD: 3.07 CM2
PISA TR MAX VEL: 2.78 M/S
PULM VEIN S/D RATIO: 1.41
PV PEAK D VEL: 0.39 M/S
PV PEAK S VEL: 0.55 M/S
RA MAJOR: 4.13 CM
RA PRESSURE: 3 MMHG
RA WIDTH: 2.87 CM
RIGHT VENTRICULAR END-DIASTOLIC DIMENSION: 3.06 CM
RV TISSUE DOPPLER FREE WALL SYSTOLIC VELOCITY 1 (APICAL 4 CHAMBER VIEW): 10.51 CM/S
SINUS: 2.97 CM
STJ: 2.34 CM
TDI LATERAL: 0.07 M/S
TDI SEPTAL: 0.05 M/S
TDI: 0.06 M/S
TR MAX PG: 31 MMHG
TRICUSPID ANNULAR PLANE SYSTOLIC EXCURSION: 1.67 CM
TV REST PULMONARY ARTERY PRESSURE: 34 MMHG

## 2023-04-05 PROCEDURE — 93306 TTE W/DOPPLER COMPLETE: CPT | Mod: 26,,, | Performed by: INTERNAL MEDICINE

## 2023-04-05 PROCEDURE — 93306 TTE W/DOPPLER COMPLETE: CPT

## 2023-04-05 PROCEDURE — 93226 XTRNL ECG REC<48 HR SCAN A/R: CPT

## 2023-04-05 PROCEDURE — 93227 XTRNL ECG REC<48 HR R&I: CPT | Mod: ,,, | Performed by: INTERNAL MEDICINE

## 2023-04-05 PROCEDURE — 93306 ECHO (CUPID ONLY): ICD-10-PCS | Mod: 26,,, | Performed by: INTERNAL MEDICINE

## 2023-04-05 PROCEDURE — 93227 HOLTER MONITOR - 48 HOUR (CUPID ONLY): ICD-10-PCS | Mod: ,,, | Performed by: INTERNAL MEDICINE

## 2023-04-24 ENCOUNTER — OFFICE VISIT (OUTPATIENT)
Dept: CARDIOLOGY | Facility: CLINIC | Age: 73
End: 2023-04-24
Payer: MEDICARE

## 2023-04-24 VITALS
HEIGHT: 66 IN | WEIGHT: 177.5 LBS | DIASTOLIC BLOOD PRESSURE: 76 MMHG | BODY MASS INDEX: 28.53 KG/M2 | SYSTOLIC BLOOD PRESSURE: 124 MMHG | RESPIRATION RATE: 18 BRPM | HEART RATE: 73 BPM | OXYGEN SATURATION: 97 %

## 2023-04-24 DIAGNOSIS — M25.572 CHRONIC PAIN OF LEFT ANKLE: ICD-10-CM

## 2023-04-24 DIAGNOSIS — E78.5 HYPERLIPIDEMIA, UNSPECIFIED HYPERLIPIDEMIA TYPE: ICD-10-CM

## 2023-04-24 DIAGNOSIS — I10 HYPERTENSION, UNSPECIFIED TYPE: Primary | ICD-10-CM

## 2023-04-24 DIAGNOSIS — R01.1 HEART MURMUR: ICD-10-CM

## 2023-04-24 DIAGNOSIS — G89.29 CHRONIC PAIN OF LEFT ANKLE: ICD-10-CM

## 2023-04-24 LAB
OHS CV EVENT MONITOR DAY: 0
OHS CV HOLTER LENGTH DECIMAL HOURS: 48
OHS CV HOLTER LENGTH HOURS: 48
OHS CV HOLTER LENGTH MINUTES: 0
OHS CV HOLTER SINUS AVERAGE HR: 84
OHS CV HOLTER SINUS MAX HR: 129
OHS CV HOLTER SINUS MIN HR: 59

## 2023-04-24 PROCEDURE — 1160F RVW MEDS BY RX/DR IN RCRD: CPT | Mod: CPTII,S$GLB,, | Performed by: INTERNAL MEDICINE

## 2023-04-24 PROCEDURE — 3288F PR FALLS RISK ASSESSMENT DOCUMENTED: ICD-10-PCS | Mod: CPTII,S$GLB,, | Performed by: INTERNAL MEDICINE

## 2023-04-24 PROCEDURE — 3078F PR MOST RECENT DIASTOLIC BLOOD PRESSURE < 80 MM HG: ICD-10-PCS | Mod: CPTII,S$GLB,, | Performed by: INTERNAL MEDICINE

## 2023-04-24 PROCEDURE — 3074F SYST BP LT 130 MM HG: CPT | Mod: CPTII,S$GLB,, | Performed by: INTERNAL MEDICINE

## 2023-04-24 PROCEDURE — 99214 PR OFFICE/OUTPT VISIT, EST, LEVL IV, 30-39 MIN: ICD-10-PCS | Mod: S$GLB,,, | Performed by: INTERNAL MEDICINE

## 2023-04-24 PROCEDURE — 99999 PR PBB SHADOW E&M-EST. PATIENT-LVL IV: CPT | Mod: PBBFAC,,, | Performed by: INTERNAL MEDICINE

## 2023-04-24 PROCEDURE — 99999 PR PBB SHADOW E&M-EST. PATIENT-LVL IV: ICD-10-PCS | Mod: PBBFAC,,, | Performed by: INTERNAL MEDICINE

## 2023-04-24 PROCEDURE — 1101F PT FALLS ASSESS-DOCD LE1/YR: CPT | Mod: CPTII,S$GLB,, | Performed by: INTERNAL MEDICINE

## 2023-04-24 PROCEDURE — 99214 OFFICE O/P EST MOD 30 MIN: CPT | Mod: S$GLB,,, | Performed by: INTERNAL MEDICINE

## 2023-04-24 PROCEDURE — 1101F PR PT FALLS ASSESS DOC 0-1 FALLS W/OUT INJ PAST YR: ICD-10-PCS | Mod: CPTII,S$GLB,, | Performed by: INTERNAL MEDICINE

## 2023-04-24 PROCEDURE — 3078F DIAST BP <80 MM HG: CPT | Mod: CPTII,S$GLB,, | Performed by: INTERNAL MEDICINE

## 2023-04-24 PROCEDURE — 3008F BODY MASS INDEX DOCD: CPT | Mod: CPTII,S$GLB,, | Performed by: INTERNAL MEDICINE

## 2023-04-24 PROCEDURE — 1159F MED LIST DOCD IN RCRD: CPT | Mod: CPTII,S$GLB,, | Performed by: INTERNAL MEDICINE

## 2023-04-24 PROCEDURE — 93000 ELECTROCARDIOGRAM COMPLETE: CPT | Mod: S$GLB,,, | Performed by: INTERNAL MEDICINE

## 2023-04-24 PROCEDURE — 93000 EKG 12-LEAD: ICD-10-PCS | Mod: S$GLB,,, | Performed by: INTERNAL MEDICINE

## 2023-04-24 PROCEDURE — 3288F FALL RISK ASSESSMENT DOCD: CPT | Mod: CPTII,S$GLB,, | Performed by: INTERNAL MEDICINE

## 2023-04-24 PROCEDURE — 3008F PR BODY MASS INDEX (BMI) DOCUMENTED: ICD-10-PCS | Mod: CPTII,S$GLB,, | Performed by: INTERNAL MEDICINE

## 2023-04-24 PROCEDURE — 1159F PR MEDICATION LIST DOCUMENTED IN MEDICAL RECORD: ICD-10-PCS | Mod: CPTII,S$GLB,, | Performed by: INTERNAL MEDICINE

## 2023-04-24 PROCEDURE — 1160F PR REVIEW ALL MEDS BY PRESCRIBER/CLIN PHARMACIST DOCUMENTED: ICD-10-PCS | Mod: CPTII,S$GLB,, | Performed by: INTERNAL MEDICINE

## 2023-04-24 PROCEDURE — 3074F PR MOST RECENT SYSTOLIC BLOOD PRESSURE < 130 MM HG: ICD-10-PCS | Mod: CPTII,S$GLB,, | Performed by: INTERNAL MEDICINE

## 2023-04-24 RX ORDER — HYDROCHLOROTHIAZIDE 25 MG/1
25 TABLET ORAL DAILY
Qty: 90 TABLET | Refills: 3 | Status: SHIPPED | OUTPATIENT
Start: 2023-04-24

## 2023-04-24 NOTE — PROGRESS NOTES
CARDIOVASCULAR CONSULTATION    REASON FOR CONSULT:   Windy Lindo is a 72 y.o. female who presents for evaluation    HISTORY OF PRESENT ILLNESS:     Patient is a pleasant 72-year-old lady.  Here for evaluation.  States that was sent over because of frequent extra beats which were noticed on examination.  EKG personally reviewed and showed PACs.  Patient does not feel these PACs.  Was recently started on hydrochlorothiazide for mildly elevated blood pressure, took only 1 dose so far as she started it yesterday.  Denies orthopnea, PND, swelling of feet.  States can walk a mi without any symptoms of shortness of breath or chest pains    4/23:  Patient here for follow-up.  Doing fine.  No cardiac symptoms.  Echo showed normal left ventricle systolic function.  EKG done today personally reviewed shows normal sinus rhythm with PAC.  Holter results are pending.      The left ventricle is normal in size with mild eccentric hypertrophy and normal systolic function.  The estimated ejection fraction is 65%.  Normal left ventricular diastolic function.  Normal right ventricular size with normal right ventricular systolic function.  Mild tricuspid regurgitation.  Mild aortic regurgitation.  Normal central venous pressure (3 mmHg).  The estimated PA systolic pressure is 34 mmHg.  Trivial posterior pericardial effusion.      PAST MEDICAL HISTORY:     Past Medical History:   Diagnosis Date    Arthritis     Eye injury 4 yrs    hit od    Hypertension 4/4/2023    Nuclear sclerosis, bilateral 2/13/2019       PAST SURGICAL HISTORY:     Past Surgical History:   Procedure Laterality Date    APPENDECTOMY      COLONOSCOPY N/A 9/7/2017    Procedure: COLONOSCOPY;  Surgeon: Albino Fenton MD;  Location: [address]);  Service: Endoscopy;  Laterality: N/A;    ESOPHAGOGASTRODUODENOSCOPY  09/07/2017    KNEE SURGERY Right 12/05/2017    TKR    LASIK  7 yrs    ou    TONSILLECTOMY         ALLERGIES AND MEDICATION:   Review of patient's  allergies indicates:  No Known Allergies     Medication List            Accurate as of April 24, 2023 10:55 AM. If you have any questions, ask your nurse or doctor.                CONTINUE taking these medications      alendronate 35 MG tablet  Commonly known as: FOSAMAX  TAKE 1 TABLET BY MOUTH 1 TIME A WEEK AS DIRECTED     amoxicillin 500 MG capsule  Commonly known as: AMOXIL     b complex vitamins capsule     hydroCHLOROthiazide 12.5 MG Tab  Commonly known as: HYDRODIURIL  Take 1 tablet (12.5 mg total) by mouth once daily.     meloxicam 15 MG tablet  Commonly known as: MOBIC  TAKE 1 TABLET(15 MG) BY MOUTH EVERY DAY     methylPREDNISolone 4 mg tablet  Commonly known as: MEDROL DOSEPACK  use as directed     MOVE FREE JOINT HEALTH ORAL     multivit-min-FA-lycopen-lutein 0.4 mg-300 mcg- 250 mcg Tab     vitamin D 1000 units Tab  Commonly known as: VITAMIN D3              SOCIAL HISTORY:     Social History     Socioeconomic History    Marital status: Single   Occupational History     Employer: [employer] [place]   Tobacco Use    Smoking status: Never    Smokeless tobacco: Never   Substance and Sexual Activity    Alcohol use: Yes     Alcohol/week: 1.0 standard drink     Types: 1 Glasses of wine per week     Comment: glass of wine a night     Drug use: No    Sexual activity: Never     Social Determinants of Health     Financial Resource Strain: Low Risk     Difficulty of Paying Living Expenses: Not hard at all   Food Insecurity: No Food Insecurity    Worried About Running Out of Food in the Last Year: Never true    Ran Out of Food in the Last Year: Never true   Transportation Needs: No Transportation Needs    Lack of Transportation (Medical): No    Lack of Transportation (Non-Medical): No   Physical Activity: Sufficiently Active    Days of Exercise per Week: 5 days    Minutes of Exercise per Session: 30 min   Stress: No Stress Concern Present    Feeling of Stress : Not at all   Social Connections: Unknown    Frequency of Communication  with Friends and Family: More than three times a week    Frequency of Social Gatherings with Friends and Family: More than three times a week    Active Member of Clubs or Organizations: No    Attends Club or Organization Meetings: Patient refused    Marital Status: Never    Housing Stability: Low Risk     Unable to Pay for Housing in the Last Year: No    Number of Places Lived in the Last Year: 0    Unstable Housing in the Last Year: No       FAMILY HISTORY:     Family History   Problem Relation Age of Onset    Hypertension Mother     Glaucoma Mother     Diabetes Mother     Cataracts Mother     Cancer Mother 74        lung    Heart disease Mother 55    Hypertension Father     Heart disease Father         onset early 60;s, Aortic valve replacement ,Rheumatic fever as a child     No Known Problems Sister     Diabetes Brother     Cancer Brother 66        mesothelioma    No Known Problems Maternal Aunt     No Known Problems Maternal Uncle     No Known Problems Paternal Aunt     No Known Problems Paternal Uncle     No Known Problems Maternal Grandmother     No Known Problems Maternal Grandfather     No Known Problems Paternal Grandmother     No Known Problems Paternal Grandfather     No Known Problems Other     Amblyopia Neg Hx     Blindness Neg Hx     Macular degeneration Neg Hx     Retinal detachment Neg Hx     Strabismus Neg Hx     Stroke Neg Hx     Thyroid disease Neg Hx     Melanoma Neg Hx        REVIEW OF SYSTEMS:   Review of Systems   Constitutional: Negative.   HENT: Negative.     Eyes: Negative.    Cardiovascular: Negative.    Respiratory: Negative.     Endocrine: Negative.    Hematologic/Lymphatic: Negative.    Skin: Negative.    Musculoskeletal: Negative.    Gastrointestinal: Negative.    Genitourinary: Negative.    Neurological: Negative.    Psychiatric/Behavioral: Negative.     Allergic/Immunologic: Negative.      A 10 point review of systems was performed and all the pertinent positives have been  "mentioned. Rest of review of systems was negative.        PHYSICAL EXAM:     Vitals:    04/24/23 1047   BP: (!) 162/82   Pulse: 73   Resp: 18    Body mass index is 28.64 kg/m².  Weight: 80.5 kg (177 lb 7.5 oz)   Height: 5' 6" (167.6 cm)     Physical Exam  Constitutional:       Appearance: Normal appearance. She is well-developed.   HENT:      Head: Normocephalic.   Eyes:      Pupils: Pupils are equal, round, and reactive to light.   Cardiovascular:      Rate and Rhythm: Normal rate and regular rhythm. Extrasystoles are present.  Pulmonary:      Effort: Pulmonary effort is normal.      Breath sounds: Normal breath sounds.   Abdominal:      General: Bowel sounds are normal.      Palpations: Abdomen is soft.      Tenderness: There is no abdominal tenderness.   Musculoskeletal:         General: Normal range of motion.      Cervical back: Normal range of motion and neck supple.   Skin:     General: Skin is warm.   Neurological:      Mental Status: She is alert and oriented to person, place, and time.         DATA:     Laboratory:  CBC:  Recent Labs   Lab 02/21/22  0923 11/05/22 1640 03/22/23  1002   WBC 6.04 7.91 6.71   Hemoglobin 14.5 14.3 14.4   Hematocrit 45.6 44.7 45.9   Platelets 190 204 181         CHEMISTRIES:  Recent Labs   Lab 09/10/20  1200 02/21/22  0923 11/05/22  1640 03/22/23  1002   Glucose 91 84 104 86   Sodium 141 139 138 143   Potassium 4.8 4.3 4.1 4.1   BUN 20 18 18 20   Creatinine 0.7 0.8 0.7 0.7   eGFR if African American >60.0 >60.0  --   --    eGFR if non African American >60.0 >60.0  --   --    Calcium 9.4 10.0 9.7 10.0   Magnesium  --   --  2.0  --          CARDIAC BIOMARKERS:        COAGS:        LIPIDS/LFTS:  Recent Labs   Lab 09/10/20  1200 02/21/22  0923 11/05/22  1640 03/22/23  1002   Cholesterol 196 205 H  --  217 H   Triglycerides 79 96  --  76   HDL 80 H 68  --  69   LDL Cholesterol 100.2 117.8  --  132.8   Non-HDL Cholesterol 116 137  --  148   AST 15 15 15 15   ALT 11 12 12 11 "         No results found for: HGBA1C    TSH  Recent Labs   Lab 02/21/22  0923 11/05/22  1640 03/22/23  1002   TSH 1.801 1.520 1.692         The 10-year ASCVD risk score (Marilee SALAZAR, et al., 2019) is: 26.2%    Values used to calculate the score:      Age: 72 years      Sex: Female      Is Non- : No      Diabetic: No      Tobacco smoker: No      Systolic Blood Pressure: 162 mmHg      Is BP treated: Yes      HDL Cholesterol: 69 mg/dL      Total Cholesterol: 217 mg/dL             ASSESSMENT AND PLAN     Patient Active Problem List   Diagnosis    Right knee pain    Elevated BP without diagnosis of hypertension    Chest sounds abnormal on percussion or auscultation    Varicose veins of both lower extremities    Primary osteoarthritis of right knee    Weakness    Chronic pain of left ankle    Nuclear sclerosis, bilateral    Refractive error    S/P LASIK (laser assisted in situ keratomileusis) of both eyes    Hyperlipidemia    Heart murmur    Hypertension       Patient with frequent PACs.  Echocardiogram did not reveal any significant structural abnormality.  Patient is asymptomatic from her pacs. Holter results are pending.    Fu in 3 m          Thank you very much for involving me in the care of your patient.  Please do not hesitate to contact me if there are any questions.      Alysia Briseno MD, FACC, [facility]  Interventional Cardiologist, [facility].           This note was dictated with the help of speech recognition software.  There might be un-intended errors and/or substitutions.

## 2023-05-19 ENCOUNTER — HOSPITAL ENCOUNTER (OUTPATIENT)
Dept: RADIOLOGY | Facility: HOSPITAL | Age: 73
Discharge: HOME | End: 2023-05-19
Attending: FAMILY MEDICINE
Payer: MEDICARE

## 2023-05-19 DIAGNOSIS — Z12.31 ENCOUNTER FOR SCREENING MAMMOGRAM FOR BREAST CANCER: ICD-10-CM

## 2023-05-19 PROCEDURE — 77067 MAMMO DIGITAL SCREENING BILAT WITH TOMO: ICD-10-PCS | Mod: 26,,, | Performed by: RADIOLOGY

## 2023-05-19 PROCEDURE — 77063 BREAST TOMOSYNTHESIS BI: CPT | Mod: 26,,, | Performed by: RADIOLOGY

## 2023-05-19 PROCEDURE — 77063 MAMMO DIGITAL SCREENING BILAT WITH TOMO: ICD-10-PCS | Mod: 26,,, | Performed by: RADIOLOGY

## 2023-05-19 PROCEDURE — 77067 SCR MAMMO BI INCL CAD: CPT | Mod: 26,,, | Performed by: RADIOLOGY

## 2023-05-19 PROCEDURE — 77067 SCR MAMMO BI INCL CAD: CPT | Mod: TC,PO

## 2023-05-20 DIAGNOSIS — M17.11 PRIMARY OSTEOARTHRITIS OF RIGHT KNEE: ICD-10-CM

## 2023-05-22 RX ORDER — MELOXICAM 15 MG/1
TABLET ORAL
Qty: 90 TABLET | Refills: 0 | Status: SHIPPED | OUTPATIENT
Start: 2023-05-22 | End: 2023-07-25

## 2023-09-10 ENCOUNTER — PATIENT MESSAGE (OUTPATIENT)
Dept: FAMILY MEDICINE | Facility: CLINIC | Age: 73
End: 2023-09-10

## 2023-09-10 ENCOUNTER — HOSPITAL ENCOUNTER (EMERGENCY)
Facility: HOSPITAL | Age: 73
Discharge: HOME | End: 2023-09-10
Attending: EMERGENCY MEDICINE
Payer: MEDICARE

## 2023-09-10 VITALS
SYSTOLIC BLOOD PRESSURE: 155 MMHG | WEIGHT: 170 LBS | BODY MASS INDEX: 27.44 KG/M2 | RESPIRATION RATE: 17 BRPM | OXYGEN SATURATION: 100 % | HEART RATE: 73 BPM | TEMPERATURE: 98 F | DIASTOLIC BLOOD PRESSURE: 83 MMHG

## 2023-09-10 DIAGNOSIS — S41.119A LACERATION OF ARM: Primary | ICD-10-CM

## 2023-09-10 PROCEDURE — 99284 EMERGENCY DEPT VISIT MOD MDM: CPT | Mod: 25,ER

## 2023-09-10 PROCEDURE — 12001 RPR S/N/AX/GEN/TRNK 2.5CM/<: CPT | Mod: ER

## 2023-09-10 PROCEDURE — 63600175 PHARM REV CODE 636 W HCPCS: Mod: ER | Performed by: NURSE PRACTITIONER

## 2023-09-10 PROCEDURE — 90715 TDAP VACCINE 7 YRS/> IM: CPT | Mod: ER | Performed by: NURSE PRACTITIONER

## 2023-09-10 PROCEDURE — 90471 IMMUNIZATION ADMIN: CPT | Mod: ER | Performed by: NURSE PRACTITIONER

## 2023-09-10 PROCEDURE — 25000003 PHARM REV CODE 250: Mod: ER | Performed by: NURSE PRACTITIONER

## 2023-09-10 RX ORDER — MUPIROCIN 20 MG/G
OINTMENT TOPICAL 3 TIMES DAILY
Qty: 30 G | Refills: 0 | Status: SHIPPED | OUTPATIENT
Start: 2023-09-10 | End: 2023-10-05

## 2023-09-10 RX ORDER — LIDOCAINE HYDROCHLORIDE 10 MG/ML
10 INJECTION INFILTRATION; PERINEURAL
Status: COMPLETED | OUTPATIENT
Start: 2023-09-10 | End: 2023-09-10

## 2023-09-10 RX ORDER — CEPHALEXIN 500 MG/1
500 CAPSULE ORAL 4 TIMES DAILY
Qty: 20 CAPSULE | Refills: 0 | Status: SHIPPED | OUTPATIENT
Start: 2023-09-10 | End: 2023-10-05

## 2023-09-10 RX ORDER — MUPIROCIN 20 MG/G
OINTMENT TOPICAL
Status: COMPLETED | OUTPATIENT
Start: 2023-09-10 | End: 2023-09-10

## 2023-09-10 RX ORDER — DEXTROMETHORPHAN HYDROBROMIDE, GUAIFENESIN 5; 100 MG/5ML; MG/5ML
650 LIQUID ORAL EVERY 8 HOURS
Qty: 20 TABLET | Refills: 0 | Status: SHIPPED | OUTPATIENT
Start: 2023-09-10 | End: 2023-09-17

## 2023-09-10 RX ADMIN — LIDOCAINE HYDROCHLORIDE 10 ML: 10 INJECTION, SOLUTION INFILTRATION; PERINEURAL at 02:09

## 2023-09-10 RX ADMIN — MUPIROCIN: 20 OINTMENT TOPICAL at 02:09

## 2023-09-10 RX ADMIN — TETANUS TOXOID, REDUCED DIPHTHERIA TOXOID AND ACELLULAR PERTUSSIS VACCINE, ADSORBED 0.5 ML: 5; 2.5; 8; 8; 2.5 SUSPENSION INTRAMUSCULAR at 02:09

## 2023-09-10 NOTE — DISCHARGE INSTRUCTIONS

## 2023-09-10 NOTE — ED PROVIDER NOTES
Encounter Date: 9/10/2023       History     Chief Complaint   Patient presents with    Laceration     Laceration to right forearm. Bleeding controlled. Unknown tetanus status     73 y.o. female with a PMH of HTN, arthritis who presents to the Emergency Department with complaints of laceration to right wrist/forearm.  She reports that she accidentally dropped a sharp object on it just PTA. Unsure of last tetanus.  She denies rash, fever, chest pain, SOB, numbness, weakness, tingling, abdominal pain, back pain, dysuria, hematuria, nausea, vomiting, diarrhea, or any other complaints.   She rates the pain as 2/10 and has not taken any medications for the symptoms.  No Alleviating/aggravating factors.                The history is provided by the patient.     Review of patient's allergies indicates:  No Known Allergies  Past Medical History:   Diagnosis Date    Arthritis     Eye injury 4 yrs    hit od    Hypertension 4/4/2023    Nuclear sclerosis, bilateral 2/13/2019     Past Surgical History:   Procedure Laterality Date    APPENDECTOMY      COLONOSCOPY N/A 9/7/2017    Procedure: COLONOSCOPY;  Surgeon: Albino Fenton MD;  Location: [facility] ([address] [place] [address]);  Service: Endoscopy;  Laterality: N/A;    ESOPHAGOGASTRODUODENOSCOPY  09/07/2017    KNEE SURGERY Right 12/05/2017    TKR    LASIK  7 yrs    ou    TONSILLECTOMY       Family History   Problem Relation Age of Onset    Hypertension Mother     Glaucoma Mother     Diabetes Mother     Cataracts Mother     Cancer Mother 74        lung    Heart disease Mother 55    Hypertension Father     Heart disease Father         onset early 60;s, Aortic valve replacement ,Rheumatic fever as a child     No Known Problems Sister     Diabetes Brother     Cancer Brother 66        mesothelioma    No Known Problems Maternal Aunt     No Known Problems Maternal Uncle     No Known Problems Paternal Aunt     No Known Problems Paternal Uncle     No Known Problems Maternal Grandmother     No Known  Problems Maternal Grandfather     No Known Problems Paternal Grandmother     No Known Problems Paternal Grandfather     No Known Problems Other     Amblyopia Neg Hx     Blindness Neg Hx     Macular degeneration Neg Hx     Retinal detachment Neg Hx     Strabismus Neg Hx     Stroke Neg Hx     Thyroid disease Neg Hx     Melanoma Neg Hx      Social History     Tobacco Use    Smoking status: Never    Smokeless tobacco: Never   Substance Use Topics    Alcohol use: Yes     Alcohol/week: 1.0 standard drink of alcohol     Types: 1 Glasses of wine per week     Comment: glass of wine a night     Drug use: No     Review of Systems   Constitutional:  Negative for chills and fever.   HENT:  Negative for congestion, ear pain, rhinorrhea, sore throat and trouble swallowing.    Eyes:  Negative for pain, discharge and redness.   Respiratory:  Negative for cough and shortness of breath.    Cardiovascular:  Negative for chest pain.   Gastrointestinal:  Negative for abdominal pain, diarrhea, nausea and vomiting.   Genitourinary:  Negative for decreased urine volume and dysuria.   Musculoskeletal:  Negative for back pain, neck pain and neck stiffness.   Skin:  Positive for wound. Negative for rash.   Neurological:  Negative for dizziness, weakness, light-headedness, numbness and headaches.   Psychiatric/Behavioral:  Negative for confusion.        Physical Exam     Initial Vitals [09/10/23 1330]   BP Pulse Resp Temp SpO2   (!) 166/95 95 17 97.8 °F (36.6 °C) 96 %      MAP       --         Physical Exam    Nursing note and vitals reviewed.  Constitutional: Vital signs are normal. She appears well-developed.  Non-toxic appearance. She does not appear ill.   HENT:   Head: Normocephalic and atraumatic.   Eyes: Conjunctivae are normal.   Neck:   Normal range of motion.  Cardiovascular:  Normal rate and regular rhythm.           Pulmonary/Chest: Effort normal and breath sounds normal. She exhibits no tenderness.   Abdominal: Abdomen is soft.  There is no abdominal tenderness.   Musculoskeletal:      Cervical back: Normal range of motion.     Neurological: She is alert and oriented to person, place, and time. Gait normal. GCS eye subscore is 4. GCS verbal subscore is 5. GCS motor subscore is 6.   Skin: Skin is warm and dry. Laceration noted. No rash noted.   2.5 cm laceration to right wrist/forearm on the dorsal aspect; normal ROM, strength, and sensation; +2 radial pulse; capillary refill < 2 seconds; no FB appreciated   Psychiatric: She has a normal mood and affect. Her speech is normal and behavior is normal. Judgment and thought content normal.         ED Course   Lac Repair    Date/Time: 9/10/2023 4:31 PM    Performed by: Zelda Arana FNP  Authorized by: Tran Horn MD    Consent:     Consent obtained:  Verbal    Consent given by:  Patient    Risks discussed:  Infection, nerve damage, poor wound healing, retained foreign body, tendon damage and vascular damage    Alternatives discussed:  No treatment  Universal protocol:     Patient identity confirmed:  Verbally with patient  Anesthesia:     Anesthesia method:  Local infiltration    Local anesthetic:  Lidocaine 1% w/o epi  Laceration details:     Location:  Shoulder/arm    Shoulder/arm location:  R lower arm    Length (cm):  2.5  Pre-procedure details:     Preparation:  Patient was prepped and draped in usual sterile fashion and imaging obtained to evaluate for foreign bodies  Exploration:     Imaging outcome: foreign body not noted      Wound exploration: wound explored through full range of motion and entire depth of wound visualized    Treatment:     Area cleansed with:  Povidone-iodine and saline    Amount of cleaning:  Standard    Irrigation solution:  Sterile water    Irrigation volume:  250    Irrigation method:  Syringe  Skin repair:     Repair method:  Sutures    Suture size:  4-0    Suture material:  Nylon    Suture technique:  Simple interrupted    Number of sutures:   8  Approximation:     Approximation:  Close  Repair type:     Repair type:  Simple  Post-procedure details:     Dressing:  Antibiotic ointment, bulky dressing and non-adherent dressing    Procedure completion:  Tolerated well, no immediate complications    Labs Reviewed - No data to display       Imaging Results              X-Ray Wrist Complete Right (Final result)  Result time 09/10/23 14:33:32      Final result by Didier Kelley MD (09/10/23 14:33:32)                   Impression:      Distal forearm suspected small focal soft tissue laceration type injury, without acute displaced fracture-dislocation or radiodense retained foreign body identified.      Electronically signed by: Didier Kelley MD  Date:    09/10/2023  Time:    14:33               Narrative:    EXAMINATION:  XR WRIST COMPLETE 3 VIEWS RIGHT    CLINICAL HISTORY:  Laceration without foreign body of unspecified upper arm, initial encounter    TECHNIQUE:  PA, lateral, and oblique views of the right wrist were performed.    COMPARISON:  None    FINDINGS:  Generalized osteopenia.  Overall alignment is within normal limits.  Carpus appears intact.  No displaced fracture, dislocation or destructive osseous process.  Moderate to advanced degenerative change at the base of the thumb.  Baseline minimal to mild DJD elsewhere.    Focal skin irregularity with superficial soft tissue defect measuring approximately 7 mm in craniocaudal dimension along the dorsal aspect of the distal forearm overlying the ulnar metaphysis likely representing laceration type injury correlating with clinical history.  No radiodense retained foreign body and no adjacent significant subcutaneous gas at this time.                        Final result by Didier Kelley MD (09/10/23 14:33:32)                   Impression:      Distal forearm suspected small focal soft tissue laceration type injury, without acute displaced fracture-dislocation or radiodense retained foreign body  identified.      Electronically signed by: Didier Kelley MD  Date:    09/10/2023  Time:    14:33               Narrative:    EXAMINATION:  XR WRIST COMPLETE 3 VIEWS RIGHT    CLINICAL HISTORY:  Laceration without foreign body of unspecified upper arm, initial encounter    TECHNIQUE:  PA, lateral, and oblique views of the right wrist were performed.    COMPARISON:  None    FINDINGS:  Generalized osteopenia.  Overall alignment is within normal limits.  Carpus appears intact.  No displaced fracture, dislocation or destructive osseous process.  Moderate to advanced degenerative change at the base of the thumb.  Baseline minimal to mild DJD elsewhere.    Focal skin irregularity with superficial soft tissue defect measuring approximately 7 mm in craniocaudal dimension along the dorsal aspect of the distal forearm overlying the ulnar metaphysis likely representing laceration type injury correlating with clinical history.  No radiodense retained foreign body and no adjacent significant subcutaneous gas at this time.                                       Medications   LIDOcaine HCL 10 mg/ml (1%) injection 10 mL (10 mLs Infiltration Given by Provider 9/10/23 1431)   mupirocin 2 % ointment ( Topical (Top) Given 9/10/23 1433)   Tdap (BOOSTRIX) vaccine injection 0.5 mL (0.5 mLs Intramuscular Given 9/10/23 1433)     Medical Decision Making  This is an urgent evaluation of a 73 y.o. female that presents to the Emergency Department for a Laceration of the right FA/wrist. Physical Exam shows a non-toxic, afebrile, and well appearing female. There is a 2.5 cm laceration to right wrist/forearm on the dorsal aspect; normal ROM, strength, and sensation; +2 radial pulse; capillary refill < 2 seconds; no FB appreciated There is no surrounding erythema or increased warmth. Compartments are soft. There is full ROM of right arm against resistance. Equal sensation proximally and distally to the wound. No visible tendon lacerations or bony  structures observed on exam. The wound was irrigated and visually inspected prior to closure with no visible foreign bodies identified. Vital Signs Are Reassuring. Tetanus is not up to date.     The wound was closed per the procedure note  Vital Signs: 155/83, 97.8, 73, 17, 100%   If available, previous records reviewed.   I ordered X-rays and reviewed the radiologist interpretation.  Xray significant for Distal forearm suspected small focal soft tissue laceration type injury, without acute displaced fracture-dislocation or radiodense retained foreign body identified      My overall impression is Laceration of the right FA/wrist. I considered, but at this time, do not suspect cellulitis, compartment syndrome, underlying fracture, tendon injury, or suspect any retained foreign body at this time.     During her stay in the ED, the patient has been given Mupirocin with good relief of her symptoms. The patient will be discharged home with Keflex, Mupirocin. Additional home care recommendations include Tylenol, Hydration. The diagnosis, treatment plan, instructions for follow-up, strict return precautions, and reevaluation with her PCP as well as ED return precautions have been discussed with the patient and she has verbalized an understanding of the information.  All questions or concerns from the patient have been addressed.       Amount and/or Complexity of Data Reviewed  Radiology: ordered. Decision-making details documented in ED Course.    Risk  OTC drugs.  Prescription drug management.                               Clinical Impression:   Final diagnoses:  [S41.119A] Laceration of arm (Primary)        ED Disposition Condition    Discharge Stable          ED Prescriptions       Medication Sig Dispense Start Date End Date Auth. Provider    cephALEXin (KEFLEX) 500 MG capsule Take 1 capsule (500 mg total) by mouth 4 (four) times daily. for 5 days 20 capsule 9/10/2023 9/15/2023 Zelda Arana, NOELP    acetaminophen  (TYLENOL) 650 MG TbSR Take 1 tablet (650 mg total) by mouth every 8 (eight) hours. for 7 days 20 tablet 9/10/2023 9/17/2023 Zelda Arana FNP    mupirocin (BACTROBAN) 2 % ointment Apply topically 3 (three) times daily. for 14 days 30 g 9/10/2023 9/24/2023 Zelda Arana FNP          Follow-up Information       Follow up With Specialties Details Why Contact Info    Trisha Higginbotham MD Family Medicine, Wound Care Schedule an appointment as soon as possible for a visit  7-10 days, For suture removal [address] [facility] [address] [phone]      [facility] ED Emergency Medicine Go to  7-10 days, For suture removal [address] [facility] [address] [phone]             Zelda Arana FNP  09/10/23 [id number]

## 2023-09-18 ENCOUNTER — OFFICE VISIT (OUTPATIENT)
Dept: FAMILY MEDICINE | Facility: CLINIC | Age: 73
End: 2023-09-18
Payer: MEDICARE

## 2023-09-18 VITALS
SYSTOLIC BLOOD PRESSURE: 130 MMHG | HEIGHT: 66 IN | WEIGHT: 175.5 LBS | OXYGEN SATURATION: 92 % | BODY MASS INDEX: 28.21 KG/M2 | DIASTOLIC BLOOD PRESSURE: 70 MMHG | TEMPERATURE: 98 F | HEART RATE: 85 BPM

## 2023-09-18 DIAGNOSIS — S61.511D WRIST LACERATION, RIGHT, SUBSEQUENT ENCOUNTER: ICD-10-CM

## 2023-09-18 DIAGNOSIS — I10 HYPERTENSION, UNSPECIFIED TYPE: ICD-10-CM

## 2023-09-18 DIAGNOSIS — Z48.02 VISIT FOR SUTURE REMOVAL: Primary | ICD-10-CM

## 2023-09-18 PROCEDURE — 1126F AMNT PAIN NOTED NONE PRSNT: CPT | Mod: CPTII,S$GLB,,

## 2023-09-18 PROCEDURE — 3075F PR MOST RECENT SYSTOLIC BLOOD PRESS GE 130-139MM HG: ICD-10-PCS | Mod: CPTII,S$GLB,,

## 2023-09-18 PROCEDURE — 99024 SUTURE REMOVAL: ICD-10-PCS | Mod: S$GLB,,,

## 2023-09-18 PROCEDURE — 3008F BODY MASS INDEX DOCD: CPT | Mod: CPTII,S$GLB,,

## 2023-09-18 PROCEDURE — 1101F PR PT FALLS ASSESS DOC 0-1 FALLS W/OUT INJ PAST YR: ICD-10-PCS | Mod: CPTII,S$GLB,,

## 2023-09-18 PROCEDURE — 3078F PR MOST RECENT DIASTOLIC BLOOD PRESSURE < 80 MM HG: ICD-10-PCS | Mod: CPTII,S$GLB,,

## 2023-09-18 PROCEDURE — 3075F SYST BP GE 130 - 139MM HG: CPT | Mod: CPTII,S$GLB,,

## 2023-09-18 PROCEDURE — 99214 PR OFFICE/OUTPT VISIT, EST, LEVL IV, 30-39 MIN: ICD-10-PCS | Mod: S$GLB,,,

## 2023-09-18 PROCEDURE — 1101F PT FALLS ASSESS-DOCD LE1/YR: CPT | Mod: CPTII,S$GLB,,

## 2023-09-18 PROCEDURE — 99214 OFFICE O/P EST MOD 30 MIN: CPT | Mod: S$GLB,,,

## 2023-09-18 PROCEDURE — 3008F PR BODY MASS INDEX (BMI) DOCUMENTED: ICD-10-PCS | Mod: CPTII,S$GLB,,

## 2023-09-18 PROCEDURE — 99999 PR PBB SHADOW E&M-EST. PATIENT-LVL III: ICD-10-PCS | Mod: PBBFAC,,,

## 2023-09-18 PROCEDURE — 1159F PR MEDICATION LIST DOCUMENTED IN MEDICAL RECORD: ICD-10-PCS | Mod: CPTII,S$GLB,,

## 2023-09-18 PROCEDURE — 99999 PR PBB SHADOW E&M-EST. PATIENT-LVL III: CPT | Mod: PBBFAC,,,

## 2023-09-18 PROCEDURE — 3078F DIAST BP <80 MM HG: CPT | Mod: CPTII,S$GLB,,

## 2023-09-18 PROCEDURE — 3288F PR FALLS RISK ASSESSMENT DOCUMENTED: ICD-10-PCS | Mod: CPTII,S$GLB,,

## 2023-09-18 PROCEDURE — 99024 POSTOP FOLLOW-UP VISIT: CPT | Mod: S$GLB,,,

## 2023-09-18 PROCEDURE — 3288F FALL RISK ASSESSMENT DOCD: CPT | Mod: CPTII,S$GLB,,

## 2023-09-18 PROCEDURE — 1159F MED LIST DOCD IN RCRD: CPT | Mod: CPTII,S$GLB,,

## 2023-09-18 PROCEDURE — 1126F PR PAIN SEVERITY QUANTIFIED, NO PAIN PRESENT: ICD-10-PCS | Mod: CPTII,S$GLB,,

## 2023-09-18 NOTE — PROGRESS NOTES
HPI     Chief Complaint:  Chief Complaint   Patient presents with    Suture / Staple Removal     Rigth wrist       Windy Lindo is a 73 y.o. female with multiple medical diagnoses as listed in the medical history and problem list that presents for suture removal.  Pt is not known to me with her last appointment 4/4/2023.      HPI  Pt presents for suture removal. Sharp object fell from wall that cut open wrist. Sutures placed on 9/10 in ED. Right wrist sutured with 8 sutures. Completed Keflex and Mupirocin ointment.      Assessment & Plan     Problem List Items Addressed This Visit          Cardiac/Vascular    Hypertension  BP in clinic today 130/70. The current medical regimen is effective;  continue present plan and medications.     Other Visit Diagnoses       Visit for suture removal    -  Primary  Sutures placed to right wrist for laceration on 9/10. 8 sutures placed by ED. 8 sutures removed from right wrist laceration.    Wrist laceration, right, subsequent encounter      Sharp object fell from wall on 9/10. Sutures placed to right wrist for laceration on 9/10. 8 sutures placed by ED. 8 sutures removed from right wrist laceration.              --------------------------------------------      Health Maintenance:  Health Maintenance         Date Due Completion Date    COVID-19 Vaccine (4 - Pfizer risk series) 11/26/2021 10/1/2021    Colorectal Cancer Screening 09/07/2022 9/7/2017    Influenza Vaccine (1) 09/01/2023 9/29/2022    DEXA Scan 01/10/2024 1/10/2022    Mammogram 05/19/2024 5/19/2023    Lipid Panel 03/22/2028 3/22/2023    Override on 2/4/2013: Done    TETANUS VACCINE 09/10/2033 9/10/2023            Health maintenance reviewed    Follow Up:  No follow-ups on file.    Exam     Review of Systems:  (as noted above)  Review of Systems   Skin:  Positive for wound.       Physical Exam:   Physical Exam  Constitutional:       Appearance: Normal appearance. She is normal weight.   Skin:     Findings:  "Laceration present.      Comments: 8 sutures removed from right wrist       Vitals:    09/18/23 1430   BP: 130/70   Pulse: 85   Temp: 97.7 °F (36.5 °C)   TempSrc: Oral   SpO2: (!) 92%   Weight: 79.6 kg (175 lb 7.8 oz)   Height: 5' 6" (1.676 m)      Body mass index is 28.32 kg/m².        History     Past Medical History:  Past Medical History:   Diagnosis Date    Arthritis     Eye injury 4 yrs    hit od    Hypertension 4/4/2023    Nuclear sclerosis, bilateral 2/13/2019       Past Surgical History:  Past Surgical History:   Procedure Laterality Date    APPENDECTOMY      COLONOSCOPY N/A 9/7/2017    Procedure: COLONOSCOPY;  Surgeon: Albnio Fenton MD;  Location: [facility] ([address] [place] [address]);  Service: Endoscopy;  Laterality: N/A;    ESOPHAGOGASTRODUODENOSCOPY  09/07/2017    KNEE SURGERY Right 12/05/2017    TKR    LASIK  7 yrs    ou    TONSILLECTOMY         Social History:  Social History     Socioeconomic History    Marital status: Single   Occupational History     Employer: [employer] [place]   Tobacco Use    Smoking status: Never    Smokeless tobacco: Never   Substance and Sexual Activity    Alcohol use: Yes     Alcohol/week: 1.0 standard drink of alcohol     Types: 1 Glasses of wine per week     Comment: glass of wine a night     Drug use: No    Sexual activity: Never     Social Determinants of Health     Financial Resource Strain: Low Risk  (9/11/2023)    Overall Financial Resource Strain (CARDIA)     Difficulty of Paying Living Expenses: Not hard at all   Food Insecurity: No Food Insecurity (9/11/2023)    Hunger Vital Sign     Worried About Running Out of Food in the Last Year: Never true     Ran Out of Food in the Last Year: Never true   Transportation Needs: No Transportation Needs (9/11/2023)    PRAPARE - Transportation     Lack of Transportation (Medical): No     Lack of Transportation (Non-Medical): No   Physical Activity: Sufficiently Active (9/11/2023)    Exercise Vital Sign     Days of Exercise per Week: 5 days     Minutes of " Exercise per Session: 150+ min   Stress: No Stress Concern Present (9/11/2023)    [employer] [place] [employer] - Occupational Stress Questionnaire     Feeling of Stress : Not at all   Social Connections: Unknown (9/11/2023)    Social Connection and Isolation Panel [NHANES]     Frequency of Communication with Friends and Family: More than three times a week     Frequency of Social Gatherings with Friends and Family: More than three times a week     Active Member of Clubs or Organizations: No     Attends Club or Organization Meetings: Patient refused     Marital Status: Never    Housing Stability: Low Risk  (9/11/2023)    Housing Stability Vital Sign     Unable to Pay for Housing in the Last Year: No     Number of Places Lived in the Last Year: 0     Unstable Housing in the Last Year: No       Family History:  Family History   Problem Relation Age of Onset    Hypertension Mother     Glaucoma Mother     Diabetes Mother     Cataracts Mother     Cancer Mother 74        lung    Heart disease Mother 55    Hypertension Father     Heart disease Father         onset early 60;s, Aortic valve replacement ,Rheumatic fever as a child     No Known Problems Sister     Diabetes Brother     Cancer Brother 66        mesothelioma    No Known Problems Maternal Aunt     No Known Problems Maternal Uncle     No Known Problems Paternal Aunt     No Known Problems Paternal Uncle     No Known Problems Maternal Grandmother     No Known Problems Maternal Grandfather     No Known Problems Paternal Grandmother     No Known Problems Paternal Grandfather     No Known Problems Other     Amblyopia Neg Hx     Blindness Neg Hx     Macular degeneration Neg Hx     Retinal detachment Neg Hx     Strabismus Neg Hx     Stroke Neg Hx     Thyroid disease Neg Hx     Melanoma Neg Hx        Allergies and Medications: (updated and reviewed)  Review of patient's allergies indicates:  No Known Allergies  Current Outpatient Medications   Medication  Sig Dispense Refill    alendronate (FOSAMAX) 35 MG tablet Take 1 tablet (35 mg total) by mouth once a week. 12 tablet 0    b complex vitamins capsule Take 1 capsule by mouth once daily.      glucosam/chond/hyalu/CF borate (MOVE FREE JOINT HEALTH ORAL) Take by mouth.      hydroCHLOROthiazide (HYDRODIURIL) 25 MG tablet Take 1 tablet (25 mg total) by mouth once daily. 90 tablet 3    multivit-min-FA-lycopen-lutein 0.4-300-250 mg-mcg-mcg Tab Take 1 tablet by mouth once daily.      mupirocin (BACTROBAN) 2 % ointment Apply topically 3 (three) times daily. for 14 days 30 g 0    vitamin D (VITAMIN D3) 1000 units Tab Take 1,000 Units by mouth once daily.       No current facility-administered medications for this visit.       Patient Care Team:  Trisha Higginbotham MD as PCP - General (Family Medicine)  Shawnee Nicholson LPN as Licensed Practical Nurse         - The patient is given an After Visit Summary that lists all medications with directions, allergies, education, orders placed during this encounter and follow-up instructions.      - I have reviewed the patient's medical information including past medical, family, and social history sections including the medications and allergies.      - We discussed the patient's current medications.     This note was created by combination of typed  and MModal dictation.  Transcription errors may be present.  If there are any questions, please contact me.       Clementine Montelongo NP

## 2023-09-18 NOTE — PROCEDURES
Suture Removal    Date/Time: 9/18/2023 2:30 PM  Location procedure was performed: [facility] FAMILY MED/ INTERNAL MED/ PEDS    Performed by: Clementine Montelongo NP  Authorized by: Clementine Montelongo NP  Assisting provider: Clementine Montelongo NP  Pre-operative diagnosis: wrist laceration  Post-operative diagnosis: wrist laceration  Body area: upper extremity  Location details: right wrist  Description of findings: well healed   Wound Appearance: well healed and clean  Sutures Removed: 8  Post-removal: no dressing applied  Technical procedures used: suture removal  Significant surgical tasks conducted by the assistant(s): none  Complications: No  Estimated blood loss (mL): 0  Specimens: No  Implants: No

## 2023-10-05 ENCOUNTER — OFFICE VISIT (OUTPATIENT)
Dept: FAMILY MEDICINE | Facility: CLINIC | Age: 73
End: 2023-10-05
Payer: MEDICARE

## 2023-10-05 VITALS
BODY MASS INDEX: 29.05 KG/M2 | SYSTOLIC BLOOD PRESSURE: 124 MMHG | OXYGEN SATURATION: 99 % | HEIGHT: 65 IN | TEMPERATURE: 98 F | DIASTOLIC BLOOD PRESSURE: 74 MMHG | HEART RATE: 77 BPM | WEIGHT: 174.38 LBS

## 2023-10-05 DIAGNOSIS — E78.5 HYPERLIPIDEMIA, UNSPECIFIED HYPERLIPIDEMIA TYPE: ICD-10-CM

## 2023-10-05 DIAGNOSIS — M25.561 CHRONIC PAIN OF RIGHT KNEE: ICD-10-CM

## 2023-10-05 DIAGNOSIS — I10 HYPERTENSION, UNSPECIFIED TYPE: Primary | ICD-10-CM

## 2023-10-05 DIAGNOSIS — G89.29 CHRONIC PAIN OF RIGHT KNEE: ICD-10-CM

## 2023-10-05 DIAGNOSIS — M25.511 PAIN IN JOINT OF RIGHT SHOULDER: ICD-10-CM

## 2023-10-05 DIAGNOSIS — Z23 NEED FOR PROPHYLACTIC VACCINATION AND INOCULATION AGAINST INFLUENZA: ICD-10-CM

## 2023-10-05 PROCEDURE — 99999 PR PBB SHADOW E&M-EST. PATIENT-LVL IV: ICD-10-PCS | Mod: PBBFAC,,, | Performed by: FAMILY MEDICINE

## 2023-10-05 PROCEDURE — 1125F PR PAIN SEVERITY QUANTIFIED, PAIN PRESENT: ICD-10-PCS | Mod: CPTII,S$GLB,, | Performed by: FAMILY MEDICINE

## 2023-10-05 PROCEDURE — 3008F BODY MASS INDEX DOCD: CPT | Mod: CPTII,S$GLB,, | Performed by: FAMILY MEDICINE

## 2023-10-05 PROCEDURE — 3288F FALL RISK ASSESSMENT DOCD: CPT | Mod: CPTII,S$GLB,, | Performed by: FAMILY MEDICINE

## 2023-10-05 PROCEDURE — 3078F PR MOST RECENT DIASTOLIC BLOOD PRESSURE < 80 MM HG: ICD-10-PCS | Mod: CPTII,S$GLB,, | Performed by: FAMILY MEDICINE

## 2023-10-05 PROCEDURE — 1125F AMNT PAIN NOTED PAIN PRSNT: CPT | Mod: CPTII,S$GLB,, | Performed by: FAMILY MEDICINE

## 2023-10-05 PROCEDURE — 1159F MED LIST DOCD IN RCRD: CPT | Mod: CPTII,S$GLB,, | Performed by: FAMILY MEDICINE

## 2023-10-05 PROCEDURE — 3008F PR BODY MASS INDEX (BMI) DOCUMENTED: ICD-10-PCS | Mod: CPTII,S$GLB,, | Performed by: FAMILY MEDICINE

## 2023-10-05 PROCEDURE — 1159F PR MEDICATION LIST DOCUMENTED IN MEDICAL RECORD: ICD-10-PCS | Mod: CPTII,S$GLB,, | Performed by: FAMILY MEDICINE

## 2023-10-05 PROCEDURE — 3288F PR FALLS RISK ASSESSMENT DOCUMENTED: ICD-10-PCS | Mod: CPTII,S$GLB,, | Performed by: FAMILY MEDICINE

## 2023-10-05 PROCEDURE — 1160F PR REVIEW ALL MEDS BY PRESCRIBER/CLIN PHARMACIST DOCUMENTED: ICD-10-PCS | Mod: CPTII,S$GLB,, | Performed by: FAMILY MEDICINE

## 2023-10-05 PROCEDURE — G0008 FLU VACCINE - QUADRIVALENT - ADJUVANTED: ICD-10-PCS | Mod: S$GLB,,, | Performed by: FAMILY MEDICINE

## 2023-10-05 PROCEDURE — 90694 FLU VACCINE - QUADRIVALENT - ADJUVANTED: ICD-10-PCS | Mod: S$GLB,,, | Performed by: FAMILY MEDICINE

## 2023-10-05 PROCEDURE — 99214 OFFICE O/P EST MOD 30 MIN: CPT | Mod: S$GLB,,, | Performed by: FAMILY MEDICINE

## 2023-10-05 PROCEDURE — 3074F PR MOST RECENT SYSTOLIC BLOOD PRESSURE < 130 MM HG: ICD-10-PCS | Mod: CPTII,S$GLB,, | Performed by: FAMILY MEDICINE

## 2023-10-05 PROCEDURE — G0008 ADMIN INFLUENZA VIRUS VAC: HCPCS | Mod: S$GLB,,, | Performed by: FAMILY MEDICINE

## 2023-10-05 PROCEDURE — 1101F PR PT FALLS ASSESS DOC 0-1 FALLS W/OUT INJ PAST YR: ICD-10-PCS | Mod: CPTII,S$GLB,, | Performed by: FAMILY MEDICINE

## 2023-10-05 PROCEDURE — 3074F SYST BP LT 130 MM HG: CPT | Mod: CPTII,S$GLB,, | Performed by: FAMILY MEDICINE

## 2023-10-05 PROCEDURE — 99214 PR OFFICE/OUTPT VISIT, EST, LEVL IV, 30-39 MIN: ICD-10-PCS | Mod: S$GLB,,, | Performed by: FAMILY MEDICINE

## 2023-10-05 PROCEDURE — 1160F RVW MEDS BY RX/DR IN RCRD: CPT | Mod: CPTII,S$GLB,, | Performed by: FAMILY MEDICINE

## 2023-10-05 PROCEDURE — 90694 VACC AIIV4 NO PRSRV 0.5ML IM: CPT | Mod: S$GLB,,, | Performed by: FAMILY MEDICINE

## 2023-10-05 PROCEDURE — 1101F PT FALLS ASSESS-DOCD LE1/YR: CPT | Mod: CPTII,S$GLB,, | Performed by: FAMILY MEDICINE

## 2023-10-05 PROCEDURE — 3078F DIAST BP <80 MM HG: CPT | Mod: CPTII,S$GLB,, | Performed by: FAMILY MEDICINE

## 2023-10-05 PROCEDURE — 99999 PR PBB SHADOW E&M-EST. PATIENT-LVL IV: CPT | Mod: PBBFAC,,, | Performed by: FAMILY MEDICINE

## 2023-10-05 RX ORDER — TIZANIDINE 4 MG/1
4 TABLET ORAL EVERY 6 HOURS PRN
Qty: 60 TABLET | Refills: 1 | Status: SHIPPED | OUTPATIENT
Start: 2023-10-05

## 2023-10-05 NOTE — PROGRESS NOTES
"Routine Office Visit    Windy Lindo  1950  [id number]      Subjective     Windy is a 73 y.o. female who presents today for:    Hypertension - Patient has been monitoring bp at home. Blood pressure has improved with hctz and exercise. Although, Patient did have to stop exercise due to knee pain.   Right shoulder pain - Right shoulder pain - chronic condition for patient. She had improvement after injection into her shoulder. Patient has days when pain is severe and days when it is ok. Patient has been doing home exercises at home. She has been using resistant band exercises. Patient has taken tylenol with relief of symptoms. She feels it may be related to weather. No trauma. No injuries. No lifting. Antiinflammatories help. She is scheduled with orthopedics.   Knee pain - Patient has history of knee surgery. She has been cycling at home to help keep up her strength. She is scheduled with orthopedic - Dr. Canales in 1 week. She states she plans on getting an injection for her knee before thanksgiving trip to [place].   Sciatica - left side - Patient c/o sciatic nerve pain on left buttock and down leg. Patient has been doing exercises at home with some relief of symptoms. However, symptoms are intermittent.       Objective     Review of Systems   Constitutional:  Negative for chills and fever.   HENT:  Negative for congestion.    Eyes:  Negative for blurred vision.   Respiratory:  Negative for cough.    Cardiovascular:  Negative for chest pain.   Gastrointestinal:  Negative for abdominal pain, constipation, diarrhea, heartburn, nausea and vomiting.   Genitourinary:  Negative for dysuria.   Musculoskeletal:  Negative for myalgias.   Skin:  Negative for itching and rash.   Neurological:  Negative for dizziness and headaches.   Psychiatric/Behavioral:  Negative for depression.      /74   Pulse 77   Temp 98.3 °F (36.8 °C)   Ht 5' 5" (1.651 m)   Wt 79.1 kg (174 lb 6.1 oz)   SpO2 99%   BMI 29.02 kg/m² "   Physical Exam  Constitutional:       Appearance: She is well-developed.   HENT:      Head: Normocephalic and atraumatic.   Eyes:      Conjunctiva/sclera: Conjunctivae normal.      Pupils: Pupils are equal, round, and reactive to light.   Cardiovascular:      Rate and Rhythm: Normal rate and regular rhythm.      Heart sounds: Normal heart sounds. No murmur heard.     No friction rub. No gallop.   Pulmonary:      Effort: No respiratory distress.      Breath sounds: Normal breath sounds.   Abdominal:      General: Bowel sounds are normal. There is no distension.      Palpations: Abdomen is soft.      Tenderness: There is no abdominal tenderness.   Musculoskeletal:         General: Normal range of motion.      Cervical back: Normal range of motion and neck supple.   Lymphadenopathy:      Cervical: No cervical adenopathy.   Skin:     General: Skin is warm.   Neurological:      Mental Status: She is alert and oriented to person, place, and time.             Assessment     Health Maintenance         Date Due Completion Date    COVID-19 Vaccine (4 - Pfizer risk series) 11/26/2021 10/1/2021    Colorectal Cancer Screening 09/07/2022 9/7/2017    DEXA Scan 01/10/2024 1/10/2022    Mammogram 05/19/2024 5/19/2023    Lipid Panel 03/22/2028 3/22/2023    Override on 2/4/2013: Done    TETANUS VACCINE 09/10/2033 9/10/2023              Problem List Items Addressed This Visit          Cardiac/Vascular    Hyperlipidemia    Relevant Orders    CBC Auto Differential    Comprehensive Metabolic Panel    Lipid Panel    TSH  The current medical regimen is effective;  continue present plan and medications.       Hypertension - Primary    Relevant Orders    CBC Auto Differential    Comprehensive Metabolic Panel    Lipid Panel    TSH  The current medical regimen is effective;  continue present plan and medications.          Orthopedic    Right knee pain  Chronic condition   F/u with ortho   Conservative strengthening and stretching exercises       Other Visit Diagnoses       Need for prophylactic vaccination and inoculation against influenza        Relevant Orders    Influenza (FLUAD) - Quadrivalent (Adjuvanted) *Preferred* (65+) (PF) (Completed)    Pain in joint of right shoulder        Relevant Medications    tiZANidine (ZANAFLEX) 4 MG tablet  Common side effects of this medication were discussed with the patient. Questions regarding medications were discussed during this visit.                      Follow up in about 1 year (around 10/5/2024), or if symptoms worsen or fail to improve.

## 2023-10-10 ENCOUNTER — HOSPITAL ENCOUNTER (OUTPATIENT)
Dept: RADIOLOGY | Facility: HOSPITAL | Age: 73
Discharge: HOME | End: 2023-10-10
Attending: ORTHOPAEDIC SURGERY
Payer: MEDICARE

## 2023-10-10 ENCOUNTER — OFFICE VISIT (OUTPATIENT)
Dept: SPORTS MEDICINE | Facility: CLINIC | Age: 73
End: 2023-10-10
Payer: MEDICARE

## 2023-10-10 VITALS
HEART RATE: 74 BPM | DIASTOLIC BLOOD PRESSURE: 72 MMHG | WEIGHT: 174.19 LBS | BODY MASS INDEX: 29.02 KG/M2 | SYSTOLIC BLOOD PRESSURE: 135 MMHG | HEIGHT: 65 IN

## 2023-10-10 DIAGNOSIS — M25.562 PAIN IN BOTH KNEES, UNSPECIFIED CHRONICITY: ICD-10-CM

## 2023-10-10 DIAGNOSIS — M25.561 PAIN IN BOTH KNEES, UNSPECIFIED CHRONICITY: ICD-10-CM

## 2023-10-10 DIAGNOSIS — M17.12 PRIMARY OSTEOARTHRITIS OF LEFT KNEE: Primary | ICD-10-CM

## 2023-10-10 PROCEDURE — 1159F PR MEDICATION LIST DOCUMENTED IN MEDICAL RECORD: ICD-10-PCS | Mod: CPTII,S$GLB,, | Performed by: ORTHOPAEDIC SURGERY

## 2023-10-10 PROCEDURE — 99203 PR OFFICE/OUTPT VISIT, NEW, LEVL III, 30-44 MIN: ICD-10-PCS | Mod: 25,S$GLB,, | Performed by: ORTHOPAEDIC SURGERY

## 2023-10-10 PROCEDURE — 99999 PR PBB SHADOW E&M-EST. PATIENT-LVL III: ICD-10-PCS | Mod: PBBFAC,,, | Performed by: ORTHOPAEDIC SURGERY

## 2023-10-10 PROCEDURE — 73564 X-RAY EXAM KNEE 4 OR MORE: CPT | Mod: 26,50,, | Performed by: RADIOLOGY

## 2023-10-10 PROCEDURE — 20610 DRAIN/INJ JOINT/BURSA W/O US: CPT | Mod: LT,S$GLB,, | Performed by: ORTHOPAEDIC SURGERY

## 2023-10-10 PROCEDURE — 20610 LARGE JOINT ASPIRATION/INJECTION: L KNEE: ICD-10-PCS | Mod: LT,S$GLB,, | Performed by: ORTHOPAEDIC SURGERY

## 2023-10-10 PROCEDURE — 3008F PR BODY MASS INDEX (BMI) DOCUMENTED: ICD-10-PCS | Mod: CPTII,S$GLB,, | Performed by: ORTHOPAEDIC SURGERY

## 2023-10-10 PROCEDURE — 99999 PR PBB SHADOW E&M-EST. PATIENT-LVL III: CPT | Mod: PBBFAC,,, | Performed by: ORTHOPAEDIC SURGERY

## 2023-10-10 PROCEDURE — 1125F PR PAIN SEVERITY QUANTIFIED, PAIN PRESENT: ICD-10-PCS | Mod: CPTII,S$GLB,, | Performed by: ORTHOPAEDIC SURGERY

## 2023-10-10 PROCEDURE — 3078F DIAST BP <80 MM HG: CPT | Mod: CPTII,S$GLB,, | Performed by: ORTHOPAEDIC SURGERY

## 2023-10-10 PROCEDURE — 3078F PR MOST RECENT DIASTOLIC BLOOD PRESSURE < 80 MM HG: ICD-10-PCS | Mod: CPTII,S$GLB,, | Performed by: ORTHOPAEDIC SURGERY

## 2023-10-10 PROCEDURE — 3075F PR MOST RECENT SYSTOLIC BLOOD PRESS GE 130-139MM HG: ICD-10-PCS | Mod: CPTII,S$GLB,, | Performed by: ORTHOPAEDIC SURGERY

## 2023-10-10 PROCEDURE — 73564 XR KNEE ORTHO BILAT WITH FLEXION: ICD-10-PCS | Mod: 26,50,, | Performed by: RADIOLOGY

## 2023-10-10 PROCEDURE — 3008F BODY MASS INDEX DOCD: CPT | Mod: CPTII,S$GLB,, | Performed by: ORTHOPAEDIC SURGERY

## 2023-10-10 PROCEDURE — 99203 OFFICE O/P NEW LOW 30 MIN: CPT | Mod: 25,S$GLB,, | Performed by: ORTHOPAEDIC SURGERY

## 2023-10-10 PROCEDURE — 1159F MED LIST DOCD IN RCRD: CPT | Mod: CPTII,S$GLB,, | Performed by: ORTHOPAEDIC SURGERY

## 2023-10-10 PROCEDURE — 73564 X-RAY EXAM KNEE 4 OR MORE: CPT | Mod: TC,50

## 2023-10-10 PROCEDURE — 1125F AMNT PAIN NOTED PAIN PRSNT: CPT | Mod: CPTII,S$GLB,, | Performed by: ORTHOPAEDIC SURGERY

## 2023-10-10 PROCEDURE — 3075F SYST BP GE 130 - 139MM HG: CPT | Mod: CPTII,S$GLB,, | Performed by: ORTHOPAEDIC SURGERY

## 2023-10-10 RX ORDER — TRIAMCINOLONE ACETONIDE 40 MG/ML
60 INJECTION, SUSPENSION INTRA-ARTICULAR; INTRAMUSCULAR
Status: DISCONTINUED | OUTPATIENT
Start: 2023-10-10 | End: 2023-10-10 | Stop reason: HOSPADM

## 2023-10-10 RX ADMIN — TRIAMCINOLONE ACETONIDE 60 MG: 40 INJECTION, SUSPENSION INTRA-ARTICULAR; INTRAMUSCULAR at 12:10

## 2023-10-10 NOTE — PROGRESS NOTES
CC: Left knee pain    73 y.o. Female with a  history of atraumatic left knee pain.  Patient has a history of a right TKA in 2018 by Dr. Busch and reports this is doing well.   She states that the pain is severe and not responding to any conservative care.  She is going on a trip in November and is requesting a CSI today.     She reports that the pain and weakness. It also bothers her at night.    + mechanical symptoms, - instability    Is affecting ADLs.  Pain is /10 at it's worst.    REVIEW OF SYSTEMS:  Constitution: Negative. Negative for chills, fever and night sweats.   HENT: Negative for congestion and headaches.    Eyes: Negative for blurred vision, left vision loss and right vision loss.   Cardiovascular: Negative for chest pain and syncope.   Respiratory: Negative for cough and shortness of breath.    Endocrine: Negative for polydipsia, polyphagia and polyuria.   Hematologic/Lymphatic: Negative for bleeding problem. Does not bruise/bleed easily.   Skin: Negative for dry skin, itching and rash.   Musculoskeletal: Negative for falls. Positive for left knee pain and  muscle weakness.   Gastrointestinal: Negative for abdominal pain and bowel incontinence.   Genitourinary: Negative for bladder incontinence and nocturia.   Neurological: Negative for disturbances in coordination, loss of balance and seizures.   Psychiatric/Behavioral: Negative for depression. The patient does not have insomnia.    Allergic/Immunologic: Negative for hives and persistent infections.     PAST MEDICAL HISTORY:    Past Medical History:   Diagnosis Date    Arthritis     Eye injury 4 yrs    hit od    Hypertension 4/4/2023    Nuclear sclerosis, bilateral 2/13/2019       PAST SURGICAL HISTORY:   Past Surgical History:   Procedure Laterality Date    APPENDECTOMY      COLONOSCOPY N/A 9/7/2017    Procedure: COLONOSCOPY;  Surgeon: Albino Fenton MD;  Location: [address];  Service: Endoscopy;  Laterality: N/A;     ESOPHAGOGASTRODUODENOSCOPY  09/07/2017    KNEE SURGERY Right 12/05/2017    TKR    LASIK  7 yrs    ou    TONSILLECTOMY         FAMILY HISTORY:   Family History   Problem Relation Age of Onset    Hypertension Mother     Glaucoma Mother     Diabetes Mother     Cataracts Mother     Cancer Mother 74        lung    Heart disease Mother 55    Hypertension Father     Heart disease Father         onset early 60;s, Aortic valve replacement ,Rheumatic fever as a child     No Known Problems Sister     Diabetes Brother     Cancer Brother 66        mesothelioma    No Known Problems Maternal Aunt     No Known Problems Maternal Uncle     No Known Problems Paternal Aunt     No Known Problems Paternal Uncle     No Known Problems Maternal Grandmother     No Known Problems Maternal Grandfather     No Known Problems Paternal Grandmother     No Known Problems Paternal Grandfather     No Known Problems Other     Amblyopia Neg Hx     Blindness Neg Hx     Macular degeneration Neg Hx     Retinal detachment Neg Hx     Strabismus Neg Hx     Stroke Neg Hx     Thyroid disease Neg Hx     Melanoma Neg Hx        SOCIAL HISTORY:   Social History     Socioeconomic History    Marital status: Single   Occupational History     [place] [employer] [place]   Tobacco Use    Smoking status: Never    Smokeless tobacco: Never   Substance and Sexual Activity    Alcohol use: Yes     Alcohol/week: 1.0 standard drink of alcohol     Types: 1 Glasses of wine per week     Comment: glass of wine a night     Drug use: No    Sexual activity: Never     Social Determinants of Health     Financial Resource Strain: Unknown (10/4/2023)    Overall Financial Resource Strain (CARDIA)     Difficulty of Paying Living Expenses: Patient refused   Food Insecurity: Unknown (10/4/2023)    Hunger Vital Sign     Worried About Running Out of Food in the Last Year: Patient refused     Ran Out of Food in the Last Year: Patient refused   Transportation Needs: No Transportation Needs (10/4/2023)     PRAPARE - Transportation     Lack of Transportation (Medical): No     Lack of Transportation (Non-Medical): No   Physical Activity: Insufficiently Active (10/4/2023)    Exercise Vital Sign     Days of Exercise per Week: 4 days     Minutes of Exercise per Session: 20 min   Stress: No Stress Concern Present (10/4/2023)    [employer] - Occupational Stress Questionnaire     Feeling of Stress : Not at all   Social Connections: Unknown (10/4/2023)    Social Connection and Isolation Panel [NHANES]     Frequency of Communication with Friends and Family: Three times a week     Frequency of Social Gatherings with Friends and Family: Three times a week     Active Member of Clubs or Organizations: No     Attends Club or Organization Meetings: Never     Marital Status: Never    Housing Stability: Unknown (10/4/2023)    Housing Stability Vital Sign     Unable to Pay for Housing in the Last Year: Patient refused     Number of Places Lived in the Last Year: 0     Unstable Housing in the Last Year: Patient refused       MEDICATIONS:     Current Outpatient Medications:     alendronate (FOSAMAX) 35 MG tablet, Take 1 tablet (35 mg total) by mouth once a week., Disp: 12 tablet, Rfl: 0    b complex vitamins capsule, Take 1 capsule by mouth once daily., Disp: , Rfl:     glucosam/chond/hyalu/CF borate (MOVE FREE JOINT HEALTH ORAL), Take by mouth., Disp: , Rfl:     hydroCHLOROthiazide (HYDRODIURIL) 25 MG tablet, Take 1 tablet (25 mg total) by mouth once daily., Disp: 90 tablet, Rfl: 3    multivit-min-FA-lycopen-lutein 0.4-300-250 mg-mcg-mcg Tab, Take 1 tablet by mouth once daily., Disp: , Rfl:     tiZANidine (ZANAFLEX) 4 MG tablet, Take 1 tablet (4 mg total) by mouth every 6 (six) hours as needed (muscle spasms)., Disp: 60 tablet, Rfl: 1    vitamin D (VITAMIN D3) 1000 units Tab, Take 1,000 Units by mouth once daily., Disp: , Rfl:     ALLERGIES:   Review of patient's allergies indicates:  No Known  "Allergies    VITAL SIGNS:   /72   Pulse 74   Ht 5' 5" (1.651 m)   Wt 79 kg (174 lb 2.6 oz)   BMI 28.98 kg/m²      PHYSICAL EXAMINATION  General:  The patient is alert and oriented x 3.  Mood is pleasant.  Observation of ears, eyes and nose reveal no gross abnormalities.  No labored breathing observed.    LEFT KNEE EXAMINATION     OBSERVATION / INSPECTION   Gait:   Nonantalgic   Alignment:  Neutral   Scars:   None   Muscle atrophy: Mild  Effusion:  Mild  Warmth:  None   Discoloration:   none     TENDERNESS / CREPITUS (T / C):          T / C      T / C   Patella   - / -   Lateral joint line   + / -   Peripatellar medial  -  Medial joint line    - / -   Peripatellar lateral -  Medial plica   - / -   Patellar tendon -   Popliteal fossa   + / -   Quad tendon   +   Gastrocnemius   -   Prepatellar Bursa - / -   Quadricep   -   Tibial tubercle  -  Thigh/hamstring  -   Pes anserine/HS -  Fibula    -   ITB   - / -  Tibia     -   Tib/fib joint  - / -  LCL    -     MFC   - / -   MCL: Proximal  -    LFC   - / -    Distal   -          ROM: (* = pain)  PASSIVE   ACTIVE    Left :   5 / 0 / 145   5 / 0 / 145     Right :    5 / 0 / 145   5 / 0 / 145    Patellofemoral examination:  See above noted areas of tenderness.   Patella position    Subluxation / dislocation: Centered           Sup. / Inf;   Normal   Crepitus (PF):    Absent   Patellar Mobility:       Medial-lateral:   Normal    Superior-inferior:  Normal    Inferior tilt   Normal    Patellar tendon:  Normal   Lateral tilt:    Normal   J-sign:     None   Patellofemoral grind:   No pain       MENISCAL SIGNS:     Pain on terminal extension:  +  Pain on terminal flexion:  +  Leonels maneuver:  + (for pain)  Squat     + (for pain)    LIGAMENT EXAMINATION:  ACL / Lachman:  normal (-1 to 2mm)    PCL-Post.  drawer: normal 0 to 2mm  MCL- Valgus:  normal 0 to 2mm  LCL- Varus:  normal 0 to 2mm  Pivot shift: normal (Equal)   Dial Test: difference c/w other side   At 30° " flexion: normal (< 5°)    At 90° flexion: normal (< 5°)   Reverse Pivot Shift:   normal (Equal)     STRENGTH: (* = with pain) PAINFUL SIDE   Quadricep   5/5   Hamstrin/5    EXTREMITY NEURO-VASCULAR EXAMINATION:   Sensation:  Grossly intact to light touch all dermatomal regions.   Motor Function:  Fully intact motor function at hip, knee, foot and ankle    DTRs;  quadriceps and  achilles 2+.  No clonus and downgoing Babinski.    Vascular status:  DP and PT pulses 2+, brisk capillary refill, symmetric.     OTHER FINDINGS:      IMAGING:     X-rays including standing, weight bearing AP and flexion bilateral knees, lateral and merchant views ordered and images reviewed by me show:    No fracture, dislocation or other pathology   Medial compartment: Moderate degenerative    Lateral compartment: Moderate degenerative changes   Patellofemoral compartment: no degenerative changes       ASSESSMENT:    Left Knee  Pain, possible   1. Primary osteoarthritis of left knee         PLAN:   1. CSI left knee    2. Possible TKA in the future. Patient will message when they would like to proceed and referred to joints.     All questions were answered, pt will contact us for questions or concerns in the interim.

## 2023-10-10 NOTE — PROCEDURES
Large Joint Aspiration/Injection: L knee    Date/Time: 10/10/2023 12:45 PM    Performed by: Gavino Canales MD  Authorized by: Gavino Canales MD    Consent Done?:  Yes (Verbal)  Indications:  Pain  Site marked: the procedure site was marked    Timeout: prior to procedure the correct patient, procedure, and site was verified    Prep: patient was prepped and draped in usual sterile fashion      Details:  Needle Size:  22 G  Ultrasonic Guidance for needle placement?: No    Approach:  Superior  Location:  Knee  Site:  L knee  Medications:  60 mg triamcinolone acetonide 40 mg/mL  Patient tolerance:  Patient tolerated the procedure well with no immediate complications

## 2023-11-30 DIAGNOSIS — I10 HYPERTENSION, UNSPECIFIED TYPE: ICD-10-CM

## 2023-11-30 RX ORDER — HYDROCHLOROTHIAZIDE 12.5 MG/1
12.5 TABLET ORAL
Qty: 30 TABLET | Refills: 11 | OUTPATIENT
Start: 2023-11-30

## 2023-11-30 NOTE — TELEPHONE ENCOUNTER
Refill Decision Note   Windy Lindo  is requesting a refill authorization.  Brief Assessment and Rationale for Refill:  Quick Discontinue     Medication Therapy Plan:  Per Epic Data, HCTZ was increased to 25 mg on 4/24/23.      Comments:     Note composed:2:54 PM 11/30/2023

## 2023-11-30 NOTE — TELEPHONE ENCOUNTER
No care due was identified.  [facility] Embedded Care Due Messages. Reference number: [id number].   11/30/2023 8:03:25 AM CST

## 2023-12-04 ENCOUNTER — PATIENT MESSAGE (OUTPATIENT)
Dept: FAMILY MEDICINE | Facility: CLINIC | Age: 73
End: 2023-12-04

## 2023-12-04 DIAGNOSIS — M17.11 PRIMARY OSTEOARTHRITIS OF RIGHT KNEE: ICD-10-CM

## 2023-12-04 RX ORDER — MELOXICAM 15 MG/1
15 TABLET ORAL DAILY
Qty: 90 TABLET | Refills: 0 | Status: SHIPPED | OUTPATIENT
Start: 2023-12-04 | End: 2024-03-06

## 2023-12-04 NOTE — TELEPHONE ENCOUNTER
No care due was identified.  Health [facility] Embedded Care Due Messages. Reference number: [id number].   12/04/2023 9:52:08 AM CST

## 2024-02-02 ENCOUNTER — OFFICE VISIT (OUTPATIENT)
Dept: OPTOMETRY | Facility: CLINIC | Age: 74
End: 2024-02-02
Payer: MEDICARE

## 2024-02-02 DIAGNOSIS — H25.13 NUCLEAR SCLEROSIS, BILATERAL: Primary | ICD-10-CM

## 2024-02-02 DIAGNOSIS — Z98.890 S/P LASIK (LASER ASSISTED IN SITU KERATOMILEUSIS) OF BOTH EYES: ICD-10-CM

## 2024-02-02 PROCEDURE — 1126F AMNT PAIN NOTED NONE PRSNT: CPT | Mod: CPTII,S$GLB,, | Performed by: OPTOMETRIST

## 2024-02-02 PROCEDURE — 99999 PR PBB SHADOW E&M-EST. PATIENT-LVL III: CPT | Mod: PBBFAC,,, | Performed by: OPTOMETRIST

## 2024-02-02 PROCEDURE — 1101F PT FALLS ASSESS-DOCD LE1/YR: CPT | Mod: CPTII,S$GLB,, | Performed by: OPTOMETRIST

## 2024-02-02 PROCEDURE — 1159F MED LIST DOCD IN RCRD: CPT | Mod: CPTII,S$GLB,, | Performed by: OPTOMETRIST

## 2024-02-02 PROCEDURE — 1160F RVW MEDS BY RX/DR IN RCRD: CPT | Mod: CPTII,S$GLB,, | Performed by: OPTOMETRIST

## 2024-02-02 PROCEDURE — 99214 OFFICE O/P EST MOD 30 MIN: CPT | Mod: S$GLB,,, | Performed by: OPTOMETRIST

## 2024-02-02 PROCEDURE — 3288F FALL RISK ASSESSMENT DOCD: CPT | Mod: CPTII,S$GLB,, | Performed by: OPTOMETRIST

## 2024-02-02 NOTE — PROGRESS NOTES
Subjective:       Patient ID: Windy Lindo is a 73 y.o. female      Chief Complaint   Patient presents with    Concerns About Ocular Health     History of Present Illness  Dls 2/22/23 Dr. Vela    72 y/o female presents today with c/o blurry, vision state VA decreasing      Pt wears pal's.    Pt undergoing steroid injections of knee, last injection October 2023.       No tearing  No itching  + ou burning  No pain  No  ha's  + ou floaters  No flashes     Eye meds  Systane ou  BID          Assessment/Plan:     1. Nuclear sclerosis, bilateral  Visually significant cataract. Discussed diagnosis with patient and surgical process. Referral for cataract evaluation.     - Ambulatory referral/consult to Ophthalmology    2. S/P LASIK (laser assisted in situ keratomileusis) of both eyes  Stable, no signs of ectasia.      Follow up for cataract consult.

## 2024-02-21 DIAGNOSIS — Z78.0 MENOPAUSE: ICD-10-CM

## 2024-03-02 DIAGNOSIS — M17.11 PRIMARY OSTEOARTHRITIS OF RIGHT KNEE: ICD-10-CM

## 2024-03-02 NOTE — TELEPHONE ENCOUNTER
Care Due:                  Date            Visit Type   Department     Provider  --------------------------------------------------------------------------------                                EP [facility] FAMILY                              PRIMARY      MED/ INTERNAL  Last Visit: 10-      CARE ([facility])   MED/ PEDS      Trisha Higginbotham  Next Visit: None Scheduled  None         None Found                                                            Last  Test          Frequency    Reason                     Performed    Due Date  --------------------------------------------------------------------------------    CBC.........  12 months..  meloxicam................  03- 03-    CMP.........  12 months..  alendronate, meloxicam...  03- 03-    Mg Level....  12 months..  alendronate..............  11-   10-    Phosphate...  12 months..  alendronate..............  Not Found    Overdue    Vitamin D...  12 months..  alendronate..............  Not Found    Overdue    [employer] Embedded Care Due Messages. Reference number: [id number].   3/02/2024 8:07:13 AM CST

## 2024-03-06 ENCOUNTER — TELEPHONE (OUTPATIENT)
Dept: PODIATRY | Facility: CLINIC | Age: 74
End: 2024-03-06
Payer: MEDICARE

## 2024-03-06 RX ORDER — MELOXICAM 15 MG/1
15 TABLET ORAL
Qty: 90 TABLET | Refills: 0 | Status: SHIPPED | OUTPATIENT
Start: 2024-03-06 | End: 2024-06-03

## 2024-03-06 NOTE — TELEPHONE ENCOUNTER
Spoke with Darvin at [facility] regarding information.    ----- Message from Filippo Santiago sent at 3/6/2024 10:17 AM CST -----  Type: Patient Call Back         Who called: Darvin ([facility])          What is the request in detail: Verify what requested for diabetic shoes          Can the clinic reply by [facility]? No         Would the patient rather a call back or a response via [facility]? Call back         Best call back number: [phone]          Additional Information:         Thank you.

## 2024-03-15 ENCOUNTER — HOSPITAL ENCOUNTER (OUTPATIENT)
Dept: RADIOLOGY | Facility: CLINIC | Age: 74
Discharge: HOME | End: 2024-03-15
Attending: FAMILY MEDICINE
Payer: MEDICARE

## 2024-03-15 DIAGNOSIS — Z78.0 MENOPAUSE: ICD-10-CM

## 2024-03-15 PROCEDURE — 77080 DXA BONE DENSITY AXIAL: CPT | Mod: TC,PO

## 2024-03-15 PROCEDURE — 77080 DXA BONE DENSITY AXIAL: CPT | Mod: 26,,, | Performed by: INTERNAL MEDICINE

## 2024-03-21 ENCOUNTER — TELEPHONE (OUTPATIENT)
Dept: FAMILY MEDICINE | Facility: CLINIC | Age: 74
End: 2024-03-21
Payer: MEDICARE

## 2024-03-21 DIAGNOSIS — M81.0 AGE-RELATED OSTEOPOROSIS WITHOUT CURRENT PATHOLOGICAL FRACTURE: Primary | ICD-10-CM

## 2024-03-21 NOTE — TELEPHONE ENCOUNTER
----- Message from Trisha Higginbotham MD sent at 3/21/2024  1:02 PM CDT -----  Your Bone Mineral Density test showed osteoporosis. This is severe bone loss and you have higher risk of fracture.     Since you are already on fosamax would you like to try an injectable medication that may help?

## 2024-03-21 NOTE — TELEPHONE ENCOUNTER
----- Message from Deniz Mosquera sent at 3/21/2024  3:05 PM CDT -----  Type:  Patient Returning Call    Who Called: SELF    Who Left Message for Patient: SHANICE BOSCH    Does the patient know what this is regarding?:YES    Would the patient rather a call back or a response via [facility]? CALL    Best Call Back Number: [phone]      Additional Information:

## 2024-03-21 NOTE — TELEPHONE ENCOUNTER
Spoke to patient. She is okay with getting injectable medication ordered for her Osteoporosis if the clinic can administer it. Provider will be notified.

## 2024-03-22 ENCOUNTER — TELEPHONE (OUTPATIENT)
Dept: FAMILY MEDICINE | Facility: CLINIC | Age: 74
End: 2024-03-22
Payer: MEDICARE

## 2024-03-22 PROBLEM — M81.0 AGE-RELATED OSTEOPOROSIS WITHOUT CURRENT PATHOLOGICAL FRACTURE: Status: ACTIVE | Noted: 2024-03-22

## 2024-03-22 NOTE — TELEPHONE ENCOUNTER
I will order prolia to go to the infusion center on the [facility]. The medication has to be pre-approved by her insurance. Once it is approved, the infusion center will contact Patient for injection

## 2024-03-22 NOTE — TELEPHONE ENCOUNTER
----- Message from Germaine Celeste MA sent at 3/22/2024 10:17 AM CDT -----  Type:  Patient Returning Call    Who Called: Self    Who Left Message for Patient: Juve Rodríguez RN    Does the patient know what this is regarding?:yes    Would the patient rather a call back or a response via [facility]? Yes, call    Best Call Back Number:[phone]

## 2024-03-23 NOTE — TELEPHONE ENCOUNTER
No care due was identified.  [facility] Embedded Care Due Messages. Reference number: [id number].   3/23/2024 7:58:57 AM CDT

## 2024-03-24 ENCOUNTER — PATIENT MESSAGE (OUTPATIENT)
Dept: FAMILY MEDICINE | Facility: CLINIC | Age: 74
End: 2024-03-24
Payer: MEDICARE

## 2024-03-25 RX ORDER — ALENDRONATE SODIUM 35 MG/1
TABLET ORAL
Qty: 12 TABLET | Refills: 0 | Status: SHIPPED | OUTPATIENT
Start: 2024-03-25

## 2024-03-28 ENCOUNTER — LAB VISIT (OUTPATIENT)
Dept: LAB | Facility: HOSPITAL | Age: 74
End: 2024-03-28
Attending: FAMILY MEDICINE
Payer: MEDICARE

## 2024-03-28 DIAGNOSIS — E78.5 HYPERLIPIDEMIA, UNSPECIFIED HYPERLIPIDEMIA TYPE: ICD-10-CM

## 2024-03-28 DIAGNOSIS — I10 HYPERTENSION, UNSPECIFIED TYPE: ICD-10-CM

## 2024-03-28 LAB
ALBUMIN SERPL BCP-MCNC: 3.8 G/DL (ref 3.5–5.2)
ALP SERPL-CCNC: 73 U/L (ref 55–135)
ALT SERPL W/O P-5'-P-CCNC: 10 U/L (ref 10–44)
ANION GAP SERPL CALC-SCNC: 10 MMOL/L (ref 8–16)
AST SERPL-CCNC: 14 U/L (ref 10–40)
BASOPHILS # BLD AUTO: 0.02 K/UL (ref 0–0.2)
BASOPHILS NFR BLD: 0.3 % (ref 0–1.9)
BILIRUB SERPL-MCNC: 0.5 MG/DL (ref 0.1–1)
BUN SERPL-MCNC: 24 MG/DL (ref 8–23)
CALCIUM SERPL-MCNC: 10.2 MG/DL (ref 8.7–10.5)
CHLORIDE SERPL-SCNC: 106 MMOL/L (ref 95–110)
CHOLEST SERPL-MCNC: 213 MG/DL (ref 120–199)
CHOLEST/HDLC SERPL: 3.7 {RATIO} (ref 2–5)
CO2 SERPL-SCNC: 25 MMOL/L (ref 23–29)
CREAT SERPL-MCNC: 0.8 MG/DL (ref 0.5–1.4)
DIFFERENTIAL METHOD BLD: ABNORMAL
EOSINOPHIL # BLD AUTO: 0.1 K/UL (ref 0–0.5)
EOSINOPHIL NFR BLD: 1.5 % (ref 0–8)
ERYTHROCYTE [DISTWIDTH] IN BLOOD BY AUTOMATED COUNT: 13.7 % (ref 11.5–14.5)
EST. GFR  (NO RACE VARIABLE): >60 ML/MIN/1.73 M^2
GLUCOSE SERPL-MCNC: 94 MG/DL (ref 70–110)
HCT VFR BLD AUTO: 43.4 % (ref 37–48.5)
HDLC SERPL-MCNC: 57 MG/DL (ref 40–75)
HDLC SERPL: 26.8 % (ref 20–50)
HGB BLD-MCNC: 13.8 G/DL (ref 12–16)
IMM GRANULOCYTES # BLD AUTO: 0.01 K/UL (ref 0–0.04)
IMM GRANULOCYTES NFR BLD AUTO: 0.2 % (ref 0–0.5)
LDLC SERPL CALC-MCNC: 137.8 MG/DL (ref 63–159)
LYMPHOCYTES # BLD AUTO: 1.9 K/UL (ref 1–4.8)
LYMPHOCYTES NFR BLD: 29.2 % (ref 18–48)
MCH RBC QN AUTO: 29.6 PG (ref 27–31)
MCHC RBC AUTO-ENTMCNC: 31.8 G/DL (ref 32–36)
MCV RBC AUTO: 93 FL (ref 82–98)
MONOCYTES # BLD AUTO: 0.7 K/UL (ref 0.3–1)
MONOCYTES NFR BLD: 10.2 % (ref 4–15)
NEUTROPHILS # BLD AUTO: 3.9 K/UL (ref 1.8–7.7)
NEUTROPHILS NFR BLD: 58.6 % (ref 38–73)
NONHDLC SERPL-MCNC: 156 MG/DL
NRBC BLD-RTO: 0 /100 WBC
PLATELET # BLD AUTO: 203 K/UL (ref 150–450)
PMV BLD AUTO: 13 FL (ref 9.2–12.9)
POTASSIUM SERPL-SCNC: 4.4 MMOL/L (ref 3.5–5.1)
PROT SERPL-MCNC: 6.5 G/DL (ref 6–8.4)
RBC # BLD AUTO: 4.67 M/UL (ref 4–5.4)
SODIUM SERPL-SCNC: 141 MMOL/L (ref 136–145)
TRIGL SERPL-MCNC: 91 MG/DL (ref 30–150)
TSH SERPL DL<=0.005 MIU/L-ACNC: 1.38 UIU/ML (ref 0.4–4)
WBC # BLD AUTO: 6.6 K/UL (ref 3.9–12.7)

## 2024-03-28 PROCEDURE — 80061 LIPID PANEL: CPT | Performed by: FAMILY MEDICINE

## 2024-03-28 PROCEDURE — 36415 COLL VENOUS BLD VENIPUNCTURE: CPT | Mod: PO | Performed by: FAMILY MEDICINE

## 2024-03-28 PROCEDURE — 85025 COMPLETE CBC W/AUTO DIFF WBC: CPT | Performed by: FAMILY MEDICINE

## 2024-03-28 PROCEDURE — 84443 ASSAY THYROID STIM HORMONE: CPT | Performed by: FAMILY MEDICINE

## 2024-03-28 PROCEDURE — 80053 COMPREHEN METABOLIC PANEL: CPT | Performed by: FAMILY MEDICINE

## 2024-04-04 ENCOUNTER — PATIENT MESSAGE (OUTPATIENT)
Dept: OTHER | Facility: OTHER | Age: 74
End: 2024-04-04
Payer: MEDICARE

## 2024-04-05 ENCOUNTER — OFFICE VISIT (OUTPATIENT)
Dept: FAMILY MEDICINE | Facility: CLINIC | Age: 74
End: 2024-04-05
Payer: MEDICARE

## 2024-04-05 VITALS
DIASTOLIC BLOOD PRESSURE: 70 MMHG | HEIGHT: 65 IN | OXYGEN SATURATION: 95 % | WEIGHT: 178.88 LBS | BODY MASS INDEX: 29.8 KG/M2 | SYSTOLIC BLOOD PRESSURE: 130 MMHG | TEMPERATURE: 98 F | HEART RATE: 89 BPM

## 2024-04-05 DIAGNOSIS — H25.13 NUCLEAR SCLEROSIS, BILATERAL: ICD-10-CM

## 2024-04-05 DIAGNOSIS — Z12.11 COLON CANCER SCREENING: ICD-10-CM

## 2024-04-05 DIAGNOSIS — G89.29 CHRONIC PAIN OF RIGHT KNEE: ICD-10-CM

## 2024-04-05 DIAGNOSIS — E78.5 HYPERLIPIDEMIA, UNSPECIFIED HYPERLIPIDEMIA TYPE: Primary | ICD-10-CM

## 2024-04-05 DIAGNOSIS — M25.561 CHRONIC PAIN OF RIGHT KNEE: ICD-10-CM

## 2024-04-05 DIAGNOSIS — M19.011 OSTEOARTHRITIS OF RIGHT SHOULDER, UNSPECIFIED OSTEOARTHRITIS TYPE: ICD-10-CM

## 2024-04-05 DIAGNOSIS — I10 HYPERTENSION, UNSPECIFIED TYPE: ICD-10-CM

## 2024-04-05 DIAGNOSIS — Z12.31 ENCOUNTER FOR SCREENING MAMMOGRAM FOR BREAST CANCER: ICD-10-CM

## 2024-04-05 PROCEDURE — 1126F AMNT PAIN NOTED NONE PRSNT: CPT | Mod: CPTII,S$GLB,,

## 2024-04-05 PROCEDURE — 3008F BODY MASS INDEX DOCD: CPT | Mod: CPTII,S$GLB,,

## 2024-04-05 PROCEDURE — 99999 PR PBB SHADOW E&M-EST. PATIENT-LVL V: CPT | Mod: PBBFAC,,,

## 2024-04-05 PROCEDURE — 1160F RVW MEDS BY RX/DR IN RCRD: CPT | Mod: CPTII,S$GLB,,

## 2024-04-05 PROCEDURE — 3075F SYST BP GE 130 - 139MM HG: CPT | Mod: CPTII,S$GLB,,

## 2024-04-05 PROCEDURE — 1101F PT FALLS ASSESS-DOCD LE1/YR: CPT | Mod: CPTII,S$GLB,,

## 2024-04-05 PROCEDURE — 1159F MED LIST DOCD IN RCRD: CPT | Mod: CPTII,S$GLB,,

## 2024-04-05 PROCEDURE — 3078F DIAST BP <80 MM HG: CPT | Mod: CPTII,S$GLB,,

## 2024-04-05 PROCEDURE — 3288F FALL RISK ASSESSMENT DOCD: CPT | Mod: CPTII,S$GLB,,

## 2024-04-05 PROCEDURE — 99214 OFFICE O/P EST MOD 30 MIN: CPT | Mod: S$GLB,,,

## 2024-04-05 RX ORDER — METHYLPREDNISOLONE 4 MG/1
TABLET ORAL
Qty: 21 EACH | Refills: 0 | Status: SHIPPED | OUTPATIENT
Start: 2024-04-05 | End: 2024-04-26

## 2024-04-05 NOTE — ASSESSMENT & PLAN NOTE
At goal in clinic. Just started DM program.     The 10-year ASCVD risk score (Marilee SALAZAR, et al., 2019) is: 19.7%    Values used to calculate the score:      Age: 73 years      Sex: Female      Is Non- : No      Diabetic: No      Tobacco smoker: No      Systolic Blood Pressure: 130 mmHg      Is BP treated: Yes      HDL Cholesterol: 57 mg/dL      Total Cholesterol: 213 mg/dL     TLC: Therapeutic Lifestyle Changes    Weight reduction: maintain a normal body weight (BMI 19-25)  DASH diet: Consume diet rich in fruits, vegetables, and low-fat dairy products with a reduced content of saturated fat and total fat.   Low-sodium diet: consume <2400mg of sodium per day  Increase physical activity: regular aerobic physical activity (150 min per week)  Limit alcohol consumption: Less than 2 drinks/day in men and less than 1 drink/day in women.   QUIT smoking

## 2024-04-05 NOTE — PROGRESS NOTES
HPI     Chief Complaint:  Chief Complaint   Patient presents with    Follow-up     6 month       Windy Lindo is a 73 y.o. female with multiple medical diagnoses as listed in the medical history and problem list that presents for 6 month f/uu.  Pt is new to me but seen in clinic with last appointment Visit date not found.      HPI    Right shoulder pain - chronic pain due to OA. Pt reports takes tylenol as needed. Gets injections. Last injection was October of last year. Followed by Dr. Gavino Canales  Cataract surgery this year hopefully, appt with [facility] 5/6/2024  HTN - BP at goal in clinic. Set up DM program last night. Pt reports BP at night is 115/70 but seems lower at night. Will start checking daily. Walks daily, tries to get 3k steps per day. Also does stationary bike. On hctz.   Knee pain - needs to get knee surgery but wants to get cataract done first. Mobic daily, no other NSAIDs but takes tylenol.   Osteoporosis - on Fosamax but recent BMD showed worsening so discussed switching to prolia. Pt waiting on PA.     Other concerns below    Assessment & Plan       Problem List Items Addressed This Visit          Ophtho    Nuclear sclerosis, bilateral    Current Assessment & Plan     F/u with ophthalmology as scheduled.             Cardiac/Vascular    Hyperlipidemia - Primary    Current Assessment & Plan     Consider starting statin therapy. Pt would like to work on diet/exercise for now. Start dialy omega 3 and Mediterranean diet.     The 10-year ASCVD risk score (Marilee SALAZAR, et al., 2019) is: 19.7%    Values used to calculate the score:      Age: 73 years      Sex: Female      Is Non- : No      Diabetic: No      Tobacco smoker: No      Systolic Blood Pressure: 130 mmHg      Is BP treated: Yes      HDL Cholesterol: 57 mg/dL      Total Cholesterol: 213 mg/dL           Hypertension    Current Assessment & Plan     At goal in clinic. Just started DM program.     The 10-year ASCVD  risk score (Marilee SALAZAR, et al., 2019) is: 19.7%    Values used to calculate the score:      Age: 73 years      Sex: Female      Is Non- : No      Diabetic: No      Tobacco smoker: No      Systolic Blood Pressure: 130 mmHg      Is BP treated: Yes      HDL Cholesterol: 57 mg/dL      Total Cholesterol: 213 mg/dL     TLC: Therapeutic Lifestyle Changes    Weight reduction: maintain a normal body weight (BMI 19-25)  DASH diet: Consume diet rich in fruits, vegetables, and low-fat dairy products with a reduced content of saturated fat and total fat.   Low-sodium diet: consume <2400mg of sodium per day  Increase physical activity: regular aerobic physical activity (150 min per week)  Limit alcohol consumption: Less than 2 drinks/day in men and less than 1 drink/day in women.   QUIT smoking              Orthopedic    Right knee pain    Current Assessment & Plan     Mobic daily, no other nsaids. Medrol dose pack. F/u with ortho.           Other Visit Diagnoses       Colon cancer screening      The patient is aware that the cologuard may miss polyps that could develop into colon cancer in the future which could be removed during a colonoscopy or even miss early colon cancer. Patient also made aware that a positive cologuard result would necessitate a colonoscopy for further evaluation.      Relevant Orders    Cologuard Screening (Multitarget Stool DNA)    Encounter for screening mammogram for breast cancer        Relevant Orders    Mammo Digital Screening Bilat w/ Aldo    Osteoporosis   Pt would like to start prolia.   Continue vit D/Calcium  Wt bearing exercises  Fall risk    Osteoarthritis of right shoulder, unspecified osteoarthritis type      Pt aware of risk factors with chronic steroid use including worsening osteoporosis. VU     Relevant Medications    methylPREDNISolone (MEDROL DOSEPACK) 4 mg tablet            --------------------------------------------      Health Maintenance:  Health  Maintenance         Date Due Completion Date    RSV Vaccine (Age 60+ and Pregnant patients) (1 - 1-dose 60+ series) Never done ---    Colorectal Cancer Screening 09/07/2022 9/7/2017    COVID-19 Vaccine (4 - 2023-24 season) 09/01/2023 10/1/2021    Mammogram 05/19/2024 5/19/2023    DEXA Scan 03/15/2026 3/15/2024    Lipid Panel 03/28/2029 3/28/2024    Override on 2/4/2013: Done    TETANUS VACCINE 09/10/2033 9/10/2023            Health maintenance reviewed, Colon cancer screening ordered, and Mammogram ordered    Follow Up:  Follow up in about 6 months (around 10/5/2024) for appt with PCP for annual.    Discussed DDx, condition, and treatment.   Education sent to patient portal/included in after visit summary.  ED precautions given.   Notify provider if symptoms do not resolve or increase in severity.   Patient verbalizes understanding and agrees with plan of care.    Exam     Review of Systems:  (as noted above)  Review of Systems    Physical Exam:   Physical Exam  Constitutional:       General: She is not in acute distress.     Appearance: Normal appearance. She is not toxic-appearing.   HENT:      Head: Normocephalic and atraumatic.      Nose: Nose normal.   Neck:      Vascular: No carotid bruit.   Cardiovascular:      Rate and Rhythm: Normal rate. Rhythm irregular.      Pulses: Normal pulses.      Heart sounds: Normal heart sounds. No murmur heard.  Pulmonary:      Effort: Pulmonary effort is normal. No respiratory distress.      Breath sounds: Normal breath sounds.   Abdominal:      General: Bowel sounds are normal.      Palpations: Abdomen is soft.   Musculoskeletal:         General: Normal range of motion.      Cervical back: Normal range of motion and neck supple. No rigidity.   Lymphadenopathy:      Cervical: No cervical adenopathy.   Skin:     General: Skin is warm.      Capillary Refill: Capillary refill takes less than 2 seconds.   Neurological:      General: No focal deficit present.      Mental Status: She  "is alert and oriented to person, place, and time.   Psychiatric:         Mood and Affect: Mood normal.       Vitals:    04/05/24 1054   BP: 130/70   Pulse: 89   Temp: 98.1 °F (36.7 °C)   TempSrc: Oral   SpO2: 95%   Weight: 81.1 kg (178 lb 14.5 oz)   Height: 5' 5" (1.651 m)      Body mass index is 29.77 kg/m².        History     Past Medical History:  Past Medical History:   Diagnosis Date    Arthritis     Eye injury 4 yrs    hit od    Hypertension 4/4/2023    Nuclear sclerosis, bilateral 2/13/2019       Past Surgical History:  Past Surgical History:   Procedure Laterality Date    APPENDECTOMY      COLONOSCOPY N/A 9/7/2017    Procedure: COLONOSCOPY;  Surgeon: Albino Fenton MD;  Location: [facility] ([address] [place] [address]);  Service: Endoscopy;  Laterality: N/A;    ESOPHAGOGASTRODUODENOSCOPY  09/07/2017    KNEE SURGERY Right 12/05/2017    TKR    LASIK  7 yrs    ou    TONSILLECTOMY         Social History:  Social History     Socioeconomic History    Marital status: Single   Occupational History     Employer: [employer] – [place]   Tobacco Use    Smoking status: Never    Smokeless tobacco: Never   Substance and Sexual Activity    Alcohol use: Yes     Alcohol/week: 1.0 standard drink of alcohol     Types: 1 Glasses of wine per week     Comment: glass of wine a night     Drug use: No    Sexual activity: Never     Social Determinants of Health     Financial Resource Strain: Low Risk  (1/26/2024)    Overall Financial Resource Strain (CARDIA)     Difficulty of Paying Living Expenses: Not hard at all   Food Insecurity: No Food Insecurity (1/26/2024)    Hunger Vital Sign     Worried About Running Out of Food in the Last Year: Never true     Ran Out of Food in the Last Year: Never true   Transportation Needs: No Transportation Needs (1/26/2024)    PRAPARE - Transportation     Lack of Transportation (Medical): No     Lack of Transportation (Non-Medical): No   Physical Activity: Insufficiently Active (1/26/2024)    Exercise Vital Sign     Days of Exercise " per Week: 5 days     Minutes of Exercise per Session: 20 min   Stress: No Stress Concern Present (1/26/2024)    [employer] [place] [employer] - Occupational Stress Questionnaire     Feeling of Stress : Not at all   Social Connections: Unknown (1/26/2024)    Social Connection and Isolation Panel [NHANES]     Frequency of Communication with Friends and Family: More than three times a week     Frequency of Social Gatherings with Friends and Family: More than three times a week     Active Member of Clubs or Organizations: No     Attends Club or Organization Meetings: Never     Marital Status: Never    Housing Stability: Unknown (10/4/2023)    Housing Stability Vital Sign     Unable to Pay for Housing in the Last Year: Patient refused     Number of Places Lived in the Last Year: 0     Unstable Housing in the Last Year: Patient refused       Family History:  Family History   Problem Relation Age of Onset    Hypertension Mother     Glaucoma Mother     Diabetes Mother     Cataracts Mother     Cancer Mother 74        lung    Heart disease Mother 55    Hypertension Father     Heart disease Father         onset early 60;s, Aortic valve replacement ,Rheumatic fever as a child     No Known Problems Sister     Diabetes Brother     Cancer Brother 66        mesothelioma    No Known Problems Maternal Aunt     No Known Problems Maternal Uncle     No Known Problems Paternal Aunt     No Known Problems Paternal Uncle     No Known Problems Maternal Grandmother     No Known Problems Maternal Grandfather     No Known Problems Paternal Grandmother     No Known Problems Paternal Grandfather     No Known Problems Other     Amblyopia Neg Hx     Blindness Neg Hx     Macular degeneration Neg Hx     Retinal detachment Neg Hx     Strabismus Neg Hx     Stroke Neg Hx     Thyroid disease Neg Hx     Melanoma Neg Hx        Allergies and Medications: (updated and reviewed)  Review of patient's allergies indicates:  No Known  Allergies  Current Outpatient Medications   Medication Sig Dispense Refill    alendronate (FOSAMAX) 35 MG tablet TAKE 1 TABLET(35 MG) BY MOUTH 1 TIME A WEEK 12 tablet 0    b complex vitamins capsule Take 1 capsule by mouth once daily.      glucosam/chond/hyalu/CF borate (MOVE FREE JOINT HEALTH ORAL) Take by mouth.      hydroCHLOROthiazide (HYDRODIURIL) 25 MG tablet Take 1 tablet (25 mg total) by mouth once daily. 90 tablet 3    meloxicam (MOBIC) 15 MG tablet TAKE 1 TABLET(15 MG) BY MOUTH EVERY DAY 90 tablet 0    multivit-min-FA-lycopen-lutein 0.4-300-250 mg-mcg-mcg Tab Take 1 tablet by mouth once daily.      tiZANidine (ZANAFLEX) 4 MG tablet Take 1 tablet (4 mg total) by mouth every 6 (six) hours as needed (muscle spasms). 60 tablet 1    vitamin D (VITAMIN D3) 1000 units Tab Take 1,000 Units by mouth once daily.      methylPREDNISolone (MEDROL DOSEPACK) 4 mg tablet use as directed 21 each 0     No current facility-administered medications for this visit.       Patient Care Team:  Trisha Higginbotham MD as PCP - General (Family Medicine)  Shawnee Nicholson LPN as Licensed Practical Nurse         - The patient is given an After Visit Summary that lists all medications with directions, allergies, education, orders placed during this encounter and follow-up instructions.      - I have reviewed the patient's medical information including past medical, family, and social history sections including the medications and allergies.      - We discussed the patient's current medications.     This note was created by combination of typed  and MModal dictation.  Transcription errors may be present.  If there are any questions, please contact me.

## 2024-04-05 NOTE — ASSESSMENT & PLAN NOTE
Consider starting statin therapy. Pt would like to work on diet/exercise for now. Start dialy omega 3 and Mediterranean diet.     The 10-year ASCVD risk score (Marilee ASLAZAR, et al., 2019) is: 19.7%    Values used to calculate the score:      Age: 73 years      Sex: Female      Is Non- : No      Diabetic: No      Tobacco smoker: No      Systolic Blood Pressure: 130 mmHg      Is BP treated: Yes      HDL Cholesterol: 57 mg/dL      Total Cholesterol: 213 mg/dL

## 2024-04-10 ENCOUNTER — PATIENT MESSAGE (OUTPATIENT)
Dept: FAMILY MEDICINE | Facility: CLINIC | Age: 74
End: 2024-04-10
Payer: MEDICARE

## 2024-04-10 ENCOUNTER — TELEPHONE (OUTPATIENT)
Dept: FAMILY MEDICINE | Facility: CLINIC | Age: 74
End: 2024-04-10
Payer: MEDICARE

## 2024-04-10 NOTE — TELEPHONE ENCOUNTER
----- Message from Renita Denny MA sent at 4/10/2024  2:40 PM CDT -----  Regarding: Return Call  Message Type: Return Call           Who Called:PATI FARRAR [[id number]]              Who Left Message for Patient:Loulou Paul LPN                Does the patient know what this is regarding?  no              Best Call Back Number:[phone]               Additional Information: Patient is returning a call.  Please assist.

## 2024-04-10 NOTE — TELEPHONE ENCOUNTER
Pt would like to know what will she be taking, far as the infusion vs prolia injection. She is due to have the infusion on 04/29, should she still take the Fosamax

## 2024-04-11 ENCOUNTER — PATIENT MESSAGE (OUTPATIENT)
Dept: FAMILY MEDICINE | Facility: CLINIC | Age: 74
End: 2024-04-11
Payer: MEDICARE

## 2024-04-15 ENCOUNTER — HOSPITAL ENCOUNTER (EMERGENCY)
Facility: HOSPITAL | Age: 74
Discharge: HOME | End: 2024-04-16
Attending: EMERGENCY MEDICINE
Payer: MEDICARE

## 2024-04-15 VITALS
RESPIRATION RATE: 16 BRPM | HEART RATE: 87 BPM | SYSTOLIC BLOOD PRESSURE: 123 MMHG | WEIGHT: 178 LBS | TEMPERATURE: 98 F | BODY MASS INDEX: 29.62 KG/M2 | OXYGEN SATURATION: 97 % | DIASTOLIC BLOOD PRESSURE: 83 MMHG

## 2024-04-15 DIAGNOSIS — S22.059A CLOSED FRACTURE OF SIXTH THORACIC VERTEBRA, UNSPECIFIED FRACTURE MORPHOLOGY, INITIAL ENCOUNTER: ICD-10-CM
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
## 2024-04-15 DIAGNOSIS — S22.069A CLOSED FRACTURE OF EIGHTH THORACIC VERTEBRA, UNSPECIFIED FRACTURE MORPHOLOGY, INITIAL ENCOUNTER: Primary | ICD-10-CM

## 2024-04-15 PROBLEM — S22.000A THORACIC COMPRESSION FRACTURE: Status: ACTIVE | Noted: 2024-04-15

## 2024-04-15 PROCEDURE — 99284 EMERGENCY DEPT VISIT MOD MDM: CPT | Mod: GC,,, | Performed by: NEUROLOGICAL SURGERY

## 2024-04-15 PROCEDURE — 99285 EMERGENCY DEPT VISIT HI MDM: CPT | Mod: 25

## 2024-04-15 PROCEDURE — 25000003 PHARM REV CODE 250: Performed by: PHYSICIAN ASSISTANT

## 2024-04-15 RX ORDER — OXYCODONE AND ACETAMINOPHEN 5; 325 MG/1; MG/1
1 TABLET ORAL
Status: DISCONTINUED | OUTPATIENT
Start: 2024-04-15 | End: 2024-04-16 | Stop reason: HOSPADM

## 2024-04-15 RX ORDER — OXYCODONE AND ACETAMINOPHEN 5; 325 MG/1; MG/1
1 TABLET ORAL
Status: COMPLETED | OUTPATIENT
Start: 2024-04-15 | End: 2024-04-15

## 2024-04-15 RX ORDER — LIDOCAINE 50 MG/G
1 PATCH TOPICAL
Status: DISCONTINUED | OUTPATIENT
Start: 2024-04-15 | End: 2024-04-16 | Stop reason: HOSPADM

## 2024-04-15 RX ADMIN — LIDOCAINE 5% 1 PATCH: 700 PATCH TOPICAL at 04:04

## 2024-04-15 RX ADMIN — OXYCODONE HYDROCHLORIDE AND ACETAMINOPHEN 1 TABLET: 5; 325 TABLET ORAL at 07:04

## 2024-04-15 NOTE — ED PROVIDER NOTES
Encounter Date: 4/15/2024       History     Chief Complaint   Patient presents with    Fall     Pt reports trip and fall ~2 hours ago. Reports hitting back of her head. Denies LOC + not on blood thinners. Reports pain w/ deep breaths.     4:07 PM  Patient is a 73-year-old female with a history of HTN, osteoporosis, arthritis who presents to [facility] ED with got child via POV for emergent evaluation of fall and head trauma.    Her fall happened around 12:00.  Patient was at home carrying sheets.  States that she was coming down her steps and tripped on her sheets.  States that she fell to the right on her elbow 1st and then back on the cement and hit her back and head.  No LOC.  Does not have any head pain but endorsed acute onset of middle thoracic back pain, right more than left.  Her current pain is 5/10.  She denies anterior chest pain shortness for breath but states that her pain is worse with deep inspiration.  She did hit her right elbow but denied any pain or decreased range of motion.  She had Tylenol x2 with some relief.  Denies bowel or bladder incontinence, saddle anesthesias, lower extremity paresthesias.  She can still ambulate at her baseline without assistance.            Review of patient's allergies indicates:  No Known Allergies  Past Medical History:   Diagnosis Date    Arthritis     Eye injury 4 yrs    hit od    Hypertension 4/4/2023    Nuclear sclerosis, bilateral 2/13/2019     Past Surgical History:   Procedure Laterality Date    APPENDECTOMY      COLONOSCOPY N/A 9/7/2017    Procedure: COLONOSCOPY;  Surgeon: Albino Fenton MD;  Location: [facility] ([address] [place] [address]);  Service: Endoscopy;  Laterality: N/A;    ESOPHAGOGASTRODUODENOSCOPY  09/07/2017    KNEE SURGERY Right 12/05/2017    TKR    LASIK  7 yrs    ou    TONSILLECTOMY       Family History   Problem Relation Name Age of Onset    Hypertension Mother      Glaucoma Mother      Diabetes Mother      Cataracts Mother      Cancer Mother  74        lung     Heart disease Mother  55    Hypertension Father      Heart disease Father          onset early 60;s, Aortic valve replacement ,Rheumatic fever as a child     No Known Problems Sister      Diabetes Brother      Cancer Brother  66        mesothelioma    No Known Problems Maternal Aunt      No Known Problems Maternal Uncle      No Known Problems Paternal Aunt      No Known Problems Paternal Uncle      No Known Problems Maternal Grandmother      No Known Problems Maternal Grandfather      No Known Problems Paternal Grandmother      No Known Problems Paternal Grandfather      No Known Problems Other      Amblyopia Neg Hx      Blindness Neg Hx      Macular degeneration Neg Hx      Retinal detachment Neg Hx      Strabismus Neg Hx      Stroke Neg Hx      Thyroid disease Neg Hx      Melanoma Neg Hx       Social History     Tobacco Use    Smoking status: Never    Smokeless tobacco: Never   Substance Use Topics    Alcohol use: Yes     Alcohol/week: 1.0 standard drink of alcohol     Types: 1 Glasses of wine per week     Comment: glass of wine a night     Drug use: No     Review of Systems   Constitutional:  Negative for activity change, appetite change, chills, diaphoresis and fever.   HENT:  Negative for sore throat.    Eyes:  Negative for photophobia and redness.   Respiratory:  Negative for shortness of breath.    Cardiovascular:  Negative for chest pain.   Gastrointestinal:  Negative for abdominal pain, constipation, diarrhea, nausea and vomiting.   Genitourinary:  Negative for difficulty urinating, dysuria, frequency, hematuria and urgency.   Musculoskeletal:  Positive for back pain.   Skin:  Negative for rash and wound.   Neurological:  Negative for dizziness, weakness, numbness and headaches.   Hematological:  Does not bruise/bleed easily.       Physical Exam     Initial Vitals [04/15/24 1451]   BP Pulse Resp Temp SpO2   (!) 141/72 84 18 98.2 °F (36.8 °C) 97 %      MAP       --         Physical Exam    Vitals  reviewed.  Constitutional: She appears well-developed and well-nourished. She is not diaphoretic. She is cooperative.  Non-toxic appearance. She does not have a sickly appearance. She does not appear ill. No distress.   HENT:   Head: Normocephalic and atraumatic. Head is without raccoon's eyes.   Right Ear: Tympanic membrane, external ear and ear canal normal. Tympanic membrane is not erythematous, not retracted and not bulging. No hemotympanum.   Left Ear: Tympanic membrane, external ear and ear canal normal. Tympanic membrane is not erythematous, not retracted and not bulging. No hemotympanum.   Nose: Nose normal. No rhinorrhea or nasal deformity. No epistaxis.   Mouth/Throat: Oropharynx is clear and moist. No trismus in the jaw. No oropharyngeal exudate, posterior oropharyngeal edema or posterior oropharyngeal erythema.   Eyes: Conjunctivae and EOM are normal. Right conjunctiva is not injected. Right conjunctiva has no hemorrhage. Left conjunctiva is not injected. Left conjunctiva has no hemorrhage. No scleral icterus.   Neck:   Normal range of motion.  Cardiovascular:  Normal rate.           Pulses:       Radial pulses are 2+ on the right side and 2+ on the left side.   Pulmonary/Chest: No accessory muscle usage. No tachypnea. No respiratory distress. She has no decreased breath sounds. She has no wheezes. She has no rhonchi. She exhibits no tenderness, no bony tenderness and no retraction.   No hypoxia.  Speaking in clear and full sentences.   Abdominal: Abdomen is soft. She exhibits no distension. There is no abdominal tenderness. There is no rebound and no guarding.   Musculoskeletal:         General: Normal range of motion.      Right elbow: Normal range of motion. No tenderness.      Left elbow: Normal range of motion. No tenderness.      Cervical back: Normal range of motion. No rigidity or bony tenderness. No spinous process tenderness or muscular tenderness. Normal range of motion.      Thoracic back:  Tenderness present. No bony tenderness. Normal range of motion.      Lumbar back: No bony tenderness. Normal range of motion. Negative right straight leg raise test and negative left straight leg raise test.        Back:       Comments: No difficulty bearing weight or ambulating without assistance.  No difficulty with supination or pronation of right elbow.     Neurological: She is alert. She has normal strength.   Skin: Skin is warm and dry. No erythema. No pallor.         ED Course   Procedures  Labs Reviewed - No data to display         Imaging Results              X-Ray Thoracic Spine Lateral Supine and Lateral Upright (Final result)  Result time 04/15/24 23:57:57      Final result by Sherman Peng MD (04/15/24 23:57:57)                   Impression:      As above      Electronically signed by: Sherman Peng  Date:    04/15/2024  Time:    23:57               Narrative:    EXAMINATION:  XR THORACIC SPINE LATERAL SUPINE AND LATERAL UPRIGHT    CLINICAL HISTORY:  WITH BRACE ON PLEASE. t10 compression fracture;    TECHNIQUE:  Lateral view of the thoracic spine with brace standing and supine    COMPARISON:  Multiple prior studies including plain film, CT and MRI of the thoracic spine same day    FINDINGS:  Compression deformity of T8 again noted.  Compression of superior endplate in thoracic T6 not appreciated on plain film.  Alignment is stable per 20 supine and standing.                                       MRI Thoracic Spine Without Contrast (Final result)  Result time 04/15/24 23:40:50      Final result by Gerhard Richards MD (04/15/24 23:40:50)                   Impression:      Acute/subacute compression fracture T8 with associated bone marrow edema, mild height loss, and minimal retropulsion.    Additional superior endplate compression fracture T6 without significant height loss.  No fracture line identified this level.  This most likely represents subacute fracture.    Of note, numbering system differs  from prior CT. Correlation with correct level recommended prior to any attempted procedure or intervention.    Electronically signed by resident: Loki Vasquez  Date:    04/15/2024  Time:    23:21    Electronically signed by: Gerhard Richards MD  Date:    04/15/2024  Time:    23:40               Narrative:    EXAMINATION:  MRI THORACIC SPINE WITHOUT CONTRAST    CLINICAL HISTORY:  Compression fracture, thoracic;    TECHNIQUE:  Multiplanar, multisequence MR images of the thoracic spine were performed without the administration of contrast.    COMPARISON:  CT thoracic spine 04/15/2024.    FINDINGS:  Of note, numbering system differs from prior CT.  Correlation with correct level recommended prior to any attempted procedure or intervention.    Alignment: Dextroscoliosis.  Minimal anterolisthesis T1 on T2, T2 on T3, T3 on T4, and T4 on T5.  Grade 1 retrolisthesis T12 on L1.    Vertebrae: Acute/subacute compression fracture T8 with associated bone marrow edema, mild height loss, and minimal 2 mm retropulsion.  No extension into the posterior elements.  Additional acute/subacute compression fracture superior endplate T6 without associated height loss.  Prominent Schmorl node superior endplate L1.  Partially visualized endplate degenerative change L1-L2.    Discs: Normal height and signal.    Cord: Normal.  Conus terminates at L1-L2.    Soft tissue structures are unremarkable.  Limited evaluation of the thoracic and retroperitoneal organs demonstrates cardiomegaly and diffusely T2 dark liver which can be seen with hemochromatosis.  Paraspinal musculature demonstrates normal bulk and signal intensity.    Degenerative findings: Mild disc bulge T6-T7 no significant neural foraminal narrowing or spinal canal stenosis at any thoracic level.                                       CT Thoracic Spine Without Contrast (Edited Result - FINAL)  Result time 04/16/24 00:12:37      Addendum (preliminary) 1 of 1 by Sherman Peng MD  (04/16/24 00:12:37)      Further review of the subsequent MRI demonstrates the compression fracture previously described at T9 is in fact at T8.      Electronically signed by: Sherman Peng  Date:    04/16/2024  Time:    00:12                 Final result by Sherman Peng MD (04/15/24 21:33:18)                   Impression:      Insufficiency type fracture through the superior endplate and body of T9 without retropulsion or stenosis.    Generalized spondylosis throughout the remaining thoracic spine with no additional thoracic spine fractures.    Incidentally imaged cervical and lumbar spondylosis incompletely evaluated with Schmorl's node endplate compression at L2.    This report was flagged in Epic as abnormal.      Electronically signed by: Sherman Peng  Date:    04/15/2024  Time:    21:33               Narrative:    EXAMINATION:  CT THORACIC SPINE WITHOUT CONTRAST    CLINICAL HISTORY:  Compression fracture, thoracic;    TECHNIQUE:  Axial CT images of the thoracic spine were obtained without contrast.  Sagittal and coronal reformatted images were performed.    COMPARISON:  Plain film of the spine, 04/15/2024 at 16:52 hours    FINDINGS:  The previous suspected T10 compression deformity appears to be through the superior endplate of T9.  Mild compression with approximately 20% loss of height.  There is no retropulsion or central canal stenosis.    The remainder of the thoracic spine demonstrates spondylosis at all levels with marginal osteophytes at the disc spaces.  No definite central canal stenosis is seen.  There is no surrounding soft tissue swelling or hematoma.  The thoracic aorta while atherosclerotic is normal in caliber.  Coronary artery calcifications as well as mitral and aortic valve annulus calcifications are noted.    The lung parenchyma visualized appears clear.  Limited images of the retroperitoneum demonstrate no focal abnormality as imaged.                                       CT  Head Without Contrast (Final result)  Result time 04/15/24 18:35:44      Final result by Sherman Peng MD (04/15/24 18:35:44)                   Impression:      No acute abnormality.      Electronically signed by: Sherman Peng  Date:    04/15/2024  Time:    18:35               Narrative:    EXAMINATION:  CT HEAD WITHOUT CONTRAST    CLINICAL HISTORY:  Head trauma, minor (Age >= 65y);    TECHNIQUE:  Low dose axial CT images obtained throughout the head without intravenous contrast. Sagittal and coronal reconstructions were performed.    COMPARISON:  None.    FINDINGS:  Intracranial compartment:    Ventricles and sulci are stable in size for age without evidence of hydrocephalus. No extra-axial blood or fluid collections.    The brain parenchyma appears stable, with moderate involutional changes in the deep white matter again noted.  No parenchymal mass, hemorrhage, edema or major vascular distribution infarct.    Skull/extracranial contents (limited evaluation): No fracture. Mastoid air cells and paranasal sinuses are essentially clear.                                       X-Ray Thoracic Spine AP Lateral (Final result)  Result time 04/15/24 17:30:25      Final result by Gerhard Richards MD (04/15/24 17:30:25)                   Impression:      Age-indeterminate compression deformity of the T10 vertebral body with approximately 50% loss of vertebral body height.  MRI of the thoracic spine may be obtained for further evaluation, as clinically warranted.    No evidence of listhesis.      Electronically signed by: Gerhard Richards MD  Date:    04/15/2024  Time:    17:30               Narrative:    EXAMINATION:  XR THORACIC SPINE AP LATERAL    CLINICAL HISTORY:  Unspecified fall, initial encounter    TECHNIQUE:  AP and lateral views of the thoracic spine were performed.    COMPARISON:  Chest x-ray dated 11/29/2017.    FINDINGS:  There is S-shaped scoliosis of the thoracolumbar alignment.  There is no evidence of  listhesis.    There is diffuse osteopenia.  There is an age-indeterminate compression deformity of the T10 vertebral body with approximately 50% loss of vertebral body height.  Overall assessment is difficult to determine given the background osteopenia.    There are calcifications of the aortic knob.  The paraspinal soft tissues are within normal limits.                                       X-Ray Chest PA And Lateral (Final result)  Result time 04/15/24 17:21:07      Final result by Gerhard Richards MD (04/15/24 17:21:07)                   Impression:      1.  No acute intrathoracic process.    2.  Age-indeterminate compression deformity of midthoracic vertebral body. Please see the dedicated thoracic spine series.      Electronically signed by: Gerhard Richards MD  Date:    04/15/2024  Time:    17:21               Narrative:    EXAMINATION:  XR CHEST PA AND LATERAL    CLINICAL HISTORY:  Unspecified fall, initial encounter    TECHNIQUE:  PA and lateral views of the chest were performed.    COMPARISON:  11/29/2017.    FINDINGS:  The trachea is unremarkable.  There are calcifications of the aortic knob.  The cardiomediastinal silhouette is within normal limits.  There is no evidence of free air beneath the hemidiaphragms.  There are no pleural effusions.  There is no evidence of a pneumothorax.  There is no evidence of pneumomediastinum.  No airspace opacity is present.  There are chronic right-sided upper rib deformities.    There is an age-indeterminate deformity of the midthoracic vertebral body.                                       Medications   oxyCODONE-acetaminophen 5-325 mg per tablet 1 tablet (1 tablet Oral Given 4/15/24 1939)     Medical Decision Making  Patient is a 73-year-old female with a history of HTN, osteoporosis, arthritis who presents to [facility] ED with got child via POV for emergent evaluation of fall and head trauma.    Differential diagnosis includes but is not limited to soft tissue contusion, bony  contusion, DDD, DJD, arthritis, other inflammatory arthritis, strain, sprain, fractures, intracerebral abnormality given head trauma.  No C, T, or L spinous process tenderness or step-offs.  Her pain is more to her right.  Skin without signs of trauma.  No signs of basilar skull fractures.  Chest wall and abdomen nontender.  Lungs clear to auscultations bilaterally.  Lower suspicion for spinal fracture or intrathoracic or abdominal hemorrhage or organ trauma.    Will obtain CT head given head trauma, obtain x-rays, apply lidocaine patch and give ice, and reassess.  She had 2 Tylenol prior to arrival with relief.    To note, I saw her in 2022 in the ED for dizziness which was thought to be due to BPPV.  States that she continued to do half somersault maneuvers at home with resolution of her symptoms.    Amount and/or Complexity of Data Reviewed  Radiology: ordered. Decision-making details documented in ED Course.    Risk  OTC drugs.  Prescription drug management.              Attending Attestation:     Physician Attestation Statement for NP/PA:   I personally made/approved the management plan and take responsibility for the patient management.              ED Course as of 04/16/24 1724   Mon Apr 15, 2024   1903 CT Head Without Contrast  No acute abnormality. [CL]   1903 X-Ray Thoracic Spine AP Lateral  Age-indeterminate compression deformity of the T10 vertebral body with approximately 50% loss of vertebral body height.  MRI of the thoracic spine may be obtained for further evaluation, as clinically warranted. [CL]   1904 X-Ray Chest PA And Lateral  1.  No acute intrathoracic process.  2.  Age-indeterminate compression deformity of midthoracic vertebral body. Please see the dedicated thoracic spine series. [CL]   1913 Patient updated with results.  Will discuss case with spine. [CL]   1925 Case discussed with Neurosurgery on-call who recommended CT and MRI thoracic spine without contrast and x-ray thoracic supine and  upright with brace on.  They will evaluate and give recommendations. [CL]   1927 BRACE notified. They will apply TLSO brace. [CL]   2358 MRI Thoracic Spine Without Contrast  Acute/subacute compression fracture T8 with associated bone marrow edema, mild height loss, and minimal retropulsion.     Additional superior endplate compression fracture T6 without significant height loss.  No fracture line identified this level.  This most likely represents subacute fracture. [CL]   Tue Apr 16, 2024   0000 X-Ray Thoracic Spine Lateral Supine and Lateral Upright  Compression deformity of T8 again noted.  Compression of superior endplate in thoracic T6 not appreciated on plain film.  Alignment is stable per 20 supine and standing. [CL]      ED Course User Index  [CL] Renetta Roy PA-C          MRI and CT of thoracic spine shows compression fracture at T8 an additional endplate compression fracture at T6 with height loss.  No retropulsion or stenosis.  Good alignment on x-ray with brace on.    Neurosurgery has evaluated patient.  Refer to their consult note. If no dynamic instability on plain films and no evidence of severe canal stenosis, they are comfortable with discharge and close outpatient follow-up.  Her images do not show such.  Patient was updated with results and plan of care.  Brace on at all times when out of bed.  We discussed proper pain management.  Continue Mobic daily which she already takes for arthritis.  Acetaminophen as needed.  We discussed proper use of Percocet for severe pain only.  Ice.  Heat.  Activity as tolerated.  She does not do any high impact activity.  Follow up closely with neuro surgery.  Referral placed.  All of her questions were answered.  Patient comfortable with plan and stable for discharge.           I have reviewed patient's chart and discussed this case with my supervising MD.     Renetta Roy PA-C  Emergent Department  [facility] Spectralink #[id number] or  #[id number]        Clinical Impression:  Final diagnoses:  [W19.XXXA] Fall  [S22.061C] Closed fracture of eighth thoracic vertebra, unspecified fracture morphology, initial encounter (Primary)  [S22.054W] Closed fracture of sixth thoracic vertebra, unspecified fracture morphology, initial encounter          ED Disposition Condition    Discharge Stable          ED Prescriptions       Medication Sig Dispense Start Date End Date Auth. Provider    acetaminophen (TYLENOL) 650 MG TbSR Take 1 tablet (650 mg total) by mouth every 6 (six) hours as needed. 20 tablet 4/16/2024 -- Renetta Roy PA-C    oxyCODONE-acetaminophen (PERCOCET) 5-325 mg per tablet Take 1 tablet by mouth every 6 (six) hours as needed for Pain. 12 tablet 4/16/2024 -- Renetta Roy PA-C          Follow-up Information       Follow up With Specialties Details Why Contact Info Additional Information    [facility] Boyd - Neurosurgery [facility] Neurosurgery Schedule an appointment as soon as possible for a visit   [address]  [facility] [id number] [phone] 8th Floor Clinic [place] Please park in [facility]. [facility] Please take the C elevator to 8th floor which opens to the lobby.    [facility] - Emergency Dept Emergency Medicine  If symptoms worsen [address]  [facility] [id number] [phone]              Elia Connell MD  04/16/24 2331       Renetta Roy PA-C  04/16/24 1402

## 2024-04-15 NOTE — FIRST PROVIDER EVALUATION
Medical screening examination initiated.  I have conducted a focused provider triage encounter, findings are as follows:    Brief history of present illness:  fall, low back and right chest wall pain    Vitals:    04/15/24 1451   BP: (!) 141/72   Patient Position: Sitting   Pulse: 84   Resp: 18   Temp: 98.2 °F (36.8 °C)   TempSrc: Oral   SpO2: 97%   Weight: 80.7 kg (178 lb)       Pertinent physical exam:  No acute distress or altered mental status.     Brief workup plan:  no triage orders    Preliminary workup initiated; this workup will be continued and followed by the physician or advanced practice provider that is assigned to the patient when roomed.

## 2024-04-15 NOTE — ED NOTES
Patient identifiers verified and correct for  Ms Lindo  C/C: Fall with back pain SEE NN  APPEARANCE: awake and alert in NAD. PAIN  10/10  SKIN: warm, dry and intact. No breakdown or bruising.  MUSCULOSKELETAL: Patient moving all extremities spontaneously, no obvious swelling or deformities noted. Ambulates independently.  RESPIRATORY: Denies shortness of breath.Respirations unlabored.   CARDIAC: Denies CP, 2+ distal pulses; no peripheral edema  ABDOMEN: S/ND/NT, Denies nausea  : voids spontaneously, denies difficulty  Neurologic: AAO x 4; follows commands equal strength in all extremities; denies numbness/tingling. Denies dizziness Denies new weakness

## 2024-04-15 NOTE — ED NOTES
Patient states she fell down 1-2 stairs, hit back of her head, denies LOC reports mid back pain R>L

## 2024-04-16 ENCOUNTER — TELEPHONE (OUTPATIENT)
Dept: NEUROSURGERY | Facility: CLINIC | Age: 74
End: 2024-04-16
Payer: MEDICARE

## 2024-04-16 DIAGNOSIS — S22.070A COMPRESSION FRACTURE OF T10 VERTEBRA, INITIAL ENCOUNTER: Primary | ICD-10-CM

## 2024-04-16 RX ORDER — OXYCODONE AND ACETAMINOPHEN 5; 325 MG/1; MG/1
1 TABLET ORAL EVERY 6 HOURS PRN
Qty: 12 TABLET | Refills: 0 | Status: SHIPPED | OUTPATIENT
Start: 2024-04-16

## 2024-04-16 RX ORDER — DEXTROMETHORPHAN HYDROBROMIDE, GUAIFENESIN 5; 100 MG/5ML; MG/5ML
650 LIQUID ORAL EVERY 6 HOURS PRN
Qty: 20 TABLET | Refills: 0 | Status: SHIPPED | OUTPATIENT
Start: 2024-04-16

## 2024-04-16 NOTE — HPI
73F PMH osteoporosis presenting after mechanical fall down two steps today with XR demonstrating T10 compression fracture. Denies weakness, numbness, b/b incontinence. CTH w/o acute intracranial abnormality. Denies AC/AP. No prior brain/spine surgery

## 2024-04-16 NOTE — ASSESSMENT & PLAN NOTE
73F PMH osteoporosis presenting after mechanical fall down two steps today with XR demonstrating T10 compression fracture.    Plan  CT/MRI Tsp ordered  TLSO ordered and placed in ED; pending upright/supine film Tsp in TLSO brace  Pain control  Pending no dynamic instability on plain films / no evidence of severe canal stenosis on MRI then okay to discharge from ED; discussed with patient  Please notify nsgy for any further questions/concerns

## 2024-04-16 NOTE — CONSULTS
[facility] - Emergency Dept  Neurosurgery  Consult Note    Inpatient consult to Neurosurgery  Consult performed by: Nghia Ellison MD  Consult ordered by: Renetta Roy PA-C        Subjective:     Chief Complaint/Reason for Admission: back pain    History of Present Illness: 73F PMH osteoporosis presenting after mechanical fall down two steps today with XR demonstrating T10 compression fracture. Denies weakness, numbness, b/b incontinence. CTH w/o acute intracranial abnormality. Denies AC/AP. No prior brain/spine surgery    (Not in a hospital admission)      Review of patient's allergies indicates:  No Known Allergies    Past Medical History:   Diagnosis Date    Arthritis     Eye injury 4 yrs    hit od    Hypertension 4/4/2023    Nuclear sclerosis, bilateral 2/13/2019     Past Surgical History:   Procedure Laterality Date    APPENDECTOMY      COLONOSCOPY N/A 9/7/2017    Procedure: COLONOSCOPY;  Surgeon: Albino Fenton MD;  Location: [address]);  Service: Endoscopy;  Laterality: N/A;    ESOPHAGOGASTRODUODENOSCOPY  09/07/2017    KNEE SURGERY Right 12/05/2017    TKR    LASIK  7 yrs    ou    TONSILLECTOMY       Family History       Problem Relation (Age of Onset)    Cancer Mother (74), Brother (66)    Cataracts Mother    Diabetes Mother, Brother    Glaucoma Mother    Heart disease Mother (55), Father    Hypertension Mother, Father    No Known Problems Sister, Maternal Aunt, Maternal Uncle, Paternal Aunt, Paternal Uncle, Maternal Grandmother, Maternal Grandfather, Paternal Grandmother, Paternal Grandfather, Other          Tobacco Use    Smoking status: Never    Smokeless tobacco: Never   Substance and Sexual Activity    Alcohol use: Yes     Alcohol/week: 1.0 standard drink of alcohol     Types: 1 Glasses of wine per week     Comment: glass of wine a night     Drug use: No    Sexual activity: Never     Review of Systems   Constitutional:  Negative for activity change and appetite change.   HENT:  Negative for  "congestion.    Eyes:  Negative for visual disturbance.   Respiratory:  Negative for apnea.    Cardiovascular:  Negative for chest pain.   Gastrointestinal:  Negative for abdominal distention.   Endocrine: Negative for cold intolerance and heat intolerance.   Genitourinary:  Negative for difficulty urinating.   Musculoskeletal:  Positive for back pain.   Neurological:  Negative for weakness and numbness.   All other systems reviewed and are negative.    Objective:     Weight: 80.7 kg (178 lb)  Body mass index is 29.62 kg/m².  Vital Signs (Most Recent):  Temp: 97.7 °F (36.5 °C) (04/15/24 1751)  Pulse: 85 (04/15/24 1751)  Resp: 18 (04/15/24 1939)  BP: 123/76 (04/15/24 1751)  SpO2: 97 % (04/15/24 1751) Vital Signs (24h Range):  Temp:  [97.7 °F (36.5 °C)-98.2 °F (36.8 °C)] 97.7 °F (36.5 °C)  Pulse:  [84-85] 85  Resp:  [16-18] 18  SpO2:  [97 %] 97 %  BP: (123-141)/(72-76) 123/76                                 Physical Exam  Vitals and nursing note reviewed.   HENT:      Head: Normocephalic.   Eyes:      Extraocular Movements: Extraocular movements intact.   Cardiovascular:      Rate and Rhythm: Normal rate.   Pulmonary:      Effort: Pulmonary effort is normal.   Abdominal:      Palpations: Abdomen is soft.   Neurological:      Mental Status: She is alert and oriented to person, place, and time.          E4V5M6  Aox3  Cni, PERRL  FC x4 FS  SILT  No dupree/clonus    +TTP Tsp        Significant Labs:  No results for input(s): "GLU", "NA", "K", "CL", "CO2", "BUN", "CREATININE", "CALCIUM", "MG" in the last 48 hours.  No results for input(s): "WBC", "HGB", "HCT", "PLT" in the last 48 hours.  No results for input(s): "LABPT", "INR", "APTT" in the last 48 hours.  Microbiology Results (last 7 days)       ** No results found for the last 168 hours. **          All pertinent labs from the last 24 hours have been reviewed.    Significant Diagnostics:  I have reviewed all pertinent imaging results/findings within the past 24 " hours.  I have reviewed and interpreted all pertinent imaging results/findings within the past 24 hours.  CT Head Without Contrast    Result Date: 4/15/2024  No acute abnormality. Electronically signed by: Sherman Peng Date:    04/15/2024 Time:    18:35    X-Ray Thoracic Spine AP Lateral    Result Date: 4/15/2024  Age-indeterminate compression deformity of the T10 vertebral body with approximately 50% loss of vertebral body height.  MRI of the thoracic spine may be obtained for further evaluation, as clinically warranted. No evidence of listhesis. Electronically signed by: Gerhard Richards MD Date:    04/15/2024 Time:    17:30    X-Ray Chest PA And Lateral    Result Date: 4/15/2024  1.  No acute intrathoracic process. 2.  Age-indeterminate compression deformity of midthoracic vertebral body. Please see the dedicated thoracic spine series. Electronically signed by: Gerhard Richards MD Date:    04/15/2024 Time:    17:21     Assessment/Plan:     Thoracic compression fracture  73F PMH osteoporosis presenting after mechanical fall down two steps today with XR demonstrating T10 compression fracture.    Plan  CT/MRI Tsp ordered  TLSO ordered and placed in ED; pending upright/supine film Tsp in TLSO brace  Pain control  Pending no dynamic instability on plain films / no evidence of severe canal stenosis on MRI then okay to discharge from ED; discussed with patient  Please notify nsgy for any further questions/concerns        Nghia Ellison MD  Neurosurgery  [facility] - [facility]

## 2024-04-16 NOTE — SUBJECTIVE & OBJECTIVE
(Not in a hospital admission)      Review of patient's allergies indicates:  No Known Allergies    Past Medical History:   Diagnosis Date    Arthritis     Eye injury 4 yrs    hit od    Hypertension 4/4/2023    Nuclear sclerosis, bilateral 2/13/2019     Past Surgical History:   Procedure Laterality Date    APPENDECTOMY      COLONOSCOPY N/A 9/7/2017    Procedure: COLONOSCOPY;  Surgeon: Albino Fenton MD;  Location: [facility] ([address] [place] [address]);  Service: Endoscopy;  Laterality: N/A;    ESOPHAGOGASTRODUODENOSCOPY  09/07/2017    KNEE SURGERY Right 12/05/2017    TKR    LASIK  7 yrs    ou    TONSILLECTOMY       Family History       Problem Relation (Age of Onset)    Cancer Mother (74), Brother (66)    Cataracts Mother    Diabetes Mother, Brother    Glaucoma Mother    Heart disease Mother (55), Father    Hypertension Mother, Father    No Known Problems Sister, Maternal Aunt, Maternal Uncle, Paternal Aunt, Paternal Uncle, Maternal Grandmother, Maternal Grandfather, Paternal Grandmother, Paternal Grandfather, Other          Tobacco Use    Smoking status: Never    Smokeless tobacco: Never   Substance and Sexual Activity    Alcohol use: Yes     Alcohol/week: 1.0 standard drink of alcohol     Types: 1 Glasses of wine per week     Comment: glass of wine a night     Drug use: No    Sexual activity: Never     Review of Systems   Constitutional:  Negative for activity change and appetite change.   HENT:  Negative for congestion.    Eyes:  Negative for visual disturbance.   Respiratory:  Negative for apnea.    Cardiovascular:  Negative for chest pain.   Gastrointestinal:  Negative for abdominal distention.   Endocrine: Negative for cold intolerance and heat intolerance.   Genitourinary:  Negative for difficulty urinating.   Musculoskeletal:  Positive for back pain.   Neurological:  Negative for weakness and numbness.   All other systems reviewed and are negative.    Objective:     Weight: 80.7 kg (178 lb)  Body mass index is 29.62  "kg/m².  Vital Signs (Most Recent):  Temp: 97.7 °F (36.5 °C) (04/15/24 1751)  Pulse: 85 (04/15/24 1751)  Resp: 18 (04/15/24 1939)  BP: 123/76 (04/15/24 1751)  SpO2: 97 % (04/15/24 1751) Vital Signs (24h Range):  Temp:  [97.7 °F (36.5 °C)-98.2 °F (36.8 °C)] 97.7 °F (36.5 °C)  Pulse:  [84-85] 85  Resp:  [16-18] 18  SpO2:  [97 %] 97 %  BP: (123-141)/(72-76) 123/76                                 Physical Exam  Vitals and nursing note reviewed.   HENT:      Head: Normocephalic.   Eyes:      Extraocular Movements: Extraocular movements intact.   Cardiovascular:      Rate and Rhythm: Normal rate.   Pulmonary:      Effort: Pulmonary effort is normal.   Abdominal:      Palpations: Abdomen is soft.   Neurological:      Mental Status: She is alert and oriented to person, place, and time.          E4V5M6  Aox3  Cni, PERRL  FC x4 FS  SILT  No dupree/clonus    +TTP Tsp        Significant Labs:  No results for input(s): "GLU", "NA", "K", "CL", "CO2", "BUN", "CREATININE", "CALCIUM", "MG" in the last 48 hours.  No results for input(s): "WBC", "HGB", "HCT", "PLT" in the last 48 hours.  No results for input(s): "LABPT", "INR", "APTT" in the last 48 hours.  Microbiology Results (last 7 days)       ** No results found for the last 168 hours. **          All pertinent labs from the last 24 hours have been reviewed.    Significant Diagnostics:  I have reviewed all pertinent imaging results/findings within the past 24 hours.  I have reviewed and interpreted all pertinent imaging results/findings within the past 24 hours.  CT Head Without Contrast    Result Date: 4/15/2024  No acute abnormality. Electronically signed by: Sherman Peng Date:    04/15/2024 Time:    18:35    X-Ray Thoracic Spine AP Lateral    Result Date: 4/15/2024  Age-indeterminate compression deformity of the T10 vertebral body with approximately 50% loss of vertebral body height.  MRI of the thoracic spine may be obtained for further evaluation, as clinically warranted. " No evidence of listhesis. Electronically signed by: Gerhard Richards MD Date:    04/15/2024 Time:    17:30    X-Ray Chest PA And Lateral    Result Date: 4/15/2024  1.  No acute intrathoracic process. 2.  Age-indeterminate compression deformity of midthoracic vertebral body. Please see the dedicated thoracic spine series. Electronically signed by: Gerhard Richards MD Date:    04/15/2024 Time:    17:21

## 2024-04-16 NOTE — PLAN OF CARE
[facility] Dept  Initial Discharge Assessment       Primary Care Provider: Trisha Higginbotham MD    Admission Diagnosis: No admission diagnoses are documented for this encounter.    Admission Date: 4/15/2024  Expected Discharge Date: 05/16/2024  Sw met pt and pt's cousin at bedside.pt ambulates with a walker and can.  Pt has family  support and has not case management needs.       Valentina Ackerman LMSW  Case Management  Emergency Department  [phone]     Transition of Care Barriers: (P) None    Payor: [employer] MED[employer]ARE ADVANTAGE / Plan: [employer] MCARE ADV / Product Type: Medicare Advantage /     Extended Emergency Contact Information  Primary Emergency Contact: Leila Lawrence   [place]  Mobile Phone: [phone]  Relation: Relative    Discharge Plan A: (P) Home with family         [email or web address] [id number] [place] [address] AT [facility] OF [address] [address]  Phone: [phone] Fax: [phone]      Initial Assessment (most recent)       Adult Discharge Assessment - 04/15/24 2155          Discharge Assessment    Assessment Type Discharge Planning Assessment (P)      Confirmed/corrected address, phone number and insurance Yes (P)      Confirmed Demographics Correct on Facesheet (P)      Source of Information patient;family (P)      Does patient/caregiver understand observation status Yes (P)      Communicated LISSY with patient/caregiver Date not available/Unable to determine (P)      People in Home alone (P)      Do you expect to return to your current living situation? Yes (P)      Do you have help at home or someone to help you manage your care at home? Yes (P)      Who are your caregiver(s) and their phone number(s)? Leila Lawrence  [phone] (P)      Prior to hospitilization cognitive status: Alert/Oriented (P)      Current cognitive status: Alert/Oriented (P)      Walking or Climbing Stairs Difficulty yes (P)       Walking or Climbing Stairs ambulation difficulty, requires equipment (P)      Dressing/Bathing Difficulty no (P)      Home Accessibility not wheelchair accessible (P)      Home Layout Able to live on 1st floor (P)      Equipment Currently Used at Home cane, straight;walker, standard (P)      Readmission within 30 days? No (P)      Patient currently being followed by outpatient case management? No (P)      Do you currently have service(s) that help you manage your care at home? No (P)      Do you take prescription medications? Yes (P)      Do you have prescription coverage? Yes (P)      Do you have any problems affording any of your prescribed medications? No (P)      Is the patient taking medications as prescribed? yes (P)      Who is going to help you get home at discharge? Leila (P)      How do you get to doctors appointments? car, drives self (P)      Are you on dialysis? No (P)      Do you take coumadin? No (P)      Discharge Plan A Home with family (P)      DME Needed Upon Discharge  none (P)      Discharge Plan discussed with: Patient (P)      Transition of Care Barriers None (P)         Physical Activity    On average, how many days per week do you engage in moderate to strenuous exercise (like a brisk walk)? 4 days (P)      On average, how many minutes do you engage in exercise at this level? 40 min (P)         Financial Resource Strain    How hard is it for you to pay for the very basics like food, housing, medical care, and heating? Not very hard (P)         Housing Stability    In the last 12 months, was there a time when you were not able to pay the mortgage or rent on time? No (P)      In the past 12 months, how many times have you moved where you were living? 1 (P)      At any time in the past 12 months, were you homeless or living in a shelter (including now)? No (P)         Transportation Needs    In the past 12 months, has lack of transportation kept you from medical appointments or from getting  medications? No (P)      In the past 12 months, has lack of transportation kept you from meetings, work, or from getting things needed for daily living? No (P)         Food Insecurity    Within the past 12 months, you worried that your food would run out before you got the money to buy more. Never true (P)      Within the past 12 months, the food you bought just didn't last and you didn't have money to get more. Never true (P)         Stress    Do you feel stress - tense, restless, nervous, or anxious, or unable to sleep at night because your mind is troubled all the time - these days? Not at all (P)         Social Connections    In a typical week, how many times do you talk on the phone with family, friends, or neighbors? More than three times a week (P)      How often do you get together with friends or relatives? More than three times a week (P)      How often do you attend Taoist or Sikhism services? Never (P)      Do you belong to any clubs or organizations such as Taoist groups, unions, fraternal or athletic groups, or school groups? No (P)      How often do you attend meetings of the clubs or organizations you belong to? Never (P)      Are you , , , , never , or living with a partner? Never  (P)         Alcohol Use    Q1: How often do you have a drink containing alcohol? Never (P)      Q2: How many drinks containing alcohol do you have on a typical day when you are drinking? Patient does not drink (P)      Q3: How often do you have six or more drinks on one occasion? Never (P)

## 2024-04-16 NOTE — DISCHARGE INSTRUCTIONS
You have a fracture at T8 and T6. Wear brace when out of bed at all times.   You can take mobic once a day. Eat first.  You can take acetaminophen/tylenol 650 mg every 6 hours or 1000 mg every 8 hours for added relief.  Apply ice to the area for 10-20 minutes every 4 hours. You can apply heat 2 days after for the same duration and frequency.  Take Percocet every 6 hours as needed for severe pain only. Do not use medication when you are operating heavy machinery, driving, or working because the medication may be sedating. Do not take medication sooner than the frequency as directed.   Do not take more than 3000 mg of acetaminophen in a 24 hour period.  Percocet has 325 mg of acetaminophen in it so you will need to do some math.  Follow up with Neurosurgery. Referral placed.  Return to the ER for new or worsening symptoms.  Future Appointments   Date Time Provider Department Center   4/29/2024  1:00 PM [facility]   5/6/2024  1:30 PM Elia Coombs MD [facility] OPHTHAL [place]   5/20/2024 10:00 AM [facility] MAMMO1 [facility] MAMMO [place]   10/7/2024 10:40 AM Trisha Higginbotham MD [facility] MED [place]

## 2024-04-22 ENCOUNTER — PATIENT MESSAGE (OUTPATIENT)
Dept: FAMILY MEDICINE | Facility: CLINIC | Age: 74
End: 2024-04-22
Payer: MEDICARE

## 2024-04-23 ENCOUNTER — TELEPHONE (OUTPATIENT)
Dept: INFUSION THERAPY | Facility: HOSPITAL | Age: 74
End: 2024-04-23
Payer: MEDICARE

## 2024-04-24 RX ORDER — HYDROCHLOROTHIAZIDE 25 MG/1
25 TABLET ORAL
Qty: 60 TABLET | Refills: 0 | Status: SHIPPED | OUTPATIENT
Start: 2024-04-24 | End: 2024-05-23 | Stop reason: SDUPTHER

## 2024-04-28 NOTE — PATIENT INSTRUCTIONS
Chief Complaint   Patient presents with    Urinary Tract Infection     Frequent urination without relief - Entered by patient    UTI     Onset: 4 days  Site: Urinary  Symptoms: Increase frequency, urgency  Pain: No  Medications: Azo x 2 days  Exposure: NA  Home Kit Test: Covid 19: NA  Vaccinations: Up to date  Recent Travel: No  Medical Note for School/Work: No     Symptoms x 4 days, worse over the last two days  Increase in frequency, urgency  Taking Azo     Symptoms   This is a new problem. The current episode started in the past 7 days. The problem has been waxing and waning. The pain is at a severity of 0/10. The patient is experiencing no pain. She is Sexually active. There is No history of pyelonephritis. Associated symptoms include frequency and urgency. Pertinent negatives include no chills, discharge, flank pain, hematuria, hesitancy, nausea, sweats or vomiting. She has tried nothing for the symptoms. The treatment provided no relief.       Review of Systems   Constitutional:  Negative for chills.   Gastrointestinal:  Negative for nausea and vomiting.   Genitourinary:  Positive for dysuria, frequency and urgency. Negative for flank pain, hematuria and hesitancy.   All other systems reviewed and are negative.      Vitals:    04/28/24 1001   BP: 106/69   BP Location: LUE - Left upper extremity   Patient Position: Sitting   Cuff Size: Regular   Pulse: 84   Temp: 98.1 °F (36.7 °C)   TempSrc: Oral   SpO2: 97%   Weight: 85 kg (187 lb 6.3 oz)   Height: 5' 8\" (1.727 m)       Physical Exam  Vitals and nursing note reviewed.   Constitutional:       Appearance: Normal appearance. She is obese.   Cardiovascular:      Rate and Rhythm: Normal rate.   Pulmonary:      Effort: Pulmonary effort is normal.   Abdominal:      Tenderness: There is no right CVA tenderness or left CVA tenderness.   Musculoskeletal:         General: Normal range of motion.   Skin:     General: Skin is warm.      Capillary Refill: Capillary refill  Start omega 3 daily (around 1g per day), consider starting statin therapy like Lipitor.     Guidelines: Heart Healthy Diet (AHA Guidelines)    -Aerobic Exercise for at least 30 minutes on most days should accompany dietary changes  -Eat smaller portions   -Eat at home.   -Increase Omega-3 Fatty Acid intake  -Eat fish twice weekly or Supplement with Omega-3 Fatty Acids  -Increase fruit and vegetable intake to drop CAD risk  -Fruits and Vegetables with greatest risk reduction  -Green leafy vegetables  -Cruciferous vegetables (broccoli, cauliflower)  -Fruits and vegetables high in Vitamin C  -Increase whole grains and high Dietary Fiber foods    LIMIT:   -saturated fat and trans-Fatty Acid intake (replace with olive oil, canola oil, nuts)  -Alcohol intake (2 drinks per day for men, and 1 drink per day for women)  -sugar intake  -salt intake (<1.5 to 2 grams per day is optimal)      Continue exercise. [facility] or schedule with Dr. Higginbotham for weight appt.     Chronic steroid use can worsen bone density.     Please send in cologuard.    takes less than 2 seconds.   Neurological:      General: No focal deficit present.      Mental Status: She is alert and oriented to person, place, and time.   Psychiatric:         Mood and Affect: Mood normal.         Behavior: Behavior normal.         Thought Content: Thought content normal.         Judgment: Judgment normal.         Results for orders placed or performed in visit on 04/28/24   POCT Urine Dip Non-Auto   Result Value    POCT Color Yellow    POCT Appearance Hazy    POCT Glucose Urine Negative    POCT Bilirubin Negative    POCT Ketones Negative    POCT Specific Gravity 1.025    POCT Occult Blood Moderate (A)    POCT pH 7.0    POCT Protein Trace (A)    POCT Urobilinogen 0.2    Urine Nitrite Negative    WBC (Leukocyte) Esterase POC Trace (A)       Nixon was seen today for urinary tract infection and uti.    Diagnoses and all orders for this visit:    UTI symptoms  -     POCT Urine Dip Non-Auto  -     Urine, Bacterial Culture    Acute cystitis without hematuria  -     nitrofurantoin, macrocrystal-monohydrate, (Macrobid) 100 MG capsule; Take 1 capsule by mouth in the morning and 1 capsule in the evening. Do all this for 5 days.    UTI symptoms  (primary encounter diagnosis)  Plan: POCT Urine Dip Non-Auto, Urine, Bacterial         Culture    Acute cystitis without hematuria  Plan: nitrofurantoin, macrocrystal-monohydrate,         (Macrobid) 100 MG capsule        No follow-ups on file.    The plan was discussed verbally and key components were summarized in the discharge instructions and printed on the AVS. All questions were answered. In discussing management and follow-up, they are advised that the plan is based only on information available at the time of this evaluation. Additional testing may be necessary, and outpatient evaluation is frequently required to ensure complete care. At the same time, there is always potential for symptoms to recur or worsen, or for additional signs or symptoms to develop  that could substantially affect the treatment plan. If any new or uncontrolled symptoms occur, or if there are any other concerns, they are advised to seek medical evaluation immediately.

## 2024-05-02 ENCOUNTER — HOSPITAL ENCOUNTER (OUTPATIENT)
Dept: RADIOLOGY | Facility: HOSPITAL | Age: 74
Discharge: HOME | End: 2024-05-02
Attending: PHYSICIAN ASSISTANT
Payer: MEDICARE

## 2024-05-02 ENCOUNTER — OFFICE VISIT (OUTPATIENT)
Dept: NEUROSURGERY | Facility: CLINIC | Age: 74
End: 2024-05-02
Payer: MEDICARE

## 2024-05-02 DIAGNOSIS — S22.059A CLOSED FRACTURE OF SIXTH THORACIC VERTEBRA, UNSPECIFIED FRACTURE MORPHOLOGY, INITIAL ENCOUNTER: ICD-10-CM

## 2024-05-02 DIAGNOSIS — S22.069A CLOSED FRACTURE OF EIGHTH THORACIC VERTEBRA, UNSPECIFIED FRACTURE MORPHOLOGY, INITIAL ENCOUNTER: ICD-10-CM

## 2024-05-02 DIAGNOSIS — S22.070A COMPRESSION FRACTURE OF T10 VERTEBRA, INITIAL ENCOUNTER: ICD-10-CM

## 2024-05-02 PROCEDURE — 72070 X-RAY EXAM THORAC SPINE 2VWS: CPT | Mod: TC

## 2024-05-02 PROCEDURE — 99999 PR PBB SHADOW E&M-EST. PATIENT-LVL I: CPT | Mod: PBBFAC,,, | Performed by: STUDENT IN AN ORGANIZED HEALTH CARE EDUCATION/TRAINING PROGRAM

## 2024-05-02 PROCEDURE — 99213 OFFICE O/P EST LOW 20 MIN: CPT | Mod: S$GLB,,, | Performed by: STUDENT IN AN ORGANIZED HEALTH CARE EDUCATION/TRAINING PROGRAM

## 2024-05-02 PROCEDURE — 72070 X-RAY EXAM THORAC SPINE 2VWS: CPT | Mod: 26,,, | Performed by: RADIOLOGY

## 2024-05-02 NOTE — PROGRESS NOTES
Neurosurgery  History & Physical    SUBJECTIVE:     Chief Complaint: Acute T8 compression fracture    History of Present Illness:  Windy Lindo is a 74yo woman w/PMHx osteoporosis who initially presented to the ED on 4/15 after a mechanical fall down 2 steps. Imaging demonstrated an acute T8 compression fracture with no significant canal stenosis. Of note, there was Radiology discrepancy of the level of the fracture across imaging modalities. MRI confirmed the affected level as T8 with an additional subacute fracture at T6. She presents to clinic today for follow up after undergoing repeat lumbar x-ray. She has been wearing her TLSO brace as advised. She endorses axial mid back pain. Denies any radiculopathy, numbness or weakness in her arms or legs, saddle anesthesia, bladder or bowel dysfunction.     Review of patient's allergies indicates:  No Known Allergies    Current Outpatient Medications   Medication Sig Dispense Refill    acetaminophen (TYLENOL) 650 MG TbSR Take 1 tablet (650 mg total) by mouth every 6 (six) hours as needed. 20 tablet 0    alendronate (FOSAMAX) 35 MG tablet TAKE 1 TABLET(35 MG) BY MOUTH 1 TIME A WEEK (Patient not taking: Reported on 5/23/2024) 12 tablet 0    b complex vitamins capsule Take 1 capsule by mouth once daily. (Patient not taking: Reported on 5/23/2024)      glucosam/chond/hyalu/CF borate (MOVE FREE JOINT [facility] ORAL) Take by mouth. (Patient not taking: Reported on 5/23/2024)      hydroCHLOROthiazide (HYDRODIURIL) 25 MG tablet Take 1 tablet (25 mg total) by mouth once daily. 90 tablet 3    meloxicam (MOBIC) 15 MG tablet TAKE 1 TABLET(15 MG) BY MOUTH EVERY DAY 90 tablet 0    multivit-min-FA-lycopen-lutein 0.4-300-250 mg-mcg-mcg Tab Take 1 tablet by mouth once daily.      oxyCODONE-acetaminophen (PERCOCET) 5-325 mg per tablet Take 1 tablet by mouth every 6 (six) hours as needed for Pain. (Patient not taking: Reported on 5/23/2024) 12 tablet 0    tiZANidine (ZANAFLEX) 4 MG tablet  Take 1 tablet (4 mg total) by mouth every 6 (six) hours as needed (muscle spasms). (Patient not taking: Reported on 5/23/2024) 60 tablet 1    vitamin D (VITAMIN D3) 1000 units Tab Take 1,000 Units by mouth once daily.      vitamin E 100 UNIT capsule Take 100 Units by mouth once daily.       No current facility-administered medications for this visit.       Past Medical History:   Diagnosis Date    Arthritis     Eye injury 4 yrs    hit od    Hypertension 4/4/2023    Nuclear sclerosis, bilateral 2/13/2019     Past Surgical History:   Procedure Laterality Date    APPENDECTOMY      COLONOSCOPY N/A 9/7/2017    Procedure: COLONOSCOPY;  Surgeon: Albino Fenton MD;  Location: [facility] ([address] [place] [address]);  Service: Endoscopy;  Laterality: N/A;    ESOPHAGOGASTRODUODENOSCOPY  09/07/2017    KNEE SURGERY Right 12/05/2017    TKR    LASIK  7 yrs    ou    TONSILLECTOMY       Family History       Problem Relation (Age of Onset)    Cancer Mother (74), Brother (66)    Cataracts Mother    Diabetes Mother, Brother    Glaucoma Mother    Heart disease Mother (55), Father    Hypertension Mother, Father    No Known Problems Sister, Maternal Aunt, Maternal Uncle, Paternal Aunt, Paternal Uncle, Maternal Grandmother, Maternal Grandfather, Paternal Grandmother, Paternal Grandfather, Other          Social History     Socioeconomic History    Marital status: Single   Occupational History     Employer: [employer] – [place], [place]   Tobacco Use    Smoking status: Never    Smokeless tobacco: Never   Substance and Sexual Activity    Alcohol use: Yes     Alcohol/week: 1.0 standard drink of alcohol     Types: 1 Glasses of wine per week     Comment: glass of wine a night     Drug use: No    Sexual activity: Never     Social Determinants of Health     Financial Resource Strain: Low Risk  (4/15/2024)    Overall Financial Resource Strain (CARDIA)     Difficulty of Paying Living Expenses: Not very hard   Food Insecurity: No Food Insecurity (4/15/2024)    Hunger Vital Sign     Worried  About Running Out of Food in the Last Year: Never true     Ran Out of Food in the Last Year: Never true   Transportation Needs: No Transportation Needs (4/15/2024)    PRAPARE - Transportation     Lack of Transportation (Medical): No     Lack of Transportation (Non-Medical): No   Physical Activity: Sufficiently Active (4/15/2024)    Exercise Vital Sign     Days of Exercise per Week: 4 days     Minutes of Exercise per Session: 40 min   Recent Concern: Physical Activity - Insufficiently Active (1/26/2024)    Exercise Vital Sign     Days of Exercise per Week: 5 days     Minutes of Exercise per Session: 20 min   Stress: No Stress Concern Present (4/15/2024)    [employer] - Occupational Stress Questionnaire     Feeling of Stress : Not at all   Housing Stability: Low Risk  (4/15/2024)    Housing Stability Vital Sign     Unable to Pay for Housing in the Last Year: No     Homeless in the Last Year: No     OBJECTIVE:     Vital Signs     There is no height or weight on file to calculate BMI.    Neurosurgery Physical Exam  General: well developed, well nourished, no distress.   Head: normocephalic, atraumatic  Mental Status: Awake, Alert, Oriented  Speech: Clear with content appropriate to conversation  Cranial nerves: face symmetric, CN II-XII grossly intact.   Eyes: pupils equal, round, reactive to light, EOMI.  Sensory: intact to light touch throughout    Motor Strength: Moves all extremities spontaneously with good tone. Full strength upper and lower extremities. No abnormal movements seen.     Strength  Deltoids Triceps Biceps Wrist Extension Wrist Flexion Hand    Upper: R 5/5 5/5 5/5 5/5 5/5 5/5    L 5/5 5/5 5/5 5/5 5/5 5/5     Iliopsoas Quadriceps Knee  Flexion Tibialis  anterior Gastro- cnemius EHL   Lower: R 5/5 5/5 5/5 5/5 5/5 5/5    L 5/5 5/5 5/5 5/5 5/5 5/5     Farmer: absent  Clonus: absent    Diagnostic Results:  X-Ray Thoracic Spine AP Lateral 5/2/24:  - Compression deformity of  T8. Grossly stable in comparison to prior imaging.     I have personally reviewed the above referenced imaging.     ASSESSMENT/PLAN:     Windy Lindo is a 72yo woman w/PMHx osteoporosis who initially presented to the ED on 4/15 after a mechanical fall down 2 steps. MRI confirmed the affected level as T8 with an additional subacute fracture at T6. She has axial back pain but denies any radiculopathy, weakness, numbness, saddle anesthesia, bladder or bowel dysfunction. She is neurologically intact on exam. Repeat thoracic x-ray today is stable. We discussed these findings and I advised her to continue wearing her TLSO brace. We will plan to follow up again with a thoracic x-ray in 4 weeks. All questions were answered. We additionally discussed alarm symptoms that should prompt her to seek emergent care.         Monalisa Mcclain PA-C  Department of Neurosurgery  [facility]

## 2024-05-03 ENCOUNTER — PATIENT MESSAGE (OUTPATIENT)
Dept: FAMILY MEDICINE | Facility: CLINIC | Age: 74
End: 2024-05-03
Payer: MEDICARE

## 2024-05-03 ENCOUNTER — PATIENT MESSAGE (OUTPATIENT)
Dept: NEUROSURGERY | Facility: CLINIC | Age: 74
End: 2024-05-03
Payer: MEDICARE

## 2024-05-07 ENCOUNTER — TELEPHONE (OUTPATIENT)
Dept: NEUROSURGERY | Facility: CLINIC | Age: 74
End: 2024-05-07
Payer: MEDICARE

## 2024-05-07 DIAGNOSIS — S22.069A CLOSED FRACTURE OF EIGHTH THORACIC VERTEBRA, UNSPECIFIED FRACTURE MORPHOLOGY, INITIAL ENCOUNTER: Primary | ICD-10-CM

## 2024-05-16 ENCOUNTER — INFUSION (OUTPATIENT)
Dept: INFECTIOUS DISEASES | Facility: HOSPITAL | Age: 74
End: 2024-05-16
Payer: MEDICARE

## 2024-05-16 VITALS
TEMPERATURE: 98 F | WEIGHT: 177.94 LBS | HEIGHT: 65 IN | HEART RATE: 89 BPM | OXYGEN SATURATION: 95 % | DIASTOLIC BLOOD PRESSURE: 80 MMHG | BODY MASS INDEX: 29.65 KG/M2 | RESPIRATION RATE: 18 BRPM | SYSTOLIC BLOOD PRESSURE: 160 MMHG

## 2024-05-16 DIAGNOSIS — M81.0 AGE-RELATED OSTEOPOROSIS WITHOUT CURRENT PATHOLOGICAL FRACTURE: Primary | ICD-10-CM

## 2024-05-16 PROCEDURE — 96372 THER/PROPH/DIAG INJ SC/IM: CPT

## 2024-05-16 PROCEDURE — 63600175 PHARM REV CODE 636 W HCPCS: Mod: JZ,JG | Performed by: FAMILY MEDICINE

## 2024-05-16 RX ADMIN — DENOSUMAB 60 MG: 60 INJECTION SUBCUTANEOUS at 02:05

## 2024-05-16 NOTE — PROGRESS NOTES
Patient arrives for Prolia injection - confirms use of calcium and vitamin D supplements and denies dental procedures over past 3 months - administered per guidelines    Pt observed for 15 min after injection    Limited head-to-toe assessment due to privacy issues and visit reason though the opportunity was given for patient to express any concerns    Next appt given to Pt

## 2024-05-20 ENCOUNTER — PATIENT MESSAGE (OUTPATIENT)
Dept: ADMINISTRATIVE | Facility: OTHER | Age: 74
End: 2024-05-20
Payer: MEDICARE

## 2024-05-23 ENCOUNTER — OFFICE VISIT (OUTPATIENT)
Dept: CARDIOLOGY | Facility: CLINIC | Age: 74
End: 2024-05-23
Payer: MEDICARE

## 2024-05-23 VITALS
HEIGHT: 65 IN | SYSTOLIC BLOOD PRESSURE: 130 MMHG | HEART RATE: 78 BPM | DIASTOLIC BLOOD PRESSURE: 72 MMHG | RESPIRATION RATE: 15 BRPM | BODY MASS INDEX: 29.34 KG/M2 | OXYGEN SATURATION: 97 % | WEIGHT: 176.13 LBS

## 2024-05-23 DIAGNOSIS — I70.0 AORTIC ATHEROSCLEROSIS: ICD-10-CM

## 2024-05-23 DIAGNOSIS — R00.2 HEART PALPITATIONS: Primary | ICD-10-CM

## 2024-05-23 DIAGNOSIS — E78.5 HYPERLIPIDEMIA, UNSPECIFIED HYPERLIPIDEMIA TYPE: ICD-10-CM

## 2024-05-23 DIAGNOSIS — I10 HYPERTENSION, UNSPECIFIED TYPE: ICD-10-CM

## 2024-05-23 LAB
OHS QRS DURATION: 86 MS
OHS QTC CALCULATION: 427 MS

## 2024-05-23 PROCEDURE — 93000 ELECTROCARDIOGRAM COMPLETE: CPT | Mod: S$GLB,,, | Performed by: INTERNAL MEDICINE

## 2024-05-23 PROCEDURE — 99214 OFFICE O/P EST MOD 30 MIN: CPT | Mod: S$GLB,,, | Performed by: INTERNAL MEDICINE

## 2024-05-23 PROCEDURE — 3288F FALL RISK ASSESSMENT DOCD: CPT | Mod: CPTII,S$GLB,, | Performed by: INTERNAL MEDICINE

## 2024-05-23 PROCEDURE — 1159F MED LIST DOCD IN RCRD: CPT | Mod: CPTII,S$GLB,, | Performed by: INTERNAL MEDICINE

## 2024-05-23 PROCEDURE — 1101F PT FALLS ASSESS-DOCD LE1/YR: CPT | Mod: CPTII,S$GLB,, | Performed by: INTERNAL MEDICINE

## 2024-05-23 PROCEDURE — 3078F DIAST BP <80 MM HG: CPT | Mod: CPTII,S$GLB,, | Performed by: INTERNAL MEDICINE

## 2024-05-23 PROCEDURE — 3075F SYST BP GE 130 - 139MM HG: CPT | Mod: CPTII,S$GLB,, | Performed by: INTERNAL MEDICINE

## 2024-05-23 PROCEDURE — 1126F AMNT PAIN NOTED NONE PRSNT: CPT | Mod: CPTII,S$GLB,, | Performed by: INTERNAL MEDICINE

## 2024-05-23 PROCEDURE — 99999 PR PBB SHADOW E&M-EST. PATIENT-LVL IV: CPT | Mod: PBBFAC,,, | Performed by: INTERNAL MEDICINE

## 2024-05-23 RX ORDER — HYDROCHLOROTHIAZIDE 25 MG/1
25 TABLET ORAL DAILY
Qty: 90 TABLET | Refills: 3 | Status: SHIPPED | OUTPATIENT
Start: 2024-05-23

## 2024-05-23 NOTE — PROGRESS NOTES
CARDIOVASCULAR CONSULTATION    REASON FOR CONSULT:   Windy Lindo is a 73 y.o. female who presents for evaluation    HISTORY OF PRESENT ILLNESS:     Patient is a pleasant 72-year-old lady.  Here for evaluation.  States that was sent over because of frequent extra beats which were noticed on examination.  EKG personally reviewed and showed PACs.  Patient does not feel these PACs.  Was recently started on hydrochlorothiazide for mildly elevated blood pressure, took only 1 dose so far as she started it yesterday.  Denies orthopnea, PND, swelling of feet.  States can walk a mi without any symptoms of shortness of breath or chest pains    4/23:  Patient here for follow-up.  Doing fine.  No cardiac symptoms.  Echo showed normal left ventricle systolic function.  EKG done today personally reviewed shows normal sinus rhythm with PAC.  Holter results are pending.      The left ventricle is normal in size with mild eccentric hypertrophy and normal systolic function.  The estimated ejection fraction is 65%.  Normal left ventricular diastolic function.  Normal right ventricular size with normal right ventricular systolic function.  Mild tricuspid regurgitation.  Mild aortic regurgitation.  Normal central venous pressure (3 mmHg).  The estimated PA systolic pressure is 34 mmHg.  Trivial posterior pericardial effusion.      Notes from May 2024: Patient here for follow-up.  Denies chest pains at rest on exertion, orthopnea, PND.  Has been doing fine.  Denies any symptomatology.  Last year's Holter showed frequent PACs, patient totally asymptomatic from them.  EKG done today personally reviewed and shows normal sinus rhythm, normal EKG.      PAST MEDICAL HISTORY:     Past Medical History:   Diagnosis Date    Arthritis     Eye injury 4 yrs    hit od    Hypertension 4/4/2023    Nuclear sclerosis, bilateral 2/13/2019       PAST SURGICAL HISTORY:     Past Surgical History:   Procedure Laterality Date    APPENDECTOMY      COLONOSCOPY  N/A 9/7/2017    Procedure: COLONOSCOPY;  Surgeon: Albino Fenton MD;  Location: [facility] ([address] [place] [address]);  Service: Endoscopy;  Laterality: N/A;    ESOPHAGOGASTRODUODENOSCOPY  09/07/2017    KNEE SURGERY Right 12/05/2017    TKR    LASIK  7 yrs    ou    TONSILLECTOMY         ALLERGIES AND MEDICATION:   Review of patient's allergies indicates:  No Known Allergies     Medication List            Accurate as of May 23, 2024 12:57 PM. If you have any questions, ask your nurse or doctor.                CONTINUE taking these medications      acetaminophen 650 MG Tbsr  Commonly known as: TYLENOL  Take 1 tablet (650 mg total) by mouth every 6 (six) hours as needed.     alendronate 35 MG tablet  Commonly known as: FOSAMAX  TAKE 1 TABLET(35 MG) BY MOUTH 1 TIME A WEEK     b complex vitamins capsule     hydroCHLOROthiazide 25 MG tablet  Commonly known as: HYDRODIURIL  TAKE 1 TABLET(25 MG) BY MOUTH EVERY DAY     meloxicam 15 MG tablet  Commonly known as: MOBIC  TAKE 1 TABLET(15 MG) BY MOUTH EVERY DAY     MOVE [facility] JOINT [facility] ORAL     multivit-min-FA-lycopen-lutein 0.4 mg-300 mcg- 250 mcg Tab     oxyCODONE-acetaminophen 5-325 mg per tablet  Commonly known as: PERCOCET  Take 1 tablet by mouth every 6 (six) hours as needed for Pain.     tiZANidine 4 MG tablet  Commonly known as: ZANAFLEX  Take 1 tablet (4 mg total) by mouth every 6 (six) hours as needed (muscle spasms).     vitamin D 1000 units Tab  Commonly known as: VITAMIN D3     vitamin E 100 UNIT capsule              SOCIAL HISTORY:     Social History     Socioeconomic History    Marital status: Single   Occupational History     Employer: [employer] [place]   Tobacco Use    Smoking status: Never    Smokeless tobacco: Never   Substance and Sexual Activity    Alcohol use: Yes     Alcohol/week: 1.0 standard drink of alcohol     Types: 1 Glasses of wine per week     Comment: glass of wine a night     Drug use: No    Sexual activity: Never     Social Determinants of Health     Financial Resource  Strain: Low Risk  (4/15/2024)    Overall Financial Resource Strain (CARDIA)     Difficulty of Paying Living Expenses: Not very hard   Food Insecurity: No Food Insecurity (4/15/2024)    Hunger Vital Sign     Worried About Running Out of Food in the Last Year: Never true     Ran Out of Food in the Last Year: Never true   Transportation Needs: No Transportation Needs (4/15/2024)    PRAPARE - Transportation     Lack of Transportation (Medical): No     Lack of Transportation (Non-Medical): No   Physical Activity: Sufficiently Active (4/15/2024)    Exercise Vital Sign     Days of Exercise per Week: 4 days     Minutes of Exercise per Session: 40 min   Recent Concern: Physical Activity - Insufficiently Active (1/26/2024)    Exercise Vital Sign     Days of Exercise per Week: 5 days     Minutes of Exercise per Session: 20 min   Stress: No Stress Concern Present (4/15/2024)    [employer] - Occupational Stress Questionnaire     Feeling of Stress : Not at all   Housing Stability: Low Risk  (4/15/2024)    Housing Stability Vital Sign     Unable to Pay for Housing in the Last Year: No     Homeless in the Last Year: No       FAMILY HISTORY:     Family History   Problem Relation Name Age of Onset    Hypertension Mother      Glaucoma Mother      Diabetes Mother      Cataracts Mother      Cancer Mother  74        lung    Heart disease Mother  55    Hypertension Father      Heart disease Father          onset early 60;s, Aortic valve replacement ,Rheumatic fever as a child     No Known Problems Sister      Diabetes Brother      Cancer Brother  66        mesothelioma    No Known Problems Maternal Aunt      No Known Problems Maternal Uncle      No Known Problems Paternal Aunt      No Known Problems Paternal Uncle      No Known Problems Maternal Grandmother      No Known Problems Maternal Grandfather      No Known Problems Paternal Grandmother      No Known Problems Paternal Grandfather      No Known Problems  "Other      Amblyopia Neg Hx      Blindness Neg Hx      Macular degeneration Neg Hx      Retinal detachment Neg Hx      Strabismus Neg Hx      Stroke Neg Hx      Thyroid disease Neg Hx      Melanoma Neg Hx         REVIEW OF SYSTEMS:   Review of Systems   Constitutional: Negative.   HENT: Negative.     Eyes: Negative.    Cardiovascular: Negative.    Respiratory: Negative.     Endocrine: Negative.    Hematologic/Lymphatic: Negative.    Skin: Negative.    Musculoskeletal: Negative.    Gastrointestinal: Negative.    Genitourinary: Negative.    Neurological: Negative.    Psychiatric/Behavioral: Negative.     Allergic/Immunologic: Negative.        A 10 point review of systems was performed and all the pertinent positives have been mentioned. Rest of review of systems was negative.        PHYSICAL EXAM:     Vitals:    05/23/24 1243   BP: 130/72   Pulse: 78   Resp: 15    Body mass index is 29.31 kg/m².  Weight: 79.9 kg (176 lb 2.4 oz)   Height: 5' 5" (165.1 cm)     Physical Exam  Constitutional:       Appearance: Normal appearance. She is well-developed.   HENT:      Head: Normocephalic.   Eyes:      Pupils: Pupils are equal, round, and reactive to light.   Cardiovascular:      Rate and Rhythm: Normal rate and regular rhythm. Extrasystoles are present.  Pulmonary:      Effort: Pulmonary effort is normal.      Breath sounds: Normal breath sounds.   Abdominal:      General: Bowel sounds are normal.      Palpations: Abdomen is soft.      Tenderness: There is no abdominal tenderness.   Musculoskeletal:         General: Normal range of motion.      Cervical back: Normal range of motion and neck supple.   Skin:     General: Skin is warm.   Neurological:      Mental Status: She is alert and oriented to person, place, and time.         DATA:     Laboratory:  CBC:  Recent Labs   Lab 11/05/22  1640 03/22/23  1002 03/28/24  0908   WBC 7.91 6.71 6.60   Hemoglobin 14.3 14.4 13.8   Hematocrit 44.7 45.9 43.4   Platelets 204 181 203 " "      CHEMISTRIES:  Recent Labs   Lab 02/21/22  0923 11/05/22  1640 03/22/23  1002 03/28/24  0908   Glucose 84 104 86 94   Sodium 139 138 143 141   Potassium 4.3 4.1 4.1 4.4   BUN 18 18 20 24 H   Creatinine 0.8 0.7 0.7 0.8   eGFR if  >60.0  --   --   --    eGFR if non  >60.0  --   --   --    Calcium 10.0 9.7 10.0 10.2   Magnesium  --  2.0  --   --        CARDIAC BIOMARKERS:        COAGS:        LIPIDS/LFTS:  Recent Labs   Lab 02/21/22  0923 11/05/22  1640 03/22/23 1002 03/28/24  0908   Cholesterol 205 H  --  217 H 213 H   Triglycerides 96  --  76 91   HDL 68  --  69 57   LDL Cholesterol 117.8  --  132.8 137.8   Non-HDL Cholesterol 137  --  148 156   AST 15 15 15 14   ALT 12 12 11 10       No results found for: "HGBA1C"    TSH  Recent Labs   Lab 11/05/22 1640 03/22/23 1002 03/28/24  0908   TSH 1.520 1.692 1.376       The 10-year ASCVD risk score (Marilee SALAZAR, et al., 2019) is: 19.7%    Values used to calculate the score:      Age: 73 years      Sex: Female      Is Non- : No      Diabetic: No      Tobacco smoker: No      Systolic Blood Pressure: 130 mmHg      Is BP treated: Yes      HDL Cholesterol: 57 mg/dL      Total Cholesterol: 213 mg/dL             ASSESSMENT AND PLAN     Patient Active Problem List   Diagnosis    Right knee pain    Elevated BP without diagnosis of hypertension    Chest sounds abnormal on percussion or auscultation    Varicose veins of both lower extremities    Primary osteoarthritis of right knee    Weakness    Chronic pain of left ankle    Nuclear sclerosis, bilateral    Refractive error    S/P LASIK (laser assisted in situ keratomileusis) of both eyes    Hyperlipidemia    Heart murmur    Hypertension    Age-related osteoporosis without current pathological fracture    Thoracic compression fracture    Aortic atherosclerosis       Patient with frequent PACs.  Echocardiogram did not reveal any significant structural abnormality.  Patient is " asymptomatic from her pacs.     Doing fine.  Medications refilled.      No cardiac complaints.      Follow-up in 1 year        Thank you very much for involving me in the care of your patient.  Please do not hesitate to contact me if there are any questions.      Alysia Briseno MD, FACC, [facility]  Interventional Cardiologist, [facility].           This note was dictated with the help of speech recognition software.  There might be un-intended errors and/or substitutions.

## 2024-06-02 DIAGNOSIS — M17.11 PRIMARY OSTEOARTHRITIS OF RIGHT KNEE: ICD-10-CM

## 2024-06-02 NOTE — TELEPHONE ENCOUNTER
Care Due:                  Date            Visit Type   Department     Provider  --------------------------------------------------------------------------------                                [facility]                              PRIMARY      MED/ INTERNAL  Last Visit: 10-      CARE ([facility])   MED/ PEDS      Trisha Higginbotham                              [facility]                              PRIMARY      MED/ INTERNAL  Next Visit: 10-      CARE ([facility])   MED/ PEDS      Trisha Higginbotham                                                            Last  Test          Frequency    Reason                     Performed    Due Date  --------------------------------------------------------------------------------    Mg Level....  12 months..  alendronate..............  11-   10-    Phosphate...  12 months..  alendronate..............  Not Found    Overdue    Vitamin D...  12 months..  alendronate..............  Not Found    Overdue    [employer] Embedded Care Due Messages. Reference number: [id number].   6/02/2024 8:03:05 AM CDT

## 2024-06-03 RX ORDER — MELOXICAM 15 MG/1
15 TABLET ORAL
Qty: 90 TABLET | Refills: 0 | Status: SHIPPED | OUTPATIENT
Start: 2024-06-03

## 2024-06-11 ENCOUNTER — OFFICE VISIT (OUTPATIENT)
Dept: NEUROSURGERY | Facility: CLINIC | Age: 74
End: 2024-06-11
Payer: MEDICARE

## 2024-06-11 ENCOUNTER — HOSPITAL ENCOUNTER (OUTPATIENT)
Dept: RADIOLOGY | Facility: HOSPITAL | Age: 74
Discharge: HOME | End: 2024-06-11
Attending: STUDENT IN AN ORGANIZED HEALTH CARE EDUCATION/TRAINING PROGRAM
Payer: MEDICARE

## 2024-06-11 VITALS — HEART RATE: 77 BPM | DIASTOLIC BLOOD PRESSURE: 79 MMHG | SYSTOLIC BLOOD PRESSURE: 130 MMHG

## 2024-06-11 DIAGNOSIS — S22.069A CLOSED FRACTURE OF EIGHTH THORACIC VERTEBRA, UNSPECIFIED FRACTURE MORPHOLOGY, INITIAL ENCOUNTER: ICD-10-CM

## 2024-06-11 DIAGNOSIS — S22.070A COMPRESSION FRACTURE OF T10 VERTEBRA, INITIAL ENCOUNTER: Primary | ICD-10-CM

## 2024-06-11 PROCEDURE — 1100F PTFALLS ASSESS-DOCD GE2>/YR: CPT | Mod: CPTII,S$GLB,, | Performed by: STUDENT IN AN ORGANIZED HEALTH CARE EDUCATION/TRAINING PROGRAM

## 2024-06-11 PROCEDURE — 1159F MED LIST DOCD IN RCRD: CPT | Mod: CPTII,S$GLB,, | Performed by: STUDENT IN AN ORGANIZED HEALTH CARE EDUCATION/TRAINING PROGRAM

## 2024-06-11 PROCEDURE — 99213 OFFICE O/P EST LOW 20 MIN: CPT | Mod: S$GLB,,, | Performed by: STUDENT IN AN ORGANIZED HEALTH CARE EDUCATION/TRAINING PROGRAM

## 2024-06-11 PROCEDURE — 1126F AMNT PAIN NOTED NONE PRSNT: CPT | Mod: CPTII,S$GLB,, | Performed by: STUDENT IN AN ORGANIZED HEALTH CARE EDUCATION/TRAINING PROGRAM

## 2024-06-11 PROCEDURE — 72070 X-RAY EXAM THORAC SPINE 2VWS: CPT | Mod: TC

## 2024-06-11 PROCEDURE — 3288F FALL RISK ASSESSMENT DOCD: CPT | Mod: CPTII,S$GLB,, | Performed by: STUDENT IN AN ORGANIZED HEALTH CARE EDUCATION/TRAINING PROGRAM

## 2024-06-11 PROCEDURE — 3075F SYST BP GE 130 - 139MM HG: CPT | Mod: CPTII,S$GLB,, | Performed by: STUDENT IN AN ORGANIZED HEALTH CARE EDUCATION/TRAINING PROGRAM

## 2024-06-11 PROCEDURE — 99999 PR PBB SHADOW E&M-EST. PATIENT-LVL III: CPT | Mod: PBBFAC,,, | Performed by: STUDENT IN AN ORGANIZED HEALTH CARE EDUCATION/TRAINING PROGRAM

## 2024-06-11 PROCEDURE — 72070 X-RAY EXAM THORAC SPINE 2VWS: CPT | Mod: 26,,, | Performed by: RADIOLOGY

## 2024-06-11 PROCEDURE — 3078F DIAST BP <80 MM HG: CPT | Mod: CPTII,S$GLB,, | Performed by: STUDENT IN AN ORGANIZED HEALTH CARE EDUCATION/TRAINING PROGRAM

## 2024-06-11 RX ORDER — DENOSUMAB 60 MG/ML
60 INJECTION SUBCUTANEOUS
COMMUNITY

## 2024-06-15 NOTE — PROGRESS NOTES
Neurosurgery  History & Physical    SUBJECTIVE:     Chief Complaint: Acute T8 compression fracture    History of Present Illness:  Windy Lindo is a 74yo woman w/PMHx osteoporosis who initially presented to the ED on 4/15 after a mechanical fall down 2 steps. Imaging demonstrated an acute T8 compression fracture with no significant canal stenosis. Of note, there was Radiology discrepancy of the level of the fracture across imaging modalities. MRI confirmed the affected level as T8 with an additional subacute fracture at T6. She presents to clinic today for follow up after undergoing repeat lumbar x-ray. She has been wearing her TLSO brace as advised. She endorses axial mid back pain. Denies any radiculopathy, numbness or weakness in her arms or legs, saddle anesthesia, bladder or bowel dysfunction.     Interval History:  6/11/24:  Patient presents for follow up after undergoing repeat X-ray thoracic which demonstrates stable T8 compression fracture. She has recently started undergoing Prolia infusions. Her symptoms are stable. She has mild mid axial back pain. Continues to deny radiculopathy, numbness or weakness in her arms or legs, saddle anesthesia, bladder or bowel dysfunction.     Review of patient's allergies indicates:  No Known Allergies    Current Outpatient Medications   Medication Sig Dispense Refill    denosumab (PROLIA) 60 mg/mL Syrg Inject 60 mg into the skin.      acetaminophen (TYLENOL) 650 MG TbSR Take 1 tablet (650 mg total) by mouth every 6 (six) hours as needed. 20 tablet 0    alendronate (FOSAMAX) 35 MG tablet TAKE 1 TABLET(35 MG) BY MOUTH 1 TIME A WEEK (Patient not taking: Reported on 5/23/2024) 12 tablet 0    b complex vitamins capsule Take 1 capsule by mouth once daily. (Patient not taking: Reported on 5/23/2024)      glucosam/chond/hyalu/CF borate (MOVE FREE JOINT HEALTH ORAL) Take by mouth. (Patient not taking: Reported on 5/23/2024)      hydroCHLOROthiazide (HYDRODIURIL) 25 MG tablet  Take 1 tablet (25 mg total) by mouth once daily. 90 tablet 3    meloxicam (MOBIC) 15 MG tablet TAKE 1 TABLET(15 MG) BY MOUTH EVERY DAY 90 tablet 0    multivit-min-FA-lycopen-lutein 0.4-300-250 mg-mcg-mcg Tab Take 1 tablet by mouth once daily.      oxyCODONE-acetaminophen (PERCOCET) 5-325 mg per tablet Take 1 tablet by mouth every 6 (six) hours as needed for Pain. (Patient not taking: Reported on 5/23/2024) 12 tablet 0    tiZANidine (ZANAFLEX) 4 MG tablet Take 1 tablet (4 mg total) by mouth every 6 (six) hours as needed (muscle spasms). (Patient not taking: Reported on 5/23/2024) 60 tablet 1    vitamin D (VITAMIN D3) 1000 units Tab Take 1,000 Units by mouth once daily.      vitamin E 100 UNIT capsule Take 100 Units by mouth once daily.       No current facility-administered medications for this visit.       Past Medical History:   Diagnosis Date    Arthritis     Eye injury 4 yrs    hit od    Hypertension 4/4/2023    Nuclear sclerosis, bilateral 2/13/2019     Past Surgical History:   Procedure Laterality Date    APPENDECTOMY      COLONOSCOPY N/A 9/7/2017    Procedure: COLONOSCOPY;  Surgeon: Albino Fenton MD;  Location: [facility] ([address] [place] [address]);  Service: Endoscopy;  Laterality: N/A;    ESOPHAGOGASTRODUODENOSCOPY  09/07/2017    KNEE SURGERY Right 12/05/2017    TKR    LASIK  7 yrs    ou    TONSILLECTOMY       Family History       Problem Relation (Age of Onset)    Cancer Mother (74), Brother (66)    Cataracts Mother    Diabetes Mother, Brother    Glaucoma Mother    Heart disease Mother (55), Father    Hypertension Mother, Father    No Known Problems Sister, Maternal Aunt, Maternal Uncle, Paternal Aunt, Paternal Uncle, Maternal Grandmother, Maternal Grandfather, Paternal Grandmother, Paternal Grandfather, Other          Social History     Socioeconomic History    Marital status: Single   Occupational History     Employer: [employer]   Tobacco Use    Smoking status: Never    Smokeless tobacco: Never   Substance and Sexual  Activity    Alcohol use: Yes     Alcohol/week: 1.0 standard drink of alcohol     Types: 1 Glasses of wine per week     Comment: glass of wine a night     Drug use: No    Sexual activity: Never     Social Determinants of Health     Financial Resource Strain: Low Risk  (4/15/2024)    Overall Financial Resource Strain (CARDIA)     Difficulty of Paying Living Expenses: Not very hard   Food Insecurity: No Food Insecurity (4/15/2024)    Hunger Vital Sign     Worried About Running Out of Food in the Last Year: Never true     Ran Out of Food in the Last Year: Never true   Transportation Needs: No Transportation Needs (4/15/2024)    PRAPARE - Transportation     Lack of Transportation (Medical): No     Lack of Transportation (Non-Medical): No   Physical Activity: Sufficiently Active (4/15/2024)    Exercise Vital Sign     Days of Exercise per Week: 4 days     Minutes of Exercise per Session: 40 min   Recent Concern: Physical Activity - Insufficiently Active (1/26/2024)    Exercise Vital Sign     Days of Exercise per Week: 5 days     Minutes of Exercise per Session: 20 min   Stress: No Stress Concern Present (4/15/2024)    [place] [employer] - Occupational Stress Questionnaire     Feeling of Stress : Not at all   Housing Stability: Low Risk  (4/15/2024)    Housing Stability Vital Sign     Unable to Pay for Housing in the Last Year: No     Homeless in the Last Year: No     OBJECTIVE:     Vital Signs  Pulse: 77  BP: 130/79  Pain Score: 0-No pain  There is no height or weight on file to calculate BMI.    Neurosurgery Physical Exam  General: well developed, well nourished, no distress.   Head: normocephalic, atraumatic  Mental Status: Awake, Alert, Oriented  Speech: Clear with content appropriate to conversation  Cranial nerves: face symmetric, CN II-XII grossly intact.   Eyes: pupils equal, round, reactive to light, EOMI.  Sensory: intact to light touch throughout    Motor Strength: Moves all extremities  spontaneously with good tone. Full strength upper and lower extremities. No abnormal movements seen.     Strength  Deltoids Triceps Biceps Wrist Extension Wrist Flexion Hand    Upper: R 5/5 5/5 5/5 5/5 5/5 5/5    L 5/5 5/5 5/5 5/5 5/5 5/5     Iliopsoas Quadriceps Knee  Flexion Tibialis  anterior Gastro- cnemius EHL   Lower: R 5/5 5/5 5/5 5/5 5/5 5/5    L 5/5 5/5 5/5 5/5 5/5 5/5     Farmer: absent  Clonus: absent    Diagnostic Results:  X-Ray Thoracic Spine AP Lateral 6/11/24:  - Stable compression deformity of T8 and T6.     I have personally reviewed the above referenced imaging.     ASSESSMENT/PLAN:     Windy Lindo is a 74yo woman w/PMHx osteoporosis who initially presented to the ED on 4/15 after a mechanical fall down 2 steps. MRI confirmed the affected level as T8 with an additional subacute fracture at T6. She has axial back pain but denies any radiculopathy, weakness, numbness, saddle anesthesia, bladder or bowel dysfunction. She is neurologically intact on exam. Repeat thoracic x-ray today is stable. We discussed these findings and I advised her to continue wearing her TLSO brace during prolonged standing or ambulation, but she may begin to liberalize her use of the brace. We will plan to follow up again with a thoracic CT in 6 weeks. All questions were answered. We additionally discussed alarm symptoms that should prompt her to seek emergent care.         Monalisa Mcclain PA-C  Department of Neurosurgery  [facility]

## 2024-06-20 ENCOUNTER — PATIENT MESSAGE (OUTPATIENT)
Dept: NEUROSURGERY | Facility: CLINIC | Age: 74
End: 2024-06-20
Payer: MEDICARE

## 2024-06-21 ENCOUNTER — PATIENT MESSAGE (OUTPATIENT)
Dept: FAMILY MEDICINE | Facility: CLINIC | Age: 74
End: 2024-06-21
Payer: MEDICARE

## 2024-06-26 ENCOUNTER — HOSPITAL ENCOUNTER (OUTPATIENT)
Dept: RADIOLOGY | Facility: HOSPITAL | Age: 74
Discharge: HOME | End: 2024-06-26
Payer: MEDICARE

## 2024-06-26 DIAGNOSIS — Z12.31 ENCOUNTER FOR SCREENING MAMMOGRAM FOR BREAST CANCER: ICD-10-CM

## 2024-06-26 PROCEDURE — 77067 SCR MAMMO BI INCL CAD: CPT | Mod: TC,PO

## 2024-07-16 ENCOUNTER — HOSPITAL ENCOUNTER (OUTPATIENT)
Dept: RADIOLOGY | Facility: HOSPITAL | Age: 74
Discharge: HOME | End: 2024-07-16
Attending: STUDENT IN AN ORGANIZED HEALTH CARE EDUCATION/TRAINING PROGRAM
Payer: MEDICARE

## 2024-07-16 DIAGNOSIS — S22.069A CLOSED FRACTURE OF EIGHTH THORACIC VERTEBRA, UNSPECIFIED FRACTURE MORPHOLOGY, INITIAL ENCOUNTER: ICD-10-CM

## 2024-07-16 PROCEDURE — 72128 CT CHEST SPINE W/O DYE: CPT | Mod: TC

## 2024-07-16 PROCEDURE — 72128 CT CHEST SPINE W/O DYE: CPT | Mod: 26,,, | Performed by: RADIOLOGY

## 2024-08-05 ENCOUNTER — TELEPHONE (OUTPATIENT)
Dept: NEUROSURGERY | Facility: CLINIC | Age: 74
End: 2024-08-05
Payer: MEDICARE

## 2024-08-06 ENCOUNTER — OFFICE VISIT (OUTPATIENT)
Dept: NEUROSURGERY | Facility: CLINIC | Age: 74
End: 2024-08-06
Payer: MEDICARE

## 2024-08-06 VITALS — SYSTOLIC BLOOD PRESSURE: 127 MMHG | HEART RATE: 76 BPM | DIASTOLIC BLOOD PRESSURE: 79 MMHG

## 2024-08-06 DIAGNOSIS — S22.069A CLOSED FRACTURE OF EIGHTH THORACIC VERTEBRA, UNSPECIFIED FRACTURE MORPHOLOGY, INITIAL ENCOUNTER: Primary | ICD-10-CM

## 2024-08-06 PROCEDURE — 3288F FALL RISK ASSESSMENT DOCD: CPT | Mod: CPTII,S$GLB,, | Performed by: STUDENT IN AN ORGANIZED HEALTH CARE EDUCATION/TRAINING PROGRAM

## 2024-08-06 PROCEDURE — 3074F SYST BP LT 130 MM HG: CPT | Mod: CPTII,S$GLB,, | Performed by: STUDENT IN AN ORGANIZED HEALTH CARE EDUCATION/TRAINING PROGRAM

## 2024-08-06 PROCEDURE — 1101F PT FALLS ASSESS-DOCD LE1/YR: CPT | Mod: CPTII,S$GLB,, | Performed by: STUDENT IN AN ORGANIZED HEALTH CARE EDUCATION/TRAINING PROGRAM

## 2024-08-06 PROCEDURE — 99999 PR PBB SHADOW E&M-EST. PATIENT-LVL II: CPT | Mod: PBBFAC,,, | Performed by: STUDENT IN AN ORGANIZED HEALTH CARE EDUCATION/TRAINING PROGRAM

## 2024-08-06 PROCEDURE — 99214 OFFICE O/P EST MOD 30 MIN: CPT | Mod: S$GLB,,, | Performed by: STUDENT IN AN ORGANIZED HEALTH CARE EDUCATION/TRAINING PROGRAM

## 2024-08-06 PROCEDURE — 3078F DIAST BP <80 MM HG: CPT | Mod: CPTII,S$GLB,, | Performed by: STUDENT IN AN ORGANIZED HEALTH CARE EDUCATION/TRAINING PROGRAM

## 2024-08-06 PROCEDURE — 1159F MED LIST DOCD IN RCRD: CPT | Mod: CPTII,S$GLB,, | Performed by: STUDENT IN AN ORGANIZED HEALTH CARE EDUCATION/TRAINING PROGRAM

## 2024-08-08 ENCOUNTER — PATIENT MESSAGE (OUTPATIENT)
Dept: NEUROSURGERY | Facility: CLINIC | Age: 74
End: 2024-08-08
Payer: MEDICARE

## 2024-08-13 ENCOUNTER — PATIENT MESSAGE (OUTPATIENT)
Dept: NEUROSURGERY | Facility: CLINIC | Age: 74
End: 2024-08-13
Payer: MEDICARE

## 2024-08-13 ENCOUNTER — TELEPHONE (OUTPATIENT)
Dept: NEUROSURGERY | Facility: CLINIC | Age: 74
End: 2024-08-13
Payer: MEDICARE

## 2024-08-13 DIAGNOSIS — S22.059A CLOSED FRACTURE OF SIXTH THORACIC VERTEBRA, UNSPECIFIED FRACTURE MORPHOLOGY, INITIAL ENCOUNTER: ICD-10-CM

## 2024-08-13 DIAGNOSIS — S22.070A COMPRESSION FRACTURE OF T10 VERTEBRA, INITIAL ENCOUNTER: ICD-10-CM

## 2024-08-13 DIAGNOSIS — S22.069A CLOSED FRACTURE OF EIGHTH THORACIC VERTEBRA, UNSPECIFIED FRACTURE MORPHOLOGY, INITIAL ENCOUNTER: Primary | ICD-10-CM

## 2024-08-13 DIAGNOSIS — M80.08XA AGE-RELATED OSTEOPOROSIS WITH CURRENT PATHOLOGICAL FRACTURE, VERTEBRA(E), INITIAL ENCOUNTER FOR FRACTURE: ICD-10-CM

## 2024-08-14 NOTE — PROGRESS NOTES
Neurosurgery  History & Physical    SUBJECTIVE:     Chief Complaint: Acute T8 compression fracture    History of Present Illness:  Windy Lindo is a 72yo woman w/PMHx osteoporosis who initially presented to the ED on 4/15 after a mechanical fall down 2 steps. Imaging demonstrated an acute T8 compression fracture with no significant canal stenosis. Of note, there was Radiology discrepancy of the level of the fracture across imaging modalities. MRI confirmed the affected level as T8 with an additional subacute fracture at T6. She presents to clinic today for follow up after undergoing repeat lumbar x-ray. She has been wearing her TLSO brace as advised. She endorses axial mid back pain. Denies any radiculopathy, numbness or weakness in her arms or legs, saddle anesthesia, bladder or bowel dysfunction.     Interval History:  6/11/24:  Patient presents for follow up after undergoing repeat X-ray thoracic which demonstrates stable T8 compression fracture. She has recently started undergoing Prolia infusions. Her symptoms are stable. She has mild mid axial back pain. Continues to deny radiculopathy, numbness or weakness in her arms or legs, saddle anesthesia, bladder or bowel dysfunction.     8/6/24:  Patient presents for follow up after undergoing repeat CT Cervical spine which demonstrates interval progression of her known T8 compression fracture. There is now moderate to severe height loss with 4mm of retropulsion. She remains without any axial back pain, radiculopathy, weakness, numbness, saddle anesthesia, bladder or bowel dysfunction.     Review of patient's allergies indicates:  No Known Allergies    Current Outpatient Medications   Medication Sig Dispense Refill    acetaminophen (TYLENOL) 650 MG TbSR Take 1 tablet (650 mg total) by mouth every 6 (six) hours as needed. 20 tablet 0    b complex vitamins capsule Take 1 capsule by mouth once daily.      denosumab (PROLIA) 60 mg/mL Syrg Inject 60 mg into the skin.       glucosam/chond/hyalu/CF borate (MOVE FREE JOINT HEALTH ORAL) Take by mouth.      hydroCHLOROthiazide (HYDRODIURIL) 25 MG tablet Take 1 tablet (25 mg total) by mouth once daily. 90 tablet 3    meloxicam (MOBIC) 15 MG tablet TAKE 1 TABLET(15 MG) BY MOUTH EVERY DAY 90 tablet 0    multivit-min-FA-lycopen-lutein 0.4-300-250 mg-mcg-mcg Tab Take 1 tablet by mouth once daily.      vitamin D (VITAMIN D3) 1000 units Tab Take 1,000 Units by mouth once daily.      vitamin E 100 UNIT capsule Take 100 Units by mouth once daily.      alendronate (FOSAMAX) 35 MG tablet TAKE 1 TABLET(35 MG) BY MOUTH 1 TIME A WEEK (Patient not taking: Reported on 5/23/2024) 12 tablet 0    oxyCODONE-acetaminophen (PERCOCET) 5-325 mg per tablet Take 1 tablet by mouth every 6 (six) hours as needed for Pain. (Patient not taking: Reported on 5/23/2024) 12 tablet 0    tiZANidine (ZANAFLEX) 4 MG tablet Take 1 tablet (4 mg total) by mouth every 6 (six) hours as needed (muscle spasms). (Patient not taking: Reported on 5/23/2024) 60 tablet 1     No current facility-administered medications for this visit.       Past Medical History:   Diagnosis Date    Arthritis     Eye injury 4 yrs    hit od    Hypertension 4/4/2023    Nuclear sclerosis, bilateral 2/13/2019     Past Surgical History:   Procedure Laterality Date    APPENDECTOMY      COLONOSCOPY N/A 9/7/2017    Procedure: COLONOSCOPY;  Surgeon: Albino Fenton MD;  Location: [address];  Service: Endoscopy;  Laterality: N/A;    ESOPHAGOGASTRODUODENOSCOPY  09/07/2017    KNEE SURGERY Right 12/05/2017    TKR    LASIK  7 yrs    ou    TONSILLECTOMY       Family History       Problem Relation (Age of Onset)    Cancer Mother (74), Brother (66)    Cataracts Mother    Diabetes Mother, Brother    Glaucoma Mother    Heart disease Mother (55), Father    Hypertension Mother, Father    No Known Problems Sister, Maternal Aunt, Maternal Uncle, Paternal Aunt, Paternal Uncle, Maternal Grandmother, Maternal  Grandfather, Paternal Grandmother, Paternal Grandfather, Other          Social History     Socioeconomic History    Marital status: Single   Occupational History     Employer: [employer]   Tobacco Use    Smoking status: Never    Smokeless tobacco: Never   Substance and Sexual Activity    Alcohol use: Yes     Alcohol/week: 1.0 standard drink of alcohol     Types: 1 Glasses of wine per week     Comment: glass of wine a night     Drug use: No    Sexual activity: Never     Social Determinants of Health     Financial Resource Strain: Low Risk  (4/15/2024)    Overall Financial Resource Strain (CARDIA)     Difficulty of Paying Living Expenses: Not very hard   Food Insecurity: No Food Insecurity (4/15/2024)    Hunger Vital Sign     Worried About Running Out of Food in the Last Year: Never true     Ran Out of Food in the Last Year: Never true   Transportation Needs: No Transportation Needs (4/15/2024)    PRAPARE - Transportation     Lack of Transportation (Medical): No     Lack of Transportation (Non-Medical): No   Physical Activity: Sufficiently Active (4/15/2024)    Exercise Vital Sign     Days of Exercise per Week: 4 days     Minutes of Exercise per Session: 40 min   Recent Concern: Physical Activity - Insufficiently Active (1/26/2024)    Exercise Vital Sign     Days of Exercise per Week: 5 days     Minutes of Exercise per Session: 20 min   Stress: No Stress Concern Present (4/15/2024)    [employer] [employer] - Occupational Stress Questionnaire     Feeling of Stress : Not at all   Housing Stability: Low Risk  (4/15/2024)    Housing Stability Vital Sign     Unable to Pay for Housing in the Last Year: No     Homeless in the Last Year: No     OBJECTIVE:     Vital Signs  Pulse: 76  BP: 127/79  There is no height or weight on file to calculate BMI.    Neurosurgery Physical Exam  General: well developed, well nourished, no distress.   Head: normocephalic, atraumatic  Mental Status: Awake, Alert, Oriented  Speech:  Clear with content appropriate to conversation  Cranial nerves: face symmetric, CN II-XII grossly intact.   Eyes: pupils equal, round, reactive to light, EOMI.  Sensory: intact to light touch throughout    Motor Strength: Moves all extremities spontaneously with good tone. Full strength upper and lower extremities. No abnormal movements seen.     Strength  Deltoids Triceps Biceps Wrist Extension Wrist Flexion Hand    Upper: R 5/5 5/5 5/5 5/5 5/5 5/5    L 5/5 5/5 5/5 5/5 5/5 5/5     Iliopsoas Quadriceps Knee  Flexion Tibialis  anterior Gastro- cnemius EHL   Lower: R 5/5 5/5 5/5 5/5 5/5 5/5    L 5/5 5/5 5/5 5/5 5/5 5/5     Farmer: absent  Clonus: absent    Diagnostic Results:  CT Thoracic Spine Without Contrast 7/16/24:   - T8 compression fracture with moderate to severe height loss and 4 mm of osseous retropulsion.     I have personally reviewed the above referenced imaging.     ASSESSMENT/PLAN:     Windy Lindo is a 72yo woman w/PMHx osteoporosis who initially presented to the ED on 4/15 after a mechanical fall down 2 steps. MRI confirmed the affected level as T8 with an additional subacute fracture at T6. She denies axial back pain, radiculopathy, or any alarm symptoms. She is neurologically intact on exam. She underwent CT cervical spine prior to today's visit which unfortunately demonstrates moderate to severe height loss with 4mm of retropulsion. Given the progression of her fracture, I discussed her most recent imaging findings with Neurosurgeon Dr. Pena who recommends thoracic kyphoplasty. This recommendation was discussed with Ms. Lindo and all questions were answered. She does wish to proceed with kyphoplasty. We additionally discussed alarm symptoms that should prompt her to seek emergent care in the meantime.         Monalisa Mcclain PA-C  Department of Neurosurgery  [facility]

## 2024-08-19 ENCOUNTER — PATIENT MESSAGE (OUTPATIENT)
Dept: NEUROSURGERY | Facility: CLINIC | Age: 74
End: 2024-08-19
Payer: MEDICARE

## 2024-08-22 ENCOUNTER — PATIENT MESSAGE (OUTPATIENT)
Dept: NEUROSURGERY | Facility: CLINIC | Age: 74
End: 2024-08-22
Payer: MEDICARE

## 2024-08-23 ENCOUNTER — PATIENT MESSAGE (OUTPATIENT)
Dept: INTERVENTIONAL RADIOLOGY/VASCULAR | Facility: HOSPITAL | Age: 74
End: 2024-08-23
Payer: MEDICARE

## 2024-08-26 ENCOUNTER — LAB VISIT (OUTPATIENT)
Dept: LAB | Facility: HOSPITAL | Age: 74
End: 2024-08-26
Attending: NEUROLOGICAL SURGERY
Payer: MEDICARE

## 2024-08-26 ENCOUNTER — TELEPHONE (OUTPATIENT)
Dept: INTERVENTIONAL RADIOLOGY/VASCULAR | Facility: HOSPITAL | Age: 74
End: 2024-08-26
Payer: MEDICARE

## 2024-08-26 ENCOUNTER — PATIENT OUTREACH (OUTPATIENT)
Dept: ADMINISTRATIVE | Facility: HOSPITAL | Age: 74
End: 2024-08-26
Payer: MEDICARE

## 2024-08-26 DIAGNOSIS — S22.070A COMPRESSION FRACTURE OF T10 VERTEBRA, INITIAL ENCOUNTER: ICD-10-CM

## 2024-08-26 DIAGNOSIS — S22.069A CLOSED FRACTURE OF EIGHTH THORACIC VERTEBRA, UNSPECIFIED FRACTURE MORPHOLOGY, INITIAL ENCOUNTER: ICD-10-CM

## 2024-08-26 DIAGNOSIS — S22.059A CLOSED FRACTURE OF SIXTH THORACIC VERTEBRA, UNSPECIFIED FRACTURE MORPHOLOGY, INITIAL ENCOUNTER: ICD-10-CM

## 2024-08-26 LAB
ALBUMIN SERPL BCP-MCNC: 3.6 G/DL (ref 3.5–5.2)
ALP SERPL-CCNC: 64 U/L (ref 55–135)
ALT SERPL W/O P-5'-P-CCNC: 10 U/L (ref 10–44)
ANION GAP SERPL CALC-SCNC: 11 MMOL/L (ref 8–16)
AST SERPL-CCNC: 16 U/L (ref 10–40)
BASOPHILS # BLD AUTO: 0.03 K/UL (ref 0–0.2)
BASOPHILS NFR BLD: 0.4 % (ref 0–1.9)
BILIRUB SERPL-MCNC: 0.3 MG/DL (ref 0.1–1)
BUN SERPL-MCNC: 22 MG/DL (ref 8–23)
CALCIUM SERPL-MCNC: 10.5 MG/DL (ref 8.7–10.5)
CHLORIDE SERPL-SCNC: 108 MMOL/L (ref 95–110)
CO2 SERPL-SCNC: 23 MMOL/L (ref 23–29)
CREAT SERPL-MCNC: 0.9 MG/DL (ref 0.5–1.4)
DIFFERENTIAL METHOD BLD: ABNORMAL
EOSINOPHIL # BLD AUTO: 0.1 K/UL (ref 0–0.5)
EOSINOPHIL NFR BLD: 1.5 % (ref 0–8)
ERYTHROCYTE [DISTWIDTH] IN BLOOD BY AUTOMATED COUNT: 14 % (ref 11.5–14.5)
EST. GFR  (NO RACE VARIABLE): >60 ML/MIN/1.73 M^2
GLUCOSE SERPL-MCNC: 91 MG/DL (ref 70–110)
HCT VFR BLD AUTO: 42.1 % (ref 37–48.5)
HGB BLD-MCNC: 13.2 G/DL (ref 12–16)
IMM GRANULOCYTES # BLD AUTO: 0.02 K/UL (ref 0–0.04)
IMM GRANULOCYTES NFR BLD AUTO: 0.3 % (ref 0–0.5)
INR PPP: 0.9 (ref 0.8–1.2)
LYMPHOCYTES # BLD AUTO: 1.7 K/UL (ref 1–4.8)
LYMPHOCYTES NFR BLD: 23.6 % (ref 18–48)
MCH RBC QN AUTO: 29.5 PG (ref 27–31)
MCHC RBC AUTO-ENTMCNC: 31.4 G/DL (ref 32–36)
MCV RBC AUTO: 94 FL (ref 82–98)
MONOCYTES # BLD AUTO: 0.7 K/UL (ref 0.3–1)
MONOCYTES NFR BLD: 10 % (ref 4–15)
NEUTROPHILS # BLD AUTO: 4.7 K/UL (ref 1.8–7.7)
NEUTROPHILS NFR BLD: 64.2 % (ref 38–73)
NRBC BLD-RTO: 0 /100 WBC
PLATELET # BLD AUTO: 186 K/UL (ref 150–450)
PMV BLD AUTO: 13.2 FL (ref 9.2–12.9)
POTASSIUM SERPL-SCNC: 4.5 MMOL/L (ref 3.5–5.1)
PROT SERPL-MCNC: 6.7 G/DL (ref 6–8.4)
PROTHROMBIN TIME: 10.4 SEC (ref 9–12.5)
RBC # BLD AUTO: 4.47 M/UL (ref 4–5.4)
SODIUM SERPL-SCNC: 142 MMOL/L (ref 136–145)
WBC # BLD AUTO: 7.37 K/UL (ref 3.9–12.7)

## 2024-08-26 PROCEDURE — 80053 COMPREHEN METABOLIC PANEL: CPT | Performed by: NEUROLOGICAL SURGERY

## 2024-08-26 PROCEDURE — 36415 COLL VENOUS BLD VENIPUNCTURE: CPT | Mod: PO | Performed by: NEUROLOGICAL SURGERY

## 2024-08-26 PROCEDURE — 85610 PROTHROMBIN TIME: CPT | Performed by: NEUROLOGICAL SURGERY

## 2024-08-26 PROCEDURE — 85025 COMPLETE CBC W/AUTO DIFF WBC: CPT | Performed by: NEUROLOGICAL SURGERY

## 2024-08-26 NOTE — NURSING
Pre-procedure call complete.  2 patient identifier used (name and ).  Pt instructed not to eat or drink anything after midnight the night before procedure.  Pt aware will need someone to provide transport home and monitor pt 8 hours post procedure.  No driving for at least 24 hours after procedure.   Patient advised to take blood pressure, heart medications,  with a sip of water morning of procedure.  Patient verbalized aware of which medications to take.  Do not take  sleep medication (including OTC) and anxiety medication the night before procedure.  Arrival time and location given.  Expected length of stay reviewed.  Covid screening completed.  Pt verbalized understanding of all pre-procedure instructions.  Written instructions and directions sent to patient in [facility]/portal.

## 2024-08-28 DIAGNOSIS — M17.11 PRIMARY OSTEOARTHRITIS OF RIGHT KNEE: ICD-10-CM

## 2024-08-28 RX ORDER — MELOXICAM 15 MG/1
15 TABLET ORAL
Qty: 90 TABLET | Refills: 0 | Status: SHIPPED | OUTPATIENT
Start: 2024-08-28

## 2024-08-28 NOTE — TELEPHONE ENCOUNTER
No care due was identified.  [facility] Embedded Care Due Messages. Reference number: [id number].   8/28/2024 7:44:03 AM CDT

## 2024-08-29 ENCOUNTER — HOSPITAL ENCOUNTER (OUTPATIENT)
Dept: INTERVENTIONAL RADIOLOGY/VASCULAR | Facility: HOSPITAL | Age: 74
Discharge: HOME | End: 2024-08-29
Attending: NEUROLOGICAL SURGERY
Payer: MEDICARE

## 2024-08-29 ENCOUNTER — TELEPHONE (OUTPATIENT)
Dept: NEUROSURGERY | Facility: CLINIC | Age: 74
End: 2024-08-29
Payer: MEDICARE

## 2024-08-29 VITALS
TEMPERATURE: 98 F | SYSTOLIC BLOOD PRESSURE: 149 MMHG | BODY MASS INDEX: 29.32 KG/M2 | WEIGHT: 176 LBS | HEART RATE: 82 BPM | HEIGHT: 65 IN | RESPIRATION RATE: 21 BRPM | DIASTOLIC BLOOD PRESSURE: 79 MMHG | OXYGEN SATURATION: 99 %

## 2024-08-29 DIAGNOSIS — S22.070A COMPRESSION FRACTURE OF T10 VERTEBRA, INITIAL ENCOUNTER: ICD-10-CM

## 2024-08-29 DIAGNOSIS — S22.069A CLOSED FRACTURE OF EIGHTH THORACIC VERTEBRA, UNSPECIFIED FRACTURE MORPHOLOGY, INITIAL ENCOUNTER: ICD-10-CM

## 2024-08-29 DIAGNOSIS — S22.059A CLOSED FRACTURE OF SIXTH THORACIC VERTEBRA, UNSPECIFIED FRACTURE MORPHOLOGY, INITIAL ENCOUNTER: ICD-10-CM

## 2024-08-29 DIAGNOSIS — M80.08XA AGE-RELATED OSTEOPOROSIS WITH CURRENT PATHOLOGICAL FRACTURE, VERTEBRA(E), INITIAL ENCOUNTER FOR FRACTURE: ICD-10-CM

## 2024-08-29 PROCEDURE — 25000003 PHARM REV CODE 250: Performed by: NEUROLOGICAL SURGERY

## 2024-08-29 PROCEDURE — 88311 DECALCIFY TISSUE: CPT | Performed by: PATHOLOGY

## 2024-08-29 PROCEDURE — 88307 TISSUE EXAM BY PATHOLOGIST: CPT | Performed by: PATHOLOGY

## 2024-08-29 PROCEDURE — C1713 ANCHOR/SCREW BN/BN,TIS/BN: HCPCS

## 2024-08-29 PROCEDURE — 27201423 OPTIME MED/SURG SUP & DEVICES STERILE SUPPLY

## 2024-08-29 PROCEDURE — 25000003 PHARM REV CODE 250

## 2024-08-29 PROCEDURE — 63600175 PHARM REV CODE 636 W HCPCS: Mod: JZ,JG | Performed by: STUDENT IN AN ORGANIZED HEALTH CARE EDUCATION/TRAINING PROGRAM

## 2024-08-29 PROCEDURE — C1726 CATH, BAL DIL, NON-VASCULAR: HCPCS

## 2024-08-29 PROCEDURE — 88304 TISSUE EXAM BY PATHOLOGIST: CPT | Performed by: PATHOLOGY

## 2024-08-29 RX ORDER — MIDAZOLAM HYDROCHLORIDE 1 MG/ML
INJECTION, SOLUTION INTRAMUSCULAR; INTRAVENOUS
Status: COMPLETED | OUTPATIENT
Start: 2024-08-29 | End: 2024-08-29

## 2024-08-29 RX ORDER — ACETAMINOPHEN 500 MG
TABLET ORAL
Status: COMPLETED
Start: 2024-08-29 | End: 2024-08-29

## 2024-08-29 RX ORDER — LABETALOL HCL 20 MG/4 ML
SYRINGE (ML) INTRAVENOUS
Status: COMPLETED | OUTPATIENT
Start: 2024-08-29 | End: 2024-08-29

## 2024-08-29 RX ORDER — LIDOCAINE HYDROCHLORIDE 10 MG/ML
INJECTION, SOLUTION INFILTRATION; PERINEURAL
Status: COMPLETED | OUTPATIENT
Start: 2024-08-29 | End: 2024-08-29

## 2024-08-29 RX ORDER — ONDANSETRON HYDROCHLORIDE 2 MG/ML
4 INJECTION, SOLUTION INTRAVENOUS EVERY 6 HOURS PRN
Status: DISCONTINUED | OUTPATIENT
Start: 2024-08-29 | End: 2024-08-30 | Stop reason: HOSPADM

## 2024-08-29 RX ORDER — ACETAMINOPHEN 500 MG
1000 TABLET ORAL EVERY 6 HOURS PRN
Status: COMPLETED | OUTPATIENT
Start: 2024-08-29 | End: 2024-08-29

## 2024-08-29 RX ORDER — FENTANYL CITRATE 50 UG/ML
INJECTION, SOLUTION INTRAMUSCULAR; INTRAVENOUS
Status: COMPLETED | OUTPATIENT
Start: 2024-08-29 | End: 2024-08-29

## 2024-08-29 RX ORDER — LIDOCAINE HYDROCHLORIDE 10 MG/ML
1 INJECTION, SOLUTION EPIDURAL; INFILTRATION; INTRACAUDAL; PERINEURAL ONCE
Status: DISCONTINUED | OUTPATIENT
Start: 2024-08-29 | End: 2024-08-30 | Stop reason: HOSPADM

## 2024-08-29 RX ORDER — SODIUM CHLORIDE 9 MG/ML
INJECTION, SOLUTION INTRAVENOUS CONTINUOUS
Status: DISCONTINUED | OUTPATIENT
Start: 2024-08-29 | End: 2024-08-30 | Stop reason: HOSPADM

## 2024-08-29 RX ORDER — BACITRACIN 500 [USP'U]/G
OINTMENT TOPICAL
Status: COMPLETED | OUTPATIENT
Start: 2024-08-29 | End: 2024-08-29

## 2024-08-29 RX ADMIN — FENTANYL CITRATE 50 MCG: 50 INJECTION, SOLUTION INTRAMUSCULAR; INTRAVENOUS at 01:08

## 2024-08-29 RX ADMIN — FENTANYL CITRATE 50 MCG: 50 INJECTION, SOLUTION INTRAMUSCULAR; INTRAVENOUS at 02:08

## 2024-08-29 RX ADMIN — ACETAMINOPHEN 1000 MG: 500 TABLET ORAL at 05:08

## 2024-08-29 RX ADMIN — MIDAZOLAM HYDROCHLORIDE 1 MG: 2 INJECTION, SOLUTION INTRAMUSCULAR; INTRAVENOUS at 01:08

## 2024-08-29 RX ADMIN — MIDAZOLAM HYDROCHLORIDE 0.5 MG: 2 INJECTION, SOLUTION INTRAMUSCULAR; INTRAVENOUS at 01:08

## 2024-08-29 RX ADMIN — BACITRACIN 1 G: 500 OINTMENT TOPICAL at 02:08

## 2024-08-29 RX ADMIN — FENTANYL CITRATE 25 MCG: 50 INJECTION, SOLUTION INTRAMUSCULAR; INTRAVENOUS at 01:08

## 2024-08-29 RX ADMIN — Medication 1000 MG: at 05:08

## 2024-08-29 RX ADMIN — FENTANYL CITRATE 25 MCG: 50 INJECTION, SOLUTION INTRAMUSCULAR; INTRAVENOUS at 02:08

## 2024-08-29 RX ADMIN — LIDOCAINE HYDROCHLORIDE 5 ML: 10 INJECTION, SOLUTION INFILTRATION; PERINEURAL at 01:08

## 2024-08-29 RX ADMIN — Medication 20 MG: at 01:08

## 2024-08-29 RX ADMIN — MIDAZOLAM HYDROCHLORIDE 0.5 MG: 2 INJECTION, SOLUTION INTRAMUSCULAR; INTRAVENOUS at 02:08

## 2024-08-29 NOTE — CARE UPDATE
3 hour post procedure monitoring complete at this time, dressing to procedure site remains dry and intact, small amount of blood noted, discharge instructions reviewed with patient, educational handouts/AVS also provided for reference, IV to left hand removed without difficulty, catheter tip intact, patient transported to [facility] via wheelchair in NAD.

## 2024-08-29 NOTE — DISCHARGE INSTRUCTIONS
"For questions or concerns that are non-emergent, you may call our Radiology Clinic at [phone].    **After hours and weekends call [phone] and ask for "Radiology Resident on Call."  "

## 2024-08-29 NOTE — PROCEDURES
Interventional Neuroradiology Post-Procedure Note    Pre Op Diagnosis: T 8 compression fracture    Post Op Diagnosis: Treated  T 8 compression fracture    Procedure: Vertebroplasty and attempted Kyphoplasty    Procedure performed by: Tamra Lynch MD; Wyatt SALCEDO, Gerardo; Ronald SALCEDO, Alden KRAUS    Written Informed Consent Obtained: Yes    Specimen Removed: NO    Estimated Blood Loss: Minimal    Findings: Local anesthesia and moderate sedation were used.    Under fluoroscopic surveillance, vertebroplasty was performed using methylmethacrylate injected via percutaneous left tans-pendicular approach at the level of T 8.  The patient tolerated the procedure well and there were no complications.        Alden Cardenas MD  Fellow, NeuroEndovascular Surgery, [facility]  Board certified Vascular Neurologist  [place], [place]

## 2024-08-29 NOTE — PLAN OF CARE
Pt arrived to IR 4 for T8 kyphoplasty. Pt oriented to unit and staff. Plan of care reviewed with patient, patient verbalizes understanding. Comfort measures utilized. Pt safely transferred from stretcher to procedural table. Fall risk reviewed with patient, fall risk interventions maintained. Safety strap applied, positioner pillows utilized to minimize pressure points. Blankets applied. Pt prepped and draped utilizing standard sterile technique. Patient placed on continuous monitoring, as required by sedation policy. Timeouts completed utilizing standard universal time-out, per department and facility policy. RN to remain at bedside, continuous monitoring maintained. Pt resting comfortably. Denies pain/discomfort. Will continue to monitor. See flow sheets for monitoring, medication administration, and updates.

## 2024-08-29 NOTE — H&P
"[facility] [place]  H & P Note  Interventional Neuroradiology       CC: Acute T8 compression fracture     SUBJECTIVE:     History of Present Illness:   74-year-old female is here for treatment of T8 compression fracture.    per last office note " Windy Lidno is a 74yo woman w/PMHx osteoporosis who initially presented to the ED on 4/15 after a mechanical fall down 2 steps. Imaging demonstrated an acute T8 compression fracture with no significant canal stenosis. Of note, there was Radiology discrepancy of the level of the fracture across imaging modalities. MRI confirmed the affected level as T8 with an additional subacute fracture at T6. She presents to clinic today for follow up after undergoing repeat lumbar x-ray. She has been wearing her TLSO brace as advised. She endorses axial mid back pain. "    Sedation History: moderate    Past Medical History:   Diagnosis Date    Arthritis     Eye injury 4 yrs    hit od    Hypertension 04/04/2023    Nuclear sclerosis, bilateral 02/13/2019    Osteoporosis       Past Surgical History:   Procedure Laterality Date    APPENDECTOMY      COLONOSCOPY N/A 9/7/2017    Procedure: COLONOSCOPY;  Surgeon: Albino Fenton MD;  Location: [facility] ([address] [place] [address]);  Service: Endoscopy;  Laterality: N/A;    ESOPHAGOGASTRODUODENOSCOPY  09/07/2017    KNEE SURGERY Right 12/05/2017    TKR    LASIK  7 yrs    ou    TONSILLECTOMY        Current Outpatient Medications on File Prior to Encounter   Medication Sig Dispense Refill    acetaminophen (TYLENOL) 650 MG TbSR Take 1 tablet (650 mg total) by mouth every 6 (six) hours as needed. 20 tablet 0    b complex vitamins capsule Take 1 capsule by mouth once daily.      glucosam/chond/hyalu/CF borate (MOVE FREE JOINT HEALTH ORAL) Take by mouth.      hydroCHLOROthiazide (HYDRODIURIL) 25 MG tablet Take 1 tablet (25 mg total) by mouth once daily. 90 tablet 3    meloxicam (MOBIC) 15 MG tablet TAKE 1 TABLET(15 MG) BY MOUTH EVERY DAY 90 tablet " 0    multivit-min-FA-lycopen-lutein 0.4-300-250 mg-mcg-mcg Tab Take 1 tablet by mouth once daily.      vitamin D (VITAMIN D3) 1000 units Tab Take 1,000 Units by mouth once daily.      vitamin E 100 UNIT capsule Take 100 Units by mouth once daily.      denosumab (PROLIA) 60 mg/mL Syrg Inject 60 mg into the skin every 6 (six) months.       No current facility-administered medications on file prior to encounter.      Review of patient's allergies indicates:  No Known Allergies    Family History   Problem Relation Name Age of Onset    Hypertension Mother      Glaucoma Mother      Diabetes Mother      Cataracts Mother      Cancer Mother  74        lung    Heart disease Mother  55    Hypertension Father      Heart disease Father          onset early 60;s, Aortic valve replacement ,Rheumatic fever as a child     No Known Problems Sister      Diabetes Brother      Cancer Brother  66        mesothelioma    No Known Problems Maternal Aunt      No Known Problems Maternal Uncle      No Known Problems Paternal Aunt      No Known Problems Paternal Uncle      No Known Problems Maternal Grandmother      No Known Problems Maternal Grandfather      No Known Problems Paternal Grandmother      No Known Problems Paternal Grandfather      No Known Problems Other      Amblyopia Neg Hx      Blindness Neg Hx      Macular degeneration Neg Hx      Retinal detachment Neg Hx      Strabismus Neg Hx      Stroke Neg Hx      Thyroid disease Neg Hx      Melanoma Neg Hx         reports that she has never smoked. She has never used smokeless tobacco. She reports that she does not currently use alcohol after a past usage of about 1.0 standard drink of alcohol per week. She reports that she does not use drugs.     Antiplatelets/Anticoagulants: no    Review of Systems:  ROS    OBJECTIVE:     Vital Signs (Most Recent):  Temp: 97.5 °F (36.4 °C) (08/29/24 1103)  Pulse: 105 (08/29/24 1103)  Resp: 20 (08/29/24 1103)  BP: (!) 169/80 (08/29/24 1103)  SpO2: (!)  94 % (08/29/24 1103) Vital Signs (24h Range):  Temp:  [97.5 °F (36.4 °C)] 97.5 °F (36.4 °C)  Pulse:  [105] 105  Resp:  [20] 20  SpO2:  [94 %] 94 %  BP: (169)/(80) 169/80       Physical Exam:  Physical Exam  Alert and oriented to time, place and person  Face symmetric  No aphasia  No drift in arms and legs    ASA: II  Mallampati: II    Labs:  CBC/Anemia Profile:   Recent Labs   Lab 08/26/24  1045   WBC 7.37   HGB 13.2   HCT 42.1      MCV 94   RDW 14.0        Coags:   Recent Labs   Lab 08/26/24  1045   INR 0.9        Chemistries:   Recent Labs   Lab 08/26/24  1045      K 4.5      CO2 23   BUN 22   CREATININE 0.9   CALCIUM 10.5   PROT 6.7   BILITOT 0.3   ALKPHOS 64   ALT 10   AST 16        Imaging:   MRI scan showing T8 compression fracture with additional subacute fracture at T6.       ASSESSMENT/PLAN:     Patient is consented for thoracic kyphoplasty.  Risk versus benefits explained to the patient and patient agrees with the treatment plan.      Sedation Plan:  Moderate        Imaging reviewed with NeuroEndovascular Surgery staff, Dr. Lynch.            Alden Cardenas MD  Fellow, NeuroEndovascular Surgery, [facility]  Board certified Vascular Neurologist  [place], [place]

## 2024-08-30 LAB
FINAL PATHOLOGIC DIAGNOSIS: NORMAL
GROSS: NORMAL
Lab: NORMAL

## 2024-09-03 ENCOUNTER — TELEPHONE (OUTPATIENT)
Dept: NEUROSURGERY | Facility: CLINIC | Age: 74
End: 2024-09-03
Payer: MEDICARE

## 2024-09-03 ENCOUNTER — PATIENT MESSAGE (OUTPATIENT)
Dept: NEUROSURGERY | Facility: CLINIC | Age: 74
End: 2024-09-03
Payer: MEDICARE

## 2024-09-03 DIAGNOSIS — S22.069A CLOSED FRACTURE OF EIGHTH THORACIC VERTEBRA, UNSPECIFIED FRACTURE MORPHOLOGY, INITIAL ENCOUNTER: Primary | ICD-10-CM

## 2024-09-10 ENCOUNTER — PATIENT MESSAGE (OUTPATIENT)
Dept: NEUROSURGERY | Facility: CLINIC | Age: 74
End: 2024-09-10
Payer: MEDICARE

## 2024-09-12 ENCOUNTER — CLINICAL SUPPORT (OUTPATIENT)
Dept: NEUROSURGERY | Facility: CLINIC | Age: 74
End: 2024-09-12
Payer: MEDICARE

## 2024-09-12 DIAGNOSIS — S22.069A CLOSED FRACTURE OF EIGHTH THORACIC VERTEBRA, UNSPECIFIED FRACTURE MORPHOLOGY, INITIAL ENCOUNTER: Primary | ICD-10-CM

## 2024-09-12 NOTE — PROGRESS NOTES
Established Patient - Audio Only Telehealth Visit     Windy Ortez a 74 y.o. female for 2 week post op wound check.    Surgery: Pt is POD 14 FROM kyphoplasty on 08/29/24 by Dr. Lynch.    DME: none    Brace in Use: none    SUBJECTIVE    New Symptoms: No new symptoms      PAIN MANAGEMENT     0,       INCISION    Location: lumbar/sacral    Incision Status: Clean, Dry, ZEN. Edges well-approximated. No drainage or swelling noted.     PICTURE    INTERVENTION    Incision: Dissolvable Sutures in Place    Medication Refills Requested: None     EDUCATION    Reviewed Post Op instructions with patient.  Keep incision ZEN, no lotions, creams or bandages.  Ok to shower without direct water pressure to incision. Pat dry after shower.  Do not submerge wound in bath tub or go swimming until released by surgeon.  No lifting > 10lbs.  No twisting or bending surgical area greater than 45 degrees from neutral position.  Patient encouraged to walk as much as possible but advised to walk with someone and rest as necessary.  Take over the counter stool softner/laxative for constipation.  Call your doctor or report to the Emergency Room for any signs of infection, including: increased redness, drainage, pain or fever (temperature greater than or equal to 101.5 for 24 hours). Call doctor or go to the Emergency Room if there are any localized neurological changes; problems with speech, vision, numbness, tingling, weakness, or severe headache; or for other concerns.      All questions answered. Pt/caregiver encouraged to call clinic or send message through portal with any future concerns. Patient verbalized understanding.     FOLLOW UP    Future Appointments   Date Time Provider Department Center   10/3/2024  9:45 AM [facility] OIC-CT2 500 LB LIMIT [facility]   10/3/2024 11:30 AM Monalisa Mcclain PA-C [facility] NEUROS8 [facility]   10/7/2024 10:40 AM Trisha Higginbotham MD [facility] MED [facility]   11/19/2024  1:45 PM INJECTION [facility] AMB INF [facility]

## 2024-09-13 ENCOUNTER — E-VISIT (OUTPATIENT)
Dept: FAMILY MEDICINE | Facility: CLINIC | Age: 74
End: 2024-09-13
Payer: MEDICARE

## 2024-09-13 ENCOUNTER — PATIENT MESSAGE (OUTPATIENT)
Dept: FAMILY MEDICINE | Facility: CLINIC | Age: 74
End: 2024-09-13
Payer: MEDICARE

## 2024-09-13 DIAGNOSIS — Z79.1 NSAID LONG-TERM USE: ICD-10-CM

## 2024-09-13 DIAGNOSIS — K92.1 BLOOD IN STOOL: Primary | ICD-10-CM

## 2024-09-13 DIAGNOSIS — R19.7 DIARRHEA, UNSPECIFIED TYPE: ICD-10-CM

## 2024-09-13 RX ORDER — PANTOPRAZOLE SODIUM 40 MG/1
40 TABLET, DELAYED RELEASE ORAL DAILY
Qty: 30 TABLET | Refills: 1 | Status: SHIPPED | OUTPATIENT
Start: 2024-09-13 | End: 2025-09-13

## 2024-09-13 NOTE — PROGRESS NOTES
Patient ID: Windy Lindo is a 74 y.o. female.    Chief Complaint: General Illness (Entered automatically based on patient selection in [employer]          274}  The patient initiated a request through [employer] on 9/13/2024 for evaluation and management with a chief complaint of General Illness (Entered automatically based on patient selection in [employer].)     I evaluated the questionnaire submission on 09/13/2024 .    I called the patient to get history she has had some diarrhea with some bright red blood small amount in her stool reports no abdominal pain no dizziness no lightheadedness no melena no heartburn chest pain or shortness for breath and feels well does have some itching in the rectum she had a colonoscopy in the past she reports no abdominal pain as well currently and her diarrhea has improved.  She is not taking aspirin is taking meloxicam daily for joint pain  Total Time (in minutes): 24     [facility] Peq Evisit [facility]-Common Problems    9/13/2024  2:18 PM CDT - Filed by Patient   What do you need help with? Other Concern   Do you agree to participate in an E-Visit? Yes   If you have any of the following symptoms, please present to your local emergency room or call 911:  I acknowledge   What is the main issue you would like addressed today? Loose stool with blood   Please describe your symptoms Loose stool with blood   Where is your problem located? Loose stool with blood   How severe are your symptoms? Moderate   Have you had these symptoms before? Yes   How long have you been having these symptoms? Just today   Please list any medications or treatments you have used for your condition and indicate if it was effective or not. Immodiom  drinking plenty water   What makes this feel better? Immodium   What makes this feel worse? Not sure   Are these symptoms related to a condition that you currently have? No   Please describe any probable cause for these symptoms Not sure   Provide any additional  information you feel is important. Not sure   Please attach any relevant images or files    Are you able to take your vital signs? Yes   Systolic Blood Pressure: 138   Diastolic Blood Pressure: 82   Weight: 175   Height: 65   Pulse: 88   Temperature: 97.7   Respiration rate:    Pulse Oxygen:       z    Active Problem List with Overview Notes    Diagnosis Date Noted    Aortic atherosclerosis 05/23/2024    Thoracic compression fracture 04/15/2024    Age-related osteoporosis without current pathological fracture 03/22/2024    Hypertension 04/04/2023    Hyperlipidemia 10/26/2020    Heart murmur 10/26/2020    Refractive error 01/14/2020    S/P LASIK (laser assisted in situ keratomileusis) of both eyes 01/14/2020    Nuclear sclerosis, bilateral 02/13/2019    Chronic pain of left ankle 12/04/2018    Weakness 12/27/2017    Primary osteoarthritis of right knee 12/05/2017    Elevated BP without diagnosis of hypertension 11/29/2017    Chest sounds abnormal on percussion or auscultation 11/29/2017    Varicose veins of both lower extremities 11/29/2017    Right knee pain 05/20/2016      Recent Labs Obtained:  Lab Results   Component Value Date    WBC 7.37 08/26/2024    HGB 13.2 08/26/2024    HCT 42.1 08/26/2024    MCV 94 08/26/2024     08/26/2024     08/26/2024    K 4.5 08/26/2024    GLU 91 08/26/2024    CREATININE 0.9 08/26/2024    EGFRNORACEVR >60.0 08/26/2024    TSH 1.376 03/28/2024      Review of patient's allergies indicates:  No Known Allergies    Encounter Diagnoses   Name Primary?    Blood in stool Yes    Diarrhea, unspecified type     NSAID long-term use         Orders Placed This Encounter   Procedures    CBC Auto Differential     Standing Status:   Future     Standing Expiration Date:   9/14/2025     Order Specific Question:   Send normal result to authorizing provider's In Basket if patient is active on MyChart:     Answer:   Yes    Iron and TIBC     Standing Status:   Future     Standing Expiration  Date:   11/12/2025    Ferritin     Standing Status:   Future     Standing Expiration Date:   11/12/2025    Ambulatory referral/consult to Gastroenterology     Standing Status:   Future     Standing Expiration Date:   10/13/2025     Referral Priority:   Routine     Referral Type:   Consultation     Referral Reason:   Specialty Services Required     Requested Specialty:   Gastroenterology     Number of Visits Requested:   1      Medications Ordered This Encounter   Medications    pantoprazole (PROTONIX) 40 MG tablet     Sig: Take 1 tablet (40 mg total) by mouth once daily.     Dispense:  30 tablet     Refill:  1      Patient is stable and bleeding has stopped she reports needs to see GI placed referral will need a colonoscopy went over red flag symptoms she does not have an upper GI bleed and her diarrhea has resolved she reports currently needs to get a physical exam as well will send in PPI recommend to stop taking NSAID this can increase her risk of bleeding  E-Visit Time Tracking:    Day 1 Time (in minutes): 24    Total Time (in minutes): 24      274}

## 2024-09-16 ENCOUNTER — TELEPHONE (OUTPATIENT)
Dept: FAMILY MEDICINE | Facility: CLINIC | Age: 74
End: 2024-09-16
Payer: MEDICARE

## 2024-09-16 NOTE — TELEPHONE ENCOUNTER
----- Message from David Oliveira MD sent at 9/13/2024  3:38 PM CDT -----  Regarding: gi ref  Please set up with gi next week thanks and blood work soon tam if possible

## 2024-09-17 ENCOUNTER — LAB VISIT (OUTPATIENT)
Dept: LAB | Facility: HOSPITAL | Age: 74
End: 2024-09-17
Payer: MEDICARE

## 2024-09-17 ENCOUNTER — HOSPITAL ENCOUNTER (EMERGENCY)
Facility: HOSPITAL | Age: 74
Discharge: HOME | End: 2024-09-17
Attending: STUDENT IN AN ORGANIZED HEALTH CARE EDUCATION/TRAINING PROGRAM
Payer: MEDICARE

## 2024-09-17 VITALS
RESPIRATION RATE: 20 BRPM | TEMPERATURE: 98 F | SYSTOLIC BLOOD PRESSURE: 124 MMHG | OXYGEN SATURATION: 98 % | BODY MASS INDEX: 27.47 KG/M2 | WEIGHT: 164.88 LBS | DIASTOLIC BLOOD PRESSURE: 60 MMHG | HEIGHT: 65 IN | HEART RATE: 91 BPM

## 2024-09-17 DIAGNOSIS — R42 ORTHOSTATIC LIGHTHEADEDNESS: Primary | ICD-10-CM

## 2024-09-17 DIAGNOSIS — K92.1 BLOOD IN STOOL: ICD-10-CM

## 2024-09-17 DIAGNOSIS — R06.00 DYSPNEA: ICD-10-CM

## 2024-09-17 LAB
ABO + RH BLD: NORMAL
ALBUMIN SERPL BCP-MCNC: 3.4 G/DL (ref 3.5–5.2)
ALP SERPL-CCNC: 58 U/L (ref 55–135)
ALT SERPL W/O P-5'-P-CCNC: 7 U/L (ref 10–44)
ANION GAP SERPL CALC-SCNC: 9 MMOL/L (ref 8–16)
AST SERPL-CCNC: 12 U/L (ref 10–40)
BASOPHILS # BLD AUTO: 0.02 K/UL (ref 0–0.2)
BASOPHILS # BLD AUTO: 0.02 K/UL (ref 0–0.2)
BASOPHILS NFR BLD: 0.2 % (ref 0–1.9)
BASOPHILS NFR BLD: 0.2 % (ref 0–1.9)
BILIRUB SERPL-MCNC: 0.5 MG/DL (ref 0.1–1)
BILIRUB UR QL STRIP: NEGATIVE
BLD GP AB SCN CELLS X3 SERPL QL: NORMAL
BNP SERPL-MCNC: 13 PG/ML (ref 0–99)
BUN SERPL-MCNC: 15 MG/DL (ref 8–23)
CALCIUM SERPL-MCNC: 9.6 MG/DL (ref 8.7–10.5)
CHLORIDE SERPL-SCNC: 106 MMOL/L (ref 95–110)
CLARITY UR REFRACT.AUTO: CLEAR
CO2 SERPL-SCNC: 25 MMOL/L (ref 23–29)
COLOR UR AUTO: YELLOW
CREAT SERPL-MCNC: 0.8 MG/DL (ref 0.5–1.4)
D DIMER PPP IA.FEU-MCNC: 0.86 MG/L FEU
DIFFERENTIAL METHOD BLD: ABNORMAL
DIFFERENTIAL METHOD BLD: ABNORMAL
EOSINOPHIL # BLD AUTO: 0 K/UL (ref 0–0.5)
EOSINOPHIL # BLD AUTO: 0 K/UL (ref 0–0.5)
EOSINOPHIL NFR BLD: 0 % (ref 0–8)
EOSINOPHIL NFR BLD: 0.3 % (ref 0–8)
ERYTHROCYTE [DISTWIDTH] IN BLOOD BY AUTOMATED COUNT: 13.6 % (ref 11.5–14.5)
ERYTHROCYTE [DISTWIDTH] IN BLOOD BY AUTOMATED COUNT: 13.9 % (ref 11.5–14.5)
EST. GFR  (NO RACE VARIABLE): >60 ML/MIN/1.73 M^2
FERRITIN SERPL-MCNC: 116 NG/ML (ref 20–300)
GLUCOSE SERPL-MCNC: 115 MG/DL (ref 70–110)
GLUCOSE UR QL STRIP: NEGATIVE
HCT VFR BLD AUTO: 32.7 % (ref 37–48.5)
HCT VFR BLD AUTO: 35.3 % (ref 37–48.5)
HGB BLD-MCNC: 10.6 G/DL (ref 12–16)
HGB BLD-MCNC: 10.6 G/DL (ref 12–16)
HGB UR QL STRIP: NEGATIVE
IMM GRANULOCYTES # BLD AUTO: 0.04 K/UL (ref 0–0.04)
IMM GRANULOCYTES # BLD AUTO: 0.04 K/UL (ref 0–0.04)
IMM GRANULOCYTES NFR BLD AUTO: 0.4 % (ref 0–0.5)
IMM GRANULOCYTES NFR BLD AUTO: 0.4 % (ref 0–0.5)
IRON SERPL-MCNC: 23 UG/DL (ref 30–160)
KETONES UR QL STRIP: NEGATIVE
LEUKOCYTE ESTERASE UR QL STRIP: NEGATIVE
LYMPHOCYTES # BLD AUTO: 1.5 K/UL (ref 1–4.8)
LYMPHOCYTES # BLD AUTO: 1.5 K/UL (ref 1–4.8)
LYMPHOCYTES NFR BLD: 13.1 % (ref 18–48)
LYMPHOCYTES NFR BLD: 13.9 % (ref 18–48)
MAGNESIUM SERPL-MCNC: 2 MG/DL (ref 1.6–2.6)
MCH RBC QN AUTO: 28.8 PG (ref 27–31)
MCH RBC QN AUTO: 29.8 PG (ref 27–31)
MCHC RBC AUTO-ENTMCNC: 30 G/DL (ref 32–36)
MCHC RBC AUTO-ENTMCNC: 32.4 G/DL (ref 32–36)
MCV RBC AUTO: 92 FL (ref 82–98)
MCV RBC AUTO: 96 FL (ref 82–98)
MONOCYTES # BLD AUTO: 0.9 K/UL (ref 0.3–1)
MONOCYTES # BLD AUTO: 1 K/UL (ref 0.3–1)
MONOCYTES NFR BLD: 8.2 % (ref 4–15)
MONOCYTES NFR BLD: 8.7 % (ref 4–15)
NEUTROPHILS # BLD AUTO: 8.5 K/UL (ref 1.8–7.7)
NEUTROPHILS # BLD AUTO: 8.6 K/UL (ref 1.8–7.7)
NEUTROPHILS NFR BLD: 77 % (ref 38–73)
NEUTROPHILS NFR BLD: 77.6 % (ref 38–73)
NITRITE UR QL STRIP: NEGATIVE
NRBC BLD-RTO: 0 /100 WBC
NRBC BLD-RTO: 0 /100 WBC
PH UR STRIP: 7 [PH] (ref 5–8)
PLATELET # BLD AUTO: 180 K/UL (ref 150–450)
PLATELET # BLD AUTO: 199 K/UL (ref 150–450)
PMV BLD AUTO: 12.8 FL (ref 9.2–12.9)
PMV BLD AUTO: 13 FL (ref 9.2–12.9)
POTASSIUM SERPL-SCNC: 3.3 MMOL/L (ref 3.5–5.1)
PROT SERPL-MCNC: 6.1 G/DL (ref 6–8.4)
PROT UR QL STRIP: NEGATIVE
RBC # BLD AUTO: 3.56 M/UL (ref 4–5.4)
RBC # BLD AUTO: 3.68 M/UL (ref 4–5.4)
SATURATED IRON: 8 % (ref 20–50)
SODIUM SERPL-SCNC: 140 MMOL/L (ref 136–145)
SP GR UR STRIP: >=1.03 (ref 1–1.03)
SPECIMEN OUTDATE: NORMAL
TOTAL IRON BINDING CAPACITY: 272 UG/DL (ref 250–450)
TRANSFERRIN SERPL-MCNC: 184 MG/DL (ref 200–375)
TROPONIN I SERPL DL<=0.01 NG/ML-MCNC: 0.01 NG/ML (ref 0–0.03)
URN SPEC COLLECT METH UR: ABNORMAL
WBC # BLD AUTO: 11.04 K/UL (ref 3.9–12.7)
WBC # BLD AUTO: 11.05 K/UL (ref 3.9–12.7)

## 2024-09-17 PROCEDURE — 25000003 PHARM REV CODE 250: Performed by: STUDENT IN AN ORGANIZED HEALTH CARE EDUCATION/TRAINING PROGRAM

## 2024-09-17 PROCEDURE — 83880 ASSAY OF NATRIURETIC PEPTIDE: CPT | Performed by: STUDENT IN AN ORGANIZED HEALTH CARE EDUCATION/TRAINING PROGRAM

## 2024-09-17 PROCEDURE — 96360 HYDRATION IV INFUSION INIT: CPT

## 2024-09-17 PROCEDURE — 93010 ELECTROCARDIOGRAM REPORT: CPT | Mod: ,,, | Performed by: INTERNAL MEDICINE

## 2024-09-17 PROCEDURE — 83540 ASSAY OF IRON: CPT | Performed by: STUDENT IN AN ORGANIZED HEALTH CARE EDUCATION/TRAINING PROGRAM

## 2024-09-17 PROCEDURE — 86850 RBC ANTIBODY SCREEN: CPT | Performed by: STUDENT IN AN ORGANIZED HEALTH CARE EDUCATION/TRAINING PROGRAM

## 2024-09-17 PROCEDURE — 83735 ASSAY OF MAGNESIUM: CPT | Performed by: STUDENT IN AN ORGANIZED HEALTH CARE EDUCATION/TRAINING PROGRAM

## 2024-09-17 PROCEDURE — 85025 COMPLETE CBC W/AUTO DIFF WBC: CPT | Mod: 91 | Performed by: STUDENT IN AN ORGANIZED HEALTH CARE EDUCATION/TRAINING PROGRAM

## 2024-09-17 PROCEDURE — 63600175 PHARM REV CODE 636 W HCPCS: Performed by: STUDENT IN AN ORGANIZED HEALTH CARE EDUCATION/TRAINING PROGRAM

## 2024-09-17 PROCEDURE — 99285 EMERGENCY DEPT VISIT HI MDM: CPT | Mod: 25

## 2024-09-17 PROCEDURE — 85379 FIBRIN DEGRADATION QUANT: CPT | Performed by: STUDENT IN AN ORGANIZED HEALTH CARE EDUCATION/TRAINING PROGRAM

## 2024-09-17 PROCEDURE — 81003 URINALYSIS AUTO W/O SCOPE: CPT | Performed by: STUDENT IN AN ORGANIZED HEALTH CARE EDUCATION/TRAINING PROGRAM

## 2024-09-17 PROCEDURE — 93005 ELECTROCARDIOGRAM TRACING: CPT

## 2024-09-17 PROCEDURE — 36415 COLL VENOUS BLD VENIPUNCTURE: CPT | Mod: PO | Performed by: STUDENT IN AN ORGANIZED HEALTH CARE EDUCATION/TRAINING PROGRAM

## 2024-09-17 PROCEDURE — 85025 COMPLETE CBC W/AUTO DIFF WBC: CPT | Performed by: STUDENT IN AN ORGANIZED HEALTH CARE EDUCATION/TRAINING PROGRAM

## 2024-09-17 PROCEDURE — 80053 COMPREHEN METABOLIC PANEL: CPT | Performed by: STUDENT IN AN ORGANIZED HEALTH CARE EDUCATION/TRAINING PROGRAM

## 2024-09-17 PROCEDURE — 25500020 PHARM REV CODE 255: Performed by: STUDENT IN AN ORGANIZED HEALTH CARE EDUCATION/TRAINING PROGRAM

## 2024-09-17 PROCEDURE — 86900 BLOOD TYPING SEROLOGIC ABO: CPT | Performed by: STUDENT IN AN ORGANIZED HEALTH CARE EDUCATION/TRAINING PROGRAM

## 2024-09-17 PROCEDURE — 82728 ASSAY OF FERRITIN: CPT | Performed by: STUDENT IN AN ORGANIZED HEALTH CARE EDUCATION/TRAINING PROGRAM

## 2024-09-17 PROCEDURE — 84484 ASSAY OF TROPONIN QUANT: CPT | Performed by: STUDENT IN AN ORGANIZED HEALTH CARE EDUCATION/TRAINING PROGRAM

## 2024-09-17 PROCEDURE — 86901 BLOOD TYPING SEROLOGIC RH(D): CPT | Performed by: STUDENT IN AN ORGANIZED HEALTH CARE EDUCATION/TRAINING PROGRAM

## 2024-09-17 RX ADMIN — IOHEXOL 75 ML: 350 INJECTION, SOLUTION INTRAVENOUS at 05:09

## 2024-09-17 RX ADMIN — SODIUM CHLORIDE, POTASSIUM CHLORIDE, SODIUM LACTATE AND CALCIUM CHLORIDE 500 ML: 600; 310; 30; 20 INJECTION, SOLUTION INTRAVENOUS at 06:09

## 2024-09-17 RX ADMIN — POTASSIUM BICARBONATE 20 MEQ: 391 TABLET, EFFERVESCENT ORAL at 05:09

## 2024-09-17 NOTE — ED TRIAGE NOTES
Windy Lindo, a 74 y.o. female presents to the ED w/ complaint of SOB and dizziness that started earlier this morning, pt also reports rectal bleeding this past Friday and Saturday.     Triage note:  Chief Complaint   Patient presents with    Weakness     Weakness and dizziness going on for weeks, chronic back pain surgery 1 week ago, over weekend patient started noticing lower gi bleed and diarrhea. Awake alert. Vertigo.      Review of patient's allergies indicates:  No Known Allergies  Past Medical History:   Diagnosis Date    Arthritis     Eye injury 4 yrs    hit od    Hypertension 04/04/2023    Nuclear sclerosis, bilateral 02/13/2019    Osteoporosis

## 2024-09-17 NOTE — ED PROVIDER NOTES
Encounter Date: 9/17/2024       History     Chief Complaint   Patient presents with    Weakness     Weakness and dizziness going on for weeks, chronic back pain surgery 1 week ago, over weekend patient started noticing lower gi bleed and diarrhea. Awake alert. Vertigo.      HPI    73 yo F w/HTN, traumatic compression fracture of T8 s/p IR kyphoplasty on 8/29/24, presenting with 1 x day of orthostatic weakness, a few days of dyspnea on exertion, orthopnea.     3 days ago had several episodes of dark black loose stool, which resolved, has had normal formed light brown stool since. No bright red blood per rectum, no hematemesis. No hx of GI bleed, no AC use. Has GI follow up in 2 days, was advised to start taking protonix and to stop her meloxicam which she began taking after her kyphoplasty surgery.    Has had back pain in thoracic back since her fall in April, no significant worsening in back pain, worsens on R thoracic back with deep breaths.      Review of patient's allergies indicates:  No Known Allergies  Past Medical History:   Diagnosis Date    Arthritis     Eye injury 4 yrs    hit od    Hypertension 04/04/2023    Nuclear sclerosis, bilateral 02/13/2019    Osteoporosis      Past Surgical History:   Procedure Laterality Date    APPENDECTOMY      COLONOSCOPY N/A 9/7/2017    Procedure: COLONOSCOPY;  Surgeon: Albino Fenton MD;  Location: [facility] ([address] [place] [address]);  Service: Endoscopy;  Laterality: N/A;    ESOPHAGOGASTRODUODENOSCOPY  09/07/2017    KNEE SURGERY Right 12/05/2017    TKR    LASIK  7 yrs    ou    TONSILLECTOMY       Family History   Problem Relation Name Age of Onset    Hypertension Mother      Glaucoma Mother      Diabetes Mother      Cataracts Mother      Cancer Mother  74        lung    Heart disease Mother  55    Hypertension Father      Heart disease Father          onset early 60;s, Aortic valve replacement ,Rheumatic fever as a child     No Known Problems Sister      Diabetes Brother      Cancer  Brother  66        mesothelioma    No Known Problems Maternal Aunt      No Known Problems Maternal Uncle      No Known Problems Paternal Aunt      No Known Problems Paternal Uncle      No Known Problems Maternal Grandmother      No Known Problems Maternal Grandfather      No Known Problems Paternal Grandmother      No Known Problems Paternal Grandfather      No Known Problems Other      Amblyopia Neg Hx      Blindness Neg Hx      Macular degeneration Neg Hx      Retinal detachment Neg Hx      Strabismus Neg Hx      Stroke Neg Hx      Thyroid disease Neg Hx      Melanoma Neg Hx       Social History     Tobacco Use    Smoking status: Never    Smokeless tobacco: Never   Substance Use Topics    Alcohol use: Not Currently     Alcohol/week: 1.0 standard drink of alcohol     Types: 1 Glasses of wine per week     Comment: glass of wine a night     Drug use: No     Review of Systems    Physical Exam     Initial Vitals [09/17/24 1420]   BP Pulse Resp Temp SpO2   (!) 140/82 90 14 98.1 °F (36.7 °C) 99 %      MAP       --         Physical Exam    Nursing note and vitals reviewed.  Constitutional: No distress.   HENT:   Head: Atraumatic.   Mouth/Throat: Oropharynx is clear and moist and mucous membranes are normal.   Eyes: Right conjunctiva is not injected. Left conjunctiva is not injected. No scleral icterus.   Cardiovascular:  Normal heart sounds.           Pulmonary/Chest: Effort normal and breath sounds normal. No respiratory distress.   Abdominal: Abdomen is soft. She exhibits no distension. There is no abdominal tenderness.     Neurological: No cranial nerve deficit. Gait normal.   No nystagmus, shoulder shrug intact, face symmetric, tongue midline, finger-nose-finger nl, sensation intact and equal in face/arms/legs, upper extremity and lower extremity flexor and extensor strength equal and intact, ambulation at baseline     Skin: Skin is warm. No ecchymosis and no rash noted.         ED Course   Procedures  Labs Reviewed    CBC W/ AUTO DIFFERENTIAL - Abnormal       Result Value    WBC 11.05      RBC 3.56 (*)     Hemoglobin 10.6 (*)     Hematocrit 32.7 (*)     MCV 92      MCH 29.8      MCHC 32.4      RDW 13.6      Platelets 180      MPV 12.8      Immature Granulocytes 0.4      Gran # (ANC) 8.6 (*)     Immature Grans (Abs) 0.04      Lymph # 1.5      Mono # 1.0      Eos # 0.0      Baso # 0.02      nRBC 0      Gran % 77.6 (*)     Lymph % 13.1 (*)     Mono % 8.7      Eosinophil % 0.0      Basophil % 0.2      Differential Method Automated     COMPREHENSIVE METABOLIC PANEL - Abnormal    Sodium 140      Potassium 3.3 (*)     Chloride 106      CO2 25      Glucose 115 (*)     BUN 15      Creatinine 0.8      Calcium 9.6      Total Protein 6.1      Albumin 3.4 (*)     Total Bilirubin 0.5      Alkaline Phosphatase 58      AST 12      ALT 7 (*)     eGFR >60.0      Anion Gap 9     D DIMER, QUANTITATIVE - Abnormal    D-Dimer 0.86 (*)    URINALYSIS, REFLEX TO URINE CULTURE - Abnormal    Specimen UA Urine, Clean Catch      Color, UA Yellow      Appearance, UA Clear      pH, UA 7.0      Specific Gravity, UA >=1.030 (*)     Protein, UA Negative      Glucose, UA Negative      Ketones, UA Negative      Bilirubin (UA) Negative      Occult Blood UA Negative      Nitrite, UA Negative      Leukocytes, UA Negative      Narrative:     Specimen Source->Urine   B-TYPE NATRIURETIC PEPTIDE    BNP 13     TROPONIN I    Troponin I 0.006     MAGNESIUM    Magnesium 2.0     TYPE & SCREEN    Group & Rh O POS      Indirect Karin NEG      Specimen Outdate 09/20/2024 23:59          ECG Results              EKG 12-lead (Final result)        Collection Time Result Time QRS Duration [facility] QTC Calculation    09/17/24 17:15:31 09/18/24 07:43:54 88 430                     Final result by Interface, Lab In [facility] (09/18/24 07:43:59)                   Narrative:    Test Reason :     Vent. Rate : 083 BPM     Atrial Rate : 083 BPM     P-R Int : 196 ms          QRS Dur : 088 ms      QT  Int : 366 ms       P-R-T Axes : 062 002 024 degrees     QTc Int : 430 ms    Normal sinus rhythm  Normal ECG  When compared with ECG of 17-SEP-2024 17:11,  No significant change was found  Confirmed by HARJEET LUNDY MD ([id number]) on 9/18/2024 7:43:51 AM    Referred By: [email or web address]   SELF           Confirmed By:HARJEET LUNDY MD                                     EKG 12-lead (Final result)        Collection Time Result Time QRS Duration [facility]HS QTC Calculation    09/17/24 17:11:08 09/18/24 07:43:45 80 430                     Final result by Interface, Lab In [facility] (09/18/24 07:43:54)                   Narrative:    Test Reason : R06.00,    Vent. Rate : 089 BPM     Atrial Rate : 089 BPM     P-R Int : 186 ms          QRS Dur : 080 ms      QT Int : 354 ms       P-R-T Axes : 085 -05 029 degrees     QTc Int : 430 ms    Normal sinus rhythm  Normal ECG  When compared with ECG of 23-MAY-2024 11:52,  No significant change was found  Confirmed by HARJEET LUNDY MD ([id number]) on 9/18/2024 7:43:41 AM    Referred By: [email or web address]   SELF           Confirmed By:HARJEET LUNDY MD                                  Imaging Results              CTA Chest Non-Coronary (PE Studies) (Final result)  Result time 09/17/24 17:56:57      Final result by Tirso Dawson MD (09/17/24 17:56:57)                   Impression:      1. No large discrete pulmonary embolism is identified.  The segmental and subsegmental branches are not evaluable due to respiratory motion artifact.  2. No acute pulmonary process.      Electronically signed by: Tirso Vieira  Date:    09/17/2024  Time:    17:56               Narrative:    EXAMINATION:  CTA CHEST NON CORONARY (PE STUDIES)    CLINICAL HISTORY:  Pulmonary embolism (PE) suspected, positive D-dimer;    TECHNIQUE:  CTA of the chest was obtained. Images were reformatted and reviewed in coronal and sagittal planes. Volume-rendered 3D reconstructed images were acquired by post processing for a better  visualization of the vascular anomalies, on an independent [employer] workstation with concurrent supervision and archived for permanent records.  Images were acquired during the arterial phase after 75 cc of non ionic intravenous contrast administration.    Suboptimal assessment of the abdominal solid organs due to lack of a venous phase.    COMPARISON:  None.    FINDINGS:  Diagnostic quality: Respiratory motion artifact limits evaluation of distal segmental and subsegmental branches..    Pulmonary arteries: The pulmonary arteries are well visualized through the proximal segmental level. No large central pulmonary embolism is identified.    Right heart strain: None.    Lungs and pleura: Consolidations or focal opacities.  Respiratory motion artifact.    Mediastinum and roxana: No mediastinal or hilar adenopathy.    Vessels: No atherosclerotic changes in the aorta and coronary arteries.    Heart and pericardium: Heart size is normal. No pericardial effusion.    Upper abdomen: Normal.    Bones: Diffuse bone demineralization.  Cervical spondylosis.  T8 compression fracture with vertebroplasty.  L1 superior endplate Schmorl nodule.                                       X-Ray Chest AP Portable (Final result)  Result time 09/17/24 17:05:27      Final result by Didier Kelley MD (09/17/24 17:05:27)                   Impression:      No convincing radiographic evidence of pneumonia or other source of dyspnea, noting that early/mild viral pneumonia or nonspecific bronchitis may be radiographically occult.      Electronically signed by: Didier Kelley MD  Date:    09/17/2024  Time:    17:05               Narrative:    EXAMINATION:  XR CHEST AP PORTABLE    CLINICAL HISTORY:  Dyspnea, unspecified    TECHNIQUE:  Single frontal view of the chest was performed.    COMPARISON:  Chest radiograph 04/15/2024, CT thoracic spine 07/16/2024    FINDINGS:  Patient is somewhat rotated.    Cardiomediastinal silhouette is midline with similar  calcification and tortuosity of the aorta.  Heart is not significantly enlarged.  Pulmonary vasculature and hilar contours are within normal limits.    Few scattered linear opacities throughout the lungs consistent with minimal platelike scarring versus atelectasis.  No consolidation, pleural effusion or pneumothorax.    Osseous structures appear grossly stable without acute findings.                                       Medications   potassium bicarbonate disintegrating tablet 20 mEq (20 mEq Oral Given 9/17/24 1753)   iohexoL (OMNIPAQUE 350) injection 75 mL (75 mLs Intravenous Given 9/17/24 1732)   lactated ringers bolus 500 mL (0 mLs Intravenous Stopped 9/17/24 2000)     Medical Decision Making  73 yo F w/HTN, traumatic compression fracture of T8 s/p IR kyphoplasty on 8/29/24, presenting with 1 x day of orthostatic weakness, a few days of dyspnea on exertion, orthopnea.       Differential diagnosis includes but is not limited to: PE, anemia, GI bleed 2/2 NSAID use (resolved), UTI     Labwork obtained significant for mild anemia compared to preprocedural Hb, may have been from 3 episodes of melena (since resolved with normal brown formed stool today), orthostatic near syncope consistent with dehydration, decreased PO intake, given 1 L IVF, able to ambulate in ED with no dyspnea, no near syncopal symptoms. CTA performed for PE given d-dimer positive, negative for PE.    Given stable GI bleed with GI follow up in 2 days, will discharge with follow up as planned, continue to avoid NSAID, return if near syncope/syncope/chest pain/recurrence of melena.      I discussed with the patient/family the diagnosis, treatment plan, indications for return to the emergency department, as well as for expected follow-up. The patient/family verbalized an understanding. The patient/family is asked if there were any questions or concerns, which were addressed to patient/family satisfaction. Patient/family understands and is agreeable  to the plan.             Amount and/or Complexity of Data Reviewed  Labs: ordered. Decision-making details documented in ED Course.  Radiology: ordered.    Risk  Prescription drug management.               ED Course as of 09/23/24 2336   Tue Sep 17, 2024   1717 D-Dimer(!): 0.86 [LF]   1717 Hemoglobin(!): 10.6 [LF]   1717 Drop in Hb from 13 to 10.6 from 3 weeks ago (prior to kyphoplasty, and episodic melena) [LF]   1802 EKG independently interpreted by me with normal sinus rhythm rate of 83, , normal axis, no acute ischemic changes [LF]      ED Course User Index  [LF] Aurora Potts MD                           Clinical Impression:  Final diagnoses:  [R06.00] Dyspnea  [R42] Orthostatic lightheadedness (Primary)          ED Disposition Condition    Discharge Stable          ED Prescriptions    None       Follow-up Information    None          Aurora Potts MD  09/23/24 1600

## 2024-09-17 NOTE — FIRST PROVIDER EVALUATION
Medical screening examination initiated.  I have conducted a focused provider triage encounter, findings are as follows:    Brief history of present illness:  75 yo F w/osteoporosis s/p recent kyphoplasty presenting with acute onset of dyspnea, orthopnea, generalized weakness.    Pleuritic pain to R back, stable since thoracic fractures from fall (was the reason for kyphoplasty)  Recent 2 day episode of dark black stool, has resolved, GI follow up on Thurs.     Vitals:    09/17/24 1420   BP: (!) 140/82   BP Location: Right arm   Pulse: 90   Resp: 14   Temp: 98.1 °F (36.7 °C)   TempSrc: Oral   SpO2: 99%   Weight: 74.8 kg (165 lb)       Pertinent physical exam:  Constitutional:  tachpyneic  HENT:   Head: Normocephalic and atraumatic.   Eyes: EOM are normal.   Cardiovascular:  Normal rate, regular rhythm and intact distal pulses.           Pulmonary/Chest: Breath sounds normal. No stridor. no wheezes.   Abdominal: Abdomen is soft.no distension. There is no abdominal tenderness.   Musculoskeletal:         General: Normal range of motion, 1+ pitting edema bl le  Neurological: alert.   Skin: Skin is warm and dry. Surgical incision on thoracic back with 1 suture, clean dry, intact    Brief workup plan:  labs, cxr    Preliminary workup initiated; this workup will be continued and followed by the physician or advanced practice provider that is assigned to the patient when roomed.

## 2024-09-18 LAB
OHS QRS DURATION: 80 MS
OHS QRS DURATION: 88 MS
OHS QTC CALCULATION: 430 MS
OHS QTC CALCULATION: 430 MS

## 2024-09-18 NOTE — ED NOTES
Assumed care of patient at this time. Patient is resting comfortably in bed in lowest, locked position with bed rails raised x2 in NAD.  Patient denies further needs at this time.

## 2024-09-18 NOTE — DISCHARGE INSTRUCTIONS
You were seen in the ED today for your lightheadedness and shortness of breath. It may have been due to a drop in your blood levels from a few episodes of dark black stool (melena) you had a few days ago, please follow up with your GI doctor as planned in 2 days, if you begin having dark black stool again, worsening lightheadedness, passing out, trouble breathing, chest pain, please return immediately to the ER. Do not take any motrin/ibuprofen/advil/mobiq, but you can take tylenol as recommended for back pain.    Thank you for coming to our Emergency Department today. It is important to remember that some problems or medical conditions are difficult to diagnose and may not be found or addressed during your Emergency Department visit.  These conditions often start with non-specific symptoms and can only be diagnosed on follow up visits with your primary care physician or specialist when the symptoms continue or change. Please remember that all medical conditions can change, and we cannot predict how you will be feeling tomorrow or the next day. Return to the ER with any questions/concerns, new/concerning symptoms, worsening or failure to improve.       Be sure to follow up with your primary care doctor and review all labs/imaging/tests that were performed during your ER visit with them. It is very common for us to identify non-emergent incidental findings which must be followed up with your primary care physician.  Some labs/imaging/tests may be outside of the normal range, and require non-emergent follow-up and/or further investigation/treatment/procedures/testing to help diagnose/exclude/prevent complications or other potentially serious medical conditions. Some abnormalities may not have been discussed or addressed during your ER visit. Some lab results may not return during your ER visit but can be accessible by downloading the free [facility] or by visiting [email or web address] . It is important for  you to review all labs/imaging/tests which are outside of the normal range with your physician.    An ER visit does not replace a primary care visit, and many screening tests or follow-up tests cannot be ordered by an ER doctor or performed by the ER. Some tests may even require pre-approval.    If you do not have a primary care doctor, you may contact the one listed on your discharge paperwork or you may also call the [facility] Appointment Desk at [phone] , or [address] [place] [address] at  [phone] to schedule an appointment, or establish care with a primary care doctor or even a specialist and to obtain information about local resources. It is important to your health that you have a primary care doctor.    Please take all medications as directed. We have done our best to select a medication for you that will treat your condition however, all medications may potentially have side-effects and it is impossible to predict which medications may give you side-effects or what those side-effects (if any) those medications may give you.  If you feel that you are having a negative effect or side-effect of any medication you should stop taking those medications immediately and seek medical attention. If you feel that you are having a life-threatening reaction call 911.        Do not drive, swim, climb to height, take a bath, operate heavy machinery, drink alcohol or take potentially sedating medications, sign any legal documents or make any important decisions for 24 hours if you have received any pain medications, sedatives or mood altering drugs during your ER visit or within 24 hours of taking them if they have been prescribed to you.     You can find additional resources for Dentists, hearing aids, durable medical equipment, low cost pharmacies and other resources at [email or web address]

## 2024-09-19 ENCOUNTER — OFFICE VISIT (OUTPATIENT)
Dept: GASTROENTEROLOGY | Facility: CLINIC | Age: 74
End: 2024-09-19
Payer: MEDICARE

## 2024-09-19 ENCOUNTER — TELEPHONE (OUTPATIENT)
Dept: ENDOSCOPY | Facility: HOSPITAL | Age: 74
End: 2024-09-19
Payer: MEDICARE

## 2024-09-19 VITALS
WEIGHT: 166.69 LBS | HEIGHT: 65 IN | DIASTOLIC BLOOD PRESSURE: 82 MMHG | SYSTOLIC BLOOD PRESSURE: 132 MMHG | BODY MASS INDEX: 27.77 KG/M2 | HEART RATE: 84 BPM

## 2024-09-19 DIAGNOSIS — E61.1 IRON DEFICIENCY: Primary | ICD-10-CM

## 2024-09-19 DIAGNOSIS — Z12.11 ENCOUNTER FOR SCREENING COLONOSCOPY FOR NON-HIGH-RISK PATIENT: Primary | ICD-10-CM

## 2024-09-19 DIAGNOSIS — Z79.1 NSAID LONG-TERM USE: ICD-10-CM

## 2024-09-19 DIAGNOSIS — D50.9 IRON DEFICIENCY ANEMIA, UNSPECIFIED IRON DEFICIENCY ANEMIA TYPE: ICD-10-CM

## 2024-09-19 DIAGNOSIS — K92.1 BLOOD IN STOOL: Primary | ICD-10-CM

## 2024-09-19 DIAGNOSIS — R53.1 WEAKNESS: ICD-10-CM

## 2024-09-19 DIAGNOSIS — K92.1 MELENA: ICD-10-CM

## 2024-09-19 PROCEDURE — 99204 OFFICE O/P NEW MOD 45 MIN: CPT | Mod: S$GLB,,,

## 2024-09-19 PROCEDURE — 3079F DIAST BP 80-89 MM HG: CPT | Mod: CPTII,S$GLB,,

## 2024-09-19 PROCEDURE — 1126F AMNT PAIN NOTED NONE PRSNT: CPT | Mod: CPTII,S$GLB,,

## 2024-09-19 PROCEDURE — 1159F MED LIST DOCD IN RCRD: CPT | Mod: CPTII,S$GLB,,

## 2024-09-19 PROCEDURE — 99999 PR PBB SHADOW E&M-EST. PATIENT-LVL III: CPT | Mod: PBBFAC,,,

## 2024-09-19 PROCEDURE — 1100F PTFALLS ASSESS-DOCD GE2>/YR: CPT | Mod: CPTII,S$GLB,,

## 2024-09-19 PROCEDURE — 3075F SYST BP GE 130 - 139MM HG: CPT | Mod: CPTII,S$GLB,,

## 2024-09-19 PROCEDURE — 3288F FALL RISK ASSESSMENT DOCD: CPT | Mod: CPTII,S$GLB,,

## 2024-09-19 RX ORDER — SODIUM, POTASSIUM,MAG SULFATES 17.5-3.13G
1 SOLUTION, RECONSTITUTED, ORAL ORAL DAILY
Qty: 1 KIT | Refills: 0 | Status: SHIPPED | OUTPATIENT
Start: 2024-09-19 | End: 2024-09-21

## 2024-09-19 RX ORDER — FERROUS SULFATE 325(65) MG
325 TABLET ORAL
Qty: 30 TABLET | Refills: 5 | Status: SHIPPED | OUTPATIENT
Start: 2024-09-19

## 2024-09-19 RX ORDER — PANTOPRAZOLE SODIUM 40 MG/1
40 TABLET, DELAYED RELEASE ORAL 2 TIMES DAILY
Qty: 60 TABLET | Refills: 3 | Status: SHIPPED | OUTPATIENT
Start: 2024-09-19

## 2024-09-19 NOTE — PATIENT INSTRUCTIONS
For GERD/Reflux:     Take your PPI 30-45 minutes before your first protein containing meal (breakfast) every day. Take twice daily.      Take Pepcid 20mg every evening before bedtime to help with nocturnal symptoms, as needed.     Remain upright for at least 3 hours after eating.      Elevate the head of the bed for nighttime.      Avoid foods that you have noticed make your symptoms worse (possible triggers include: peppermint, alcohol, chocolate, caffeine, spicy foods, greasy/fried foods, acidic foods-citrus).

## 2024-09-19 NOTE — PROGRESS NOTES
"GENERAL GI PATIENT INTAKE:    COVID symptoms in the last 7 days (runny nose, sore throat, congestion, cough, fever): No  PCP: Trisha Higginbotham  If not PCP-  number given to establish [phone]: N/A    ALLERGIES REVIEWED:  Yes    CHIEF COMPLAINT:    Chief Complaint   Patient presents with    Anemia       VITAL SIGNS:  /82   Pulse 84   Ht 5' 5" (1.651 m)   Wt 75.6 kg (166 lb 10.7 oz)   BMI 27.73 kg/m²      Change in medical, surgical, family or social history: No      REVIEWED MEDICATION LIST RECONCILED INCLUDING ABOVE MEDS:  Yes     "

## 2024-09-19 NOTE — H&P (VIEW-ONLY)
Gastroenterology Clinic Consultation Note    Reason for Visit:  The primary encounter diagnosis was Blood in stool. Diagnoses of NSAID long-term use and Weakness were also pertinent to this visit.    PCP:   Trisha Higginbotham   [address] [place] [address]      Initial HPI   This is a 74 y.o. female presenting for black stools and diarrhea. Patient with recent back fracture taking Mobic daily. She developed red, dark stools and associated diarrhea. She messaged her PCP prompting e-visit on 9/13/2024. Ordered labwork, however, patient felt to weak to go get her labs done that day. Her last episode of blood in her stool was on Saturday. States that it was dark red in color with some black as well. Saw clots in the toilet also. She went on Tuesday to get bloodwork that was ordered from her E-visit on Friday and Hgb notably lower at 10.6 (Baseline ~13-14). She developed dizziness and dyspnea prompting her to present to the ER. Labs and tests completed including a CTA Chest negative for PE. GI consult deferred this ER visit. Presents to me to further discuss. Feeling better today. Denies bloody stools. Last since Saturday. Still with some weakness, but feels liek this improved. She has stopped taking Mobic and starting taking once daily Protonix that was given to her by her PCP. Stools have returned to normal color and consistency. Denies abdominal pain, nausea, vomiting.       ROS:  Review of Systems   Constitutional:  Positive for malaise/fatigue. Negative for chills, fever and weight loss.   Eyes:  Negative for redness.   Respiratory:  Negative for cough and wheezing.    Cardiovascular:  Negative for chest pain.   Gastrointestinal:  Negative for abdominal pain, blood in stool (none since Saturday), constipation, diarrhea, heartburn, nausea and vomiting. Melena: none since Saturday.  Genitourinary:  Negative for flank pain.   Skin:  Negative for  rash.   Neurological:  Positive for weakness. Negative for seizures and loss of consciousness.        Medical History:  has a past medical history of Arthritis, Eye injury (4 yrs), Hypertension (04/04/2023), Nuclear sclerosis, bilateral (02/13/2019), and Osteoporosis.    Surgical History:  has a past surgical history that includes LASIK (7 yrs); Appendectomy; Tonsillectomy; Colonoscopy (N/A, 9/7/2017); Esophagogastroduodenoscopy (09/07/2017); and Knee surgery (Right, 12/05/2017).    Family History: family history includes Cancer (age of onset: 66) in her brother; Cancer (age of onset: 74) in her mother; Cataracts in her mother; Diabetes in her brother and mother; Glaucoma in her mother; Heart disease in her father; Heart disease (age of onset: 55) in her mother; Hypertension in her father and mother; No Known Problems in her maternal aunt, maternal grandfather, maternal grandmother, maternal uncle, paternal aunt, paternal grandfather, paternal grandmother, paternal uncle, sister, and another family member..       Review of patient's allergies indicates:  No Known Allergies    Current Outpatient Medications on File Prior to Visit   Medication Sig Dispense Refill    acetaminophen (TYLENOL) 650 MG TbSR Take 1 tablet (650 mg total) by mouth every 6 (six) hours as needed. 20 tablet 0    b complex vitamins capsule Take 1 capsule by mouth once daily.      denosumab (PROLIA) 60 mg/mL Syrg Inject 60 mg into the skin every 6 (six) months.      glucosam/chond/hyalu/CF borate (MOVE FREE JOINT HEALTH ORAL) Take by mouth.      hydroCHLOROthiazide (HYDRODIURIL) 25 MG tablet Take 1 tablet (25 mg total) by mouth once daily. 90 tablet 3    meloxicam (MOBIC) 15 MG tablet TAKE 1 TABLET(15 MG) BY MOUTH EVERY DAY 90 tablet 0    multivit-min-FA-lycopen-lutein 0.4-300-250 mg-mcg-mcg Tab Take 1 tablet by mouth once daily.      vitamin D (VITAMIN D3) 1000 units Tab Take 1,000 Units by mouth once daily.      vitamin E 100 UNIT capsule Take  "100 Units by mouth once daily.      [DISCONTINUED] pantoprazole (PROTONIX) 40 MG tablet Take 1 tablet (40 mg total) by mouth once daily. 30 tablet 1     No current facility-administered medications on file prior to visit.         Objective Findings:    Vital Signs:  /82   Pulse 84   Ht 5' 5" (1.651 m)   Wt 75.6 kg (166 lb 10.7 oz)   BMI 27.73 kg/m²   Body mass index is 27.73 kg/m².    Physical Exam:  Physical Exam  Vitals and nursing note reviewed.   Constitutional:       Appearance: She is normal weight. She is not ill-appearing.   HENT:      Mouth/Throat:      Mouth: Mucous membranes are moist.      Pharynx: Oropharynx is clear.   Eyes:      General: No scleral icterus.  Abdominal:      General: Abdomen is flat. Bowel sounds are normal. There is no distension.      Palpations: Abdomen is soft. There is no mass.      Tenderness: There is no abdominal tenderness.      Hernia: No hernia is present.   Skin:     General: Skin is warm and dry.      Capillary Refill: Capillary refill takes less than 2 seconds.      Coloration: Skin is not jaundiced or pale.   Neurological:      Mental Status: She is alert and oriented to person, place, and time. Mental status is at baseline.             Labs:  Lab Results   Component Value Date    WBC 11.05 09/17/2024    HGB 10.6 (L) 09/17/2024    HCT 32.7 (L) 09/17/2024     09/17/2024    CHOL 213 (H) 03/28/2024    TRIG 91 03/28/2024    HDL 57 03/28/2024    ALKPHOS 58 09/17/2024    ALT 7 (L) 09/17/2024    AST 12 09/17/2024     09/17/2024    K 3.3 (L) 09/17/2024     09/17/2024    CREATININE 0.8 09/17/2024    BUN 15 09/17/2024    CO2 25 09/17/2024    TSH 1.376 03/28/2024    INR 0.9 08/26/2024       Imaging reviewed: CTA Chest 9/17/2024  Impression:     1. No large discrete pulmonary embolism is identified.  The segmental and subsegmental branches are not evaluable due to respiratory motion artifact.  2. No acute pulmonary process.        Electronically signed " by:Tirso Vieira  Date:                                            09/17/2024  Time:                                           17:56      Endoscopy reviewed: Colonoscopy 9/7/2017  Impression:           - Diverticulosis in the sigmoid colon and in the                         descending colon.                         - Two small colon polyps were resected and                         retrieved.   Recommendation:       - Patient has a contact number available for                         emergencies. The signs and symptoms of potential                         delayed complications were discussed with the                         patient. Return to normal activities tomorrow.                         Written discharge instructions were provided to the                         patient.                         - Discharge patient to home.                         - Resume previous diet.                         - Continue present medications.                         - Await pathology results.                         - Repeat colonoscopy in 5 years for surveillance.   Attending Participation:        -   Albino Fenton MD   9/7/2017 11:51:18 AM     EGD 9/7/2017  Impression:           - Small hiatal hernia.   Recommendation:       - Patient has a contact number available for                         emergencies. The signs and symptoms of potential                         delayed complications were discussed with the                         patient. Return to normal activities tomorrow.                         Written discharge instructions were provided to the                         patient.                         - Discharge patient to home.                         - Resume previous diet.                         - Continue present medications.   Attending Participation:        -   Albino Fenton MD   9/7/2017 11:34:50 AM     Assessment:  1. Blood in stool    2. NSAID long-term use    3. Weakness      Orders Placed  This Encounter    CBC W/ AUTO DIFFERENTIAL    IRON AND TIBC    Ferritin    H. PYLORI ANTIBODY, IGG    pantoprazole (PROTONIX) 40 MG tablet    ferrous sulfate (FEOSOL) 325 mg (65 mg iron) Tab tablet         Plan:  Recent episodes of blood in her stool with subsequent drop in Hgb and symptomatic. Referral placed for URGENT EGD/Colonoscopy for further evaluation of causes for her blood in stool. Labwork ordered for next week. Will hold off on getting labs today since recent labs done 2 days ago. PPI increased to BID pending EGD findings. Will adjust as appropriate.   H. Pylori IgG  Referral for EGD/Colonoscopy, labs. Ferrous sulfate once daily.       Thank you for allowing me to participate in this patient's care.    Sincerely,     Naya De Jesus NP  Gastroenterology Department  [facility]

## 2024-09-19 NOTE — TELEPHONE ENCOUNTER
Spoke to pt to schedule procedure(s) Colonoscopy/EGD       Physician to perform procedure(s) Dr. ADAMARIS Fenton  Date of Procedure (s) 10/09/24  Arrival Time 10:00 AM  Time of Procedure(s) 11:00 AM   Location of Procedure(s) [facility]  Type of Rx Prep sent to patient: Suprep  Instructions provided to patient via [facility]    Patient was informed on the following information and verbalized understanding. Screening questionnaire reviewed with patient and complete. If procedure requires anesthesia, a responsible adult needs to be present to accompany the patient home, patient cannot drive after receiving anesthesia. Appointment details are tentative, especially check-in time. Patient will receive a prep-op call 7 days prior to confirm check-in time for procedure. If applicable the patient should contact their pharmacy to verify Rx for procedure prep is ready for pick-up. Patient was advised to call the scheduling department at [phone] if pharmacy states no Rx is available. Patient was advised to call the endoscopy scheduling department if any questions or concerns arise.      [facility] Scheduling Department

## 2024-09-19 NOTE — PROGRESS NOTES
Gastroenterology Clinic Consultation Note    Reason for Visit:  The primary encounter diagnosis was Blood in stool. Diagnoses of NSAID long-term use and Weakness were also pertinent to this visit.    PCP:   Trisha Higginbotham   [address] [place] [address]      Initial HPI   This is a 74 y.o. female presenting for black stools and diarrhea. Patient with recent back fracture taking Mobic daily. She developed red, dark stools and associated diarrhea. She messaged her PCP prompting e-visit on 9/13/2024. Ordered labwork, however, patient felt to weak to go get her labs done that day. Her last episode of blood in her stool was on Saturday. States that it was dark red in color with some black as well. Saw clots in the toilet also. She went on Tuesday to get bloodwork that was ordered from her E-visit on Friday and Hgb notably lower at 10.6 (Baseline ~13-14). She developed dizziness and dyspnea prompting her to present to the ER. Labs and tests completed including a CTA Chest negative for PE. GI consult deferred this ER visit. Presents to me to further discuss. Feeling better today. Denies bloody stools. Last since Saturday. Still with some weakness, but feels liek this improved. She has stopped taking Mobic and starting taking once daily Protonix that was given to her by her PCP. Stools have returned to normal color and consistency. Denies abdominal pain, nausea, vomiting.       ROS:  Review of Systems   Constitutional:  Positive for malaise/fatigue. Negative for chills, fever and weight loss.   Eyes:  Negative for redness.   Respiratory:  Negative for cough and wheezing.    Cardiovascular:  Negative for chest pain.   Gastrointestinal:  Negative for abdominal pain, blood in stool (none since Saturday), constipation, diarrhea, heartburn, nausea and vomiting. Melena: none since Saturday.  Genitourinary:  Negative for flank pain.   Skin:  Negative for  rash.   Neurological:  Positive for weakness. Negative for seizures and loss of consciousness.        Medical History:  has a past medical history of Arthritis, Eye injury (4 yrs), Hypertension (04/04/2023), Nuclear sclerosis, bilateral (02/13/2019), and Osteoporosis.    Surgical History:  has a past surgical history that includes LASIK (7 yrs); Appendectomy; Tonsillectomy; Colonoscopy (N/A, 9/7/2017); Esophagogastroduodenoscopy (09/07/2017); and Knee surgery (Right, 12/05/2017).    Family History: family history includes Cancer (age of onset: 66) in her brother; Cancer (age of onset: 74) in her mother; Cataracts in her mother; Diabetes in her brother and mother; Glaucoma in her mother; Heart disease in her father; Heart disease (age of onset: 55) in her mother; Hypertension in her father and mother; No Known Problems in her maternal aunt, maternal grandfather, maternal grandmother, maternal uncle, paternal aunt, paternal grandfather, paternal grandmother, paternal uncle, sister, and another family member..       Review of patient's allergies indicates:  No Known Allergies    Current Outpatient Medications on File Prior to Visit   Medication Sig Dispense Refill    acetaminophen (TYLENOL) 650 MG TbSR Take 1 tablet (650 mg total) by mouth every 6 (six) hours as needed. 20 tablet 0    b complex vitamins capsule Take 1 capsule by mouth once daily.      denosumab (PROLIA) 60 mg/mL Syrg Inject 60 mg into the skin every 6 (six) months.      glucosam/chond/hyalu/CF borate (MOVE FREE JOINT HEALTH ORAL) Take by mouth.      hydroCHLOROthiazide (HYDRODIURIL) 25 MG tablet Take 1 tablet (25 mg total) by mouth once daily. 90 tablet 3    meloxicam (MOBIC) 15 MG tablet TAKE 1 TABLET(15 MG) BY MOUTH EVERY DAY 90 tablet 0    multivit-min-FA-lycopen-lutein 0.4-300-250 mg-mcg-mcg Tab Take 1 tablet by mouth once daily.      vitamin D (VITAMIN D3) 1000 units Tab Take 1,000 Units by mouth once daily.      vitamin E 100 UNIT capsule Take  "100 Units by mouth once daily.      [DISCONTINUED] pantoprazole (PROTONIX) 40 MG tablet Take 1 tablet (40 mg total) by mouth once daily. 30 tablet 1     No current facility-administered medications on file prior to visit.         Objective Findings:    Vital Signs:  /82   Pulse 84   Ht 5' 5" (1.651 m)   Wt 75.6 kg (166 lb 10.7 oz)   BMI 27.73 kg/m²   Body mass index is 27.73 kg/m².    Physical Exam:  Physical Exam  Vitals and nursing note reviewed.   Constitutional:       Appearance: She is normal weight. She is not ill-appearing.   HENT:      Mouth/Throat:      Mouth: Mucous membranes are moist.      Pharynx: Oropharynx is clear.   Eyes:      General: No scleral icterus.  Abdominal:      General: Abdomen is flat. Bowel sounds are normal. There is no distension.      Palpations: Abdomen is soft. There is no mass.      Tenderness: There is no abdominal tenderness.      Hernia: No hernia is present.   Skin:     General: Skin is warm and dry.      Capillary Refill: Capillary refill takes less than 2 seconds.      Coloration: Skin is not jaundiced or pale.   Neurological:      Mental Status: She is alert and oriented to person, place, and time. Mental status is at baseline.             Labs:  Lab Results   Component Value Date    WBC 11.05 09/17/2024    HGB 10.6 (L) 09/17/2024    HCT 32.7 (L) 09/17/2024     09/17/2024    CHOL 213 (H) 03/28/2024    TRIG 91 03/28/2024    HDL 57 03/28/2024    ALKPHOS 58 09/17/2024    ALT 7 (L) 09/17/2024    AST 12 09/17/2024     09/17/2024    K 3.3 (L) 09/17/2024     09/17/2024    CREATININE 0.8 09/17/2024    BUN 15 09/17/2024    CO2 25 09/17/2024    TSH 1.376 03/28/2024    INR 0.9 08/26/2024       Imaging reviewed: CTA Chest 9/17/2024  Impression:     1. No large discrete pulmonary embolism is identified.  The segmental and subsegmental branches are not evaluable due to respiratory motion artifact.  2. No acute pulmonary process.        Electronically signed " by:Tirso Vieira  Date:                                            09/17/2024  Time:                                           17:56      Endoscopy reviewed: Colonoscopy 9/7/2017  Impression:           - Diverticulosis in the sigmoid colon and in the                         descending colon.                         - Two small colon polyps were resected and                         retrieved.   Recommendation:       - Patient has a contact number available for                         emergencies. The signs and symptoms of potential                         delayed complications were discussed with the                         patient. Return to normal activities tomorrow.                         Written discharge instructions were provided to the                         patient.                         - Discharge patient to home.                         - Resume previous diet.                         - Continue present medications.                         - Await pathology results.                         - Repeat colonoscopy in 5 years for surveillance.   Attending Participation:        -   Albino Fenton MD   9/7/2017 11:51:18 AM     EGD 9/7/2017  Impression:           - Small hiatal hernia.   Recommendation:       - Patient has a contact number available for                         emergencies. The signs and symptoms of potential                         delayed complications were discussed with the                         patient. Return to normal activities tomorrow.                         Written discharge instructions were provided to the                         patient.                         - Discharge patient to home.                         - Resume previous diet.                         - Continue present medications.   Attending Participation:        -   Albino Fenton MD   9/7/2017 11:34:50 AM     Assessment:  1. Blood in stool    2. NSAID long-term use    3. Weakness      Orders Placed  This Encounter    CBC W/ AUTO DIFFERENTIAL    IRON AND TIBC    Ferritin    H. PYLORI ANTIBODY, IGG    pantoprazole (PROTONIX) 40 MG tablet    ferrous sulfate (FEOSOL) 325 mg (65 mg iron) Tab tablet         Plan:  Recent episodes of blood in her stool with subsequent drop in Hgb and symptomatic. Referral placed for URGENT EGD/Colonoscopy for further evaluation of causes for her blood in stool. Labwork ordered for next week. Will hold off on getting labs today since recent labs done 2 days ago. PPI increased to BID pending EGD findings. Will adjust as appropriate.   H. Pylori IgG  Referral for EGD/Colonoscopy, labs. Ferrous sulfate once daily.       Thank you for allowing me to participate in this patient's care.    Sincerely,     Naya De Jesus NP  Gastroenterology Department  [facility]

## 2024-09-19 NOTE — TELEPHONE ENCOUNTER
"----- Message from Naya De Jesus NP sent at 9/19/2024 12:49 PM CDT -----  Regarding: EGD/Colonoscopy  Procedure: EGD/Colonoscopy    Diagnosis: Iron deficiency anemia and Melena    Procedure Timing: Within 4 weeks (Urgent)    #If within 4 weeks selected, please nba as high priority#    #If greater than 12 weeks, please select "5-12 weeks" and delay sending until 3 months prior to requested date#     Location: Any Site    Additional Scheduling Information: No scheduling concerns    Prep Specifications:Standard prep    Is the patient taking a GLP-1 Agonist:no    Have you attached a patient to this message: yes  "

## 2024-09-20 ENCOUNTER — HOSPITAL ENCOUNTER (EMERGENCY)
Facility: HOSPITAL | Age: 74
Discharge: HOME | End: 2024-09-20
Attending: EMERGENCY MEDICINE
Payer: MEDICARE

## 2024-09-20 VITALS
BODY MASS INDEX: 27.66 KG/M2 | SYSTOLIC BLOOD PRESSURE: 132 MMHG | RESPIRATION RATE: 20 BRPM | HEIGHT: 65 IN | WEIGHT: 166 LBS | HEART RATE: 88 BPM | OXYGEN SATURATION: 98 % | TEMPERATURE: 98 F | DIASTOLIC BLOOD PRESSURE: 66 MMHG

## 2024-09-20 DIAGNOSIS — R53.1 GENERALIZED WEAKNESS: Primary | ICD-10-CM

## 2024-09-20 DIAGNOSIS — R53.83 FATIGUE, UNSPECIFIED TYPE: ICD-10-CM

## 2024-09-20 DIAGNOSIS — R06.09 DYSPNEA ON EXERTION: ICD-10-CM

## 2024-09-20 DIAGNOSIS — R53.1 WEAKNESS: ICD-10-CM

## 2024-09-20 LAB
ALBUMIN SERPL BCP-MCNC: 3.1 G/DL (ref 3.5–5.2)
ALP SERPL-CCNC: 63 U/L (ref 55–135)
ALT SERPL W/O P-5'-P-CCNC: 11 U/L (ref 10–44)
ANION GAP SERPL CALC-SCNC: 10 MMOL/L (ref 8–16)
AST SERPL-CCNC: 17 U/L (ref 10–40)
BASOPHILS # BLD AUTO: 0.02 K/UL (ref 0–0.2)
BASOPHILS NFR BLD: 0.2 % (ref 0–1.9)
BILIRUB SERPL-MCNC: 0.3 MG/DL (ref 0.1–1)
BILIRUB UR QL STRIP: NEGATIVE
BUN SERPL-MCNC: 20 MG/DL (ref 8–23)
CALCIUM SERPL-MCNC: 9.8 MG/DL (ref 8.7–10.5)
CHLORIDE SERPL-SCNC: 106 MMOL/L (ref 95–110)
CLARITY UR REFRACT.AUTO: CLEAR
CO2 SERPL-SCNC: 25 MMOL/L (ref 23–29)
COLOR UR AUTO: COLORLESS
CREAT SERPL-MCNC: 0.8 MG/DL (ref 0.5–1.4)
DIFFERENTIAL METHOD BLD: ABNORMAL
EOSINOPHIL # BLD AUTO: 0.1 K/UL (ref 0–0.5)
EOSINOPHIL NFR BLD: 0.6 % (ref 0–8)
ERYTHROCYTE [DISTWIDTH] IN BLOOD BY AUTOMATED COUNT: 13.6 % (ref 11.5–14.5)
EST. GFR  (NO RACE VARIABLE): >60 ML/MIN/1.73 M^2
GLUCOSE SERPL-MCNC: 115 MG/DL (ref 70–110)
GLUCOSE UR QL STRIP: NEGATIVE
HCT VFR BLD AUTO: 31.2 % (ref 37–48.5)
HCV AB SERPL QL IA: NORMAL
HGB BLD-MCNC: 9.9 G/DL (ref 12–16)
HGB UR QL STRIP: NEGATIVE
HIV 1+2 AB+HIV1 P24 AG SERPL QL IA: NORMAL
IMM GRANULOCYTES # BLD AUTO: 0.03 K/UL (ref 0–0.04)
IMM GRANULOCYTES NFR BLD AUTO: 0.3 % (ref 0–0.5)
KETONES UR QL STRIP: NEGATIVE
LEUKOCYTE ESTERASE UR QL STRIP: NEGATIVE
LYMPHOCYTES # BLD AUTO: 1.6 K/UL (ref 1–4.8)
LYMPHOCYTES NFR BLD: 17.9 % (ref 18–48)
MCH RBC QN AUTO: 29.4 PG (ref 27–31)
MCHC RBC AUTO-ENTMCNC: 31.7 G/DL (ref 32–36)
MCV RBC AUTO: 93 FL (ref 82–98)
MONOCYTES # BLD AUTO: 0.9 K/UL (ref 0.3–1)
MONOCYTES NFR BLD: 10.3 % (ref 4–15)
NEUTROPHILS # BLD AUTO: 6.2 K/UL (ref 1.8–7.7)
NEUTROPHILS NFR BLD: 70.7 % (ref 38–73)
NITRITE UR QL STRIP: NEGATIVE
NRBC BLD-RTO: 0 /100 WBC
PH UR STRIP: 7 [PH] (ref 5–8)
PLATELET # BLD AUTO: 206 K/UL (ref 150–450)
PMV BLD AUTO: 12.5 FL (ref 9.2–12.9)
POTASSIUM SERPL-SCNC: 3.2 MMOL/L (ref 3.5–5.1)
PROT SERPL-MCNC: 6.1 G/DL (ref 6–8.4)
PROT UR QL STRIP: NEGATIVE
RBC # BLD AUTO: 3.37 M/UL (ref 4–5.4)
SODIUM SERPL-SCNC: 141 MMOL/L (ref 136–145)
SP GR UR STRIP: 1.01 (ref 1–1.03)
TROPONIN I SERPL DL<=0.01 NG/ML-MCNC: 0.01 NG/ML (ref 0–0.03)
URN SPEC COLLECT METH UR: ABNORMAL
WBC # BLD AUTO: 8.72 K/UL (ref 3.9–12.7)

## 2024-09-20 PROCEDURE — 87389 HIV-1 AG W/HIV-1&-2 AB AG IA: CPT | Performed by: PHYSICIAN ASSISTANT

## 2024-09-20 PROCEDURE — 99285 EMERGENCY DEPT VISIT HI MDM: CPT | Mod: 25

## 2024-09-20 PROCEDURE — 25000003 PHARM REV CODE 250

## 2024-09-20 PROCEDURE — 80053 COMPREHEN METABOLIC PANEL: CPT | Performed by: STUDENT IN AN ORGANIZED HEALTH CARE EDUCATION/TRAINING PROGRAM

## 2024-09-20 PROCEDURE — 86803 HEPATITIS C AB TEST: CPT | Performed by: PHYSICIAN ASSISTANT

## 2024-09-20 PROCEDURE — 81003 URINALYSIS AUTO W/O SCOPE: CPT | Performed by: STUDENT IN AN ORGANIZED HEALTH CARE EDUCATION/TRAINING PROGRAM

## 2024-09-20 PROCEDURE — 85025 COMPLETE CBC W/AUTO DIFF WBC: CPT | Performed by: STUDENT IN AN ORGANIZED HEALTH CARE EDUCATION/TRAINING PROGRAM

## 2024-09-20 PROCEDURE — 93010 ELECTROCARDIOGRAM REPORT: CPT | Mod: ,,, | Performed by: INTERNAL MEDICINE

## 2024-09-20 PROCEDURE — 84484 ASSAY OF TROPONIN QUANT: CPT

## 2024-09-20 PROCEDURE — 93005 ELECTROCARDIOGRAM TRACING: CPT

## 2024-09-20 RX ORDER — POTASSIUM CHLORIDE 20 MEQ/1
60 TABLET, EXTENDED RELEASE ORAL ONCE
Status: COMPLETED | OUTPATIENT
Start: 2024-09-20 | End: 2024-09-20

## 2024-09-20 RX ORDER — SPIRONOLACTONE 50 MG/1
50 TABLET, FILM COATED ORAL DAILY
Qty: 30 TABLET | Refills: 11 | Status: SHIPPED | OUTPATIENT
Start: 2024-09-20 | End: 2025-09-20

## 2024-09-20 RX ADMIN — POTASSIUM CHLORIDE 60 MEQ: 1500 TABLET, EXTENDED RELEASE ORAL at 08:09

## 2024-09-20 NOTE — ED TRIAGE NOTES
Patient identifiers for Windy Lindo 74 y.o. female checked and correct.  Chief Complaint   Patient presents with    Weakness     Arrived via ems from home with c/o generalized weakness few days, had labs done a few days ago, was told to come to ed for low h/h     Past Medical History:   Diagnosis Date    Arthritis     Eye injury 4 yrs    hit od    Hypertension 04/04/2023    Nuclear sclerosis, bilateral 02/13/2019    Osteoporosis      Allergies reported: Review of patient's allergies indicates:  No Known Allergies      LOC: Patient is awake, alert, and aware of environment with an appropriate affect. Patient is oriented x 4 and speaking appropriately.  APPEARANCE: Patient resting comfortably and in no acute distress. Patient is clean and well groomed, patient's clothing is properly fastened.  SKIN: The skin is warm and dry. Patient has normal skin turgor and moist mucus membranes.   MUSKULOSKELETAL: Patient is moving all extremities well, no obvious deformities noted. Pulses intact. Generalized weakness  RESPIRATORY: Airway is open and patent. Respirations are spontaneous and non-labored with normal effort and rate. +SOB  CARDIAC: Patient has a normal rate and rhythm. Normal sinus on cardiac monitor. No peripheral edema noted.   ABDOMEN: No distention noted. Soft and non-tender upon palpation.  NEUROLOGICAL:  PERRL. Facial expression is symmetrical. Hand grasps are equal bilaterally. Normal sensation in all extremities when touched with finger.

## 2024-09-20 NOTE — ED PROVIDER NOTES
Encounter Date: 9/20/2024       History     Chief Complaint   Patient presents with    Weakness     Arrived via ems from home with c/o generalized weakness few days, had labs done a few days ago, was told to come to ed for low h/h     Windy Lindo is a 74 y.o. female with PMH significant for osteoporosis, HTN, and T6-T8 vertebra fx s/p kyphoplasty  presenting with weakness. She states sxs began after a 2 day spell of bloody diarrhea one week ago. She endorses weakness, fatigue, SOB worse with exertion, and decreased appetite. She states she came to the ED earlier this week for similar c/o with H/H of 10.6/32.7. Pt had GI appointment yesterday and has colonoscopy and EGD scheduled in October. Patient denies any head trauma or LOC. Denies f/c, n/v, chest pain.     Walks w/out assisted devices at baseline. Doesn't take any anticoagulation at baseline.     H/H on arrival 9.9/31.2. Potassium 3.2. Vitals wnl.             Review of patient's allergies indicates:  No Known Allergies  Past Medical History:   Diagnosis Date    Arthritis     Eye injury 4 yrs    hit od    Hypertension 04/04/2023    Nuclear sclerosis, bilateral 02/13/2019    Osteoporosis      Past Surgical History:   Procedure Laterality Date    APPENDECTOMY      COLONOSCOPY N/A 9/7/2017    Procedure: COLONOSCOPY;  Surgeon: Albino Fenton MD;  Location: [address]);  Service: Endoscopy;  Laterality: N/A;    ESOPHAGOGASTRODUODENOSCOPY  09/07/2017    KNEE SURGERY Right 12/05/2017    TKR    LASIK  7 yrs    ou    TONSILLECTOMY       Family History   Problem Relation Name Age of Onset    Hypertension Mother      Glaucoma Mother      Diabetes Mother      Cataracts Mother      Cancer Mother  74        lung    Heart disease Mother  55    Hypertension Father      Heart disease Father          onset early 60;s, Aortic valve replacement ,Rheumatic fever as a child     No Known Problems Sister      Diabetes Brother      Cancer Brother  66        mesothelioma     No Known Problems Maternal Aunt      No Known Problems Maternal Uncle      No Known Problems Paternal Aunt      No Known Problems Paternal Uncle      No Known Problems Maternal Grandmother      No Known Problems Maternal Grandfather      No Known Problems Paternal Grandmother      No Known Problems Paternal Grandfather      No Known Problems Other      Amblyopia Neg Hx      Blindness Neg Hx      Macular degeneration Neg Hx      Retinal detachment Neg Hx      Strabismus Neg Hx      Stroke Neg Hx      Thyroid disease Neg Hx      Melanoma Neg Hx       Social History     Tobacco Use    Smoking status: Never    Smokeless tobacco: Never   Substance Use Topics    Alcohol use: Not Currently     Alcohol/week: 1.0 standard drink of alcohol     Types: 1 Glasses of wine per week     Comment: glass of wine a night     Drug use: No     Review of Systems   Constitutional:  Positive for appetite change and fatigue. Negative for chills, diaphoresis and fever.   HENT:  Negative for hearing loss, sneezing and sore throat.    Respiratory:  Positive for shortness of breath. Negative for cough and chest tightness.    Cardiovascular:  Negative for chest pain, palpitations and leg swelling.   Gastrointestinal:  Positive for diarrhea. Negative for abdominal distention, abdominal pain, blood in stool, nausea and vomiting.   Endocrine: Positive for cold intolerance.   Musculoskeletal:  Positive for arthralgias and back pain. Negative for myalgias.   Neurological:  Positive for dizziness, weakness and light-headedness. Negative for seizures, syncope, facial asymmetry, speech difficulty, numbness and headaches.   Psychiatric/Behavioral:  Negative for confusion and decreased concentration.        Physical Exam     Initial Vitals [09/20/24 1603]   BP Pulse Resp Temp SpO2   124/84 98 18 98.8 °F (37.1 °C) 100 %      MAP       --         Physical Exam    Constitutional: She is not diaphoretic. No distress.   Eyes: EOM are normal.   Neck:   Normal  range of motion.  Cardiovascular:  Normal rate and regular rhythm.           Pulmonary/Chest: Breath sounds normal.   Abdominal: Abdomen is soft. Bowel sounds are normal. She exhibits no distension. There is no abdominal tenderness. There is no rebound and no guarding.   Musculoskeletal:      Cervical back: Normal range of motion.     Neurological: She is alert and oriented to person, place, and time. She has normal strength.   Skin: Skin is warm and dry.   Psychiatric: She has a normal mood and affect. Thought content normal.         ED Course   Procedures  Labs Reviewed   CBC W/ AUTO DIFFERENTIAL - Abnormal       Result Value    WBC 8.72      RBC 3.37 (*)     Hemoglobin 9.9 (*)     Hematocrit 31.2 (*)     MCV 93      MCH 29.4      MCHC 31.7 (*)     RDW 13.6      Platelets 206      MPV 12.5      Immature Granulocytes 0.3      Gran # (ANC) 6.2      Immature Grans (Abs) 0.03      Lymph # 1.6      Mono # 0.9      Eos # 0.1      Baso # 0.02      nRBC 0      Gran % 70.7      Lymph % 17.9 (*)     Mono % 10.3      Eosinophil % 0.6      Basophil % 0.2      Differential Method Automated     COMPREHENSIVE METABOLIC PANEL - Abnormal    Sodium 141      Potassium 3.2 (*)     Chloride 106      CO2 25      Glucose 115 (*)     BUN 20      Creatinine 0.8      Calcium 9.8      Total Protein 6.1      Albumin 3.1 (*)     Total Bilirubin 0.3      Alkaline Phosphatase 63      AST 17      ALT 11      eGFR >60.0      Anion Gap 10     URINALYSIS, REFLEX TO URINE CULTURE - Abnormal    Specimen UA Urine, Clean Catch      Color, UA Colorless (*)     Appearance, UA Clear      pH, UA 7.0      Specific Gravity, UA 1.010      Protein, UA Negative      Glucose, UA Negative      Ketones, UA Negative      Bilirubin (UA) Negative      Occult Blood UA Negative      Nitrite, UA Negative      Leukocytes, UA Negative      Narrative:     Specimen Source->Urine   HIV 1 / 2 ANTIBODY    HIV 1/2 Ag/Ab Non-reactive      Narrative:     Release to  patient->Immediate   HEPATITIS C ANTIBODY    Hepatitis C Ab Non-reactive      Narrative:     Release to patient->Immediate   TROPONIN I    Troponin I 0.007          ECG Results              EKG 12-lead (Final result)        Collection Time Result Time QRS Duration OHS QTC Calculation    09/20/24 16:06:49 09/21/24 09:19:51 88 430                     Final result by Interface, Lab In [facility] (09/21/24 09:19:53)                   Narrative:    Test Reason : R53.1,    Vent. Rate : 092 BPM     Atrial Rate : 092 BPM     P-R Int : 176 ms          QRS Dur : 088 ms      QT Int : 348 ms       P-R-T Axes : 049 026 043 degrees     QTc Int : 430 ms    Sinus rhythm with Premature supraventricular complexes  Otherwise normal ECG  When compared with ECG of 17-SEP-2024 17:15,  Premature supraventricular complexes are now Present  Confirmed by BEN SALCEDO, HOMEYAR ([id number]) on 9/21/2024 9:19:46 AM    Referred By: [email or web address]   SELF           Confirmed By:HOMEYAR BEN SALCEDO                                  Imaging Results              X-Ray Chest AP Portable (Final result)  Result time 09/20/24 22:50:17      Final result by Bj Sears DO (09/20/24 22:50:17)                   Impression:      No acute abnormality.      Electronically signed by: Bj Sears  Date:    09/20/2024  Time:    22:50               Narrative:    EXAMINATION:  XR CHEST AP PORTABLE    CLINICAL HISTORY:  Weakness    TECHNIQUE:  Single frontal view of the chest was performed.    COMPARISON:  09/17/2024.    FINDINGS:  The lungs are well expanded and clear. No focal opacities are seen. The pleural spaces are clear. The cardiac silhouette is unremarkable.  There are calcifications of the aortic arch.  The visualized osseous structures demonstrate degenerative changes.  There are remote right-sided rib fractures.                                       Medications   potassium chloride SA CR tablet 60 mEq (60 mEq Oral Given 9/20/24 2041)     Medical Decision  Hema Lindo is a 74 y.o. female with PMH significant for osteoporosis, HTN, and T6-T8 vertebra fx s/p kyphoplasty  presenting with weakness. She states sxs began after a 2 day spell of bloody diarrhea one week ago. She endorses weakness, fatigue, SOB worse with exertion, and decreased appetite. She states she came to the ED earlier this week for similar c/o with H/H of 10.6/32.7. Pt had GI appointment yesterday and has colonoscopy and EGD scheduled in October. Patient denies any head trauma or LOC. Denies f/c, n/v, chest pain.     Walks w/out assisted devices at baseline. Doesn't take any anticoagulation at baseline.     H/H on arrival 9.9/31.2. Potassium 3.2. Vitals wnl.     CBC  CMP  UA  Potassium     Dispo: Patient is discharged with HTN medication changed to Aldactone.     Amount and/or Complexity of Data Reviewed  Radiology: ordered.    Risk  Prescription drug management.              Attending Attestation:   Physician Attestation Statement for Resident:  As the supervising MD   Physician Attestation Statement: I have personally seen and examined this patient.   I agree with the above history.  -:   As the supervising MD I agree with the above PE.     As the supervising MD I agree with the above treatment, course, plan, and disposition.                Attending ED Notes:   STAFF ATTENDING PHYSICIAN NOTE:  I have individually/jointly evaluated Windy Lindo and discussed their ED management with the resident physician. I have also reviewed their notes, assessments, and procedures documented.  I was present during all critical portions of any procedure(s) performed on Windy Lindo.   ____________________  Tylor Fagan MD, [facility]  Emergency Medicine Staff                               Clinical Impression:  Final diagnoses:  [R53.1] Weakness  [R53.1] Generalized weakness (Primary)  [R53.83] Fatigue, unspecified type  [R06.09] Dyspnea on exertion          ED Disposition Condition    Discharge Stable           ED Prescriptions       Medication Sig Dispense Start Date End Date Auth. Provider    spironolactone (ALDACTONE) 50 MG tablet Take 1 tablet (50 mg total) by mouth once daily. 30 tablet 9/20/2024 9/20/2025 Tj West MD          Follow-up Information       Follow up With Specialties Details Why Contact Info    Trisha Higginbotham MD Family Medicine, Wound Care Schedule an appointment as soon as possible for a visit in 3 days  [address] [facility] [address]  [phone]      Alysia Briseno MD Cardiology, Interventional Cardiology Schedule an appointment as soon as possible for a visit in 1 week  [address]  [address]  [facility] [address]  [phone]               Tj West MD  Resident  09/20/24 2253       Rufino Fagan MD  09/24/24 0606

## 2024-09-21 LAB
OHS QRS DURATION: 88 MS
OHS QTC CALCULATION: 430 MS

## 2024-09-21 NOTE — ED NOTES
APPEARANCE: awake and alert in NAD.  SKIN: warm, dry and intact. No breakdown or bruising.  MUSCULOSKELETAL: Patient moving all extremities spontaneously, no obvious swelling or deformities noted. Ambulates independently. Generalized weakness.   RESPIRATORY: Denies shortness of breath.Respirations unlabored.   CARDIAC: Denies CP, 2+ distal pulses; no peripheral edema.   ABDOMEN: S/ND/NT, Denies nausea  : voids spontaneously, denies difficulty  Neurologic: AAO x 4; follows commands equal strength in all extremities; denies numbness/tingling. Denies dizziness, SHARATH, pupils 3mm. + Lightheadedness

## 2024-09-21 NOTE — ED NOTES
Report received from MEGAN Perez. Assume care of pt. Pt resting comfortably and independently repositioned in stretcher with bed locked in lowest position for safety. NAD noted at this time. Respirations even and unlabored and visible chest rise noted.  Patient offered bathroom assistance and denies need at this time. Pt instructed to call if assistance is needed. Pt on continuous cardiac, BP, and O2 monitoring. Call light within reach. Family at bedside. No needs at this time. Will continue to monitor.

## 2024-09-21 NOTE — DISCHARGE INSTRUCTIONS
Future Appointments   Date Time Provider Department Center   9/26/2024 10:30 AM LAB, [facility] LAB [place]   10/3/2024  9:45 AM [facility] OIC-CT2 [address] LIM[address] [facility] IC [facility]   10/3/2024 11:30 AM Monalisa Mcclain PA-C [facility] NEUROS[address]   10/7/2024 10:40 AM Trisha Higginbotham MD [facility] MED [place]   11/19/2024  1:45 PM INJECTION [facility] AMB INF [facility]     Return to the emergency department if any acute concerns especially chest pain, worsening shortness of breath.

## 2024-09-22 ENCOUNTER — PATIENT MESSAGE (OUTPATIENT)
Dept: FAMILY MEDICINE | Facility: CLINIC | Age: 74
End: 2024-09-22
Payer: MEDICARE

## 2024-09-24 ENCOUNTER — PATIENT MESSAGE (OUTPATIENT)
Dept: GASTROENTEROLOGY | Facility: CLINIC | Age: 74
End: 2024-09-24
Payer: MEDICARE

## 2024-09-25 DIAGNOSIS — Z00.00 ENCOUNTER FOR MEDICARE ANNUAL WELLNESS EXAM: ICD-10-CM

## 2024-09-26 ENCOUNTER — LAB VISIT (OUTPATIENT)
Dept: LAB | Facility: HOSPITAL | Age: 74
End: 2024-09-26
Payer: MEDICARE

## 2024-09-26 DIAGNOSIS — K92.1 BLOOD IN STOOL: ICD-10-CM

## 2024-09-26 LAB
BASOPHILS # BLD AUTO: 0.03 K/UL (ref 0–0.2)
BASOPHILS NFR BLD: 0.3 % (ref 0–1.9)
DIFFERENTIAL METHOD BLD: ABNORMAL
EOSINOPHIL # BLD AUTO: 0.1 K/UL (ref 0–0.5)
EOSINOPHIL NFR BLD: 0.7 % (ref 0–8)
ERYTHROCYTE [DISTWIDTH] IN BLOOD BY AUTOMATED COUNT: 14.4 % (ref 11.5–14.5)
FERRITIN SERPL-MCNC: 81 NG/ML (ref 20–300)
HCT VFR BLD AUTO: 37.4 % (ref 37–48.5)
HGB BLD-MCNC: 11.4 G/DL (ref 12–16)
IMM GRANULOCYTES # BLD AUTO: 0.02 K/UL (ref 0–0.04)
IMM GRANULOCYTES NFR BLD AUTO: 0.2 % (ref 0–0.5)
IRON SERPL-MCNC: 44 UG/DL (ref 30–160)
LYMPHOCYTES # BLD AUTO: 1.6 K/UL (ref 1–4.8)
LYMPHOCYTES NFR BLD: 17.9 % (ref 18–48)
MCH RBC QN AUTO: 29.2 PG (ref 27–31)
MCHC RBC AUTO-ENTMCNC: 30.5 G/DL (ref 32–36)
MCV RBC AUTO: 96 FL (ref 82–98)
MONOCYTES # BLD AUTO: 0.7 K/UL (ref 0.3–1)
MONOCYTES NFR BLD: 7.3 % (ref 4–15)
NEUTROPHILS # BLD AUTO: 6.5 K/UL (ref 1.8–7.7)
NEUTROPHILS NFR BLD: 73.6 % (ref 38–73)
NRBC BLD-RTO: 0 /100 WBC
PLATELET # BLD AUTO: 254 K/UL (ref 150–450)
PMV BLD AUTO: 12.5 FL (ref 9.2–12.9)
RBC # BLD AUTO: 3.91 M/UL (ref 4–5.4)
SATURATED IRON: 16 % (ref 20–50)
TOTAL IRON BINDING CAPACITY: 278 UG/DL (ref 250–450)
TRANSFERRIN SERPL-MCNC: 188 MG/DL (ref 200–375)
WBC # BLD AUTO: 8.86 K/UL (ref 3.9–12.7)

## 2024-09-26 PROCEDURE — 82728 ASSAY OF FERRITIN: CPT

## 2024-09-26 PROCEDURE — 85025 COMPLETE CBC W/AUTO DIFF WBC: CPT

## 2024-09-26 PROCEDURE — 36415 COLL VENOUS BLD VENIPUNCTURE: CPT | Mod: PO

## 2024-09-26 PROCEDURE — 83540 ASSAY OF IRON: CPT

## 2024-09-30 ENCOUNTER — PATIENT MESSAGE (OUTPATIENT)
Dept: GASTROENTEROLOGY | Facility: CLINIC | Age: 74
End: 2024-09-30
Payer: MEDICARE

## 2024-10-01 ENCOUNTER — HOSPITAL ENCOUNTER (EMERGENCY)
Facility: HOSPITAL | Age: 74
Discharge: HOME | End: 2024-10-01
Attending: EMERGENCY MEDICINE
Payer: MEDICARE

## 2024-10-01 ENCOUNTER — TELEPHONE (OUTPATIENT)
Dept: ENDOSCOPY | Facility: HOSPITAL | Age: 74
End: 2024-10-01
Payer: MEDICARE

## 2024-10-01 VITALS
HEART RATE: 76 BPM | SYSTOLIC BLOOD PRESSURE: 169 MMHG | HEIGHT: 65 IN | RESPIRATION RATE: 17 BRPM | OXYGEN SATURATION: 99 % | WEIGHT: 162 LBS | DIASTOLIC BLOOD PRESSURE: 72 MMHG | TEMPERATURE: 99 F | BODY MASS INDEX: 26.99 KG/M2

## 2024-10-01 DIAGNOSIS — R53.81 PHYSICAL DECONDITIONING: ICD-10-CM

## 2024-10-01 DIAGNOSIS — R06.02 SOB (SHORTNESS OF BREATH): ICD-10-CM

## 2024-10-01 DIAGNOSIS — R53.1 WEAKNESS: Primary | ICD-10-CM

## 2024-10-01 LAB
ALBUMIN SERPL BCP-MCNC: 3.6 G/DL (ref 3.5–5.2)
ALP SERPL-CCNC: 59 U/L (ref 55–135)
ALT SERPL W/O P-5'-P-CCNC: 10 U/L (ref 10–44)
ANION GAP SERPL CALC-SCNC: 8 MMOL/L (ref 8–16)
AST SERPL-CCNC: 14 U/L (ref 10–40)
BASOPHILS # BLD AUTO: 0.03 K/UL (ref 0–0.2)
BASOPHILS NFR BLD: 0.3 % (ref 0–1.9)
BILIRUB SERPL-MCNC: 0.4 MG/DL (ref 0.1–1)
BILIRUB UR QL STRIP: NEGATIVE
BNP SERPL-MCNC: 18 PG/ML (ref 0–99)
BUN SERPL-MCNC: 15 MG/DL (ref 8–23)
CALCIUM SERPL-MCNC: 10 MG/DL (ref 8.7–10.5)
CHLORIDE SERPL-SCNC: 111 MMOL/L (ref 95–110)
CLARITY UR REFRACT.AUTO: CLEAR
CO2 SERPL-SCNC: 23 MMOL/L (ref 23–29)
COLOR UR AUTO: YELLOW
CREAT SERPL-MCNC: 0.8 MG/DL (ref 0.5–1.4)
DIFFERENTIAL METHOD BLD: ABNORMAL
EOSINOPHIL # BLD AUTO: 0 K/UL (ref 0–0.5)
EOSINOPHIL NFR BLD: 0.1 % (ref 0–8)
ERYTHROCYTE [DISTWIDTH] IN BLOOD BY AUTOMATED COUNT: 14.1 % (ref 11.5–14.5)
EST. GFR  (NO RACE VARIABLE): >60 ML/MIN/1.73 M^2
GLUCOSE SERPL-MCNC: 111 MG/DL (ref 70–110)
GLUCOSE UR QL STRIP: NEGATIVE
HCT VFR BLD AUTO: 36.3 % (ref 37–48.5)
HGB BLD-MCNC: 11.6 G/DL (ref 12–16)
HGB UR QL STRIP: NEGATIVE
IMM GRANULOCYTES # BLD AUTO: 0.03 K/UL (ref 0–0.04)
IMM GRANULOCYTES NFR BLD AUTO: 0.3 % (ref 0–0.5)
KETONES UR QL STRIP: ABNORMAL
LEUKOCYTE ESTERASE UR QL STRIP: NEGATIVE
LYMPHOCYTES # BLD AUTO: 1.5 K/UL (ref 1–4.8)
LYMPHOCYTES NFR BLD: 14.2 % (ref 18–48)
MCH RBC QN AUTO: 29.3 PG (ref 27–31)
MCHC RBC AUTO-ENTMCNC: 32 G/DL (ref 32–36)
MCV RBC AUTO: 92 FL (ref 82–98)
MONOCYTES # BLD AUTO: 0.8 K/UL (ref 0.3–1)
MONOCYTES NFR BLD: 7.7 % (ref 4–15)
NEUTROPHILS # BLD AUTO: 7.9 K/UL (ref 1.8–7.7)
NEUTROPHILS NFR BLD: 77.4 % (ref 38–73)
NITRITE UR QL STRIP: NEGATIVE
NRBC BLD-RTO: 0 /100 WBC
OHS QRS DURATION: 84 MS
OHS QTC CALCULATION: 423 MS
PH UR STRIP: 8 [PH] (ref 5–8)
PLATELET # BLD AUTO: 236 K/UL (ref 150–450)
PMV BLD AUTO: 11.9 FL (ref 9.2–12.9)
POTASSIUM SERPL-SCNC: 4.1 MMOL/L (ref 3.5–5.1)
PROT SERPL-MCNC: 6.7 G/DL (ref 6–8.4)
PROT UR QL STRIP: NEGATIVE
RBC # BLD AUTO: 3.96 M/UL (ref 4–5.4)
SODIUM SERPL-SCNC: 142 MMOL/L (ref 136–145)
SP GR UR STRIP: 1.01 (ref 1–1.03)
TSH SERPL DL<=0.005 MIU/L-ACNC: 1.66 UIU/ML (ref 0.4–4)
URN SPEC COLLECT METH UR: ABNORMAL
WBC # BLD AUTO: 10.19 K/UL (ref 3.9–12.7)

## 2024-10-01 PROCEDURE — 85025 COMPLETE CBC W/AUTO DIFF WBC: CPT | Performed by: EMERGENCY MEDICINE

## 2024-10-01 PROCEDURE — 93010 ELECTROCARDIOGRAM REPORT: CPT | Mod: ,,, | Performed by: INTERNAL MEDICINE

## 2024-10-01 PROCEDURE — 83880 ASSAY OF NATRIURETIC PEPTIDE: CPT | Performed by: EMERGENCY MEDICINE

## 2024-10-01 PROCEDURE — 96360 HYDRATION IV INFUSION INIT: CPT

## 2024-10-01 PROCEDURE — 99285 EMERGENCY DEPT VISIT HI MDM: CPT | Mod: 25

## 2024-10-01 PROCEDURE — 81003 URINALYSIS AUTO W/O SCOPE: CPT | Performed by: EMERGENCY MEDICINE

## 2024-10-01 PROCEDURE — 93005 ELECTROCARDIOGRAM TRACING: CPT

## 2024-10-01 PROCEDURE — 80053 COMPREHEN METABOLIC PANEL: CPT | Performed by: EMERGENCY MEDICINE

## 2024-10-01 PROCEDURE — 84443 ASSAY THYROID STIM HORMONE: CPT | Performed by: EMERGENCY MEDICINE

## 2024-10-01 RX ORDER — PANTOPRAZOLE SODIUM 20 MG/1
20 TABLET, DELAYED RELEASE ORAL DAILY
Qty: 30 TABLET | Refills: 0 | Status: ON HOLD | OUTPATIENT
Start: 2024-10-01 | End: 2024-10-10 | Stop reason: HOSPADM

## 2024-10-01 NOTE — ED TRIAGE NOTES
Windy Lindo, a 74 y.o. female presents to the ED w/ complaint of SOB, dizziness, fatigue starting this morning    Triage note:  Chief Complaint   Patient presents with    Fatigue     Generalized weakness, dizziness, and SOB. Has had several similar recent episodes. Seeking reevaluation.      Review of patient's allergies indicates:  No Known Allergies  Past Medical History:   Diagnosis Date    Arthritis     Eye injury 4 yrs    hit od    Hypertension 04/04/2023    Nuclear sclerosis, bilateral 02/13/2019    Osteoporosis          APPEARANCE: awake and alert in NAD. PAIN  4/10  SKIN: warm, dry and intact. No breakdown or bruising.  MUSCULOSKELETAL: Patient moving all extremities spontaneously, no obvious swelling or deformities noted. Ambulates independently.  RESPIRATORY: endorses shortness of breath.Respirations unlabored.   CARDIAC: Denies CP, 2+ distal pulses; no peripheral edema  ABDOMEN: S/ND/NT, Denies nausea  : voids spontaneously, denies difficulty  Neurologic: AAO x 4; follows commands equal strength in all extremities; denies numbness/tingling. endorses dizziness

## 2024-10-01 NOTE — ED PROVIDER NOTES
Source of History:  Patient and cousin    Chief complaint:  Fatigue (Generalized weakness, dizziness, and SOB. Has had several similar recent episodes. Seeking reevaluation. )      HPI:  Winyd Lindo is a 74 y.o. female presenting with weakness dizziness and shaking that presented this morning after the patient had cleaned her kitchen.  She denied any chest pain did have some shortness of breath, no diaphoresis and no nausea or vomiting.  Patient has had a prolonged course of this dating back to a few months ago when she fell at home and fractured vertebrae in her back requiring kyphoplasty repair.  Since then she has been severely weakened and had a severely diminished appetite losing approximately 12 lb over this time.  There was also bloody diarrhea for which she has been worked up, and has an endoscopy scheduled.  However she denies any blood in her stool now in any dark or tarry appearance to her stool.  She is moving her bowels and urinating normally.    ROS: As per HPI and below:  General:  As above.  No fever.  No chills.  Eyes: No visual changes.  Head: No headache.    Integument: No rashes or lesions.  Chest:  As above.  Cardiovascular: No chest pain.  Abdomen: No abdominal pain.  No nausea or vomiting.  Urinary: No abnormal urination.  Neurologic:  As above.  No focal weakness.  No numbness.  Hematologic: No easy bruising.  Endocrine: No excessive thirst or urination.      Review of patient's allergies indicates:  No Known Allergies    No current facility-administered medications on file prior to encounter.     Current Outpatient Medications on File Prior to Encounter   Medication Sig Dispense Refill    acetaminophen (TYLENOL) 650 MG TbSR Take 1 tablet (650 mg total) by mouth every 6 (six) hours as needed. 20 tablet 0    b complex vitamins capsule Take 1 capsule by mouth once daily.      denosumab (PROLIA) 60 mg/mL Syrg Inject 60 mg into the skin every 6 (six) months.      ferrous sulfate (FEOSOL) 325  mg (65 mg iron) Tab tablet Take 1 tablet (325 mg total) by mouth daily with breakfast. 30 tablet 5    glucosam/chond/hyalu/CF borate (MOVE FREE JOINT HEALTH ORAL) Take by mouth.      meloxicam (MOBIC) 15 MG tablet TAKE 1 TABLET(15 MG) BY MOUTH EVERY DAY 90 tablet 0    multivit-min-FA-lycopen-lutein 0.4-300-250 mg-mcg-mcg Tab Take 1 tablet by mouth once daily.      spironolactone (ALDACTONE) 50 MG tablet Take 1 tablet (50 mg total) by mouth once daily. 30 tablet 11    vitamin D (VITAMIN D3) 1000 units Tab Take 1,000 Units by mouth once daily.      vitamin E 100 UNIT capsule Take 100 Units by mouth once daily.      [DISCONTINUED] pantoprazole (PROTONIX) 40 MG tablet Take 1 tablet (40 mg total) by mouth 2 (two) times daily. 60 tablet 3       PMH:  As per HPI and below:  Past Medical History:   Diagnosis Date    Arthritis     Eye injury 4 yrs    hit od    Hypertension 04/04/2023    Nuclear sclerosis, bilateral 02/13/2019    Osteoporosis      Past Surgical History:   Procedure Laterality Date    APPENDECTOMY      COLONOSCOPY N/A 9/7/2017    Procedure: COLONOSCOPY;  Surgeon: Albino Fenton MD;  Location: [facility] ([address] [place] [address]);  Service: Endoscopy;  Laterality: N/A;    ESOPHAGOGASTRODUODENOSCOPY  09/07/2017    KNEE SURGERY Right 12/05/2017    TKR    LASIK  7 yrs    ou    TONSILLECTOMY         Social History     Socioeconomic History    Marital status: Single   Occupational History     Employer: [employer]   Tobacco Use    Smoking status: Never    Smokeless tobacco: Never   Substance and Sexual Activity    Alcohol use: Not Currently     Alcohol/week: 1.0 standard drink of alcohol     Types: 1 Glasses of wine per week     Comment: glass of wine a night     Drug use: No    Sexual activity: Never     Social Drivers of Health     Financial Resource Strain: Low Risk  (4/15/2024)    Overall Financial Resource Strain (CARDIA)     Difficulty of Paying Living Expenses: Not very hard   Food Insecurity: No Food Insecurity (4/15/2024)     Hunger Vital Sign     Worried About Running Out of Food in the Last Year: Never true     Ran Out of Food in the Last Year: Never true   Transportation Needs: No Transportation Needs (4/15/2024)    PRAPARE - Transportation     Lack of Transportation (Medical): No     Lack of Transportation (Non-Medical): No   Physical Activity: Sufficiently Active (4/15/2024)    Exercise Vital Sign     Days of Exercise per Week: 4 days     Minutes of Exercise per Session: 40 min   Recent Concern: Physical Activity - Insufficiently Active (1/26/2024)    Exercise Vital Sign     Days of Exercise per Week: 5 days     Minutes of Exercise per Session: 20 min   Stress: No Stress Concern Present (4/15/2024)    [place] [employer] - Occupational Stress Questionnaire     Feeling of Stress : Not at all   Housing Stability: Low Risk  (4/15/2024)    Housing Stability Vital Sign     Unable to Pay for Housing in the Last Year: No     Homeless in the Last Year: No       Family History   Problem Relation Name Age of Onset    Hypertension Mother      Glaucoma Mother      Diabetes Mother      Cataracts Mother      Cancer Mother  74        lung    Heart disease Mother  55    Hypertension Father      Heart disease Father          onset early 60;s, Aortic valve replacement ,Rheumatic fever as a child     No Known Problems Sister      Diabetes Brother      Cancer Brother  66        mesothelioma    No Known Problems Maternal Aunt      No Known Problems Maternal Uncle      No Known Problems Paternal Aunt      No Known Problems Paternal Uncle      No Known Problems Maternal Grandmother      No Known Problems Maternal Grandfather      No Known Problems Paternal Grandmother      No Known Problems Paternal Grandfather      No Known Problems Other      Amblyopia Neg Hx      Blindness Neg Hx      Macular degeneration Neg Hx      Retinal detachment Neg Hx      Strabismus Neg Hx      Stroke Neg Hx      Thyroid disease Neg Hx      Melanoma Neg Hx          Physical Exam:    Vitals:    10/01/24 1353 10/01/24 1359 10/01/24 1423 10/01/24 1607   BP: (!) 141/86 138/77 132/83 (!) 169/72   BP Location: Left arm Left arm     Patient Position: Sitting Standing     Pulse: 82 100 77 76   Resp: 14 16 14 17   Temp:   98.4 °F (36.9 °C) 98.5 °F (36.9 °C)   TempSrc:   Oral Oral   SpO2: 98%  95% 99%   Weight:       Height:         Appearance: No acute distress.  Skin: No rashes seen.  Good turgor.  No abrasions.  No ecchymoses.  Eyes: No conjunctival injection.  ENT: Oropharynx clear.    Chest: Clear to auscultation bilaterally.  Good air movement.  No wheezes.  No rhonchi.  Cardiovascular: Regular rate and rhythm.  No murmurs. No gallops. No rubs.  Abdomen: Soft.  Not distended.  Nontender.  No guarding.  No rebound.  Musculoskeletal: Good range of motion all joints.  No deformities.  Neck supple.  No meningismus.  Neurologic:  Patient is able to ambulate with my assistance, however she feels somewhat unsteady on her feet.  She did not exhibit any diaphoresis or signs that she was about to pass out or walking with me.  As we continued to walk her gait got stronger as did her coordination.  She does have a limp from her aforementioned injury that required her kyphoplasty.  Motor intact.  Sensation intact.  Cerebellar intact.  Cranial nerves intact.  Mental Status:  Alert and oriented x 3.  Appropriate, conversant.      Initial Impression:  Weakness    Labs Reviewed   CBC W/ AUTO DIFFERENTIAL - Abnormal       Result Value    WBC 10.19      RBC 3.96 (*)     Hemoglobin 11.6 (*)     Hematocrit 36.3 (*)     MCV 92      MCH 29.3      MCHC 32.0      RDW 14.1      Platelets 236      MPV 11.9      Immature Granulocytes 0.3      Gran # (ANC) 7.9 (*)     Immature Grans (Abs) 0.03      Lymph # 1.5      Mono # 0.8      Eos # 0.0      Baso # 0.03      nRBC 0      Gran % 77.4 (*)     Lymph % 14.2 (*)     Mono % 7.7      Eosinophil % 0.1      Basophil % 0.3      Differential Method Automated      COMPREHENSIVE METABOLIC PANEL - Abnormal    Sodium 142      Potassium 4.1      Chloride 111 (*)     CO2 23      Glucose 111 (*)     BUN 15      Creatinine 0.8      Calcium 10.0      Total Protein 6.7      Albumin 3.6      Total Bilirubin 0.4      Alkaline Phosphatase 59      AST 14      ALT 10      eGFR >60.0      Anion Gap 8      Narrative:     add on tsh per  /order#[id number] @ 10/01/2024  14:04    URINALYSIS, REFLEX TO URINE CULTURE - Abnormal    Specimen UA Urine, Clean Catch      Color, UA Yellow      Appearance, UA Clear      pH, UA 8.0      Specific Gravity, UA 1.010      Protein, UA Negative      Glucose, UA Negative      Ketones, UA 1+ (*)     Bilirubin (UA) Negative      Occult Blood UA Negative      Nitrite, UA Negative      Leukocytes, UA Negative      Narrative:     Specimen Source->Urine   B-TYPE NATRIURETIC PEPTIDE    BNP 18      Narrative:     add on tsh per  /order#[id number] @ 10/01/2024  14:04    TSH   TSH    TSH 1.657      Narrative:     add on tsh per  /order#[id number] @ 10/01/2024  14:04        I decided to obtain the patient's medical records.    Imaging Results              X-Ray Chest AP Portable (Final result)  Result time 10/01/24 15:34:02      Final result by Andreas Montoya MD (10/01/24 15:34:02)                   Impression:      No acute cardiopulmonary disease      Electronically signed by: Andreas Montoya MD  Date:    10/01/2024  Time:    15:34               Narrative:    EXAMINATION:  XR CHEST AP PORTABLE    CLINICAL HISTORY:  Chest Pain;    TECHNIQUE:  Single frontal view of the chest was performed.    COMPARISON:  09/20/2024    FINDINGS:  The heart size is normal.  Mediastinum shows aortic atherosclerosis.  Lungs are expanded and clear without acute consolidation or pleural fluid.    Skeletal structures again show a couple old appearing rib fractures on the right and a compression fracture in the midthoracic spine, which is also old.                                       Medications   sodium chloride 0.9% bolus 500 mL 500 mL (0 mLs Intravenous Stopped 10/1/24 7907)          EKG as interpreted by me: normal EKG, normal sinus rhythm, unchanged from previous tracings, 87bpm.           MDM:    74 y.o. female with Weakness.  Patient has had multiple workups in over last few weeks I will repeat her blood test today, adding a TSH as well.  At this time I believe she may be deconditioned from her injury and her diminished appetite.    Differential did consider anemia, GI bleeding, ACS, pulmonary embolism, infectious process, or metabolic derangement, however none of these were found.    Patient's workup was unremarkable, with the exception of her improved H&H count.  I do think she will benefit from home-based physical therapy followed by progression to regular physical therapy.  Patient is very eager to try this as well, and I have sent a message to her primary care physician and neurosurgeon to discuss this.  Additionally I have refilled her Protonix as that was giving her some relief from reflux and enable her to stimulate her appetite again.    Diagnostic Impression:    1. Weakness    2. SOB (shortness of breath)    3. Physical deconditioning         ED Disposition Condition    Discharge Stable          ED Prescriptions       Medication Sig Dispense Start Date End Date Auth. Provider    pantoprazole (PROTONIX) 20 MG tablet Take 1 tablet (20 mg total) by mouth once daily. 30 tablet 10/1/2024 10/1/2025 Sancho Pretty III, MD          Follow-up Information    None            Sanhco Prtety III, MD  10/01/24 9321       Sancho Pretty III, MD  10/01/24 6305

## 2024-10-01 NOTE — ED NOTES
Bed: CCR 09  Expected date: 10/1/24  Expected time: 1:59 PM  Means of arrival:   Comments:  For intake 4

## 2024-10-01 NOTE — TELEPHONE ENCOUNTER
Spoke to patient for pre-call to confirm scheduled Colonoscopy and patient verbalized understanding of the following:       Date & arrival time of procedure(s) verified 10/9/24, 10:00 AM.  Location of procedure(s) [facility] Floor verified.  NPO status reinforced. Ok to continue clear liquids until 7:00 AM.   Patient denies use of blood thinners, GLP-1 medications, and weight loss medications.  Patient confirmed receipt of prep instructions and Rx prep.  Instructions provided to patient via [facility].  Patient confirmed ride home after procedure if procedure requires anesthesia.   Pre-call screening questionnaire reviewed and completed with patient.   Appointment details are tentative, including check-in time.  If the patient begins taking any blood thinning medications, injectable weight loss/diabetes medications (other than insulin), or Adipex (phentermine) patient was instructed to contact the endoscopy scheduling department as soon as possible.  Patient was advised to call the endoscopy scheduling department if any questions or concerns arise.     [facility] Scheduling Department

## 2024-10-03 ENCOUNTER — HOSPITAL ENCOUNTER (OUTPATIENT)
Dept: RADIOLOGY | Facility: HOSPITAL | Age: 74
Discharge: HOME | End: 2024-10-03
Attending: STUDENT IN AN ORGANIZED HEALTH CARE EDUCATION/TRAINING PROGRAM
Payer: MEDICARE

## 2024-10-03 ENCOUNTER — OFFICE VISIT (OUTPATIENT)
Dept: NEUROSURGERY | Facility: CLINIC | Age: 74
End: 2024-10-03
Payer: MEDICARE

## 2024-10-03 VITALS — TEMPERATURE: 98 F | SYSTOLIC BLOOD PRESSURE: 120 MMHG | HEART RATE: 76 BPM | DIASTOLIC BLOOD PRESSURE: 74 MMHG

## 2024-10-03 DIAGNOSIS — S22.069A CLOSED FRACTURE OF EIGHTH THORACIC VERTEBRA, UNSPECIFIED FRACTURE MORPHOLOGY, INITIAL ENCOUNTER: ICD-10-CM

## 2024-10-03 DIAGNOSIS — S22.069A CLOSED FRACTURE OF EIGHTH THORACIC VERTEBRA, UNSPECIFIED FRACTURE MORPHOLOGY, INITIAL ENCOUNTER: Primary | ICD-10-CM

## 2024-10-03 PROCEDURE — 72128 CT CHEST SPINE W/O DYE: CPT | Mod: TC

## 2024-10-03 PROCEDURE — 3078F DIAST BP <80 MM HG: CPT | Mod: CPTII,S$GLB,, | Performed by: STUDENT IN AN ORGANIZED HEALTH CARE EDUCATION/TRAINING PROGRAM

## 2024-10-03 PROCEDURE — 3074F SYST BP LT 130 MM HG: CPT | Mod: CPTII,S$GLB,, | Performed by: STUDENT IN AN ORGANIZED HEALTH CARE EDUCATION/TRAINING PROGRAM

## 2024-10-03 PROCEDURE — 72131 CT LUMBAR SPINE W/O DYE: CPT | Mod: 26,,, | Performed by: RADIOLOGY

## 2024-10-03 PROCEDURE — 1101F PT FALLS ASSESS-DOCD LE1/YR: CPT | Mod: CPTII,S$GLB,, | Performed by: STUDENT IN AN ORGANIZED HEALTH CARE EDUCATION/TRAINING PROGRAM

## 2024-10-03 PROCEDURE — 1125F AMNT PAIN NOTED PAIN PRSNT: CPT | Mod: CPTII,S$GLB,, | Performed by: STUDENT IN AN ORGANIZED HEALTH CARE EDUCATION/TRAINING PROGRAM

## 2024-10-03 PROCEDURE — 99999 PR PBB SHADOW E&M-EST. PATIENT-LVL III: CPT | Mod: PBBFAC,,, | Performed by: STUDENT IN AN ORGANIZED HEALTH CARE EDUCATION/TRAINING PROGRAM

## 2024-10-03 PROCEDURE — 99213 OFFICE O/P EST LOW 20 MIN: CPT | Mod: S$GLB,,, | Performed by: STUDENT IN AN ORGANIZED HEALTH CARE EDUCATION/TRAINING PROGRAM

## 2024-10-03 PROCEDURE — 3044F HG A1C LEVEL LT 7.0%: CPT | Mod: CPTII,S$GLB,, | Performed by: STUDENT IN AN ORGANIZED HEALTH CARE EDUCATION/TRAINING PROGRAM

## 2024-10-03 PROCEDURE — 72131 CT LUMBAR SPINE W/O DYE: CPT | Mod: TC

## 2024-10-03 PROCEDURE — 3288F FALL RISK ASSESSMENT DOCD: CPT | Mod: CPTII,S$GLB,, | Performed by: STUDENT IN AN ORGANIZED HEALTH CARE EDUCATION/TRAINING PROGRAM

## 2024-10-03 PROCEDURE — 72128 CT CHEST SPINE W/O DYE: CPT | Mod: 26,,, | Performed by: RADIOLOGY

## 2024-10-07 ENCOUNTER — OFFICE VISIT (OUTPATIENT)
Dept: FAMILY MEDICINE | Facility: CLINIC | Age: 74
End: 2024-10-07
Payer: MEDICARE

## 2024-10-07 ENCOUNTER — ANESTHESIA EVENT (OUTPATIENT)
Dept: ENDOSCOPY | Facility: HOSPITAL | Age: 74
End: 2024-10-07
Payer: MEDICARE

## 2024-10-07 VITALS
TEMPERATURE: 99 F | WEIGHT: 164.25 LBS | OXYGEN SATURATION: 97 % | SYSTOLIC BLOOD PRESSURE: 132 MMHG | HEART RATE: 83 BPM | DIASTOLIC BLOOD PRESSURE: 70 MMHG | BODY MASS INDEX: 27.36 KG/M2 | HEIGHT: 65 IN

## 2024-10-07 DIAGNOSIS — I10 HYPERTENSION, UNSPECIFIED TYPE: ICD-10-CM

## 2024-10-07 DIAGNOSIS — M25.511 PAIN IN JOINT OF RIGHT SHOULDER: ICD-10-CM

## 2024-10-07 DIAGNOSIS — Z99.89 AMBULATES WITH CANE: ICD-10-CM

## 2024-10-07 DIAGNOSIS — M81.0 AGE-RELATED OSTEOPOROSIS WITHOUT CURRENT PATHOLOGICAL FRACTURE: ICD-10-CM

## 2024-10-07 DIAGNOSIS — D50.9 IRON DEFICIENCY ANEMIA, UNSPECIFIED IRON DEFICIENCY ANEMIA TYPE: Primary | ICD-10-CM

## 2024-10-07 DIAGNOSIS — R53.1 WEAKNESS: ICD-10-CM

## 2024-10-07 DIAGNOSIS — M17.11 PRIMARY OSTEOARTHRITIS OF RIGHT KNEE: ICD-10-CM

## 2024-10-07 DIAGNOSIS — Z98.890 H/O KYPHOPLASTY: ICD-10-CM

## 2024-10-07 DIAGNOSIS — I70.0 AORTIC ATHEROSCLEROSIS: ICD-10-CM

## 2024-10-07 PROCEDURE — 99214 OFFICE O/P EST MOD 30 MIN: CPT | Mod: S$GLB,,, | Performed by: FAMILY MEDICINE

## 2024-10-07 PROCEDURE — 3078F DIAST BP <80 MM HG: CPT | Mod: CPTII,S$GLB,, | Performed by: FAMILY MEDICINE

## 2024-10-07 PROCEDURE — 1125F AMNT PAIN NOTED PAIN PRSNT: CPT | Mod: CPTII,S$GLB,, | Performed by: FAMILY MEDICINE

## 2024-10-07 PROCEDURE — 1160F RVW MEDS BY RX/DR IN RCRD: CPT | Mod: CPTII,S$GLB,, | Performed by: FAMILY MEDICINE

## 2024-10-07 PROCEDURE — 3075F SYST BP GE 130 - 139MM HG: CPT | Mod: CPTII,S$GLB,, | Performed by: FAMILY MEDICINE

## 2024-10-07 PROCEDURE — 1101F PT FALLS ASSESS-DOCD LE1/YR: CPT | Mod: CPTII,S$GLB,, | Performed by: FAMILY MEDICINE

## 2024-10-07 PROCEDURE — 99999 PR PBB SHADOW E&M-EST. PATIENT-LVL V: CPT | Mod: PBBFAC,,, | Performed by: FAMILY MEDICINE

## 2024-10-07 PROCEDURE — 3288F FALL RISK ASSESSMENT DOCD: CPT | Mod: CPTII,S$GLB,, | Performed by: FAMILY MEDICINE

## 2024-10-07 PROCEDURE — 1159F MED LIST DOCD IN RCRD: CPT | Mod: CPTII,S$GLB,, | Performed by: FAMILY MEDICINE

## 2024-10-07 PROCEDURE — G2211 COMPLEX E/M VISIT ADD ON: HCPCS | Mod: S$GLB,,, | Performed by: FAMILY MEDICINE

## 2024-10-07 RX ORDER — DIPHENHYDRAMINE HYDROCHLORIDE 50 MG/ML
50 INJECTION INTRAMUSCULAR; INTRAVENOUS ONCE AS NEEDED
OUTPATIENT
Start: 2024-10-07

## 2024-10-07 RX ORDER — HEPARIN 100 UNIT/ML
500 SYRINGE INTRAVENOUS
OUTPATIENT
Start: 2024-10-07

## 2024-10-07 RX ORDER — EPINEPHRINE 0.3 MG/.3ML
0.3 INJECTION SUBCUTANEOUS ONCE AS NEEDED
OUTPATIENT
Start: 2024-10-07

## 2024-10-07 RX ORDER — SODIUM CHLORIDE 0.9 % (FLUSH) 0.9 %
10 SYRINGE (ML) INJECTION
OUTPATIENT
Start: 2024-10-07

## 2024-10-07 RX ORDER — DICLOFENAC SODIUM 10 MG/G
2 GEL TOPICAL DAILY
Qty: 200 G | Refills: 3 | Status: SHIPPED | OUTPATIENT
Start: 2024-10-07

## 2024-10-07 NOTE — ANESTHESIA PREPROCEDURE EVALUATION
10/07/2024  Windy Lindo is a 74 y.o., female.    [facility]  Anesthesia Pre-Operative Evaluation       Patient Name: Windy Lindo  YOB: 1950  MRN: [id number]  CSN: [id number]      Code Status: Prior   Date of Procedure: 10/9/2024  Anesthesia: Choice Procedure: Procedure(s) (LRB):  EGD (ESOPHAGOGASTRODUODENOSCOPY) (N/A)  COLONOSCOPY (N/A)  Pre-Operative Diagnosis: Iron deficiency anemia, unspecified iron deficiency anemia type [D50.9]  Melena [K92.1]  Proceduralist: Surgeons and Role:     * Albino Fenton MD - Primary Nurse: (Unknown)      SUBJECTIVE:   Windy Lindo is a 74 y.o. female who  has a past medical history of Arthritis, Eye injury (4 yrs), Hypertension (04/04/2023), Nuclear sclerosis, bilateral (02/13/2019), and Osteoporosis..     she has a current medication list which includes the following long-term medication(s): diclofenac sodium, pantoprazole, and spironolactone.     ALLERGIES:   Review of patient's allergies indicates:  No Known Allergies  LDA:          Lines/Drains/Airways       None                  Anesthesia Evaluation      Airway   Mallampati: II  TM distance: Normal  Neck ROM: Normal ROM  Dental      Pulmonary    Cardiovascular   (+) hypertension    ECG reviewed  Rate: Normal    Neuro/Psych      GI/Hepatic/Renal      Endo/Other    (+) arthritis  Abdominal                   MEDICATIONS:     Antibiotics (From admission, onward)      None          VTE Risk Mitigation (From admission, onward)      None              No current facility-administered medications for this encounter.     Current Outpatient Medications   Medication Sig Dispense Refill    acetaminophen (TYLENOL) 650 MG TbSR Take 1 tablet (650 mg total) by mouth every 6 (six) hours as needed. 20 tablet 0    b complex vitamins capsule Take 1 capsule by mouth once daily.      denosumab  (PROLIA) 60 mg/mL Syrg Inject 60 mg into the skin every 6 (six) months.      diclofenac sodium (VOLTAREN) 1 % Gel Apply 2 g topically once daily. 200 g 3    ferrous sulfate (FEOSOL) 325 mg (65 mg iron) Tab tablet Take 1 tablet (325 mg total) by mouth daily with breakfast. 30 tablet 5    glucosam/chond/hyalu/CF borate (MOVE FREE JOINT HEALTH ORAL) Take by mouth.      multivit-min-FA-lycopen-lutein 0.4-300-250 mg-mcg-mcg Tab Take 1 tablet by mouth once daily.      pantoprazole (PROTONIX) 20 MG tablet Take 1 tablet (20 mg total) by mouth once daily. 30 tablet 0    spironolactone (ALDACTONE) 50 MG tablet Take 1 tablet (50 mg total) by mouth once daily. 30 tablet 11    vitamin D (VITAMIN D3) 1000 units Tab Take 1,000 Units by mouth once daily.      vitamin E 100 UNIT capsule Take 100 Units by mouth once daily.            History:   There are no hospital problems to display for this patient.    Surgical History:    has a past surgical history that includes LASIK (7 yrs); Appendectomy; Tonsillectomy; Colonoscopy (N/A, 9/7/2017); Esophagogastroduodenoscopy (09/07/2017); and Knee surgery (Right, 12/05/2017).   Social History:    reports never being sexually active.  reports that she has never smoked. She has never used smokeless tobacco. She reports that she does not currently use alcohol after a past usage of about 1.0 standard drink of alcohol per week. She reports that she does not use drugs.     OBJECTIVE:     Vital Signs (Most Recent):    Vital Signs Range (Last 24H):  Temp:  [36.9 °C (98.5 °F)]   Pulse:  [83]   BP: (132)/(70)   SpO2:  [97 %]        There is no height or weight on file to calculate BMI.   Wt Readings from Last 4 Encounters:   10/07/24 74.5 kg (164 lb 3.9 oz)   10/01/24 73.5 kg (162 lb)   09/20/24 75.3 kg (166 lb)   09/19/24 75.6 kg (166 lb 10.7 oz)       Significant Labs:  Lab Results   Component Value Date    WBC 10.19 10/01/2024    HGB 11.6 (L) 10/01/2024    HCT 36.3 (L) 10/01/2024      10/01/2024     10/01/2024    K 4.1 10/01/2024     (H) 10/01/2024    CREATININE 0.8 10/01/2024    BUN 15 10/01/2024    CO2 23 10/01/2024     (H) 10/01/2024    CALCIUM 10.0 10/01/2024    MG 2.0 09/17/2024    ALKPHOS 59 10/01/2024    ALT 10 10/01/2024    AST 14 10/01/2024    ALBUMIN 3.6 10/01/2024    INR 0.9 08/26/2024    TROPONINI 0.007 09/20/2024    BNP 18 10/01/2024     No LMP recorded. Patient is postmenopausal.  No results found for this or any previous visit (from the past 72 hours).    EKG:   Results for orders placed or performed during the hospital encounter of 10/01/24   EKG 12-lead (Shortness of Breath) Age > 50    Collection Time: 10/01/24 12:34 PM   Result Value Ref Range    QRS Duration 84 ms    [facility] QTC Calculation 423 ms    Narrative    Test Reason : R06.02,    Vent. Rate : 087 BPM     Atrial Rate : 087 BPM     P-R Int : 182 ms          QRS Dur : 084 ms      QT Int : 352 ms       P-R-T Axes : 081 041 042 degrees     QTc Int : 423 ms    Sinus rhythm with Premature atrial complexes  Otherwise normal ECG  When compared with ECG of 20-SEP-2024 16:06,  No significant change was found  Confirmed by Jagdish Truong MD ([id number]) on 10/1/2024 1:45:27 PM    Referred By:             Confirmed By:Jagdish Truong MD       TTE:  Results for orders placed or performed during the hospital encounter of 04/05/23   Echo   Result Value Ref Range    Ascending aorta 3.32 cm    STJ 2.34 cm    AV mean gradient 11 mmHg    Ao peak cathleen 2.06 m/s    Ao VTI 39.60 cm    IVS 1.05 0.6 - 1.1 cm    LA size 3.73 cm    Left Atrium Major Axis 4.75 cm    Left Atrium Minor Axis 4.74 cm    LVIDd 5.08 3.5 - 6.0 cm    LVIDs 3.55 2.1 - 4.0 cm    LVOT diameter 2.06 cm    LVOT peak VTI 35.11 cm    PW 1.05 0.6 - 1.1 cm    MV Peak A Cathleen 1.10 m/s    E wave deceleration time 246.94 msec    MV Peak E Cathleen 0.83 m/s    PV Peak D Cathleen 0.39 m/s    PV Peak S Cathleen 0.55 m/s    RA Major Axis 4.13 cm    RA Width 2.87 cm    RVDD 3.06 cm    Sinus 2.97  "cm    TAPSE 1.67 cm    TR Max Manny 2.78 m/s    TDI LATERAL 0.07 m/s    TDI SEPTAL 0.05 m/s    LA WIDTH 4.32 cm    MV stenosis pressure 1/2 time 71.61 ms    LV Diastolic Volume 122.66 mL    LV Systolic Volume 52.80 mL    RV S' 10.51 cm/s    LVOT peak manny 1.63 m/s    LA Vol (MOD) 73.40 cm3    MV "A" wave duration 7.99 msec    LV LATERAL E/E' RATIO 11.86 m/s    LV SEPTAL E/E' RATIO 16.60 m/s    FS 30 %    LA Vol 64.99 cm3    LV mass 199.49 g    Left Ventricle Relative Wall Thickness 0.41 cm    AV valve area 2.95 cm2    AV Velocity Ratio 0.79     AV index (prosthetic) 0.89     MV valve area p 1/2 method 3.07 cm2    E/A ratio 0.75     Mean e' 0.06 m/s    Pulm vein S/D ratio 1.41     LVOT area 3.3 cm2    LVOT stroke volume 116.96 cm3    AV peak gradient 17 mmHg    E/E' ratio 13.83 m/s    Triscuspid Valve Regurgitation Peak Gradient 31 mmHg    BSA 1.96 m2    LV Systolic Volume Index 27.5 mL/m2    LV Diastolic Volume Index 63.89 mL/m2    LILLIAN 33.8 mL/m2    LV Mass Index 104 g/m2    LILLIAN (MOD) 38.2 mL/m2    Right Atrial Pressure (from IVC) 3 mmHg    EF 65 %    TV resting pulmonary artery pressure 34 mmHg    Narrative    · The left ventricle is normal in size with mild eccentric hypertrophy and   normal systolic function.  · The estimated ejection fraction is 65%.  · Normal left ventricular diastolic function.  · Normal right ventricular size with normal right ventricular systolic   function.  · Mild tricuspid regurgitation.  · Mild aortic regurgitation.  · Normal central venous pressure (3 mmHg).  · The estimated PA systolic pressure is 34 mmHg.  · Trivial posterior pericardial effusion.        EF   Date Value Ref Range Status   04/05/2023 65 % Final      No results found for this or any previous visit.  JANE:  No results found for this or any previous visit.  Stress Test:  No results found for this or any previous visit.     LHC:  No results found for this or any previous visit.     PFT:  No results found for: "FEV1", "FVC", " ""[id number]", "TLC", "DLCO"     ASSESSMENT/PLAN:       Pre-op Assessment    I have reviewed the Patient Summary Reports.     I have reviewed the Nursing Notes. I have reviewed the NPO Status.   I have reviewed the Medications.     Review of Systems  Anesthesia Hx:  No problems with previous Anesthesia             Denies Family Hx of Anesthesia complications.    Denies Personal Hx of Anesthesia complications.                    Social:  Non-Smoker, No Alcohol Use       Hematology/Oncology:  Hematology Normal   Oncology Normal                                   EENT/Dental:  EENT/Dental Normal           Cardiovascular:     Hypertension              ECG has been reviewed.                          Pulmonary:  Pulmonary Normal                       Renal/:  Renal/ Normal                 Hepatic/GI:  Hepatic/GI Normal                 Musculoskeletal:  Arthritis               Neurological:  Neurology Normal                                      Endocrine:  Endocrine Normal            Dermatological:  Skin Normal    Psych:  Psychiatric Normal                  Physical Exam  General: Well nourished, Cooperative, Alert and Oriented    Airway:  Mallampati: II   TM Distance: Normal  Tongue: Normal  Neck ROM: Normal ROM    Chest/Lungs:  Clear to auscultation    Heart:  Rate: Normal    Abdomen:  Normal      Anesthesia Plan  Type of Anesthesia, risks & benefits discussed:    Anesthesia Type: Gen Natural Airway  Intra-op Monitoring Plan: Standard ASA Monitors  Induction:  IV  Informed Consent: Informed consent signed with the Patient and all parties understand the risks and agree with anesthesia plan.  All questions answered. Patient consented to blood products? No  ASA Score: 2  Day of Surgery Review of History & Physical: H&P Update referred to the surgeon/provider.    Ready For Surgery From Anesthesia Perspective.     .      "

## 2024-10-07 NOTE — PROGRESS NOTES
Routine Office Visit     Patient Name: Windy Lindo    : 1950  MRN: [id number]    Subjective     History of Present Illness    CHIEF COMPLAINT:  Patient presents today for annual follow-up and multiple health concerns.    FATIGUE:  She reports multiple emergency room visits over the past six weeks due to anemia, with low blood counts and iron deficiency. She experiences significant weakness, fatigue, and shortness of breath, with episodes where she feels unable to function, cannot stand up, and becomes extremely short of breath. Her daily activity has decreased significantly, now only managing 300-400 steps per day compared to her previous 3,000-4,000 steps. She expresses concern about her ability to perform daily activities and participate in upcoming travel plans due to these symptoms.    RECENT FRACTURES AND PROCEDURES:  She reports a fall in April resulting in three fractured vertebrae and two fractured ribs. She underwent a kyphoplasty procedure on T8 vertebra in August, which she states was successful. The neurosurgeon has since discharged her following a post-operative follow-up visit last Thursday. She uses a cane for stability and expresses fear of falling and further injury. She denies any falls since the ER visits in the past month.    GASTROINTESTINAL ISSUES:  She reports experiencing blood in her stool for two days starting . She is scheduled for a colonoscopy but expresses concerns about her ability to complete the preparation due to weakness and fatigue. She is willing to attempt the prep but indicates she will discontinue if she cannot manage it safely. She still plans to keep the appointment for the endoscopy portion if unable to complete the full colonoscopy prep.    MEDICATIONS AND SIDE EFFECTS:  She discontinued Mobic due to bleeding issues and was prescribed Protonix following an episode of passing blood. She received her first Prolia injection a few months ago and  reports experiencing bleeding gums approximately three weeks after the injection, which she had never experienced before. She expresses concern about this potential side effect but understands the importance of continuing Prolia due to her history of compression fractures. She uses diclofenac gel topically to manage her arthritis symptoms.    CARDIOVASCULAR:  She has an appointment scheduled with a cardiologist. She denies any current cardiac symptoms or concerns. She mentions having seen her cardiologist in March, with all tests reported as normal at that time.    ARTHRITIS:  She reports experiencing severe joint pain, particularly in her knees, due to arthritis. She describes difficulty getting around due to the pain.    WEIGHT:  She reports recent weight loss of 12-15 lbs, decreasing from 178 lbs to 164 lbs currently.    PLANNED VACATION:  She expresses concerns about her ability   to travel due to recent health issues. She has a planned vacation to [place] at the end of the month, where she and her family have [place] a [place] the [place]. She is uncertain if she will be able to make the trip due to her current health status.    SOCIAL HISTORY:  She reports relying on family, friends, and neighbors for transportation due to her inability to drive. She expresses difficulty in attending appointments outside the home, including physical therapy sessions.    ROS:  General: no fever, no chills, +fatigue, no weight gain, +weight loss  Eyes: no vision changes, no redness, no discharge  ENT: no ear pain, no nasal congestion, no sore throat  Cardiovascular: no chest pain, no palpitations, no lower extremity edema  Respiratory: no cough, +shortness of breath  Gastrointestinal: no abdominal pain, no nausea, no vomiting, no diarrhea, no constipation, no blood in stool  Genitourinary: no dysuria, no hematuria, no frequency  Musculoskeletal: +joint pain, no muscle pain  Skin: no rash, no lesion  Neurological: no  "headache, no dizziness, no numbness, no tingling, +weakness, +weakness  Psychiatric: no anxiety, no depression, no sleep difficulty           Objective     /70   Pulse 83   Temp 98.5 °F (36.9 °C)   Ht 5' 5" (1.651 m)   Wt 74.5 kg (164 lb 3.9 oz)   SpO2 97%   BMI 27.33 kg/m²   Physical Exam  Constitutional:       Appearance: She is well-developed.   HENT:      Head: Normocephalic and atraumatic.   Eyes:      Conjunctiva/sclera: Conjunctivae normal.      Pupils: Pupils are equal, round, and reactive to light.   Cardiovascular:      Rate and Rhythm: Normal rate and regular rhythm.      Heart sounds: Normal heart sounds. No murmur heard.     No friction rub. No gallop.   Pulmonary:      Effort: No respiratory distress.      Breath sounds: Normal breath sounds.   Abdominal:      General: Bowel sounds are normal. There is no distension.      Palpations: Abdomen is soft.      Tenderness: There is no abdominal tenderness.   Musculoskeletal:         General: Normal range of motion.      Cervical back: Normal range of motion and neck supple.   Lymphadenopathy:      Cervical: No cervical adenopathy.   Skin:     General: Skin is warm.   Neurological:      Mental Status: She is alert and oriented to person, place, and time.     Ambulates with cane       Assessment     Assessment & Plan      COLONOSCOPY PREPARATION:   Patient to maintain clear liquid diet in preparation for upcoming colonoscopy.   Recommend consuming jellies and watery soups to reduce need for bowel prep.    FALL PREVENTION:   Patient to continue using cane for stability to prevent falls.    DIZZINESS:   Recommend using Liquid IV or Power 8 drinks to help with dizzy spells.    ARTHRITIS:   Started topical diclofenac gel for arthritis pain.    FOLLOW UP:   Follow up in 6 months.         Problem List Items Addressed This Visit          Cardiac/Vascular    Aortic atherosclerosis (Chronic)    Overview     CT chest 9/2024  Aortoiliac calcific " atherosclerosis   Patient with Atherosclerosis of the Aorta.  Stable/asymptomatic. Currently stable on lipid and b/p monitoring.           Hypertension       Oncology    Iron deficiency anemia - Primary    Relevant Orders    Ambulatory referral/consult to Home Health   Explained that body can take up to 4 months to fully recover from blood loss.   Discussed how smaller RBCs may not carry as much oxygen, contributing to fatigue.   Iron infusion treatment ordered, pending insurance authorization.         Endocrine    Age-related osteoporosis without current pathological fracture   Clarified that bleeding gums can be a side effect of Prolia, but benefits for fracture prevention outweigh this risk.         Orthopedic    Primary osteoarthritis of right knee    Relevant Medications    diclofenac sodium (VOLTAREN) 1 % Gel       Other    Weakness    Relevant Orders    Ambulatory referral/consult to Home Health     Other Visit Diagnoses       H/O kyphoplasty        Relevant Orders    Ambulatory referral/consult to Physical/Occupational Therapy    Ambulates with cane        Relevant Orders    Ambulatory referral/consult to Home Health   Explained limitations of home health physical therapy compared to outpatient services.   Home health for physical therapy ordered.      Pain in joint of right shoulder        Relevant Medications    diclofenac sodium (VOLTAREN) 1 % Gel              This note was generated with the assistance of ambient listening technology. Verbal consent was obtained by the patient and accompanying visitor(s) for the recording of patient appointment to facilitate this note. I attest to having reviewed and edited the generated note for accuracy, though some syntax or spelling errors may persist. Please contact the author of this note for any clarification.

## 2024-10-09 ENCOUNTER — HOSPITAL ENCOUNTER (OUTPATIENT)
Facility: HOSPITAL | Age: 74
Discharge: HOME | End: 2024-10-10
Attending: EMERGENCY MEDICINE | Admitting: STUDENT IN AN ORGANIZED HEALTH CARE EDUCATION/TRAINING PROGRAM
Payer: MEDICARE

## 2024-10-09 ENCOUNTER — ANESTHESIA (OUTPATIENT)
Dept: ENDOSCOPY | Facility: HOSPITAL | Age: 74
End: 2024-10-09
Payer: MEDICARE

## 2024-10-09 ENCOUNTER — HOSPITAL ENCOUNTER (OUTPATIENT)
Facility: HOSPITAL | Age: 74
Discharge: HOME | End: 2024-10-09
Attending: INTERNAL MEDICINE | Admitting: INTERNAL MEDICINE
Payer: MEDICARE

## 2024-10-09 VITALS
RESPIRATION RATE: 18 BRPM | OXYGEN SATURATION: 99 % | HEART RATE: 122 BPM | TEMPERATURE: 98 F | SYSTOLIC BLOOD PRESSURE: 156 MMHG | DIASTOLIC BLOOD PRESSURE: 118 MMHG

## 2024-10-09 DIAGNOSIS — R07.9 CHEST PAIN: ICD-10-CM

## 2024-10-09 DIAGNOSIS — I48.91 A-FIB: ICD-10-CM

## 2024-10-09 DIAGNOSIS — D50.9 IRON DEFICIENCY ANEMIA, UNSPECIFIED IRON DEFICIENCY ANEMIA TYPE: Primary | ICD-10-CM

## 2024-10-09 DIAGNOSIS — I48.91 NEW ONSET A-FIB: ICD-10-CM

## 2024-10-09 DIAGNOSIS — I48.91 ATRIAL FIBRILLATION: ICD-10-CM

## 2024-10-09 DIAGNOSIS — I48.91 NEW ONSET ATRIAL FIBRILLATION: Primary | ICD-10-CM

## 2024-10-09 DIAGNOSIS — D64.9 ANEMIA: ICD-10-CM

## 2024-10-09 DIAGNOSIS — R00.0 TACHYCARDIA: ICD-10-CM

## 2024-10-09 DIAGNOSIS — I47.10 SVT (SUPRAVENTRICULAR TACHYCARDIA): ICD-10-CM

## 2024-10-09 PROBLEM — Z87.19 HISTORY OF GI BLEED: Status: ACTIVE | Noted: 2024-10-09

## 2024-10-09 LAB
ALBUMIN SERPL BCP-MCNC: 3.6 G/DL (ref 3.5–5.2)
ALP SERPL-CCNC: 61 U/L (ref 55–135)
ALT SERPL W/O P-5'-P-CCNC: 7 U/L (ref 10–44)
ANION GAP SERPL CALC-SCNC: 9 MMOL/L (ref 8–16)
APTT PPP: 23.9 SEC (ref 21–32)
APTT PPP: 28.2 SEC (ref 21–32)
AST SERPL-CCNC: 13 U/L (ref 10–40)
BASOPHILS # BLD AUTO: 0.01 K/UL (ref 0–0.2)
BASOPHILS # BLD AUTO: 0.03 K/UL (ref 0–0.2)
BASOPHILS NFR BLD: 0.1 % (ref 0–1.9)
BASOPHILS NFR BLD: 0.4 % (ref 0–1.9)
BILIRUB SERPL-MCNC: 0.4 MG/DL (ref 0.1–1)
BILIRUB UR QL STRIP: NEGATIVE
BNP SERPL-MCNC: 41 PG/ML (ref 0–99)
BUN SERPL-MCNC: 10 MG/DL (ref 8–23)
CALCIUM SERPL-MCNC: 9.6 MG/DL (ref 8.7–10.5)
CHLORIDE SERPL-SCNC: 111 MMOL/L (ref 95–110)
CLARITY UR REFRACT.AUTO: CLEAR
CO2 SERPL-SCNC: 23 MMOL/L (ref 23–29)
COLOR UR AUTO: YELLOW
CREAT SERPL-MCNC: 0.7 MG/DL (ref 0.5–1.4)
DIFFERENTIAL METHOD BLD: ABNORMAL
DIFFERENTIAL METHOD BLD: ABNORMAL
EOSINOPHIL # BLD AUTO: 0 K/UL (ref 0–0.5)
EOSINOPHIL # BLD AUTO: 0 K/UL (ref 0–0.5)
EOSINOPHIL NFR BLD: 0.2 % (ref 0–8)
EOSINOPHIL NFR BLD: 0.3 % (ref 0–8)
ERYTHROCYTE [DISTWIDTH] IN BLOOD BY AUTOMATED COUNT: 14.9 % (ref 11.5–14.5)
ERYTHROCYTE [DISTWIDTH] IN BLOOD BY AUTOMATED COUNT: 15.1 % (ref 11.5–14.5)
EST. GFR  (NO RACE VARIABLE): >60 ML/MIN/1.73 M^2
GLUCOSE SERPL-MCNC: 102 MG/DL (ref 70–110)
GLUCOSE UR QL STRIP: NEGATIVE
HCT VFR BLD AUTO: 38.4 % (ref 37–48.5)
HCT VFR BLD AUTO: 39.9 % (ref 37–48.5)
HGB BLD-MCNC: 12.2 G/DL (ref 12–16)
HGB BLD-MCNC: 12.7 G/DL (ref 12–16)
HGB UR QL STRIP: NEGATIVE
IMM GRANULOCYTES # BLD AUTO: 0.02 K/UL (ref 0–0.04)
IMM GRANULOCYTES # BLD AUTO: 0.03 K/UL (ref 0–0.04)
IMM GRANULOCYTES NFR BLD AUTO: 0.2 % (ref 0–0.5)
IMM GRANULOCYTES NFR BLD AUTO: 0.4 % (ref 0–0.5)
INR PPP: 1 (ref 0.8–1.2)
KETONES UR QL STRIP: ABNORMAL
LEUKOCYTE ESTERASE UR QL STRIP: NEGATIVE
LYMPHOCYTES # BLD AUTO: 0.9 K/UL (ref 1–4.8)
LYMPHOCYTES # BLD AUTO: 1.2 K/UL (ref 1–4.8)
LYMPHOCYTES NFR BLD: 11.9 % (ref 18–48)
LYMPHOCYTES NFR BLD: 15 % (ref 18–48)
MAGNESIUM SERPL-MCNC: 2 MG/DL (ref 1.6–2.6)
MCH RBC QN AUTO: 29.7 PG (ref 27–31)
MCH RBC QN AUTO: 29.7 PG (ref 27–31)
MCHC RBC AUTO-ENTMCNC: 31.8 G/DL (ref 32–36)
MCHC RBC AUTO-ENTMCNC: 31.8 G/DL (ref 32–36)
MCV RBC AUTO: 93 FL (ref 82–98)
MCV RBC AUTO: 93 FL (ref 82–98)
MONOCYTES # BLD AUTO: 0.8 K/UL (ref 0.3–1)
MONOCYTES # BLD AUTO: 0.8 K/UL (ref 0.3–1)
MONOCYTES NFR BLD: 10 % (ref 4–15)
MONOCYTES NFR BLD: 10.3 % (ref 4–15)
NEUTROPHILS # BLD AUTO: 5.9 K/UL (ref 1.8–7.7)
NEUTROPHILS # BLD AUTO: 6.1 K/UL (ref 1.8–7.7)
NEUTROPHILS NFR BLD: 73.9 % (ref 38–73)
NEUTROPHILS NFR BLD: 77.3 % (ref 38–73)
NITRITE UR QL STRIP: NEGATIVE
NRBC BLD-RTO: 0 /100 WBC
NRBC BLD-RTO: 0 /100 WBC
OHS QRS DURATION: 78 MS
OHS QRS DURATION: 80 MS
OHS QTC CALCULATION: 445 MS
OHS QTC CALCULATION: 452 MS
PH UR STRIP: 6 [PH] (ref 5–8)
PHOSPHATE SERPL-MCNC: 1.9 MG/DL (ref 2.7–4.5)
PLATELET # BLD AUTO: 208 K/UL (ref 150–450)
PLATELET # BLD AUTO: 226 K/UL (ref 150–450)
PMV BLD AUTO: 12.1 FL (ref 9.2–12.9)
PMV BLD AUTO: 12.4 FL (ref 9.2–12.9)
POTASSIUM SERPL-SCNC: 4.2 MMOL/L (ref 3.5–5.1)
PROT SERPL-MCNC: 6.6 G/DL (ref 6–8.4)
PROT UR QL STRIP: NEGATIVE
PROTHROMBIN TIME: 10.9 SEC (ref 9–12.5)
RBC # BLD AUTO: 4.11 M/UL (ref 4–5.4)
RBC # BLD AUTO: 4.27 M/UL (ref 4–5.4)
SODIUM SERPL-SCNC: 143 MMOL/L (ref 136–145)
SP GR UR STRIP: 1.02 (ref 1–1.03)
TROPONIN I SERPL DL<=0.01 NG/ML-MCNC: 0.02 NG/ML (ref 0–0.03)
TSH SERPL DL<=0.005 MIU/L-ACNC: 1.09 UIU/ML (ref 0.4–4)
URN SPEC COLLECT METH UR: ABNORMAL
WBC # BLD AUTO: 7.91 K/UL (ref 3.9–12.7)
WBC # BLD AUTO: 8.04 K/UL (ref 3.9–12.7)

## 2024-10-09 PROCEDURE — 27201012 HC FORCEPS, HOT/COLD, DISP: Performed by: INTERNAL MEDICINE

## 2024-10-09 PROCEDURE — 45378 DIAGNOSTIC COLONOSCOPY: CPT | Performed by: INTERNAL MEDICINE

## 2024-10-09 PROCEDURE — 84443 ASSAY THYROID STIM HORMONE: CPT | Performed by: PHYSICIAN ASSISTANT

## 2024-10-09 PROCEDURE — 88342 IMHCHEM/IMCYTCHM 1ST ANTB: CPT | Performed by: PATHOLOGY

## 2024-10-09 PROCEDURE — 93005 ELECTROCARDIOGRAM TRACING: CPT

## 2024-10-09 PROCEDURE — G0378 HOSPITAL OBSERVATION PER HR: HCPCS

## 2024-10-09 PROCEDURE — 88342 IMHCHEM/IMCYTCHM 1ST ANTB: CPT | Mod: 26,,, | Performed by: PATHOLOGY

## 2024-10-09 PROCEDURE — 25000003 PHARM REV CODE 250: Performed by: ANESTHESIOLOGY

## 2024-10-09 PROCEDURE — 37000009 HC ANESTHESIA EA ADD 15 MINS: Performed by: INTERNAL MEDICINE

## 2024-10-09 PROCEDURE — 37000008 HC ANESTHESIA 1ST 15 MINUTES: Performed by: INTERNAL MEDICINE

## 2024-10-09 PROCEDURE — 93010 ELECTROCARDIOGRAM REPORT: CPT | Mod: 76,,, | Performed by: INTERNAL MEDICINE

## 2024-10-09 PROCEDURE — 25500020 PHARM REV CODE 255: Performed by: STUDENT IN AN ORGANIZED HEALTH CARE EDUCATION/TRAINING PROGRAM

## 2024-10-09 PROCEDURE — 99900035 HC TECH TIME PER 15 MIN (STAT)

## 2024-10-09 PROCEDURE — 25000003 PHARM REV CODE 250: Performed by: PHYSICIAN ASSISTANT

## 2024-10-09 PROCEDURE — 83880 ASSAY OF NATRIURETIC PEPTIDE: CPT | Performed by: PHYSICIAN ASSISTANT

## 2024-10-09 PROCEDURE — 93010 ELECTROCARDIOGRAM REPORT: CPT | Mod: ,,, | Performed by: INTERNAL MEDICINE

## 2024-10-09 PROCEDURE — 99285 EMERGENCY DEPT VISIT HI MDM: CPT | Mod: 25

## 2024-10-09 PROCEDURE — 63600175 PHARM REV CODE 636 W HCPCS: Performed by: NURSE ANESTHETIST, CERTIFIED REGISTERED

## 2024-10-09 PROCEDURE — 96374 THER/PROPH/DIAG INJ IV PUSH: CPT

## 2024-10-09 PROCEDURE — 81003 URINALYSIS AUTO W/O SCOPE: CPT | Performed by: PHYSICIAN ASSISTANT

## 2024-10-09 PROCEDURE — 85730 THROMBOPLASTIN TIME PARTIAL: CPT | Mod: 91 | Performed by: STUDENT IN AN ORGANIZED HEALTH CARE EDUCATION/TRAINING PROGRAM

## 2024-10-09 PROCEDURE — 84484 ASSAY OF TROPONIN QUANT: CPT | Performed by: PHYSICIAN ASSISTANT

## 2024-10-09 PROCEDURE — 45378 DIAGNOSTIC COLONOSCOPY: CPT | Mod: ,,, | Performed by: INTERNAL MEDICINE

## 2024-10-09 PROCEDURE — 43239 EGD BIOPSY SINGLE/MULTIPLE: CPT | Performed by: INTERNAL MEDICINE

## 2024-10-09 PROCEDURE — 84100 ASSAY OF PHOSPHORUS: CPT | Performed by: PHYSICIAN ASSISTANT

## 2024-10-09 PROCEDURE — 85610 PROTHROMBIN TIME: CPT | Performed by: PHYSICIAN ASSISTANT

## 2024-10-09 PROCEDURE — 85025 COMPLETE CBC W/AUTO DIFF WBC: CPT | Mod: 91 | Performed by: PHYSICIAN ASSISTANT

## 2024-10-09 PROCEDURE — 25000003 PHARM REV CODE 250: Performed by: NURSE ANESTHETIST, CERTIFIED REGISTERED

## 2024-10-09 PROCEDURE — 88305 TISSUE EXAM BY PATHOLOGIST: CPT | Mod: 26,,, | Performed by: PATHOLOGY

## 2024-10-09 PROCEDURE — 80053 COMPREHEN METABOLIC PANEL: CPT | Performed by: PHYSICIAN ASSISTANT

## 2024-10-09 PROCEDURE — 88305 TISSUE EXAM BY PATHOLOGIST: CPT | Performed by: PATHOLOGY

## 2024-10-09 PROCEDURE — 36415 COLL VENOUS BLD VENIPUNCTURE: CPT | Performed by: STUDENT IN AN ORGANIZED HEALTH CARE EDUCATION/TRAINING PROGRAM

## 2024-10-09 PROCEDURE — 85730 THROMBOPLASTIN TIME PARTIAL: CPT | Performed by: PHYSICIAN ASSISTANT

## 2024-10-09 PROCEDURE — 82962 GLUCOSE BLOOD TEST: CPT

## 2024-10-09 PROCEDURE — 83735 ASSAY OF MAGNESIUM: CPT | Performed by: PHYSICIAN ASSISTANT

## 2024-10-09 PROCEDURE — 85025 COMPLETE CBC W/AUTO DIFF WBC: CPT | Performed by: PHYSICIAN ASSISTANT

## 2024-10-09 PROCEDURE — 43239 EGD BIOPSY SINGLE/MULTIPLE: CPT | Mod: 51,,, | Performed by: INTERNAL MEDICINE

## 2024-10-09 PROCEDURE — 94761 N-INVAS EAR/PLS OXIMETRY MLT: CPT

## 2024-10-09 RX ORDER — PANTOPRAZOLE SODIUM 40 MG/1
40 TABLET, DELAYED RELEASE ORAL DAILY
Status: DISCONTINUED | OUTPATIENT
Start: 2024-10-09 | End: 2024-10-10 | Stop reason: HOSPADM

## 2024-10-09 RX ORDER — METOPROLOL TARTRATE 25 MG/1
50 TABLET, FILM COATED ORAL ONCE
Status: COMPLETED | OUTPATIENT
Start: 2024-10-09 | End: 2024-10-09

## 2024-10-09 RX ORDER — LIDOCAINE HYDROCHLORIDE 20 MG/ML
INJECTION INTRAVENOUS
Status: DISCONTINUED | OUTPATIENT
Start: 2024-10-09 | End: 2024-10-09

## 2024-10-09 RX ORDER — DILTIAZEM HYDROCHLORIDE 5 MG/ML
0.25 INJECTION INTRAVENOUS
Status: COMPLETED | OUTPATIENT
Start: 2024-10-09 | End: 2024-10-09

## 2024-10-09 RX ORDER — POLYETHYLENE GLYCOL 3350 17 G/17G
17 POWDER, FOR SOLUTION ORAL DAILY PRN
Status: DISCONTINUED | OUTPATIENT
Start: 2024-10-09 | End: 2024-10-10 | Stop reason: HOSPADM

## 2024-10-09 RX ORDER — ONDANSETRON 8 MG/1
8 TABLET, ORALLY DISINTEGRATING ORAL EVERY 8 HOURS PRN
Status: DISCONTINUED | OUTPATIENT
Start: 2024-10-09 | End: 2024-10-10 | Stop reason: HOSPADM

## 2024-10-09 RX ORDER — METOPROLOL TARTRATE 1 MG/ML
5 INJECTION, SOLUTION INTRAVENOUS EVERY 5 MIN PRN
Status: COMPLETED | OUTPATIENT
Start: 2024-10-09 | End: 2024-10-09

## 2024-10-09 RX ORDER — METOPROLOL TARTRATE 25 MG/1
25 TABLET, FILM COATED ORAL 2 TIMES DAILY
Status: DISCONTINUED | OUTPATIENT
Start: 2024-10-09 | End: 2024-10-10 | Stop reason: HOSPADM

## 2024-10-09 RX ORDER — LABETALOL HCL 20 MG/4 ML
SYRINGE (ML) INTRAVENOUS
Status: DISCONTINUED
Start: 2024-10-09 | End: 2024-10-09 | Stop reason: HOSPADM

## 2024-10-09 RX ORDER — SPIRONOLACTONE 25 MG/1
50 TABLET ORAL DAILY
Status: DISCONTINUED | OUTPATIENT
Start: 2024-10-10 | End: 2024-10-10 | Stop reason: HOSPADM

## 2024-10-09 RX ORDER — IBUPROFEN 200 MG
16 TABLET ORAL
Status: DISCONTINUED | OUTPATIENT
Start: 2024-10-09 | End: 2024-10-10 | Stop reason: HOSPADM

## 2024-10-09 RX ORDER — ACETAMINOPHEN 325 MG/1
650 TABLET ORAL EVERY 4 HOURS PRN
Status: DISCONTINUED | OUTPATIENT
Start: 2024-10-09 | End: 2024-10-10 | Stop reason: HOSPADM

## 2024-10-09 RX ORDER — PROMETHAZINE HYDROCHLORIDE 25 MG/1
25 TABLET ORAL EVERY 6 HOURS PRN
Status: DISCONTINUED | OUTPATIENT
Start: 2024-10-09 | End: 2024-10-10 | Stop reason: HOSPADM

## 2024-10-09 RX ORDER — TALC
6 POWDER (GRAM) TOPICAL NIGHTLY PRN
Status: DISCONTINUED | OUTPATIENT
Start: 2024-10-09 | End: 2024-10-10 | Stop reason: HOSPADM

## 2024-10-09 RX ORDER — HEPARIN SODIUM,PORCINE/D5W 25000/250
0-40 INTRAVENOUS SOLUTION INTRAVENOUS CONTINUOUS
Status: DISCONTINUED | OUTPATIENT
Start: 2024-10-09 | End: 2024-10-10

## 2024-10-09 RX ORDER — VITAMIN E 268 MG
400 CAPSULE ORAL DAILY
Status: DISCONTINUED | OUTPATIENT
Start: 2024-10-10 | End: 2024-10-10 | Stop reason: HOSPADM

## 2024-10-09 RX ORDER — PROPOFOL 10 MG/ML
VIAL (ML) INTRAVENOUS
Status: DISCONTINUED | OUTPATIENT
Start: 2024-10-09 | End: 2024-10-09

## 2024-10-09 RX ORDER — CHOLECALCIFEROL (VITAMIN D3) 25 MCG
1000 TABLET ORAL DAILY
Status: DISCONTINUED | OUTPATIENT
Start: 2024-10-10 | End: 2024-10-10 | Stop reason: HOSPADM

## 2024-10-09 RX ORDER — PROPOFOL 10 MG/ML
VIAL (ML) INTRAVENOUS CONTINUOUS PRN
Status: DISCONTINUED | OUTPATIENT
Start: 2024-10-09 | End: 2024-10-09

## 2024-10-09 RX ORDER — LABETALOL HYDROCHLORIDE 5 MG/ML
5 INJECTION, SOLUTION INTRAVENOUS ONCE
Status: DISCONTINUED | OUTPATIENT
Start: 2024-10-09 | End: 2024-10-09

## 2024-10-09 RX ORDER — IPRATROPIUM BROMIDE AND ALBUTEROL SULFATE 2.5; .5 MG/3ML; MG/3ML
3 SOLUTION RESPIRATORY (INHALATION) EVERY 4 HOURS PRN
Status: DISCONTINUED | OUTPATIENT
Start: 2024-10-09 | End: 2024-10-10 | Stop reason: HOSPADM

## 2024-10-09 RX ORDER — BISACODYL 10 MG/1
10 SUPPOSITORY RECTAL DAILY PRN
Status: DISCONTINUED | OUTPATIENT
Start: 2024-10-09 | End: 2024-10-10 | Stop reason: HOSPADM

## 2024-10-09 RX ORDER — SODIUM CHLORIDE 9 MG/ML
INJECTION, SOLUTION INTRAVENOUS CONTINUOUS
Status: DISCONTINUED | OUTPATIENT
Start: 2024-10-09 | End: 2024-10-09 | Stop reason: HOSPADM

## 2024-10-09 RX ORDER — IBUPROFEN 200 MG
24 TABLET ORAL
Status: DISCONTINUED | OUTPATIENT
Start: 2024-10-09 | End: 2024-10-10 | Stop reason: HOSPADM

## 2024-10-09 RX ORDER — GLUCAGON 1 MG
1 KIT INJECTION
Status: DISCONTINUED | OUTPATIENT
Start: 2024-10-09 | End: 2024-10-10 | Stop reason: HOSPADM

## 2024-10-09 RX ADMIN — METOPROLOL TARTRATE 5 MG: 5 INJECTION, SOLUTION INTRAVENOUS at 12:10

## 2024-10-09 RX ADMIN — PROPOFOL 80 MG: 10 INJECTION, EMULSION INTRAVENOUS at 11:10

## 2024-10-09 RX ADMIN — PANTOPRAZOLE SODIUM 40 MG: 40 TABLET, DELAYED RELEASE ORAL at 11:10

## 2024-10-09 RX ADMIN — METOPROLOL TARTRATE 50 MG: 25 TABLET, FILM COATED ORAL at 12:10

## 2024-10-09 RX ADMIN — DILTIAZEM HYDROCHLORIDE 18.5 MG: 5 INJECTION INTRAVENOUS at 04:10

## 2024-10-09 RX ADMIN — IOHEXOL 75 ML: 350 INJECTION, SOLUTION INTRAVENOUS at 08:10

## 2024-10-09 RX ADMIN — Medication 6 MG: at 11:10

## 2024-10-09 RX ADMIN — PROPOFOL 150 MCG/KG/MIN: 10 INJECTION, EMULSION INTRAVENOUS at 11:10

## 2024-10-09 RX ADMIN — GLYCOPYRROLATE 0.2 MG: 0.2 INJECTION, SOLUTION INTRAMUSCULAR; INTRAVENOUS at 11:10

## 2024-10-09 RX ADMIN — METOPROLOL TARTRATE 25 MG: 25 TABLET, FILM COATED ORAL at 11:10

## 2024-10-09 RX ADMIN — LIDOCAINE HYDROCHLORIDE 100 MG: 20 INJECTION INTRAVENOUS at 11:10

## 2024-10-09 RX ADMIN — SODIUM CHLORIDE: 0.9 INJECTION, SOLUTION INTRAVENOUS at 10:10

## 2024-10-09 RX ADMIN — DIBASIC SODIUM PHOSPHATE, MONOBASIC POTASSIUM PHOSPHATE AND MONOBASIC SODIUM PHOSPHATE 1 TABLET: 852; 155; 130 TABLET ORAL at 06:10

## 2024-10-09 NOTE — ED TRIAGE NOTES
"Windy Lindo, a 74 y.o. female presents to the ED w/ complaint of uncontrolled afib.     Pt had c-scope and egd today, once awake HR was in afib with RVR. Received 15 mg total of metoprolol at the clinic with no improvement.     Pt denies CP. Pt reports SOB on exertion or lying flat. Pt denies palpitations.      Triage note:  Chief Complaint   Patient presents with    Irregular Heart Beat     Colonoscopy and egd this am; "went into a fib" although chart review states svt. No chest pain. Given metoprolol. Sent to ed.      Review of patient's allergies indicates:  No Known Allergies  Past Medical History:   Diagnosis Date    Arthritis     Eye injury 4 yrs    hit od    Hypertension 04/04/2023    Nuclear sclerosis, bilateral 02/13/2019    Osteoporosis        "

## 2024-10-09 NOTE — PROGRESS NOTES
Called to inform ED Charge nurse at [facility] location. Pt will arrive by POV for dx of A-fib w RVR. Last rate 122. Pt treated per orders of Anesthesia. Please refer to MAR. Pt denies any symptoms at this time. Spoke to MEGAN Massey.

## 2024-10-09 NOTE — PLAN OF CARE
Discharge instructions given to patient/family with verbalization of understanding all.  Written report of procedure provided by MD and given to patient. MD came to bedside and gave report of findings. VSS, denies n/v and tolerating PO, rates pain level tolerable. IV removed, and family notified for patient discharge to [facility] ED for further eval of new onset A-fib w/ SOB.  Patient voided 500cc and an unmeasured void before leaving.

## 2024-10-09 NOTE — INTERVAL H&P NOTE
The patient has been examined and the H&P has been reviewed:    I concur with the findings and no changes have occurred since H&P was written.    Procedure risks, benefits and alternative options discussed and understood by patient/family.    EGD/Colonoscopy: Iron deficiency anemia; rectal bleeding  Sedation: GA  ASA: per anesthesia  Mallampati: Per anesthesia        There are no hospital problems to display for this patient.

## 2024-10-09 NOTE — PROGRESS NOTES
10/09/24 1300   Vital Signs   Pulse (!) 123   Resp 20   SpO2 97 %   Pulse Oximetry Type Continuous   Oximetry Probe Status Intact   Device (Oxygen Therapy) room air   BP (!) 134/102   MAP (mmHg) 112   BP Location Left arm   BP Method Automatic   Patient Position Lying     Dr. Kim at bedside for evaluation.  Patient's HR remains 120s/Afib.  Denies chest pain or SOB.  Family member at bedside and aware of current status.  Charge nurse and supervisor at bedside and aware of the status of patient, will continue to monitor.

## 2024-10-09 NOTE — [employer] PATIENT INSTRUCTIONS
Discharge Summary/Instructions after an Endoscopic Procedure  Patient Name: Windy Lindo  Patient MRN: [id number]  Patient YOB: 1950 Wednesday, October 9, 2024  Albino Fenton MD  Dear patient,  As a result of recent federal legislation (The Federal Cures Act), you may   receive lab or pathology results from your procedure in your [facility]   account before your physician is able to contact you. Your physician or   their representative will relay the results to you with their   recommendations at their soonest availability.  Thank you,  RESTRICTIONS:  During your procedure today, you received medications for sedation.  These   medications may affect your judgment, balance and coordination.  Therefore,   for 24 hours, you have the following restrictions:   - DO NOT drive a car, operate machinery, make legal/financial decisions,   sign important papers or drink alcohol.    ACTIVITY:  Today: no heavy lifting, straining or running due to procedural   sedation/anesthesia.  The following day: return to full activity including work.  DIET:  Eat and drink normally unless instructed otherwise.     TREATMENT FOR COMMON SIDE EFFECTS:  - Mild abdominal pain, nausea, belching, bloating or excessive gas:  rest,   eat lightly and use a heating pad.  - Sore Throat: treat with throat lozenges and/or gargle with warm salt   water.  - Because air was used during the procedure, expelling large amounts of air   from your rectum or belching is normal.  - If a bowel prep was taken, you may not have a bowel movement for 1-3 days.    This is normal.  SYMPTOMS TO WATCH FOR AND REPORT TO YOUR PHYSICIAN:  1. Abdominal pain or bloating, other than gas cramps.  2. Chest pain.  3. Back pain.  4. Signs of infection such as: chills or fever occurring within 24 hours   after the procedure.  5. Rectal bleeding, which would show as bright red, maroon, or black stools.   (A tablespoon of blood from the rectum is not serious, especially  if   hemorrhoids are present.)  6. Vomiting.  7. Weakness or dizziness.  GO DIRECTLY TO THE NEAREST EMERGENCY ROOM IF YOU HAVE ANY OF THE FOLLOWING:      Difficulty breathing              Chills and/or fever over 101 F   Persistent vomiting and/or vomiting blood   Severe abdominal pain   Severe chest pain   Black, tarry stools   Bleeding- more than one tablespoon   Any other symptom or condition that you feel may need urgent attention  Your doctor recommends these additional instructions:  If any biopsies were taken, your doctors clinic will contact you in 1 to 2   weeks with any results.  - Patient has a contact number available for emergencies.  The signs and   symptoms of potential delayed complications were discussed with the   patient.  Return to normal activities tomorrow.  Written discharge   instructions were provided to the patient.   - Discharge patient to home.   - Resume previous diet.   - Continue present medications.   - Repeat colonoscopy is not recommended for screening purposes.   For questions, problems or results please call your physician - Albino Fenton MD at Work:  [phone].  [facility], EMERGENCY ROOM PHONE NUMBER: [phone]  IF A COMPLICATION OR EMERGENCY SITUATION ARISES AND YOU ARE UNABLE TO REACH   YOUR PHYSICIAN - GO DIRECTLY TO THE EMERGENCY ROOM.  Albino Fenton MD  10/9/2024 11:42:52 AM  This report has been verified and signed electronically.  Dear patient,  As a result of recent federal legislation (The Federal Cures Act), you may   receive lab or pathology results from your procedure in your [facility]   account before your physician is able to contact you. Your physician or   their representative will relay the results to you with their   recommendations at their soonest availability.  Thank you,  [employer]

## 2024-10-09 NOTE — ED PROVIDER NOTES
"Encounter Date: 10/9/2024       History     Chief Complaint   Patient presents with    Irregular Heart Beat     Colonoscopy and egd this am; "went into a fib" although chart review states svt. No chest pain. Given metoprolol. Sent to ed.      74 y.o. Female with a PMHx of HTN, GI bleed, anemia presents to the ED from [facility] with new onset of A fib. She had upper and lower scopes preformed today after recent GI bleed. After procedure, her heart rate was elevated. EKG showed a fib. She denies prior diagnosis and is not on a blood thinner. She was give PO and IV metoprolol without improvement of her heart rate. If was recommended that she present to the ED for further evaluation. She notes palpations today. She c/o intermittent episodes of shortness of breath and lightheadedness over the past month and has had several visits to the ED for this complaint. She denies chest, shortness of breath today, LE swelling, fever.       The history is provided by the patient.     Review of patient's allergies indicates:  No Known Allergies  Past Medical History:   Diagnosis Date    Arthritis     Eye injury 4 yrs    hit od    Hypertension 04/04/2023    Nuclear sclerosis, bilateral 02/13/2019    Osteoporosis      Past Surgical History:   Procedure Laterality Date    APPENDECTOMY      COLONOSCOPY N/A 9/7/2017    Procedure: COLONOSCOPY;  Surgeon: Albino Fenton MD;  Location: [address];  Service: Endoscopy;  Laterality: N/A;    ESOPHAGOGASTRODUODENOSCOPY  09/07/2017    KNEE SURGERY Right 12/05/2017    TKR    LASIK  7 yrs    ou    TONSILLECTOMY       Family History   Problem Relation Name Age of Onset    Hypertension Mother      Glaucoma Mother      Diabetes Mother      Cataracts Mother      Cancer Mother  74        lung    Heart disease Mother  55    Hypertension Father      Heart disease Father          onset early 60;s, Aortic valve replacement ,Rheumatic fever as a child     No Known Problems Sister      " Diabetes Brother      Cancer Brother  66        mesothelioma    No Known Problems Maternal Aunt      No Known Problems Maternal Uncle      No Known Problems Paternal Aunt      No Known Problems Paternal Uncle      No Known Problems Maternal Grandmother      No Known Problems Maternal Grandfather      No Known Problems Paternal Grandmother      No Known Problems Paternal Grandfather      No Known Problems Other      Amblyopia Neg Hx      Blindness Neg Hx      Macular degeneration Neg Hx      Retinal detachment Neg Hx      Strabismus Neg Hx      Stroke Neg Hx      Thyroid disease Neg Hx      Melanoma Neg Hx       Social History     Tobacco Use    Smoking status: Never    Smokeless tobacco: Never   Substance Use Topics    Alcohol use: Not Currently     Alcohol/week: 1.0 standard drink of alcohol     Types: 1 Glasses of wine per week     Comment: glass of wine a night     Drug use: No     Review of Systems   Constitutional:  Negative for fever.   Respiratory:  Negative for shortness of breath.    Cardiovascular:  Positive for palpitations. Negative for chest pain and leg swelling.       Physical Exam     Initial Vitals [10/09/24 1502]   BP Pulse Resp Temp SpO2   (!) 141/76 (!) 132 20 98.5 °F (36.9 °C) 99 %      MAP       --         Physical Exam    Nursing note and vitals reviewed.  Constitutional: She appears well-developed and well-nourished. She is not diaphoretic. No distress.   HENT:   Head: Normocephalic and atraumatic.   Nose: Nose normal.   Eyes: Conjunctivae and EOM are normal.   Neck: Neck supple.   Cardiovascular:  Normal rate. An irregularly irregular rhythm present.           Pulses:       Radial pulses are 2+ on the right side and 2+ on the left side.   Pulmonary/Chest: Breath sounds normal. No respiratory distress.   Musculoskeletal:      Cervical back: Neck supple.      Right lower leg: No edema.      Left lower leg: No edema.     Neurological: She is alert and oriented to person, place, and time. Gait  normal.   Skin: No rash noted.   Psychiatric: She has a normal mood and affect. Thought content normal.         ED Course   Procedures  Labs Reviewed   CBC W/ AUTO DIFFERENTIAL - Abnormal       Result Value    WBC 8.04      RBC 4.27      Hemoglobin 12.7      Hematocrit 39.9      MCV 93      MCH 29.7      MCHC 31.8 (*)     RDW 14.9 (*)     Platelets 226      MPV 12.1      Immature Granulocytes 0.2      Gran # (ANC) 5.9      Immature Grans (Abs) 0.02      Lymph # 1.2      Mono # 0.8      Eos # 0.0      Baso # 0.03      nRBC 0      Gran % 73.9 (*)     Lymph % 15.0 (*)     Mono % 10.3      Eosinophil % 0.2      Basophil % 0.4      Differential Method Automated     COMPREHENSIVE METABOLIC PANEL - Abnormal    Sodium 143      Potassium 4.2      Chloride 111 (*)     CO2 23      Glucose 102      BUN 10      Creatinine 0.7      Calcium 9.6      Total Protein 6.6      Albumin 3.6      Total Bilirubin 0.4      Alkaline Phosphatase 61      AST 13      ALT 7 (*)     eGFR >60.0      Anion Gap 9     PHOSPHORUS - Abnormal    Phosphorus 1.9 (*)    TSH    TSH 1.087     MAGNESIUM    Magnesium 2.0     TROPONIN I    Troponin I 0.019     B-TYPE NATRIURETIC PEPTIDE    BNP 41       EKG Readings: (Independently Interpreted)   Rhythm: Atrial Fibrillation. Heart Rate: 131. Axis: Normal. Clinical Impression: Atrial Fibrillation with RVR     ECG Results              EKG 12-lead (Final result)        Collection Time Result Time QRS Duration [facility] QTC Calculation    10/09/24 15:30:48 10/09/24 18:09:55 80 445                     Final result by Interface, Lab In [facility] (10/09/24 18:10:04)                   Narrative:    Test Reason :     Vent. Rate : 131 BPM     Atrial Rate : 147 BPM     P-R Int : 000 ms          QRS Dur : 080 ms      QT Int : 302 ms       P-R-T Axes : 000 018 022 degrees     QTc Int : 445 ms    Atrial fibrillation with rapid ventricular response  Nonspecific T wave abnormality  Abnormal ECG  When compared with ECG of 09-OCT-2024  15:06,  No significant change was found  Confirmed by Evans Yun MD ([id number]) on 10/9/2024 6:09:53 PM    Referred By: [email or web address]   SELF           Confirmed By:Evans Yun MD                                     EKG 12-lead (Final result)        Collection Time Result Time QRS Duration [facility] QTC Calculation    10/09/24 15:06:10 10/09/24 15:27:44 78 452                     Final result by Interface, Lab In [facility] (10/09/24 15:27:55)                   Narrative:    Test Reason : I48.91,    Vent. Rate : 128 BPM     Atrial Rate : 000 BPM     P-R Int : 000 ms          QRS Dur : 078 ms      QT Int : 310 ms       P-R-T Axes : 000 021 027 degrees     QTc Int : 452 ms    Atrial fibrillation with rapid ventricular response  Abnormal ECG  When compared with ECG of 09-OCT-2024 12:05,  Non-specific change in ST segment in Anterior leads  Nonspecific T wave abnormality, improved in Inferior leads  Confirmed by Stas TIPTON MD ([id number]) on 10/9/2024 3:27:43 PM    Referred By:             Confirmed By:Stas TIPTON MD                                  Imaging Results              X-Ray Chest AP Portable (Final result)  Result time 10/09/24 15:50:09      Final result by Sven Bravo MD (10/09/24 15:50:09)                   Impression:      No acute abnormality.      Electronically signed by: Sven Bravo  Date:    10/09/2024  Time:    15:50               Narrative:    EXAMINATION:  XR CHEST AP PORTABLE    CLINICAL HISTORY:  Tachycardia, unspecified    TECHNIQUE:  Single frontal view of the chest was performed.    COMPARISON:  Chest radiograph 10/01/2024; CT chest 09/17/2024    FINDINGS:  Lines and tubes: Monitoring leads.    Heart and mediastinum: Normal in size.  Calcifications of the thoracic aorta.    Pleura: No pleural effusion or pneumothorax.    Lungs: Lungs are well inflated. No focal consolidations or evidence of pulmonary edema.    Soft tissue/bone: Diffuse osteopenia.  Remote right-sided rib fractures.  Remote compression fracture in  the midthoracic spine status post vertebral augmentation.                                       Medications   diltiaZEM injection 18.5 mg (18.5 mg Intravenous Given 10/9/24 1650)   k phos di & mono-sod phos mono 250 mg tablet 1 tablet (1 tablet Oral Given 10/9/24 1842)     Medical Decision Making  74 y.o. Female with a PMHx of HTN, GI bleed, anemia presents to the ED from [facility] after having a EGD with A fib. Nontoxic appearing. Vitals with HTN and tachycardia. Afebrile. Exam as above. I will initiate workup and reassess.    Ddx: A fib, symptomatic anemia, thyroid disease, ACS, new onset CHF    EKG shows A fib with RVR. Diltiazem given with improvement of rate. CHADS-VASc Score of 4. She will most likely need anticoagulation.     CXR without infiltrate, effusion, pneumothorax, mass or other acute findings.    Laboratory workup significant for low phosphorus. Replacement given.    I Will admit at this time to  for further evaluation of new onset a fib     Amount and/or Complexity of Data Reviewed  External Data Reviewed: notes.     Details: Upper EGD and colonoscopy preformed today. Findings include  Erythematous antral gastropathy without active bleeding, hiatal hernia, diverticula   Labs: ordered. Decision-making details documented in ED Course.  Radiology: ordered.    Risk  OTC drugs.  Prescription drug management.              Attending Attestation:     Physician Attestation Statement for NP/PA:   I personally made/approved the management plan and take responsibility for the patient management.    Other NP/PA Attestation Additions:      Medical Decision Making: Afib/RVR, new onset.  Rate controlled.   to observe.             ED Course as of 10/09/24 1904   Wed Oct 09, 2024   1706 WBC: 8.04 []   1706 Hemoglobin: 12.7 []   1706 Hematocrit: 39.9 []   1759 Phosphorus Level(!): 1.9 []   1759 Magnesium : 2.0 []   1904 BNP: 41 []   1904 Troponin I: 0.019 []   1904 TSH: 1.087 []      ED  Course User Index  [HM] Laura Dubon PA-C                       Critical Care   Performed by: Sherman Cabral MD   Authorized by: Sherman Cabral MD    Total critical care time (exclusive of procedural time) : 30 minutes  Critical care was necessary to treat or prevent imminent or life-threatening deterioration of the following conditions:  afib/rvr      Clinical Impression:  Final diagnoses:  [I48.91] Atrial fibrillation  [R00.0] Tachycardia          ED Disposition Condition    Observation                 Laura Dubon PA-C  10/09/24 1906       Sherman Cabral MD  10/09/24 1919

## 2024-10-09 NOTE — PROGRESS NOTES
10/09/24 1255   Vital Signs   Pulse (!) 131  (Afib, administered metoprolol 5 mg IV per Dr. Kim orders)   Heart Rate Source Monitor   Resp 20   SpO2 100 %   Pulse Oximetry Type Continuous   Oximetry Probe Status Intact   BP (!) 155/99   MAP (mmHg) 118   BP Location Left arm   BP Method Automatic   Patient Position Lying     -131/Afib at present time, Administered Metoprolol 5 mg IV per Dr. Kim.  Denies chest pain or SOB at the present time.

## 2024-10-09 NOTE — ANESTHESIA POSTPROCEDURE EVALUATION
Anesthesia Post Evaluation    Patient: Windy Lindo    Procedure(s) Performed: Procedure(s) (LRB):  EGD (ESOPHAGOGASTRODUODENOSCOPY) (N/A)  COLONOSCOPY (N/A)    Final Anesthesia Type: general      Patient location during evaluation: PACU  Patient participation: Yes- Able to Participate  Level of consciousness: awake and alert  Post-procedure vital signs: reviewed and stable  Pain management: adequate  Airway patency: patent    PONV status at discharge: No PONV  Anesthetic complications: yes  Perioperative Events: arrhythmias requiring treatment        Cardiovascular status: tachycardic and blood pressure returned to baseline  Respiratory status: spontaneous ventilation  Hydration status: euvolemic  Follow-up not needed.  Comments: On arrival to PACU patient found to be in a fib with rapid rate, 140's. Discussed case with cardiology at [facility] who suggested IV metoprolol and po metoprolol. After greater than 2 hours patient heart rate still in 120's low 130's. Cardiology at [facility] recommended ER. Discussed with Dr Fenton and we felt patient was stable enough for private transport to ER. Long discussion held with patient regarding recommendations and she expressed understanding. All questions answered            Vitals Value Taken Time   /118 10/09/24 1408   Temp 36.7 °C (98.1 °F) 10/09/24 1145   Pulse 121 10/09/24 1408   Resp 27 10/09/24 1408   SpO2 98 % 10/09/24 1408   Vitals shown include unfiled device data.      Event Time   Out of Recovery 12:15:00         Pain/Cortney Score: Cortney Score: 10 (10/9/2024 12:35 PM)

## 2024-10-09 NOTE — TRANSFER OF CARE
Anesthesia Transfer of Care Note    Patient: Windy Lindo    Procedure(s) Performed: Procedure(s) (LRB):  EGD (ESOPHAGOGASTRODUODENOSCOPY) (N/A)  COLONOSCOPY (N/A)    Patient location: PACU    Anesthesia Type: general    Transport from OR: Transported from OR on room air with adequate spontaneous ventilation    Post pain: adequate analgesia    Post assessment: no apparent anesthetic complications and tolerated procedure well    Post vital signs: stable    Level of consciousness: awake, alert and oriented    Nausea/Vomiting: no nausea/vomiting    Complications: none    Transfer of care protocol was followed      Last vitals: Visit Vitals  BP (!) 158/93   Pulse 91   Temp 36.6 °C (97.9 °F)   Resp 16   SpO2 97%   Breastfeeding No

## 2024-10-09 NOTE — PLAN OF CARE
Pt in preop bay 6, VSS and IV inserted. Pt denies any open wounds on body or the use of any weight loss injections. Pt needs procedural consents and anesthesia consents, otherwise ready to roll.

## 2024-10-09 NOTE — PROGRESS NOTES
10/09/24 1235   Vital Signs   Pulse (!) 118   Heart Rate Source Monitor   Resp 16   SpO2 95 %   Pulse Oximetry Type Continuous   Oximetry Probe Status Intact   Device (Oxygen Therapy) room air   BP (!) 160/70   MAP (mmHg) 101   BP Location Left arm   BP Method Automatic   Patient Position Lying     Report received from MEGAN Dee.  Patient resting in bed with no acute distress noted. Remains in Afib 118-125, denies SOB while sitting up, but baseline SOB when she is in a reclined position.  Denies chest pain at the present time.

## 2024-10-09 NOTE — PLAN OF CARE
Pt arrived to recovery [facility] via stretcher per ENDO team. Bedside report received. Pt attached to bedside monitor. VSS. New onset A-feb appearing on monitor, 's - 140's. Dr Kim called to bedside.  12 lead EKG ordered.  Will wait results of EKG and possible refer to 3rd floor cardiology clinic to be seen today.   Pt _responds to voice__ post procedure. Pt on room air; oxygen sats _100___%. Pt IV access clean, dry and intact, infusing Normal Saline. Will continue to monitor.     1200 - Dr Kim reviewed EKG.  He spoke with Cardiology clinic.  Orders for metoprolol IVP x3 PRN and Metoprolol 50mg PO x1 now.  Will monitor.     1333 - /102, .  Notified Dr Kim.  Continue  to monitor for another 1/2 hour.      1400 - Dr Kim at bedside.  No further orders received.  He spoke to Dr Fenton about discharging with family to ED.   Dr Fenton ok with patient discharging with family to ED.  Instructed family/patient importance of being seen in ED as she has uncontrolled HR and BP is fluctuating. Gave detailed instructions on what to tell ED staff upon arrival. Verbalized understanding.  Patient and family agreed to transport patient to [facility] ED when discharged for further evaluation.

## 2024-10-09 NOTE — PROVATION PATIENT INSTRUCTIONS
Discharge Summary/Instructions after an Endoscopic Procedure  Patient Name: Windy Lindo  Patient MRN: [id number]  Patient YOB: 1950 Wednesday, October 9, 2024  Albino Fentno MD  Dear patient,  As a result of recent federal legislation (The Federal Cures Act), you may   receive lab or pathology results from your procedure in your [facility]   account before your physician is able to contact you. Your physician or   their representative will relay the results to you with their   recommendations at their soonest availability.  Thank you,  RESTRICTIONS:  During your procedure today, you received medications for sedation.  These   medications may affect your judgment, balance and coordination.  Therefore,   for 24 hours, you have the following restrictions:   - DO NOT drive a car, operate machinery, make legal/financial decisions,   sign important papers or drink alcohol.    ACTIVITY:  Today: no heavy lifting, straining or running due to procedural   sedation/anesthesia.  The following day: return to full activity including work.  DIET:  Eat and drink normally unless instructed otherwise.     TREATMENT FOR COMMON SIDE EFFECTS:  - Mild abdominal pain, nausea, belching, bloating or excessive gas:  rest,   eat lightly and use a heating pad.  - Sore Throat: treat with throat lozenges and/or gargle with warm salt   water.  - Because air was used during the procedure, expelling large amounts of air   from your rectum or belching is normal.  - If a bowel prep was taken, you may not have a bowel movement for 1-3 days.    This is normal.  SYMPTOMS TO WATCH FOR AND REPORT TO YOUR PHYSICIAN:  1. Abdominal pain or bloating, other than gas cramps.  2. Chest pain.  3. Back pain.  4. Signs of infection such as: chills or fever occurring within 24 hours   after the procedure.  5. Rectal bleeding, which would show as bright red, maroon, or black stools.   (A tablespoon of blood from the rectum is not serious, especially  if   hemorrhoids are present.)  6. Vomiting.  7. Weakness or dizziness.  GO DIRECTLY TO THE NEAREST EMERGENCY ROOM IF YOU HAVE ANY OF THE FOLLOWING:      Difficulty breathing              Chills and/or fever over 101 F   Persistent vomiting and/or vomiting blood   Severe abdominal pain   Severe chest pain   Black, tarry stools   Bleeding- more than one tablespoon   Any other symptom or condition that you feel may need urgent attention  Your doctor recommends these additional instructions:  If any biopsies were taken, your doctors clinic will contact you in 1 to 2   weeks with any results.  - Patient has a contact number available for emergencies.  The signs and   symptoms of potential delayed complications were discussed with the   patient.  Return to normal activities tomorrow.  Written discharge   instructions were provided to the patient.   - Discharge patient to home.   - Resume previous diet.   - Continue present medications.   - Await pathology results.   For questions, problems or results please call your physician - Albino Fenton MD at Work:  [phone].  [facility], EMERGENCY ROOM PHONE NUMBER: [phone]  IF A COMPLICATION OR EMERGENCY SITUATION ARISES AND YOU ARE UNABLE TO REACH   YOUR PHYSICIAN - GO DIRECTLY TO THE EMERGENCY ROOM.  Albino Fenton MD  10/9/2024 11:27:37 AM  This report has been verified and signed electronically.  Dear patient,  As a result of recent federal legislation (The Federal Cures Act), you may   receive lab or pathology results from your procedure in your [facility]   account before your physician is able to contact you. Your physician or   their representative will relay the results to you with their   recommendations at their soonest availability.  Thank you,  PROVATION

## 2024-10-10 ENCOUNTER — HOSPITAL ENCOUNTER (OUTPATIENT)
Dept: CARDIOLOGY | Facility: HOSPITAL | Age: 74
Discharge: HOME | End: 2024-10-10
Attending: EMERGENCY MEDICINE | Admitting: STUDENT IN AN ORGANIZED HEALTH CARE EDUCATION/TRAINING PROGRAM
Payer: MEDICARE

## 2024-10-10 VITALS
HEART RATE: 70 BPM | OXYGEN SATURATION: 96 % | TEMPERATURE: 98 F | DIASTOLIC BLOOD PRESSURE: 61 MMHG | RESPIRATION RATE: 22 BRPM | BODY MASS INDEX: 26.81 KG/M2 | HEIGHT: 65 IN | WEIGHT: 160.94 LBS | SYSTOLIC BLOOD PRESSURE: 106 MMHG

## 2024-10-10 DIAGNOSIS — I48.91 NEW ONSET ATRIAL FIBRILLATION: ICD-10-CM

## 2024-10-10 PROBLEM — I71.20 THORACIC AORTIC ANEURYSM (TAA): Status: ACTIVE | Noted: 2024-10-10

## 2024-10-10 PROBLEM — I71.9 AORTIC ANEURYSM: Status: ACTIVE | Noted: 2024-10-10

## 2024-10-10 LAB
ANION GAP SERPL CALC-SCNC: 9 MMOL/L (ref 8–16)
APTT PPP: 40.3 SEC (ref 21–32)
ASCENDING AORTA: 3.7 CM
AV AREA BY CONTINUOUS VTI: 3.1 CM2
AV INDEX (PROSTH): 0.72
AV LVOT MEAN GRADIENT: 5 MMHG
AV LVOT PEAK GRADIENT: 9 MMHG
AV MEAN GRADIENT: 10.1 MMHG
AV PEAK GRADIENT: 17.6 MMHG
AV VALVE AREA BY VELOCITY RATIO: 3.2 CM²
AV VALVE AREA: 3.2 CM2
AV VELOCITY RATIO: 0.71
BASOPHILS # BLD AUTO: 0.02 K/UL (ref 0–0.2)
BASOPHILS # BLD AUTO: 0.03 K/UL (ref 0–0.2)
BASOPHILS NFR BLD: 0.2 % (ref 0–1.9)
BASOPHILS NFR BLD: 0.4 % (ref 0–1.9)
BSA FOR ECHO PROCEDURE: 1.83 M2
BUN SERPL-MCNC: 11 MG/DL (ref 8–23)
CALCIUM SERPL-MCNC: 9.2 MG/DL (ref 8.7–10.5)
CHLORIDE SERPL-SCNC: 110 MMOL/L (ref 95–110)
CO2 SERPL-SCNC: 23 MMOL/L (ref 23–29)
CREAT SERPL-MCNC: 0.7 MG/DL (ref 0.5–1.4)
CV ECHO LV RWT: 0.34 CM
DIFFERENTIAL METHOD BLD: ABNORMAL
DIFFERENTIAL METHOD BLD: ABNORMAL
DOP CALC AO PEAK VEL: 2.1 M/S
DOP CALC AO VTI: 43.2 CM
DOP CALC LVOT AREA: 4.5 CM2
DOP CALC LVOT DIAMETER: 2.4 CM
DOP CALC LVOT PEAK VEL: 1.5 M/S
DOP CALC LVOT STROKE VOLUME: 140.2 CM3
DOP CALCLVOT PEAK VEL VTI: 31 CM
E WAVE DECELERATION TIME: 239.17 MS
E/A RATIO: 0.64
E/E' RATIO: 8.53 M/S
ECHO EF ESTIMATED: 79 %
ECHO LV POSTERIOR WALL: 0.7 CM (ref 0.6–1.1)
EOSINOPHIL # BLD AUTO: 0 K/UL (ref 0–0.5)
EOSINOPHIL # BLD AUTO: 0.1 K/UL (ref 0–0.5)
EOSINOPHIL NFR BLD: 0.2 % (ref 0–8)
EOSINOPHIL NFR BLD: 0.7 % (ref 0–8)
ERYTHROCYTE [DISTWIDTH] IN BLOOD BY AUTOMATED COUNT: 14.9 % (ref 11.5–14.5)
ERYTHROCYTE [DISTWIDTH] IN BLOOD BY AUTOMATED COUNT: 15 % (ref 11.5–14.5)
EST. GFR  (NO RACE VARIABLE): >60 ML/MIN/1.73 M^2
ESTIMATED AVG GLUCOSE: 94 MG/DL (ref 68–131)
FRACTIONAL SHORTENING: 48.8 % (ref 28–44)
GLUCOSE SERPL-MCNC: 77 MG/DL (ref 70–110)
HBA1C MFR BLD: 4.9 % (ref 4–5.6)
HCT VFR BLD AUTO: 37.7 % (ref 37–48.5)
HCT VFR BLD AUTO: 38 % (ref 37–48.5)
HGB BLD-MCNC: 12 G/DL (ref 12–16)
HGB BLD-MCNC: 12.2 G/DL (ref 12–16)
IMM GRANULOCYTES # BLD AUTO: 0.03 K/UL (ref 0–0.04)
IMM GRANULOCYTES # BLD AUTO: 0.05 K/UL (ref 0–0.04)
IMM GRANULOCYTES NFR BLD AUTO: 0.4 % (ref 0–0.5)
IMM GRANULOCYTES NFR BLD AUTO: 0.4 % (ref 0–0.5)
INTERVENTRICULAR SEPTUM: 0.9 CM (ref 0.6–1.1)
IVC DIAMETER: 1.46 CM
IVRT: 122.74 MS
LA MAJOR: 5 CM
LA MINOR: 4.92 CM
LA WIDTH: 3.44 CM
LEFT ATRIUM SIZE: 3.85 CM
LEFT ATRIUM VOLUME INDEX MOD: 28.5 ML/M2
LEFT ATRIUM VOLUME INDEX: 31 ML/M2
LEFT ATRIUM VOLUME MOD: 51.32 ML
LEFT ATRIUM VOLUME: 55.83 CM3
LEFT INTERNAL DIMENSION IN SYSTOLE: 2.1 CM (ref 2.1–4)
LEFT VENTRICLE DIASTOLIC VOLUME INDEX: 40.02 ML/M2
LEFT VENTRICLE DIASTOLIC VOLUME: 72.03 ML
LEFT VENTRICLE MASS INDEX: 54.1 G/M2
LEFT VENTRICLE SYSTOLIC VOLUME INDEX: 8.4 ML/M2
LEFT VENTRICLE SYSTOLIC VOLUME: 15.18 ML
LEFT VENTRICULAR INTERNAL DIMENSION IN DIASTOLE: 4.1 CM (ref 3.5–6)
LEFT VENTRICULAR MASS: 97.3 G
LV LATERAL E/E' RATIO: 7.11
LV SEPTAL E/E' RATIO: 10.67
LYMPHOCYTES # BLD AUTO: 1 K/UL (ref 1–4.8)
LYMPHOCYTES # BLD AUTO: 1.7 K/UL (ref 1–4.8)
LYMPHOCYTES NFR BLD: 21.1 % (ref 18–48)
LYMPHOCYTES NFR BLD: 8.6 % (ref 18–48)
MAGNESIUM SERPL-MCNC: 1.9 MG/DL (ref 1.6–2.6)
MCH RBC QN AUTO: 30 PG (ref 27–31)
MCH RBC QN AUTO: 30.2 PG (ref 27–31)
MCHC RBC AUTO-ENTMCNC: 31.6 G/DL (ref 32–36)
MCHC RBC AUTO-ENTMCNC: 32.4 G/DL (ref 32–36)
MCV RBC AUTO: 93 FL (ref 82–98)
MCV RBC AUTO: 96 FL (ref 82–98)
MONOCYTES # BLD AUTO: 0.6 K/UL (ref 0.3–1)
MONOCYTES # BLD AUTO: 0.9 K/UL (ref 0.3–1)
MONOCYTES NFR BLD: 7.3 % (ref 4–15)
MONOCYTES NFR BLD: 8 % (ref 4–15)
MV A" WAVE DURATION": 151.28 MS
MV PEAK A VEL: 1 M/S
MV PEAK E VEL: 0.64 M/S
NEUTROPHILS # BLD AUTO: 10 K/UL (ref 1.8–7.7)
NEUTROPHILS # BLD AUTO: 5.6 K/UL (ref 1.8–7.7)
NEUTROPHILS NFR BLD: 69.4 % (ref 38–73)
NEUTROPHILS NFR BLD: 83.3 % (ref 38–73)
NRBC BLD-RTO: 0 /100 WBC
NRBC BLD-RTO: 0 /100 WBC
OHS CV RV/LV RATIO: 0.85 CM
OHS QRS DURATION: 80 MS
OHS QRS DURATION: 86 MS
OHS QRS DURATION: 86 MS
OHS QTC CALCULATION: 416 MS
OHS QTC CALCULATION: 422 MS
OHS QTC CALCULATION: 437 MS
PHOSPHATE SERPL-MCNC: 2.6 MG/DL (ref 2.7–4.5)
PISA TR MAX VEL: 2.68 M/S
PLATELET # BLD AUTO: 193 K/UL (ref 150–450)
PLATELET # BLD AUTO: 203 K/UL (ref 150–450)
PMV BLD AUTO: 12.4 FL (ref 9.2–12.9)
PMV BLD AUTO: 12.5 FL (ref 9.2–12.9)
POCT GLUCOSE: 113 MG/DL (ref 70–110)
POTASSIUM SERPL-SCNC: 3.8 MMOL/L (ref 3.5–5.1)
PULM VEIN A" WAVE DURATION": 151.28 MS
PULM VEIN S/D RATIO: 2
PULMONIC VEIN PEAK A VELOCITY: 0.3 M/S
PV PEAK D VEL: 0.3 M/S
PV PEAK S VEL: 0.6 M/S
RA MAJOR: 4.18 CM
RA PRESSURE ESTIMATED: 3 MMHG
RA WIDTH: 3.12 CM
RBC # BLD AUTO: 3.98 M/UL (ref 4–5.4)
RBC # BLD AUTO: 4.06 M/UL (ref 4–5.4)
RIGHT ATRIAL AREA: 13.6 CM2
RIGHT VENTRICLE DIASTOLIC BASEL DIMENSION: 3.5 CM
RV TB RVSP: 6 MMHG
RV TISSUE DOPPLER FREE WALL SYSTOLIC VELOCITY 1 (APICAL 4 CHAMBER VIEW): 10.5 CM/S
SINUS: 3.02 CM
SODIUM SERPL-SCNC: 142 MMOL/L (ref 136–145)
STJ: 2.81 CM
TDI LATERAL: 0.09 M/S
TDI SEPTAL: 0.06 M/S
TDI: 0.08 M/S
TR MAX PG: 29 MMHG
TRICUSPID ANNULAR PLANE SYSTOLIC EXCURSION: 1.3 CM
TV PEAK GRADIENT: 29 MMHG
TV REST PULMONARY ARTERY PRESSURE: 32 MMHG
WBC # BLD AUTO: 11.98 K/UL (ref 3.9–12.7)
WBC # BLD AUTO: 8.04 K/UL (ref 3.9–12.7)
Z-SCORE OF LEFT VENTRICULAR DIMENSION IN END DIASTOLE: -1.94
Z-SCORE OF LEFT VENTRICULAR DIMENSION IN END SYSTOLE: -3.02

## 2024-10-10 PROCEDURE — 25000003 PHARM REV CODE 250: Performed by: PHYSICIAN ASSISTANT

## 2024-10-10 PROCEDURE — 99214 OFFICE O/P EST MOD 30 MIN: CPT | Mod: ,,, | Performed by: INTERNAL MEDICINE

## 2024-10-10 PROCEDURE — 93005 ELECTROCARDIOGRAM TRACING: CPT

## 2024-10-10 PROCEDURE — 93226 XTRNL ECG REC<48 HR SCAN A/R: CPT

## 2024-10-10 PROCEDURE — 84100 ASSAY OF PHOSPHORUS: CPT | Performed by: PHYSICIAN ASSISTANT

## 2024-10-10 PROCEDURE — 93010 ELECTROCARDIOGRAM REPORT: CPT | Mod: ,,, | Performed by: INTERNAL MEDICINE

## 2024-10-10 PROCEDURE — 93225 XTRNL ECG REC<48 HRS REC: CPT

## 2024-10-10 PROCEDURE — 83735 ASSAY OF MAGNESIUM: CPT | Performed by: PHYSICIAN ASSISTANT

## 2024-10-10 PROCEDURE — 85025 COMPLETE CBC W/AUTO DIFF WBC: CPT | Performed by: PHYSICIAN ASSISTANT

## 2024-10-10 PROCEDURE — 63600175 PHARM REV CODE 636 W HCPCS: Performed by: PHYSICIAN ASSISTANT

## 2024-10-10 PROCEDURE — 83036 HEMOGLOBIN GLYCOSYLATED A1C: CPT | Performed by: PHYSICIAN ASSISTANT

## 2024-10-10 PROCEDURE — 85730 THROMBOPLASTIN TIME PARTIAL: CPT | Performed by: STUDENT IN AN ORGANIZED HEALTH CARE EDUCATION/TRAINING PROGRAM

## 2024-10-10 PROCEDURE — 36415 COLL VENOUS BLD VENIPUNCTURE: CPT | Performed by: PHYSICIAN ASSISTANT

## 2024-10-10 PROCEDURE — G0378 HOSPITAL OBSERVATION PER HR: HCPCS

## 2024-10-10 PROCEDURE — 80048 BASIC METABOLIC PNL TOTAL CA: CPT | Performed by: PHYSICIAN ASSISTANT

## 2024-10-10 PROCEDURE — 25000003 PHARM REV CODE 250

## 2024-10-10 PROCEDURE — 96375 TX/PRO/DX INJ NEW DRUG ADDON: CPT

## 2024-10-10 RX ORDER — PANTOPRAZOLE SODIUM 40 MG/1
40 TABLET, DELAYED RELEASE ORAL DAILY
Qty: 90 TABLET | Refills: 3 | Status: SHIPPED | OUTPATIENT
Start: 2024-10-11 | End: 2025-10-11

## 2024-10-10 RX ORDER — SODIUM,POTASSIUM PHOSPHATES 280-250MG
1 POWDER IN PACKET (EA) ORAL ONCE
Status: COMPLETED | OUTPATIENT
Start: 2024-10-10 | End: 2024-10-10

## 2024-10-10 RX ORDER — METOPROLOL TARTRATE 25 MG/1
25 TABLET, FILM COATED ORAL 2 TIMES DAILY
Qty: 180 TABLET | Refills: 3 | Status: SHIPPED | OUTPATIENT
Start: 2024-10-10 | End: 2025-10-10

## 2024-10-10 RX ADMIN — HEPARIN SODIUM 12 UNITS/KG/HR: 10000 INJECTION, SOLUTION INTRAVENOUS at 12:10

## 2024-10-10 RX ADMIN — CHOLECALCIFEROL TAB 25 MCG (1000 UNIT) 1000 UNITS: 25 TAB at 09:10

## 2024-10-10 RX ADMIN — SPIRONOLACTONE 50 MG: 25 TABLET ORAL at 09:10

## 2024-10-10 RX ADMIN — PANTOPRAZOLE SODIUM 40 MG: 40 TABLET, DELAYED RELEASE ORAL at 09:10

## 2024-10-10 RX ADMIN — Medication 400 UNITS: at 09:10

## 2024-10-10 RX ADMIN — METOPROLOL TARTRATE 25 MG: 25 TABLET, FILM COATED ORAL at 09:10

## 2024-10-10 RX ADMIN — ACETAMINOPHEN 650 MG: 325 TABLET ORAL at 09:10

## 2024-10-10 RX ADMIN — POTASSIUM & SODIUM PHOSPHATES POWDER PACK 280-160-250 MG 1 PACKET: 280-160-250 PACK at 09:10

## 2024-10-10 NOTE — PLAN OF CARE
Problem: Adult Inpatient Plan of Care  Goal: Plan of Care Review  Outcome: Progressing     Problem: Adult Inpatient Plan of Care  Goal: Patient-Specific Goal (Individualized)  Reactivated  Goal: Absence of Hospital-Acquired Illness or Injury  Reactivated  Goal: Optimal Comfort and Wellbeing  Reactivated  Goal: Readiness for Transition of Care  Reactivated     Problem: Infection  Goal: Absence of Infection Signs and Symptoms  Reactivated     Problem: Fall Injury Risk  Goal: Absence of Fall and Fall-Related Injury  Reactivated     Problem: Dysrhythmia  Goal: Normalized Cardiac Rhythm  Reactivated     Problem: Hypertension Acute  Goal: Blood Pressure Within Desired Range  Reactivated     Problem: Gastrointestinal Bleeding  Goal: Optimal Coping with Acute Illness  Reactivated  Goal: Hemostasis  Reactivated

## 2024-10-10 NOTE — ASSESSMENT & PLAN NOTE
- recent GIB in September, since resolved  - Hgb stable at 12.7  - EGD done today shows erythematous antral gastropathy  - avoid NSAIDs  - continue PPI  - trend daily CBC

## 2024-10-10 NOTE — HOSPITAL COURSE
73 yo F that presented for new Afib RVR 130s seen after upper endoscopy and colonoscopy for GIB 2/2 NSAIDs. Received Dilt 18.5mg and was rate controlled. RVR again 120s then Lopresor 25mg x1 and has been rate controlled. CHADSVASC is 4 (age, sex, HTN, A1c pending). HASBLED 3 (high risk). Echo 3/2023 normal EF 65%. She was seen by cardiology (Dr. Briseno) on 05/23/24 who noted frequent PACs (asymptomatic) and normal EF. No active bleeding seen during GI procedures. Upper endoscopy showed small hiatal hernia and erythematous antral gastropathy. Colonoscopy showed diverticulosis. CBC has been stable. Cardiology was consulted for new Afib. Recommend Lopressor 25 BID and Eliquis Repeat EKG showed NSR>

## 2024-10-10 NOTE — PLAN OF CARE
[facility] - Observation 11H  Discharge Final Note    Primary Care Provider: Trisha Higginbotham MD    Expected Discharge Date: 10/10/2024    Future Appointments   Date Time Provider Department Center   10/10/2024  3:00 PM [facility] ECHOSTR [place]   10/17/2024 11:00 AM Rick Soto MD [facility] FAM MED [place]   10/18/2024 11:20 AM Alysia Briseno MD [facility] CARDIO [facility]   11/19/2024  1:45 PM INJECTION [facility] AMB INF [facility]   4/8/2025  9:40 AM Trisha Higginbotham MD [facility] FAM MED [place]     Pt discharged home with no services.  Keven Ellington, RN,BSN        Final Discharge Note (most recent)       Final Note - 10/10/24 1348          Final Note    Assessment Type Final Discharge Note     Anticipated Discharge Disposition Home or Self Care     Hospital Resources/Appts/Education Provided Provided patient/caregiver with written discharge plan information;Provided education on problems/symptoms using teachback;Appointments scheduled and added to AVS        Post-Acute Status    Discharge Delays None known at this time                     Important Message from Medicare             Contact Info       Trisha Higginbotham MD   Specialty: Family Medicine, Wound Care   Relationship: PCP - General  Hypertension Digital Medicine Responsible Provider    [facility] [place] [address]   Phone: [phone]       Next Steps: Schedule an appointment as soon as possible for a visit in 1 week(s)

## 2024-10-10 NOTE — DISCHARGE SUMMARY
[facility] [address] [place] [address] Medicine  Discharge Summary      Patient Name: Windy Lindo  MRN: [id number]  DAVID: [id number]  Patient Class: OP- Observation  Admission Date: 10/9/2024  Hospital Length of Stay: 0 days  Discharge Date and Time: 10/10/2024  1:58 PM  Attending Physician: Christina att. providers found   Discharging Provider: Kirstie Hirsch PA-C  Primary Care Provider: Trisha Higginbotham MD  [facility] Medicine Team: [facility] HOSP MED E Kirstie Hirsch PA-C  Primary Care Team: [facility] HOSP MED E    HPI:   Windy Lindo is a 74 y.o. female with a PMHx of osteoporosis with pathologic fracture, hx GIB,  HTN, venous insufficiency who presents to [facility] for evaluation of new onset Afib with RVR. Patient had 2 days of dark black stools with bright red clots mixed within on September 13th and 14th. She was taking mobic at the time and was instructed to stop mobic and was prescribed protonix. No further black/ bloody Bms since then. She had a EGD and C scope done today for evaluation of recent GIB which showed normal findings. Patient was tachycardic to HR 130s following the procedure today for which she was given IV and PO lorpessor without any improvement in HR so she was sent to the ED for further eval. Patient was asymptomatic during this episode today.    Patient had a fall in April causing a thoracic vertabrea fracture. She subjequently underwent percutaneous vertebroplasty of T8 on 8/29. Since then, she reports intermittent episodes of lightheadedness, shortness of breath with exertion and near syncope. She says she feels drained during these episodes. Reports mild BLE edema which is chronic and unchanged. Denies any chest pain, palpitations, N/V, abdominal pain, HA, vision changes or syncope. No recent dark/ bloody stools since episode in September.    ED: tachycardic to HR 130s and found to be in Afib with RVR. Given 18.5mg IV dilt with improvement in HR to 80s. Normotensive. No leukocytosis. Phos 1.9, replaced.      *  No surgery found *      Hospital Course:   Windy Lindo is a 74 y.o. F who was admitted to  for further evaluation of new onset a-fib. Pt with RVR on admission which improved with metoprolol. Initiated lopressor 25mg BID. Converted to sinus rhythm overnight. Echo performed in am with EF 65-70%, G1DD, CVP 3mmHg. Heparin gtt initiated during stay with [id number] 4. Hgb stable and no signs of bleeding given recent hx of GIB [id number] ago. Patient transitioned to eliquis 5mg BID. Cards with recs of eliquis, lopressor and 48hr holter at discharge.  Patient medically ready for discharge. Plan to follow up with PCP, cardiology. Return precautions provided. Patient was seen and assessed on day of discharge. Plan of care discussed with patient, patient agreeable with plan, and all questions answered.    Physical Exam  Gen: in NAD, appears stated age  Neuro: mental status at baseline, motor, sensory, and strength grossly intact BL  HEENT: EOMI, PERRLA; no JVD appreciated  CVS: RRR, no m/r/g  Resp: lungs CTAB, no w/r/r; no belabored breathing or accessory muscle use appreciated   Abd: NTND, soft to palpation  Extrem: no UE or LE edema BL          Goals of Care Treatment Preferences:  Code Status: Full Code      SDOH Screening:  The patient was screened for utility difficulties, food insecurity, transport difficulties, housing insecurity, and interpersonal safety and there were no concerns identified this admission.     Consults:   Consults (From admission, onward)          Status Ordering Provider     Inpatient consult to Cardiology  Once        Provider:  (Not yet assigned)    BECKIE Grimm            Cardiac/Vascular  * New onset atrial fibrillation  - noted to be in RVR to HR 130s, converted to NSR s/p IV dilt in the ED  - some concern for PE given symptoms began following vertebroplasty of T8  - CTA chest ordered  - cardiology consulted; appreciate recs  - [id number] Score: 3.   - hold AC pending cards  recs/ CTA results  - start lopressor 25mg BID  - echo in AM  - monitor tele     Thoracic aortic aneurysm (TAA)  - Annual CT or MR angiography, echocardiogram to follow valvular disease (if needed)         Final Active Diagnoses:    Diagnosis Date Noted POA    PRINCIPAL PROBLEM:  New onset atrial fibrillation [I48.91] 10/09/2024 Yes    Thoracic aortic aneurysm (TAA) [I71.20] 10/10/2024 Yes    History of GI bleed [Z87.19] 10/09/2024 Not Applicable    Hypertension [I10] 04/04/2023 Yes    Hyperlipidemia [E78.5] 10/26/2020 Yes    Varicose veins of both lower extremities [I83.93] 11/29/2017 Yes      Problems Resolved During this Admission:       Discharged Condition: good    Disposition: Home or Self Care    Follow Up:   Follow-up Information       Trisha Higginbotham MD. Schedule an appointment as soon as possible for a visit in 1 week(s).    Specialties: Family Medicine, Wound Care  Contact information:  [address] [facility]  [place] [id number] [phone]                           Patient Instructions:      Ambulatory referral/consult to Acute Care at Home   Standing Status: Future   Referral Priority: Routine Referral Type: Consultation   Referred to Provider: KRISTI PROVIDER Requested Specialty: Internal Medicine   Number of Visits Requested: 1     Ambulatory referral/consult to Cardiology   Standing Status: Future   Referral Priority: Routine Referral Type: Consultation   Referral Reason: Specialty Services Required   Requested Specialty: Cardiology   Number of Visits Requested: 1     Notify your health care provider if you experience any of the following:  severe uncontrolled pain     Notify your health care provider if you experience any of the following:  difficulty breathing or increased cough     Notify your health care provider if you experience any of the following:  severe persistent headache     Notify your health care provider if you experience any of the following:  persistent dizziness, light-headedness, or  visual disturbances     Holter monitor - 48 hour   Standing Status: Future Number of Occurrences: 1 Standing Exp. Date: 10/10/25     Order Specific Question Answer Comments   Release to patient Immediate      Activity as tolerated       Significant Diagnostic Studies: N/A    Pending Diagnostic Studies:       Procedure Component Value Units Date/Time    APTT [[id number]]     Order Status: Sent Lab Status: No result     Specimen: Blood     CBC with Automated Differential [[id number]]     Order Status: Sent Lab Status: No result     Specimen: Blood            Medications:  Reconciled Home Medications:      Medication List        START taking these medications      ELIQUIS 5 mg Tab  Generic drug: apixaban  Take 1 tablet (5 mg total) by mouth 2 (two) times daily.     metoprolol tartrate 25 MG tablet  Commonly known as: LOPRESSOR  Take 1 tablet (25 mg total) by mouth 2 (two) times daily.            CHANGE how you take these medications      pantoprazole 40 MG tablet  Commonly known as: PROTONIX  Take 1 tablet (40 mg total) by mouth once daily.  Start taking on: October 11, 2024  What changed:   medication strength  how much to take            CONTINUE taking these medications      acetaminophen 650 MG Tbsr  Commonly known as: TYLENOL  Take 1 tablet (650 mg total) by mouth every 6 (six) hours as needed.     b complex vitamins capsule  Take 1 capsule by mouth once daily.     diclofenac sodium 1 % Gel  Commonly known as: VOLTAREN  Apply 2 g topically once daily.     ferrous sulfate 325 mg (65 mg iron) Tab tablet  Commonly known as: FEOSOL  Take 1 tablet (325 mg total) by mouth daily with breakfast.     multivit-min-FA-lycopen-lutein 0.4 mg-300 mcg- 250 mcg Tab  Take 1 tablet by mouth once daily.     PROLIA 60 mg/mL Syrg  Generic drug: denosumab  Inject 60 mg into the skin every 6 (six) months.     spironolactone 50 MG tablet  Commonly known as: ALDACTONE  Take 1 tablet (50 mg total) by mouth once daily.     vitamin D 1000  units Tab  Commonly known as: VITAMIN D3  Take 1,000 Units by mouth once daily.     vitamin E 100 UNIT capsule  Take 100 Units by mouth once daily.              Indwelling Lines/Drains at time of discharge:   Lines/Drains/Airways       None                   Time spent on the discharge of patient: 38 minutes         Kirstie Hirsch PA-C  Department of Hospital Medicine  [facility] 11[facility]

## 2024-10-10 NOTE — PROGRESS NOTES
[facility]  Cardiology  Progress Note    Patient Name: Windy Lindo  MRN: [id number]  Admission Date: 10/9/2024  Hospital Length of Stay: 0 days  Code Status: Full Code   Attending Physician: Jose Alfredo Ackerman MD   Primary Care Physician: Trisha Higginbotham MD  Expected Discharge Date: 10/10/2024  Principal Problem:New onset atrial fibrillation    Subjective:     Hospital Course:   73 yo F that presented for new Afib RVR 130s seen after upper endoscopy and colonoscopy for GIB 2/2 NSAIDs. Received Dilt 18.5mg and was rate controlled. RVR again 120s then Lopresor 25mg x1 and has been rate controlled. CHADSVASC is 4 (age, sex, HTN, A1c pending). HASBLED 3 (high risk). Echo 3/2023 normal EF 65%. She was seen by cardiology (Dr. Briseno) on 05/23/24 who noted frequent PACs (asymptomatic) and normal EF. No active bleeding seen during GI procedures. Upper endoscopy showed small hiatal hernia and erythematous antral gastropathy. Colonoscopy showed diverticulosis. CBC has been stable. Cardiology was consulted for new Afib. Recommend Lopressor 25 BID and Eliquis Repeat EKG showed NSR>     Interval History: Repeat EKG shows her in NSR. Recommend discharge from cardiac standpoint with follow up in gen cards clinic.     ROS  Objective:     Vital Signs (Most Recent):  Temp: 98.3 °F (36.8 °C) (10/10/24 1133)  Pulse: 70 (10/10/24 1133)  Resp: (!) 22 (10/10/24 1133)  BP: 106/61 (10/10/24 1133)  SpO2: 96 % (10/10/24 1133) Vital Signs (24h Range):  Temp:  [98.1 °F (36.7 °C)-98.9 °F (37.2 °C)] 98.3 °F (36.8 °C)  Pulse:  [] 70  Resp:  [16-22] 22  SpO2:  [95 %-99 %] 96 %  BP: (106-153)/(61-97) 106/61     Weight: 73 kg (160 lb 15 oz)  Body mass index is 26.81 kg/m².     SpO2: 96 %         Intake/Output Summary (Last 24 hours) at 10/10/2024 1407  Last data filed at 10/9/2024 2352  Gross per 24 hour   Intake 100 ml   Output --   Net 100 ml       Lines/Drains/Airways       Peripheral Intravenous Line  Duration                   Peripheral IV - Single Lumen 10/10/24 0118 22 G Anterior;Distal;Right Forearm <1 day                       Physical Exam  Constitutional:       General: She is not in acute distress.  HENT:      Head: Normocephalic and atraumatic.   Eyes:      Extraocular Movements: Extraocular movements intact.   Cardiovascular:      Rate and Rhythm: Normal rate and regular rhythm.   Pulmonary:      Effort: Pulmonary effort is normal. No respiratory distress.      Breath sounds: Normal breath sounds.   Abdominal:      General: There is no distension.      Palpations: Abdomen is soft.   Musculoskeletal:      Right lower leg: No edema.      Left lower leg: No edema.   Neurological:      Mental Status: She is alert. Mental status is at baseline.   Psychiatric:         Mood and Affect: Mood normal.         Behavior: Behavior normal.        Significant Labs: All pertinent lab results from the last 24 hours have been reviewed.    Significant Imaging:     Assessment and Plan:       * New onset atrial fibrillation  #Atrial fibrillation - Paroxysmal - Not associated with valvular disease  - Risk factors: HTN, sedentary lifestyle, older age  - Cause: Acute - Likely metabolic (High catecholamine states post-procedure  - Anticoagulation - [id number] 4 (HTN, Age, Sex), HASBLED (HTN, prior bleeding, Age) - Pending HbA1c  - GI bleeding likely related to NSAID use, and endoscopy confirmed erythematous gastric mucosa without acute bleeding.  - Currently self-converted to NSR, agree with BB for rate control  - Normal thyroid function  - Bedside TTE with normal EF    Recommendations:  - TTE on admission with normal systolic and diastolic function, EF 65%  - Rate control: Goal HR<110: Continue Lopressor 25 BID  - Rhythm control: Not needed at this point, paroxysmal and could be related to procedure  - Anticoagulation: Patient needs to be anticoagulated if appropriate by primary team. CBC stable. Start Eilquis 5mg BID and can discharge with Holter  monitor after 2 weeks. She can follow up with Dr. Triplett in general cardiology clinic.   -Repeat EKG in NSR  -Stable for discharge from cardiac standpoint    VTE Risk Mitigation (From admission, onward)           Ordered     Reason for No Pharmacological VTE Prophylaxis  Once        Question:  Reasons:  Answer:  Already adequately anticoagulated on oral Anticoagulants    10/09/24 1917     IP VTE HIGH RISK PATIENT  Once         10/09/24 1917                    Lelia Handley,   Cardiology  [facility]

## 2024-10-10 NOTE — TREATMENT PLAN
Spoke to patient and family regarding anticoagulation given [id number] 4. Exaplined risks and benefits of starting anticoagulation in the setting of GI bleeding ~3-4 weeks ago. Patient agreeable to starting AC. Reports complete resolution of GI bleeding 3 weeks ago and Hgb stable. Will start low intensity heparin gtt without initial bolus overnight. If no bleeding & H/H stable, can discharge on OAC. Increase protonix to 40mg daily. Monitor H/H q8h for now.       Anitra Buckley PA-C  [facility] Medicine

## 2024-10-10 NOTE — PLAN OF CARE
Patient ready for discharge. Discharge paper provided. Patient/ family will bring a wheelchair.   Problem: Adult Inpatient Plan of Care  Goal: Plan of Care Review  Outcome: Met  Goal: Patient-Specific Goal (Individualized)  Outcome: Met  Goal: Absence of Hospital-Acquired Illness or Injury  Outcome: Met  Goal: Optimal Comfort and Wellbeing  Outcome: Met  Goal: Readiness for Transition of Care  Outcome: Met     Problem: Infection  Goal: Absence of Infection Signs and Symptoms  Outcome: Met     Problem: Fall Injury Risk  Goal: Absence of Fall and Fall-Related Injury  Outcome: Met     Problem: Dysrhythmia  Goal: Normalized Cardiac Rhythm  Outcome: Met     Problem: Hypertension Acute  Goal: Blood Pressure Within Desired Range  Outcome: Met     Problem: Gastrointestinal Bleeding  Goal: Optimal Coping with Acute Illness  Outcome: Met  Goal: Hemostasis  Outcome: Met

## 2024-10-10 NOTE — HPI
Windy Lindo is a 74 y.o. female with a PMHx of osteoporosis with pathologic fracture, hx GIB,  HTN, venous insufficiency who presents to [facility] for evaluation of new onset Afib with RVR. Patient had 2 days of dark black stools with bright red clots mixed within on September 13th and 14th. She was taking mobic at the time and was instructed to stop mobic and was prescribed protonix. No further black/ bloody Bms since then. She had a EGD and C scope done today for evaluation of recent GIB which showed normal findings. Patient was tachycardic to HR 130s following the procedure today for which she was given IV and PO lorpessor without any improvement in HR so she was sent to the ED for further eval. Patient was asymptomatic during this episode today.    Patient had a fall in April causing a thoracic vertabrea fracture. She subjequently underwent percutaneous vertebroplasty of T8 on 8/29. Since then, she reports intermittent episodes of lightheadedness, shortness of breath with exertion and near syncope. She says she feels drained during these episodes. Reports mild BLE edema which is chronic and unchanged. Denies any chest pain, palpitations, N/V, abdominal pain, HA, vision changes or syncope. No recent dark/ bloody stools since episode in September.    ED: tachycardic to HR 130s and found to be in Afib with RVR. Given 18.5mg IV dilt with improvement in HR to 80s. Normotensive. No leukocytosis. Phos 1.9, replaced.

## 2024-10-10 NOTE — ASSESSMENT & PLAN NOTE
#Atrial fibrillation - Paroxysmal - Not associated with valvular disease  - Risk factors: HTN, sedentary lifestyle, older age  - Cause: Acute - Likely metabolic (High catecholamine states post-procedure  - Anticoagulation - [id number] 4 (HTN, Age, Sex), HASBLED (HTN, prior bleeding, Age) - Pending HbA1c  - GI bleeding likely related to NSAID use, and endoscopy confirmed erythematous gastric mucosa without acute bleeding.  - Currently self-converted to NSR, agree with BB for rate control  - Normal thyroid function  - Bedside TTE with normal EF    Recommendations:  - Order TTE  - Rate control: Goal HR<110: Agree with metoprolol tartarte 25mg bid  - Rhythm control: Not needed at this point, paroxysmal and could be related to procedure  - Anticoagulation: Patient needs to be anticoagulated if appropriate by primary team. Can start heparin and watch overnight. Could benefit from DOAC if no mitral disease  - GI prophylaxis strongly recommended if starting anticoagulation

## 2024-10-10 NOTE — SUBJECTIVE & OBJECTIVE
Interval History: Repeat EKG shows her in NSR. Recommend discharge from cardiac standpoint with follow up in gen cards clinic.     ROS  Objective:     Vital Signs (Most Recent):  Temp: 98.3 °F (36.8 °C) (10/10/24 1133)  Pulse: 70 (10/10/24 1133)  Resp: (!) 22 (10/10/24 1133)  BP: 106/61 (10/10/24 1133)  SpO2: 96 % (10/10/24 1133) Vital Signs (24h Range):  Temp:  [98.1 °F (36.7 °C)-98.9 °F (37.2 °C)] 98.3 °F (36.8 °C)  Pulse:  [] 70  Resp:  [16-22] 22  SpO2:  [95 %-99 %] 96 %  BP: (106-153)/(61-97) 106/61     Weight: 73 kg (160 lb 15 oz)  Body mass index is 26.81 kg/m².     SpO2: 96 %         Intake/Output Summary (Last 24 hours) at 10/10/2024 1407  Last data filed at 10/9/2024 2352  Gross per 24 hour   Intake 100 ml   Output --   Net 100 ml       Lines/Drains/Airways       Peripheral Intravenous Line  Duration                  Peripheral IV - Single Lumen 10/10/24 0118 22 G Anterior;Distal;Right Forearm <1 day                       Physical Exam  Constitutional:       General: She is not in acute distress.  HENT:      Head: Normocephalic and atraumatic.   Eyes:      Extraocular Movements: Extraocular movements intact.   Cardiovascular:      Rate and Rhythm: Normal rate and regular rhythm.   Pulmonary:      Effort: Pulmonary effort is normal. No respiratory distress.      Breath sounds: Normal breath sounds.   Abdominal:      General: There is no distension.      Palpations: Abdomen is soft.   Musculoskeletal:      Right lower leg: No edema.      Left lower leg: No edema.   Neurological:      Mental Status: She is alert. Mental status is at baseline.   Psychiatric:         Mood and Affect: Mood normal.         Behavior: Behavior normal.        Significant Labs: All pertinent lab results from the last 24 hours have been reviewed.    Significant Imaging:

## 2024-10-10 NOTE — SUBJECTIVE & OBJECTIVE
Past Medical History:   Diagnosis Date    Arthritis     Eye injury 4 yrs    hit od    Hypertension 04/04/2023    Nuclear sclerosis, bilateral 02/13/2019    Osteoporosis        Past Surgical History:   Procedure Laterality Date    APPENDECTOMY      COLONOSCOPY N/A 9/7/2017    Procedure: COLONOSCOPY;  Surgeon: Albino Fenton MD;  Location: [facility] ([address] [place] [address]);  Service: Endoscopy;  Laterality: N/A;    ESOPHAGOGASTRODUODENOSCOPY  09/07/2017    KNEE SURGERY Right 12/05/2017    TKR    LASIK  7 yrs    ou    TONSILLECTOMY         Review of patient's allergies indicates:  No Known Allergies    Current Facility-Administered Medications on File Prior to Encounter   Medication    [COMPLETED] metoprolol injection 5 mg    [COMPLETED] metoprolol tartrate (LOPRESSOR) tablet 50 mg    [DISCONTINUED] 0.9%  NaCl infusion    [DISCONTINUED] glycopyrrolate (PF) injection    [DISCONTINUED] labetaloL 20 mg/4 mL (5 mg/mL) IV syringe Syrg    [DISCONTINUED] labetaloL injection 5 mg    [DISCONTINUED] LIDOcaine (cardiac) injection    [DISCONTINUED] propofol (DIPRIVAN) 10 mg/mL infusion    [DISCONTINUED] propofol (DIPRIVAN) 10 mg/mL infusion    [DISCONTINUED] sodium chloride 0.9% infusion     Current Outpatient Medications on File Prior to Encounter   Medication Sig    acetaminophen (TYLENOL) 650 MG TbSR Take 1 tablet (650 mg total) by mouth every 6 (six) hours as needed.    b complex vitamins capsule Take 1 capsule by mouth once daily.    denosumab (PROLIA) 60 mg/mL Syrg Inject 60 mg into the skin every 6 (six) months.    diclofenac sodium (VOLTAREN) 1 % Gel Apply 2 g topically once daily.    ferrous sulfate (FEOSOL) 325 mg (65 mg iron) Tab tablet Take 1 tablet (325 mg total) by mouth daily with breakfast.    multivit-min-FA-lycopen-lutein 0.4-300-250 mg-mcg-mcg Tab Take 1 tablet by mouth once daily.    pantoprazole (PROTONIX) 20 MG tablet Take 1 tablet (20 mg total) by mouth once daily.    spironolactone (ALDACTONE) 50 MG tablet Take 1  tablet (50 mg total) by mouth once daily.    vitamin D (VITAMIN D3) 1000 units Tab Take 1,000 Units by mouth once daily.    vitamin E 100 UNIT capsule Take 100 Units by mouth once daily.    [DISCONTINUED] glucosam/chond/hyalu/CF borate (MOVE FREE JOINT HEALTH ORAL) Take by mouth.     Family History       Problem Relation (Age of Onset)    Cancer Mother (74), Brother (66)    Cataracts Mother    Diabetes Mother, Brother    Glaucoma Mother    Heart disease Mother (55), Father    Hypertension Mother, Father    No Known Problems Sister, Maternal Aunt, Maternal Uncle, Paternal Aunt, Paternal Uncle, Maternal Grandmother, Maternal Grandfather, Paternal Grandmother, Paternal Grandfather, Other          Tobacco Use    Smoking status: Never    Smokeless tobacco: Never   Substance and Sexual Activity    Alcohol use: Not Currently     Alcohol/week: 1.0 standard drink of alcohol     Types: 1 Glasses of wine per week     Comment: glass of wine a night     Drug use: No    Sexual activity: Never     Review of Systems   Constitutional: Negative.   HENT: Negative.     Cardiovascular:  Positive for palpitations. Negative for chest pain, claudication, cyanosis, dyspnea on exertion, irregular heartbeat, leg swelling, near-syncope, orthopnea, paroxysmal nocturnal dyspnea and syncope.   Respiratory: Negative.     Endocrine: Negative.    Musculoskeletal: Negative.    Gastrointestinal: Negative.    Genitourinary: Negative.    Neurological: Negative.      Objective:     Vital Signs (Most Recent):  Temp: 98.1 °F (36.7 °C) (10/09/24 2003)  Pulse: 86 (10/09/24 2003)  Resp: 18 (10/09/24 2003)  BP: 125/68 (10/09/24 2003)  SpO2: 97 % (10/09/24 2003) Vital Signs (24h Range):  Temp:  [97.9 °F (36.6 °C)-98.5 °F (36.9 °C)] 98.1 °F (36.7 °C)  Pulse:  [] 86  Resp:  [16-27] 18  SpO2:  [93 %-100 %] 97 %  BP: (111-181)/() 125/68     Weight: 73.9 kg (163 lb)  Body mass index is 27.12 kg/m².    SpO2: 97 %       No intake or output data in the 24  "hours ending 10/09/24 2137    Lines/Drains/Airways       Peripheral Intravenous Line  Duration                  Peripheral IV - Single Lumen 10/09/24 1638 20 G 1 in Yes Left Antecubital <1 day                     Physical Exam  Vitals and nursing note reviewed.   Constitutional:       Appearance: Normal appearance.   HENT:      Head: Normocephalic and atraumatic.   Eyes:      Extraocular Movements: Extraocular movements intact.      Pupils: Pupils are equal, round, and reactive to light.   Neck:      Vascular: No carotid bruit.   Cardiovascular:      Rate and Rhythm: Normal rate. Rhythm irregular.      Pulses: Normal pulses.      Heart sounds: Normal heart sounds. No murmur heard.     No friction rub. No gallop.   Pulmonary:      Effort: Pulmonary effort is normal.      Breath sounds: Normal breath sounds.   Abdominal:      General: Abdomen is flat. Bowel sounds are normal. There is no distension.      Palpations: Abdomen is soft. There is no mass.      Tenderness: There is no abdominal tenderness.   Musculoskeletal:         General: No swelling. Normal range of motion.      Cervical back: Normal range of motion.   Skin:     General: Skin is warm.      Capillary Refill: Capillary refill takes less than 2 seconds.   Neurological:      General: No focal deficit present.      Mental Status: She is alert and oriented to person, place, and time.          Significant Labs:     Recent Labs   Lab 10/09/24  1637   WBC 8.04   HGB 12.7   HCT 39.9          Recent Labs   Lab 10/09/24  1637      K 4.2   *   CO2 23   BUN 10   CREATININE 0.7   CALCIUM 9.6   PHOS 1.9*       Recent Labs   Lab 10/09/24  1637   ALKPHOS 61   BILITOT 0.4   PROT 6.6   ALT 7*   AST 13     Lab Results   Component Value Date    CHOL 213 (H) 03/28/2024    HDL 57 03/28/2024    LDLCALC 137.8 03/28/2024    TRIG 91 03/28/2024       Recent Labs   Lab 10/09/24  1637   TROPONINI 0.019       No results found for: "HGBA1C"    Lab Results   Component " Value Date    BNP 41 10/09/2024    BNP 18 10/01/2024    BNP 13 09/17/2024     Significant Imaging:     EKG 10/09/24  - Initially Afib RVR, but then self-converted to NSR with PACs    TTE 04/05/23  - The left ventricle is normal in size with mild eccentric hypertrophy and normal systolic function.  - The estimated ejection fraction is 65%.  - Normal left ventricular diastolic function.  - Normal right ventricular size with normal right ventricular systolic function.  - Mild tricuspid regurgitation.  - Mild aortic regurgitation.  - Normal central venous pressure (3 mmHg).  - The estimated PA systolic pressure is 34 mmHg.  - Trivial posterior pericardial effusion.

## 2024-10-10 NOTE — ASSESSMENT & PLAN NOTE
- noted to be in RVR to HR 130s, converted to NSR s/p IV dilt in the ED  - some concern for PE given symptoms began following vertebroplasty of T8  - CTA chest ordered  - cardiology consulted; appreciate recs  - [id number] Score: 3.   - hold AC pending cards recs given converted to NSR and recent GIB  - start lopressor 25mg BID  - echo in AM  - monitor tele

## 2024-10-10 NOTE — HPI
Chief Complaint   Patient presents with    Follow-up     IUD check         Subjective   HPI  Malena Stewart is a 30 y.o. female, , who presents for IUD check follow up.  She had a Mirena placed on 24. Since the IUD placement, the patient has not had any unusual complaints. She has not had a period since the IUD was placed. Patient is still having vaginal bleeding since having her IUD placed. Patient is s/p  3/8/2024.    The additional following portions of the patient's history were reviewed and updated as appropriate: allergies and current medications.    Did the patient have u/s today? Yes.  Findings showed IUD had correct placement. Right complex ovarian cyst present.  I have personally evaluated the U/S and agree with the findings.     Review of Systems  All other systems reviewed and are negative.     I have reviewed and agree with the HPI, ROS, and historical information as entered above. Donte Brunner MD      Objective   /90   Wt 84.2 kg (185 lb 9.6 oz)   LMP 2024 (Exact Date)   Breastfeeding No   BMI 32.88 kg/m²     Physical Examnot done     Assessment & Plan     Assessment     Problem List Items Addressed This Visit    None  Visit Diagnoses       IUD check up    -  Primary    Abnormal uterine bleeding (AUB)        Relevant Medications    Progesterone (Prometrium) 100 MG capsule            IUD is in correct positon    Plan     Return to office PRN  Will add prog for 1 mo ansd asses  No follow-ups on file.      Donte Brunner MD  2024      73yo F patient with recent GIB 1 week ago for 2 days while taking meloxicam ([id number]), osteoporosis, pathologic thoracic vertebrae fracture s/p T8 vertebroplasty 08/29/24, HTN, venous insufficiency who was admitted to [facility] on 10/09/24 with CC of palpiations after endoscopy/colonoscopy and found to be in Afib RVR. After GI procedure today, she was in Afib RVR 130s seen on EKG. She was given IV and PO metoprolol without improvement and was sent to the ED for further evaluation. In the ED patient was found to be tachycardic, with a HR 130s and /80s. Reports dyspnea and lightheadedness since the pathologic fracture. Received Dilt 18.5mg and was rate controlled. RVR again 120s then Lopresor 25mg x1 and has been rate controlled. CHADSVASC is 4 (age, sex, HTN, A1c pending). HASBLED 3 (high risk). Echo 3/2023 normal EF 65%. She was seen by cardiology (Dr. Briseno) on 05/23/24 who noted frequent PACs (asymptomatic) and normal EF. No active bleeding seen during GI procedures. Upper endoscopy showed small hiatal hernia and erythematous antral gastropathy. Colonoscopy showed diverticulosis. CBC has been stable. Cardiology was consulted for new Afib.

## 2024-10-10 NOTE — ASSESSMENT & PLAN NOTE
#Atrial fibrillation - Paroxysmal - Not associated with valvular disease  - Risk factors: HTN, sedentary lifestyle, older age  - Cause: Acute - Likely metabolic (High catecholamine states post-procedure  - Anticoagulation - [id number] 4 (HTN, Age, Sex), HASBLED (HTN, prior bleeding, Age) - Pending HbA1c  - GI bleeding likely related to NSAID use, and endoscopy confirmed erythematous gastric mucosa without acute bleeding.  - Currently self-converted to NSR, agree with BB for rate control  - Normal thyroid function  - Bedside TTE with normal EF    Recommendations:  - TTE on admission with normal systolic and diastolic function, EF 65%  - Rate control: Goal HR<110: Continue Lopressor 25 BID  - Rhythm control: Not needed at this point, paroxysmal and could be related to procedure  - Anticoagulation: Patient needs to be anticoagulated if appropriate by primary team. CBC stable. Start Eilquis 5mg BID and can discharge with Holter monitor after 2 weeks. She can follow up with Dr. Triplett in general cardiology clinic.

## 2024-10-10 NOTE — SUBJECTIVE & OBJECTIVE
Past Medical History:   Diagnosis Date    Arthritis     Eye injury 4 yrs    hit od    Hypertension 04/04/2023    Nuclear sclerosis, bilateral 02/13/2019    Osteoporosis        Past Surgical History:   Procedure Laterality Date    APPENDECTOMY      COLONOSCOPY N/A 9/7/2017    Procedure: COLONOSCOPY;  Surgeon: Albino Fenton MD;  Location: [facility] ([address] [place] [address]);  Service: Endoscopy;  Laterality: N/A;    ESOPHAGOGASTRODUODENOSCOPY  09/07/2017    KNEE SURGERY Right 12/05/2017    TKR    LASIK  7 yrs    ou    TONSILLECTOMY         Review of patient's allergies indicates:  No Known Allergies    Current Facility-Administered Medications on File Prior to Encounter   Medication    [COMPLETED] metoprolol injection 5 mg    [COMPLETED] metoprolol tartrate (LOPRESSOR) tablet 50 mg    [DISCONTINUED] 0.9%  NaCl infusion    [DISCONTINUED] glycopyrrolate (PF) injection    [DISCONTINUED] labetaloL 20 mg/4 mL (5 mg/mL) IV syringe Syrg    [DISCONTINUED] labetaloL injection 5 mg    [DISCONTINUED] LIDOcaine (cardiac) injection    [DISCONTINUED] propofol (DIPRIVAN) 10 mg/mL infusion    [DISCONTINUED] propofol (DIPRIVAN) 10 mg/mL infusion    [DISCONTINUED] sodium chloride 0.9% infusion     Current Outpatient Medications on File Prior to Encounter   Medication Sig    acetaminophen (TYLENOL) 650 MG TbSR Take 1 tablet (650 mg total) by mouth every 6 (six) hours as needed.    b complex vitamins capsule Take 1 capsule by mouth once daily.    denosumab (PROLIA) 60 mg/mL Syrg Inject 60 mg into the skin every 6 (six) months.    diclofenac sodium (VOLTAREN) 1 % Gel Apply 2 g topically once daily.    ferrous sulfate (FEOSOL) 325 mg (65 mg iron) Tab tablet Take 1 tablet (325 mg total) by mouth daily with breakfast.    multivit-min-FA-lycopen-lutein 0.4-300-250 mg-mcg-mcg Tab Take 1 tablet by mouth once daily.    pantoprazole (PROTONIX) 20 MG tablet Take 1 tablet (20 mg total) by mouth once daily.    spironolactone (ALDACTONE) 50 MG tablet Take 1  tablet (50 mg total) by mouth once daily.    vitamin D (VITAMIN D3) 1000 units Tab Take 1,000 Units by mouth once daily.    vitamin E 100 UNIT capsule Take 100 Units by mouth once daily.    [DISCONTINUED] glucosam/chond/hyalu/CF borate (MOVE FREE JOINT HEALTH ORAL) Take by mouth.     Family History       Problem Relation (Age of Onset)    Cancer Mother (74), Brother (66)    Cataracts Mother    Diabetes Mother, Brother    Glaucoma Mother    Heart disease Mother (55), Father    Hypertension Mother, Father    No Known Problems Sister, Maternal Aunt, Maternal Uncle, Paternal Aunt, Paternal Uncle, Maternal Grandmother, Maternal Grandfather, Paternal Grandmother, Paternal Grandfather, Other          Tobacco Use    Smoking status: Never    Smokeless tobacco: Never   Substance and Sexual Activity    Alcohol use: Not Currently     Alcohol/week: 1.0 standard drink of alcohol     Types: 1 Glasses of wine per week     Comment: glass of wine a night     Drug use: No    Sexual activity: Never     Review of Systems   Constitutional:  Negative for chills, fatigue and fever.   HENT:  Negative for trouble swallowing and voice change.    Respiratory:  Positive for shortness of breath. Negative for chest tightness and wheezing.    Cardiovascular:  Positive for leg swelling. Negative for chest pain and palpitations.   Gastrointestinal:  Negative for abdominal distention, abdominal pain, diarrhea, nausea and vomiting.   Genitourinary:  Negative for difficulty urinating, dysuria, frequency and hematuria.   Musculoskeletal:  Negative for arthralgias, back pain and gait problem.   Skin:  Negative for color change and wound.   Neurological:  Positive for dizziness and light-headedness. Negative for seizures, weakness and numbness.   Psychiatric/Behavioral:  Negative for behavioral problems, confusion and decreased concentration.      Objective:     Vital Signs (Most Recent):  Temp: 98.5 °F (36.9 °C) (10/09/24 1502)  Pulse: (!) 121 (10/09/24  1914)  Resp: 20 (10/09/24 1902)  BP: (!) 153/97 (10/09/24 1902)  SpO2: 98 % (10/09/24 1902) Vital Signs (24h Range):  Temp:  [97.9 °F (36.6 °C)-98.5 °F (36.9 °C)] 98.5 °F (36.9 °C)  Pulse:  [] 121  Resp:  [16-27] 20  SpO2:  [93 %-100 %] 98 %  BP: (111-181)/() 153/97     Weight: 73.9 kg (163 lb)  Body mass index is 27.12 kg/m².     Physical Exam  Vitals and nursing note reviewed.   Constitutional:       General: She is not in acute distress.     Appearance: She is well-developed.   HENT:      Head: Normocephalic and atraumatic.      Mouth/Throat:      Pharynx: No oropharyngeal exudate.   Eyes:      General: No scleral icterus.     Conjunctiva/sclera: Conjunctivae normal.   Cardiovascular:      Rate and Rhythm: Normal rate and regular rhythm.      Heart sounds: Normal heart sounds.   Pulmonary:      Effort: Pulmonary effort is normal. No respiratory distress.      Breath sounds: Normal breath sounds. No wheezing.   Abdominal:      General: Bowel sounds are normal. There is no distension.      Palpations: Abdomen is soft.      Tenderness: There is no abdominal tenderness.   Musculoskeletal:         General: No tenderness. Normal range of motion.      Cervical back: Normal range of motion and neck supple.      Comments: Trace bilateral edema   Lymphadenopathy:      Cervical: No cervical adenopathy.   Skin:     General: Skin is warm and dry.      Capillary Refill: Capillary refill takes less than 2 seconds.      Findings: No rash.   Neurological:      Mental Status: She is alert and oriented to person, place, and time.      Cranial Nerves: No cranial nerve deficit.      Sensory: No sensory deficit.      Coordination: Coordination normal.   Psychiatric:         Behavior: Behavior normal.         Thought Content: Thought content normal.         Judgment: Judgment normal.                Significant Labs: All pertinent labs within the past 24 hours have been reviewed.  CBC:   Recent Labs   Lab 10/09/24  1637    WBC 8.04   HGB 12.7   HCT 39.9        CMP:   Recent Labs   Lab 10/09/24  1637      K 4.2   *   CO2 23      BUN 10   CREATININE 0.7   CALCIUM 9.6   PROT 6.6   ALBUMIN 3.6   BILITOT 0.4   ALKPHOS 61   AST 13   ALT 7*   ANIONGAP 9       Significant Imaging: I have reviewed all pertinent imaging results/findings within the past 24 hours.  X-Ray Chest AP Portable  Narrative: EXAMINATION:  XR CHEST AP PORTABLE    CLINICAL HISTORY:  Tachycardia, unspecified    TECHNIQUE:  Single frontal view of the chest was performed.    COMPARISON:  Chest radiograph 10/01/2024; CT chest 09/17/2024    FINDINGS:  Lines and tubes: Monitoring leads.    Heart and mediastinum: Normal in size.  Calcifications of the thoracic aorta.    Pleura: No pleural effusion or pneumothorax.    Lungs: Lungs are well inflated. No focal consolidations or evidence of pulmonary edema.    Soft tissue/bone: Diffuse osteopenia.  Remote right-sided rib fractures.  Remote compression fracture in the midthoracic spine status post vertebral augmentation.  Impression: No acute abnormality.    Electronically signed by: Sven Bravo  Date:    10/09/2024  Time:    15:50

## 2024-10-10 NOTE — H&P
"[facility] - Emergency Dept  [facility]  History & Physical    Patient Name: Windy Lindo  MRN: [id number]  Patient Class: OP- Observation  Admission Date: 10/9/2024  Attending Physician: Lj Gomez MD   Primary Care Provider: Trisha Higginbotham MD         Patient information was obtained from patient, relative(s), past medical records, and ER records.     Subjective:     Principal Problem:New onset atrial fibrillation    Chief Complaint:   Chief Complaint   Patient presents with    Irregular Heart Beat     Colonoscopy and egd this am; "went into a fib" although chart review states svt. No chest pain. Given metoprolol. Sent to ed.         HPI: Windy Lindo is a 74 y.o. female with a PMHx of osteoporosis with pathologic fracture, hx GIB,  HTN, venous insufficiency who presents to [facility] for evaluation of new onset Afib with RVR. Patient had 2 days of dark black stools with bright red clots mixed within on September 13th and 14th. She was taking mobic at the time and was instructed to stop mobic and was prescribed protonix. No further black/ bloody Bms since then. She had a EGD and C scope done today for evaluation of recent GIB which showed normal findings. Patient was tachycardic to HR 130s following the procedure today for which she was given IV and PO lorpessor without any improvement in HR so she was sent to the ED for further eval. Patient was asymptomatic during this episode today.    Patient had a fall in April causing a thoracic vertabrea fracture. She subjequently underwent percutaneous vertebroplasty of T8 on 8/29. Since then, she reports intermittent episodes of lightheadedness, shortness of breath with exertion and near syncope. She says she feels drained during these episodes. Reports mild BLE edema which is chronic and unchanged. Denies any chest pain, palpitations, N/V, abdominal pain, HA, vision changes or syncope. No recent dark/ bloody stools since episode in September.    ED: tachycardic to " HR 130s and found to be in Afib with RVR. Given 18.5mg IV dilt with improvement in HR to 80s. Normotensive. No leukocytosis. Phos 1.9, replaced.      Past Medical History:   Diagnosis Date    Arthritis     Eye injury 4 yrs    hit od    Hypertension 04/04/2023    Nuclear sclerosis, bilateral 02/13/2019    Osteoporosis        Past Surgical History:   Procedure Laterality Date    APPENDECTOMY      COLONOSCOPY N/A 9/7/2017    Procedure: COLONOSCOPY;  Surgeon: Albino Fenton MD;  Location: [facility] ([address] [place] [address]);  Service: Endoscopy;  Laterality: N/A;    ESOPHAGOGASTRODUODENOSCOPY  09/07/2017    KNEE SURGERY Right 12/05/2017    TKR    LASIK  7 yrs    ou    TONSILLECTOMY         Review of patient's allergies indicates:  No Known Allergies    Current Facility-Administered Medications on File Prior to Encounter   Medication    [COMPLETED] metoprolol injection 5 mg    [COMPLETED] metoprolol tartrate (LOPRESSOR) tablet 50 mg    [DISCONTINUED] 0.9%  NaCl infusion    [DISCONTINUED] glycopyrrolate (PF) injection    [DISCONTINUED] labetaloL 20 mg/4 mL (5 mg/mL) IV syringe Syrg    [DISCONTINUED] labetaloL injection 5 mg    [DISCONTINUED] LIDOcaine (cardiac) injection    [DISCONTINUED] propofol (DIPRIVAN) 10 mg/mL infusion    [DISCONTINUED] propofol (DIPRIVAN) 10 mg/mL infusion    [DISCONTINUED] sodium chloride 0.9% infusion     Current Outpatient Medications on File Prior to Encounter   Medication Sig    acetaminophen (TYLENOL) 650 MG TbSR Take 1 tablet (650 mg total) by mouth every 6 (six) hours as needed.    b complex vitamins capsule Take 1 capsule by mouth once daily.    denosumab (PROLIA) 60 mg/mL Syrg Inject 60 mg into the skin every 6 (six) months.    diclofenac sodium (VOLTAREN) 1 % Gel Apply 2 g topically once daily.    ferrous sulfate (FEOSOL) 325 mg (65 mg iron) Tab tablet Take 1 tablet (325 mg total) by mouth daily with breakfast.    multivit-min-FA-lycopen-lutein 0.4-300-250 mg-mcg-mcg Tab Take 1 tablet by mouth once  daily.    pantoprazole (PROTONIX) 20 MG tablet Take 1 tablet (20 mg total) by mouth once daily.    spironolactone (ALDACTONE) 50 MG tablet Take 1 tablet (50 mg total) by mouth once daily.    vitamin D (VITAMIN D3) 1000 units Tab Take 1,000 Units by mouth once daily.    vitamin E 100 UNIT capsule Take 100 Units by mouth once daily.    [DISCONTINUED] glucosam/chond/hyalu/CF borate (MOVE FREE JOINT HEALTH ORAL) Take by mouth.     Family History       Problem Relation (Age of Onset)    Cancer Mother (74), Brother (66)    Cataracts Mother    Diabetes Mother, Brother    Glaucoma Mother    Heart disease Mother (55), Father    Hypertension Mother, Father    No Known Problems Sister, Maternal Aunt, Maternal Uncle, Paternal Aunt, Paternal Uncle, Maternal Grandmother, Maternal Grandfather, Paternal Grandmother, Paternal Grandfather, Other          Tobacco Use    Smoking status: Never    Smokeless tobacco: Never   Substance and Sexual Activity    Alcohol use: Not Currently     Alcohol/week: 1.0 standard drink of alcohol     Types: 1 Glasses of wine per week     Comment: glass of wine a night     Drug use: No    Sexual activity: Never     Review of Systems   Constitutional:  Negative for chills, fatigue and fever.   HENT:  Negative for trouble swallowing and voice change.    Respiratory:  Positive for shortness of breath. Negative for chest tightness and wheezing.    Cardiovascular:  Positive for leg swelling. Negative for chest pain and palpitations.   Gastrointestinal:  Negative for abdominal distention, abdominal pain, diarrhea, nausea and vomiting.   Genitourinary:  Negative for difficulty urinating, dysuria, frequency and hematuria.   Musculoskeletal:  Negative for arthralgias, back pain and gait problem.   Skin:  Negative for color change and wound.   Neurological:  Positive for dizziness and light-headedness. Negative for seizures, weakness and numbness.   Psychiatric/Behavioral:  Negative for behavioral problems,  confusion and decreased concentration.      Objective:     Vital Signs (Most Recent):  Temp: 98.5 °F (36.9 °C) (10/09/24 1502)  Pulse: (!) 121 (10/09/24 1914)  Resp: 20 (10/09/24 1902)  BP: (!) 153/97 (10/09/24 1902)  SpO2: 98 % (10/09/24 1902) Vital Signs (24h Range):  Temp:  [97.9 °F (36.6 °C)-98.5 °F (36.9 °C)] 98.5 °F (36.9 °C)  Pulse:  [] 121  Resp:  [16-27] 20  SpO2:  [93 %-100 %] 98 %  BP: (111-181)/() 153/97     Weight: 73.9 kg (163 lb)  Body mass index is 27.12 kg/m².     Physical Exam  Vitals and nursing note reviewed.   Constitutional:       General: She is not in acute distress.     Appearance: She is well-developed.   HENT:      Head: Normocephalic and atraumatic.      Mouth/Throat:      Pharynx: No oropharyngeal exudate.   Eyes:      General: No scleral icterus.     Conjunctiva/sclera: Conjunctivae normal.   Cardiovascular:      Rate and Rhythm: Normal rate and regular rhythm.      Heart sounds: Normal heart sounds.   Pulmonary:      Effort: Pulmonary effort is normal. No respiratory distress.      Breath sounds: Normal breath sounds. No wheezing.   Abdominal:      General: Bowel sounds are normal. There is no distension.      Palpations: Abdomen is soft.      Tenderness: There is no abdominal tenderness.   Musculoskeletal:         General: No tenderness. Normal range of motion.      Cervical back: Normal range of motion and neck supple.      Comments: Trace bilateral edema   Lymphadenopathy:      Cervical: No cervical adenopathy.   Skin:     General: Skin is warm and dry.      Capillary Refill: Capillary refill takes less than 2 seconds.      Findings: No rash.   Neurological:      Mental Status: She is alert and oriented to person, place, and time.      Cranial Nerves: No cranial nerve deficit.      Sensory: No sensory deficit.      Coordination: Coordination normal.   Psychiatric:         Behavior: Behavior normal.         Thought Content: Thought content normal.         Judgment:  Judgment normal.                Significant Labs: All pertinent labs within the past 24 hours have been reviewed.  CBC:   Recent Labs   Lab 10/09/24  1637   WBC 8.04   HGB 12.7   HCT 39.9        CMP:   Recent Labs   Lab 10/09/24  1637      K 4.2   *   CO2 23      BUN 10   CREATININE 0.7   CALCIUM 9.6   PROT 6.6   ALBUMIN 3.6   BILITOT 0.4   ALKPHOS 61   AST 13   ALT 7*   ANIONGAP 9       Significant Imaging: I have reviewed all pertinent imaging results/findings within the past 24 hours.  X-Ray Chest AP Portable  Narrative: EXAMINATION:  XR CHEST AP PORTABLE    CLINICAL HISTORY:  Tachycardia, unspecified    TECHNIQUE:  Single frontal view of the chest was performed.    COMPARISON:  Chest radiograph 10/01/2024; CT chest 09/17/2024    FINDINGS:  Lines and tubes: Monitoring leads.    Heart and mediastinum: Normal in size.  Calcifications of the thoracic aorta.    Pleura: No pleural effusion or pneumothorax.    Lungs: Lungs are well inflated. No focal consolidations or evidence of pulmonary edema.    Soft tissue/bone: Diffuse osteopenia.  Remote right-sided rib fractures.  Remote compression fracture in the midthoracic spine status post vertebral augmentation.  Impression: No acute abnormality.    Electronically signed by: Sven Bravo  Date:    10/09/2024  Time:    15:50      Assessment/Plan:     * New onset atrial fibrillation  - noted to be in RVR to HR 130s, converted to NSR s/p IV dilt in the ED  - some concern for PE given symptoms began following vertebroplasty of T8  - CTA chest ordered  - cardiology consulted; appreciate recs  - [id number] Score: 3.   - hold AC pending cards recs/ CTA results  - start lopressor 25mg BID  - echo in AM  - monitor tele     History of GI bleed  - recent GIB in September, since resolved  - Hgb stable at 12.7  - EGD done today shows erythematous antral gastropathy  - avoid NSAIDs  - continue PPI  - trend daily CBC    Hypertension  - continue aldactone 25mg  daily     Hyperlipidemia  - not on statin     Varicose veins of both lower extremities  - no acute issues       VTE Risk Mitigation (From admission, onward)           Ordered     Reason for No Pharmacological VTE Prophylaxis  Once        Question:  Reasons:  Answer:  Already adequately anticoagulated on oral Anticoagulants    10/09/24 1917     IP VTE HIGH RISK PATIENT  Once         10/09/24 1917                         On 10/09/2024, patient should be placed in hospital observation services under my care in collaboration with Dr. Didier Stephens.           Anitra Buckley PA-C  Department of Hospital Medicine  [facility] - Emergency Dept

## 2024-10-10 NOTE — ASSESSMENT & PLAN NOTE
- noted to be in RVR to HR 130s, converted to NSR s/p IV dilt in the ED  - some concern for PE given symptoms began following vertebroplasty of T8  - CTA chest ordered  - cardiology consulted; appreciate recs  - [id number] Score: 3.   - hold AC pending cards recs/ CTA results  - start lopressor 25mg BID  - echo in AM  - monitor tele

## 2024-10-10 NOTE — CONSULTS
[facility] - Emergency Dept  Cardiology  Consult Note    Patient Name: Windy Lindo  MRN: [id number]  Admission Date: 10/9/2024  Hospital Length of Stay: 0 days  Code Status: Full Code   Attending Provider: Lj Gomez MD   Consulting Provider: Devante Jaimes MD  Primary Care Physician: Trisha Higginbotham MD  Principal Problem:New onset atrial fibrillation    Patient information was obtained from patient and ER records.     Inpatient consult to Cardiology  Consult performed by: Devante Amaro MD  Consult ordered by: Anitra Buckley PA-C        Subjective:     Chief Complaint:  Palpitations     HPI:   73yo F patient with recent GIB 1 week ago for 2 days while taking meloxicam (black [id number]), osteoporosis, pathologic thoracic vertebrae fracture s/p T8 vertebroplasty 08/29/24, HTN, venous insufficiency who was admitted to [facility] on 10/09/24 with CC of palpiations after endoscopy/colonoscopy and found to be in Atrial fibrillation. After GI procedure today, her HR was 130s, and an EKG showed Afib. She was given IV and PO metoprolol without improvement and was sent to the ED for further evaluation.     Since the pathologic fracture patient has been having episodes of dyspnea on exertion and lightheadedness. She was seen by cardiology (Dr. Briseno) on 05/23/24 who noted frequent PACs (asymptomatic) and normal EF.     Of note, evaluation by GI showed small hiatal hernia, erythematous antral gastropathy, and diverticulosis of entire colon. no active bleeding seen.      In the ED patient was found to be tachycardic, with a HR 130s and /80s. 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     Patient denies chest pain with  exertion or at rest, SOB at rest, syncope, orthopnea or PND (although she sleeps in a recliner due to back surgery), or claudication.    Cardiology was consulted for Afib.    Past Medical History:   Diagnosis Date    Arthritis     Eye injury 4 yrs    hit od    Hypertension 04/04/2023    Nuclear sclerosis, bilateral 02/13/2019    Osteoporosis        Past Surgical History:   Procedure Laterality Date    APPENDECTOMY      COLONOSCOPY N/A 9/7/2017    Procedure: COLONOSCOPY;  Surgeon: Albino Fenton MD;  Location: [facility] ([address] [place] [address]);  Service: Endoscopy;  Laterality: N/A;    ESOPHAGOGASTRODUODENOSCOPY  09/07/2017    KNEE SURGERY Right 12/05/2017    TKR    LASIK  7 yrs    ou    TONSILLECTOMY         Review of patient's allergies indicates:  No Known Allergies    Current Facility-Administered Medications on File Prior to Encounter   Medication    [COMPLETED] metoprolol injection 5 mg    [COMPLETED] metoprolol tartrate (LOPRESSOR) tablet 50 mg    [DISCONTINUED] 0.9%  NaCl infusion    [DISCONTINUED] glycopyrrolate (PF) injection    [DISCONTINUED] labetaloL 20 mg/4 mL (5 mg/mL) IV syringe Syrg    [DISCONTINUED] labetaloL injection 5 mg    [DISCONTINUED] LIDOcaine (cardiac) injection    [DISCONTINUED] propofol (DIPRIVAN) 10 mg/mL infusion    [DISCONTINUED] propofol (DIPRIVAN) 10 mg/mL infusion    [DISCONTINUED] sodium chloride 0.9% infusion     Current Outpatient Medications on File Prior to Encounter   Medication Sig    acetaminophen (TYLENOL) 650 MG TbSR Take 1 tablet (650 mg total) by mouth every 6 (six) hours as needed.    b complex vitamins capsule Take 1 capsule by mouth once daily.    denosumab (PROLIA) 60 mg/mL Syrg Inject 60 mg into the skin every 6 (six) months.    diclofenac sodium (VOLTAREN) 1 % Gel Apply 2 g topically once daily.    ferrous sulfate (FEOSOL) 325 mg (65 mg iron) Tab tablet Take 1 tablet (325 mg total) by mouth daily with breakfast.    multivit-min-FA-lycopen-lutein 0.4-300-250 mg-mcg-mcg Tab  Take 1 tablet by mouth once daily.    pantoprazole (PROTONIX) 20 MG tablet Take 1 tablet (20 mg total) by mouth once daily.    spironolactone (ALDACTONE) 50 MG tablet Take 1 tablet (50 mg total) by mouth once daily.    vitamin D (VITAMIN D3) 1000 units Tab Take 1,000 Units by mouth once daily.    vitamin E 100 UNIT capsule Take 100 Units by mouth once daily.    [DISCONTINUED] glucosam/chond/hyalu/CF borate (MOVE FREE JOINT HEALTH ORAL) Take by mouth.     Family History       Problem Relation (Age of Onset)    Cancer Mother (74), Brother (66)    Cataracts Mother    Diabetes Mother, Brother    Glaucoma Mother    Heart disease Mother (55), Father    Hypertension Mother, Father    No Known Problems Sister, Maternal Aunt, Maternal Uncle, Paternal Aunt, Paternal Uncle, Maternal Grandmother, Maternal Grandfather, Paternal Grandmother, Paternal Grandfather, Other          Tobacco Use    Smoking status: Never    Smokeless tobacco: Never   Substance and Sexual Activity    Alcohol use: Not Currently     Alcohol/week: 1.0 standard drink of alcohol     Types: 1 Glasses of wine per week     Comment: glass of wine a night     Drug use: No    Sexual activity: Never     Review of Systems   Constitutional: Negative.   HENT: Negative.     Cardiovascular:  Positive for palpitations. Negative for chest pain, claudication, cyanosis, dyspnea on exertion, irregular heartbeat, leg swelling, near-syncope, orthopnea, paroxysmal nocturnal dyspnea and syncope.   Respiratory: Negative.     Endocrine: Negative.    Musculoskeletal: Negative.    Gastrointestinal: Negative.    Genitourinary: Negative.    Neurological: Negative.      Objective:     Vital Signs (Most Recent):  Temp: 98.1 °F (36.7 °C) (10/09/24 2003)  Pulse: 86 (10/09/24 2003)  Resp: 18 (10/09/24 2003)  BP: 125/68 (10/09/24 2003)  SpO2: 97 % (10/09/24 2003) Vital Signs (24h Range):  Temp:  [97.9 °F (36.6 °C)-98.5 °F (36.9 °C)] 98.1 °F (36.7 °C)  Pulse:  [] 86  Resp:  [16-27]  18  SpO2:  [93 %-100 %] 97 %  BP: (111-181)/() 125/68     Weight: 73.9 kg (163 lb)  Body mass index is 27.12 kg/m².    SpO2: 97 %       No intake or output data in the 24 hours ending 10/09/24 2137    Lines/Drains/Airways       Peripheral Intravenous Line  Duration                  Peripheral IV - Single Lumen 10/09/24 1638 20 G 1 in Yes Left Antecubital <1 day                     Physical Exam  Vitals and nursing note reviewed.   Constitutional:       Appearance: Normal appearance.   HENT:      Head: Normocephalic and atraumatic.   Eyes:      Extraocular Movements: Extraocular movements intact.      Pupils: Pupils are equal, round, and reactive to light.   Neck:      Vascular: No carotid bruit.   Cardiovascular:      Rate and Rhythm: Normal rate. Rhythm irregular.      Pulses: Normal pulses.      Heart sounds: Normal heart sounds. No murmur heard.     No friction rub. No gallop.   Pulmonary:      Effort: Pulmonary effort is normal.      Breath sounds: Normal breath sounds.   Abdominal:      General: Abdomen is flat. Bowel sounds are normal. There is no distension.      Palpations: Abdomen is soft. There is no mass.      Tenderness: There is no abdominal tenderness.   Musculoskeletal:         General: No swelling. Normal range of motion.      Cervical back: Normal range of motion.   Skin:     General: Skin is warm.      Capillary Refill: Capillary refill takes less than 2 seconds.   Neurological:      General: No focal deficit present.      Mental Status: She is alert and oriented to person, place, and time.          Significant Labs:     Recent Labs   Lab 10/09/24  1637   WBC 8.04   HGB 12.7   HCT 39.9          Recent Labs   Lab 10/09/24  1637      K 4.2   *   CO2 23   BUN 10   CREATININE 0.7   CALCIUM 9.6   PHOS 1.9*       Recent Labs   Lab 10/09/24  1637   ALKPHOS 61   BILITOT 0.4   PROT 6.6   ALT 7*   AST 13     Lab Results   Component Value Date    CHOL 213 (H) 03/28/2024    HDL 57  "03/28/2024    LDLCALC 137.8 03/28/2024    TRIG 91 03/28/2024       Recent Labs   Lab 10/09/24  1637   TROPONINI 0.019       No results found for: "HGBA1C"    Lab Results   Component Value Date    BNP 41 10/09/2024    BNP 18 10/01/2024    BNP 13 09/17/2024     Significant Imaging:     EKG 10/09/24  - Initially Afib RVR, but then self-converted to NSR with PACs    TTE 04/05/23  - The left ventricle is normal in size with mild eccentric hypertrophy and normal systolic function.  - The estimated ejection fraction is 65%.  - Normal left ventricular diastolic function.  - Normal right ventricular size with normal right ventricular systolic function.  - Mild tricuspid regurgitation.  - Mild aortic regurgitation.  - Normal central venous pressure (3 mmHg).  - The estimated PA systolic pressure is 34 mmHg.  - Trivial posterior pericardial effusion.  Assessment and Plan:     * New onset atrial fibrillation  #Atrial fibrillation - Paroxysmal - Not associated with valvular disease  - Risk factors: HTN, sedentary lifestyle, older age  - Cause: Acute - Likely metabolic (High catecholamine states post-procedure  - Anticoagulation - [id number] 4 (HTN, Age, Sex), HASBLED (HTN, prior bleeding, Age) - Pending HbA1c  - GI bleeding likely related to NSAID use, and endoscopy confirmed erythematous gastric mucosa without acute bleeding.  - Currently self-converted to NSR, agree with BB for rate control  - Normal thyroid function  - Bedside TTE with normal EF    Recommendations:  - Order TTE  - Rate control: Goal HR<110: Agree with metoprolol tartarte 25mg bid  - Rhythm control: Not needed at this point, paroxysmal and could be related to procedure  - Anticoagulation: Patient needs to be anticoagulated if appropriate by primary team. Can start heparin and watch overnight. Could benefit from DOAC if no mitral disease  - GI prophylaxis strongly recommended if starting anticoagulation        VTE Risk Mitigation (From admission, onward)       "     Ordered     Reason for No Pharmacological VTE Prophylaxis  Once        Question:  Reasons:  Answer:  Already adequately anticoagulated on oral Anticoagulants    10/09/24 1917     IP VTE HIGH RISK PATIENT  Once         10/09/24 1917                    Thank you for your consult.     Devante Jaimes MD  Cardiology   [facility] - Emergency Dept

## 2024-10-10 NOTE — HOSPITAL COURSE
Windy Lindo is a 74 y.o. F who was admitted to  for further evaluation of new onset a-fib. Pt with RVR on admission which improved with metoprolol. Initiated lopressor 25mg BID. Converted to sinus rhythm overnight. Echo performed in am with EF 65-70%, G1DD, CVP 3mmHg. Heparin gtt initiated during stay with [id number] 4. Hgb stable and no signs of bleeding given recent hx of GIB [id number] ago. Patient transitioned to eliquis 5mg BID. Cards with recs of eliquis, lopressor and 48hr holter at discharge.  Patient medically ready for discharge. Plan to follow up with PCP, cardiology. Return precautions provided. Patient was seen and assessed on day of discharge. Plan of care discussed with patient, patient agreeable with plan, and all questions answered.    Physical Exam  Gen: in NAD, appears stated age  Neuro: mental status at baseline, motor, sensory, and strength grossly intact BL  HEENT: EOMI, PERRLA; no JVD appreciated  CVS: RRR, no m/r/g  Resp: lungs CTAB, no w/r/r; no belabored breathing or accessory muscle use appreciated   Abd: NTND, soft to palpation  Extrem: no UE or LE edema BL

## 2024-10-11 LAB
FINAL PATHOLOGIC DIAGNOSIS: NORMAL
GROSS: NORMAL
Lab: NORMAL
OHS QRS DURATION: 82 MS
OHS QTC CALCULATION: 462 MS

## 2024-10-11 PROCEDURE — G0180 MD CERTIFICATION HHA PATIENT: HCPCS | Mod: ,,, | Performed by: FAMILY MEDICINE

## 2024-10-11 NOTE — PROGRESS NOTES
Neurosurgery  History & Physical    SUBJECTIVE:     Chief Complaint: Acute T8 compression fracture    History of Present Illness:  Wnidy Lindo is a 74yo woman w/PMHx osteoporosis who initially presented to the ED on 4/15 after a mechanical fall down 2 steps. Imaging demonstrated an acute T8 compression fracture with no significant canal stenosis. Of note, there was Radiology discrepancy of the level of the fracture across imaging modalities. MRI confirmed the affected level as T8 with an additional subacute fracture at T6. She presents to clinic today for follow up after undergoing repeat lumbar x-ray. She has been wearing her TLSO brace as advised. She endorses axial mid back pain. Denies any radiculopathy, numbness or weakness in her arms or legs, saddle anesthesia, bladder or bowel dysfunction.     Interval History:  6/11/24:  Patient presents for follow up after undergoing repeat X-ray thoracic which demonstrates stable T8 compression fracture. She has recently started undergoing Prolia infusions. Her symptoms are stable. She has mild mid axial back pain. Continues to deny radiculopathy, numbness or weakness in her arms or legs, saddle anesthesia, bladder or bowel dysfunction.     8/6/24:  Patient presents for follow up after undergoing repeat CT Cervical spine which demonstrates interval progression of her known T8 compression fracture. There is now moderate to severe height loss with 4mm of retropulsion. She remains without any axial back pain, radiculopathy, weakness, numbness, saddle anesthesia, bladder or bowel dysfunction.     10/3/24:   Patient presents for follow up after undergoing T8 vertebroplasty with Dr. Lynch on 8/29/24. The procedure was successful and without complication. She underwent CT Thoracic spine prior to our visit today. Imaging demonstrates expected post op changes. She remains without any axial back pain, radiculopathy, weakness, numbness, saddle anesthesia, bladder or bowel  dysfunction.     Review of patient's allergies indicates:  No Known Allergies    Current Outpatient Medications   Medication Sig Dispense Refill    spironolactone (ALDACTONE) 50 MG tablet Take 1 tablet (50 mg total) by mouth once daily. 30 tablet 11    acetaminophen (TYLENOL) 650 MG TbSR Take 1 tablet (650 mg total) by mouth every 6 (six) hours as needed. 20 tablet 0    apixaban (ELIQUIS) 5 mg Tab Take 1 tablet (5 mg total) by mouth 2 (two) times daily. 60 tablet 2    b complex vitamins capsule Take 1 capsule by mouth once daily.      denosumab (PROLIA) 60 mg/mL Syrg Inject 60 mg into the skin every 6 (six) months.      diclofenac sodium (VOLTAREN) 1 % Gel Apply 2 g topically once daily. 200 g 3    ferrous sulfate (FEOSOL) 325 mg (65 mg iron) Tab tablet Take 1 tablet (325 mg total) by mouth daily with breakfast. 30 tablet 5    metoprolol tartrate (LOPRESSOR) 25 MG tablet Take 1 tablet (25 mg total) by mouth 2 (two) times daily. 180 tablet 3    multivit-min-FA-lycopen-lutein 0.4-300-250 mg-mcg-mcg Tab Take 1 tablet by mouth once daily.      pantoprazole (PROTONIX) 40 MG tablet Take 1 tablet (40 mg total) by mouth once daily. 90 tablet 3    vitamin D (VITAMIN D3) 1000 units Tab Take 1,000 Units by mouth once daily.      vitamin E 100 UNIT capsule Take 100 Units by mouth once daily.       No current facility-administered medications for this visit.       Past Medical History:   Diagnosis Date    Arthritis     Eye injury 4 yrs    hit od    Hypertension 04/04/2023    Nuclear sclerosis, bilateral 02/13/2019    Osteoporosis      Past Surgical History:   Procedure Laterality Date    APPENDECTOMY      COLONOSCOPY N/A 9/7/2017    Procedure: COLONOSCOPY;  Surgeon: Albino Fenton MD;  Location: [facility] ENDO ([address] [place] [address]);  Service: Endoscopy;  Laterality: N/A;    COLONOSCOPY N/A 10/9/2024    Procedure: COLONOSCOPY;  Surgeon: Albino Fenton MD;  Location: [facility] ENDOSCOPY;  Service: Endoscopy;  Laterality: N/A;     ESOPHAGOGASTRODUODENOSCOPY  09/07/2017    ESOPHAGOGASTRODUODENOSCOPY N/A 10/9/2024    Procedure: EGD (ESOPHAGOGASTRODUODENOSCOPY);  Surgeon: Albino Fenton MD;  Location: [facility] ENDOSCOPY;  Service: Endoscopy;  Laterality: N/A;  Ref By:solange Jaramillo suprep.  10/1/24- pc complete. DBM    KNEE SURGERY Right 12/05/2017    TKR    LASIK  7 yrs    ou    TONSILLECTOMY       Family History       Problem Relation (Age of Onset)    Cancer Mother (74), Brother (66)    Cataracts Mother    Diabetes Mother, Brother    Glaucoma Mother    Heart disease Mother (55), Father    Hypertension Mother, Father    No Known Problems Sister, Maternal Aunt, Maternal Uncle, Paternal Aunt, Paternal Uncle, Maternal Grandmother, Maternal Grandfather, Paternal Grandmother, Paternal Grandfather, Other          Social History     Socioeconomic History    Marital status: Single   Occupational History     Employer: [employer] [place]   Tobacco Use    Smoking status: Never    Smokeless tobacco: Never   Substance and Sexual Activity    Alcohol use: Not Currently     Alcohol/week: 1.0 standard drink of alcohol     Types: 1 Glasses of wine per week     Comment: glass of wine a night     Drug use: No    Sexual activity: Never     Social Drivers of Health     Financial Resource Strain: Low Risk  (10/9/2024)    Overall Financial Resource Strain (CARDIA)     Difficulty of Paying Living Expenses: Not hard at all   Food Insecurity: No Food Insecurity (10/9/2024)    Hunger Vital Sign     Worried About Running Out of Food in the Last Year: Never true     Ran Out of Food in the Last Year: Never true   Transportation Needs: No Transportation Needs (10/9/2024)    TRANSPORTATION NEEDS     Transportation : No   Physical Activity: Sufficiently Active (4/15/2024)    Exercise Vital Sign     Days of Exercise per Week: 4 days     Minutes of Exercise per Session: 40 min   Recent Concern: Physical Activity - Insufficiently Active (1/26/2024)    Exercise Vital Sign     Days of  Exercise per Week: 5 days     Minutes of Exercise per Session: 20 min   Stress: No Stress Concern Present (10/9/2024)    [place] [employer] - Occupational Stress Questionnaire     Feeling of Stress : Not at all   Housing Stability: Low Risk  (10/9/2024)    Housing Stability Vital Sign     Unable to Pay for Housing in the Last Year: No     Homeless in the Last Year: No     OBJECTIVE:     Vital Signs  Temp: 98.1 °F (36.7 °C)  Pulse: 76  BP: 120/74  Pain Score:   1  There is no height or weight on file to calculate BMI.    Neurosurgery Physical Exam  General: well developed, well nourished, no distress.   Head: normocephalic, atraumatic  Mental Status: Awake, Alert, Oriented  Speech: Clear with content appropriate to conversation  Cranial nerves: face symmetric, CN II-XII grossly intact.   Eyes: pupils equal, round, reactive to light, EOMI.  Sensory: intact to light touch throughout    Motor Strength: Moves all extremities spontaneously with good tone. Full strength upper and lower extremities. No abnormal movements seen.     Strength  Deltoids Triceps Biceps Wrist Extension Wrist Flexion Hand    Upper: R 5/5 5/5 5/5 5/5 5/5 5/5    L 5/5 5/5 5/5 5/5 5/5 5/5     Iliopsoas Quadriceps Knee  Flexion Tibialis  anterior Gastro- cnemius EHL   Lower: R 5/5 5/5 5/5 5/5 5/5 5/5    L 5/5 5/5 5/5 5/5 5/5 5/5     Farmer: absent  Clonus: absent    Diagnostic Results:  CT Thoracic Spine Without Contrast 10/3/24:   - Remote T8 compression fracture status post vertebral augmentation with persistent severe vertebral body height loss as above. Expected post op changes.    I have personally reviewed the above referenced imaging.     ASSESSMENT/PLAN:     Windy Lindo is a 74yo woman w/PMHx osteoporosis who initially presented to the ED on 4/15 after a mechanical fall down 2 steps. MRI confirmed the affected level as T8 with an additional subacute fracture at T6. She denies axial back pain, radiculopathy, or  any alarm symptoms. She is neurologically intact on exam. On repeat CT Thoracic spine for follow up, she was found to have progression with moderate to severe height loss with 4mm of retropulsion. Given the progression of her fracture, she was recommended to undergo vertebroplasty with Dr. Pena on 8/29/24. She presents for follow up. CT Thoracic today demonstrates expected post op changes. Her neurological exam is stable. At this point, she may follow up PRN. She was encouraged to reach out if any questions or concerns arise. We additionally discussed alarm symptoms that should prompt her to seek emergent care in the meantime.         Monalisa Mcclain PA-C  Department of Neurosurgery  [facility]

## 2024-10-14 ENCOUNTER — PATIENT OUTREACH (OUTPATIENT)
Dept: ADMINISTRATIVE | Facility: CLINIC | Age: 74
End: 2024-10-14
Payer: MEDICARE

## 2024-10-14 ENCOUNTER — TELEPHONE (OUTPATIENT)
Dept: GASTROENTEROLOGY | Facility: CLINIC | Age: 74
End: 2024-10-14
Payer: MEDICARE

## 2024-10-14 NOTE — TELEPHONE ENCOUNTER
----- Message from Albino Fenton MD sent at 10/12/2024  9:09 AM CDT -----  Please notify patient, there was nothing concerning on biopsies.

## 2024-10-17 ENCOUNTER — OFFICE VISIT (OUTPATIENT)
Dept: FAMILY MEDICINE | Facility: CLINIC | Age: 74
End: 2024-10-17
Payer: MEDICARE

## 2024-10-17 VITALS
DIASTOLIC BLOOD PRESSURE: 76 MMHG | HEIGHT: 64 IN | OXYGEN SATURATION: 99 % | BODY MASS INDEX: 27.28 KG/M2 | TEMPERATURE: 98 F | SYSTOLIC BLOOD PRESSURE: 140 MMHG | HEART RATE: 67 BPM | WEIGHT: 159.81 LBS

## 2024-10-17 DIAGNOSIS — I48.91 NEW ONSET A-FIB: ICD-10-CM

## 2024-10-17 DIAGNOSIS — Z09 HOSPITAL DISCHARGE FOLLOW-UP: Primary | ICD-10-CM

## 2024-10-17 DIAGNOSIS — E66.3 OVERWEIGHT (BMI 25.0-29.9): ICD-10-CM

## 2024-10-17 DIAGNOSIS — I83.893 VARICOSE VEINS OF BILATERAL LOWER EXTREMITIES WITH OTHER COMPLICATIONS: ICD-10-CM

## 2024-10-17 DIAGNOSIS — I70.0 AORTIC ATHEROSCLEROSIS: ICD-10-CM

## 2024-10-17 DIAGNOSIS — I10 HYPERTENSION, UNSPECIFIED TYPE: ICD-10-CM

## 2024-10-17 DIAGNOSIS — Z87.19 HISTORY OF GI BLEED: ICD-10-CM

## 2024-10-17 DIAGNOSIS — E78.5 HYPERLIPIDEMIA, UNSPECIFIED HYPERLIPIDEMIA TYPE: ICD-10-CM

## 2024-10-17 PROCEDURE — 99999 PR PBB SHADOW E&M-EST. PATIENT-LVL IV: CPT | Mod: PBBFAC,,, | Performed by: FAMILY MEDICINE

## 2024-10-17 NOTE — PROGRESS NOTES
"  Physical Exam  BP (!) 140/76 (BP Location: Right arm, Patient Position: Sitting)   Pulse 67   Temp 97.9 °F (36.6 °C) (Oral)   Ht 5' 4" (1.626 m)   Wt 72.5 kg (159 lb 13.3 oz)   SpO2 99%   BMI 27.44 kg/m²      Office Visit    Patient Name: Windy Lindo    : 1950  MRN: [id number]      Assessment/Plan:  Windy Lindo is a 74 y.o. female who presents today for :    Hospital discharge follow-up  New onset a-fib    -VSS, exam unremarkable  -doing well post-discharge  -continue current medications, general bleeding precautions given with Eliquis therapy  -follow up with Cardiology as scheduled  -call clinic back with any concerns      Hypertension, unspecified type  Hyperlipidemia, unspecified hyperlipidemia type  Overweight (BMI 25.0-29.9)  Aortic atherosclerosis  -continue current medication regimen  -DASH diet, regular cardiovascular exercises    Hx Varicose veins of bilateral lower extremities with other complications    History of GI bleed - H/H stable        Follow up PRN          This note was created by combination of typed  and MModal dictation.  Transcription errors may be present.  If there are any questions, please contact me.        ----------------------------------------------------------------------------------------------------------------------      HPI:  Patient Care Team:  Trisha Higginbotham MD as PCP - General (Family Medicine)  Trisha Higginbotham MD as Hypertension Digital Medicine Responsible Provider (Family Medicine)  Deyanira Ruby, PharmD as Hypertension Digital Medicine Clinician  Plan, [employer] as Hypertension Digital Medicine Contract  Luis Jacobs MA as Care Coordinator    Windy is a 74 y.o. female with      Patient Active Problem List   Diagnosis    Right knee pain    Elevated BP without diagnosis of hypertension    Chest sounds abnormal on percussion or auscultation    Varicose veins of both lower extremities    Primary osteoarthritis of right knee    " Weakness    Chronic pain of left ankle    Nuclear sclerosis, bilateral    Refractive error    S/P LASIK (laser assisted in situ keratomileusis) of both eyes    Hyperlipidemia    Heart murmur    Hypertension    Age-related osteoporosis without current pathological fracture    Thoracic compression fracture    Aortic atherosclerosis    Iron deficiency anemia    New onset atrial fibrillation    History of GI bleed    Thoracic aortic aneurysm (TAA)     This patient is new to me       Windy presents today for f/u of recent hospital observation a week ago for new onset Afib with RVR. She was initiated on Lopressor with subsequent conversion to sinus rhythm. She was discharged on Eliquis and Lopressor and completed her Holter monitoring. She denies any CP, but still has SULTANA, which she has had for the past 6 months. No CP/palpitations/claudication/leg swelling. She has f/u with Cardiology in 3 weeks. Otherwise, no other acute issues during this visit.          Additional ROS    CONST: no fever, no activity change  EYES: no vision change.   ENT: no sore throat. No dysphagia.   CV: no CP with exertion  RESP: see HPI  GI: no N/V/diarrhea/constipation  : no urinary concerns  MSK: +chronic back pain  SKIN: no new rashes  NEURO: no focal deficits.   PSYCH: no new issues.   ENDOCRINE: no polyuria.     [facility] - Observation 11H  Hospital Medicine  Discharge Summary        Patient Name: Windy Lindo  MRN: [id number]  DAVID: [id number]  Patient Class: OP- Observation  Admission Date: 10/9/2024  Hospital Length of Stay: 0 days  Discharge Date and Time: 10/10/2024  1:58 PM  Attending Physician: No att. providers found   Discharging Provider: Kirstie Hirsch PA-C  Primary Care Provider: Trisha Higginbotham MD  [place] [facility] Kirstie Hirsch PA-C  Primary Care Team[place] [facility]     HPI:   Windy Lindo is a 74 y.o. female with a PMHx of osteoporosis with pathologic fracture, hx GIB,  HTN, venous insufficiency who  presents to [facility] for evaluation of new onset Afib with RVR. Patient had 2 days of dark black stools with bright red clots mixed within on September 13th and 14th. She was taking mobic at the time and was instructed to stop mobic and was prescribed protonix. No further black/ bloody Bms since then. She had a EGD and C scope done today for evaluation of recent GIB which showed normal findings. Patient was tachycardic to HR 130s following the procedure today for which she was given IV and PO lorpessor without any improvement in HR so she was sent to the ED for further eval. Patient was asymptomatic during this episode today.     Patient had a fall in April causing a thoracic vertabrea fracture. She subjequently underwent percutaneous vertebroplasty of T8 on 8/29. Since then, she reports intermittent episodes of lightheadedness, shortness of breath with exertion and near syncope. She says she feels drained during these episodes. Reports mild BLE edema which is chronic and unchanged. Denies any chest pain, palpitations, N/V, abdominal pain, HA, vision changes or syncope. No recent dark/ bloody stools since episode in September.     ED: tachycardic to HR 130s and found to be in Afib with RVR. Given 18.5mg IV dilt with improvement in HR to 80s. Normotensive. No leukocytosis. Phos 1.9, replaced.       * No surgery found *       Hospital Course:   Windy Lindo is a 74 y.o. F who was admitted to  for further evaluation of new onset a-fib. Pt with RVR on admission which improved with metoprolol. Initiated lopressor 25mg BID. Converted to sinus rhythm overnight. Echo performed in am with EF 65-70%, G1DD, CVP 3mmHg. Heparin gtt initiated during stay with [id number] 4. Hgb stable and no signs of bleeding given recent hx of GIB [id number] ago. Patient transitioned to eliquis 5mg BID. Cards with recs of eliquis, lopressor and 48hr holter at discharge.  Patient medically ready for discharge. Plan to follow up with PCP, cardiology.  Return precautions provided. Patient was seen and assessed on day of discharge. Plan of care discussed with patient, patient agreeable with plan, and all questions answered.    ECHO 10/10/24  Summary  Show Result Comparison     Left Ventricle: The left ventricle is normal in size. Normal wall thickness. Normal wall motion. There is normal systolic function with a visually estimated ejection fraction of 65 - 70%. Grade I diastolic dysfunction.    Right Ventricle: Normal right ventricular cavity size. Wall thickness is normal. Systolic function is normal.    Aortic Valve: There is moderate aortic valve sclerosis. Mildly restricted motion. There is mild aortic regurgitation.    Pulmonary Artery: The estimated pulmonary artery systolic pressure is 32 mmHg.    IVC/SVC: Normal venous pressure at 3 mmHg.    Sinus rhythm was present throughout the examination.            Patient Active Problem List   Diagnosis    Right knee pain    Elevated BP without diagnosis of hypertension    Chest sounds abnormal on percussion or auscultation    Varicose veins of both lower extremities    Primary osteoarthritis of right knee    Weakness    Chronic pain of left ankle    Nuclear sclerosis, bilateral    Refractive error    S/P LASIK (laser assisted in situ keratomileusis) of both eyes    Hyperlipidemia    Heart murmur    Hypertension    Age-related osteoporosis without current pathological fracture    Thoracic compression fracture    Aortic atherosclerosis    Iron deficiency anemia    New onset atrial fibrillation    History of GI bleed    Thoracic aortic aneurysm (TAA)       Current Medications  Medications reviewed and updated.       Current Outpatient Medications:     acetaminophen (TYLENOL) 650 MG TbSR, Take 1 tablet (650 mg total) by mouth every 6 (six) hours as needed., Disp: 20 tablet, Rfl: 0    apixaban (ELIQUIS) 5 mg Tab, Take 1 tablet (5 mg total) by mouth 2 (two) times daily., Disp: 60 tablet, Rfl: 2    b complex vitamins  capsule, Take 1 capsule by mouth once daily., Disp: , Rfl:     denosumab (PROLIA) 60 mg/mL Syrg, Inject 60 mg into the skin every 6 (six) months., Disp: , Rfl:     multivit-min-FA-lycopen-lutein 0.4-300-250 mg-mcg-mcg Tab, Take 1 tablet by mouth once daily., Disp: , Rfl:     pantoprazole (PROTONIX) 40 MG tablet, Take 1 tablet (40 mg total) by mouth once daily., Disp: 90 tablet, Rfl: 3    spironolactone (ALDACTONE) 50 MG tablet, Take 1 tablet (50 mg total) by mouth once daily., Disp: 30 tablet, Rfl: 11    vitamin D (VITAMIN D3) 1000 units Tab, Take 1,000 Units by mouth once daily., Disp: , Rfl:     vitamin E 100 UNIT capsule, Take 100 Units by mouth once daily., Disp: , Rfl:     diclofenac sodium (VOLTAREN) 1 % Gel, Apply 2 g topically once daily. (Patient not taking: Reported on 10/17/2024), Disp: 200 g, Rfl: 3    ferrous sulfate (FEOSOL) 325 mg (65 mg iron) Tab tablet, Take 1 tablet (325 mg total) by mouth daily with breakfast. (Patient not taking: Reported on 10/17/2024), Disp: 30 tablet, Rfl: 5    metoprolol tartrate (LOPRESSOR) 25 MG tablet, Take 1 tablet (25 mg total) by mouth 2 (two) times daily. (Patient not taking: Reported on 10/17/2024), Disp: 180 tablet, Rfl: 3    Past Surgical History:   Procedure Laterality Date    APPENDECTOMY      COLONOSCOPY N/A 9/7/2017    Procedure: COLONOSCOPY;  Surgeon: Albino Fenton MD;  Location: [address]);  Service: Endoscopy;  Laterality: N/A;    COLONOSCOPY N/A 10/9/2024    Procedure: COLONOSCOPY;  Surgeon: Albino Fenton MD;  Location: [facility] ENDOSCOPY;  Service: Endoscopy;  Laterality: N/A;    ESOPHAGOGASTRODUODENOSCOPY  09/07/2017    ESOPHAGOGASTRODUODENOSCOPY N/A 10/9/2024    Procedure: EGD (ESOPHAGOGASTRODUODENOSCOPY);  Surgeon: Albino Fenton MD;  Location: [facility] ENDOSCOPY;  Service: Endoscopy;  Laterality: N/A;  Ref By:solange Jaramillo suprep.  10/1/24- pc complete. DBM    KNEE SURGERY Right 12/05/2017    TKR    LASIK  7 yrs    ou     TONSILLECTOMY         Family History   Problem Relation Name Age of Onset    Hypertension Mother      Glaucoma Mother      Diabetes Mother      Cataracts Mother      Cancer Mother  74        lung    Heart disease Mother  55    Hypertension Father      Heart disease Father          onset early 60;s, Aortic valve replacement ,Rheumatic fever as a child     No Known Problems Sister      Diabetes Brother      Cancer Brother  66        mesothelioma    No Known Problems Maternal Aunt      No Known Problems Maternal Uncle      No Known Problems Paternal Aunt      No Known Problems Paternal Uncle      No Known Problems Maternal Grandmother      No Known Problems Maternal Grandfather      No Known Problems Paternal Grandmother      No Known Problems Paternal Grandfather      No Known Problems Other      Amblyopia Neg Hx      Blindness Neg Hx      Macular degeneration Neg Hx      Retinal detachment Neg Hx      Strabismus Neg Hx      Stroke Neg Hx      Thyroid disease Neg Hx      Melanoma Neg Hx         Social History     Socioeconomic History    Marital status: Single   Occupational History     [place]er[place] [employer]   Tobacco Use    Smoking status: Never    Smokeless tobacco: Never   Substance and Sexual Activity    Alcohol use: Not Currently     Alcohol/week: 1.0 standard drink of alcohol     Types: 1 Glasses of wine per week     Comment: glass of wine a night     Drug use: No    Sexual activity: Never     Social Drivers of Health     Financial Resource Strain: Low Risk  (10/9/2024)    Overall Financial Resource Strain (CARDIA)     Difficulty of Paying Living Expenses: Not hard at all   Food Insecurity: No Food Insecurity (10/9/2024)    Hunger Vital Sign     Worried About Running Out of Food in the Last Year: Never true     Ran Out of Food in the Last Year: Never true   Transportation Needs: No Transportation Needs (10/9/2024)    TRANSPORTATION NEEDS     Transportation : No   Physical Activity: Sufficiently Active (4/15/2024)     "Exercise Vital Sign     Days of Exercise per Week: 4 days     Minutes of Exercise per Session: 40 min   Recent Concern: Physical Activity - Insufficiently Active (1/26/2024)    Exercise Vital Sign     Days of Exercise per Week: 5 days     Minutes of Exercise per Session: 20 min   Stress: No Stress Concern Present (10/9/2024)    [place] [employer] - Occupational Stress Questionnaire     Feeling of Stress : Not at all   Housing Stability: Low Risk  (10/9/2024)    Housing Stability Vital Sign     Unable to Pay for Housing in the Last Year: No     Homeless in the Last Year: No           Allergies   Review of patient's allergies indicates:  No Known Allergies          Review of Systems  See HPI        [unfilled]  BP (!) 140/76 (BP Location: Right arm, Patient Position: Sitting)   Pulse 67   Temp 97.9 °F (36.6 °C) (Oral)   Ht 5' 4" (1.626 m)   Wt 72.5 kg (159 lb 13.3 oz)   SpO2 99%   BMI 27.44 kg/m²     GEN: NAD, well developed, pleasant, well nourished  HEENT: NCAT, PERRLA, EOMI, sclera clear, anicteric  NECK: normal, supple with midline trachea, no LAD, no thyromegaly  LUNGS: CTAB, no w/r/r, normal effort, no increased work of breathing,  HEART: RRR, normal S1 and S2, no m/r/g, no palpitations, no edema, normal pulses  ABD: s/nt/nd, NABS, no organomegaly, no masses  SKIN: warm and dry with normal turgor, no rashes,  PSYCH: AOx3, appropriate mood and affect.   MSK: extremities warm/well perfused, normal ROM in all 4 extremities, no c/c/e.  NEURO: normal without focal findings, CN II-XII are intact.  Sensation grossly normal, gait and station normal.                 "

## 2024-10-24 ENCOUNTER — PATIENT MESSAGE (OUTPATIENT)
Dept: ADMINISTRATIVE | Facility: OTHER | Age: 74
End: 2024-10-24
Payer: MEDICARE

## 2024-10-31 ENCOUNTER — TELEPHONE (OUTPATIENT)
Dept: FAMILY MEDICINE | Facility: CLINIC | Age: 74
End: 2024-10-31
Payer: MEDICARE

## 2024-10-31 ENCOUNTER — TELEPHONE (OUTPATIENT)
Dept: CARDIOLOGY | Facility: CLINIC | Age: 74
End: 2024-10-31
Payer: MEDICARE

## 2024-10-31 ENCOUNTER — TELEPHONE (OUTPATIENT)
Dept: INFUSION THERAPY | Facility: HOSPITAL | Age: 74
End: 2024-10-31
Payer: MEDICARE

## 2024-11-02 ENCOUNTER — PATIENT MESSAGE (OUTPATIENT)
Dept: FAMILY MEDICINE | Facility: CLINIC | Age: 74
End: 2024-11-02
Payer: MEDICARE

## 2024-11-04 ENCOUNTER — TELEPHONE (OUTPATIENT)
Dept: INFUSION THERAPY | Facility: HOSPITAL | Age: 74
End: 2024-11-04
Payer: MEDICARE

## 2024-11-04 ENCOUNTER — EXTERNAL HOME HEALTH (OUTPATIENT)
Dept: HOME HEALTH SERVICES | Facility: HOSPITAL | Age: 74
End: 2024-11-04
Payer: MEDICARE

## 2024-11-06 ENCOUNTER — PATIENT MESSAGE (OUTPATIENT)
Dept: SPORTS MEDICINE | Facility: CLINIC | Age: 74
End: 2024-11-06
Payer: MEDICARE

## 2024-11-08 ENCOUNTER — OFFICE VISIT (OUTPATIENT)
Dept: CARDIOLOGY | Facility: CLINIC | Age: 74
End: 2024-11-08
Payer: MEDICARE

## 2024-11-08 ENCOUNTER — TELEPHONE (OUTPATIENT)
Dept: ELECTROPHYSIOLOGY | Facility: CLINIC | Age: 74
End: 2024-11-08
Payer: MEDICARE

## 2024-11-08 ENCOUNTER — PATIENT MESSAGE (OUTPATIENT)
Dept: CARDIOLOGY | Facility: CLINIC | Age: 74
End: 2024-11-08

## 2024-11-08 VITALS
OXYGEN SATURATION: 96 % | HEIGHT: 64 IN | WEIGHT: 159.63 LBS | SYSTOLIC BLOOD PRESSURE: 147 MMHG | BODY MASS INDEX: 27.25 KG/M2 | HEART RATE: 70 BPM | DIASTOLIC BLOOD PRESSURE: 74 MMHG

## 2024-11-08 DIAGNOSIS — I48.91 NEW ONSET ATRIAL FIBRILLATION: Primary | ICD-10-CM

## 2024-11-08 DIAGNOSIS — I35.9 AORTIC VALVE DISEASE: ICD-10-CM

## 2024-11-08 DIAGNOSIS — E78.5 HYPERLIPIDEMIA, UNSPECIFIED HYPERLIPIDEMIA TYPE: ICD-10-CM

## 2024-11-08 DIAGNOSIS — I10 PRIMARY HYPERTENSION: ICD-10-CM

## 2024-11-08 DIAGNOSIS — I70.0 AORTIC ATHEROSCLEROSIS: Chronic | ICD-10-CM

## 2024-11-08 PROCEDURE — 99999 PR PBB SHADOW E&M-EST. PATIENT-LVL IV: CPT | Mod: PBBFAC,,, | Performed by: INTERNAL MEDICINE

## 2024-11-08 RX ORDER — METOPROLOL TARTRATE 25 MG/1
50 TABLET, FILM COATED ORAL 2 TIMES DAILY
Start: 2024-11-08 | End: 2025-11-08

## 2024-11-08 NOTE — ASSESSMENT & PLAN NOTE
Lopressor will be 50 b.i.d..  I told her not to start Aldactone.  I am trying to maximize beta-blocker dosing, she may end up on higher doses of Lopressor than ordered today.

## 2024-11-08 NOTE — ASSESSMENT & PLAN NOTE
She has a heart murmur.  Her echo showed peak aortic velocity 2.1 m/sec October 20, 2024.  There was mild aortic regurgitation.  Condition considered mild.

## 2024-11-08 NOTE — ASSESSMENT & PLAN NOTE
CHADS-VASc score 4.  Eliquis ordered.  She spontaneously broke to sinus rhythm, on her hospitalized event as well as subsequent episodes of likely AFib by symptomatology.  She has had syncope with 1 episode of symptomatic tachycardia.    Electrophysiologic referral made.  I asked her to contact me if she has another episode of symptomatic tachycardia.    Lopressor dosing increased for recurrence protection.

## 2024-11-08 NOTE — PROGRESS NOTES
[facility] Cardiology     Subjective:    Patient ID:  Windy Lindo is a 74 y.o. female who presents for evaluation of Atrial Fibrillation, Hypertension, Shortness of Breath, and Dizziness    Review of patient's allergies indicates:  No Known Allergies  She developed atrial fibrillation while on telemetry during a colonoscopy October 20, 2024.  She was hospitalized.  She spontaneously broke to sinus rhythm.  Her calculated CHADS-VASc score is 4.  She has worn Holter monitors for palpitations in the past which confirmed frequent PACs at 1 time, another Holter showed some PVCs.  She has no history of coronary disease or calcification of her aorta.  She has never had a stress test.    She had hematochezia but did not require transfusion.  This preceded her AFib development.  She is now on Eliquis.  Her colonoscopy was negative for source of bleeding.  No hemorrhoids documented.  She has episodes of shakiness without awareness of palpitations presumed to be AFib related.  She has had syncope at home before.    Her echo showed normal ejection fraction without significant valvular disease.  She does have an aortic ejection murmur with aortic valve sclerosis and mild aortic regurgitation.  She has normal exercise tolerance generally.  She states she is planning on having knee replacement and also having cataract surgery.    She had an episode of shakiness and likely atrial fibrillation about a week ago.  She does not have a monitoring device her blood pressure unit at home.  On discharge she was sent home on 25 mg Lopressor and Aldactone.    Today she is in sinus rhythm by exam.  She continues to report that she has never felt palpitations with her AFib.  She has had awareness of AFib in the past in setting of PACs and PVCs based on Holter results.  She was not on treatment for hypertension until her recent hospitalization.  She states that she was  never with AFib until she had the GI bleed and anemia.  She is no longer anemic.         Review of Systems   Constitutional: Negative for chills, decreased appetite, diaphoresis, fever, malaise/fatigue, night sweats, weight gain and weight loss.   HENT:  Negative for congestion, ear discharge, ear pain, hearing loss, hoarse voice, nosebleeds, odynophagia, sore throat, stridor and tinnitus.    Eyes:  Negative for blurred vision, discharge, double vision, pain, photophobia, redness, vision loss in left eye, vision loss in right eye, visual disturbance and visual halos.   Cardiovascular:  Positive for irregular heartbeat, palpitations and syncope. Negative for chest pain, claudication, cyanosis, dyspnea on exertion, leg swelling, near-syncope, orthopnea and paroxysmal nocturnal dyspnea.   Respiratory:  Negative for cough, hemoptysis, shortness of breath, sleep disturbances due to breathing, snoring, sputum production and wheezing.    Endocrine: Negative for cold intolerance, heat intolerance, polydipsia, polyphagia and polyuria.   Hematologic/Lymphatic: Negative for adenopathy and bleeding problem. Does not bruise/bleed easily.   Skin:  Negative for color change, dry skin, flushing, itching, nail changes, poor wound healing, rash, skin cancer, suspicious lesions and unusual hair distribution.   Musculoskeletal:  Negative for arthritis, back pain, falls, gout, joint pain, joint swelling, muscle cramps, muscle weakness, myalgias, neck pain and stiffness.   Gastrointestinal:  Negative for bloating, abdominal pain, anorexia, change in bowel habit, bowel incontinence, constipation, diarrhea, dysphagia, excessive appetite, flatus, heartburn, hematemesis, hematochezia, hemorrhoids, jaundice, melena, nausea and vomiting.   Genitourinary:  Negative for bladder incontinence, decreased libido, dysuria, flank pain, frequency, genital sores, hematuria, hesitancy, incomplete emptying, nocturia and urgency.   Neurological:  Negative  "for aphonia, brief paralysis, difficulty with concentration, disturbances in coordination, excessive daytime sleepiness, dizziness, focal weakness, headaches, light-headedness, loss of balance, numbness, paresthesias, seizures, sensory change, tremors, vertigo and weakness.   Psychiatric/Behavioral:  Negative for altered mental status, depression, hallucinations, memory loss, substance abuse, suicidal ideas and thoughts of violence. The patient does not have insomnia and is not nervous/anxious.    Allergic/Immunologic: Negative for hives and persistent infections.        Objective:       Vitals:    11/08/24 1048   BP: (!) 147/74   Pulse: 70   SpO2: 96%   Weight: 72.4 kg (159 lb 9.8 oz)   Height: 5' 4" (1.626 m)    Physical Exam  Constitutional:       General: She is not in acute distress.     Appearance: She is well-developed. She is not diaphoretic.   HENT:      Head: Normocephalic and atraumatic.      Nose: Nose normal.   Eyes:      General: No scleral icterus.        Right eye: No discharge.      Conjunctiva/sclera: Conjunctivae normal.      Pupils: Pupils are equal, round, and reactive to light.   Neck:      Thyroid: No thyromegaly.      Vascular: No JVD.      Trachea: No tracheal deviation.   Cardiovascular:      Rate and Rhythm: Normal rate and regular rhythm.      Pulses:           Carotid pulses are 2+ on the right side and 2+ on the left side.       Radial pulses are 2+ on the right side and 2+ on the left side.        Dorsalis pedis pulses are 2+ on the right side and 2+ on the left side.        Posterior tibial pulses are 2+ on the right side and 2+ on the left side.      Heart sounds: Murmur heard.      Harsh early systolic murmur is present with a grade of 2/6 at the upper right sternal border.      No friction rub. No gallop.   Pulmonary:      Effort: Pulmonary effort is normal. No respiratory distress.      Breath sounds: Normal breath sounds. No stridor. No wheezing or rales.   Chest:      Chest " wall: No tenderness.   Abdominal:      General: Bowel sounds are normal. There is no distension.      Palpations: Abdomen is soft. There is no mass.      Tenderness: There is no abdominal tenderness. There is no guarding or rebound.   Musculoskeletal:         General: No tenderness. Normal range of motion.      Cervical back: Normal range of motion and neck supple.   Lymphadenopathy:      Cervical: No cervical adenopathy.   Skin:     General: Skin is warm and dry.      Coloration: Skin is not pale.      Findings: No erythema or rash.   Neurological:      Mental Status: She is alert and oriented to person, place, and time.      Cranial Nerves: No cranial nerve deficit.      Coordination: Coordination normal.   Psychiatric:         Behavior: Behavior normal.         Thought Content: Thought content normal.         Judgment: Judgment normal.           Assessment:       1. New onset atrial fibrillation    2. Aortic atherosclerosis    3. Primary hypertension    4. Hyperlipidemia, unspecified hyperlipidemia type    5. Aortic valve disease      Results for orders placed or performed during the hospital encounter of 10/10/24   Holter monitor - 48 hour    Collection Time: 10/10/24  2:58 PM   Result Value Ref Range    Event Monitor Day 0     holter length minutes 59     Holter length hours 47     holter length dec hours 47.98     Sinus min HR 52     Sinus max hr 130     Sinus avg hr 78          Current Outpatient Medications:     acetaminophen (TYLENOL) 650 MG TbSR, Take 1 tablet (650 mg total) by mouth every 6 (six) hours as needed., Disp: 20 tablet, Rfl: 0    apixaban (ELIQUIS) 5 mg Tab, Take 1 tablet (5 mg total) by mouth 2 (two) times daily., Disp: 60 tablet, Rfl: 2    b complex vitamins capsule, Take 1 capsule by mouth once daily., Disp: , Rfl:     denosumab (PROLIA) 60 mg/mL Syrg, Inject 60 mg into the skin every 6 (six) months., Disp: , Rfl:     multivit-min-FA-lycopen-lutein 0.4-300-250 mg-mcg-mcg Tab, Take 1 tablet  "by mouth once daily., Disp: , Rfl:     pantoprazole (PROTONIX) 40 MG tablet, Take 1 tablet (40 mg total) by mouth once daily., Disp: 90 tablet, Rfl: 3    vitamin D (VITAMIN D3) 1000 units Tab, Take 1,000 Units by mouth once daily., Disp: , Rfl:     vitamin E 100 UNIT capsule, Take 100 Units by mouth once daily., Disp: , Rfl:     metoprolol tartrate (LOPRESSOR) 25 MG tablet, Take 2 tablets (50 mg total) by mouth 2 (two) times daily., Disp: , Rfl:      Lab Results   Component Value Date    WBC 11.98 10/10/2024    RBC 4.06 10/10/2024    HGB 12.2 10/10/2024    HCT 37.7 10/10/2024    MCV 93 10/10/2024    MCH 30.0 10/10/2024    MCHC 32.4 10/10/2024    RDW 14.9 (H) 10/10/2024     10/10/2024    MPV 12.4 10/10/2024    GRAN 10.0 (H) 10/10/2024    GRAN 83.3 (H) 10/10/2024    LYMPH 1.0 10/10/2024    LYMPH 8.6 (L) 10/10/2024    MONO 0.9 10/10/2024    MONO 7.3 10/10/2024    EOS 0.0 10/10/2024    BASO 0.02 10/10/2024    EOSINOPHIL 0.2 10/10/2024    BASOPHIL 0.2 10/10/2024    MG 1.9 10/10/2024        CMP  Lab Results   Component Value Date     10/10/2024    K 3.8 10/10/2024     10/10/2024    CO2 23 10/10/2024    GLU 77 10/10/2024    BUN 11 10/10/2024    CREATININE 0.7 10/10/2024    CALCIUM 9.2 10/10/2024    PROT 6.6 10/09/2024    ALBUMIN 3.6 10/09/2024    BILITOT 0.4 10/09/2024    ALKPHOS 61 10/09/2024    AST 13 10/09/2024    ALT 7 (L) 10/09/2024    ANIONGAP 9 10/10/2024    ESTGFRAFRICA >60.0 02/21/2022    EGFRNONAA >60.0 02/21/2022        No results found for: "LABBLOO", "LABURIN", "RESPIRATORYC", "GSRESP"         Results for orders placed or performed during the hospital encounter of 10/09/24   EKG 12-lead    Collection Time: 10/10/24 11:18 AM   Result Value Ref Range    QRS Duration 86 ms    OHS QTC Calculation 422 ms    Narrative    Test Reason : I48.91,    Vent. Rate : 072 BPM     Atrial Rate : 072 BPM     P-R Int : 176 ms          QRS Dur : 086 ms      QT Int : 386 ms       P-R-T Axes : 042 -07 035 " degrees     QTc Int : 422 ms    Normal sinus rhythm  Normal ECG  When compared with ECG of 09-OCT-2024 21:01,  Premature supraventricular complexes are no longer Present  Confirmed by HARJEET LUNDY MD ([id number]) on 10/10/2024 12:22:32 PM    Referred By: JULIA   SELF           Confirmed By:HARJEET LUNDY MD        Holter monitor - 48 hour 10/10/2024 [id number] Final   Cardiac monitoring strips 10/10/2024  Final   Echo 10/10/2024 [id number] Final             Plan:       Problem List Items Addressed This Visit          Cardiac/Vascular    Aortic atherosclerosis (Chronic)     Condition stable.         Hyperlipidemia      mg%.  She is considered primary prevention.  No coronary artery disease history in her family noted.         Hypertension     Lopressor will be 50 b.i.d..  I told her not to start Aldactone.  I am trying to maximize beta-blocker dosing, she may end up on higher doses of Lopressor than ordered today.         New onset atrial fibrillation - Primary     CHADS-VASc score 4.  Eliquis ordered.  She spontaneously broke to sinus rhythm, on her hospitalized event as well as subsequent episodes of likely AFib by symptomatology.  She has had syncope with 1 episode of symptomatic tachycardia.    Electrophysiologic referral made.  I asked her to contact me if she has another episode of symptomatic tachycardia.    Lopressor dosing increased for recurrence protection.             Relevant Medications    metoprolol tartrate (LOPRESSOR) 25 MG tablet    Aortic valve disease     She has a heart murmur.  Her echo showed peak aortic velocity 2.1 m/sec October 20, 2024.  There was mild aortic regurgitation.  Condition considered mild.             Electrophysiology referral made.  I increased Lopressor to 50 b.i.d..  I told her it may need to go up further and she should notify me if she has another episode of tachycardia.  She is advised to get a pulse oximeter and blood pressure machine.    I made a 3 month follow-up.   She will be seeing electrophysiology before that.  She works the portal well and will send messages.    Thank you for allowing me to participate in your patient's care.                Evans Vernon MD  11/08/2024   11:41 AM

## 2024-11-08 NOTE — ASSESSMENT & PLAN NOTE
mg%.  She is considered primary prevention.  No coronary artery disease history in her family noted.

## 2024-11-11 ENCOUNTER — PATIENT MESSAGE (OUTPATIENT)
Dept: CARDIOLOGY | Facility: CLINIC | Age: 74
End: 2024-11-11
Payer: MEDICARE

## 2024-11-13 ENCOUNTER — HOSPITAL ENCOUNTER (INPATIENT)
Facility: HOSPITAL | Age: 74
LOS: 1 days | Discharge: HOME | DRG: 123 | End: 2024-11-15
Attending: EMERGENCY MEDICINE | Admitting: STUDENT IN AN ORGANIZED HEALTH CARE EDUCATION/TRAINING PROGRAM
Payer: MEDICARE

## 2024-11-13 ENCOUNTER — PATIENT MESSAGE (OUTPATIENT)
Dept: CARDIOLOGY | Facility: CLINIC | Age: 74
End: 2024-11-13

## 2024-11-13 DIAGNOSIS — H53.8 BLURRY VISION, RIGHT EYE: ICD-10-CM

## 2024-11-13 DIAGNOSIS — R42 DIZZINESS: ICD-10-CM

## 2024-11-13 DIAGNOSIS — R07.9 CHEST PAIN: ICD-10-CM

## 2024-11-13 DIAGNOSIS — H54.61 VISION LOSS OF RIGHT EYE: Primary | ICD-10-CM

## 2024-11-13 DIAGNOSIS — G45.3 AF (AMAUROSIS FUGAX): ICD-10-CM

## 2024-11-13 DIAGNOSIS — I48.91 A-FIB: ICD-10-CM

## 2024-11-13 DIAGNOSIS — R51.9 NONINTRACTABLE HEADACHE, UNSPECIFIED CHRONICITY PATTERN, UNSPECIFIED HEADACHE TYPE: ICD-10-CM

## 2024-11-13 PROBLEM — Z87.19 HISTORY OF GI BLEED: Chronic | Status: ACTIVE | Noted: 2024-10-09

## 2024-11-13 PROBLEM — E78.5 HYPERLIPIDEMIA: Chronic | Status: ACTIVE | Noted: 2020-10-26

## 2024-11-13 PROBLEM — I10 HYPERTENSION: Chronic | Status: ACTIVE | Noted: 2023-04-04

## 2024-11-13 PROBLEM — I71.20 THORACIC AORTIC ANEURYSM (TAA): Chronic | Status: ACTIVE | Noted: 2024-10-10

## 2024-11-13 PROCEDURE — 83735 ASSAY OF MAGNESIUM: CPT | Performed by: EMERGENCY MEDICINE

## 2024-11-13 PROCEDURE — 25000003 PHARM REV CODE 250: Performed by: EMERGENCY MEDICINE

## 2024-11-13 PROCEDURE — 93010 ELECTROCARDIOGRAM REPORT: CPT | Mod: ,,, | Performed by: INTERNAL MEDICINE

## 2024-11-13 PROCEDURE — 99285 EMERGENCY DEPT VISIT HI MDM: CPT | Mod: 25

## 2024-11-13 PROCEDURE — 84484 ASSAY OF TROPONIN QUANT: CPT | Performed by: EMERGENCY MEDICINE

## 2024-11-13 PROCEDURE — 93005 ELECTROCARDIOGRAM TRACING: CPT

## 2024-11-13 PROCEDURE — 84443 ASSAY THYROID STIM HORMONE: CPT | Performed by: EMERGENCY MEDICINE

## 2024-11-13 PROCEDURE — 83880 ASSAY OF NATRIURETIC PEPTIDE: CPT | Performed by: EMERGENCY MEDICINE

## 2024-11-13 RX ORDER — PROPARACAINE HYDROCHLORIDE 5 MG/ML
1 SOLUTION/ DROPS OPHTHALMIC
Status: COMPLETED | OUTPATIENT
Start: 2024-11-13 | End: 2024-11-13

## 2024-11-13 RX ORDER — ACETAMINOPHEN 500 MG
1000 TABLET ORAL
Status: COMPLETED | OUTPATIENT
Start: 2024-11-13 | End: 2024-11-13

## 2024-11-13 RX ADMIN — PROPARACAINE HYDROCHLORIDE 1 DROP: 5 SOLUTION/ DROPS OPHTHALMIC at 11:11

## 2024-11-13 RX ADMIN — ACETAMINOPHEN 1000 MG: 500 TABLET ORAL at 11:11

## 2024-11-13 RX ADMIN — FLUORESCEIN SODIUM 1 EACH: 1 STRIP OPHTHALMIC at 11:11

## 2024-11-14 PROBLEM — R09.89 CHEST SOUNDS ABNORMAL ON PERCUSSION OR AUSCULTATION: Status: RESOLVED | Noted: 2017-11-29 | Resolved: 2024-11-14

## 2024-11-14 PROBLEM — G45.3 AMAUROSIS FUGAX OF LEFT EYE: Status: ACTIVE | Noted: 2024-11-14

## 2024-11-14 PROBLEM — J96.01 ACUTE HYPOXEMIC RESPIRATORY FAILURE: Status: RESOLVED | Noted: 2024-11-14 | Resolved: 2024-11-14

## 2024-11-14 PROBLEM — J96.01 ACUTE HYPOXEMIC RESPIRATORY FAILURE: Status: ACTIVE | Noted: 2024-11-14

## 2024-11-14 PROBLEM — R03.0 ELEVATED BP WITHOUT DIAGNOSIS OF HYPERTENSION: Status: RESOLVED | Noted: 2017-11-29 | Resolved: 2024-11-14

## 2024-11-14 PROBLEM — R53.1 WEAKNESS: Status: RESOLVED | Noted: 2017-12-27 | Resolved: 2024-11-14

## 2024-11-14 PROBLEM — I48.0 PAROXYSMAL ATRIAL FIBRILLATION: Status: ACTIVE | Noted: 2024-10-09

## 2024-11-14 LAB
ALBUMIN SERPL BCP-MCNC: 3.4 G/DL (ref 3.5–5.2)
ALP SERPL-CCNC: 63 U/L (ref 40–150)
ALT SERPL W/O P-5'-P-CCNC: 7 U/L (ref 10–44)
ANION GAP SERPL CALC-SCNC: 6 MMOL/L (ref 8–16)
ASCENDING AORTA: 3.16 CM
AST SERPL-CCNC: 15 U/L (ref 10–40)
AV AREA BY CONTINUOUS VTI: 3.2 CM2
AV INDEX (PROSTH): 0.83
AV LVOT MEAN GRADIENT: 4 MMHG
AV LVOT PEAK GRADIENT: 7 MMHG
AV MEAN GRADIENT: 6.2 MMHG
AV PEAK GRADIENT: 17.6 MMHG
AV VALVE AREA BY VELOCITY RATIO: 2.5 CM²
AV VALVE AREA: 3.1 CM2
AV VELOCITY RATIO: 0.67
BASOPHILS # BLD AUTO: 0.04 K/UL (ref 0–0.2)
BASOPHILS NFR BLD: 0.4 % (ref 0–1.9)
BILIRUB SERPL-MCNC: 0.2 MG/DL (ref 0.1–1)
BNP SERPL-MCNC: 11 PG/ML (ref 0–99)
BSA FOR ECHO PROCEDURE: 1.8 M2
BUN SERPL-MCNC: 24 MG/DL (ref 8–23)
CALCIUM SERPL-MCNC: 9.8 MG/DL (ref 8.7–10.5)
CHLORIDE SERPL-SCNC: 108 MMOL/L (ref 95–110)
CO2 SERPL-SCNC: 25 MMOL/L (ref 23–29)
CREAT SERPL-MCNC: 0.8 MG/DL (ref 0.5–1.4)
CRP SERPL-MCNC: 2.3 MG/L (ref 0–8.2)
CV ECHO LV RWT: 0.35 CM
DIFFERENTIAL METHOD BLD: NORMAL
DOP CALC AO PEAK VEL: 2.1 M/S
DOP CALC AO VTI: 36.7 CM
DOP CALC LVOT AREA: 3.8 CM2
DOP CALC LVOT DIAMETER: 2.2 CM
DOP CALC LVOT PEAK VEL: 1.4 M/S
DOP CALC LVOT STROKE VOLUME: 115.5 CM3
DOP CALCLVOT PEAK VEL VTI: 30.4 CM
E WAVE DECELERATION TIME: 163.3 MS
E/A RATIO: 0.81
E/E' RATIO: 14.36 M/S
ECHO EF ESTIMATED: 71 %
ECHO LV POSTERIOR WALL: 0.8 CM (ref 0.6–1.1)
EOSINOPHIL # BLD AUTO: 0.1 K/UL (ref 0–0.5)
EOSINOPHIL NFR BLD: 0.8 % (ref 0–8)
ERYTHROCYTE [DISTWIDTH] IN BLOOD BY AUTOMATED COUNT: 14.5 % (ref 11.5–14.5)
ERYTHROCYTE [SEDIMENTATION RATE] IN BLOOD BY PHOTOMETRIC METHOD: 21 MM/HR (ref 0–36)
EST. GFR  (NO RACE VARIABLE): >60 ML/MIN/1.73 M^2
FRACTIONAL SHORTENING: 41.3 % (ref 28–44)
GLUCOSE SERPL-MCNC: 101 MG/DL (ref 70–110)
HCT VFR BLD AUTO: 38.6 % (ref 37–48.5)
HGB BLD-MCNC: 12.5 G/DL (ref 12–16)
IMM GRANULOCYTES # BLD AUTO: 0.03 K/UL (ref 0–0.04)
IMM GRANULOCYTES NFR BLD AUTO: 0.3 % (ref 0–0.5)
INTERVENTRICULAR SEPTUM: 1 CM (ref 0.6–1.1)
IVC DIAMETER: 1.46 CM
IVRT: 176.97 MS
LA MAJOR: 4.81 CM
LA MINOR: 5.05 CM
LA WIDTH: 4.4 CM
LEFT ATRIUM SIZE: 3.88 CM
LEFT ATRIUM VOLUME INDEX MOD: 36.4 ML/M2
LEFT ATRIUM VOLUME INDEX: 40.4 ML/M2
LEFT ATRIUM VOLUME MOD: 64.41 ML
LEFT ATRIUM VOLUME: 71.5 CM3
LEFT CBA DIAS: 11 CM/S
LEFT CBA SYS: 60 CM/S
LEFT CCA DIST DIAS: 16 CM/S
LEFT CCA DIST SYS: 72 CM/S
LEFT CCA MID DIAS: 19 CM/S
LEFT CCA MID SYS: 72 CM/S
LEFT CCA PROX DIAS: 13 CM/S
LEFT CCA PROX SYS: 52 CM/S
LEFT ECA DIAS: 0 CM/S
LEFT ECA SYS: 52 CM/S
LEFT ICA DIST DIAS: 22 CM/S
LEFT ICA DIST SYS: 72 CM/S
LEFT ICA MID DIAS: 20 CM/S
LEFT ICA MID SYS: 69 CM/S
LEFT ICA PROX DIAS: 14 CM/S
LEFT ICA PROX SYS: 56 CM/S
LEFT INTERNAL DIMENSION IN SYSTOLE: 2.7 CM (ref 2.1–4)
LEFT VENTRICLE DIASTOLIC VOLUME INDEX: 53.56 ML/M2
LEFT VENTRICLE DIASTOLIC VOLUME: 94.81 ML
LEFT VENTRICLE MASS INDEX: 77.8 G/M2
LEFT VENTRICLE SYSTOLIC VOLUME INDEX: 15.5 ML/M2
LEFT VENTRICLE SYSTOLIC VOLUME: 27.42 ML
LEFT VENTRICULAR INTERNAL DIMENSION IN DIASTOLE: 4.6 CM (ref 3.5–6)
LEFT VENTRICULAR MASS: 137.7 G
LEFT VERTEBRAL DIAS: 11 CM/S
LEFT VERTEBRAL SYS: 57 CM/S
LV LATERAL E/E' RATIO: 13.17
LV SEPTAL E/E' RATIO: 15.8
LYMPHOCYTES # BLD AUTO: 2.1 K/UL (ref 1–4.8)
LYMPHOCYTES NFR BLD: 22.7 % (ref 18–48)
MAGNESIUM SERPL-MCNC: 2 MG/DL (ref 1.6–2.6)
MCH RBC QN AUTO: 29.6 PG (ref 27–31)
MCHC RBC AUTO-ENTMCNC: 32.4 G/DL (ref 32–36)
MCV RBC AUTO: 92 FL (ref 82–98)
MONOCYTES # BLD AUTO: 0.8 K/UL (ref 0.3–1)
MONOCYTES NFR BLD: 8.2 % (ref 4–15)
MV A" WAVE DURATION": 154.14 MS
MV PEAK A VEL: 0.97 M/S
MV PEAK E VEL: 0.79 M/S
NEUTROPHILS # BLD AUTO: 6.2 K/UL (ref 1.8–7.7)
NEUTROPHILS NFR BLD: 67.6 % (ref 38–73)
NRBC BLD-RTO: 0 /100 WBC
OHS CV CAROTID RIGHT ICA EDV HIGHEST: 24
OHS CV CAROTID ULTRASOUND LEFT ICA/CCA RATIO: 1
OHS CV CAROTID ULTRASOUND RIGHT ICA/CCA RATIO: 1.25
OHS CV PV CAROTID LEFT HIGHEST CCA: 72
OHS CV PV CAROTID LEFT HIGHEST ICA: 72
OHS CV PV CAROTID RIGHT HIGHEST CCA: 60
OHS CV PV CAROTID RIGHT HIGHEST ICA: 70
OHS CV RV/LV RATIO: 0.72 CM
OHS CV US CAROTID LEFT HIGHEST EDV: 22
OHS QRS DURATION: 80 MS
OHS QRS DURATION: 86 MS
OHS QTC CALCULATION: 386 MS
OHS QTC CALCULATION: 414 MS
PISA TR MAX VEL: 2.7 M/S
PLATELET # BLD AUTO: 177 K/UL (ref 150–450)
PMV BLD AUTO: 12.5 FL (ref 9.2–12.9)
POTASSIUM SERPL-SCNC: 4.2 MMOL/L (ref 3.5–5.1)
PROT SERPL-MCNC: 6.4 G/DL (ref 6–8.4)
PULM VEIN A" WAVE DURATION": 154.14 MS
PULM VEIN S/D RATIO: 1.29
PULMONIC VEIN PEAK A VELOCITY: 0.2 M/S
PV PEAK D VEL: 0.34 M/S
PV PEAK S VEL: 0.44 M/S
RA MAJOR: 4.38 CM
RA PRESSURE ESTIMATED: 3 MMHG
RA WIDTH: 3.8 CM
RBC # BLD AUTO: 4.22 M/UL (ref 4–5.4)
RIGHT ATRIAL AREA: 12.7 CM2
RIGHT CBA DIAS: 12 CM/S
RIGHT CBA SYS: 49 CM/S
RIGHT CCA DIST DIAS: 14 CM/S
RIGHT CCA DIST SYS: 56 CM/S
RIGHT CCA MID DIAS: 14 CM/S
RIGHT CCA MID SYS: 54 CM/S
RIGHT CCA PROX DIAS: 11 CM/S
RIGHT CCA PROX SYS: 60 CM/S
RIGHT ECA DIAS: 9 CM/S
RIGHT ECA SYS: 63 CM/S
RIGHT ICA DIST DIAS: 24 CM/S
RIGHT ICA DIST SYS: 70 CM/S
RIGHT ICA MID DIAS: 19 CM/S
RIGHT ICA MID SYS: 56 CM/S
RIGHT ICA PROX DIAS: 13 CM/S
RIGHT ICA PROX SYS: 43 CM/S
RIGHT VENTRICLE DIASTOLIC BASEL DIMENSION: 3.3 CM
RIGHT VERTEBRAL DIAS: 10 CM/S
RIGHT VERTEBRAL SYS: 43 CM/S
RV TB RVSP: 6 MMHG
RV TISSUE DOPPLER FREE WALL SYSTOLIC VELOCITY 1 (APICAL 4 CHAMBER VIEW): 8.12 CM/S
SINUS: 2.81 CM
SODIUM SERPL-SCNC: 139 MMOL/L (ref 136–145)
STJ: 2.45 CM
TDI LATERAL: 0.06 M/S
TDI SEPTAL: 0.05 M/S
TDI: 0.06 M/S
TR MAX PG: 29 MMHG
TRICUSPID ANNULAR PLANE SYSTOLIC EXCURSION: 1.51 CM
TROPONIN I SERPL DL<=0.01 NG/ML-MCNC: <0.006 NG/ML (ref 0–0.03)
TSH SERPL DL<=0.005 MIU/L-ACNC: 2.1 UIU/ML (ref 0.4–4)
TV PEAK GRADIENT: 29 MMHG
TV REST PULMONARY ARTERY PRESSURE: 32 MMHG
WBC # BLD AUTO: 9.13 K/UL (ref 3.9–12.7)
Z-SCORE OF LEFT VENTRICULAR DIMENSION IN END DIASTOLE: -0.62
Z-SCORE OF LEFT VENTRICULAR DIMENSION IN END SYSTOLE: -0.9

## 2024-11-14 PROCEDURE — 25000003 PHARM REV CODE 250

## 2024-11-14 PROCEDURE — 85652 RBC SED RATE AUTOMATED: CPT | Performed by: EMERGENCY MEDICINE

## 2024-11-14 PROCEDURE — 80053 COMPREHEN METABOLIC PANEL: CPT | Performed by: EMERGENCY MEDICINE

## 2024-11-14 PROCEDURE — 85025 COMPLETE CBC W/AUTO DIFF WBC: CPT | Performed by: EMERGENCY MEDICINE

## 2024-11-14 PROCEDURE — 94761 N-INVAS EAR/PLS OXIMETRY MLT: CPT

## 2024-11-14 PROCEDURE — 11000001 HC ACUTE MED/SURG PRIVATE ROOM

## 2024-11-14 PROCEDURE — 25000003 PHARM REV CODE 250: Performed by: STUDENT IN AN ORGANIZED HEALTH CARE EDUCATION/TRAINING PROGRAM

## 2024-11-14 PROCEDURE — 86140 C-REACTIVE PROTEIN: CPT | Performed by: EMERGENCY MEDICINE

## 2024-11-14 PROCEDURE — 25500020 PHARM REV CODE 255: Performed by: EMERGENCY MEDICINE

## 2024-11-14 RX ORDER — PANTOPRAZOLE SODIUM 20 MG/1
40 TABLET, DELAYED RELEASE ORAL DAILY
Status: DISCONTINUED | OUTPATIENT
Start: 2024-11-14 | End: 2024-11-15 | Stop reason: HOSPADM

## 2024-11-14 RX ORDER — DIPHENHYDRAMINE HCL 25 MG
25 CAPSULE ORAL ONCE
Status: COMPLETED | OUTPATIENT
Start: 2024-11-14 | End: 2024-11-14

## 2024-11-14 RX ORDER — VITAMIN E 268 MG
400 CAPSULE ORAL DAILY
Status: DISCONTINUED | OUTPATIENT
Start: 2024-11-14 | End: 2024-11-15 | Stop reason: HOSPADM

## 2024-11-14 RX ORDER — CHOLECALCIFEROL (VITAMIN D3) 25 MCG
1000 TABLET ORAL DAILY
Status: DISCONTINUED | OUTPATIENT
Start: 2024-11-14 | End: 2024-11-15 | Stop reason: HOSPADM

## 2024-11-14 RX ORDER — DOCUSATE SODIUM 100 MG
500 CAPSULE ORAL ONCE
Status: COMPLETED | OUTPATIENT
Start: 2024-11-14 | End: 2024-11-14

## 2024-11-14 RX ORDER — ACETAMINOPHEN 325 MG/1
650 TABLET ORAL EVERY 8 HOURS PRN
Status: DISCONTINUED | OUTPATIENT
Start: 2024-11-14 | End: 2024-11-15 | Stop reason: HOSPADM

## 2024-11-14 RX ORDER — METOPROLOL TARTRATE 50 MG/1
50 TABLET ORAL 2 TIMES DAILY
Status: DISCONTINUED | OUTPATIENT
Start: 2024-11-14 | End: 2024-11-15 | Stop reason: HOSPADM

## 2024-11-14 RX ORDER — VITAMIN E (DL,TOCOPHERYL ACET) 22.5 MG/ML
100 DROPS ORAL DAILY
Status: DISCONTINUED | OUTPATIENT
Start: 2024-11-14 | End: 2024-11-14

## 2024-11-14 RX ORDER — GLUCAGON 1 MG
1 KIT INJECTION
Status: DISCONTINUED | OUTPATIENT
Start: 2024-11-14 | End: 2024-11-15 | Stop reason: HOSPADM

## 2024-11-14 RX ORDER — B-COMPLEX WITH VITAMIN C
1 TABLET ORAL DAILY
Status: DISCONTINUED | OUTPATIENT
Start: 2024-11-14 | End: 2024-11-15 | Stop reason: HOSPADM

## 2024-11-14 RX ORDER — IBUPROFEN 200 MG
16 TABLET ORAL
Status: DISCONTINUED | OUTPATIENT
Start: 2024-11-14 | End: 2024-11-15 | Stop reason: HOSPADM

## 2024-11-14 RX ORDER — IBUPROFEN 200 MG
24 TABLET ORAL
Status: DISCONTINUED | OUTPATIENT
Start: 2024-11-14 | End: 2024-11-15 | Stop reason: HOSPADM

## 2024-11-14 RX ORDER — SODIUM CHLORIDE 0.9 % (FLUSH) 0.9 %
10 SYRINGE (ML) INJECTION EVERY 12 HOURS PRN
Status: DISCONTINUED | OUTPATIENT
Start: 2024-11-14 | End: 2024-11-15 | Stop reason: HOSPADM

## 2024-11-14 RX ORDER — NALOXONE HCL 0.4 MG/ML
0.02 VIAL (ML) INJECTION
Status: DISCONTINUED | OUTPATIENT
Start: 2024-11-14 | End: 2024-11-15 | Stop reason: HOSPADM

## 2024-11-14 RX ADMIN — METOPROLOL TARTRATE 50 MG: 50 TABLET, FILM COATED ORAL at 07:11

## 2024-11-14 RX ADMIN — Medication 1 TABLET: at 08:11

## 2024-11-14 RX ADMIN — METOPROLOL TARTRATE 50 MG: 50 TABLET, FILM COATED ORAL at 08:11

## 2024-11-14 RX ADMIN — DIPHENHYDRAMINE HYDROCHLORIDE 25 MG: 25 CAPSULE ORAL at 09:11

## 2024-11-14 RX ADMIN — PANTOPRAZOLE SODIUM 40 MG: 20 TABLET, DELAYED RELEASE ORAL at 08:11

## 2024-11-14 RX ADMIN — APIXABAN 5 MG: 5 TABLET, FILM COATED ORAL at 08:11

## 2024-11-14 RX ADMIN — APIXABAN 5 MG: 5 TABLET, FILM COATED ORAL at 07:11

## 2024-11-14 RX ADMIN — IOHEXOL 75 ML: 350 INJECTION, SOLUTION INTRAVENOUS at 02:11

## 2024-11-14 RX ADMIN — Medication 500 ML: at 07:11

## 2024-11-14 RX ADMIN — Medication 400 UNITS: at 08:11

## 2024-11-14 RX ADMIN — ACETAMINOPHEN 650 MG: 325 TABLET ORAL at 07:11

## 2024-11-14 RX ADMIN — CHOLECALCIFEROL TAB 25 MCG (1000 UNIT) 1000 UNITS: 25 TAB at 08:11

## 2024-11-14 NOTE — ASSESSMENT & PLAN NOTE
73 y/o female with right eye blurriness now after painless vision loss at first.  Seen by ophthalmology and odes not think is CRAO but amaurosis fugax  CTA Head and neck no LVO  Antithrombotics: continue Eliquis  Statin: Lipitor 40 mg daily  Therapy OT for vision loss  VTE: SCD's and Eliquis  Risk factor management: HTN, AFIB  SBP: normotensive  Diagnostics: MRI brain, HgB A1C, lipids

## 2024-11-14 NOTE — PLAN OF CARE
[facility] (Hospital)  Initial Discharge Assessment       Primary Care Provider: Trisha Higginbotham MD    Admission Diagnosis: Dizziness [R42]  A-fib [I48.91]  AF (amaurosis fugax) [G45.3]  Blurry vision, right eye [H53.8]  Vision loss of right eye [H54.61]  Chest pain [R07.9]  Nonintractable headache, unspecified chronicity pattern, unspecified headache type [R51.9]    Admission Date: 11/13/2024  Expected Discharge Date:     Transition of Care Barriers: (P) None    Payor: [employer]ARE ADVANTAGE / Plan: [employer] MCARE ADV / Product Type: Medicare Advantage /     Extended Emergency Contact Information  Primary Emergency Contact: Janny Rod  [place] Phone: [phone]  Relation: Neighbor  Preferred language: English   needed? No  Secondary Emergency Contact: Leila Lawrecne   [place]  Mobile Phone: [phone]  Relation: Relative    Discharge Plan A: (P) Home  Discharge Plan B: (P) Home, Home Health      [facility] #[id number] [place] [address] AT [address] [address]  Phone: [phone] Fax: [phone]      Initial Assessment (most recent)       Adult Discharge Assessment - 11/14/24 1509          Discharge Assessment    Assessment Type Discharge Planning Assessment     Confirmed/corrected address, phone number and insurance Yes     Confirmed Demographics Correct on Facesheet     Source of Information patient     When was your last doctors appointment? 03/13/24     Does patient/caregiver understand observation status Yes     Communicated LISSY with patient/caregiver Yes     Reason For Admission right eye fugax     People in Home alone     Do you expect to return to your current living situation? Yes (P)      Do you have help at home or someone to help you manage your care at home? Yes (P)      Who are your caregiver(s) and their phone number(s)? susanne Soliman (P)      Prior to  hospitilization cognitive status: Alert/Oriented (P)      Current cognitive status: Alert/Oriented (P)      Walking or Climbing Stairs Difficulty yes (P)      Walking or Climbing Stairs ambulation difficulty, requires equipment (P)      Mobility Management cane in community only (P)      Dressing/Bathing Difficulty no (P)      Home Accessibility stairs to enter home (P)      Number of Stairs, Main Entrance four (P)      Stair Railings, Main Entrance railings safe and in good condition (P)      Landing, Stairs, Main Entrance adequate turning radius (P)      Home Layout Able to live on 1st floor (P)      Equipment Currently Used at Home cane, straight (P)      Readmission within 30 days? No (P)      Patient currently being followed by outpatient case management? No (P)      Do you currently have service(s) that help you manage your care at home? No (P)      Do you take prescription medications? Yes (P)      Do you have prescription coverage? Yes (P)      Coverage [employer] Medicare Advantage (P)      Do you have any problems affording any of your prescribed medications? No (P)      Is the patient taking medications as prescribed? yes (P)      Who is going to help you get home at discharge? Neighbor Janny or cousin Leila (P)      How do you get to doctors appointments? family or friend will provide (P)      Are you on dialysis? No (P)      Do you take coumadin? No (P)      Discharge Plan A Home (P)      Discharge Plan B Home;Home Health (P)      DME Needed Upon Discharge  none (P)      Discharge Plan discussed with: Patient (P)      Transition of Care Barriers None (P)         Physical Activity    On average, how many days per week do you engage in moderate to strenuous exercise (like a brisk walk)? 3 days (P)      On average, how many minutes do you engage in exercise at this level? 40 min (P)         Financial Resource Strain    How hard is it for you to pay for the very basics like food, housing, medical care, and  heating? Not hard at all (P)         Housing Stability    In the last 12 months, was there a time when you were not able to pay the mortgage or rent on time? No (P)      At any time in the past 12 months, were you homeless or living in a shelter (including now)? No (P)         Transportation Needs    Has the lack of transportation kept you from medical appointments, meetings, work or from getting things needed for daily living? No (P)         Food Insecurity    Within the past 12 months, you worried that your food would run out before you got the money to buy more. Never true (P)      Within the past 12 months, the food you bought just didn't last and you didn't have money to get more. Never true (P)         Stress    Do you feel stress - tense, restless, nervous, or anxious, or unable to sleep at night because your mind is troubled all the time - these days? Only a little (P)         Social Isolation    How often do you feel lonely or isolated from those around you?  Rarely (P)         Alcohol Use    Q1: How often do you have a drink containing alcohol? Monthly or less (P)      Q2: How many drinks containing alcohol do you have on a typical day when you are drinking? 1 or 2 (P)      Q3: How often do you have six or more drinks on one occasion? Never (P)         Your Office Agent    In the past 12 months has the electric, gas, oil, or water company threatened to shut off services in your home? No (P)         Health Literacy    How often do you need to have someone help you when you read instructions, pamphlets, or other written material from your doctor or pharmacy? Rarely (P)                  WILLIE met with patient at bedside.  She resides alone in a  raised home with 4 steps.  Patient uses a cane in the community, but is independent with ambulation and ADL's.  Patient does not have any HH needs and is not on HD or Coumadin.  Patient to discharge home when medically cleared.  Her neighbor Janny or her Cousin Leila can  provide support and transportation.      Discharge Plan A and Plan B have been determined by review of patient's clinical status, future medical and therapeutic needs, and coverage/benefits for post-acute care in coordination with multidisciplinary team members.    Melissa Gaston LMSW  [facility]  [phone]

## 2024-11-14 NOTE — ASSESSMENT & PLAN NOTE
Patients blood pressure range in the last 24 hours was: BP  Min: 138/66  Max: 164/75.The patient's inpatient anti-hypertensive regimen is listed below:  Current Antihypertensives  metoprolol tartrate (LOPRESSOR) tablet 50 mg, 2 times daily, Oral    Plan  - BP is controlled, no changes needed to their regimen

## 2024-11-14 NOTE — HPI
74F w/ hx of hypertension, hyperlipidemia, atrial fibrillation, thoracic aortic aneurysm, prior GI bleed, presenting to emergency department with complaint of episode of palpitations and painless vision loss of right eye. Patient was recently diagnosed with paroxysmal atrial fibrillation and is taking her blood thinner as directed.  She states she had an episode of afib today that lasted for about an hour, felt like palpitations and lightheadedness, and resolved spontaneously.  She then had another episode this evening around 7:00 p.m., that was accompanied by sudden onset painless vision loss in the right eye.  Patient states that the right eye went totally dark, and then vision gradually returned, and remained blurry for which she reported to the emergency room.    In the ED: afebrile, hemodynamically stable. She does not report any eye pain, though c/o of a mild headache.  No neck pain, chest pain, difficulty breathing, or loss of consciousness.  No speech changes, or focal numbness or weakness of the arms/legs or other focal neuro deficits. No acute abnormality on labs.  No leukocytosis or acute anemia.  No electrolyte disturbance or acute kidney injury.  Normal BNP and troponin. Patient seen by Ophthalmology who recommend hospital medicine admission with vascular Neurology consultation

## 2024-11-14 NOTE — CONSULTS
"[facility] - Neurosurgery ([facility])  Vascular Neurology  Comprehensive Stroke Center  Consult Note    Inpatient consult to Vascular (Stroke) Neurology  Consult performed by: Chio Hoskins NP  Consult ordered by: Naya Jaime MD        Assessment/Plan:     Patient is a 74 y.o. year old female with:    * Amaurosis fugax of right eye  73 y/o female with right eye blurriness now after painless vision loss at first.  Seen by ophthalmology and odes not think is CRAO but amaurosis fugax  CTA Head and neck no LVO  Antithrombotics: continue Eliquis  Statin: Lipitor 40 mg daily  Therapy OT for vision loss  VTE: SCD's and Eliquis  Risk factor management: HTN, AFIB  SBP: normotensive  Diagnostics: MRI brain, HgB A1C, lipids    Paroxysmal atrial fibrillation  Stroke risk factor  Rate control and anitcoagulation    Hypertension  Stroke risk factor  Normotensive    Hyperlipidemia  Stroke risk factor  LDL  Lipitor 40 mg daily        STROKE DOCUMENTATION     Acute Stroke Times   Unknown Normal Date: Unknown Date  Unknown Normal Time: Unknown Time (Reports "a couple of days")    NIH Scale:  1a. Level of Consciousness: 0-->Alert, keenly responsive  1b. LOC Questions: 0-->Answers both questions correctly  1c. LOC Commands: 0-->Performs both tasks correctly  2. Best Gaze: 0-->Normal  3. Visual: 1-->Partial hemianopia  4. Facial Palsy: 0-->Normal symmetrical movements  5a. Motor Arm, Left: 0-->No drift, limb holds 90 (or 45) degrees for full 10 secs  5b. Motor Arm, Right: 0-->No drift, limb holds 90 (or 45) degrees for full 10 secs  6a. Motor Leg, Left: 0-->No drift, leg holds 30 degree position for full 5 secs  6b. Motor Leg, Right: 0-->No drift, leg holds 30 degree position for full 5 secs  7. Limb Ataxia: 0-->Absent  8. Sensory: 0-->Normal, no sensory loss  9. Best Language: 0-->No aphasia, normal  10. Dysarthria: 0-->Normal  11. Extinction and Inattention (formerly Neglect): 0-->No abnormality  Total (NIH Stroke Scale): " "1    Modified Josh Score: 0  Roderick Coma Scale:15   ABCD2 Score:    [id number] Score:6  HAS -BLED Score:   ICH Score:   Hunt & العراقي Classification:       Thrombolysis Candidate? No, Out of window - Symptom onset > 4.5 hours    Delays to Thrombolysis?  Not Applicable    Interventional Revascularization Candidate?   Is the patient eligible for mechanical endovascular reperfusion (ANGELIC)?  No; suspected/known chronic occlusion    Delays to Thrombectomy? Not Applicable    Hemorrhagic change of an Ischemic Stroke: Does this patient have an ischemic stroke with hemorrhagic changes? No     Subjective:     History of Present Illness:  Windy Lindo is a 74 y.o. female with PMH of HTN, HLD, atrial fibrillation (on eliquis), osteoporosis, OA, thoracic aortic aneurysm, who presents to [facility] [place]  for sudden onset, painless vision loss of the right eye which started at 6:30 pm 11/13/24. Pt states she noticed suddenly within seconds a change in her vision of the right eye where "it went completely dark" and seconds later, it improved to "generalized fogginess". This foggy vision has been stable since her episode earlier this night and has not improved. Notably, pt was diagnosed with Afib in the past few months, reports multiple episodes of "dizziness" but without vision changes mostly. There was only one episode which involved vision changes bilaterally 2 weeks ago, where she experienced a similar sudden, painless, darkening of vision in both eyes which resolved over a few seconds. She says prior to the episode tonight, her vision was at baseline. Reports intermittent right jaw and neck pain for past week (not associated with chewing), denies fevers, weight loss, proximal muscle stiffness, joint pain, temporal headaches, scalp tenderness, pain in eye or with EOMs, diplopia, flashes, floaters, or curtain-veil in visual field ou. Denies any ocular discomfort ou. Pt reports compliance with eliquis.   Patient also has " mild headache  Patient evaluated by Ophthalmology  CTA Head and neck 1. No acute abnormality. No high-grade stenosis or major vessel occlusion.  2. Asymmetric narrowing of the left transverse sinus and sigmoid sinus, possibly representing congenital narrowing.  3. General cerebral volume loss and microvascular ischemic change.          Past Medical History:   Diagnosis Date    Arthritis     Chronic a-fib     Eye injury 4 yrs    hit od    Hypertension 04/04/2023    Nuclear sclerosis, bilateral 02/13/2019    Osteoporosis      Past Surgical History:   Procedure Laterality Date    APPENDECTOMY      COLONOSCOPY N/A 9/7/2017    Procedure: COLONOSCOPY;  Surgeon: Albino Fenton MD;  [facility]  Service: Endoscopy;  Laterality: N/A;    COLONOSCOPY N/A 10/9/2024    Procedure: COLONOSCOPY;  Surgeon: Albino Fenton MD;  Location: [facility] ENDOSCOPY;  Service: Endoscopy;  Laterality: N/A;    ESOPHAGOGASTRODUODENOSCOPY  09/07/2017    ESOPHAGOGASTRODUODENOSCOPY N/A 10/9/2024    Procedure: EGD (ESOPHAGOGASTRODUODENOSCOPY);  Surgeon: Albino Fenton MD;  Location: [facility] ENDOSCOPY;  Service: Endoscopy;  Laterality: N/A;  Ref By:solange Jaramillo suprep.  10/1/24- pc complete. DBM    KNEE SURGERY Right 12/05/2017    TKR    LASIK  7 yrs    ou    TONSILLECTOMY       Social History     Tobacco Use    Smoking status: Never    Smokeless tobacco: Never   Substance Use Topics    Alcohol use: Not Currently     Alcohol/week: 1.0 standard drink of alcohol     Types: 1 Glasses of wine per week     Comment: glass of wine a night     Drug use: No     Review of patient's allergies indicates:  No Known Allergies    Medications: I have reviewed the current medication administration record.    (Not in a hospital admission)      Review of Systems   Constitutional:  Negative for chills and fever.   HENT:  Negative for ear discharge and ear pain.    Eyes:  Positive for visual disturbance.   Respiratory:  Negative for cough  and shortness of breath.    Cardiovascular:  Negative for chest pain and leg swelling.   Gastrointestinal:  Negative for abdominal distention and abdominal pain.   Endocrine: Negative for cold intolerance and heat intolerance.   Genitourinary:  Negative for dysuria and enuresis.   Musculoskeletal:  Negative for arthralgias.   Skin:  Negative for rash and wound.   Allergic/Immunologic: Positive for immunocompromised state. Negative for environmental allergies and food allergies.   Neurological:  Negative for speech difficulty and weakness.   Hematological:  Bruises/bleeds easily.   Psychiatric/Behavioral:  Negative for agitation and confusion.      Objective:     Vital Signs (Most Recent):  Temp: 98.7 °F (37.1 °C) (11/14/24 0159)  Pulse: 69 (11/14/24 0316)  Resp: 18 (11/14/24 0316)  BP: (!) 146/74 (11/14/24 0301)  SpO2: 99 % (11/14/24 0316)    Vital Signs Range (Last 24H):  Temp:  [97.7 °F (36.5 °C)-99.1 °F (37.3 °C)]   Pulse:  [67-76]   Resp:  [15-22]   BP: (138-164)/(66-86)   SpO2:  [98 %-100 %]        Physical Exam  Vitals and nursing note reviewed.   Constitutional:       Appearance: Normal appearance.   HENT:      Head: Normocephalic and atraumatic.   Eyes:      Extraocular Movements: Extraocular movements intact.      Conjunctiva/sclera: Conjunctivae normal.      Pupils: Pupils are equal, round, and reactive to light.      Comments: Blurry vision right eye   Cardiovascular:      Rate and Rhythm: Normal rate and regular rhythm.   Pulmonary:      Effort: Pulmonary effort is normal.      Breath sounds: Normal breath sounds.   Abdominal:      General: Abdomen is flat. Bowel sounds are normal.      Palpations: Abdomen is soft.   Musculoskeletal:         General: Normal range of motion.      Cervical back: Normal range of motion.   Skin:     General: Skin is warm and dry.   Neurological:      General: No focal deficit present.      Mental Status: She is alert and oriented to person, place, and time.      Sensory:  "Sensory deficit present.      Motor: Weakness present.   Psychiatric:         Mood and Affect: Mood normal.         Behavior: Behavior normal.              Neurological Exam:   LOC: alert  Attention Span: Good   Language: No aphasia  Articulation: No dysarthria  Orientation: Person, Place, Time   Visual Fields: Full  EOM (CN III, IV, VI): Full/intact  Pupils (CN II, III): PERRL  Facial Sensation (CN V): Normal  Facial Movement (CN VII): Symmetric facial expression    Gag Reflex: present  Reflexes: flexor plantar responses bilaterally  Motor: Arm left  Normal 5/5  Leg left  Normal 5/5  Arm right  Normal 5/5  Leg right Normal 5/5  Cerebellum: No evidence of appendicular or axial ataxia  Sensation: Intact to light touch, temperature and vibration  Tone: Normal tone throughout      Laboratory:  CMP:   Recent Labs   Lab 11/14/24  0104   CALCIUM 9.8   ALBUMIN 3.4*   PROT 6.4      K 4.2   CO2 25      BUN 24*   CREATININE 0.8   ALKPHOS 63   ALT 7*   AST 15   BILITOT 0.2     CBC:   Recent Labs   Lab 11/14/24  0104   WBC 9.13   RBC 4.22   HGB 12.5   HCT 38.6      MCV 92   MCH 29.6   MCHC 32.4     Lipid Panel: No results for input(s): "CHOL", "LDLCALC", "HDL", "TRIG" in the last 168 hours.  Coagulation: No results for input(s): "PT", "INR", "APTT" in the last 168 hours.  Hgb A1C: No results for input(s): "HGBA1C" in the last 168 hours.  TSH:   Recent Labs   Lab 11/13/24  2317   TSH 2.100       Diagnostic Results:      Brain imaging:      Vessel Imaging:  CTA Head and neck 11-13-24 results:  1. No acute abnormality. No high-grade stenosis or major vessel occlusion.  2. Asymmetric narrowing of the left transverse sinus and sigmoid sinus, possibly representing congenital narrowing.  3. General cerebral volume loss and microvascular ischemic change.    Cardiac Evaluation:   EKG 11-13-24 result:  Normal sinus rhythm      Chio Hoskins NP  [facility] St[facility]  Department of Vascular Neurology   [address] - Neurosurgery ([facility])

## 2024-11-14 NOTE — FIRST PROVIDER EVALUATION
"Medical screening examination initiated.  I have conducted a focused provider triage encounter, findings are as follows:    Brief history of present illness:  Patient presents with 1 hour of dizziness when eyes are closed, and blurry vision in right eye. Patient taking eliquis for a-fib.    Vitals:    11/13/24 2126   BP: (!) 158/86   Pulse: 76   Resp: 18   Temp: 97.7 °F (36.5 °C)   TempSrc: Oral   SpO2: 98%   Weight: 72.6 kg (160 lb)   Height: 5' 4" (1.626 m)       Pertinent physical exam:      Alert and oriented to person place and time.  CN 2-12 intact bilaterally.  Sensation is equal and intact in all extremities.  Strength:   -No drift in any extremity.    EOMI. Pupils equal, round, reactive to light.  Visual fields intact bilaterally.    No extremity ataxia.    Brief workup plan:  Normal initial neuro exam, no code stroke. Labs, CT head ordered.    Preliminary workup initiated; this workup will be continued and followed by the physician or advanced practice provider that is assigned to the patient when roomed.  " Product 2 Application Directions: Apply to entire face every morning, after applying medications. Product 34 Units: 0 Name Of Product 4: UV SPORT Product 14 Price (In Dollars - Numeric Only, No Special Characters Or $): 0.00 Product 1 Units: 1 Detail Level: Zone Name Of Product 3: UV Daily Render Product Pricing In Note: No Assigning Risk Information: Per AMA, level of risk is based upon consequences of the problem(s) addressed at the encounter when appropriately treated. Risk also includes medical decision making related to the need to initiate or forego further testing, treatment and/or hospitalization. Over the counter medication are assigned a risk level of low. Prescription medication management is assigned a risk level of moderate. Risk Of Complication Category: Low (OTC Medications) Name Of Product 2: UV Clear (untinted) Allow Plan To Count Towards E/M Coding: Yes Name Of Product 1: UV Clear tinted

## 2024-11-14 NOTE — ASSESSMENT & PLAN NOTE
Patient has paroxysmal atrial fibrillation. Patient is currently in sinus rhythm. [id number] Score: 3. The patients heart rate in the last 24 hours is as follows:  Pulse  Min: 67  Max: 76     Antiarrhythmics  metoprolol tartrate (LOPRESSOR) tablet 50 mg, 2 times daily, Oral    Anticoagulants  apixaban tablet 5 mg, 2 times daily, Oral    Plan  - Replete lytes with a goal of K>4, Mg >2  - Patient is anticoagulated, see medications listed above.  - Patient's afib is currently controlled

## 2024-11-14 NOTE — ED NOTES
Telemetry Verification   Patient placed on Telemetry Box  Verified with War Room  Box # [id number]   Monitor Tech WARROOM   Rate 75   Rhythm NS

## 2024-11-14 NOTE — HPI
"Windy Lindo is a 74 y.o. female with PMH of HTN, HLD, atrial fibrillation (on eliquis), osteoporosis, OA, thoracic aortic aneurysm, who presents to [facility] [place]  for sudden onset, painless vision loss of the right eye which started at 6:30 pm 11/13/24. Pt states she noticed suddenly within seconds a change in her vision of the right eye where "it went completely dark" and seconds later, it improved to "generalized fogginess". This foggy vision has been stable since her episode earlier this night and has not improved. Notably, pt was diagnosed with Afib in the past few months, reports multiple episodes of "dizziness" but without vision changes mostly. There was only one episode which involved vision changes bilaterally 2 weeks ago, where she experienced a similar sudden, painless, darkening of vision in both eyes which resolved over a few seconds. She says prior to the episode tonight, her vision was at baseline. Reports intermittent right jaw and neck pain for past week (not associated with chewing), denies fevers, weight loss, proximal muscle stiffness, joint pain, temporal headaches, scalp tenderness, pain in eye or with EOMs, diplopia, flashes, floaters, or curtain-veil in visual field ou. Denies any ocular discomfort ou. Pt reports compliance with eliquis.   Patient also has mild headache  Patient evaluated by Ophthalmology  CTA Head and neck 1. No acute abnormality. No high-grade stenosis or major vessel occlusion.  2. Asymmetric narrowing of the left transverse sinus and sigmoid sinus, possibly representing congenital narrowing.  3. General cerebral volume loss and microvascular ischemic change.  "

## 2024-11-14 NOTE — SUBJECTIVE & OBJECTIVE
Past Medical History:   Diagnosis Date    Arthritis     Chronic a-fib     Eye injury 4 yrs    hit od    Hypertension 04/04/2023    Nuclear sclerosis, bilateral 02/13/2019    Osteoporosis        Past Surgical History:   Procedure Laterality Date    APPENDECTOMY      COLONOSCOPY N/A 9/7/2017    Procedure: COLONOSCOPY;  Surgeon: Albino Fenton MD;  Location: [address]);  Service: Endoscopy;  Laterality: N/A;    COLONOSCOPY N/A 10/9/2024    Procedure: COLONOSCOPY;  Surgeon: Albino Fenton MD;  Location: [facility] ENDOSCOPY;  Service: Endoscopy;  Laterality: N/A;    ESOPHAGOGASTRODUODENOSCOPY  09/07/2017    ESOPHAGOGASTRODUODENOSCOPY N/A 10/9/2024    Procedure: EGD (ESOPHAGOGASTRODUODENOSCOPY);  Surgeon: Albino Fenton MD;  Location: [facility] ENDOSCOPY;  Service: Endoscopy;  Laterality: N/A;  Ref By:solange Jaramillo suprep.  10/1/24- pc complete. DBM    KNEE SURGERY Right 12/05/2017    TKR    LASIK  7 yrs    ou    TONSILLECTOMY         Review of patient's allergies indicates:  No Known Allergies    No current facility-administered medications on file prior to encounter.     Current Outpatient Medications on File Prior to Encounter   Medication Sig    acetaminophen (TYLENOL) 650 MG TbSR Take 1 tablet (650 mg total) by mouth every 6 (six) hours as needed.    apixaban (ELIQUIS) 5 mg Tab Take 1 tablet (5 mg total) by mouth 2 (two) times daily.    b complex vitamins capsule Take 1 capsule by mouth once daily.    denosumab (PROLIA) 60 mg/mL Syrg Inject 60 mg into the skin every 6 (six) months.    metoprolol tartrate (LOPRESSOR) 25 MG tablet Take 2 tablets (50 mg total) by mouth 2 (two) times daily.    multivit-min-FA-lycopen-lutein 0.4-300-250 mg-mcg-mcg Tab Take 1 tablet by mouth once daily.    pantoprazole (PROTONIX) 40 MG tablet Take 1 tablet (40 mg total) by mouth once daily.    vitamin D (VITAMIN D3) 1000 units Tab Take 1,000 Units by mouth once daily.    vitamin E 100 UNIT capsule Take 100 Units by mouth  once daily.     Family History       Problem Relation (Age of Onset)    Cancer Mother (74), Brother (66)    Cataracts Mother    Diabetes Mother, Brother    Glaucoma Mother    Heart disease Mother (55), Father    Hypertension Mother, Father    No Known Problems Sister, Maternal Aunt, Maternal Uncle, Paternal Aunt, Paternal Uncle, Maternal Grandmother, Maternal Grandfather, Paternal Grandmother, Paternal Grandfather, Other          Tobacco Use    Smoking status: Never    Smokeless tobacco: Never   Substance and Sexual Activity    Alcohol use: Not Currently     Alcohol/week: 1.0 standard drink of alcohol     Types: 1 Glasses of wine per week     Comment: glass of wine a night     Drug use: No    Sexual activity: Never     Review of Systems  See HPI    Objective:     Vital Signs (Most Recent):  Temp: 98.7 °F (37.1 °C) (11/14/24 0159)  Pulse: 69 (11/14/24 0316)  Resp: 18 (11/14/24 0316)  BP: (!) 146/74 (11/14/24 0301)  SpO2: 99 % (11/14/24 0316) Vital Signs (24h Range):  Temp:  [97.7 °F (36.5 °C)-99.1 °F (37.3 °C)] 98.7 °F (37.1 °C)  Pulse:  [67-76] 69  Resp:  [15-22] 18  SpO2:  [98 %-100 %] 99 %  BP: (138-164)/(66-86) 146/74     Weight: 72.6 kg (160 lb)  Body mass index is 27.46 kg/m².     Physical Exam  Constitutional:       Appearance: Normal appearance.   HENT:      Head: Normocephalic and atraumatic.   Eyes:      Extraocular Movements: Extraocular movements intact.      Conjunctiva/sclera: Conjunctivae normal.      Pupils: Pupils are equal, round, and reactive to light.      Comments: Blurry vision right eye   Cardiovascular:      Rate and Rhythm: Normal rate and regular rhythm.   Pulmonary:      Effort: Pulmonary effort is normal.      Breath sounds: Normal breath sounds.   Abdominal:      General: Abdomen is flat. Bowel sounds are normal.      Palpations: Abdomen is soft.   Musculoskeletal:         General: Normal range of motion.      Cervical back: Normal range of motion.   Skin:     General: Skin is warm and  dry.   Neurological:      General: No focal deficit present.      Mental Status: She is alert and oriented to person, place, and time.      Sensory: Sensory deficit present.      Motor: Weakness present.   Psychiatric:         Mood and Affect: Mood normal.         Behavior: Behavior normal.              CRANIAL NERVES     CN III, IV, VI   Pupils are equal, round, and reactive to light.       Significant Labs: All pertinent labs within the past 24 hours have been reviewed.    Significant Imaging: I have reviewed all pertinent imaging results/findings within the past 24 hours.

## 2024-11-14 NOTE — PHARMACY MED REC
"    Admission Medication History     The home medication history was taken by Aisha Rocha.    You may go to "Admission" then "Reconcile Home Medications" tabs to review and/or act upon these items.     The home medication list has been updated by the Pharmacy department.   Please read ALL comments highlighted in yellow.   Please address this information as you see fit.    Feel free to contact us if you have any questions or require assistance.      Medications listed below were obtained from: Patient/family and Analytic software- DocDep Medications   Medication Sig     Acetaminophen (Tylenol) 650 Mg Tbsr  Take 1 Tablet (650 Mg Total) By Mouth Every 6 (Six) Hours As Needed.        Apixaban (Eliquis) 5 Mg Tab  Take 1 Tablet (5 Mg Total) By Mouth 2 (Two) Times Daily.        B Complex Vitamins Capsule  Take 1 Capsule By Mouth Once Daily.       Denosumab (Prolia) 60 Mg/Ml Syrg  Inject 60 Mg Into The Skin Every 6 (Six) Months.       Metoprolol Tartrate (Lopressor) 25 Mg Tablet  Take 2 Tablets (50 Mg Total) By Mouth 2 (Two) Times Daily.       Multivit-Min-Fa-Lycopen-Lutein 0.4-300-250 Mg-Mcg Tab  Take 1 Tablet By Mouth Once Daily.       Pantoprazole (Protonix) 40 Mg Tablet  Take 1 Tablet (40 Mg Total) By Mouth Once Daily.         Vitamin D (Vitamin D3) 1000 Units Tab  Take 1,000 Units By Mouth Once Daily.        Vitamin E 100 Unit Capsule  Take 100 Units By Mouth Once Daily.         Aisha Rocha  [id number]        .          "

## 2024-11-14 NOTE — ED PROVIDER NOTES
"Source of History:  Patient  Chart    Chief complaint:  Dizziness (Dizziness when closing eyes and "wavy" vision x 1 hour, peripheral vision intact, no other neuro deficits)      HPI:  Windy Lindo is a 74 y.o. female with history of hypertension, hyperlipidemia, atrial fibrillation, thoracic aortic aneurysm, prior GI bleed, presenting to emergency department with complaint of episode of palpitations and painless vision loss of right eye.      Patient states that she was recently diagnosed with paroxysmal atrial fibrillation and is taking her blood thinner as directed.  Since she was started on medication, she was only had a couple episodes of atrial fibrillation.  She had an episode today that lasted for about an hour, felt like palpitations and lightheadedness, and resolved spontaneously.  She had another episode this evening around 7:00 p.m., that was accompanied by sudden onset painless vision loss in the right eye.  Patient states that the right eye went totally dark, and then vision gradually returned, and remained blurry.  There is no eye pain.  She does complain of mild headache.  No neck pain, chest pain, difficulty breathing, or loss of consciousness.  No speech changes, or focal numbness or weakness of the arms or legs.    Review of patient's allergies indicates:  No Known Allergies    No current facility-administered medications on file prior to encounter.     Current Outpatient Medications on File Prior to Encounter   Medication Sig Dispense Refill    acetaminophen (TYLENOL) 650 MG TbSR Take 1 tablet (650 mg total) by mouth every 6 (six) hours as needed. 20 tablet 0    apixaban (ELIQUIS) 5 mg Tab Take 1 tablet (5 mg total) by mouth 2 (two) times daily. 60 tablet 2    b complex vitamins capsule Take 1 capsule by mouth once daily.      denosumab (PROLIA) 60 mg/mL Syrg Inject 60 mg into the skin every 6 (six) months.      metoprolol tartrate (LOPRESSOR) 25 MG tablet Take 2 tablets (50 mg total) by mouth " 2 (two) times daily.      multivit-min-FA-lycopen-lutein 0.4-300-250 mg-mcg-mcg Tab Take 1 tablet by mouth once daily.      pantoprazole (PROTONIX) 40 MG tablet Take 1 tablet (40 mg total) by mouth once daily. 90 tablet 3    vitamin D (VITAMIN D3) 1000 units Tab Take 1,000 Units by mouth once daily.      vitamin E 100 UNIT capsule Take 100 Units by mouth once daily.         PMH:  As per HPI and below:  Past Medical History:   Diagnosis Date    Arthritis     Chronic a-fib     Eye injury 4 yrs    hit od    Hypertension 04/04/2023    Nuclear sclerosis, bilateral 02/13/2019    Osteoporosis      Past Surgical History:   Procedure Laterality Date    APPENDECTOMY      COLONOSCOPY N/A 9/7/2017    Procedure: COLONOSCOPY;  Surgeon: Albino Fenton MD;  Location: [address]);  Service: Endoscopy;  Laterality: N/A;    COLONOSCOPY N/A 10/9/2024    Procedure: COLONOSCOPY;  Surgeon: Albino Fenton MD;  [facility];  Service: Endoscopy;  Laterality: N/A;    ESOPHAGOGASTRODUODENOSCOPY  09/07/2017    ESOPHAGOGASTRODUODENOSCOPY N/A 10/9/2024    Procedure: EGD (ESOPHAGOGASTRODUODENOSCOPY);  Surgeon: Albino Fenton MD;  [facility];  Service: Endoscopy;  Laterality: N/A;  Ref By:solange Jaramillo suprep.  10/1/24- pc complete. DBM    KNEE SURGERY Right 12/05/2017    TKR    LASIK  7 yrs    ou    TONSILLECTOMY         Social History     Socioeconomic History    Marital status: Single   Occupational History     Employer: [employer] [place]   Tobacco Use    Smoking status: Never    Smokeless tobacco: Never   Substance and Sexual Activity    Alcohol use: Not Currently     Alcohol/week: 1.0 standard drink of alcohol     Types: 1 Glasses of wine per week     Comment: glass of wine a night     Drug use: No    Sexual activity: Never     Social Drivers of Health     Financial Resource Strain: Low Risk  (10/9/2024)    Overall Financial Resource Strain (CARDIA)     Difficulty of Paying Living Expenses: Not hard at  all   Food Insecurity: No Food Insecurity (10/9/2024)    Hunger Vital Sign     Worried About Running Out of Food in the Last Year: Never true     Ran Out of Food in the Last Year: Never true   Transportation Needs: No Transportation Needs (10/9/2024)    TRANSPORTATION NEEDS     Transportation : No   Physical Activity: Sufficiently Active (4/15/2024)    Exercise Vital Sign     Days of Exercise per Week: 4 days     Minutes of Exercise per Session: 40 min   Recent Concern: Physical Activity - Insufficiently Active (1/26/2024)    Exercise Vital Sign     Days of Exercise per Week: 5 days     Minutes of Exercise per Session: 20 min   Stress: No Stress Concern Present (10/9/2024)    [employer] - Occupational Stress Questionnaire     Feeling of Stress : Not at all   Housing Stability: Low Risk  (10/9/2024)    Housing Stability Vital Sign     Unable to Pay for Housing in the Last Year: No     Homeless in the Last Year: No       Family History   Problem Relation Name Age of Onset    Hypertension Mother      Glaucoma Mother      Diabetes Mother      Cataracts Mother      Cancer Mother  74        lung    Heart disease Mother  55    Hypertension Father      Heart disease Father          onset early 60;s, Aortic valve replacement ,Rheumatic fever as a child     No Known Problems Sister      Diabetes Brother      Cancer Brother  66        mesothelioma    No Known Problems Maternal Aunt      No Known Problems Maternal Uncle      No Known Problems Paternal Aunt      No Known Problems Paternal Uncle      No Known Problems Maternal Grandmother      No Known Problems Maternal Grandfather      No Known Problems Paternal Grandmother      No Known Problems Paternal Grandfather      No Known Problems Other      Amblyopia Neg Hx      Blindness Neg Hx      Macular degeneration Neg Hx      Retinal detachment Neg Hx      Strabismus Neg Hx      Stroke Neg Hx      Thyroid disease Neg Hx      Melanoma Neg Hx          Physical Exam:      Vitals:    11/14/24 0316   BP:    Pulse: 69   Resp: 18   Temp:      Gen: No acute distress.  Nontoxic.  Well appearing.  Mental Status:  Alert and oriented .  Appropriate, conversant.  Skin: Warm, dry. No rashes seen.  Eyes: No conjunctival injection.  PERRL, EOMI  No fluorescein uptake on right cornea.  Intra-ocular pressure is 10.  Pulm: CTAB. No increased work of breathing.  No significant tachypnea.  No audible stridor or wheezing.  No conversational dyspnea.    CV: Regular rate. Regular rhythm.   Abd: Soft.  Not distended.  Nontender.   MSK: Good range of motion all joints.  No deformities.    Neuro: Awake. Speech normal. No focal neuro deficit observed, except inability to lift left leg off of the bed secondary to problem with left knee (chronic).       Laboratory Studies:  Labs Reviewed   COMPREHENSIVE METABOLIC PANEL - Abnormal       Result Value    Sodium 139      Potassium 4.2      Chloride 108      CO2 25      Glucose 101      BUN 24 (*)     Creatinine 0.8      Calcium 9.8      Total Protein 6.4      Albumin 3.4 (*)     Total Bilirubin 0.2      Alkaline Phosphatase 63      AST 15      ALT 7 (*)     eGFR >60.0      Anion Gap 6 (*)     Narrative:     ADD ON CRP PER DR ALEXANDRA MCCARTHY/ORDER# [id number] @ 1:11AM   B-TYPE NATRIURETIC PEPTIDE    BNP 11     MAGNESIUM    Magnesium 2.0     TROPONIN I    Troponin I <0.006     TSH    TSH 2.100     CBC W/ AUTO DIFFERENTIAL    WBC 9.13      RBC 4.22      Hemoglobin 12.5      Hematocrit 38.6      MCV 92      MCH 29.6      MCHC 32.4      RDW 14.5      Platelets 177      MPV 12.5      Immature Granulocytes 0.3      Gran # (ANC) 6.2      Immature Grans (Abs) 0.03      Lymph # 2.1      Mono # 0.8      Eos # 0.1      Baso # 0.04      nRBC 0      Gran % 67.6      Lymph % 22.7      Mono % 8.2      Eosinophil % 0.8      Basophil % 0.4      Differential Method Automated     SEDIMENTATION RATE   C-REACTIVE PROTEIN   C-REACTIVE PROTEIN    CRP 2.3       Narrative:     ADD ON CRP PER DR ALEXADNRA MCCARTHY/ORDER# [id number] @ 1:11AM   CBC W/ AUTO DIFFERENTIAL   COMPREHENSIVE METABOLIC PANEL   SEDIMENTATION RATE   POCT GLUCOSE MONITORING CONTINUOUS   ISTAT CHEM8       EKG (independently interpreted by me):  Normal sinus rhythm, rate 70.  No STEMI.  QRS 86 milliseconds.   milliseconds.    Chart reviewed.     Imaging Results              CTA Head and Neck (xpd) (In process)                     Medications Given:  Medications   fluorescein ophthalmic strip 1 each (1 each Right Eye Given 11/13/24 2314)   proparacaine 0.5 % ophthalmic solution 1 drop (1 drop Right Eye Given 11/13/24 2314)   acetaminophen tablet 1,000 mg (1,000 mg Oral Given 11/13/24 2345)   iohexoL (OMNIPAQUE 350) injection 75 mL (75 mLs Intravenous Given 11/14/24 0231)       Discussed with:  Ophthalmology    MDM:    74 y.o. female with complaint of episode of palpitations, presumed to be episode of her paroxysmal atrial fibrillation, accompanied by sudden onset painless vision loss of right eye.      Here she was afebrile, hemodynamically stable.  No other focal neuro deficits, seen by prior team in triage, no stroke code.      Will obtain labs, EKG, and CTA head and neck.  Ophthalmology consulted.    Labs reviewed.  No acute abnormality.  No leukocytosis or acute anemia.  No electrolyte disturbance or acute kidney injury.  Normal BNP and troponin    Patient seen by Ophthalmology who recommend hospital medicine admission with vascular Neurology consultation, recommend TTE and MRI brain and orbits.  Case discussed with vascular Neurology who agrees with Hospital Medicine admission.  Case discussed with hospital medicine    Diagnostic Impression:    1. AF (amaurosis fugax)    2. Dizziness    3. Vision loss of right eye    4. Nonintractable headache, unspecified chronicity pattern, unspecified headache type         ED Disposition Condition    Observation Stable               Patient understands the plan  and is in agreement, verbalized understanding, questions answered    Naya Jaime MD  Emergency Medicine         Naya Jaime MD  11/14/24 1529

## 2024-11-14 NOTE — PLAN OF CARE
Problem: Adult Inpatient Plan of Care  Goal: Readiness for Transition of Care  Outcome: Progressing     Problem: Adult Inpatient Plan of Care  Goal: Optimal Comfort and Wellbeing  Outcome: Progressing   Plan of care discussed. Addressed questions and concerns. Pending test will cont to care.

## 2024-11-14 NOTE — H&P
"  [facility] - Emergency Dept  [facility] Medicine  History & Physical    Patient Name: Windy Lindo  MRN: [id number]  Patient Class: OP- Observation  Admission Date: 11/13/2024  Attending Physician: Renu De Paz MD   Primary Care Provider: Trisha Higginbotham MD         Patient information was obtained from patient and ER records.     Subjective:     Principal Problem:Amaurosis fugax of right eye    Chief Complaint:   Chief Complaint   Patient presents with    Dizziness     Dizziness when closing eyes and "wavy" vision x 1 hour, peripheral vision intact, no other neuro deficits        HPI: 74F w/ hx of hypertension, hyperlipidemia, atrial fibrillation, thoracic aortic aneurysm, prior GI bleed, presenting to emergency department with complaint of episode of palpitations and painless vision loss of right eye. Patient was recently diagnosed with paroxysmal atrial fibrillation and is taking her blood thinner as directed.  She states she had an episode of afib today that lasted for about an hour, felt like palpitations and lightheadedness, and resolved spontaneously.  She then had another episode this evening around 7:00 p.m., that was accompanied by sudden onset painless vision loss in the right eye.  Patient states that the right eye went totally dark, and then vision gradually returned, and remained blurry for which she reported to the emergency room.    In the ED: afebrile, hemodynamically stable. She does not report any eye pain, though c/o of a mild headache.  No neck pain, chest pain, difficulty breathing, or loss of consciousness.  No speech changes, or focal numbness or weakness of the arms/legs or other focal neuro deficits. No acute abnormality on labs.  No leukocytosis or acute anemia.  No electrolyte disturbance or acute kidney injury.  Normal BNP and troponin. Patient seen by Ophthalmology who recommend hospital medicine admission with vascular Neurology consultation    Past Medical History:   Diagnosis Date "    Arthritis     Chronic a-fib     Eye injury 4 yrs    hit od    Hypertension 04/04/2023    Nuclear sclerosis, bilateral 02/13/2019    Osteoporosis        Past Surgical History:   Procedure Laterality Date    APPENDECTOMY      COLONOSCOPY N/A 9/7/2017    Procedure: COLONOSCOPY;  Surgeon: Albino Fenton MD;  Location: [address]);  Service: Endoscopy;  Laterality: N/A;    COLONOSCOPY N/A 10/9/2024    Procedure: COLONOSCOPY;  Surgeon: Albino Fenton MD;  Location: [facility] ENDOSCOPY;  Service: Endoscopy;  Laterality: N/A;    ESOPHAGOGASTRODUODENOSCOPY  09/07/2017    ESOPHAGOGASTRODUODENOSCOPY N/A 10/9/2024    Procedure: EGD (ESOPHAGOGASTRODUODENOSCOPY);  Surgeon: Albino Fenton MD;  Location: [facility] ENDOSCOPY;  Service: Endoscopy;  Laterality: N/A;  Ref By:solange Jaramillo [email or web address].[email or web address]  10/1/24- pc complete. DBM    KNEE SURGERY Right 12/05/2017    TKR    LASIK  7 yrs    ou    TONSILLECTOMY         Review of patient's allergies indicates:  No Known Allergies    No current facility-administered medications on file prior to encounter.     Current Outpatient Medications on File Prior to Encounter   Medication Sig    acetaminophen (TYLENOL) 650 MG TbSR Take 1 tablet (650 mg total) by mouth every 6 (six) hours as needed.    apixaban (ELIQUIS) 5 mg Tab Take 1 tablet (5 mg total) by mouth 2 (two) times daily.    b complex vitamins capsule Take 1 capsule by mouth once daily.    denosumab (PROLIA) 60 mg/mL Syrg Inject 60 mg into the skin every 6 (six) months.    metoprolol tartrate (LOPRESSOR) 25 MG tablet Take 2 tablets (50 mg total) by mouth 2 (two) times daily.    multivit-min-FA-lycopen-lutein 0.4-300-250 mg-mcg-mcg Tab Take 1 tablet by mouth once daily.    pantoprazole (PROTONIX) 40 MG tablet Take 1 tablet (40 mg total) by mouth once daily.    vitamin D (VITAMIN D3) 1000 units Tab Take 1,000 Units by mouth once daily.    vitamin E 100 UNIT capsule Take 100 Units by mouth once daily.     Family History        Problem Relation (Age of Onset)    Cancer Mother (74), Brother (66)    Cataracts Mother    Diabetes Mother, Brother    Glaucoma Mother    Heart disease Mother (55), Father    Hypertension Mother, Father    No Known Problems Sister, Maternal Aunt, Maternal Uncle, Paternal Aunt, Paternal Uncle, Maternal Grandmother, Maternal Grandfather, Paternal Grandmother, Paternal Grandfather, Other          Tobacco Use    Smoking status: Never    Smokeless tobacco: Never   Substance and Sexual Activity    Alcohol use: Not Currently     Alcohol/week: 1.0 standard drink of alcohol     Types: 1 Glasses of wine per week     Comment: glass of wine a night     Drug use: No    Sexual activity: Never     Review of Systems  See HPI    Objective:     Vital Signs (Most Recent):  Temp: 98.7 °F (37.1 °C) (11/14/24 0159)  Pulse: 69 (11/14/24 0316)  Resp: 18 (11/14/24 0316)  BP: (!) 146/74 (11/14/24 0301)  SpO2: 99 % (11/14/24 0316) Vital Signs (24h Range):  Temp:  [97.7 °F (36.5 °C)-99.1 °F (37.3 °C)] 98.7 °F (37.1 °C)  Pulse:  [67-76] 69  Resp:  [15-22] 18  SpO2:  [98 %-100 %] 99 %  BP: (138-164)/(66-86) 146/74     Weight: 72.6 kg (160 lb)  Body mass index is 27.46 kg/m².     Physical Exam  Constitutional:       Appearance: Normal appearance.   HENT:      Head: Normocephalic and atraumatic.   Eyes:      Extraocular Movements: Extraocular movements intact.      Conjunctiva/sclera: Conjunctivae normal.      Pupils: Pupils are equal, round, and reactive to light.      Comments: Blurry vision right eye   Cardiovascular:      Rate and Rhythm: Normal rate and regular rhythm.   Pulmonary:      Effort: Pulmonary effort is normal.      Breath sounds: Normal breath sounds.   Abdominal:      General: Abdomen is flat. Bowel sounds are normal.      Palpations: Abdomen is soft.   Musculoskeletal:         General: Normal range of motion.      Cervical back: Normal range of motion.   Skin:     General: Skin is warm and dry.   Neurological:      General: No  focal deficit present.      Mental Status: She is alert and oriented to person, place, and time.      Sensory: Sensory deficit present.      Motor: Weakness present.   Psychiatric:         Mood and Affect: Mood normal.         Behavior: Behavior normal.              CRANIAL NERVES     CN III, IV, VI   Pupils are equal, round, and reactive to light.       Significant Labs: All pertinent labs within the past 24 hours have been reviewed.    Significant Imaging: I have reviewed all pertinent imaging results/findings within the past 24 hours.  Assessment/Plan:     * Amaurosis fugax of right eye  Presents for sudden and painless vision loss of right eye that which started the night of 11/13 and was associated w/ lightheadedness. Shortly after the episode, her vision improved to generalized fogginess which has been persistent though her admission in the ED.     Rpt also reports intermittent R nhi/neck pain for past week (no jaw claudication), no fevers, myalgias, arthraligias, headaches. No focal deficits upon examination. Low concern for GCA given stable CRP and platelets in ED. Ophthalmology evaluated pt in ED (eye exam findings can be found in there note).     Plan:   - ESR pending to r/o inflammatory etiology   - CTA Head/Neck to r/o stroke   - MRI Brain and Orbits pending   - Carotid Doppler U/S and TTE ordered   - Vascular Neuro consulted   - Consult Ophthalmology stat in the event of any sudden vision changes, will need outpatient follow up on discharge           Paroxysmal atrial fibrillation  Patient has paroxysmal atrial fibrillation. Patient is currently in sinus rhythm. [id number] Score: 3. The patients heart rate in the last 24 hours is as follows:  Pulse  Min: 67  Max: 76     Antiarrhythmics  metoprolol tartrate (LOPRESSOR) tablet 50 mg, 2 times daily, Oral    Anticoagulants  apixaban tablet 5 mg, 2 times daily, Oral    Plan  - Replete lytes with a goal of K>4, Mg >2  - Patient is anticoagulated, see  medications listed above.  - Patient's afib is currently controlled    Hypertension  Patients blood pressure range in the last 24 hours was: BP  Min: 138/66  Max: 164/75.The patient's inpatient anti-hypertensive regimen is listed below:  Current Antihypertensives  metoprolol tartrate (LOPRESSOR) tablet 50 mg, 2 times daily, Oral    Plan  - BP is controlled, no changes needed to their regimen      VTE Risk Mitigation (From admission, onward)           Ordered     apixaban tablet 5 mg  2 times daily         11/14/24 0348     IP VTE HIGH RISK PATIENT  Once         11/14/24 0346     Place sequential compression device  Until discontinued         11/14/24 0346                       Mainor Sepulveda MD  Department of Hospital Medicine  [facility] - Emergency Dept
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
## 2024-11-14 NOTE — ASSESSMENT & PLAN NOTE
Presents for sudden and painless vision loss of right eye that which started the night of 11/13 and was associated w/ lightheadedness. Shortly after the episode, her vision improved to generalized fogginess which has been persistent though her admission in the ED.     Rpt also reports intermittent R nhi/neck pain for past week (no jaw claudication), no fevers, myalgias, arthraligias, headaches. No focal deficits upon examination. Low concern for GCA given stable CRP and platelets in ED. Ophthalmology evaluated pt in ED (eye exam findings can be found in there note).     Plan:   - ESR pending to r/o inflammatory etiology   - CTA Head/Neck to r/o stroke   - MRI Brain and Orbits pending   - Carotid Doppler U/S and TTE ordered   - Vascular Neuro consulted   - Consult Ophthalmology stat in the event of any sudden vision changes, will need outpatient follow up on discharge

## 2024-11-14 NOTE — PLAN OF CARE
Inpatient Upgrade Note    Windy Lindo has warranted treatment spanning two or more midnights of hospital level care for the management of Amaurosis fugax of right eye . She continues to require daily labs, further testing/imaging, monitoring of vital signs, further evaluation by consultants, and neurochecks . Her condition is also complicated by the following comorbidities: Hypertension.

## 2024-11-14 NOTE — HOSPITAL COURSE
Admitted to [facility] [place] on 11/14/24 for vision loss in right eye. MRI brain/orbit did not reveal acute ischemic process or any evidence of damage to optic nerve. CTA head/neck with no LVO, no ICA stenosis. TTE with G1DD but otherwise normal EF. Ophthalmology evaluated, no increased IOP or CRAO. ESR and CRP normal, GCA additionally ruled out with normal temporal artery doppler.  Vascular Neurology reviewed above studies and determined no ischemic or neurologic process acutely occurring to explain visual symptoms.     Deemed medically stable for discharge 11/15/24 with Ophthalmology follow-up for cataract. Wrote new Rx for Metoprolol 50mg bid. Discussed with patient to begin taking this prescription once she runs out of her Metoprolol 25mg BID. Cardiology follow-up arranged. Advised to not drive. Dc with walker.    well appearing

## 2024-11-15 VITALS
RESPIRATION RATE: 16 BRPM | HEART RATE: 67 BPM | HEIGHT: 64 IN | BODY MASS INDEX: 27.1 KG/M2 | OXYGEN SATURATION: 95 % | SYSTOLIC BLOOD PRESSURE: 141 MMHG | WEIGHT: 158.75 LBS | TEMPERATURE: 99 F | DIASTOLIC BLOOD PRESSURE: 76 MMHG

## 2024-11-15 PROBLEM — I48.91 HYPERCOAGULABLE STATE DUE TO ATRIAL FIBRILLATION: Status: ACTIVE | Noted: 2024-11-15

## 2024-11-15 PROBLEM — K59.00 CONSTIPATION: Status: ACTIVE | Noted: 2024-11-15

## 2024-11-15 PROBLEM — D68.69 HYPERCOAGULABLE STATE DUE TO ATRIAL FIBRILLATION: Status: ACTIVE | Noted: 2024-11-15

## 2024-11-15 PROBLEM — I50.32 CHRONIC DIASTOLIC HEART FAILURE: Status: ACTIVE | Noted: 2024-11-15

## 2024-11-15 PROBLEM — Z79.01 CHRONIC ANTICOAGULATION: Status: ACTIVE | Noted: 2024-11-15

## 2024-11-15 LAB
ANION GAP SERPL CALC-SCNC: 10 MMOL/L (ref 8–16)
BUN SERPL-MCNC: 15 MG/DL (ref 8–23)
CALCIUM SERPL-MCNC: 9.7 MG/DL (ref 8.7–10.5)
CHLORIDE SERPL-SCNC: 105 MMOL/L (ref 95–110)
CO2 SERPL-SCNC: 24 MMOL/L (ref 23–29)
CREAT SERPL-MCNC: 0.8 MG/DL (ref 0.5–1.4)
ERYTHROCYTE [DISTWIDTH] IN BLOOD BY AUTOMATED COUNT: 14.5 % (ref 11.5–14.5)
EST. GFR  (NO RACE VARIABLE): >60 ML/MIN/1.73 M^2
GLUCOSE SERPL-MCNC: 89 MG/DL (ref 70–110)
HCT VFR BLD AUTO: 40.9 % (ref 37–48.5)
HGB BLD-MCNC: 13 G/DL (ref 12–16)
MAGNESIUM SERPL-MCNC: 2 MG/DL (ref 1.6–2.6)
MCH RBC QN AUTO: 28.9 PG (ref 27–31)
MCHC RBC AUTO-ENTMCNC: 31.8 G/DL (ref 32–36)
MCV RBC AUTO: 91 FL (ref 82–98)
PLATELET # BLD AUTO: 214 K/UL (ref 150–450)
PMV BLD AUTO: 13 FL (ref 9.2–12.9)
POTASSIUM SERPL-SCNC: 3.7 MMOL/L (ref 3.5–5.1)
RBC # BLD AUTO: 4.5 M/UL (ref 4–5.4)
SODIUM SERPL-SCNC: 139 MMOL/L (ref 136–145)
WBC # BLD AUTO: 7.05 K/UL (ref 3.9–12.7)

## 2024-11-15 PROCEDURE — 85027 COMPLETE CBC AUTOMATED: CPT | Performed by: STUDENT IN AN ORGANIZED HEALTH CARE EDUCATION/TRAINING PROGRAM

## 2024-11-15 PROCEDURE — 36415 COLL VENOUS BLD VENIPUNCTURE: CPT

## 2024-11-15 PROCEDURE — 25000003 PHARM REV CODE 250

## 2024-11-15 PROCEDURE — 97162 PT EVAL MOD COMPLEX 30 MIN: CPT

## 2024-11-15 PROCEDURE — 83735 ASSAY OF MAGNESIUM: CPT

## 2024-11-15 PROCEDURE — 97530 THERAPEUTIC ACTIVITIES: CPT

## 2024-11-15 PROCEDURE — 80048 BASIC METABOLIC PNL TOTAL CA: CPT | Performed by: STUDENT IN AN ORGANIZED HEALTH CARE EDUCATION/TRAINING PROGRAM

## 2024-11-15 RX ORDER — METOPROLOL TARTRATE 50 MG/1
50 TABLET ORAL 2 TIMES DAILY
Qty: 180 TABLET | Refills: 0 | Status: SHIPPED | OUTPATIENT
Start: 2024-11-15 | End: 2025-11-15

## 2024-11-15 RX ORDER — METOPROLOL TARTRATE 50 MG/1
50 TABLET ORAL 2 TIMES DAILY
Qty: 180 TABLET | Refills: 0 | Status: SHIPPED | OUTPATIENT
Start: 2024-11-15 | End: 2024-11-15

## 2024-11-15 RX ADMIN — ACETAMINOPHEN 650 MG: 325 TABLET ORAL at 04:11

## 2024-11-15 RX ADMIN — CHOLECALCIFEROL TAB 25 MCG (1000 UNIT) 1000 UNITS: 25 TAB at 08:11

## 2024-11-15 RX ADMIN — Medication 1 TABLET: at 08:11

## 2024-11-15 RX ADMIN — PANTOPRAZOLE SODIUM 40 MG: 20 TABLET, DELAYED RELEASE ORAL at 08:11

## 2024-11-15 RX ADMIN — APIXABAN 5 MG: 5 TABLET, FILM COATED ORAL at 08:11

## 2024-11-15 RX ADMIN — METOPROLOL TARTRATE 50 MG: 50 TABLET, FILM COATED ORAL at 08:11

## 2024-11-15 RX ADMIN — Medication 400 UNITS: at 08:11

## 2024-11-15 NOTE — CARE UPDATE
Unit [facility] Care Support Interaction      I have reviewed the chart of Windy Lindo who is hospitalized for Amaurosis fugax of right eye. The patient is currently located in the following unit: [facility]          I have seen  the patient and provided the following support:     Patient experience rounds - positive experience reported       Jennifer Montilla NP  Unit Based [facility]

## 2024-11-15 NOTE — DISCHARGE SUMMARY
[facility] Boyd - Neurosurgery ([facility])  [facility] Medicine  Discharge Summary      Patient Name: Windy Lindo  MRN: [id number]  [facility] ([facility]): [id number]  Patient Class: IP- Inpatient  Admission Date: 11/13/2024  Hospital Length of Stay: 1 days  Discharge Date and Time:  11/15/2024 11:32 AM  Attending Physician: Renu De Paz MD   Discharging Provider: Ivett Stephenson MD  Primary Care Provider: Trisha Higginbotham MD  [facility] Medicine Team: [facility] Ivett Stephenson MD  Primary Care Team: [facility]    HPI:   74F w/ hx of hypertension, hyperlipidemia, atrial fibrillation, thoracic aortic aneurysm, prior GI bleed, presenting to emergency department with complaint of episode of palpitations and painless vision loss of right eye. Patient was recently diagnosed with paroxysmal atrial fibrillation and is taking her blood thinner as directed.  She states she had an episode of afib today that lasted for about an hour, felt like palpitations and lightheadedness, and resolved spontaneously.  She then had another episode this evening around 7:00 p.m., that was accompanied by sudden onset painless vision loss in the right eye.  Patient states that the right eye went totally dark, and then vision gradually returned, and remained blurry for which she reported to the emergency room.    In the ED: afebrile, hemodynamically stable. She does not report any eye pain, though c/o of a mild headache.  No neck pain, chest pain, difficulty breathing, or loss of consciousness.  No speech changes, or focal numbness or weakness of the arms/legs or other focal neuro deficits. No acute abnormality on labs.  No leukocytosis or acute anemia.  No electrolyte disturbance or acute kidney injury.  Normal BNP and troponin. Patient seen by Ophthalmology who recommend hospital medicine admission with vascular Neurology consultation    * No surgery found *      Hospital Course:   [place] [facility] [place] for vision loss in right eye. MRI brain/orbit  did not reveal acute ischemic process or any evidence of damage to optic nerve. CTA head/neck with no LVO, no ICA stenosis. TTE with G1DD but otherwise normal EF. Ophthalmology evaluated, no increased IOP or CRAO. ESR and CRP normal, GCA additionally ruled out with normal temporal artery doppler.  Vascular Neurology reviewed above studies and determined no ischemic or neurologic process acutely occurring to explain visual symptoms.     Deemed medically stable for discharge 11/15/24 with Ophthalmology follow-up for cataract. Wrote new Rx for Metoprolol 50mg bid. Discussed with patient to begin taking this prescription once she runs out of her Metoprolol 25mg BID. Cardiology follow-up arranged. Advised to not drive. Dc with walker.      Goals of Care Treatment Preferences:  Code Status: Full Code    Physical Exam  Constitutional:       Appearance: Normal appearance.   HENT:      Head: Normocephalic and atraumatic.   Eyes:      Extraocular Movements: Extraocular movements intact.      Conjunctiva/sclera: Conjunctivae normal.      Pupils: Pupils are equal, round, and reactive to light.      Comments: Blurry vision right eye   Cardiovascular:      Rate and Rhythm: Normal rate and regular rhythm.   Pulmonary:      Effort: Pulmonary effort is normal.      Breath sounds: Normal breath sounds.   Abdominal:      General: Abdomen is flat. Bowel sounds are normal.      Palpations: Abdomen is soft.   Musculoskeletal:         General: Normal range of motion.      Cervical back: Normal range of motion.   Skin:     General: Skin is warm and dry.   Neurological:      Mental Status: She is alert and oriented to person, place, and time. 5/5 strength all 4 extremities  Psychiatric:         Mood and Affect: Mood normal.         Behavior: Behavior normal.       SDOH Screening:  The patient was screened for utility difficulties, food insecurity, transport difficulties, housing insecurity, and interpersonal safety and there were no  concerns identified this admission.     Consults:   Consults (From admission, onward)          Status Ordering Provider     Inpatient consult to Vascular (Stroke) Neurology  Once        Provider:  (Not yet assigned)    Completed ALEXANDRA MCCARTHY     Inpatient consult to Ophthalmology  Once        Provider:  (Not yet assigned)    Completed ALEXANDRA MCCARTHY            Ophtho  * Vision loss of right eye  Presents for sudden and painless vision loss of right eye that which started the night of 11/13 and was associated w/ lightheadedness. Shortly after the episode, her vision improved to generalized fogginess which has been persistent though her admission in the ED.     Rpt also reports intermittent R nhi/neck pain for past week (no jaw claudication), no fevers, myalgias, arthraligias, headaches. No focal deficits upon examination. Low concern for GCA given normal CRP and ESR. Ophthalmology evaluated pt in ED (eye exam findings can be found in there note).     Plan:   - CTA Head/Neck : No acute abnormality. No high-grade stenosis or major vessel occlusion.    - MRI Brain and Orbits - no evidence of stroke or intracranial hemorrhage.  No evidence of ocular pathology   - Carotid Doppler U/S and TTE reviewed   - Vascular Neuro consulted   - Consult Ophthalmology stat in the event of any sudden vision changes, will need outpatient follow up on discharge     Cardiac/Vascular  Paroxysmal atrial fibrillation  Patient has paroxysmal atrial fibrillation. Patient is currently in sinus rhythm. [id number] Score: 3. The patients heart rate is controlled    Antiarrhythmics  metoprolol tartrate (LOPRESSOR) tablet, 2 times daily, Oral    Anticoagulants: Eliquis    Hypertension  Current Antihypertensives  metoprolol tartrate (LOPRESSOR) tablet, 2 times daily, Oral    Plan  - BP is controlled, no changes needed to their regimen      Final Active Diagnoses:    Diagnosis Date Noted POA    PRINCIPAL PROBLEM:  Amaurosis fugax of right  eye [G45.3] 11/14/2024 Yes    Chronic diastolic heart failure [I50.32] 11/15/2024 Yes    Hypercoagulable state due to atrial fibrillation [D68.69, I48.91] 11/15/2024 Yes    Chronic anticoagulation [Z79.01] 11/15/2024 Not Applicable    Constipation [K59.00] 11/15/2024 Yes    Paroxysmal atrial fibrillation [I48.0] 10/09/2024 Yes    Hypertension [I10] 04/04/2023 Yes     Chronic    Hyperlipidemia [E78.5] 10/26/2020 Yes     Chronic      Problems Resolved During this Admission:    Diagnosis Date Noted Date Resolved POA    Acute hypoxemic respiratory failure [J96.01] 11/14/2024 11/14/2024 Unknown       Discharged Condition: stable    Disposition: Home or Self Care    Follow Up:   Follow-up Information       Trisha Higginbotham MD. Schedule an appointment as soon as possible for a visit on 11/22/2024.    Specialties: Family Medicine, Wound Care  Why: F/U appointment 9:00 am    Due to your doctor full schedule you will be seeing Keaton Dawson MD  Contact information:  [address] [facility]  [place] [address]  [phone]               [address] Schedule an appointment as soon as possible for a visit on 11/15/2024.    Specialty: Ophthalmology  Why: Appointment    Aisha Will send message to Ophthalmology  to get pt. scheduled  Contact information:  [address] [facility] [address] [phone]  Additional information:  Please arrive on the 10th floor for check-in.             Therapy, [facility].    Why: will call you to schedule appt.  Contact information:  [address] [facility]  [place] [id number] [phone]                           Patient Instructions:      Ambulatory referral/consult to Ophthalmology   Standing Status: Future   Referral Priority: Routine Referral Type: Consultation   Referral Reason: Specialty Services Required   Requested Specialty: Ophthalmology   Number of Visits Requested: 1     Notify your health care provider if you experience any of the following:  severe  persistent headache     Notify your health care provider if you experience any of the following:  persistent dizziness, light-headedness, or visual disturbances     Activity as tolerated       Significant Diagnostic Studies:     Lab Results   Component Value Date    WBC 7.05 11/15/2024    HGB 13.0 11/15/2024    HCT 40.9 11/15/2024     11/15/2024     Lab Results   Component Value Date     11/15/2024    K 3.7 11/15/2024     11/15/2024    CO2 24 11/15/2024    BUN 15 11/15/2024    CREATININE 0.8 11/15/2024    CALCIUM 9.7 11/15/2024      US Transcran Doppler Intracran Artr Ltd    Result Date: 11/15/2024  EXAMINATION: US TRANSCRAN DOPPLER INTRACRAN ARTR LTD CLINICAL HISTORY: 75 y/o F referred due to: GCA concern, right sided vision loss; TECHNIQUE: Standard color duplex sonography technique was used to evaluate the bilateral temporal arteries.  Longitudinal and transverse planes were evaluated. COMPARISON: MRI head and orbits 11/14/2024 and CT head neck 11/14/2024. FINDINGS: Right temporal artery: No halo sign and peak systolic velocity of 54 centimeter/second. Right axillary artery: No halo sign and peak systolic velocity of 49 centimeter/second. Left temporal artery: No halo sign and peak systolic velocity of 57 centimeter/second. Left axillary artery: No halo sign and peak systolic velocity of 50 centimeter/second.     No sonographic evidence of giant cell arteritis. Electronically signed by resident: Leonor Wayne Date:    11/15/2024 Time:    05:22 Electronically signed by: Sven Bravo Date:    11/15/2024 Time:    09:11    X-Ray Abdomen AP 1 View    Result Date: 11/15/2024  EXAMINATION: XR ABDOMEN AP 1 VIEW CLINICAL HISTORY: mri clearance; TECHNIQUE: AP View(s) of the abdomen was performed. COMPARISON: None FINDINGS: No bowel dilatation.  Mild constipation.  No definite the metallic foreign bodies identified.     See above Electronically signed by: Keven Parrish MD Date:    11/15/2024  Time:    08:32    X-Ray Chest AP Portable    Result Date: 11/15/2024  EXAMINATION: XR CHEST AP PORTABLE CLINICAL HISTORY: mri clearance; TECHNIQUE: Single views of the chest were performed. COMPARISON: Chest radiograph dated October 9, 2024 FINDINGS: The trachea and cardiomediastinal silhouette remains stable.  There is no evidence of pleural effusions, pneumothoraces or consolidations.  Chronic appearing interstitial markings are identified bilaterally.  Osseous structures demonstrate no evidence for acute fractures or dislocations.     No acute abnormality. Electronically signed by: Mayur Cheatham MD Date:    11/15/2024 Time:    07:09    MRI Head-Orbits Without Contrast    Result Date: 11/14/2024  EXAMINATION: MRI HEAD ORBITS WITHOUT CONTRAST (XPD) CLINICAL HISTORY: Vision loss, monocular; TECHNIQUE: Multiplanar and multisequence MRI of the brain and orbits was performed without intravenous contrast. COMPARISON: CT dated 11/14/2024. MRI brain dated 11/05/2022. FINDINGS: MRI brain: The craniocervical junction is intact.  The sellar and parasellar structures are unremarkable.  There is a dominant right vertebral artery.  The intracranial flow voids are otherwise unremarkable. No diffusion-weighted signal abnormality is present.  There are extensive T2/FLAIR signal hyperintensities within the periventricular and subcortical white matter.  These are unchanged compared to the prior examination.  There are no extra-axial fluid collections.  There is no evidence of intracranial hemorrhage.  There is no evidence of mass effect. MRI orbits: No diffusion weighted signal is identified along the optic tracts or the optic nerves.  The orbits and intraorbital contents are unremarkable.  There is no mass effect within the orbital apices.     MRI brain: No evidence of acute infarction no evidence of mass effect. Changes of extensive chronic vessel ischemic disease and cerebral volume loss. MRI orbits: Unremarkable noncontrast MRI  of the orbits. Electronically signed by: Gerhard Richards MD Date:    11/14/2024 Time:    23:23    CV Ultrasound Bilateral Doppler Carotid    Result Date: 11/14/2024    There is less than 50% right Internal Carotid Stenosis.   There is less than 50% left Internal Carotid Stenosis.   There is antegrade flow in the vertebral arteries bilaterally.     Echo    Result Date: 11/14/2024    Left Ventricle: The left ventricle is normal in size. Basal septal thickening. Normal wall motion. There is normal systolic function with a visually estimated ejection fraction of 60 - 65%. Grade I diastolic dysfunction.   Right Ventricle: Normal right ventricular cavity size. Wall thickness is normal. Systolic function is normal.   The left atrium is mildly dilated.   Aortic Valve: There is moderate aortic valve sclerosis. Mildly restricted motion. There is mild aortic regurgitation.   Tricuspid Valve: There is mild regurgitation.   Pulmonary Artery: The estimated pulmonary artery systolic pressure is 32 mmHg.   IVC/SVC: Normal venous pressure at 3 mmHg.     CTA Head and Neck (xpd)    Result Date: 11/14/2024  EXAMINATION: CTA HEAD AND NECK (XPD) CLINICAL HISTORY: Neuro deficit, acute, stroke suspected; TECHNIQUE: Non contrast low dose axial images were obtained through the head. CT angiogram was performed from the level of the nakia to the top of the head following the IV administration of 75mL of Omnipaque 350.   Sagittal and coronal reconstructions and maximum intensity projection reconstructions were performed. Arterial stenosis percentages are based on NASCET measurement criteria. COMPARISON: CT head 04/15/2024. FINDINGS: Intracranial Compartment: Generalized cerebral volume loss with compensatory enlargement of the ventricles and sulci.  No evidence of hydrocephalus. No extra-axial blood or fluid collections. Supratentorial white matter hypoattenuation, nonspecific and suggestive of chronic microvascular ischemic change.   No  parenchymal mass, hemorrhage, edema, or major vascular distribution infarct. Skull/Extracranial Contents (limited evaluation): No acute displaced calvarial fracture. Mastoid air cells and paranasal sinuses are essentially clear. The extracranial soft tissues are unremarkable. Non-Vascular Structures of the Neck/Thoracic Inlet (limited evaluation): Degenerative changes of the cervical spine and visualized upper thoracic, with mild multilevel listhesis. Aorta: Normal 3 vessel arch.  Bilateral subclavian arteries are patent.  The brachiocephalic artery is patent. Extracranial carotid circulation: Calcific atherosclerosis at the bilateral carotid bifurcations with less than 50% stenosis.  No hemodynamically significant stenosis, aneurysmal dilatation, or dissection. Extracranial vertebral circulation: Calcific atherosclerosis of the right vertebral artery V4 segment without hemodynamically significant stenosis.  No hemodynamically significant stenosis, aneurysmal dilatation, or dissection. Intracranial Arteries: Few foci of calcific atherosclerosis of the bilateral intracranial internal carotid arteries without hemodynamically significant stenosis.  The bilateral ACAs, MCAs, basilar artery, and PCAs are patent.  No focal high-grade stenosis, occlusion, or aneurysm. Venous structures (limited evaluation): Asymmetric narrowing of the left transverse sinus and sigmoid sinus, possibly congenital.     1. No acute abnormality. No high-grade stenosis or major vessel occlusion. 2. Asymmetric narrowing of the left transverse sinus and sigmoid sinus, possibly representing congenital narrowing. 3. General cerebral volume loss and microvascular ischemic change. Electronically signed by resident: Leonor Wayne Date:    11/14/2024 Time:    03:41 Electronically signed by: Michael Rosenberg Date:    11/14/2024 Time:    04:50        Medications:  Reconciled Home Medications:      Medication List        CHANGE how you take these medications       * metoprolol tartrate 25 MG tablet  Commonly known as: LOPRESSOR  Take 2 tablets (50 mg total) by mouth 2 (two) times daily.  What changed: Another medication with the same name was added. Make sure you understand how and when to take each.     * metoprolol tartrate 50 MG tablet  Commonly known as: LOPRESSOR  Take 1 tablet (50 mg total) by mouth 2 (two) times daily.  What changed: You were already taking a medication with the same name, and this prescription was added. Make sure you understand how and when to take each.           * This list has 2 medication(s) that are the same as other medications prescribed for you. Read the directions carefully, and ask your doctor or other care provider to review them with you.                CONTINUE taking these medications      acetaminophen 650 MG Tbsr  Commonly known as: TYLENOL  Take 1 tablet (650 mg total) by mouth every 6 (six) hours as needed.     b complex vitamins capsule  Take 1 capsule by mouth once daily.     ELIQUIS 5 mg Tab  Generic drug: apixaban  Take 1 tablet (5 mg total) by mouth 2 (two) times daily.     multivit-min-FA-lycopen-lutein 0.4 mg-300 mcg- 250 mcg Tab  Take 1 tablet by mouth once daily.     pantoprazole 40 MG tablet  Commonly known as: PROTONIX  Take 1 tablet (40 mg total) by mouth once daily.     PROLIA 60 mg/mL Syrg  Generic drug: denosumab  Inject 60 mg into the skin every 6 (six) months.     vitamin D 1000 units Tab  Commonly known as: VITAMIN D3  Take 1,000 Units by mouth once daily.     vitamin E 100 UNIT capsule  Take 100 Units by mouth once daily.              Indwelling Lines/Drains at time of discharge:   Lines/Drains/Airways       None              Time spent on the discharge of patient: 40 minutes         Ivett Stephenson MD  Department of Hospital Medicine  [facility] Neurosurgery ([facility])

## 2024-11-15 NOTE — PLAN OF CARE
[facility] dc - Neurosurgery (Hospital)  Discharge Final Note    Primary Care Provider: Trisha Higginbotham MD    Expected Discharge Date: 11/15/2024    Final Discharge Note (most recent)       Final Note - 11/15/24 1125          Final Note    Assessment Type Final Discharge Note (P)      Anticipated Discharge Disposition Home or Self Care (P)         Post-Acute Status    Post-Acute Authorization Other (P)      Other Status Community Services (P)    OP therapy                Patient will discharge home with referrals for OP therapy.  Patient does not DME, already has a RW at home.  Patient's friends at bedside will provide transport.      Melissa Gaston, LMSW [facility]  [phone]    Future Appointments   Date Time Provider Department Center   11/19/2024  1:45 PM INJECTION [facility] AMB INF [facility]   11/19/2024  3:00 PM NURSE 10, [facility] CHEMO [facility]   11/22/2024  9:00 AM Keaton Dawson MD [facility] FAM MED [facility]   11/26/2024  1:15 PM EKG, APPT [facility] EKG [facility]   11/26/2024  2:40 PM Carlos Temple MD [facility] ARRHYTH [facility]   11/26/2024  3:00 PM CHAIR 12, [facility] BMT INF [facility]   12/27/2024 10:30 AM Mac Brice III, MD [facility] ORTHO [facility] Ort   1/17/2025  9:15 AM Flaca Varghese MD [facility] OPHTHAL [facility]   2/7/2025 10:00 AM Evans Vernon MD [facility] CARDIO [facility]   4/8/2025  9:40 AM Trisha Higginbotham MD [facility] FAM MED [place]         Important Message from Medicare             Contact Info       Trisha Higginbotham MD   Specialty: Family Medicine, Wound Care   Relationship: PCP - General  Hypertension Digital Medicine Responsible Provider    [address] [place], [place] [address] [address]   Phone: [phone]       Next Steps: Schedule an appointment as soon as possible for a visit on 11/22/2024    Instructions: F/U appointment 9:00 am    Due to your doctor full schedule you will be seeing Keaton Dawson MD    [facility] - 10th Fl   Specialty: Ophthalmology    [address] [facility]  [facility]  [address]   Phone: [phone]       Next Steps: Schedule an appointment as soon as possible for a visit on 11/15/2024    Instructions: Appointment    Aisha Will send message to Ophthalmology  to get pt. scheduled    [facility]    [address] [place] [id number]   Phone: [phone]       Next Steps: Follow up    Instructions: will call you to schedule appt.

## 2024-11-15 NOTE — PLAN OF CARE
Problem: Adult Inpatient Plan of Care  Goal: Readiness for Transition of Care  Outcome: Progressing     Problem: Adult Inpatient Plan of Care  Goal: Optimal Comfort and Wellbeing  Outcome: Progressing   Plan of care discussed. Addressed questions and concerns. MRI done, possible dc home.

## 2024-11-15 NOTE — PT/OT/SLP EVAL
Physical Therapy Evaluation    Patient Name:  Windy Lindo   MRN:  [id number]    Recommendations:     Discharge Recommendations: Low Intensity Therapy   Discharge Equipment Recommendations: walker, rolling   Barriers to discharge: None    Assessment:     Windy Lindo is a 74 y.o. female admitted with a medical diagnosis of Amaurosis fugax of right eye. Patient presented with increased motivation to participate in evaluation, with good response to completed activities and provided education. Based upon information gained, PT recommends low intensity skilled physical therapy services post-acutely. Provided recommendation based upon needed intensity to not only directly address patient's previously listed functional impairments, discrepancy between functional baseline & current mobility status, and increased falls risk, but to also positively impact patient's quality of life & intervene on high risk for caregiver burnout. . Performance deficits impacting function include weakness, impaired endurance, pain, impaired balance, gait instability, decreased lower extremity function.    Rehab Prognosis: Good; patient would benefit from acute skilled PT services to address these deficits and reach maximum level of function.    Recent Surgery: * No surgery found *      Plan:     During this hospitalization, patient to be seen 3 x/week to address the identified rehab impairments via gait training, therapeutic activities, therapeutic exercises, neuromuscular re-education and progress toward the following goals:    Plan of Care Expires:  12/15/24    Subjective     Chief Complaint: None stated  Patient/Family Comments/goals: To go home  Pain/Comfort:  Pain Rating 1: 0/10  Pain Rating Post-Intervention 1: 0/10    Patients cultural, spiritual, Hinduism conflicts given the current situation: no    Social History:  Residence: Patient lives alone in  [facility]  with  [employer] + [employer] .   Equipment Owned: cane, straight  Prior level of  "function:  Prior to admission, patient was independent for short-distance ambulation, however utilized SPC for community distances. Patient was independent for all other mobility.  Assistance Upon Discharge: family and neighbor    Objective:     Communicated with Nsg prior to session.  Patient found HOB elevated with telemetry  upon PT entry to room.    General Precautions: Standard, fall   Orthopedic Precautions:N/A   Braces: N/A   Body mass index is 27.43 kg/m².  Oxygen Device: Room Air  Vitals: BP (!) 141/76 (BP Location: Right arm, Patient Position: Sitting)   Pulse 67   Temp 98.5 °F (36.9 °C) (Oral)   Resp 16   Ht 5' 3.78" (1.62 m)   Wt 72 kg (158 lb 11.7 oz)   SpO2 95%   BMI 27.43 kg/m²     Exams:  Cognition:   Alert and Cooperative   Patient is oriented to Person, Place, Time, Situation  Skin Integrity: Visible skin intact  Physical Exam:   LLE ROM: WFL  RLE ROM: WFL    LLE Strength: WFL  RLE Strength: WFL    Functional Mobility:    Bed Mobility:     EOB Scooting: Anterior: Contact Guard Assistance  Supine>Sit: Contact Guard Assistance with HOB Elevated  Sit>Supine: Contact Guard Assistance with HOB Flat    Transfers:     Sit>Stand: Contact Guard Assistance from Edge of Bed with No AD  Stand>Sit: Contact Guard Assistance to Edge of Bed with No AD    Gait:  2x~50ft, Contact Guard Assistance with No AD  Gait Assessment: decreased step length, decreased step height, decreased melody, decreased gait speed, antalgic gait pattern, decreased heel strike, decreased toe push-off  Total Distance: ~100ft  *VC/TC for upright posturing, step height, step length    Balance:   Static Sitting: Supervision  Dynamic Sitting: Stand-By Assistance    Static Standing: Contact Guard Assistance  Dynamic Standing: Contact Guard Assistance    Stairs:   Pt ascended/descended  4 stair(s) with Minimal Assistance and right handrail using No AD  Patient Negotiated Stairs with Step-To Pattern     AM-PAC 6 CLICK MOBILITY  Total " Score:20     Treatment & Education:  Patient Education Provided on:  The role of physical therapy and how the patient can benefit from skilled services  The negative effects of prolonged bed rest/sedentary behavior, along with the importance of OOB activity & patient participation with PT  The importance of contacting RN, via call light, for mobility throughout the day  Pt white board updated with current therapists name and level of mobility assistance needed.     Patient Verbalized understanding of all topics touched on this date. All questions answered within the PT scope of practice    Patient left HOB elevated with all lines intact, call button in reach, RN & SW notified, and MD team present.    GOALS:   Multidisciplinary Problems       Physical Therapy Goals          Problem: Physical Therapy    Goal Priority Disciplines Outcome Interventions   Physical Therapy Goal     PT, PT/OT Progressing    Description: Goals to be met by: 26     Patient will increase functional independence with mobility by performin. Supine to sit with [place]  2. Sit to stand transfer with [place]  3. Bed to chair transfer with Modified [place] using LRAD as appropriate  4. Gait  x 100 feet with Modified [place] using LRAD as appropriate   5. Ascend/descend 4 stair with right Handrails Stand-by Assistance using LRAD as appropriate  6. Sitting at edge of bed x10 minutes with [place]  7. Stand for 8 minutes with Modified [place] using LRAD as appropriate  8. Lower extremity exercise program x20 reps per handout, with assistance as needed    DME Justifications (see above for complete DME recommendations)    Rolling Walker- Patient demonstrates a mobility limitation that significantly impairs their ability to participate in one or more mobility related activities of daily living. Patient's mobility limitation cannot be sufficiently resolved with the use of a cane, but can be sufficiently resolved  with the use of a rolling walker.The use of a rolling walker will considerably improve their ability to participate in MRADLs. Patient will use the walker on a regular basis at home.                         History:     Past Medical History:   Diagnosis Date    Arthritis     Chronic a-fib     Eye injury 4 yrs    hit od    Hypertension 04/04/2023    Nuclear sclerosis, bilateral 02/13/2019    Osteoporosis        Past Surgical History:   Procedure Laterality Date    APPENDECTOMY      COLONOSCOPY N/A 9/7/2017    Procedure: COLONOSCOPY;  Surgeon: Albino Fenton MD;  Location: [facility] ENDO [address]);  Service: Endoscopy;  Laterality: N/A;    COLONOSCOPY N/A 10/9/2024    Procedure: COLONOSCOPY;  Surgeon: Albino Fenton MD;  [facility];  Service: Endoscopy;  Laterality: N/A;    ESOPHAGOGASTRODUODENOSCOPY  09/07/2017    ESOPHAGOGASTRODUODENOSCOPY N/A 10/9/2024    Procedure: EGD (ESOPHAGOGASTRODUODENOSCOPY);  Surgeon: Albino Fenton MD;  [facility];  Service: Endoscopy;  Laterality: N/A;  Ref By:solange Jaramillo suprep.  10/1/24- pc complete. DBM    KNEE SURGERY Right 12/05/2017    TKR    LASIK  7 yrs    ou    TONSILLECTOMY         Time Tracking:     PT Received On: 11/15/24  PT Start Time: 1005     PT Stop Time: 1022  PT Total Time (min): 17 min     Billable Minutes: Evaluation 8 and Therapeutic Activity 9      11/15/2024

## 2024-11-15 NOTE — PLAN OF CARE
Problem: Physical Therapy  Goal: Physical Therapy Goal  Description: Goals to be met by: 26     Patient will increase functional independence with mobility by performin. Supine to sit with [place]  2. Sit to stand transfer with [place]  3. Bed to chair transfer with Modified [place] using LRAD as appropriate  4. Gait  x 100 feet with Modified [place] using LRAD as appropriate   5. Ascend/descend 4 stair with right Handrails Stand-by Assistance using LRAD as appropriate  6. Sitting at edge of bed x10 minutes with [place]  7. Stand for 8 minutes with Modified [place] using LRAD as appropriate  8. Lower extremity exercise program x20 reps per handout, with assistance as needed    DME Justifications (see above for complete DME recommendations)    Rolling Walker- Patient demonstrates a mobility limitation that significantly impairs their ability to participate in one or more mobility related activities of daily living. Patient's mobility limitation cannot be sufficiently resolved with the use of a cane, but can be sufficiently resolved with the use of a rolling walker.The use of a rolling walker will considerably improve their ability to participate in MRADLs. Patient will use the walker on a regular basis at home.      Outcome: Progressing    Evaluation Complete. Goals Appropriate.

## 2024-11-15 NOTE — PLAN OF CARE
Aisha Will send message to Ophthalmology to get pt. Scheduled. They will call pt.         Marie Trevizo W  Case Management   [phone]

## 2024-11-15 NOTE — CARE UPDATE
I have reviewed the chart of Windy Lindo who is hospitalized for the following:    Active Hospital Problems    Diagnosis    *Amaurosis fugax of right eye    Chronic diastolic heart failure     There is normal systolic function with a visually estimated ejection fraction of 60 - 65%. Grade I diastolic dysfunction.   Follow up with cards  Lifestyle modification  gdmt      Hypercoagulable state due to atrial fibrillation    Chronic anticoagulation     On eliquis      Constipation     Noted on xray  Bowel regimen      Paroxysmal atrial fibrillation     On eliquis      Hypertension    Hyperlipidemia        Jennifer Montilla NP  [facility]

## 2024-11-15 NOTE — NURSING
Pt discharged to home. Family present. Discharge instructions given. Questions answered. Transport here with wheelchair to transport Pt. to car with family. PIV discontinued. No bleeding noted.

## 2024-11-16 PROBLEM — H54.61 VISION LOSS OF RIGHT EYE: Status: ACTIVE | Noted: 2024-11-14

## 2024-11-16 NOTE — ASSESSMENT & PLAN NOTE
Presents for sudden and painless vision loss of right eye that which started the night of 11/13 and was associated w/ lightheadedness. Shortly after the episode, her vision improved to generalized fogginess which has been persistent though her admission in the ED.     Rpt also reports intermittent R nhi/neck pain for past week (no jaw claudication), no fevers, myalgias, arthraligias, headaches. No focal deficits upon examination. Low concern for GCA given normal CRP and ESR. Ophthalmology evaluated pt in ED (eye exam findings can be found in there note).     Plan:   - CTA Head/Neck : No acute abnormality. No high-grade stenosis or major vessel occlusion.    - MRI Brain and Orbits - no evidence of stroke or intracranial hemorrhage.  No evidence of ocular pathology   - Carotid Doppler U/S and TTE reviewed   - Vascular Neuro consulted   - Consult Ophthalmology stat in the event of any sudden vision changes, will need outpatient follow up on discharge

## 2024-11-16 NOTE — ASSESSMENT & PLAN NOTE
Patient has paroxysmal atrial fibrillation. Patient is currently in sinus rhythm. [id number] Score: 3. The patients heart rate is controlled    Antiarrhythmics  metoprolol tartrate (LOPRESSOR) tablet, 2 times daily, Oral    Anticoagulants: Eliquis

## 2024-11-16 NOTE — ASSESSMENT & PLAN NOTE
Current Antihypertensives  metoprolol tartrate (LOPRESSOR) tablet, 2 times daily, Oral    Plan  - BP is controlled, no changes needed to their regimen

## 2024-11-19 ENCOUNTER — INFUSION (OUTPATIENT)
Dept: INFUSION THERAPY | Facility: HOSPITAL | Age: 74
End: 2024-11-19
Payer: MEDICARE

## 2024-11-19 ENCOUNTER — INFUSION (OUTPATIENT)
Dept: INFECTIOUS DISEASES | Facility: HOSPITAL | Age: 74
End: 2024-11-19
Payer: MEDICARE

## 2024-11-19 VITALS
WEIGHT: 159.38 LBS | SYSTOLIC BLOOD PRESSURE: 140 MMHG | TEMPERATURE: 98 F | OXYGEN SATURATION: 97 % | RESPIRATION RATE: 20 BRPM | BODY MASS INDEX: 26.55 KG/M2 | HEIGHT: 65 IN | DIASTOLIC BLOOD PRESSURE: 67 MMHG | HEART RATE: 66 BPM

## 2024-11-19 VITALS — DIASTOLIC BLOOD PRESSURE: 63 MMHG | RESPIRATION RATE: 20 BRPM | HEART RATE: 60 BPM | SYSTOLIC BLOOD PRESSURE: 135 MMHG

## 2024-11-19 DIAGNOSIS — M81.0 AGE-RELATED OSTEOPOROSIS WITHOUT CURRENT PATHOLOGICAL FRACTURE: Primary | ICD-10-CM

## 2024-11-19 DIAGNOSIS — D50.9 IRON DEFICIENCY ANEMIA, UNSPECIFIED IRON DEFICIENCY ANEMIA TYPE: Primary | ICD-10-CM

## 2024-11-19 PROCEDURE — 63600175 PHARM REV CODE 636 W HCPCS: Mod: JZ,JG | Performed by: FAMILY MEDICINE

## 2024-11-19 PROCEDURE — 25000003 PHARM REV CODE 250: Performed by: FAMILY MEDICINE

## 2024-11-19 PROCEDURE — 96374 THER/PROPH/DIAG INJ IV PUSH: CPT

## 2024-11-19 PROCEDURE — 96372 THER/PROPH/DIAG INJ SC/IM: CPT

## 2024-11-19 RX ORDER — HEPARIN 100 UNIT/ML
500 SYRINGE INTRAVENOUS
OUTPATIENT
Start: 2024-11-19

## 2024-11-19 RX ORDER — EPINEPHRINE 0.3 MG/.3ML
0.3 INJECTION SUBCUTANEOUS ONCE AS NEEDED
OUTPATIENT
Start: 2024-11-19

## 2024-11-19 RX ORDER — EPINEPHRINE 0.3 MG/.3ML
0.3 INJECTION SUBCUTANEOUS ONCE AS NEEDED
Status: DISCONTINUED | OUTPATIENT
Start: 2024-11-19 | End: 2024-11-19 | Stop reason: HOSPADM

## 2024-11-19 RX ORDER — SODIUM CHLORIDE 0.9 % (FLUSH) 0.9 %
10 SYRINGE (ML) INJECTION
OUTPATIENT
Start: 2024-11-19

## 2024-11-19 RX ORDER — SODIUM CHLORIDE 0.9 % (FLUSH) 0.9 %
10 SYRINGE (ML) INJECTION
Status: DISCONTINUED | OUTPATIENT
Start: 2024-11-19 | End: 2024-11-19 | Stop reason: HOSPADM

## 2024-11-19 RX ORDER — HEPARIN 100 UNIT/ML
500 SYRINGE INTRAVENOUS
Status: DISCONTINUED | OUTPATIENT
Start: 2024-11-19 | End: 2024-11-19 | Stop reason: HOSPADM

## 2024-11-19 RX ORDER — DIPHENHYDRAMINE HYDROCHLORIDE 50 MG/ML
50 INJECTION INTRAMUSCULAR; INTRAVENOUS ONCE AS NEEDED
Status: DISCONTINUED | OUTPATIENT
Start: 2024-11-19 | End: 2024-11-19 | Stop reason: HOSPADM

## 2024-11-19 RX ORDER — DIPHENHYDRAMINE HYDROCHLORIDE 50 MG/ML
50 INJECTION INTRAMUSCULAR; INTRAVENOUS ONCE AS NEEDED
OUTPATIENT
Start: 2024-11-19

## 2024-11-19 RX ADMIN — FERUMOXYTOL 510 MG: 510 INJECTION INTRAVENOUS at 02:11

## 2024-11-19 RX ADMIN — DENOSUMAB 60 MG: 60 INJECTION SUBCUTANEOUS at 01:11

## 2024-11-19 NOTE — PLAN OF CARE
Pt tolerated Feraheme #1 without incident. Vital stable before & after. PIV flushed w/o resistance and positive for blood return. PIV removed at discharge. No signs or symptoms of a reaction in 30 min post observation. Pt stable and ambulatory out of clinic w/ family member. Pt aware of next appt. on 11/26.

## 2024-11-19 NOTE — PROGRESS NOTES
Patient arrives for Prolia injection - confirms use of calcium and vitamin D supplements and denies dental procedures over past 3 months - administered per guidelines   Limited head-to-toe assessment due to privacy issues and visit reason though the opportunity was given for patient to express any concerns

## 2024-11-20 ENCOUNTER — PATIENT OUTREACH (OUTPATIENT)
Dept: ADMINISTRATIVE | Facility: CLINIC | Age: 74
End: 2024-11-20
Payer: MEDICARE

## 2024-11-20 ENCOUNTER — TELEPHONE (OUTPATIENT)
Dept: FAMILY MEDICINE | Facility: CLINIC | Age: 74
End: 2024-11-20
Payer: MEDICARE

## 2024-11-20 DIAGNOSIS — Z96.651 PRESENCE OF RIGHT ARTIFICIAL KNEE JOINT: ICD-10-CM

## 2024-11-20 DIAGNOSIS — M25.562 ACUTE PAIN OF LEFT KNEE: Primary | ICD-10-CM

## 2024-11-20 NOTE — PROGRESS NOTES
C3 nurse spoke with Windy Lindo for a TCC post hospital discharge follow up call. The patient does not have a scheduled HOSFU appointment with her PCP, Trisha Higginbotham MD within 5-7 days post hospital discharge date of 11/15/24. Pt denies needing a HOSFU with her PCP or a NPHV at this time. No messages routed at this time.

## 2024-11-20 NOTE — TELEPHONE ENCOUNTER
----- Message from Ally sent at 11/20/2024 10:55 AM CST -----  Type: Patient Call Back    Who called:Salma    What is the request in detail: pt metoprolol tartrate (LOPRESSOR) 50 MG tablet was increased to 100 MG while in hospital. Would like directions on how to more forward, due to pt heart beats is running low. Please call    Can the clinic reply by [facility]? No     Would the patient rather a call back or a response via [facility]?  call    Best call back number: [phone]    Additional Information:

## 2024-11-20 NOTE — PROGRESS NOTES
C3 nurse attempted to contact Windy Lindo for a TCC post hospital discharge follow up call. No answer, left voicemail with callback information. The patient does not have a scheduled HOSFU appointment with her PCP, Trisha Higginbotham MD within the first 5-7 days post DC date of 11/15/24. No messages routed at this time.

## 2024-11-21 ENCOUNTER — PATIENT MESSAGE (OUTPATIENT)
Dept: CARDIOLOGY | Facility: CLINIC | Age: 74
End: 2024-11-21
Payer: MEDICARE

## 2024-11-21 ENCOUNTER — PATIENT MESSAGE (OUTPATIENT)
Dept: FAMILY MEDICINE | Facility: CLINIC | Age: 74
End: 2024-11-21
Payer: MEDICARE

## 2024-11-25 ENCOUNTER — HOSPITAL ENCOUNTER (EMERGENCY)
Facility: HOSPITAL | Age: 74
Discharge: HOME | End: 2024-11-25
Attending: EMERGENCY MEDICINE
Payer: MEDICARE

## 2024-11-25 VITALS
HEIGHT: 65 IN | TEMPERATURE: 99 F | WEIGHT: 159.38 LBS | DIASTOLIC BLOOD PRESSURE: 64 MMHG | OXYGEN SATURATION: 98 % | RESPIRATION RATE: 18 BRPM | SYSTOLIC BLOOD PRESSURE: 136 MMHG | BODY MASS INDEX: 26.55 KG/M2 | HEART RATE: 68 BPM

## 2024-11-25 DIAGNOSIS — R06.02 SHORTNESS OF BREATH: ICD-10-CM

## 2024-11-25 DIAGNOSIS — Z86.79 HISTORY OF ATRIAL FIBRILLATION: Primary | ICD-10-CM

## 2024-11-25 LAB
ALBUMIN SERPL BCP-MCNC: 3.8 G/DL (ref 3.5–5.2)
ALP SERPL-CCNC: 76 U/L (ref 40–150)
ALT SERPL W/O P-5'-P-CCNC: 12 U/L (ref 10–44)
ANION GAP SERPL CALC-SCNC: 8 MMOL/L (ref 8–16)
AST SERPL-CCNC: 17 U/L (ref 10–40)
BASOPHILS # BLD AUTO: 0.02 K/UL (ref 0–0.2)
BASOPHILS NFR BLD: 0.3 % (ref 0–1.9)
BILIRUB SERPL-MCNC: 0.4 MG/DL (ref 0.1–1)
BNP SERPL-MCNC: 14 PG/ML (ref 0–99)
BUN SERPL-MCNC: 12 MG/DL (ref 8–23)
CALCIUM SERPL-MCNC: 10.3 MG/DL (ref 8.7–10.5)
CHLORIDE SERPL-SCNC: 108 MMOL/L (ref 95–110)
CO2 SERPL-SCNC: 25 MMOL/L (ref 23–29)
CREAT SERPL-MCNC: 0.8 MG/DL (ref 0.5–1.4)
DIFFERENTIAL METHOD BLD: ABNORMAL
EOSINOPHIL # BLD AUTO: 0 K/UL (ref 0–0.5)
EOSINOPHIL NFR BLD: 0.4 % (ref 0–8)
ERYTHROCYTE [DISTWIDTH] IN BLOOD BY AUTOMATED COUNT: 14.5 % (ref 11.5–14.5)
EST. GFR  (NO RACE VARIABLE): >60 ML/MIN/1.73 M^2
GLUCOSE SERPL-MCNC: 100 MG/DL (ref 70–110)
HCT VFR BLD AUTO: 44 % (ref 37–48.5)
HGB BLD-MCNC: 13.8 G/DL (ref 12–16)
IMM GRANULOCYTES # BLD AUTO: 0.02 K/UL (ref 0–0.04)
IMM GRANULOCYTES NFR BLD AUTO: 0.3 % (ref 0–0.5)
LYMPHOCYTES # BLD AUTO: 1.8 K/UL (ref 1–4.8)
LYMPHOCYTES NFR BLD: 23.2 % (ref 18–48)
MCH RBC QN AUTO: 29.1 PG (ref 27–31)
MCHC RBC AUTO-ENTMCNC: 31.4 G/DL (ref 32–36)
MCV RBC AUTO: 93 FL (ref 82–98)
MONOCYTES # BLD AUTO: 0.6 K/UL (ref 0.3–1)
MONOCYTES NFR BLD: 7.4 % (ref 4–15)
NEUTROPHILS # BLD AUTO: 5.4 K/UL (ref 1.8–7.7)
NEUTROPHILS NFR BLD: 68.4 % (ref 38–73)
NRBC BLD-RTO: 0 /100 WBC
OHS QRS DURATION: 80 MS
OHS QTC CALCULATION: 437 MS
PLATELET # BLD AUTO: 234 K/UL (ref 150–450)
PMV BLD AUTO: 12.6 FL (ref 9.2–12.9)
POTASSIUM SERPL-SCNC: 4.4 MMOL/L (ref 3.5–5.1)
PROT SERPL-MCNC: 7 G/DL (ref 6–8.4)
RBC # BLD AUTO: 4.75 M/UL (ref 4–5.4)
SODIUM SERPL-SCNC: 141 MMOL/L (ref 136–145)
TROPONIN I SERPL DL<=0.01 NG/ML-MCNC: <0.006 NG/ML (ref 0–0.03)
WBC # BLD AUTO: 7.89 K/UL (ref 3.9–12.7)

## 2024-11-25 PROCEDURE — 99285 EMERGENCY DEPT VISIT HI MDM: CPT | Mod: 25

## 2024-11-25 PROCEDURE — 83880 ASSAY OF NATRIURETIC PEPTIDE: CPT | Performed by: EMERGENCY MEDICINE

## 2024-11-25 PROCEDURE — 85025 COMPLETE CBC W/AUTO DIFF WBC: CPT | Performed by: EMERGENCY MEDICINE

## 2024-11-25 PROCEDURE — 84484 ASSAY OF TROPONIN QUANT: CPT | Performed by: EMERGENCY MEDICINE

## 2024-11-25 PROCEDURE — 80053 COMPREHEN METABOLIC PANEL: CPT | Performed by: EMERGENCY MEDICINE

## 2024-11-25 NOTE — ED PROVIDER NOTES
Encounter Date: 11/25/2024       History     Chief Complaint   Patient presents with    Shortness of Breath     SOB and dizziness since today. Recently diagnosed with afib     Patient is a 74-year-old female that presents to the emergency department for shortness of breath. She states that she was recently diagnosed in October with AFib.  Started on metoprolol and Xarelto.  She states that she had an episode of palpitations, shortness of breath, headache about 1 hour prior to arrival.  Symptoms have mostly resolved with mild lightheadedness reported.  Today she denies any chest pressure, pain, abdominal pain, nausea vomiting.        Review of patient's allergies indicates:  No Known Allergies  Past Medical History:   Diagnosis Date    Arthritis     Chronic a-fib     Eye injury 4 yrs    hit od    Hypertension 04/04/2023    Nuclear sclerosis, bilateral 02/13/2019    Osteoporosis      Past Surgical History:   Procedure Laterality Date    APPENDECTOMY      COLONOSCOPY N/A 9/7/2017    Procedure: COLONOSCOPY;  Surgeon: Albino Fenton MD;  Location: [address]);  Service: Endoscopy;  Laterality: N/A;    COLONOSCOPY N/A 10/9/2024    Procedure: COLONOSCOPY;  Surgeon: Albino Fenton MD;  Location: [facility] ENDOSCOPY;  Service: Endoscopy;  Laterality: N/A;    ESOPHAGOGASTRODUODENOSCOPY  09/07/2017    ESOPHAGOGASTRODUODENOSCOPY N/A 10/9/2024    Procedure: EGD (ESOPHAGOGASTRODUODENOSCOPY);  Surgeon: Albino Fenton MD;  Location: [facility] ENDOSCOPY;  Service: Endoscopy;  Laterality: N/A;  Ref By:solange Jaramillo suprep.TORSTEN  10/1/24- pc complete. DBM    KNEE SURGERY Right 12/05/2017    TKR    LASIK  7 yrs    ou    TONSILLECTOMY       Family History   Problem Relation Name Age of Onset    Hypertension Mother      Glaucoma Mother      Diabetes Mother      Cataracts Mother      Cancer Mother  74        lung    Heart disease Mother  55    Hypertension Father      Heart disease Father          onset early 60;s, Aortic  valve replacement ,Rheumatic fever as a child     No Known Problems Sister      Diabetes Brother      Cancer Brother  66        mesothelioma    No Known Problems Maternal Aunt      No Known Problems Maternal Uncle      No Known Problems Paternal Aunt      No Known Problems Paternal Uncle      No Known Problems Maternal Grandmother      No Known Problems Maternal Grandfather      No Known Problems Paternal Grandmother      No Known Problems Paternal Grandfather      No Known Problems Other      Amblyopia Neg Hx      Blindness Neg Hx      Macular degeneration Neg Hx      Retinal detachment Neg Hx      Strabismus Neg Hx      Stroke Neg Hx      Thyroid disease Neg Hx      Melanoma Neg Hx       Social History     Tobacco Use    Smoking status: Never    Smokeless tobacco: Never   Substance Use Topics    Alcohol use: Not Currently     Alcohol/week: 1.0 standard drink of alcohol     Types: 1 Glasses of wine per week     Comment: glass of wine a night     Drug use: No     Review of Systems  All other systems reviewed and were negative; see HPI also for additional ROS.    Physical Exam     Initial Vitals [11/25/24 1323]   BP Pulse Resp Temp SpO2   (!) 189/86 88 16 98.5 °F (36.9 °C) 100 %      MAP       --         Physical Exam    Nursing note and vitals reviewed.  Constitutional: She appears well-developed and well-nourished.   HENT:   Head: Normocephalic and atraumatic.   Eyes: EOM are normal. Pupils are equal, round, and reactive to light.   Neck: Neck supple.   Normal range of motion.  Cardiovascular:  Normal rate, regular rhythm and intact distal pulses.           Pulmonary/Chest: Breath sounds normal. No respiratory distress. She has no wheezes. She has no rales.   Abdominal: Abdomen is soft. Bowel sounds are normal.   Musculoskeletal:         General: No edema (Of bilateral lower extremities).      Cervical back: Normal range of motion and neck supple.     Neurological: She is alert and oriented to person, place, and  time.   Skin: Skin is warm and dry. Capillary refill takes less than 2 seconds.         ED Course   Procedures  Labs Reviewed   CBC W/ AUTO DIFFERENTIAL - Abnormal       Result Value    WBC 7.89      RBC 4.75      Hemoglobin 13.8      Hematocrit 44.0      MCV 93      MCH 29.1      MCHC 31.4 (*)     RDW 14.5      Platelets 234      MPV 12.6      Immature Granulocytes 0.3      Gran # (ANC) 5.4      Immature Grans (Abs) 0.02      Lymph # 1.8      Mono # 0.6      Eos # 0.0      Baso # 0.02      nRBC 0      Gran % 68.4      Lymph % 23.2      Mono % 7.4      Eosinophil % 0.4      Basophil % 0.3      Differential Method Automated     COMPREHENSIVE METABOLIC PANEL    Sodium 141      Potassium 4.4      Chloride 108      CO2 25      Glucose 100      BUN 12      Creatinine 0.8      Calcium 10.3      Total Protein 7.0      Albumin 3.8      Total Bilirubin 0.4      Alkaline Phosphatase 76      AST 17      ALT 12      eGFR >60.0      Anion Gap 8     TROPONIN I    Troponin I <0.006     B-TYPE NATRIURETIC PEPTIDE    BNP 14            Imaging Results              X-Ray Chest AP Portable (Final result)  Result time 11/25/24 17:06:48      Final result by Jean Claude Verdugo MD (11/25/24 17:06:48)                   Impression:      1. Chronic appearing interstitial findings, superimposed edema is a consideration.  No convincing large focal consolidation.      Electronically signed by: Jean Claude Verdugo MD  Date:    11/25/2024  Time:    17:06               Narrative:    EXAMINATION:  XR CHEST AP PORTABLE    CLINICAL HISTORY:  Shortness of breath    TECHNIQUE:  Single frontal view of the chest was performed.    COMPARISON:  11/14/2024    FINDINGS:  The cardiomediastinal silhouette is not enlarged noting calcification of the aorta.  There is elevation of the right hemidiaphragm..  There is no pleural effusion.  The trachea is midline.  The lungs are symmetrically expanded bilaterally with coarse interstitial attenuation bilaterally..  No  large focal consolidation seen.  There is no pneumothorax.  The osseous structures are remarkable for degenerative change.  There are remote appearing right rib injuries..  There is stable midthoracic vertebral body compression deformity.                                    X-Rays:   Independently Interpreted Readings:   Chest X-Ray: Normal heart size.  No infiltrates. No pneumothorax or free air noted, no large consolidation     Medications - No data to display  Medical Decision Making  Windy Lindo is a 74 y.o. female with a past medical history of atrial fibrillation on anticoagulation presenting to the ED with resolved paroxysmal atrial fibrillation episode. History and physical exam as above. Initial vital signs stable and non-actionable. Initial work-up to include: Labs (CBC, CMP, Troponin, BNP,), Imaging (CXR,), EKG,    Differential diagnosis considered for this patient includes, but is not limited to:  Episode of atrial fibrillation, CHF.  Other severe, more emergent diagnoses considered, but deemed much less likely, to include:  ACS.    Patient has been asymptomatic since they arrival to the emergency department.  Workup was reassuring.  She has a an appointment with her electrophysiologist tomorrow that she will follow up with.  She will be discharged to home with ED return precautions.      Amount and/or Complexity of Data Reviewed  Labs:  Decision-making details documented in ED Course.  ECG/medicine tests:  Decision-making details documented in ED Course.              Attending Attestation:   Physician Attestation Statement for Resident:  As the supervising MD   Physician Attestation Statement: I have personally seen and examined this patient.   I agree with the above history.  -:   As the supervising MD I agree with the above PE.     As the supervising MD I agree with the above treatment, course, plan, and disposition.                      I have reviewed and concur with the resident's history,  physical, assessment, and plan.  I have personally interviewed and examined the patient at bedside.   I did supervise any and all procedures and was present for any critical portion, and was always immediately available for help and as a resource.     The above history physical, review of symptoms, HPI and physical exam reflect my independent interpretation and evaluation.    Complexity: High Risk    Final diagnoses:  [R06.02] Shortness of breath  [Z86.79] History of atrial fibrillation (Primary)     Tank Silva DO, [facility]  Emergency Staff Physician   Dept of Emergency Medicine   [facility]  Spectralink: [id number]        Disclaimer: This note has been generated using voice-recognition software. There may be typographical errors that have been missed during proof-reading.            ED Course as of 11/25/24 1808   Mon Nov 25, 2024   1620 Comprehensive metabolic panel  CMP largely unremarkable [HJ]   1620 BNP: 14  Much less likely CHF [HJ]   1620 CBC auto differential(!)  CBC unremarkable, no leukocytosis, thrombocytopenia, anemia    [HJ]   1629 EKG 12-lead  As per my interpretation normal sinus rhythm at 78.  Normal intervals, no STEMI. [HJ]   1713 Troponin I: <0.006 [HJ]      ED Course User Index  [HJ] Casey Cuadra MD                           Clinical Impression:  Final diagnoses:  [R06.02] Shortness of breath  [Z86.79] History of atrial fibrillation (Primary)          ED Disposition Condition    Discharge Stable          ED Prescriptions    None       Follow-up Information       Follow up With Specialties Details Why Contact Info    Your cardiologist electrophysiology  Go on 11/26/2024 Keep your scheduled appointment     [facility] - Emergency Dept Emergency Medicine Go to  If symptoms worsen [address]  [facility] [id number] [phone]             Casey Cuadra MD  Resident  11/25/24 1808       Tank Silva DO  11/26/24 [id number]

## 2024-11-25 NOTE — DISCHARGE INSTRUCTIONS
You were seen in the emergency department for your shortness of breath.  He had likely had an episode of AFib.  Please follow-up with your electrophysiologist tomorrow.  Please return to the emergency department if you develop chest pain, symptoms worsen.

## 2024-11-25 NOTE — FIRST PROVIDER EVALUATION
"Medical screening examination initiated.  I have conducted a focused provider triage encounter, findings are as follows:    Brief history of present illness:  Pt presents with shortness of breath that started suddenly this morning.  Has had similar episodes in the past.  Diagnosed with afib recently and thinks that may be what is going on again.  Denies chest pain.  She feels dizzy.    Vitals:    11/25/24 1323   BP: (!) 189/86   BP Location: Left arm   Pulse: 88   Resp: 16   Temp: 98.5 °F (36.9 °C)   TempSrc: Oral   SpO2: 100%   Weight: 72.3 kg (159 lb 6.3 oz)   Height: 5' 5" (1.651 m)       Pertinent physical exam:  Sinus rhythm in the 80s on my exam, no murmur noted, breath sounds somewhat less at the R lung base but otherwise CTAB.    Brief workup plan:  Will start with EKG, CXR, labs.  Will defer decisions about PE workup to primary ED team after full history and physical , particularly given that pt has had symptoms like this in the past and is currently without tachycardia or hypoxia.    Preliminary workup initiated; this workup will be continued and followed by the physician or advanced practice provider that is assigned to the patient when roomed.  "

## 2024-11-26 ENCOUNTER — PATIENT MESSAGE (OUTPATIENT)
Dept: FAMILY MEDICINE | Facility: CLINIC | Age: 74
End: 2024-11-26
Payer: MEDICARE

## 2024-11-26 ENCOUNTER — PATIENT OUTREACH (OUTPATIENT)
Dept: EMERGENCY MEDICINE | Facility: HOSPITAL | Age: 74
End: 2024-11-26
Payer: MEDICARE

## 2024-11-26 ENCOUNTER — OFFICE VISIT (OUTPATIENT)
Dept: ELECTROPHYSIOLOGY | Facility: CLINIC | Age: 74
End: 2024-11-26
Payer: MEDICARE

## 2024-11-26 VITALS
HEART RATE: 81 BPM | SYSTOLIC BLOOD PRESSURE: 134 MMHG | BODY MASS INDEX: 26.41 KG/M2 | DIASTOLIC BLOOD PRESSURE: 82 MMHG | WEIGHT: 158.75 LBS

## 2024-11-26 DIAGNOSIS — M17.11 PRIMARY OSTEOARTHRITIS OF RIGHT KNEE: ICD-10-CM

## 2024-11-26 DIAGNOSIS — I10 PRIMARY HYPERTENSION: Chronic | ICD-10-CM

## 2024-11-26 DIAGNOSIS — I70.0 AORTIC ATHEROSCLEROSIS: Chronic | ICD-10-CM

## 2024-11-26 DIAGNOSIS — Z87.19 HISTORY OF GI BLEED: Chronic | ICD-10-CM

## 2024-11-26 DIAGNOSIS — D50.9 IRON DEFICIENCY ANEMIA, UNSPECIFIED IRON DEFICIENCY ANEMIA TYPE: ICD-10-CM

## 2024-11-26 DIAGNOSIS — I48.0 PAROXYSMAL ATRIAL FIBRILLATION: Primary | ICD-10-CM

## 2024-11-26 DIAGNOSIS — I50.32 CHRONIC DIASTOLIC HEART FAILURE: ICD-10-CM

## 2024-11-26 PROCEDURE — 3288F FALL RISK ASSESSMENT DOCD: CPT | Mod: CPTII,S$GLB,, | Performed by: INTERNAL MEDICINE

## 2024-11-26 PROCEDURE — 3075F SYST BP GE 130 - 139MM HG: CPT | Mod: CPTII,S$GLB,, | Performed by: INTERNAL MEDICINE

## 2024-11-26 PROCEDURE — 1159F MED LIST DOCD IN RCRD: CPT | Mod: CPTII,S$GLB,, | Performed by: INTERNAL MEDICINE

## 2024-11-26 PROCEDURE — 3044F HG A1C LEVEL LT 7.0%: CPT | Mod: CPTII,S$GLB,, | Performed by: INTERNAL MEDICINE

## 2024-11-26 PROCEDURE — 1111F DSCHRG MED/CURRENT MED MERGE: CPT | Mod: CPTII,S$GLB,, | Performed by: INTERNAL MEDICINE

## 2024-11-26 PROCEDURE — 99999 PR PBB SHADOW E&M-EST. PATIENT-LVL III: CPT | Mod: PBBFAC,,, | Performed by: INTERNAL MEDICINE

## 2024-11-26 PROCEDURE — 99205 OFFICE O/P NEW HI 60 MIN: CPT | Mod: S$GLB,,, | Performed by: INTERNAL MEDICINE

## 2024-11-26 PROCEDURE — 1160F RVW MEDS BY RX/DR IN RCRD: CPT | Mod: CPTII,S$GLB,, | Performed by: INTERNAL MEDICINE

## 2024-11-26 PROCEDURE — 1126F AMNT PAIN NOTED NONE PRSNT: CPT | Mod: CPTII,S$GLB,, | Performed by: INTERNAL MEDICINE

## 2024-11-26 PROCEDURE — 3079F DIAST BP 80-89 MM HG: CPT | Mod: CPTII,S$GLB,, | Performed by: INTERNAL MEDICINE

## 2024-11-26 PROCEDURE — 1100F PTFALLS ASSESS-DOCD GE2>/YR: CPT | Mod: CPTII,S$GLB,, | Performed by: INTERNAL MEDICINE

## 2024-11-26 RX ORDER — DRONEDARONE 400 MG/1
400 TABLET, FILM COATED ORAL 2 TIMES DAILY WITH MEALS
Qty: 60 TABLET | Refills: 11 | Status: SHIPPED | OUTPATIENT
Start: 2024-11-26 | End: 2024-11-29 | Stop reason: RX

## 2024-11-26 RX ORDER — METOPROLOL TARTRATE 25 MG/1
25 TABLET, FILM COATED ORAL 2 TIMES DAILY
Qty: 180 TABLET | Refills: 3 | Status: SHIPPED | OUTPATIENT
Start: 2024-11-26 | End: 2025-11-26

## 2024-11-26 NOTE — PROGRESS NOTES
Subjective   Patient ID:  Windy Lindo is a 74 y.o. female who presents for evaluation of Atrial Fibrillation    Referring Cardiologist: Evans Vernon MD  Primary Care Physician, Trisha Higginbotham MD    HPI  I had the pleasure of seeing Mrs Lindo today in our electrophysiology clinic in consultation for her atrial fibrillation. As you are aware she is a pleasant 74 year-old woman with hypertension, aortic atherosclerotic disease, prior GI bleed in setting of taking NSAIDs, and paroxysmal AF. She was in her usual state of health until September 2024 when she had 2 days of dark stools also with red blood clots in the setting of taking Mobic. Mobic was stopped and protonix was started. She underwent EGD/colonoscopy on 10/9/2024 which showed an erythematous gastric antrum without bleeding and diverticulosis. She was observed to be in atrial fibrillation with RVR. She was given IV metoprolol and sent to the ER. She reported to the ER that since April 2024 she has had intermittent episodes of light-headedness, shortness of breath and near syncope. She was admitted to observation. She spontaneously converted. She was started on eliquis and metoprolol. Subsequent holter monitor noted sinus rhythm without AF. She then presented in November 2024 with painless vision loss. Work-up was unrevealing including CTA head/neck, MRI, and normal CRP/ESR. She reports weekly episodes of symptoms that she corresponds with AF.     Holter 4/2023: sinus rhythm with 12% PAC burden  ECHO 11/2024: normal LVEF, mild LA dilation    I reviewed available ECGs in [facility] and my personal interpretation summary is either sinus rhythm, with at times PACs, or atrial fibrillation, with at times RVR. QRS is narrow    My interpretation of today's in-clinic ECG is sinus rhythm with normal intervals.    Review of Systems   Constitutional: Negative for fever and malaise/fatigue.   HENT:  Negative for congestion and sore throat.    Eyes:  Negative for blurred  vision and visual disturbance.   Cardiovascular:  Positive for irregular heartbeat and palpitations. Negative for chest pain, dyspnea on exertion, near-syncope and syncope.   Respiratory:  Negative for cough and shortness of breath.    Hematologic/Lymphatic: Negative for bleeding problem. Does not bruise/bleed easily.   Skin: Negative.    Musculoskeletal: Negative.    Gastrointestinal:  Negative for bloating, abdominal pain, hematochezia and melena.   Neurological:  Negative for focal weakness and weakness.   Psychiatric/Behavioral: Negative.            Objective     Physical Exam  Vitals reviewed.   Constitutional:       General: She is not in acute distress.     Appearance: She is well-developed. She is not diaphoretic.   HENT:      Head: Normocephalic and atraumatic.   Eyes:      General:         Right eye: No discharge.         Left eye: No discharge.      Conjunctiva/sclera: Conjunctivae normal.   Cardiovascular:      Rate and Rhythm: Normal rate and regular rhythm.      Heart sounds: No murmur heard.     No friction rub. No gallop.   Pulmonary:      Effort: Pulmonary effort is normal. No respiratory distress.      Breath sounds: Normal breath sounds. No wheezing or rales.   Abdominal:      General: Bowel sounds are normal. There is no distension.      Palpations: Abdomen is soft.      Tenderness: There is no abdominal tenderness.   Musculoskeletal:      Cervical back: Neck supple.   Skin:     General: Skin is warm and dry.   Neurological:      Mental Status: She is alert and oriented to person, place, and time.   Psychiatric:         Behavior: Behavior normal.         Thought Content: Thought content normal.         Judgment: Judgment normal.            Assessment and Plan     1. Paroxysmal atrial fibrillation    2. Primary hypertension    3. Aortic atherosclerosis    4. Chronic diastolic heart failure    5. Iron deficiency anemia, unspecified iron deficiency anemia type    6. History of GI bleed         Plan:  In summary, Mrs Lindo is a pleasant 74 year-old woman with hypertension, aortic atherosclerotic disease, prior GI bleed in setting of taking NSAIDs, and symptomatic paroxysmal AF. I had a long discussion with the patient about the pathophysiology and risks of atrial fibrillation and its basic pathophysiology, including its health implications and treatment options. Specifically, I addressed the need for CVA (stroke) prophylaxis with aspirin versus oral anticoagulation (warfarin vs DOACs, discussed bleeding risks, and need to come to the ER for any head trauma for CT scanning even if asymptomatic). [id number] score is 4 and oral anticoagulation is indicated. She is tolerating eliquis with stable H/H. She should refrain from NSAID usage. I also discussed the goal to reduce symptomatic arrhythmic episodes by pharmacologic and/or procedural methods and utilizing a rhythm versus a rate control strategy. She elects for rhythm control. Discussed anti-arrhythmic drug therapy versus PVI. I spent about a half hour discussing the nature of PVI including transseptal puncture. We discussed risks and benefits at length. Our discussion included, but was not limited to the risk of death, infection, bleeding, stroke, MI, cardiac perforation, embolism, cardiac tamponade, vascular injury, valvular injury, AE fistula (RFA), injury to phrenic nerve (RFA), pulmonary vein stenosis (RFA), rhabdomyolysis (PFA), hemolysis (PFA) and other organic injury including the possibility for need for surgery or pacemaker implantation.     Start multaq 400mg bid  She is thinking about ablation and would like a date, may cancel if multaq is effective  PVI with PFA  CHAYA for mapping  Anesthesia  JANE day of, cancel if in sinus  Hold eliquis AM of procedure  Hold multaq 5 days prior      Thank you for allowing me to participate in the care of this patient. Please do not hesitate to call me with any questions or concerns.    Carlos Temple MD,  PhD  Cardiac Electrophysiology

## 2024-11-26 NOTE — TELEPHONE ENCOUNTER
No care due was identified.  [facility] Embedded Care Due Messages. Reference number: [id number].   11/26/2024 3:52:09 AM CST

## 2024-11-26 NOTE — PROGRESS NOTES
Spoke with Pt regarding their recent ED visit for SOB. Pt states that she is doing ok and is scheduled to f/u with Cardiology on 11-26-24.She denies having any concerns and has already messaged her PCP. ED Navigator reached out to PCP for f/u. ED navigator will follow-up with patient to assist as needed and to remind of upcoming appt's. Pt encouraged to reach out with any concerns at they arise.

## 2024-11-27 ENCOUNTER — PATIENT MESSAGE (OUTPATIENT)
Dept: ELECTROPHYSIOLOGY | Facility: CLINIC | Age: 74
End: 2024-11-27
Payer: MEDICARE

## 2024-11-27 LAB
OHS QRS DURATION: 86 MS
OHS QTC CALCULATION: 415 MS

## 2024-11-27 RX ORDER — MELOXICAM 15 MG/1
15 TABLET ORAL
Qty: 90 TABLET | Refills: 0 | Status: SHIPPED | OUTPATIENT
Start: 2024-11-27 | End: 2024-11-28 | Stop reason: ALTCHOICE

## 2024-11-27 NOTE — TELEPHONE ENCOUNTER
----- Message from Nurse Nicolle sent at 11/26/2024 12:27 PM CST -----  Please reach out to Pt with questions regarding her recent ED visit. Thank you.

## 2024-11-28 ENCOUNTER — HOSPITAL ENCOUNTER (OUTPATIENT)
Facility: HOSPITAL | Age: 74
Discharge: HOME | End: 2024-11-29
Attending: STUDENT IN AN ORGANIZED HEALTH CARE EDUCATION/TRAINING PROGRAM | Admitting: STUDENT IN AN ORGANIZED HEALTH CARE EDUCATION/TRAINING PROGRAM
Payer: MEDICARE

## 2024-11-28 DIAGNOSIS — I48.0 PAF (PAROXYSMAL ATRIAL FIBRILLATION): Primary | ICD-10-CM
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
## 2024-11-28 DIAGNOSIS — I48.0 PAROXYSMAL ATRIAL FIBRILLATION: ICD-10-CM

## 2024-11-28 DIAGNOSIS — R55 SYNCOPE, UNSPECIFIED SYNCOPE TYPE: ICD-10-CM

## 2024-11-28 LAB
ALBUMIN SERPL BCP-MCNC: 3.3 G/DL (ref 3.5–5.2)
ALP SERPL-CCNC: 72 U/L (ref 40–150)
ALT SERPL W/O P-5'-P-CCNC: 11 U/L (ref 10–44)
ANION GAP SERPL CALC-SCNC: 9 MMOL/L (ref 8–16)
AST SERPL-CCNC: 13 U/L (ref 10–40)
BASOPHILS # BLD AUTO: 0.02 K/UL (ref 0–0.2)
BASOPHILS NFR BLD: 0.2 % (ref 0–1.9)
BILIRUB SERPL-MCNC: 0.5 MG/DL (ref 0.1–1)
BNP SERPL-MCNC: <10 PG/ML (ref 0–99)
BUN SERPL-MCNC: 21 MG/DL (ref 8–23)
CALCIUM SERPL-MCNC: 9.4 MG/DL (ref 8.7–10.5)
CHLORIDE SERPL-SCNC: 109 MMOL/L (ref 95–110)
CO2 SERPL-SCNC: 21 MMOL/L (ref 23–29)
CREAT SERPL-MCNC: 1 MG/DL (ref 0.5–1.4)
DIFFERENTIAL METHOD BLD: ABNORMAL
EOSINOPHIL # BLD AUTO: 0.2 K/UL (ref 0–0.5)
EOSINOPHIL NFR BLD: 1.4 % (ref 0–8)
ERYTHROCYTE [DISTWIDTH] IN BLOOD BY AUTOMATED COUNT: 14.8 % (ref 11.5–14.5)
EST. GFR  (NO RACE VARIABLE): 58.8 ML/MIN/1.73 M^2
GLUCOSE SERPL-MCNC: 122 MG/DL (ref 70–110)
HCT VFR BLD AUTO: 44.5 % (ref 37–48.5)
HGB BLD-MCNC: 14.4 G/DL (ref 12–16)
IMM GRANULOCYTES # BLD AUTO: 0.03 K/UL (ref 0–0.04)
IMM GRANULOCYTES NFR BLD AUTO: 0.3 % (ref 0–0.5)
LYMPHOCYTES # BLD AUTO: 0.6 K/UL (ref 1–4.8)
LYMPHOCYTES NFR BLD: 5.4 % (ref 18–48)
MAGNESIUM SERPL-MCNC: 2.1 MG/DL (ref 1.6–2.6)
MCH RBC QN AUTO: 29.1 PG (ref 27–31)
MCHC RBC AUTO-ENTMCNC: 32.4 G/DL (ref 32–36)
MCV RBC AUTO: 90 FL (ref 82–98)
MONOCYTES # BLD AUTO: 0.6 K/UL (ref 0.3–1)
MONOCYTES NFR BLD: 6 % (ref 4–15)
NEUTROPHILS # BLD AUTO: 9.2 K/UL (ref 1.8–7.7)
NEUTROPHILS NFR BLD: 86.7 % (ref 38–73)
NRBC BLD-RTO: 0 /100 WBC
PLATELET # BLD AUTO: 242 K/UL (ref 150–450)
PMV BLD AUTO: 13 FL (ref 9.2–12.9)
POTASSIUM SERPL-SCNC: 4.2 MMOL/L (ref 3.5–5.1)
PROT SERPL-MCNC: 6.4 G/DL (ref 6–8.4)
RBC # BLD AUTO: 4.94 M/UL (ref 4–5.4)
SODIUM SERPL-SCNC: 139 MMOL/L (ref 136–145)
TROPONIN I SERPL DL<=0.01 NG/ML-MCNC: <0.006 NG/ML (ref 0–0.03)
TROPONIN I SERPL DL<=0.01 NG/ML-MCNC: <0.006 NG/ML (ref 0–0.03)
WBC # BLD AUTO: 10.62 K/UL (ref 3.9–12.7)

## 2024-11-28 PROCEDURE — 83735 ASSAY OF MAGNESIUM: CPT | Performed by: STUDENT IN AN ORGANIZED HEALTH CARE EDUCATION/TRAINING PROGRAM

## 2024-11-28 PROCEDURE — 80053 COMPREHEN METABOLIC PANEL: CPT | Performed by: STUDENT IN AN ORGANIZED HEALTH CARE EDUCATION/TRAINING PROGRAM

## 2024-11-28 PROCEDURE — 85025 COMPLETE CBC W/AUTO DIFF WBC: CPT | Performed by: STUDENT IN AN ORGANIZED HEALTH CARE EDUCATION/TRAINING PROGRAM

## 2024-11-28 PROCEDURE — 25000003 PHARM REV CODE 250: Performed by: STUDENT IN AN ORGANIZED HEALTH CARE EDUCATION/TRAINING PROGRAM

## 2024-11-28 PROCEDURE — 93005 ELECTROCARDIOGRAM TRACING: CPT

## 2024-11-28 PROCEDURE — G0378 HOSPITAL OBSERVATION PER HR: HCPCS

## 2024-11-28 PROCEDURE — 99285 EMERGENCY DEPT VISIT HI MDM: CPT | Mod: 25

## 2024-11-28 PROCEDURE — 84484 ASSAY OF TROPONIN QUANT: CPT | Mod: 91 | Performed by: STUDENT IN AN ORGANIZED HEALTH CARE EDUCATION/TRAINING PROGRAM

## 2024-11-28 PROCEDURE — 93010 ELECTROCARDIOGRAM REPORT: CPT | Mod: ,,, | Performed by: INTERNAL MEDICINE

## 2024-11-28 PROCEDURE — 83880 ASSAY OF NATRIURETIC PEPTIDE: CPT | Performed by: STUDENT IN AN ORGANIZED HEALTH CARE EDUCATION/TRAINING PROGRAM

## 2024-11-28 PROCEDURE — 25000003 PHARM REV CODE 250: Performed by: PHYSICIAN ASSISTANT

## 2024-11-28 RX ORDER — SODIUM CHLORIDE 0.9 % (FLUSH) 0.9 %
10 SYRINGE (ML) INJECTION
Status: DISCONTINUED | OUTPATIENT
Start: 2024-11-28 | End: 2024-11-29 | Stop reason: HOSPADM

## 2024-11-28 RX ORDER — PANTOPRAZOLE SODIUM 40 MG/1
40 TABLET, DELAYED RELEASE ORAL DAILY
Status: DISCONTINUED | OUTPATIENT
Start: 2024-11-29 | End: 2024-11-29 | Stop reason: HOSPADM

## 2024-11-28 RX ORDER — ONDANSETRON 8 MG/1
8 TABLET, ORALLY DISINTEGRATING ORAL EVERY 8 HOURS PRN
Status: DISCONTINUED | OUTPATIENT
Start: 2024-11-28 | End: 2024-11-29 | Stop reason: HOSPADM

## 2024-11-28 RX ORDER — ACETAMINOPHEN 500 MG
1000 TABLET ORAL
Status: COMPLETED | OUTPATIENT
Start: 2024-11-28 | End: 2024-11-28

## 2024-11-28 RX ORDER — ACETAMINOPHEN 325 MG/1
650 TABLET ORAL EVERY 4 HOURS PRN
Status: DISCONTINUED | OUTPATIENT
Start: 2024-11-28 | End: 2024-11-29 | Stop reason: HOSPADM

## 2024-11-28 RX ORDER — METOPROLOL TARTRATE 25 MG/1
25 TABLET, FILM COATED ORAL 2 TIMES DAILY
Status: DISCONTINUED | OUTPATIENT
Start: 2024-11-28 | End: 2024-11-29 | Stop reason: HOSPADM

## 2024-11-28 RX ADMIN — ACETAMINOPHEN 1000 MG: 500 TABLET ORAL at 12:11

## 2024-11-28 RX ADMIN — ACETAMINOPHEN 650 MG: 325 TABLET ORAL at 09:11

## 2024-11-28 RX ADMIN — APIXABAN 5 MG: 5 TABLET, FILM COATED ORAL at 09:11

## 2024-11-28 RX ADMIN — METOPROLOL TARTRATE 25 MG: 25 TABLET, FILM COATED ORAL at 09:11

## 2024-11-28 NOTE — ED PROVIDER NOTES
Encounter Date: 11/28/2024       History     Chief Complaint   Patient presents with    Loss of Consciousness     Arrives via EMS with c/o LOC today recent dx of A-fib, in A-fib on monitor with EMS      HPI  Patient is a 74-year-old female with history of AFib on Eliquis, hypertension, arthritis, hyperlipidemia, iron deficiency anemia, aortic valve disease, chronic diastolic heart failure who presents for a syncopal episode at home.  Patient states that she woke up this morning and felt generally unwell.  She reports feeling lightheaded today.  She was also been having intermittent episodes of heart palpitations.  She was states that she always has chronic shortness of breath but has no new or worsening shortness of breath or dyspnea on exertion.  No chest pain or abdominal pain.  She felt lightheaded while making coffee earlier this morning and then had a syncopal episode.  She was unsure if she hit her head.  She is reporting headache.  Per EMS, patient initially has been intermittently in AFib with RVR, rate up to 120s.      Review of patient's allergies indicates:  No Known Allergies  Past Medical History:   Diagnosis Date    Arthritis     Chronic a-fib     Eye injury 4 yrs    hit od    Hypertension 04/04/2023    Nuclear sclerosis, bilateral 02/13/2019    Osteoporosis      Past Surgical History:   Procedure Laterality Date    APPENDECTOMY      COLONOSCOPY N/A 9/7/2017    Procedure: COLONOSCOPY;  Surgeon: Albino Fenton MD;  Location: [address]);  Service: Endoscopy;  Laterality: N/A;    COLONOSCOPY N/A 10/9/2024    Procedure: COLONOSCOPY;  Surgeon: Albino Fenton MD;  Location: [facility] ENDOSCOPY;  Service: Endoscopy;  Laterality: N/A;    ESOPHAGOGASTRODUODENOSCOPY  09/07/2017    ESOPHAGOGASTRODUODENOSCOPY N/A 10/9/2024    Procedure: EGD (ESOPHAGOGASTRODUODENOSCOPY);  Surgeon: Albino Fenton MD;  Location: [facility] ENDOSCOPY;  Service: Endoscopy;  Laterality: N/A;  Ref By:Naya  nathan,solange,suprep.  10/1/24- pc complete. DBM    KNEE SURGERY Right 12/05/2017    TKR    LASIK  7 yrs    ou    TONSILLECTOMY       Family History   Problem Relation Name Age of Onset    Hypertension Mother      Glaucoma Mother      Diabetes Mother      Cataracts Mother      Cancer Mother  74        lung    Heart disease Mother  55    Hypertension Father      Heart disease Father          onset early 60;s, Aortic valve replacement ,Rheumatic fever as a child     No Known Problems Sister      Diabetes Brother      Cancer Brother  66        mesothelioma    No Known Problems Maternal Aunt      No Known Problems Maternal Uncle      No Known Problems Paternal Aunt      No Known Problems Paternal Uncle      No Known Problems Maternal Grandmother      No Known Problems Maternal Grandfather      No Known Problems Paternal Grandmother      No Known Problems Paternal Grandfather      No Known Problems Other      Amblyopia Neg Hx      Blindness Neg Hx      Macular degeneration Neg Hx      Retinal detachment Neg Hx      Strabismus Neg Hx      Stroke Neg Hx      Thyroid disease Neg Hx      Melanoma Neg Hx       Social History     Tobacco Use    Smoking status: Never    Smokeless tobacco: Never   Substance Use Topics    Alcohol use: Not Currently     Alcohol/week: 1.0 standard drink of alcohol     Types: 1 Glasses of wine per week     Comment: glass of wine a night     Drug use: No     Review of Systems  A full ROS was obtained, see HPI for pertinent positives.     Physical Exam     Initial Vitals [11/28/24 0922]   BP Pulse Resp Temp SpO2   122/70 94 16 98.1 °F (36.7 °C) 96 %      MAP       --         Physical Exam  Constitutional: No acute distress  HENT: Normocephalic, atraumatic  Eyes: PERRL  Spine: No C/T/L-spine tenderness, no step offs or deformities  Respiratory: Breath sounds equal bilaterally, lungs clear  Cardiovascular: Regular rate and rhythm, intact distal pulses in all extremities  Gastrointestinal: Soft,  non-tender, non-distended  Pelvis: Stable  Musculoskeletal: No deformity, TTP over the distal right humerus and elbow, range of motion at the shoulder, elbow and wrist in the right upper extremity still with normal range of motion, all extremities neurovascularly intact distally with soft compartments throughout   Integumentary: Warm and dry  Neurological: Awake and alert, follows commands in all extremities, 5/5 motor and sensation light touch intact in all extremities, cranial nerves 2-12 grossly intact     ED Course   Procedures  Labs Reviewed   CBC W/ AUTO DIFFERENTIAL - Abnormal       Result Value    WBC 10.62      RBC 4.94      Hemoglobin 14.4      Hematocrit 44.5      MCV 90      MCH 29.1      MCHC 32.4      RDW 14.8 (*)     Platelets 242      MPV 13.0 (*)     Immature Granulocytes 0.3      Gran # (ANC) 9.2 (*)     Immature Grans (Abs) 0.03      Lymph # 0.6 (*)     Mono # 0.6      Eos # 0.2      Baso # 0.02      nRBC 0      Gran % 86.7 (*)     Lymph % 5.4 (*)     Mono % 6.0      Eosinophil % 1.4      Basophil % 0.2      Differential Method Automated     COMPREHENSIVE METABOLIC PANEL - Abnormal    Sodium 139      Potassium 4.2      Chloride 109      CO2 21 (*)     Glucose 122 (*)     BUN 21      Creatinine 1.0      Calcium 9.4      Total Protein 6.4      Albumin 3.3 (*)     Total Bilirubin 0.5      Alkaline Phosphatase 72      AST 13      ALT 11      eGFR 58.8 (*)     Anion Gap 9     TROPONIN I    Troponin I <0.006     B-TYPE NATRIURETIC PEPTIDE    BNP <10     TROPONIN I          Imaging Results              X-Ray Chest AP Portable (Final result)  Result time 11/28/24 12:06:10      Final result by Kacy Jeronimo MD (11/28/24 12:06:10)                   Impression:      No acute cardiopulmonary process.      Electronically signed by: Kacy Jeronimo MD  Date:    11/28/2024  Time:    12:06               Narrative:    EXAMINATION:  XR CHEST AP PORTABLE    CLINICAL HISTORY:  Chest  Pain;    TECHNIQUE:  Single frontal view of the chest was performed.    COMPARISON:  Prior dated 11/25/2020    FINDINGS:  Mediastinal structures are midline.  The cardiac silhouette is upper normal in size.  There is atherosclerotic calcification of the aortic arch.  Lungs appear clear.    There is diffuse osteopenia.  Remote right rib fractures are again demonstrated.  There is dextroscoliosis of the spine.                                       X-Ray Elbow Complete Right (Final result)  Result time 11/28/24 12:04:52      Final result by Kacy Jeronimo MD (11/28/24 12:04:52)                   Impression:      Diffuse osteopenia and degenerative change.  No acute fracture is identified.      Electronically signed by: Kacy Jeronimo MD  Date:    11/28/2024  Time:    12:04               Narrative:    EXAMINATION:  XR SHOULDER TRAUMA 3 VIEW RIGHT; XR HUMERUS 2 VIEW RIGHT; XR ELBOW COMPLETE 3 VIEW RIGHT    CLINICAL HISTORY:  Unspecified fall, initial encounter    TECHNIQUE:  Three or four views of the right shoulder, three views of the right elbow, and two views of the right humerus were obtained    COMPARISON:  None    FINDINGS:  Shoulder: There is diffuse osteopenia.  There is no acute fracture, dislocation, or bone destruction identified.  There is severe degenerative change of the acromioclavicular and glenohumeral joints.    Elbow: There is no acute fracture, dislocation, or bone destruction.  No joint effusion is seen.    Humerus: There is diffuse osteopenia.  No acute fracture is identified.                                       X-Ray Humerus 2 View Right (Final result)  Result time 11/28/24 12:04:52      Final result by Kacy Jeronimo MD (11/28/24 12:04:52)                   Impression:      Diffuse osteopenia and degenerative change.  No acute fracture is identified.      Electronically signed by: Kacy Jeronimo MD  Date:    11/28/2024  Time:    12:04               Narrative:     EXAMINATION:  XR SHOULDER TRAUMA 3 VIEW RIGHT; XR HUMERUS 2 VIEW RIGHT; XR ELBOW COMPLETE 3 VIEW RIGHT    CLINICAL HISTORY:  Unspecified fall, initial encounter    TECHNIQUE:  Three or four views of the right shoulder, three views of the right elbow, and two views of the right humerus were obtained    COMPARISON:  None    FINDINGS:  Shoulder: There is diffuse osteopenia.  There is no acute fracture, dislocation, or bone destruction identified.  There is severe degenerative change of the acromioclavicular and glenohumeral joints.    Elbow: There is no acute fracture, dislocation, or bone destruction.  No joint effusion is seen.    Humerus: There is diffuse osteopenia.  No acute fracture is identified.                                       X-Ray Shoulder Trauma Right (Final result)  Result time 11/28/24 12:04:52      Final result by Kacy Jeronimo MD (11/28/24 12:04:52)                   Impression:      Diffuse osteopenia and degenerative change.  No acute fracture is identified.      Electronically signed by: Kacy Jeronimo MD  Date:    11/28/2024  Time:    12:04               Narrative:    EXAMINATION:  XR SHOULDER TRAUMA 3 VIEW RIGHT; XR HUMERUS 2 VIEW RIGHT; XR ELBOW COMPLETE 3 VIEW RIGHT    CLINICAL HISTORY:  Unspecified fall, initial encounter    TECHNIQUE:  Three or four views of the right shoulder, three views of the right elbow, and two views of the right humerus were obtained    COMPARISON:  None    FINDINGS:  Shoulder: There is diffuse osteopenia.  There is no acute fracture, dislocation, or bone destruction identified.  There is severe degenerative change of the acromioclavicular and glenohumeral joints.    Elbow: There is no acute fracture, dislocation, or bone destruction.  No joint effusion is seen.    Humerus: There is diffuse osteopenia.  No acute fracture is identified.                                       CT Head Without Contrast (Final result)  Result time 11/28/24 10:47:23      Final  result by Robb Sheldon DO (11/28/24 10:47:23)                   Impression:      CT head: No acute intracranial findings as detailed above specifically without evidence for acute intracranial hemorrhage or definite new abnormal parenchymal attenuation.  Clinical correlation and further evaluation as warranted.    CT cervical spine: Continued spondylo degenerative change of the cervical spine without evidence for acute fracture or traumatic subluxation.    See above for additional details.      Electronically signed by: Robb Sheldon DO  Date:    11/28/2024  Time:    10:47               Narrative:    EXAMINATION:  CT HEAD WITHOUT CONTRAST; CT CERVICAL SPINE WITHOUT CONTRAST    CLINICAL HISTORY:  Head trauma, minor (Age >= 65y);; Neck trauma (Age >= 65y);    TECHNIQUE:  CT head: Multiple sequential 5 mm axial images of the head without contrast.  Coronal and sagittal reformatted imaging from the axial acquisition.    CT cervical spine: Multiple sequential 1.25 mm axial images of the cervical spine without contrast.  Coronal and sagittal reformatted imaging from the axial acquisition.    COMPARISON:  MRI 11/14/2024 and CTA 11/14/2024    FINDINGS:  CT head: No evidence for acute intracranial hemorrhage or sulcal effacement.  Confluent decreased attenuation supratentorial white matter corresponds to T2 flair signal abnormality seen on MRI which is nonspecific and may represent advanced chronic microvascular ischemic change.  No midline shift or mass effect.  Ventricles stable without hydrocephalus.  Visualized paranasal sinuses and mastoid air cells are clear..    CT cervical spine: Cervical sagittal alignment similar to prior with continued grade 1 anterolisthesis of C3 on C4.  There is degenerative disc disease with intervertebral height loss and endplate degeneration mid to lower cervical levels similar to prior.  No evidence for acute fracture or traumatic subluxation cervical spine    Allowing for artifact from  motion and beam hardening degenerative change most prominent at C 4/C5 with posterior disc osteophyte, uncovertebral joint hypertrophy and facet arthropathy mild central canal stenosis and mild right bony neural foraminal stenosis    There is no consolidation visualized lung apices                                       CT Cervical Spine Without Contrast (Final result)  Result time 11/28/24 10:47:23      Final result by Robb Sheldon DO (11/28/24 10:47:23)                   Impression:      CT head: No acute intracranial findings as detailed above specifically without evidence for acute intracranial hemorrhage or definite new abnormal parenchymal attenuation.  Clinical correlation and further evaluation as warranted.    CT cervical spine: Continued spondylo degenerative change of the cervical spine without evidence for acute fracture or traumatic subluxation.    See above for additional details.      Electronically signed by: Robb Sheldon DO  Date:    11/28/2024  Time:    10:47               Narrative:    EXAMINATION:  CT HEAD WITHOUT CONTRAST; CT CERVICAL SPINE WITHOUT CONTRAST    CLINICAL HISTORY:  Head trauma, minor (Age >= 65y);; Neck trauma (Age >= 65y);    TECHNIQUE:  CT head: Multiple sequential 5 mm axial images of the head without contrast.  Coronal and sagittal reformatted imaging from the axial acquisition.    CT cervical spine: Multiple sequential 1.25 mm axial images of the cervical spine without contrast.  Coronal and sagittal reformatted imaging from the axial acquisition.    COMPARISON:  MRI 11/14/2024 and CTA 11/14/2024    FINDINGS:  CT head: No evidence for acute intracranial hemorrhage or sulcal effacement.  Confluent decreased attenuation supratentorial white matter corresponds to T2 flair signal abnormality seen on MRI which is nonspecific and may represent advanced chronic microvascular ischemic change.  No midline shift or mass effect.  Ventricles stable without hydrocephalus.  Visualized  paranasal sinuses and mastoid air cells are clear..    CT cervical spine: Cervical sagittal alignment similar to prior with continued grade 1 anterolisthesis of C3 on C4.  There is degenerative disc disease with intervertebral height loss and endplate degeneration mid to lower cervical levels similar to prior.  No evidence for acute fracture or traumatic subluxation cervical spine    Allowing for artifact from motion and beam hardening degenerative change most prominent at C 4/C5 with posterior disc osteophyte, uncovertebral joint hypertrophy and facet arthropathy mild central canal stenosis and mild right bony neural foraminal stenosis    There is no consolidation visualized lung apices                                       Medications   acetaminophen tablet 1,000 mg (has no administration in time range)     Medical Decision Making  Patient is a 74-year-old female who presents for a syncopal episode at home.  She has been feeling lightheaded this morning with palpitations.  She is on anticoagulation.  Reporting headache after her fall.  No neck or back pain.  No new shortness of breath.  No chest pain.  Per EMS, patient was intermittently in AFib with RVR with rate in the 120s.  Upon arrival here, patient now in sinus rhythm, rate 80s.  I am concerned for an arrhythmia like AFib with RVR causing her to have these intermittent symptoms causing her to have the syncopal episode.  Cardiac workup ordered.  She is on anticoagulation.  No clinical signs of DVT.  Given head trauma, will obtain CT head and neck.  She was also having distal right humerus pain and elbow pain.  Plain films ordered.  She was currently rate controlled, will continue to closely monitor on cardiac monitoring.    EKG with sinus rhythm, rate 80s, no STEMI    Labs and imaging reviewed and reassuring.  Given pAF and high risk syncope, will admit to ED observation after discussion with Hospital Medicine.  Patient was had multiple workups for this  including carotid Dopplers, echo.  She was supposed to start multaq by cardiology which she was not yet been able to get filled.  Plan to admit to ED observation for cardiac monitoring and initiation of this medication.  Plan to discuss with EP.          Amount and/or Complexity of Data Reviewed  Labs: ordered.  Radiology: ordered.    Risk  OTC drugs.               ED Course as of 11/28/24 1229   Thu Nov 28, 2024   0930 Last Echo from 11/14/24  ·  Left Ventricle: The left ventricle is normal in size. Basal septal thickening. Normal wall motion. There is normal systolic function with a visually estimated ejection fraction of 60 - 65%. Grade I diastolic dysfunction.  ·  Right Ventricle: Normal right ventricular cavity size. Wall thickness is normal. Systolic function is normal.  ·  The left atrium is mildly dilated.  ·  Aortic Valve: There is moderate aortic valve sclerosis. Mildly restricted motion. There is mild aortic regurgitation.  ·  Tricuspid Valve: There is mild regurgitation.  ·  Pulmonary Artery: The estimated pulmonary artery systolic pressure is 32 mmHg.  ·  IVC/SVC: Normal venous pressure at 3 mmHg.   [NN]   1136 Impression:     CT head: No acute intracranial findings as detailed above specifically without evidence for acute intracranial hemorrhage or definite new abnormal parenchymal attenuation.  Clinical correlation and further evaluation as warranted.     CT cervical spine: Continued spondylo degenerative change of the cervical spine without evidence for acute fracture or traumatic subluxation.      [NN]      ED Course User Index  [NN] Haylee Peck MD                             Clinical Impression:  Final diagnoses:  [W19.XXXA] Fall  [I48.0] PAF (paroxysmal atrial fibrillation) (Primary)  [R55] Syncope, unspecified syncope type                 Haylee Peck MD  11/28/24 1229       Haylee Peck MD  11/28/24 0936

## 2024-11-28 NOTE — H&P
"ED Observation Unit  History and Physical      I assumed care of this patient from the Main ED at onset of observation time, 1:30pm on 11/28/2024.       History of Present Illness:    "Patient is a 74-year-old female with history of AFib on Eliquis, hypertension, arthritis, hyperlipidemia, iron deficiency anemia, aortic valve disease, chronic diastolic heart failure who presents for a syncopal episode at home.  Patient states that she woke up this morning and felt generally unwell.  She reports feeling lightheaded today.  She was also been having intermittent episodes of heart palpitations.  She was states that she always has chronic shortness of breath but has no new or worsening shortness of breath or dyspnea on exertion.  No chest pain or abdominal pain.  She felt lightheaded while making coffee earlier this morning and then had a syncopal episode.  She was unsure if she hit her head.  She is reporting headache.  Per EMS, patient initially has been intermittently in AFib with RVR, rate up to 120s.  "    I reviewed the ED Provider Note dated 11/28/24 prior to my evaluation of this patient.  I reviewed all labs and imaging performed in the Main ED, prior to patient being placed in Observation. Patient was placed in the ED Observation Unit for Syncope.      Seen and examined by this provider. Admitted with syncope 2/2 afib RVR. Recent admission and has extensive workup including carotid US, MRI brain and Echo. EF noted 60-6% with Grade 1 DD.       Left Ventricle: The left ventricle is normal in size. Basal septal thickening. Normal wall motion. There is normal systolic function with a visually estimated ejection fraction of 60 - 65%. Grade I diastolic dysfunction.    Right Ventricle: Normal right ventricular cavity size. Wall thickness is normal. Systolic function is normal.    The left atrium is mildly dilated.    Aortic Valve: There is moderate aortic valve sclerosis. Mildly restricted motion. There is mild aortic " regurgitation.    Tricuspid Valve: There is mild regurgitation.    Pulmonary Artery: The estimated pulmonary artery systolic pressure is 32 mmHg.    IVC/SVC: Normal venous pressure at 3 mmHg.       She recently decrease her dose of lopressor PO to 25mg and she was experiencing possible symptomatic bradycardia. Seen by EP Dr. Temple and recommended multaq and possible PVI on the future.     She has only taken one dose of the multaq, as many pharmacies were out. Currently in NSR.    Imaging and labs reviewed without significant findings.     Current complains of decreased urine output with last void 24 hours ago.       PMHx   Past Medical History:   Diagnosis Date    Arthritis     Chronic a-fib     Eye injury 4 yrs    hit od    Hypertension 04/04/2023    Nuclear sclerosis, bilateral 02/13/2019    Osteoporosis       Past Surgical History:   Procedure Laterality Date    APPENDECTOMY      COLONOSCOPY N/A 9/7/2017    Procedure: COLONOSCOPY;  Surgeon: Albino Fenton MD;  Location: [address]);  Service: Endoscopy;  Laterality: N/A;    COLONOSCOPY N/A 10/9/2024    Procedure: COLONOSCOPY;  Surgeon: Albino Fenton MD;  Location: [facility] ENDOSCOPY;  Service: Endoscopy;  Laterality: N/A;    ESOPHAGOGASTRODUODENOSCOPY  09/07/2017    ESOPHAGOGASTRODUODENOSCOPY N/A 10/9/2024    Procedure: EGD (ESOPHAGOGASTRODUODENOSCOPY);  Surgeon: Albino Fenton MD;  Location: [facility] ENDOSCOPY;  Service: Endoscopy;  Laterality: N/A;  Ref By:solange Jaramillo suprep.  10/1/24- pc complete. DBM    KNEE SURGERY Right 12/05/2017    TKR    LASIK  7 yrs    ou    TONSILLECTOMY          Family Hx   Family History   Problem Relation Name Age of Onset    Hypertension Mother      Glaucoma Mother      Diabetes Mother      Cataracts Mother      Cancer Mother  74        lung    Heart disease Mother  55    Hypertension Father      Heart disease Father          onset early 60;s, Aortic valve replacement ,Rheumatic fever as a child     No Known  Problems Sister      Diabetes Brother      Cancer Brother  66        mesothelioma    No Known Problems Maternal Aunt      No Known Problems Maternal Uncle      No Known Problems Paternal Aunt      No Known Problems Paternal Uncle      No Known Problems Maternal Grandmother      No Known Problems Maternal Grandfather      No Known Problems Paternal Grandmother      No Known Problems Paternal Grandfather      No Known Problems Other      Amblyopia Neg Hx      Blindness Neg Hx      Macular degeneration Neg Hx      Retinal detachment Neg Hx      Strabismus Neg Hx      Stroke Neg Hx      Thyroid disease Neg Hx      Melanoma Neg Hx          Social Hx   Social History     Socioeconomic History    Marital status: Single   Occupational History     [place] [employer] [place]   Tobacco Use    Smoking status: Never    Smokeless tobacco: Never   Substance and Sexual Activity    Alcohol use: Not Currently     Alcohol/week: 1.0 standard drink of alcohol     Types: 1 Glasses of wine per week     Comment: glass of wine a night     Drug use: No    Sexual activity: Never     Social Drivers of Health     Financial Resource Strain: Low Risk  (11/26/2024)    Overall Financial Resource Strain (CARDIA)     Difficulty of Paying Living Expenses: Not hard at all   Food Insecurity: No Food Insecurity (11/26/2024)    Hunger Vital Sign     Worried About Running Out of Food in the Last Year: Never true     Ran Out of Food in the Last Year: Never true   Transportation Needs: No Transportation Needs (11/26/2024)    PRAPARE - Transportation     Lack of Transportation (Medical): No     Lack of Transportation (Non-Medical): No   Physical Activity: Insufficiently Active (11/14/2024)    Exercise Vital Sign     Days of Exercise per Week: 3 days     Minutes of Exercise per Session: 40 min   Stress: No Stress Concern Present (11/26/2024)    [employer] [place] [employer] - Occupational Stress Questionnaire     Feeling of Stress : Not at all   Housing  Stability: Low Risk  (11/26/2024)    Housing Stability Vital Sign     Unable to Pay for Housing in the Last Year: No     Homeless in the Last Year: No        Vital Signs   Vitals:    11/28/24 0922 11/28/24 1015   BP: 122/70 116/68   BP Location:  Left arm   Patient Position:  Lying   Pulse: 94 72   Resp: 16 11   Temp: 98.1 °F (36.7 °C)    TempSrc: Oral    SpO2: 96% 95%   Weight: 68 kg (150 lb)         Review of Systems  Review of Systems   Constitutional: Negative.    Skin: Negative.        Physical Exam  Physical Exam    Medications:   Scheduled Meds:   apixaban  5 mg Oral BID    dronedarone  400 mg Oral BID WM    metoprolol tartrate  25 mg Oral BID    [START ON 11/29/2024] pantoprazole  40 mg Oral Daily     Continuous Infusions:  PRN Meds:.  Current Facility-Administered Medications:     acetaminophen, 650 mg, Oral, Q4H PRN    ondansetron, 8 mg, Oral, Q8H PRN    sodium chloride 0.9%, 10 mL, Intravenous, PRN      Assessment/Plan:  Syncope   PAF  Decreased urine output    - suspected syncope 2/2 PAF episodes. Discussed case with on-call cardiology fellow, who recommenced 24 hours of observation and initiated of multaq  -follow-up with EP if symptoms persist  - check magnesium, telemetry   - recent echo noted   - no obvious trauma from syncopal episode, will continue with eliquis  - bladder scan, follow-up, does not poor oral intake over the last 24 hours     Case was discussed with the ED provider, Dr. Peck and Cardiology (any consultants).        Maribel Sanchez PA-C

## 2024-11-28 NOTE — Clinical Note
Diagnosis: PAF (paroxysmal atrial fibrillation) [077250]   Future Attending Provider: GUS NOLAN [[id number]]   Is the patient being sent to ED Observation?: Yes

## 2024-11-28 NOTE — ED NOTES
C/C: 74 y.o. female arrived to the ED via EMS for c/o syncope. Patient with hx of atrial fibrillation presents from home after a reported unwitnessed syncope and collapse. Patient states she last remembers attempting to make a pot of coffee this morning, 11/28/24, and when she woke up, she found herself on ground. Patient denies any symptoms preceding the fall, however, reported soreness to RUE and noted to be in afib upon EMS arrival. Endorses dizziness/lightheadedness and pain to RUE. Patient denies hitting head, visual changes, N/V/D, recent swelling to BLE/abdomen, fevers or chills.     APPEARANCE: awake, alert, and appears to be in mild discomfort. Pain score 5/10. Placed on cont cardiac monitoring, auto BP cuff, cont pulse ox, and changed into hospital gown. Bed in lowest and locked position w/ side rails up x2. Call light w/n pt reach and instructed on how to use.    SKIN: warm, dry and intact. No visible breakdown or bruising noted to extremities. Well-healed past surgical scar noted to right anterior knee.    MUSCULOSKELETAL: Patient moving all extremities spontaneously, no obvious swelling or deformities noted. +right elbow pain - no visible deformity   RESPIRATORY: Airway open and patent. +shortness of breath. Respirations even, unlabored, equal bilaterally on inspiration and expiration.   CARDIAC: Regular HR. Denies CP, 2+ distal pulses; no peripheral edema  ABDOMEN: S/ND/NT, Denies N/V/D. +decrease in appetite  : Pt voids spontaneously, denies difficulty  NEUROLOGIC: AAO x 4; speaking and following commands appropriately. Equal strength in all extremities except for RUE d/t pain; denies numbness/tingling. + dizziness, HA. Denies visual changes

## 2024-11-29 VITALS
SYSTOLIC BLOOD PRESSURE: 119 MMHG | OXYGEN SATURATION: 98 % | RESPIRATION RATE: 16 BRPM | WEIGHT: 150 LBS | DIASTOLIC BLOOD PRESSURE: 56 MMHG | BODY MASS INDEX: 24.96 KG/M2 | TEMPERATURE: 98 F | HEART RATE: 65 BPM

## 2024-11-29 DIAGNOSIS — I48.0 PAROXYSMAL ATRIAL FIBRILLATION: Primary | ICD-10-CM

## 2024-11-29 LAB
ANION GAP SERPL CALC-SCNC: 8 MMOL/L (ref 8–16)
BUN SERPL-MCNC: 19 MG/DL (ref 8–23)
CALCIUM SERPL-MCNC: 9.3 MG/DL (ref 8.7–10.5)
CHLORIDE SERPL-SCNC: 110 MMOL/L (ref 95–110)
CO2 SERPL-SCNC: 19 MMOL/L (ref 23–29)
CREAT SERPL-MCNC: 0.9 MG/DL (ref 0.5–1.4)
EST. GFR  (NO RACE VARIABLE): >60 ML/MIN/1.73 M^2
GLUCOSE SERPL-MCNC: 106 MG/DL (ref 70–110)
OHS QRS DURATION: 78 MS
OHS QTC CALCULATION: 425 MS
POTASSIUM SERPL-SCNC: 4.1 MMOL/L (ref 3.5–5.1)
SODIUM SERPL-SCNC: 137 MMOL/L (ref 136–145)

## 2024-11-29 PROCEDURE — 80048 BASIC METABOLIC PNL TOTAL CA: CPT | Performed by: PHYSICIAN ASSISTANT

## 2024-11-29 PROCEDURE — 25000003 PHARM REV CODE 250: Performed by: PHYSICIAN ASSISTANT

## 2024-11-29 PROCEDURE — G0378 HOSPITAL OBSERVATION PER HR: HCPCS

## 2024-11-29 PROCEDURE — 25000003 PHARM REV CODE 250: Performed by: STUDENT IN AN ORGANIZED HEALTH CARE EDUCATION/TRAINING PROGRAM

## 2024-11-29 RX ORDER — TALC
6 POWDER (GRAM) TOPICAL NIGHTLY PRN
Status: DISCONTINUED | OUTPATIENT
Start: 2024-11-29 | End: 2024-11-29 | Stop reason: HOSPADM

## 2024-11-29 RX ADMIN — PANTOPRAZOLE SODIUM 40 MG: 40 TABLET, DELAYED RELEASE ORAL at 09:11

## 2024-11-29 RX ADMIN — APIXABAN 5 MG: 5 TABLET, FILM COATED ORAL at 09:11

## 2024-11-29 RX ADMIN — METOPROLOL TARTRATE 25 MG: 25 TABLET, FILM COATED ORAL at 09:11

## 2024-11-29 RX ADMIN — ACETAMINOPHEN 650 MG: 325 TABLET ORAL at 12:11

## 2024-11-29 RX ADMIN — DRONEDARONE 400 MG: 400 TABLET, FILM COATED ORAL at 09:11

## 2024-11-29 RX ADMIN — DRONEDARONE 400 MG: 400 TABLET, FILM COATED ORAL at 12:11

## 2024-11-29 RX ADMIN — Medication 6 MG: at 03:11

## 2024-11-29 NOTE — DISCHARGE INSTRUCTIONS
Future Appointments   Date Time Provider Department Center   12/27/2024 10:15 AM [facility] [id number] 485 LB LIMIT NOM XRAYORT [facility]   12/27/2024 10:30 AM Mac Brice III, MD [facility] ORTHO [facility]   1/17/2025  9:15 AM Flaca Varghese MD [facility] OPHTHAL [facility]   2/7/2025 10:00 AM Evans Vernon MD [facility] CARDIO [facility]   4/8/2025  9:40 AM Trisha Higginbotham MD [facility] MED [place]   5/20/2025  1:45 PM INJECTION [facility] AMB INF [facility]     Your dronedarone (Multaq) prescription is available at the [facility] on [address] [facility]. ([address]).   Follow up in electrophysiology clinic next week for reevaluation.    Return to the ER for any new or worsening symptoms.

## 2024-11-29 NOTE — PLAN OF CARE
[facility] - Emergency Dept  Initial Discharge Assessment       Primary Care Provider: Trisha Higginbotham MD    Admission Diagnosis: PAF (paroxysmal atrial fibrillation) [I48.0]    Admission Date: 11/28/2024  Expected Discharge Date: 11/29/2024    Transition of Care Barriers: (P) None    Payor: [employer] MEDICARE ADVANTAGE / Plan: [employer] MCARE ADV / Product Type: Medicare Advantage /     Extended Emergency Contact Information  Primary Emergency Contact: Janny Rod  Home Phone: [phone]  Relation: Neighbor  Preferred language: English   needed? No  Secondary Emergency Contact: Leila Lawrence   [place]  Mobile Phone: [phone]  Relation: Relative    Discharge Plan A: (P) Home  Discharge Plan B: (P) Home      [email or web address] #[email or web address] [address] AT [address] [address]  Phone: [phone] Fax: [phone]      Initial Assessment (most recent)       Adult Discharge Assessment - 11/29/24 1618          Discharge Assessment    Assessment Type Discharge Planning Assessment (P)      Confirmed/corrected address, phone number and insurance Yes (P)      Confirmed Demographics Correct on Facesheet (P)      Source of Information patient;health record (P)      Does patient/caregiver understand observation status Yes (P)      Communicated LISSY with patient/caregiver Yes (P)      People in Home alone (P)      Prior to hospitilization cognitive status: Alert/Oriented (P)      Current cognitive status: Alert/Oriented (P)      Patient currently being followed by outpatient case management? No (P)      Do you currently have service(s) that help you manage your care at home? No (P)      Do you take prescription medications? Yes (P)      Do you have prescription coverage? Yes (P)      Do you have any problems affording any of your prescribed medications? No (P)      Is the patient taking medications as prescribed?  yes (P)      How do you get to doctors appointments? car, drives self;family or friend will provide (P)      Are you on dialysis? No (P)      Discharge Plan A Home (P)      Discharge Plan B Home (P)      DME Needed Upon Discharge  none (P)      Discharge Plan discussed with: Patient (P)      Transition of Care Barriers None (P)         Physical Activity    On average, how many days per week do you engage in moderate to strenuous exercise (like a brisk walk)? 0 days (P)         Financial Resource Strain    How hard is it for you to pay for the very basics like food, housing, medical care, and heating? Not very hard (P)         Housing Stability    In the last 12 months, was there a time when you were not able to pay the mortgage or rent on time? No (P)         Transportation Needs    Has the lack of transportation kept you from medical appointments, meetings, work or from getting things needed for daily living? No (P)         Food Insecurity    Within the past 12 months, you worried that your food would run out before you got the money to buy more. Never true (P)         Stress    Do you feel stress - tense, restless, nervous, or anxious, or unable to sleep at night because your mind is troubled all the time - these days? Only a little (P)         Social Isolation    How often do you feel lonely or isolated from those around you?  Sometimes (P)         Alcohol Use    Q1: How often do you have a drink containing alcohol? Patient declined (P)         Health Literacy    How often do you need to have someone help you when you read instructions, pamphlets, or other written material from your doctor or pharmacy? Rarely (P)

## 2024-11-29 NOTE — DISCHARGE SUMMARY
"ED Observation Unit  Discharge Summary        History of Present Illness:    Patient is a 74-year-old female with history of AFib on Eliquis, hypertension, arthritis, hyperlipidemia, iron deficiency anemia, aortic valve disease, chronic diastolic heart failure who presents for a syncopal episode at home.  Patient states that she woke up this morning and felt generally unwell.  She reports feeling lightheaded today.  She was also been having intermittent episodes of heart palpitations.  She was states that she always has chronic shortness of breath but has no new or worsening shortness of breath or dyspnea on exertion.  No chest pain or abdominal pain.  She felt lightheaded while making coffee earlier this morning and then had a syncopal episode.  She was unsure if she hit her head.  She is reporting headache.  Per EMS, patient initially has been intermittently in AFib with RVR, rate up to 120s.  "     I reviewed the ED Provider Note dated 11/28/24 prior to my evaluation of this patient.  I reviewed all labs and imaging performed in the Main ED, prior to patient being placed in Observation. Patient was placed in the ED Observation Unit for Syncope.        Seen and examined by this provider. Admitted with syncope 2/2 afib RVR. Recent admission and has extensive workup including carotid US, MRI brain and Echo. EF noted 60-6% with Grade 1 DD.        Left Ventricle: The left ventricle is normal in size. Basal septal thickening. Normal wall motion. There is normal systolic function with a visually estimated ejection fraction of 60 - 65%. Grade I diastolic dysfunction.    Right Ventricle: Normal right ventricular cavity size. Wall thickness is normal. Systolic function is normal.    The left atrium is mildly dilated.    Aortic Valve: There is moderate aortic valve sclerosis. Mildly restricted motion. There is mild aortic regurgitation.    Tricuspid Valve: There is mild regurgitation.    Pulmonary Artery: The estimated " pulmonary artery systolic pressure is 32 mmHg.    IVC/SVC: Normal venous pressure at 3 mmHg.        She recently decrease her dose of lopressor PO to 25mg and she was experiencing possible symptomatic bradycardia. Seen by EP Dr. Temple and recommended multaq and possible PVI on the future.      She has only taken one dose of the multaq, as many pharmacies were out. Currently in NSR.     Imaging and labs reviewed without significant findings.      Current complains of decreased urine output with last void 24 hours ago.        Observation Course:    Patient placed in observation for syncope. Patient was monitored on telemetry for > 24 hours without acute events.  She received 2 doses of Multaq per cardiology recommendations.  No additional episodes of syncope were noted.  She had a recent echocardiogram done that was not repeated.  She was discharged in stable condition.  She will follow up in electrophysiology Clinic.  ED return precautions given.    Consultants:    None    Final Diagnosis:  Syncope, atrial fibrillation    Discharge Condition: Good    Disposition: Home or Self Care     Time spent on the discharge of the patient including review of hospital course with the patient. reviewing discharge medications and arranging follow-up care 35 minutes.  Patient was seen and examined on the date of discharge and determined to be suitable for discharge.    Follow Up:  Future Appointments   Date Time Provider Department Center   12/12/2024 10:40 AM Trisha Higginbotham MD [facility] MED [place]   12/27/2024 10:15 AM [facility] XRORTHO1 485 LB LIMIT [facility] XRAYORT [facility]   12/27/2024 10:30 AM Mac Brice III, MD [facility]   1/8/2025  9:15 AM EKG, APPT [facility] EKG [facility]   1/8/2025  9:30 AM Ankita Matt NP [facility]HYTH [facility]   1/17/2025  9:15 AM Flaca Varghese MD [facility]AL [facility]   2/7/2025 10:00 AM Evans Vernon MD [facility] CARDIO [facility]   4/8/2025  9:40 AM Trisha Higginbotham MD [facility] MED  [place]   5/20/2025  1:45 PM INJECTION NOM AMB INF [facility]

## 2024-11-29 NOTE — PLAN OF CARE
[facility] - Emergency Dept  Discharge Final Note    Primary Care Provider: Trisha Higginbotham MD    Expected Discharge Date: 11/29/2024    Final Discharge Note (most recent)       Final Note - 11/29/24 1632          Final Note    Assessment Type Final Discharge Note (P)      Anticipated Discharge Disposition Home or Self Care (P)      Hospital Resources/Appts/Education Provided Provided patient/caregiver with written discharge plan information (P)         Post-Acute Status    Discharge Delays None known at this time (P)                      Important Message from Medicare             Contact Info       Carlos Temple MD   Specialty: Electrophysiology, Cardiology    [address] [facility] [address]   Phone: [phone]       Next Steps: Go to    Instructions: see as scheduled    Trisha Higginbotham MD   Specialty: Family Medicine, Wound Care   Relationship: PCP - General  Hypertension Digital Medicine Responsible Provider    [facility] [place] [address]   Phone: [phone]       Next Steps: Go to    [facility] - Emergency Dept   Specialty: Emergency Medicine    [address] [facility] [address]   Phone: [phone]       Next Steps: Go to    Instructions: If symptoms worsen

## 2024-11-29 NOTE — PROGRESS NOTES
"ED Observation Unit  Progress Note      HPI   "Patient is a 74-year-old female with history of AFib on Eliquis, hypertension, arthritis, hyperlipidemia, iron deficiency anemia, aortic valve disease, chronic diastolic heart failure who presents for a syncopal episode at home.  Patient states that she woke up this morning and felt generally unwell.  She reports feeling lightheaded today.  She was also been having intermittent episodes of heart palpitations.  She was states that she always has chronic shortness of breath but has no new or worsening shortness of breath or dyspnea on exertion.  No chest pain or abdominal pain.  She felt lightheaded while making coffee earlier this morning and then had a syncopal episode.  She was unsure if she hit her head.  She is reporting headache.  Per EMS, patient initially has been intermittently in AFib with RVR, rate up to 120s.  "     I reviewed the ED Provider Note dated 11/28/24 prior to my evaluation of this patient.  I reviewed all labs and imaging performed in the Main ED, prior to patient being placed in Observation. Patient was placed in the ED Observation Unit for Syncope.        Seen and examined by this provider. Admitted with syncope 2/2 afib RVR. Recent admission and has extensive workup including carotid US, MRI brain and Echo. EF noted 60-6% with Grade 1 DD.        Left Ventricle: The left ventricle is normal in size. Basal septal thickening. Normal wall motion. There is normal systolic function with a visually estimated ejection fraction of 60 - 65%. Grade I diastolic dysfunction.    Right Ventricle: Normal right ventricular cavity size. Wall thickness is normal. Systolic function is normal.    The left atrium is mildly dilated.    Aortic Valve: There is moderate aortic valve sclerosis. Mildly restricted motion. There is mild aortic regurgitation.    Tricuspid Valve: There is mild regurgitation.    Pulmonary Artery: The estimated pulmonary artery systolic pressure " is 32 mmHg.    IVC/SVC: Normal venous pressure at 3 mmHg.        She recently decrease her dose of lopressor PO to 25mg and she was experiencing possible symptomatic bradycardia. Seen by EP Dr. Temple and recommended multaq and possible PVI on the future.      She has only taken one dose of the multaq, as many pharmacies were out. Currently in NSR.     Imaging and labs reviewed without significant findings.      Current complains of decreased urine output with last void 24 hours ago.     Interval History   No acute events overnight.  VSS.  Patient is well-appearing this morning in has no complaints.  States she feels well.  States that she has been able to urinate.  Patient will complete her 24 hour monitoring.  Around 1:00 p.m. today.  Multaq prescription sent to [facility].     PMHx   Past Medical History:   Diagnosis Date    Arthritis     Chronic a-fib     Eye injury 4 yrs    hit od    Hypertension 04/04/2023    Nuclear sclerosis, bilateral 02/13/2019    Osteoporosis       Past Surgical History:   Procedure Laterality Date    APPENDECTOMY      COLONOSCOPY N/A 9/7/2017    Procedure: COLONOSCOPY;  Surgeon: Albino Fenton MD;  Location: [address]);  Service: Endoscopy;  Laterality: N/A;    COLONOSCOPY N/A 10/9/2024    Procedure: COLONOSCOPY;  Surgeon: Albino Fenton MD;  Location: [facility] ENDOSCOPY;  Service: Endoscopy;  Laterality: N/A;    ESOPHAGOGASTRODUODENOSCOPY  09/07/2017    ESOPHAGOGASTRODUODENOSCOPY N/A 10/9/2024    Procedure: EGD (ESOPHAGOGASTRODUODENOSCOPY);  Surgeon: Albino Fenton MD;  Location: [facility] ENDOSCOPY;  Service: Endoscopy;  Laterality: N/A;  Ref By:solange Jaramillo suprep.  10/1/24- pc complete. DBM    KNEE SURGERY Right 12/05/2017    TKR    LASIK  7 yrs    ou    TONSILLECTOMY          Family Hx   Family History   Problem Relation Name Age of Onset    Hypertension Mother      Glaucoma Mother      Diabetes Mother      Cataracts Mother      Cancer Mother  74         lung    Heart disease Mother  55    Hypertension Father      Heart disease Father          onset early 60;s, Aortic valve replacement ,Rheumatic fever as a child     No Known Problems Sister      Diabetes Brother      Cancer Brother  66        mesothelioma    No Known Problems Maternal Aunt      No Known Problems Maternal Uncle      No Known Problems Paternal Aunt      No Known Problems Paternal Uncle      No Known Problems Maternal Grandmother      No Known Problems Maternal Grandfather      No Known Problems Paternal Grandmother      No Known Problems Paternal Grandfather      No Known Problems Other      Amblyopia Neg Hx      Blindness Neg Hx      Macular degeneration Neg Hx      Retinal detachment Neg Hx      Strabismus Neg Hx      Stroke Neg Hx      Thyroid disease Neg Hx      Melanoma Neg Hx          Social Hx   Social History     Socioeconomic History    Marital status: Single   Occupational History     Employer: [employer]   Tobacco Use    Smoking status: Never    Smokeless tobacco: Never   Substance and Sexual Activity    Alcohol use: Not Currently     Alcohol/week: 1.0 standard drink of alcohol     Types: 1 Glasses of wine per week     Comment: glass of wine a night     Drug use: No    Sexual activity: Never     Social Drivers of Health     Financial Resource Strain: Low Risk  (11/26/2024)    Overall Financial Resource Strain (CARDIA)     Difficulty of Paying Living Expenses: Not hard at all   Food Insecurity: No Food Insecurity (11/26/2024)    Hunger Vital Sign     Worried About Running Out of Food in the Last Year: Never true     Ran Out of Food in the Last Year: Never true   Transportation Needs: No Transportation Needs (11/26/2024)    PRAPARE - Transportation     Lack of Transportation (Medical): No     Lack of Transportation (Non-Medical): No   Physical Activity: Insufficiently Active (11/14/2024)    Exercise Vital Sign     Days of Exercise per Week: 3 days     Minutes of Exercise per Session: 40 min    Stress: No Stress Concern Present (11/26/2024)    [place] [place] [employer] - Occupational Stress Questionnaire     Feeling of Stress : Not at all   Housing Stability: Low Risk  (11/26/2024)    Housing Stability Vital Sign     Unable to Pay for Housing in the Last Year: No     Homeless in the Last Year: No        Vital Signs   Vitals:    11/29/24 0403 11/29/24 0530 11/29/24 0802 11/29/24 0909   BP: (!) 116/56  137/63    BP Location:   Left arm    Patient Position:   Lying    Pulse: 70  72    Resp: 14  16    Temp:  98 °F (36.7 °C)  98.3 °F (36.8 °C)   TempSrc:  Oral  Oral   SpO2: 100%  99%    Weight:            Review of Systems  ROS    Brief Physical Exam/Reassessment   Physical Exam  Vitals reviewed.   Constitutional:       General: She is not in acute distress.     Appearance: She is not diaphoretic.   HENT:      Head: Normocephalic and atraumatic.   Cardiovascular:      Rate and Rhythm: Normal rate. Rhythm regularly irregular.      Heart sounds: Heart sounds not distant. Murmur heard.      Systolic murmur is present.      No friction rub. No gallop.      Comments: Skipped beat every 6th beat.   Pulmonary:      Effort: Pulmonary effort is normal. No respiratory distress.      Breath sounds: Normal breath sounds. No decreased breath sounds, wheezing, rhonchi or rales.   Chest:      Chest wall: No tenderness.   Musculoskeletal:      Cervical back: Neck supple.   Skin:     General: Skin is warm and dry.   Neurological:      Mental Status: She is alert.   Psychiatric:         Mood and Affect: Mood and affect normal.         Labs/Imaging   Labs Reviewed   CBC W/ AUTO DIFFERENTIAL - Abnormal       Result Value    WBC 10.62      RBC 4.94      Hemoglobin 14.4      Hematocrit 44.5      MCV 90      MCH 29.1      MCHC 32.4      RDW 14.8 (*)     Platelets 242      MPV 13.0 (*)     Immature Granulocytes 0.3      Gran # (ANC) 9.2 (*)     Immature Grans (Abs) 0.03      Lymph # 0.6 (*)     Mono # 0.6      Eos #  0.2      Baso # 0.02      nRBC 0      Gran % 86.7 (*)     Lymph % 5.4 (*)     Mono % 6.0      Eosinophil % 1.4      Basophil % 0.2      Differential Method Automated     COMPREHENSIVE METABOLIC PANEL - Abnormal    Sodium 139      Potassium 4.2      Chloride 109      CO2 21 (*)     Glucose 122 (*)     BUN 21      Creatinine 1.0      Calcium 9.4      Total Protein 6.4      Albumin 3.3 (*)     Total Bilirubin 0.5      Alkaline Phosphatase 72      AST 13      ALT 11      eGFR 58.8 (*)     Anion Gap 9     BASIC METABOLIC PANEL - Abnormal    Sodium 137      Potassium 4.1      Chloride 110      CO2 19 (*)     Glucose 106      BUN 19      Creatinine 0.9      Calcium 9.3      Anion Gap 8      eGFR >60.0     TROPONIN I    Troponin I <0.006     B-TYPE NATRIURETIC PEPTIDE    BNP <10     TROPONIN I    Troponin I <0.006     MAGNESIUM    Magnesium 2.1     MAGNESIUM      Imaging Results              X-Ray Chest AP Portable (Final result)  Result time 11/28/24 12:06:10      Final result by Kacy Jeronimo MD (11/28/24 12:06:10)                   Impression:      No acute cardiopulmonary process.      Electronically signed by: Kacy Jeronimo MD  Date:    11/28/2024  Time:    12:06               Narrative:    EXAMINATION:  XR CHEST AP PORTABLE    CLINICAL HISTORY:  Chest Pain;    TECHNIQUE:  Single frontal view of the chest was performed.    COMPARISON:  Prior dated 11/25/2020    FINDINGS:  Mediastinal structures are midline.  The cardiac silhouette is upper normal in size.  There is atherosclerotic calcification of the aortic arch.  Lungs appear clear.    There is diffuse osteopenia.  Remote right rib fractures are again demonstrated.  There is dextroscoliosis of the spine.                                       X-Ray Elbow Complete Right (Final result)  Result time 11/28/24 12:04:52      Final result by Kacy Jeronimo MD (11/28/24 12:04:52)                   Impression:      Diffuse osteopenia and degenerative change.   No acute fracture is identified.      Electronically signed by: Kacy Jeronimo MD  Date:    11/28/2024  Time:    12:04               Narrative:    EXAMINATION:  XR SHOULDER TRAUMA 3 VIEW RIGHT; XR HUMERUS 2 VIEW RIGHT; XR ELBOW COMPLETE 3 VIEW RIGHT    CLINICAL HISTORY:  Unspecified fall, initial encounter    TECHNIQUE:  Three or four views of the right shoulder, three views of the right elbow, and two views of the right humerus were obtained    COMPARISON:  None    FINDINGS:  Shoulder: There is diffuse osteopenia.  There is no acute fracture, dislocation, or bone destruction identified.  There is severe degenerative change of the acromioclavicular and glenohumeral joints.    Elbow: There is no acute fracture, dislocation, or bone destruction.  No joint effusion is seen.    Humerus: There is diffuse osteopenia.  No acute fracture is identified.                                       X-Ray Humerus 2 View Right (Final result)  Result time 11/28/24 12:04:52      Final result by Kacy Jeronimo MD (11/28/24 12:04:52)                   Impression:      Diffuse osteopenia and degenerative change.  No acute fracture is identified.      Electronically signed by: Kacy Jeronimo MD  Date:    11/28/2024  Time:    12:04               Narrative:    EXAMINATION:  XR SHOULDER TRAUMA 3 VIEW RIGHT; XR HUMERUS 2 VIEW RIGHT; XR ELBOW COMPLETE 3 VIEW RIGHT    CLINICAL HISTORY:  Unspecified fall, initial encounter    TECHNIQUE:  Three or four views of the right shoulder, three views of the right elbow, and two views of the right humerus were obtained    COMPARISON:  None    FINDINGS:  Shoulder: There is diffuse osteopenia.  There is no acute fracture, dislocation, or bone destruction identified.  There is severe degenerative change of the acromioclavicular and glenohumeral joints.    Elbow: There is no acute fracture, dislocation, or bone destruction.  No joint effusion is seen.    Humerus: There is diffuse osteopenia.  No  acute fracture is identified.                                       X-Ray Shoulder Trauma Right (Final result)  Result time 11/28/24 12:04:52      Final result by Kacy Jeronimo MD (11/28/24 12:04:52)                   Impression:      Diffuse osteopenia and degenerative change.  No acute fracture is identified.      Electronically signed by: Kacy Jeronimo MD  Date:    11/28/2024  Time:    12:04               Narrative:    EXAMINATION:  XR SHOULDER TRAUMA 3 VIEW RIGHT; XR HUMERUS 2 VIEW RIGHT; XR ELBOW COMPLETE 3 VIEW RIGHT    CLINICAL HISTORY:  Unspecified fall, initial encounter    TECHNIQUE:  Three or four views of the right shoulder, three views of the right elbow, and two views of the right humerus were obtained    COMPARISON:  None    FINDINGS:  Shoulder: There is diffuse osteopenia.  There is no acute fracture, dislocation, or bone destruction identified.  There is severe degenerative change of the acromioclavicular and glenohumeral joints.    Elbow: There is no acute fracture, dislocation, or bone destruction.  No joint effusion is seen.    Humerus: There is diffuse osteopenia.  No acute fracture is identified.                                       CT Head Without Contrast (Final result)  Result time 11/28/24 10:47:23      Final result by Robb Sheldon DO (11/28/24 10:47:23)                   Impression:      CT head: No acute intracranial findings as detailed above specifically without evidence for acute intracranial hemorrhage or definite new abnormal parenchymal attenuation.  Clinical correlation and further evaluation as warranted.    CT cervical spine: Continued spondylo degenerative change of the cervical spine without evidence for acute fracture or traumatic subluxation.    See above for additional details.      Electronically signed by: Robb Sheldon DO  Date:    11/28/2024  Time:    10:47               Narrative:    EXAMINATION:  CT HEAD WITHOUT CONTRAST; CT CERVICAL SPINE WITHOUT  CONTRAST    CLINICAL HISTORY:  Head trauma, minor (Age >= 65y);; Neck trauma (Age >= 65y);    TECHNIQUE:  CT head: Multiple sequential 5 mm axial images of the head without contrast.  Coronal and sagittal reformatted imaging from the axial acquisition.    CT cervical spine: Multiple sequential 1.25 mm axial images of the cervical spine without contrast.  Coronal and sagittal reformatted imaging from the axial acquisition.    COMPARISON:  MRI 11/14/2024 and CTA 11/14/2024    FINDINGS:  CT head: No evidence for acute intracranial hemorrhage or sulcal effacement.  Confluent decreased attenuation supratentorial white matter corresponds to T2 flair signal abnormality seen on MRI which is nonspecific and may represent advanced chronic microvascular ischemic change.  No midline shift or mass effect.  Ventricles stable without hydrocephalus.  Visualized paranasal sinuses and mastoid air cells are clear..    CT cervical spine: Cervical sagittal alignment similar to prior with continued grade 1 anterolisthesis of C3 on C4.  There is degenerative disc disease with intervertebral height loss and endplate degeneration mid to lower cervical levels similar to prior.  No evidence for acute fracture or traumatic subluxation cervical spine    Allowing for artifact from motion and beam hardening degenerative change most prominent at C 4/C5 with posterior disc osteophyte, uncovertebral joint hypertrophy and facet arthropathy mild central canal stenosis and mild right bony neural foraminal stenosis    There is no consolidation visualized lung apices                                       CT Cervical Spine Without Contrast (Final result)  Result time 11/28/24 10:47:23      Final result by Robb Sheldon DO (11/28/24 10:47:23)                   Impression:      CT head: No acute intracranial findings as detailed above specifically without evidence for acute intracranial hemorrhage or definite new abnormal parenchymal attenuation.   Clinical correlation and further evaluation as warranted.    CT cervical spine: Continued spondylo degenerative change of the cervical spine without evidence for acute fracture or traumatic subluxation.    See above for additional details.      Electronically signed by: Robb Sheldon DO  Date:    11/28/2024  Time:    10:47               Narrative:    EXAMINATION:  CT HEAD WITHOUT CONTRAST; CT CERVICAL SPINE WITHOUT CONTRAST    CLINICAL HISTORY:  Head trauma, minor (Age >= 65y);; Neck trauma (Age >= 65y);    TECHNIQUE:  CT head: Multiple sequential 5 mm axial images of the head without contrast.  Coronal and sagittal reformatted imaging from the axial acquisition.    CT cervical spine: Multiple sequential 1.25 mm axial images of the cervical spine without contrast.  Coronal and sagittal reformatted imaging from the axial acquisition.    COMPARISON:  MRI 11/14/2024 and CTA 11/14/2024    FINDINGS:  CT head: No evidence for acute intracranial hemorrhage or sulcal effacement.  Confluent decreased attenuation supratentorial white matter corresponds to T2 flair signal abnormality seen on MRI which is nonspecific and may represent advanced chronic microvascular ischemic change.  No midline shift or mass effect.  Ventricles stable without hydrocephalus.  Visualized paranasal sinuses and mastoid air cells are clear..    CT cervical spine: Cervical sagittal alignment similar to prior with continued grade 1 anterolisthesis of C3 on C4.  There is degenerative disc disease with intervertebral height loss and endplate degeneration mid to lower cervical levels similar to prior.  No evidence for acute fracture or traumatic subluxation cervical spine    Allowing for artifact from motion and beam hardening degenerative change most prominent at C 4/C5 with posterior disc osteophyte, uncovertebral joint hypertrophy and facet arthropathy mild central canal stenosis and mild right bony neural foraminal stenosis    There is no  consolidation visualized lung apices                                       I reviewed all labs, imaging, EKGs.     Plan   Syncope   PAF  Decreased urine output     - suspected syncope 2/2 PAF episodes. Discussed case with on-call cardiology fellow, who recommenced 24 hours of observation and initiated of multaq  -follow-up with EP if symptoms persist  - Mg wnl  - telemetry   - recent echo noted   - no obvious trauma from syncopal episode, will continue with eliquis  - bladder scan, follow-up, does not poor oral intake over the last 24 hours         I have discussed this case with DARIUSZ Sanchez.

## 2024-11-29 NOTE — PLAN OF CARE
Discharge Planning Assessment:    Patient admitted on: 11-28-24  Chart reviewed today  Care plan discussed with ER treatment team    Current Dispo: home today  See EP clinic and PCP as discussed  Transportation: has reliable  Case management to follow

## 2024-11-29 NOTE — ED NOTES
Assumed care of patient and have received report from nurse.     Windy RASHEEDA Lindo, a 74 y.o. female presents to the ED w/ complaint of LOC    Triage note:  Chief Complaint   Patient presents with    Loss of Consciousness     Arrives via EMS with c/o LOC today recent dx of A-fib, in A-fib on monitor with EMS      Review of patient's allergies indicates:  No Known Allergies  Past Medical History:   Diagnosis Date    Arthritis     Chronic a-fib     Eye injury 4 yrs    hit od    Hypertension 04/04/2023    Nuclear sclerosis, bilateral 02/13/2019    Osteoporosis        Pt identifiers Windy Freydevonachecked and correct  LOC: The patient is awake, alert, aware of environment with an appropriate affect. Oriented x3, speaking appropriately  APPEARANCE: Pt resting comfortably, in no acute distress, pt is clean and well groomed, clothing properly fastened  SKIN: Skin warm, dry and intact, normal skin turgor, moist mucus membranes  RESPIRATORY: Airway is open and patent, respirations are spontaneous, even and unlabored, normal effort and rate  CARDIAC: Normal rate and rhythm, no peripheral edema noted, capillary refill < 3 seconds, bilateral radial pulses 2+ Denies CP  ABDOMEN: Soft, nontender, nondistended. Bowel sounds present +decrease in appetite   NEUROLOGIC: PERRL, facial expression is symmetrical, patient moving all extremities spontaneously, normal sensation in all extremities when touched with a finger.  Follows all commands appropriately  MUSCULOSKELETAL: No obvious deformities. +Generalized weakness

## 2024-11-29 NOTE — PLAN OF CARE
11/29/24 1648   Readmission   Was this a planned readmission? No   Why were you readmitted? Alarmed about signs/symptoms   Did you try to manage your symptoms your self? Yes   Did you have  a follow-up appointment on discharge? Yes

## 2024-11-30 ENCOUNTER — PATIENT MESSAGE (OUTPATIENT)
Dept: ELECTROPHYSIOLOGY | Facility: CLINIC | Age: 74
End: 2024-11-30
Payer: MEDICARE

## 2024-12-01 ENCOUNTER — PATIENT MESSAGE (OUTPATIENT)
Dept: FAMILY MEDICINE | Facility: CLINIC | Age: 74
End: 2024-12-01
Payer: MEDICARE

## 2024-12-04 ENCOUNTER — PATIENT MESSAGE (OUTPATIENT)
Dept: FAMILY MEDICINE | Facility: CLINIC | Age: 74
End: 2024-12-04
Payer: MEDICARE

## 2024-12-04 RX ORDER — PROPAFENONE HYDROCHLORIDE 225 MG/1
225 CAPSULE, EXTENDED RELEASE ORAL EVERY 12 HOURS
Qty: 60 CAPSULE | Refills: 11 | Status: CANCELLED | OUTPATIENT
Start: 2024-12-04 | End: 2025-12-04

## 2024-12-04 NOTE — TELEPHONE ENCOUNTER
Spoke with patient who states that she is concerned about the propafenone side effects noted.  Patient reports that her itching has not improved, but is being controlled with OTC Benadryl.  Today is the 5th day off of the Multaq, and understands that since there has been no improvement with her symptoms since stopping the Multaq, there is a high probability that her symptoms may not be related to the medication. Patient would prefer to retry the Multaq prior to making a change. She was unable to get an appointment with her PCP yesterday, but will call again today and if unable to be seen, she will go to urgent care for evaluation and treatment of the itching prior to resumption of Multaq. Patient will follow up to let me know how her symptoms are doing and if she decides to resume the Multaq or change to Propafenone.

## 2024-12-05 ENCOUNTER — OFFICE VISIT (OUTPATIENT)
Dept: URGENT CARE | Facility: CLINIC | Age: 74
End: 2024-12-05
Payer: MEDICARE

## 2024-12-05 VITALS
HEART RATE: 80 BPM | TEMPERATURE: 99 F | WEIGHT: 150 LBS | HEIGHT: 65 IN | DIASTOLIC BLOOD PRESSURE: 84 MMHG | SYSTOLIC BLOOD PRESSURE: 173 MMHG | RESPIRATION RATE: 18 BRPM | BODY MASS INDEX: 24.99 KG/M2 | OXYGEN SATURATION: 98 %

## 2024-12-05 DIAGNOSIS — J34.89 DRY NOSE: ICD-10-CM

## 2024-12-05 DIAGNOSIS — T78.40XA ALLERGIC REACTION, INITIAL ENCOUNTER: Primary | ICD-10-CM

## 2024-12-05 DIAGNOSIS — L29.9 GENERALIZED PRURITUS: ICD-10-CM

## 2024-12-05 RX ORDER — HYDROXYZINE HYDROCHLORIDE 25 MG/1
25 TABLET, FILM COATED ORAL EVERY 8 HOURS PRN
Qty: 30 TABLET | Refills: 0 | Status: SHIPPED | OUTPATIENT
Start: 2024-12-05

## 2024-12-05 RX ORDER — TRIAMCINOLONE ACETONIDE 1 MG/G
OINTMENT TOPICAL 2 TIMES DAILY PRN
Qty: 80 G | Refills: 0 | Status: SHIPPED | OUTPATIENT
Start: 2024-12-05 | End: 2024-12-30

## 2024-12-05 RX ORDER — CETIRIZINE HYDROCHLORIDE 10 MG/1
10 TABLET ORAL DAILY
Qty: 30 TABLET | Refills: 0 | Status: SHIPPED | OUTPATIENT
Start: 2024-12-05

## 2024-12-05 NOTE — PROGRESS NOTES
"Subjective:      Patient ID: Windy Lindo is a 74 y.o. female.    Vitals:  height is 5' 5" (1.651 m) and weight is 68 kg (150 lb). Her oral temperature is 99 °F (37.2 °C). Her blood pressure is 173/84 (abnormal) and her pulse is 80. Her respiration is 18 and oxygen saturation is 98%.     Chief Complaint: Other Misc (Itchy skin - Entered by patient)    This is a 74 y.o. female who presents today with a chief complaint of itching. Pt states the only new or different factor is a heart medication, Multaq, to help control her atrial fibrillation. She stopped taking the med five days ago, and informing her EP MD.   Her medical team requested she be seen and treated prior to discussing either retaking the med vs other options-she states.    Also reports dry, irritates nostrils-likely from the benadryl.   She denies any obvious rashes to her body; and denies any respiratory distress, such as wheeze, tightened airway, or SOB.   Denies any facial or tongue swelling, or any swelling to the rest of her body.    NKA, otherwise.   She has been taking benadryl multiple times per day for the itch, which is not really helping. She feels like her skin is on fire.   Denies any other new meds, body products, or foods; or environmental factors-so she is pretty convinced that it is this heart med causing the problem.      Other  This is a new problem. Pertinent negatives include no abdominal pain, anorexia, arthralgias, change in bowel habit, chest pain, chills, congestion, coughing, diaphoresis, fatigue, fever, headaches, joint swelling, myalgias, nausea, rash, sore throat, swollen glands, urinary symptoms, vertigo, visual change, vomiting or weakness.     Constitution: Negative for chills, sweating, fatigue and fever.   HENT:  Negative for facial swelling, congestion and sore throat.    Cardiovascular:  Negative for chest pain.   Respiratory:  Negative for chest tightness, cough, shortness of breath and wheezing.  "   Gastrointestinal:  Negative for abdominal pain, nausea and vomiting.   Musculoskeletal:  Negative for joint pain, joint swelling and muscle ache.   Skin:  Positive for erythema (mild erythema to upper chest and back, likely from scratching). Negative for rash, skin thickening/induration, bruising and hives.   Allergic/Immunologic: Positive for itching. Negative for hives.   Neurological:  Negative for history of vertigo and headaches.      Objective:     Physical Exam   Constitutional: She is oriented to person, place, and time.  Non-toxic appearance. She does not appear ill. No distress.   HENT:   Head: Normocephalic.   Nose: Nose normal.   Mouth/Throat: Mucous membranes are moist. No posterior oropharyngeal edema.   Eyes: Right eye exhibits no discharge. Left eye exhibits no discharge.   Neck: Neck supple.   Cardiovascular: Normal rate and regular rhythm.   Pulmonary/Chest: Effort normal and breath sounds normal. No respiratory distress.   Abdominal: Normal appearance. She exhibits no distension. Soft. flat abdomen There is no abdominal tenderness.   Musculoskeletal: Normal range of motion.         General: Normal range of motion.   Neurological: She is alert and oriented to person, place, and time.   Skin: Skin is warm, dry, not diaphoretic and no rash. Capillary refill takes less than 2 seconds. erythema (mild erythema to upper chest and back, likely from scratching)   Psychiatric: Her behavior is normal. Mood normal.   Nursing note and vitals reviewed.      Assessment:     1. Allergic reaction, initial encounter    2. Generalized pruritus    3. Dry nose        Plan:       Allergic reaction, initial encounter  -     triamcinolone acetonide 0.1% (KENALOG) 0.1 % ointment; Apply topically 2 (two) times daily as needed (itch/rash).  Dispense: 80 g; Refill: 0  -     hydrOXYzine HCL (ATARAX) 25 MG tablet; Take 1 tablet (25 mg total) by mouth every 8 (eight) hours as needed for Itching.  Dispense: 30 tablet; Refill:  0  -     cetirizine (ZYRTEC) 10 MG tablet; Take 1 tablet (10 mg total) by mouth once daily.  Dispense: 30 tablet; Refill: 0    Generalized pruritus  -     triamcinolone acetonide 0.1% (KENALOG) 0.1 % ointment; Apply topically 2 (two) times daily as needed (itch/rash).  Dispense: 80 g; Refill: 0  -     hydrOXYzine HCL (ATARAX) 25 MG tablet; Take 1 tablet (25 mg total) by mouth every 8 (eight) hours as needed for Itching.  Dispense: 30 tablet; Refill: 0  -     cetirizine (ZYRTEC) 10 MG tablet; Take 1 tablet (10 mg total) by mouth once daily.  Dispense: 30 tablet; Refill: 0    Dry nose      Patient Instructions   Try OTC unscented, sensitive skin lotion, bath, and/or calamine lotion  Discontinue benadryl and try atarax (hydroxyzine) instead  OTC aquaphor ointment to help keep nostrils moist  Avoid scratching or touching area  Good handwashing  Avoid heat/humidity-which can worsen rash  Take cooler than normal showers  Use gentle soaps and body products   Avoid any products with fragrance until rash resolves  Ice packs as needed   Rub with soft cloth if you get the desire to scratch    Follow up with PCP and EP team as scheduled

## 2024-12-05 NOTE — PATIENT INSTRUCTIONS
Try OTC unscented, sensitive skin lotion, bath, and/or calamine lotion  Discontinue benadryl and try atarax (hydroxyzine) instead  OTC aquaphor ointment to help keep nostrils moist  Avoid scratching or touching area  Good handwashing  Avoid heat/humidity-which can worsen rash  Take cooler than normal showers  Use gentle soaps and body products   Avoid any products with fragrance until rash resolves  Ice packs as needed   Rub with soft cloth if you get the desire to scratch    Follow up with PCP and EP team as scheduled

## 2024-12-06 ENCOUNTER — OFFICE VISIT (OUTPATIENT)
Dept: FAMILY MEDICINE | Facility: CLINIC | Age: 74
End: 2024-12-06
Payer: MEDICARE

## 2024-12-06 VITALS
HEART RATE: 80 BPM | HEIGHT: 65 IN | TEMPERATURE: 98 F | WEIGHT: 163.13 LBS | BODY MASS INDEX: 27.18 KG/M2 | OXYGEN SATURATION: 98 %

## 2024-12-06 DIAGNOSIS — D50.9 IRON DEFICIENCY ANEMIA, UNSPECIFIED IRON DEFICIENCY ANEMIA TYPE: ICD-10-CM

## 2024-12-06 DIAGNOSIS — Z99.89 AMBULATES WITH CANE: ICD-10-CM

## 2024-12-06 DIAGNOSIS — I48.0 PAROXYSMAL ATRIAL FIBRILLATION: ICD-10-CM

## 2024-12-06 DIAGNOSIS — Z09 HOSPITAL DISCHARGE FOLLOW-UP: Primary | ICD-10-CM

## 2024-12-06 DIAGNOSIS — Z79.01 CHRONIC ANTICOAGULATION: ICD-10-CM

## 2024-12-06 DIAGNOSIS — I10 PRIMARY HYPERTENSION: Chronic | ICD-10-CM

## 2024-12-06 PROCEDURE — 99999 PR PBB SHADOW E&M-EST. PATIENT-LVL V: CPT | Mod: PBBFAC,,,

## 2024-12-06 RX ORDER — DRONEDARONE 400 MG/1
400 TABLET, FILM COATED ORAL 2 TIMES DAILY WITH MEALS
COMMUNITY

## 2024-12-06 NOTE — PROGRESS NOTES
HPI     Chief Complaint:  No chief complaint on file.      Windy Lindo is a 74 y.o. female with multiple medical diagnoses as listed in the medical history and problem list that presents for No chief complaint on file.  .   Patient is not known to me with her last appointment in this department on 10/17/2024.      HPI    Today in clinic:  Using cane to ambulate.   Skin breakdown - none  Normal BMs  Normal urine output.  Admits the new medication causes some decreased urination but reports normal urination lately.  Medication changes, yes reviewed.     [place] [facility] [place] Restarted multaq last night.  Was advised to hold by EP provider to 2 concern for allergic reaction.  No visible rash/hives noted but itching still present but started atarax, zyrtec, and kenalog ointment. Admits some improvement.    Pt thinks multaq is culprit for syncope but followed closely by EP.     Feeling well. Yesterday had Afib episodes past two days. Will last for 1-2 hours. When she went to ED 1 week ago HR was in 140s. Will check at home when she feels Afib and yesterday HR was 120 but states now she nows how to deal with it.  Considering ablation with EP. Plan to trial multaq first. Metoprolol 25 bid. Eliquis. No SOB or chest pain. Pt states aware of symptoms when she is in afib, will have racing heart.  Aware of fall risk.     Iron infusions - completed one but missed last iron infusion because of scheduling conflict. Last infusion of Feraheme #1 was 11/19, missed appt on 11/29. Will call infusion center to see if she can reschedule. Reviewed recent labs. RDW still elevated    Hospital encounter notes, objective/subjective data, diagnostics, and plan of care from 11/15, 11/25 and 11/29 reviewed    ED Observation Unit  Discharge Summary           History of Present Illness:    Patient is a 74-year-old female with history of AFib on Eliquis, hypertension, arthritis, hyperlipidemia, iron deficiency anemia,  "aortic valve disease, chronic diastolic heart failure who presents for a syncopal episode at home.  Patient states that she woke up this morning and felt generally unwell.  She reports feeling lightheaded today.  She was also been having intermittent episodes of heart palpitations.  She was states that she always has chronic shortness of breath but has no new or worsening shortness of breath or dyspnea on exertion.  No chest pain or abdominal pain.  She felt lightheaded while making coffee earlier this morning and then had a syncopal episode.  She was unsure if she hit her head.  She is reporting headache.  Per EMS, patient initially has been intermittently in AFib with RVR, rate up to 120s.  "     I reviewed the ED Provider Note dated 11/28/24 prior to my evaluation of this patient.  I reviewed all labs and imaging performed in the Main ED, prior to patient being placed in Observation. Patient was placed in the ED Observation Unit for Syncope.        Seen and examined by this provider. Admitted with syncope 2/2 afib RVR. Recent admission and has extensive workup including carotid US, MRI brain and Echo. EF noted 60-6% with Grade 1 DD.        Left Ventricle: The left ventricle is normal in size. Basal septal thickening. Normal wall motion. There is normal systolic function with a visually estimated ejection fraction of 60 - 65%. Grade I diastolic dysfunction.    Right Ventricle: Normal right ventricular cavity size. Wall thickness is normal. Systolic function is normal.    The left atrium is mildly dilated.    Aortic Valve: There is moderate aortic valve sclerosis. Mildly restricted motion. There is mild aortic regurgitation.    Tricuspid Valve: There is mild regurgitation.    Pulmonary Artery: The estimated pulmonary artery systolic pressure is 32 mmHg.    IVC/SVC: Normal venous pressure at 3 mmHg.        She recently decrease her dose of lopressor PO to 25mg and she was experiencing possible symptomatic " bradycardia. Seen by EP Dr. Temple and recommended multaq and possible PVI on the future.      She has only taken one dose of the multaq, as many pharmacies were out. Currently in NSR.     Imaging and labs reviewed without significant findings.      Current complains of decreased urine output with last void 24 hours ago.         Observation Course:    Patient placed in observation for syncope. Patient was monitored on telemetry for > 24 hours without acute events.  She received 2 doses of Multaq per cardiology recommendations.  No additional episodes of syncope were noted.  She had a recent echocardiogram done that was not repeated.  She was discharged in stable condition.  She will follow up in electrophysiology Clinic.  ED return precautions given.     Consultants:    None     Final Diagnosis:  Syncope, atrial fibrillation     Discharge Condition: Good     Disposition: Home or Self Care     Time spent on the discharge of the patient including review of hospital course with the patient. reviewing discharge medications and arranging follow-up care 35 minutes.  Patient was seen and examined on the date of discharge and determined to be suitable for discharge.     Follow Up:         Future Appointments   Date Time Provider Department Center   12/12/2024 10:40 AM Trisha Higginbotham MD [facility] MED [place]   12/27/2024 10:15 AM [facility] [id number] 485 LB LIMIT [facility] XRAYORT [facility] [place]   12/27/2024 10:30 AM Mac Brice III, MD [facility] ORTHO [facility]   1/8/2025  9:15 AM EKG, APPT [facility] EKG [facility]   1/8/2025  9:30 AM Ankita Matt NP [facility]   1/17/2025  9:15 AM Flaca Varghese MD [facility] OPHTHAL [facility]   2/7/2025 10:00 AM Evans Vernon MD [facility] CARDIO [facility]   4/8/2025  9:40 AM Trisha Higginbotham MD [facility] [place]   5/20/2025  1:45 PM INJECTION [facility] AMB INF [facility]                   Cosigned by: Haylee Peck MD at 11/29/2024  3:42 PM   Electronically signed by Annie Polanco, INGE  at 11/29/2024  2:32 PM    11/15: Missed hospital f/u for this appt  HPI:   74F w/ hx of hypertension, hyperlipidemia, atrial fibrillation, thoracic aortic aneurysm, prior GI bleed, presenting to emergency department with complaint of episode of palpitations and painless vision loss of right eye. Patient was recently diagnosed with paroxysmal atrial fibrillation and is taking her blood thinner as directed.  She states she had an episode of afib today that lasted for about an hour, felt like palpitations and lightheadedness, and resolved spontaneously.  She then had another episode this evening around 7:00 p.m., that was accompanied by sudden onset painless vision loss in the right eye.  Patient states that the right eye went totally dark, and then vision gradually returned, and remained blurry for which she reported to the emergency room.     In the ED: afebrile, hemodynamically stable. She does not report any eye pain, though c/o of a mild headache.  No neck pain, chest pain, difficulty breathing, or loss of consciousness.  No speech changes, or focal numbness or weakness of the arms/legs or other focal neuro deficits. No acute abnormality on labs.  No leukocytosis or acute anemia.  No electrolyte disturbance or acute kidney injury.  Normal BNP and troponin. Patient seen by Ophthalmology who recommend hospital medicine admission with vascular Neurology consultation     * No surgery found *       Hospital Course:   Admitted to [facility] [place] on [place]14/24 for vision loss in right eye. MRI brain/orbit did not reveal acute ischemic process or any evidence of damage to optic nerve. CTA head/neck with no LVO, no ICA stenosis. TTE with G1DD but otherwise normal EF. Ophthalmology evaluated, no increased IOP or CRAO. ESR and CRP normal, GCA additionally ruled out with normal temporal artery doppler.  Vascular Neurology reviewed above studies and determined no ischemic or neurologic process acutely occurring to explain visual symptoms.       Deemed medically stable for discharge 11/15/24 with Ophthalmology follow-up for cataract. Wrote new Rx for Metoprolol 50mg bid. Discussed with patient to begin taking this prescription once she runs out of her Metoprolol 25mg BID. Cardiology follow-up arranged. Advised to not drive. Dc with walker.       Assessment & Plan       Problem List Items Addressed This Visit       Hypertension (Chronic)  Blood pressure at goal in clinic. The current medical regimen is effective. Continue metoprolol as prescribed.     Iron deficiency anemia    Rescheduled iron infusion for 12/16. Monitor for bleeding. Pt admits feeling well.   Lab Results   Component Value Date    WBC 10.62 11/28/2024    RBC 4.94 11/28/2024    HGB 14.4 11/28/2024    HCT 44.5 11/28/2024    MCV 90 11/28/2024    MCH 29.1 11/28/2024    MCHC 32.4 11/28/2024    RDW 14.8 (H) 11/28/2024     11/28/2024    MPV 13.0 (H) 11/28/2024    GRAN 9.2 (H) 11/28/2024    GRAN 86.7 (H) 11/28/2024    LYMPH 0.6 (L) 11/28/2024    LYMPH 5.4 (L) 11/28/2024    MONO 0.6 11/28/2024    MONO 6.0 11/28/2024    EOS 0.2 11/28/2024    BASO 0.02 11/28/2024    EOSINOPHIL 1.4 11/28/2024    BASOPHIL 0.2 11/28/2024         Paroxysmal atrial fibrillation  The current medical regimen is effective. Continue Eliquis as prescribed. Managed by EP  Multaq for rate control    Overview     On eliquis         Chronic anticoagulation  The current medical regimen is effective. Continue Eliquis as prescribed.     Overview     On eliquis          Other Visit Diagnoses       Hospital discharge follow-up    -  Primary  Transitional Care Note    Family and/or Caretaker present at visit?  No.  Diagnostic tests reviewed/disposition: I have reviewed all completed as well as pending diagnostic tests at the time of discharge.  Disease/illness education: fall risk, med mgmt, ED precautions  Home health/community services discussion/referrals: Patient does not have home health established from hospital visit.   They do not need home health.  If needed, we will set up home health for the patient.   Establishment or re-establishment of referral orders for community resources: No other necessary community resources.   Discussion with other health care providers: No discussion with other health care providers necessary.           Ambulates with cane      The current medical regimen is effective;  continue present plan and medications.              --------------------------------------------      Health Maintenance:  Health Maintenance         Date Due Completion Date    High Dose Statin Never done ---    RSV Vaccine (Age 60+ and Pregnant patients) (1 - 1-dose 75+ series) Never done ---    COVID-19 Vaccine (4 - 2024-25 season) 09/01/2024 10/1/2021    DEXA Scan 03/15/2026 3/15/2024    Lipid Panel 03/28/2029 3/28/2024    Override on 2/4/2013: Done    Colorectal Cancer Screening 10/09/2029 10/9/2024    TETANUS VACCINE 09/10/2033 9/10/2023            Health maintenance reviewed    Follow Up:  Follow up if symptoms worsen or fail to improve.    Exam     Review of Systems:  (as noted above)  Review of Systems    Physical Exam:   Physical Exam  Constitutional:       General: She is not in acute distress.     Appearance: Normal appearance. She is not toxic-appearing.   HENT:      Mouth/Throat:      Mouth: Mucous membranes are moist.   Eyes:      Conjunctiva/sclera: Conjunctivae normal.   Cardiovascular:      Rate and Rhythm: Normal rate and regular rhythm.      Heart sounds: Murmur heard.   Pulmonary:      Effort: Pulmonary effort is normal. No respiratory distress.      Breath sounds: Normal breath sounds. No wheezing.   Musculoskeletal:      Cervical back: Normal range of motion and neck supple.   Skin:     General: Skin is warm.      Capillary Refill: Capillary refill takes less than 2 seconds.   Neurological:      General: No focal deficit present.      Mental Status: She is alert and oriented to person, place, and time.       "Motor: No weakness.   Psychiatric:         Mood and Affect: Mood normal.       Vitals:    12/06/24 1120   BP: (P) 120/62   BP Location: Left arm   Patient Position: Sitting   Pulse: 80   Temp: 97.9 °F (36.6 °C)   TempSrc: Oral   SpO2: 98%   Weight: 74 kg (163 lb 2.3 oz)   Height: 5' 5" (1.651 m)      Body mass index is 27.15 kg/m².        History     Past Medical History:  Past Medical History:   Diagnosis Date    Arthritis     Chronic a-fib     Eye injury 4 yrs    hit od    Hypertension 04/04/2023    Nuclear sclerosis, bilateral 02/13/2019    Osteoporosis        Past Surgical History:  Past Surgical History:   Procedure Laterality Date    APPENDECTOMY      COLONOSCOPY N/A 9/7/2017    Procedure: COLONOSCOPY;  Surgeon: Albino Fenton MD;  Location: [address]);  Service: Endoscopy;  Laterality: N/A;    COLONOSCOPY N/A 10/9/2024    Procedure: COLONOSCOPY;  Surgeon: Albino Fenton MD;  Location: [facility] ENDOSCOPY;  Service: Endoscopy;  Laterality: N/A;    ESOPHAGOGASTRODUODENOSCOPY  09/07/2017    ESOPHAGOGASTRODUODENOSCOPY N/A 10/9/2024    Procedure: EGD (ESOPHAGOGASTRODUODENOSCOPY);  Surgeon: Albino Fenton MD;  Location: [facility] ENDOSCOPY;  Service: Endoscopy;  Laterality: N/A;  Ref By:solange Jaramillo suprep.  10/1/24- pc complete. DBM    KNEE SURGERY Right 12/05/2017    TKR    LASIK  7 yrs    ou    TONSILLECTOMY         Social History:  Social History     Socioeconomic History    Marital status: Single   Occupational History     Employer: [employer] [place]   Tobacco Use    Smoking status: Never    Smokeless tobacco: Never   Substance and Sexual Activity    Alcohol use: Not Currently     Alcohol/week: 1.0 standard drink of alcohol     Types: 1 Glasses of wine per week     Comment: glass of wine a night     Drug use: No    Sexual activity: Never     Social Drivers of Health     Financial Resource Strain: Low Risk  (11/29/2024)    Overall Financial Resource Strain (CARDIA)     Difficulty of Paying Living Expenses: " Not very hard   Food Insecurity: No Food Insecurity (11/29/2024)    Hunger Vital Sign     Worried About Running Out of Food in the Last Year: Never true     Ran Out of Food in the Last Year: Never true   Transportation Needs: No Transportation Needs (11/29/2024)    TRANSPORTATION NEEDS     Transportation : No   Physical Activity: Inactive (11/29/2024)    Exercise Vital Sign     Days of Exercise per Week: 0 days     Minutes of Exercise per Session: 40 min   Stress: No Stress Concern Present (11/29/2024)    [employer] [place] [employer] - Occupational Stress Questionnaire     Feeling of Stress : Only a little   Housing Stability: Low Risk  (11/29/2024)    Housing Stability Vital Sign     Unable to Pay for Housing in the Last Year: No     Homeless in the Last Year: No       Family History:  Family History   Problem Relation Name Age of Onset    Hypertension Mother      Glaucoma Mother      Diabetes Mother      Cataracts Mother      Cancer Mother  74        lung    Heart disease Mother  55    Hypertension Father      Heart disease Father          onset early 60;s, Aortic valve replacement ,Rheumatic fever as a child     No Known Problems Sister      Diabetes Brother      Cancer Brother  66        mesothelioma    No Known Problems Maternal Aunt      No Known Problems Maternal Uncle      No Known Problems Paternal Aunt      No Known Problems Paternal Uncle      No Known Problems Maternal Grandmother      No Known Problems Maternal Grandfather      No Known Problems Paternal Grandmother      No Known Problems Paternal Grandfather      No Known Problems Other      Amblyopia Neg Hx      Blindness Neg Hx      Macular degeneration Neg Hx      Retinal detachment Neg Hx      Strabismus Neg Hx      Stroke Neg Hx      Thyroid disease Neg Hx      Melanoma Neg Hx         Allergies and Medications: (updated and reviewed)  Review of patient's allergies indicates:  No Known Allergies  Current Outpatient Medications    Medication Sig Dispense Refill    acetaminophen (TYLENOL) 650 MG TbSR Take 1 tablet (650 mg total) by mouth every 6 (six) hours as needed. 20 tablet 0    apixaban (ELIQUIS) 5 mg Tab Take 1 tablet (5 mg total) by mouth 2 (two) times daily. 60 tablet 2    b complex vitamins capsule Take 1 capsule by mouth once daily.      cetirizine (ZYRTEC) 10 MG tablet Take 1 tablet (10 mg total) by mouth once daily. 30 tablet 0    denosumab (PROLIA) 60 mg/mL Syrg Inject 60 mg into the skin every 6 (six) months.      dronedarone (MULTAQ) 400 mg Tab Take 400 mg by mouth 2 (two) times daily with meals.      hydrOXYzine HCL (ATARAX) 25 MG tablet Take 1 tablet (25 mg total) by mouth every 8 (eight) hours as needed for Itching. 30 tablet 0    metoprolol tartrate (LOPRESSOR) 25 MG tablet Take 1 tablet (25 mg total) by mouth 2 (two) times daily. 180 tablet 3    multivit-min-FA-lycopen-lutein 0.4-300-250 mg-mcg-mcg Tab Take 1 tablet by mouth once daily.      pantoprazole (PROTONIX) 40 MG tablet Take 1 tablet (40 mg total) by mouth once daily. 90 tablet 3    triamcinolone acetonide 0.1% (KENALOG) 0.1 % ointment Apply topically 2 (two) times daily as needed (itch/rash). 80 g 0    vitamin D (VITAMIN D3) 1000 units Tab Take 1,000 Units by mouth once daily.      vitamin E 100 UNIT capsule Take 100 Units by mouth once daily.       No current facility-administered medications for this visit.       Patient Care Team:  Trisha Higginbotham MD as PCP - General (Family Medicine)  Trisha Higginbotham MD as Hypertension Digital Medicine Responsible Provider (Family Medicine)  Deyanira Ruby PharmD as Hypertension Digital Medicine Clinician  Medicare, Digital Medicine as Hypertension Digital Medicine Contract  Nicolle Geller LPN as Licensed Practical Nurse         - The patient is given an After Visit Summary that lists all medications with directions, allergies, education, orders placed during this encounter and follow-up instructions.      - I have reviewed the  patient's medical information including past medical, family, and social history sections including the medications and allergies.      - We discussed the patient's current medications.     This note was created by combination of typed  and MModal dictation.  Transcription errors may be present.  If there are any questions, please contact me.

## 2024-12-10 PROCEDURE — G0179 MD RECERTIFICATION HHA PT: HCPCS | Mod: ,,, | Performed by: FAMILY MEDICINE

## 2024-12-11 ENCOUNTER — PATIENT MESSAGE (OUTPATIENT)
Dept: ELECTROPHYSIOLOGY | Facility: CLINIC | Age: 74
End: 2024-12-11
Payer: MEDICARE

## 2024-12-11 ENCOUNTER — PATIENT MESSAGE (OUTPATIENT)
Dept: FAMILY MEDICINE | Facility: CLINIC | Age: 74
End: 2024-12-11
Payer: MEDICARE

## 2024-12-16 ENCOUNTER — INFUSION (OUTPATIENT)
Dept: INFUSION THERAPY | Facility: HOSPITAL | Age: 74
End: 2024-12-16
Payer: MEDICARE

## 2024-12-16 VITALS
DIASTOLIC BLOOD PRESSURE: 63 MMHG | RESPIRATION RATE: 16 BRPM | SYSTOLIC BLOOD PRESSURE: 139 MMHG | HEART RATE: 67 BPM | OXYGEN SATURATION: 98 % | TEMPERATURE: 98 F

## 2024-12-16 DIAGNOSIS — D50.9 IRON DEFICIENCY ANEMIA, UNSPECIFIED IRON DEFICIENCY ANEMIA TYPE: Primary | ICD-10-CM

## 2024-12-16 PROCEDURE — 96374 THER/PROPH/DIAG INJ IV PUSH: CPT

## 2024-12-16 PROCEDURE — 25000003 PHARM REV CODE 250: Performed by: FAMILY MEDICINE

## 2024-12-16 PROCEDURE — 63600175 PHARM REV CODE 636 W HCPCS: Mod: JZ,JG | Performed by: FAMILY MEDICINE

## 2024-12-16 RX ORDER — EPINEPHRINE 0.3 MG/.3ML
0.3 INJECTION SUBCUTANEOUS ONCE AS NEEDED
OUTPATIENT
Start: 2024-12-16

## 2024-12-16 RX ORDER — DIPHENHYDRAMINE HYDROCHLORIDE 50 MG/ML
50 INJECTION INTRAMUSCULAR; INTRAVENOUS ONCE AS NEEDED
OUTPATIENT
Start: 2024-12-16

## 2024-12-16 RX ORDER — SODIUM CHLORIDE 0.9 % (FLUSH) 0.9 %
10 SYRINGE (ML) INJECTION
OUTPATIENT
Start: 2024-12-16

## 2024-12-16 RX ORDER — HEPARIN 100 UNIT/ML
500 SYRINGE INTRAVENOUS
OUTPATIENT
Start: 2024-12-16

## 2024-12-16 RX ADMIN — FERUMOXYTOL 510 MG: 510 INJECTION INTRAVENOUS at 02:12

## 2024-12-16 NOTE — PLAN OF CARE
Patient ambulated onto unit using cane with family  for Feraheme treatment, no s/s of distress, VSS. Plan of care reviewed with patient. Patient tolerated treatment well. This was the patient's last treatment. Patient ambulated off unit using cane with family.  Problem: Anemia  Goal: Anemia Symptom Improvement  Outcome: Progressing

## 2024-12-18 DIAGNOSIS — Z01.818 PREOP TESTING: ICD-10-CM

## 2024-12-18 DIAGNOSIS — I50.32 CHRONIC DIASTOLIC HEART FAILURE: ICD-10-CM

## 2024-12-18 DIAGNOSIS — I48.0 PAROXYSMAL ATRIAL FIBRILLATION: Primary | ICD-10-CM

## 2024-12-18 DIAGNOSIS — Z79.01 CHRONIC ANTICOAGULATION: ICD-10-CM

## 2024-12-19 ENCOUNTER — PATIENT MESSAGE (OUTPATIENT)
Dept: ELECTROPHYSIOLOGY | Facility: CLINIC | Age: 74
End: 2024-12-19
Payer: MEDICARE

## 2024-12-19 RX ORDER — AMOXICILLIN 500 MG/1
500 TABLET, FILM COATED ORAL
COMMUNITY

## 2024-12-26 ENCOUNTER — PATIENT OUTREACH (OUTPATIENT)
Dept: ADMINISTRATIVE | Facility: CLINIC | Age: 74
End: 2024-12-26
Payer: MEDICARE

## 2025-01-17 ENCOUNTER — OFFICE VISIT (OUTPATIENT)
Dept: OPHTHALMOLOGY | Facility: CLINIC | Age: 75
End: 2025-01-17
Payer: MEDICARE

## 2025-01-17 DIAGNOSIS — Z98.890 S/P LASIK (LASER ASSISTED IN SITU KERATOMILEUSIS) OF BOTH EYES: Primary | ICD-10-CM

## 2025-01-17 DIAGNOSIS — H25.12 NUCLEAR SCLEROTIC CATARACT OF LEFT EYE: ICD-10-CM

## 2025-01-17 DIAGNOSIS — H25.11 NUCLEAR SCLEROTIC CATARACT OF RIGHT EYE: ICD-10-CM

## 2025-01-17 DIAGNOSIS — H54.61 VISION LOSS OF RIGHT EYE: ICD-10-CM

## 2025-01-17 PROCEDURE — 1100F PTFALLS ASSESS-DOCD GE2>/YR: CPT | Mod: CPTII,S$GLB,, | Performed by: OPHTHALMOLOGY

## 2025-01-17 PROCEDURE — 92136 OPHTHALMIC BIOMETRY: CPT | Mod: RT,S$GLB,, | Performed by: OPHTHALMOLOGY

## 2025-01-17 PROCEDURE — 1126F AMNT PAIN NOTED NONE PRSNT: CPT | Mod: CPTII,S$GLB,, | Performed by: OPHTHALMOLOGY

## 2025-01-17 PROCEDURE — 99204 OFFICE O/P NEW MOD 45 MIN: CPT | Mod: S$GLB,,, | Performed by: OPHTHALMOLOGY

## 2025-01-17 PROCEDURE — 1159F MED LIST DOCD IN RCRD: CPT | Mod: CPTII,S$GLB,, | Performed by: OPHTHALMOLOGY

## 2025-01-17 PROCEDURE — 3288F FALL RISK ASSESSMENT DOCD: CPT | Mod: CPTII,S$GLB,, | Performed by: OPHTHALMOLOGY

## 2025-01-17 PROCEDURE — 92025 CPTRIZED CORNEAL TOPOGRAPHY: CPT | Mod: S$GLB,,, | Performed by: OPHTHALMOLOGY

## 2025-01-17 PROCEDURE — 99999 PR PBB SHADOW E&M-EST. PATIENT-LVL III: CPT | Mod: PBBFAC,,, | Performed by: OPHTHALMOLOGY

## 2025-01-17 NOTE — PROGRESS NOTES
HPI    Dr. Vela    Cataracts OU  S/p H Lasik OU - monoVA OD near / OS distance.    Patient here for cataract evaluation. Patient states decrease in distance   vision. Lost vision OD while in the hospital and was told it was related   to cataracts. Was scheduled for cataract eval last year but had to   reschedule due to other health issues.  Last edited by Flaca Varghese MD on 1/17/2025 10:14 AM.            Assessment /Plan     For exam results, see Encounter Report.    S/P LASIK (laser assisted in situ keratomileusis) of both eyes  -     Computerized corneal topography    Vision loss of right eye    Nuclear sclerotic cataract of right eye  -     IOL Master - OD - Right Eye    Nuclear sclerotic cataract of left eye                    Pt was in hospital for LOV OD... see consult note 11/14/24.    Visually significant nuclear sclerotic cataract    - Cataracts are interfering with activities of daily living, including night time driving.  - Pt desires cataract surgery for visual rehabilitation.   - Risks / benefits/ alternatives were discussed and patient agrees to proceed with surgery.   - IOL options discussed as well, according to patient's goals and concomitant ocular pathology.  - Target: plano.    OD first.    DIBOO x  vs. Flacs with ora / standard /   (1400) vs. Mfiol (2550) OD =  pt to consider all of her options and let us know prior to sx.  Ora - H lasik    DIBOO x   Ora - H lasik    [facility] target is another option.    H/o H lasik    Near OS / distance OD

## 2025-01-24 RX ORDER — METOPROLOL TARTRATE 25 MG/1
25 TABLET, FILM COATED ORAL 2 TIMES DAILY
Qty: 180 TABLET | Refills: 3 | Status: SHIPPED | OUTPATIENT
Start: 2025-01-24 | End: 2026-01-24

## 2025-01-27 NOTE — PROGRESS NOTES
Subjective:     HPI:   Windy Lindo is a 74 y.o. female who presents for eval L knee    History of Present Illness    CHIEF COMPLAINT:  - Left knee pain.    HPI:  Ms. Lindo presents with left knee pain that has been bothering her for seven years. Pain is worse in the back of the knee. Ms. Lindo reports difficulty walking on the outside of her foot due to the leg issue. Ms. Lindo had a right knee replacement previously, which is doing well with no major issues.    Ms. Lindo describes a complex medical history over the past year, beginning with a fall in April. She reports multiple emergency room visits and hospitalizations. She mentions having an internal bleed, which has prevented her from addressing her scheduled medical procedures. Ms. Lindo notes worsening leg condition and vision loss in her right eye.    Ms. Lindo was previously on Mobic for arthritis, but it was discontinued due to concerns about bleeding. She received a steroid injection over a year ago from Dr. Canales. She has tried using a cane for stability issues rather than leg pain specifically. Ms. Lindo is currently taking Eliquis for atrial fibrillation and is scheduled for a cardiac ablation on February 13th. She also has cataract surgeries planned for March and April.    Ms. Lindo denies any tingling in the leg. Ms. Lindo denies any groin pain with leg raises.    PREVIOUS TREATMENTS:  - Physical therapy after right knee replacement  - Steroid injection in left knee by Dr. Canales over a year ago  - Ms. Lindo tried knee brace/compressive sleeve with no benefit  - Ms. Lindo tried KT tape with no benefit    MEDICATIONS:  - Eliquis: for atrial fibrillation  - Tylenol    SURGICAL HISTORY:  - Right knee replacement: Previously performed, no major issues reported  - Cardiac ablation: Scheduled for February 13th  - Cataract surgeries: Planned for March and April             Hx: Right  TKA (12/5/17) MARK Busch  The patient  reported no issues with his inicision healing and no complications with infection after surgery. The patient spent 1 night(s) in the hospital. The patient had 2 weeks of home health physical therapy and 6 weeks of out patient physical therapy. The patient stated that she healed well since surgery[place] [facility] [place]    [place]ants Used: Michelle     Tibia: Size 5    Femur: Size 4    Insert: 11mm    Patella: 33mm    R TKA doing well, happy     L knee pain x 7 years, dr grimaldo told her to do L first but R one hurt   Last seen Dr Zapata 10/23 L knee CSI  Now progressive deformity, walking on side of foot   Pain mostly post       Past surgical history: None.     Hx DVT: None.     Medications: Tylenol (650mg, tid), Eliquis (5mg, bid, afib managed by Dr. Temple). Has had tried meloxicam in the past but had GI issues.    Injections:   10/10/2023 left knee iaCSI with Dr. Canales minimal relief for a few months.   In the past has had left knee iaCSI that would last longer (6-8 months)    Physical Therapy: did PT with R TKA, not  indicated bone on bone knee arthritis     Bracing: tried, min relief, and k-taping    Assistive Devices: Yes, cane, the patient uses for longer walks to help with balance     Walking:   < 1 block    Limitations:  general walking, wants to be able to stand for a long period of time, wants to be able to work in the garden      Occupation: Retired - the patient production control for the [employer]     Social support: The patient stated that they live at home alone. The patient stated that she has friends would be able to help take care of them if they were to have surgery.   Cousin lives nextdoor, does have other family/friends in the area to help take care of her  Cousing with her today        ROS:  The updated medical history is in the chart and has been reviewed. A review of systems is updated and there is no reported vision changes, ear/nose/mouth/throat complaints,  chest pain, shortness of  breath, abdominal pain, urological complaints, fevers or chills, psychiatric complaints. Musculoskeletal and neurologcial symptoms are as documented. All other systems are negative.      Objective:   Exam:  There were no vitals filed for this visit.  Body mass index is 29.55 kg/m².    Physical examination included assessment of the patient's general appearance with particular attention to development, nutrition, body habitus, attention to grooming, and any evidence of distress.  Constitutional: The patient is a well-developed, well-nourished patient in no acute distress.   Cardiovascular: Vascular examination included warmth and capillary refill as well inspection for edema and assessment of pedal pulses. Pulses are palpable and regular.  Musculoskeletal: Gait was assessed as to whether it was steady, non-antalgic, and/or required the use of an assist device. The patient was also asked to walk independently and get onto the examination table.  Skin: The skin was examined for any obvious rashes or lesions in the extremity.  Neurologic: Sensation is intact to light touch in the extremity. The patient has good coordination without hyperreflexia and is alert and oriented to person, place and time and has normal mood and affect.     All of the above were examined and found to be within normal limits except for the following pertinent clinical findings:    Physical Exam    MSK: Knee - Left: Antalgic gait with varus deformity on the left knee. Left knee: 5 to 120 degree range of motion with 18 degrees of valgus alignment. Left knee: Tenderness at medial and patellofemoral joint lines with moderate effusion. Left knee: Stable to anterior, posterior, and valgus stresses with flexion and contraction but no extensor lag.  MSK: Knee - Right: Right knee: Well-healed total knee incision, good range of motion, and stability.  IMAGING:  - X-rays of both knees: Findings for left knee: significant varus deformity with 18 degrees of  valgus alignment, bone-on-bone arthritis. Right knee replacement appears to be doing well.  - X-rays from 2017: showed the right knee was worse at that time, which led to it being replaced first.         5-120, 18 valgus corrects to 10         Imaging:      KNEE L ARTHRITIS     Indication:  Left knee pain  Exam Ordered: Radiographs of the left knee include a standing anteroposterior view, a standing posterioanterior view, a lateral view in full flexion, and a sunrise view  Details of Examination: Exam shows evidence of joint space narrowing, osteophyte formation, and subchondral sclerosis, all consistent with degenerative arthritis of the knee.  No other significant findings are noted.  Impression:  Degenerative Arthritis, Left Knee    Klg4 valgus severe bone on bone      Assessment:     No diagnosis found.     Right  TKA (12/5/17) MARK Busch     Osteoporosis - prolia  A fib - eliquis 5 no ASA  TAA  Aortic valve disease - mod sclerosis: grad <40 = ok  cDHF: 60-65%, PAP 32  Hx GI bleed    Planned a fib ablation 2/13/25  Cataracts planned 3/19/25 and 4/16/25 2024: fell April, admitted 3-4 times, mult ER visits, GI bleed  Was unable to do the ablation or cataracts         Plan:     Assessment & Plan    PROCEDURES:   Recommend steroid injection for the left knee.   Discussed knee replacement using robotic technology.   Discussed timing considerations for knee replacement in relation to upcoming cardiac ablation and cataract surgeries.         The above findings were discussed with patient length. We discussed the risks of conservative versus surgical management knee arthritis. Conservative management consisting of anti-inflammatory medications, glucosamine/chondroitin sulfate, weight loss, physical therapy, activity modification, as well as injections (lubricant versus corticosteroid) was discussed at length. At this point considering the patient's level of activity, pain, and radiographic findings I recommend  TKA    This patient has significant symptoms in their knee that are affecting their quality of life and daily activities.  They have tried non-operative treatment including analgesics, an exercise program, and activity modification, but the symptoms have persisted. I believe they make a good candidate for knee arthroplasty.     The risks and benefits of knee arthroplasty have been discussed with the patient which include, but are not limited to infections (including severe sequelae), potential component failure, fracture, DVT, pulmonary embolus, nerve palsy, poor range of motion, the possibility of bone grafting, persistent pain, wound healing complications, transfusions, medical complications and death.     We discussed surgical options including implant type and why I believe the implant selected is a good match for the patient's needs. The patient expressed understanding and agrees to proceed with the planned surgery.     Pre-operative planning will include the following:  A pre-surgical evaluation by will be arranged.  Pre-op orders will be placed.  We will make arrangements with the operating room for proper time and staffing.  We will make Social Service arrangements for the patient.    Implants:   Company: [employer]  System: Attune    Velys (not available at [facility])  all poly  Revision backup    DVT prophylaxis: ASA 81mg x1 @12hrs post op, resume Eliquis 5mg BID @24hrs post op    Dispo: HH PT 1st 2 weeks     Admission status: Outpatient/23hr OBS    Location: [facility]   1 night  Cards clearance      Planned a fib ablation 2/13/25 - keira graves  Cataracts planned 3/19/25 and 4/16/25    Would need cards and ophtho clearance - messaged Kris Graves and Halima    Plan L TKA   TKA + velys info  F/u for pre-op visit     L knee CSI today (>90 days from anticipated surgery date end of April/early May)        No orders of the defined types were placed in this encounter.      This note was generated with the  assistance of ambient listening technology. Verbal consent was obtained by the patient and accompanying visitor(s) for the recording of patient appointment to facilitate this note. I attest to having reviewed and edited the generated note for accuracy, though some syntax or spelling errors may persist. Please contact the author of this note for any clarification.            Past Medical History:   Diagnosis Date    Arthritis     Cataract     Chronic a-fib     Eye injury 4 yrs    hit od    Hypertension 04/04/2023    Nuclear sclerosis, bilateral 02/13/2019    Osteoporosis        Past Surgical History:   Procedure Laterality Date    APPENDECTOMY      COLONOSCOPY N/A 09/07/2017    Procedure: COLONOSCOPY;  Surgeon: Albino Fenton MD;  Location: [facility] ENDO [address]);  Service: Endoscopy;  Laterality: N/A;    COLONOSCOPY N/A 10/09/2024    Procedure: COLONOSCOPY;  Surgeon: Albino Fenton MD;  Location: [facility] ENDOSCOPY;  Service: Endoscopy;  Laterality: N/A;    ESOPHAGOGASTRODUODENOSCOPY  09/07/2017    ESOPHAGOGASTRODUODENOSCOPY N/A 10/09/2024    Procedure: EGD (ESOPHAGOGASTRODUODENOSCOPY);  Surgeon: Albino Fenton MD;  Location: [facility] ENDOSCOPY;  Service: Endoscopy;  Laterality: N/A;  Ref By:[email or web address] [email or web address]  10/1/24- pc complete. DBM    KNEE SURGERY Right 12/05/2017    TKR    LASIK Bilateral     TONSILLECTOMY         Family History   Problem Relation Name Age of Onset    Hypertension Mother      Glaucoma Mother      Diabetes Mother      Cataracts Mother      Cancer Mother  74        lung    Heart disease Mother  55    Hypertension Father      Heart disease Father          onset early 60;s, Aortic valve replacement ,Rheumatic fever as a child     No Known Problems Sister      Diabetes Brother      Cancer Brother  66        mesothelioma    No Known Problems Maternal Aunt      No Known Problems Maternal Uncle      No Known Problems Paternal Aunt      No Known Problems Paternal Uncle      No Known Problems  Maternal Grandmother      No Known Problems Maternal Grandfather      No Known Problems Paternal Grandmother      No Known Problems Paternal Grandfather      No Known Problems Other      Amblyopia Neg Hx      Blindness Neg Hx      Macular degeneration Neg Hx      Retinal detachment Neg Hx      Strabismus Neg Hx      Stroke Neg Hx      Thyroid disease Neg Hx      Melanoma Neg Hx         Social History     Socioeconomic History    Marital status: Single   Occupational History     [place] [employer] [place]   Tobacco Use    Smoking status: Never    Smokeless tobacco: Never   Substance and Sexual Activity    Alcohol use: Not Currently     Alcohol/week: 1.0 standard drink of alcohol     Types: 1 Glasses of wine per week     Comment: glass of wine a night     Drug use: No    Sexual activity: Never     Social Drivers of Health     Financial Resource Strain: Low Risk  (11/29/2024)    Overall Financial Resource Strain (CARDIA)     Difficulty of Paying Living Expenses: Not very hard   Food Insecurity: No Food Insecurity (11/29/2024)    Hunger Vital Sign     Worried About Running Out of Food in the Last Year: Never true     Ran Out of Food in the Last Year: Never true   Transportation Needs: No Transportation Needs (11/29/2024)    TRANSPORTATION NEEDS     Transportation : No   Physical Activity: Inactive (11/29/2024)    Exercise Vital Sign     Days of Exercise per Week: 0 days     Minutes of Exercise per Session: 40 min   Stress: No Stress Concern Present (11/29/2024)    [employer] - Occupational Stress Questionnaire     Feeling of Stress : Only a little   Housing Stability: Low Risk  (11/29/2024)    Housing Stability Vital Sign     Unable to Pay for Housing in the Last Year: No     Homeless in the Last Year: No

## 2025-01-28 ENCOUNTER — OFFICE VISIT (OUTPATIENT)
Dept: ORTHOPEDICS | Facility: CLINIC | Age: 75
End: 2025-01-28
Payer: MEDICARE

## 2025-01-28 ENCOUNTER — HOSPITAL ENCOUNTER (OUTPATIENT)
Dept: RADIOLOGY | Facility: HOSPITAL | Age: 75
Discharge: HOME | End: 2025-01-28
Attending: ORTHOPAEDIC SURGERY
Payer: MEDICARE

## 2025-01-28 VITALS — HEIGHT: 62 IN | BODY MASS INDEX: 30.04 KG/M2 | WEIGHT: 163.25 LBS

## 2025-01-28 DIAGNOSIS — Z96.651 PRESENCE OF RIGHT ARTIFICIAL KNEE JOINT: ICD-10-CM

## 2025-01-28 DIAGNOSIS — M25.562 ACUTE PAIN OF LEFT KNEE: Primary | ICD-10-CM

## 2025-01-28 DIAGNOSIS — M17.12 PRIMARY OSTEOARTHRITIS OF LEFT KNEE: Primary | ICD-10-CM

## 2025-01-28 DIAGNOSIS — M25.562 ACUTE PAIN OF LEFT KNEE: ICD-10-CM

## 2025-01-28 PROCEDURE — 73564 X-RAY EXAM KNEE 4 OR MORE: CPT | Mod: TC,50

## 2025-01-28 PROCEDURE — 1101F PT FALLS ASSESS-DOCD LE1/YR: CPT | Mod: CPTII,S$GLB,, | Performed by: ORTHOPAEDIC SURGERY

## 2025-01-28 PROCEDURE — 1125F AMNT PAIN NOTED PAIN PRSNT: CPT | Mod: CPTII,S$GLB,, | Performed by: ORTHOPAEDIC SURGERY

## 2025-01-28 PROCEDURE — 73564 X-RAY EXAM KNEE 4 OR MORE: CPT | Mod: 26,50,, | Performed by: RADIOLOGY

## 2025-01-28 PROCEDURE — 1159F MED LIST DOCD IN RCRD: CPT | Mod: CPTII,S$GLB,, | Performed by: ORTHOPAEDIC SURGERY

## 2025-01-28 PROCEDURE — 99999 PR PBB SHADOW E&M-EST. PATIENT-LVL III: CPT | Mod: PBBFAC,,, | Performed by: ORTHOPAEDIC SURGERY

## 2025-01-28 PROCEDURE — 99215 OFFICE O/P EST HI 40 MIN: CPT | Mod: S$GLB,,, | Performed by: ORTHOPAEDIC SURGERY

## 2025-01-28 PROCEDURE — 3288F FALL RISK ASSESSMENT DOCD: CPT | Mod: CPTII,S$GLB,, | Performed by: ORTHOPAEDIC SURGERY

## 2025-01-28 NOTE — PROGRESS NOTES
Injection Information     Triamcinolone Acetonide Injectable Suspension (40mg/mL)  Lot Number: [id number]  Expiration Date: 7/31/2026

## 2025-01-29 ENCOUNTER — TELEPHONE (OUTPATIENT)
Dept: OPHTHALMOLOGY | Facility: CLINIC | Age: 75
End: 2025-01-29
Payer: MEDICARE

## 2025-01-29 ENCOUNTER — EXTERNAL HOME HEALTH (OUTPATIENT)
Dept: HOME HEALTH SERVICES | Facility: HOSPITAL | Age: 75
End: 2025-01-29
Payer: MEDICARE

## 2025-01-29 NOTE — TELEPHONE ENCOUNTER
Left message stating Dr. Varghese will be out the week of 4/14 and Inna will call her to reschedule her sx on 4/16.

## 2025-01-29 NOTE — TELEPHONE ENCOUNTER
----- Message from Tj sent at 1/29/2025  3:02 PM CST -----  Regarding: Consult/Advisory  Contact: [phone]  Consult/Advisory     Name Of Caller: Windy Lindo        Contact Preference:  [phone]     Nature of call: Calling because her post-op appt on 4/17 was cancelled and she was wondering if something will be rescheduled.

## 2025-01-30 DIAGNOSIS — Z00.00 ENCOUNTER FOR MEDICARE ANNUAL WELLNESS EXAM: ICD-10-CM

## 2025-02-02 NOTE — TELEPHONE ENCOUNTER
Refill Routing Note   Medication(s) are not appropriate for processing by [facility] for the following reason(s):        Outside of protocol    [facility] action(s):  Route             Appointments  past 12m or future 3m with PCP    Date Provider   Last Visit   10/7/2024 Trisha Higginbotham MD   Next Visit   4/8/2025 Trisha Higginbotham MD   ED visits in past 90 days: 1        Note composed:5:05 PM 02/02/2025

## 2025-02-04 ENCOUNTER — TELEPHONE (OUTPATIENT)
Dept: ORTHOPEDICS | Facility: CLINIC | Age: 75
End: 2025-02-04
Payer: MEDICARE

## 2025-02-04 NOTE — TELEPHONE ENCOUNTER
----- Message from Carlos Graves MD sent at 1/28/2025  4:19 PM CST -----  Regarding: RE: L total knee timing: cardiac ablation and cataracts planned  Hi Will    Minimum of 8 weeks post-AF ablation before anticoagulation can be held. Ideally 12 weeks.    Take care    Carlos  ----- Message -----  From: Hiral Neff MA  Sent: 1/28/2025   2:36 PM CST  To: Carlos Graves MD; Naz Gee RN  Subject: RE: L total knee timing: cardiac ablation an#      ----- Message -----  From: Mac Brice III, MD  Sent: 1/28/2025   2:17 PM CST  To: Flaca Varghese MD; Carlos Graves MD; #  Subject: L total knee timing: cardiac ablation and ca#    Ms Lindo would like to plan a left total knee replacement    Ideally would proceed with that after her planned ablation and cataracts    Planned a fib ablation 2/13/25 - carlos graves  Cataracts planned 3/19/25 and 4/16/25    -after ablation when can she stop anticoagulation for surgery?   -after cataracts when would she be cleared to proceed with TKA?     Thank you,   Remy Brice

## 2025-02-04 NOTE — TELEPHONE ENCOUNTER
----- Message from Flaca Varghese MD sent at 1/28/2025  2:26 PM CST -----  Regarding: RE: L total knee timing: cardiac ablation and cataracts planned  Hi -- the only restrictions post cat sx are no heavy lifting/ no strenuous exercise / no swimming x 1 wk -- so no contraindication for knee sx at any time...  ----- Message -----  From: Mac Brice III, MD  Sent: 1/28/2025   2:17 PM CST  To: Flaca Varghese MD; Carlos Graves MD; #  Subject: L total knee timing: cardiac ablation and ca#    Ms Lindo would like to plan a left total knee replacement    Ideally would proceed with that after her planned ablation and cataracts    Planned a fib ablation 2/13/25 - carlos graves  Cataracts planned 3/19/25 and 4/16/25    -after ablation when can she stop anticoagulation for surgery?   -after cataracts when would she be cleared to proceed with TKA?     Thank you,   Remy Brice

## 2025-02-07 ENCOUNTER — LAB VISIT (OUTPATIENT)
Dept: LAB | Facility: HOSPITAL | Age: 75
End: 2025-02-07
Attending: INTERNAL MEDICINE
Payer: MEDICARE

## 2025-02-07 ENCOUNTER — OFFICE VISIT (OUTPATIENT)
Dept: CARDIOLOGY | Facility: CLINIC | Age: 75
End: 2025-02-07
Payer: MEDICARE

## 2025-02-07 VITALS
WEIGHT: 159.81 LBS | RESPIRATION RATE: 14 BRPM | HEIGHT: 63 IN | SYSTOLIC BLOOD PRESSURE: 128 MMHG | DIASTOLIC BLOOD PRESSURE: 66 MMHG | BODY MASS INDEX: 28.32 KG/M2 | HEART RATE: 62 BPM

## 2025-02-07 DIAGNOSIS — I48.0 PAROXYSMAL ATRIAL FIBRILLATION: ICD-10-CM

## 2025-02-07 DIAGNOSIS — I10 PRIMARY HYPERTENSION: Chronic | ICD-10-CM

## 2025-02-07 DIAGNOSIS — Z01.818 PREOP TESTING: ICD-10-CM

## 2025-02-07 DIAGNOSIS — Z79.01 CHRONIC ANTICOAGULATION: ICD-10-CM

## 2025-02-07 DIAGNOSIS — E78.5 HYPERLIPIDEMIA, UNSPECIFIED HYPERLIPIDEMIA TYPE: Chronic | ICD-10-CM

## 2025-02-07 DIAGNOSIS — I35.9 AORTIC VALVE DISEASE: ICD-10-CM

## 2025-02-07 DIAGNOSIS — I48.0 PAROXYSMAL ATRIAL FIBRILLATION: Primary | ICD-10-CM

## 2025-02-07 DIAGNOSIS — I71.21 ANEURYSM OF ASCENDING AORTA WITHOUT RUPTURE: Chronic | ICD-10-CM

## 2025-02-07 DIAGNOSIS — I70.0 AORTIC ATHEROSCLEROSIS: Chronic | ICD-10-CM

## 2025-02-07 DIAGNOSIS — I50.32 CHRONIC DIASTOLIC HEART FAILURE: ICD-10-CM

## 2025-02-07 LAB
ANION GAP SERPL CALC-SCNC: 7 MMOL/L (ref 8–16)
APTT PPP: 29.2 SEC (ref 21–32)
BUN SERPL-MCNC: 25 MG/DL (ref 8–23)
CALCIUM SERPL-MCNC: 10 MG/DL (ref 8.7–10.5)
CHLORIDE SERPL-SCNC: 106 MMOL/L (ref 95–110)
CO2 SERPL-SCNC: 27 MMOL/L (ref 23–29)
CREAT SERPL-MCNC: 0.9 MG/DL (ref 0.5–1.4)
ERYTHROCYTE [DISTWIDTH] IN BLOOD BY AUTOMATED COUNT: 15.2 % (ref 11.5–14.5)
EST. GFR  (NO RACE VARIABLE): >60 ML/MIN/1.73 M^2
GLUCOSE SERPL-MCNC: 80 MG/DL (ref 70–110)
HCT VFR BLD AUTO: 41.8 % (ref 37–48.5)
HGB BLD-MCNC: 13.2 G/DL (ref 12–16)
INR PPP: 1 (ref 0.8–1.2)
MCH RBC QN AUTO: 30.3 PG (ref 27–31)
MCHC RBC AUTO-ENTMCNC: 31.6 G/DL (ref 32–36)
MCV RBC AUTO: 96 FL (ref 82–98)
PLATELET # BLD AUTO: 183 K/UL (ref 150–450)
PMV BLD AUTO: 12.7 FL (ref 9.2–12.9)
POTASSIUM SERPL-SCNC: 4.7 MMOL/L (ref 3.5–5.1)
PROTHROMBIN TIME: 10.8 SEC (ref 9–12.5)
RBC # BLD AUTO: 4.36 M/UL (ref 4–5.4)
SODIUM SERPL-SCNC: 140 MMOL/L (ref 136–145)
WBC # BLD AUTO: 9.34 K/UL (ref 3.9–12.7)

## 2025-02-07 PROCEDURE — 36415 COLL VENOUS BLD VENIPUNCTURE: CPT | Performed by: INTERNAL MEDICINE

## 2025-02-07 PROCEDURE — 80048 BASIC METABOLIC PNL TOTAL CA: CPT | Performed by: INTERNAL MEDICINE

## 2025-02-07 PROCEDURE — 99999 PR PBB SHADOW E&M-EST. PATIENT-LVL IV: CPT | Mod: PBBFAC,,, | Performed by: INTERNAL MEDICINE

## 2025-02-07 PROCEDURE — 85610 PROTHROMBIN TIME: CPT | Performed by: INTERNAL MEDICINE

## 2025-02-07 PROCEDURE — 3078F DIAST BP <80 MM HG: CPT | Mod: CPTII,S$GLB,, | Performed by: INTERNAL MEDICINE

## 2025-02-07 PROCEDURE — 3074F SYST BP LT 130 MM HG: CPT | Mod: CPTII,S$GLB,, | Performed by: INTERNAL MEDICINE

## 2025-02-07 PROCEDURE — 99214 OFFICE O/P EST MOD 30 MIN: CPT | Mod: S$GLB,,, | Performed by: INTERNAL MEDICINE

## 2025-02-07 PROCEDURE — 85027 COMPLETE CBC AUTOMATED: CPT | Performed by: INTERNAL MEDICINE

## 2025-02-07 PROCEDURE — 85730 THROMBOPLASTIN TIME PARTIAL: CPT | Performed by: INTERNAL MEDICINE

## 2025-02-07 PROCEDURE — 1126F AMNT PAIN NOTED NONE PRSNT: CPT | Mod: CPTII,S$GLB,, | Performed by: INTERNAL MEDICINE

## 2025-02-07 PROCEDURE — 1159F MED LIST DOCD IN RCRD: CPT | Mod: CPTII,S$GLB,, | Performed by: INTERNAL MEDICINE

## 2025-02-07 NOTE — PROGRESS NOTES
[facility] Cardiology     Subjective:    Patient ID:  Windy Lindo is a 74 y.o. female who presents for follow-up of Atrial Fibrillation, Hyperlipidemia, and Hypertension    Review of patient's allergies indicates:  No Known Allergies  She developed paroxysmal atrial fibrillation 2024.  Her episodes are disabling.  She does get tachycardic.  She self terminates her AFib.  She is scheduled for ablation with Dr. Temple next week.  She is on Multaq.  Her frequent episodes of tachycardia prompted treatment.  She is on low-dose metoprolol.  She remains on 25 mg Lopressor b.i.d..  She is in sinus rhythm today.  The last couple episodes of AFib she has seen on her Apple watch but she was asleep at the time and it did not wake her up.  Her CHADS-VASc score is 4.         Review of Systems   Constitutional: Negative for chills, decreased appetite, diaphoresis, fever, malaise/fatigue, night sweats, weight gain and weight loss.   HENT:  Negative for congestion, ear discharge, ear pain, hearing loss, hoarse voice, nosebleeds, odynophagia, sore throat, stridor and tinnitus.    Eyes:  Negative for blurred vision, discharge, double vision, pain, photophobia, redness, vision loss in left eye, vision loss in right eye, visual disturbance and visual halos.   Cardiovascular:  Positive for irregular heartbeat, palpitations and syncope. Negative for chest pain, claudication, cyanosis, dyspnea on exertion, leg swelling, near-syncope, orthopnea and paroxysmal nocturnal dyspnea.   Respiratory:  Negative for cough, hemoptysis, shortness of breath, sleep disturbances due to breathing, snoring, sputum production and wheezing.    Endocrine: Negative for cold intolerance, heat intolerance, polydipsia, polyphagia and polyuria.   Hematologic/Lymphatic: Negative for adenopathy and bleeding problem. Does not bruise/bleed easily.   Skin:  Negative for color change, dry skin,  "flushing, itching, nail changes, poor wound healing, rash, skin cancer, suspicious lesions and unusual hair distribution.   Musculoskeletal:  Negative for arthritis, back pain, falls, gout, joint pain, joint swelling, muscle cramps, muscle weakness, myalgias, neck pain and stiffness.   Gastrointestinal:  Negative for bloating, abdominal pain, anorexia, change in bowel habit, bowel incontinence, constipation, diarrhea, dysphagia, excessive appetite, flatus, heartburn, hematemesis, hematochezia, hemorrhoids, jaundice, melena, nausea and vomiting.   Genitourinary:  Negative for bladder incontinence, decreased libido, dysuria, flank pain, frequency, genital sores, hematuria, hesitancy, incomplete emptying, nocturia and urgency.   Neurological:  Negative for aphonia, brief paralysis, difficulty with concentration, disturbances in coordination, excessive daytime sleepiness, dizziness, focal weakness, headaches, light-headedness, loss of balance, numbness, paresthesias, seizures, sensory change, tremors, vertigo and weakness.   Psychiatric/Behavioral:  Negative for altered mental status, depression, hallucinations, memory loss, substance abuse, suicidal ideas and thoughts of violence. The patient does not have insomnia and is not nervous/anxious.    Allergic/Immunologic: Negative for hives and persistent infections.        Objective:       Vitals:    02/07/25 1002   BP: 128/66   BP Location: Left arm   Patient Position: Sitting   Pulse: 62   Resp: 14   Weight: 72.5 kg (159 lb 13.3 oz)   Height: 5' 2.5" (1.588 m)    Physical Exam  Constitutional:       General: She is not in acute distress.     Appearance: She is well-developed. She is not diaphoretic.   HENT:      Head: Normocephalic and atraumatic.      Nose: Nose normal.   Eyes:      General: No scleral icterus.        Right eye: No discharge.      Conjunctiva/sclera: Conjunctivae normal.      Pupils: Pupils are equal, round, and reactive to light.   Neck:      Thyroid: " No thyromegaly.      Vascular: No JVD.      Trachea: No tracheal deviation.   Cardiovascular:      Rate and Rhythm: Normal rate and regular rhythm.      Pulses:           Carotid pulses are 2+ on the right side and 2+ on the left side.       Radial pulses are 2+ on the right side and 2+ on the left side.        Dorsalis pedis pulses are 2+ on the right side and 2+ on the left side.        Posterior tibial pulses are 2+ on the right side and 2+ on the left side.      Heart sounds: Murmur heard.      Harsh early systolic murmur is present with a grade of 2/6 at the upper right sternal border.      No friction rub. No gallop.   Pulmonary:      Effort: Pulmonary effort is normal. No respiratory distress.      Breath sounds: Normal breath sounds. No stridor. No wheezing or rales.   Chest:      Chest wall: No tenderness.   Abdominal:      General: Bowel sounds are normal. There is no distension.      Palpations: Abdomen is soft. There is no mass.      Tenderness: There is no abdominal tenderness. There is no guarding or rebound.   Musculoskeletal:         General: No tenderness. Normal range of motion.      Cervical back: Normal range of motion and neck supple.   Lymphadenopathy:      Cervical: No cervical adenopathy.   Skin:     General: Skin is warm and dry.      Coloration: Skin is not pale.      Findings: No erythema or rash.   Neurological:      Mental Status: She is alert and oriented to person, place, and time.      Cranial Nerves: No cranial nerve deficit.      Coordination: Coordination normal.   Psychiatric:         Behavior: Behavior normal.         Thought Content: Thought content normal.         Judgment: Judgment normal.           Assessment:       1. Paroxysmal atrial fibrillation    2. Aortic atherosclerosis    3. Aortic valve disease    4. Primary hypertension    5. Hyperlipidemia, unspecified hyperlipidemia type    6. Aneurysm of ascending aorta without rupture      Results for orders placed or performed  in visit on 02/07/25   APTT    Collection Time: 02/07/25  9:17 AM   Result Value Ref Range    aPTT 29.2 21.0 - 32.0 sec   Basic Metabolic Panel    Collection Time: 02/07/25  9:17 AM   Result Value Ref Range    Sodium 140 136 - 145 mmol/L    Potassium 4.7 3.5 - 5.1 mmol/L    Chloride 106 95 - 110 mmol/L    CO2 27 23 - 29 mmol/L    Glucose 80 70 - 110 mg/dL    BUN 25 (H) 8 - 23 mg/dL    Creatinine 0.9 0.5 - 1.4 mg/dL    Calcium 10.0 8.7 - 10.5 mg/dL    Anion Gap 7 (L) 8 - 16 mmol/L    eGFR >60.0 >60 mL/min/1.73 m^2   CBC Without Differential    Collection Time: 02/07/25  9:17 AM   Result Value Ref Range    WBC 9.34 3.90 - 12.70 K/uL    RBC 4.36 4.00 - 5.40 M/uL    Hemoglobin 13.2 12.0 - 16.0 g/dL    Hematocrit 41.8 37.0 - 48.5 %    MCV 96 82 - 98 fL    MCH 30.3 27.0 - 31.0 pg    MCHC 31.6 (L) 32.0 - 36.0 g/dL    RDW 15.2 (H) 11.5 - 14.5 %    Platelets 183 150 - 450 K/uL    MPV 12.7 9.2 - 12.9 fL   Protime-INR    Collection Time: 02/07/25  9:17 AM   Result Value Ref Range    Prothrombin Time 10.8 9.0 - 12.5 sec    INR 1.0 0.8 - 1.2         Current Outpatient Medications:     acetaminophen (TYLENOL) 650 MG TbSR, Take 1 tablet (650 mg total) by mouth every 6 (six) hours as needed., Disp: 20 tablet, Rfl: 0    amoxicillin (AMOXIL) 500 MG Tab, Take 500 mg by mouth as needed., Disp: , Rfl:     apixaban (ELIQUIS) 5 mg Tab, Take 1 tablet (5 mg total) by mouth 2 (two) times daily., Disp: 60 tablet, Rfl: 2    b complex vitamins capsule, Take 1 capsule by mouth once daily., Disp: , Rfl:     denosumab (PROLIA) 60 mg/mL Syrg, Inject 60 mg into the skin every 6 (six) months., Disp: , Rfl:     dronedarone (MULTAQ) 400 mg Tab, Take 400 mg by mouth 2 (two) times daily with meals., Disp: , Rfl:     metoprolol tartrate (LOPRESSOR) 25 MG tablet, Take 1 tablet (25 mg total) by mouth 2 (two) times daily., Disp: 180 tablet, Rfl: 3    multivit-min-FA-lycopen-lutein 0.4-300-250 mg-mcg-mcg Tab, Take 1 tablet by mouth once daily., Disp: , Rfl:  "    pantoprazole (PROTONIX) 40 MG tablet, Take 1 tablet (40 mg total) by mouth once daily., Disp: 90 tablet, Rfl: 3    vitamin D (VITAMIN D3) 1000 units Tab, Take 1,000 Units by mouth once daily., Disp: , Rfl:     vitamin E 100 UNIT capsule, Take 100 Units by mouth once daily., Disp: , Rfl:      Lab Results   Component Value Date    WBC 9.34 02/07/2025    RBC 4.36 02/07/2025    HGB 13.2 02/07/2025    HCT 41.8 02/07/2025    MCV 96 02/07/2025    MCH 30.3 02/07/2025    MCHC 31.6 (L) 02/07/2025    RDW 15.2 (H) 02/07/2025     02/07/2025    MPV 12.7 02/07/2025    GRAN 9.2 (H) 11/28/2024    GRAN 86.7 (H) 11/28/2024    LYMPH 0.6 (L) 11/28/2024    LYMPH 5.4 (L) 11/28/2024    MONO 0.6 11/28/2024    MONO 6.0 11/28/2024    EOS 0.2 11/28/2024    BASO 0.02 11/28/2024    EOSINOPHIL 1.4 11/28/2024    BASOPHIL 0.2 11/28/2024    MG 2.1 11/28/2024        CMP  Lab Results   Component Value Date     02/07/2025    K 4.7 02/07/2025     02/07/2025    CO2 27 02/07/2025    GLU 80 02/07/2025    BUN 25 (H) 02/07/2025    CREATININE 0.9 02/07/2025    CALCIUM 10.0 02/07/2025    PROT 6.4 11/28/2024    ALBUMIN 3.3 (L) 11/28/2024    BILITOT 0.5 11/28/2024    ALKPHOS 72 11/28/2024    AST 13 11/28/2024    ALT 11 11/28/2024    ANIONGAP 7 (L) 02/07/2025    ESTGFRAFRICA >60.0 02/21/2022    EGFRNONAA >60.0 02/21/2022        No results found for: "LABBLOO", "LABURIN", "RESPIRATORYC", "GSRESP"         Results for orders placed or performed during the hospital encounter of 11/28/24   EKG 12-lead    Collection Time: 11/28/24  9:33 AM   Result Value Ref Range    QRS Duration 78 ms    [facility] QTC Calculation 425 ms    Narrative    Test Reason : R07.9,    Vent. Rate :  81 BPM     Atrial Rate :  81 BPM     P-R Int : 182 ms          QRS Dur :  78 ms      QT Int : 366 ms       P-R-T Axes :  52  -8  46 degrees    QTcB Int : 425 ms    Normal sinus rhythm  Normal ECG  When compared with ECG of 26-Nov-2024 14:34,  No significant change was " found  Confirmed by Lj Zuñiga ([id number]) on 11/29/2024 8:01:24 AM    Referred By: AAAREFERRAL SELF           Confirmed By: Lj Zuñiga                   Plan:       Problem List Items Addressed This Visit          Cardiac/Vascular    Hyperlipidemia (Chronic)      range.  It looks like in the past she had been on atorvastatin.  She is considered primary prevention.  I did not reinitiate statin therapy today.         Aortic atherosclerosis (Chronic)     Condition stable.         Thoracic aortic aneurysm (TAA) (Chronic)     Ascending aortic measurement was 4.2 cm by CT chest 2024 October.  Fusiform aneurysmal dilatation reported.         Primary hypertension     Lopressor 25 mg b.i.d. will be continued.  Her blood pressures have been stable.         Paroxysmal atrial fibrillation - Primary     Upcoming ablation planned.  She is still having episodes.  Her heart rates go elevated intermittently, she bought an Apple watch.    She has awareness of AFib.    She is tolerating Multaq but is not interested in staying on it long-term.  She is reporting that it makes her somnolent.         Aortic valve disease     Peak aortic velocity 2.1 m/sec by echo 2024 October.  Mild aortic regurgitation noted.  Condition unchanged.               I made a six-month follow-up.    I support her decision for ablation for atrial fibrillation management.  She continues to have frequent episodes despite antiarrhythmic therapy and beta-blocker.  She is tolerating anticoagulation well.             Evans Vernon MD  02/07/2025   10:27 AM

## 2025-02-07 NOTE — ASSESSMENT & PLAN NOTE
Upcoming ablation planned.  She is still having episodes.  Her heart rates go elevated intermittently, she bought an Apple watch.    She has awareness of AFib.    She is tolerating Multaq but is not interested in staying on it long-term.  She is reporting that it makes her somnolent.

## 2025-02-07 NOTE — ASSESSMENT & PLAN NOTE
Peak aortic velocity 2.1 m/sec by echo 2024 October.  Mild aortic regurgitation noted.  Condition unchanged.

## 2025-02-07 NOTE — ASSESSMENT & PLAN NOTE
range.  It looks like in the past she had been on atorvastatin.  She is considered primary prevention.  I did not reinitiate statin therapy today.

## 2025-02-07 NOTE — ASSESSMENT & PLAN NOTE
Ascending aortic measurement was 4.2 cm by CT chest 2024 October.  Fusiform aneurysmal dilatation reported.

## 2025-02-11 ENCOUNTER — TELEPHONE (OUTPATIENT)
Dept: ELECTROPHYSIOLOGY | Facility: CLINIC | Age: 75
End: 2025-02-11
Payer: MEDICARE

## 2025-02-11 NOTE — TELEPHONE ENCOUNTER
Spoke to patient    CONFIRMED procedure arrival time of 8 am    Reiterated instructions including:    -Directions to check in desk    -NPO after midnight night prior to procedure. Fasting upon arrival to the hospital the day of the procedure. Patient allowed to drink water up until 2 hours prior to arrival due to IV fluid shortage.     -High importance of HOLDING Multaq 5 days prior to procedure (last dose on 2/7/25); holding Eliquis on day of procedure (last dose on 2/12/25)    - Confirms no new meds prescribed or med changes since scheduling -     -Pre-procedure LABS Reviewed; BUN 25 on 2/7/25 and 19 on 11/29/24; creatinine normal at 0.9 on 2/7/25    -Confirmed absence or presence of implanted device/stimulator n/a    -Confirmed no recent or current fever, cough, or shortness of breath.    -Confirmed no redness, rash, irritation, or yeast infection to skin/ chest / groin area.     Patient verbalized understanding of above, denies any further questions and appreciated the call.

## 2025-02-13 ENCOUNTER — ANESTHESIA (OUTPATIENT)
Dept: MEDSURG UNIT | Facility: HOSPITAL | Age: 75
End: 2025-02-13
Payer: MEDICARE

## 2025-02-13 ENCOUNTER — ANESTHESIA EVENT (OUTPATIENT)
Dept: MEDSURG UNIT | Facility: HOSPITAL | Age: 75
End: 2025-02-13
Payer: MEDICARE

## 2025-02-13 ENCOUNTER — HOSPITAL ENCOUNTER (OUTPATIENT)
Facility: HOSPITAL | Age: 75
Discharge: HOME | End: 2025-02-14
Attending: INTERNAL MEDICINE | Admitting: INTERNAL MEDICINE
Payer: MEDICARE

## 2025-02-13 DIAGNOSIS — I48.0 PAROXYSMAL ATRIAL FIBRILLATION: ICD-10-CM

## 2025-02-13 DIAGNOSIS — I50.32 CHRONIC DIASTOLIC HEART FAILURE: ICD-10-CM

## 2025-02-13 DIAGNOSIS — I48.91 ATRIAL FIBRILLATION: Primary | ICD-10-CM

## 2025-02-13 DIAGNOSIS — I49.9 ARRHYTHMIA: ICD-10-CM

## 2025-02-13 LAB
OHS QRS DURATION: 78 MS
OHS QRS DURATION: 90 MS
OHS QTC CALCULATION: 428 MS
OHS QTC CALCULATION: 442 MS
POCT GLUCOSE: 121 MG/DL (ref 70–110)

## 2025-02-13 PROCEDURE — C1753 CATH, INTRAVAS ULTRASOUND: HCPCS | Performed by: INTERNAL MEDICINE

## 2025-02-13 PROCEDURE — 25000003 PHARM REV CODE 250: Performed by: INTERNAL MEDICINE

## 2025-02-13 PROCEDURE — 93656 COMPRE EP EVAL ABLTJ ATR FIB: CPT | Performed by: INTERNAL MEDICINE

## 2025-02-13 PROCEDURE — C1894 INTRO/SHEATH, NON-LASER: HCPCS | Performed by: INTERNAL MEDICINE

## 2025-02-13 PROCEDURE — 93005 ELECTROCARDIOGRAM TRACING: CPT

## 2025-02-13 PROCEDURE — C1766 INTRO/SHEATH,STRBLE,NON-PEEL: HCPCS | Performed by: INTERNAL MEDICINE

## 2025-02-13 PROCEDURE — 63600175 PHARM REV CODE 636 W HCPCS: Performed by: INTERNAL MEDICINE

## 2025-02-13 PROCEDURE — C1730 CATH, EP, 19 OR FEW ELECT: HCPCS | Performed by: INTERNAL MEDICINE

## 2025-02-13 PROCEDURE — 27201423 OPTIME MED/SURG SUP & DEVICES STERILE SUPPLY: Performed by: INTERNAL MEDICINE

## 2025-02-13 PROCEDURE — 37000008 HC ANESTHESIA 1ST 15 MINUTES: Performed by: INTERNAL MEDICINE

## 2025-02-13 PROCEDURE — C1733 CATH, EP, OTHR THAN COOL-TIP: HCPCS | Performed by: INTERNAL MEDICINE

## 2025-02-13 PROCEDURE — 37000009 HC ANESTHESIA EA ADD 15 MINS: Performed by: INTERNAL MEDICINE

## 2025-02-13 PROCEDURE — 63600175 PHARM REV CODE 636 W HCPCS: Performed by: STUDENT IN AN ORGANIZED HEALTH CARE EDUCATION/TRAINING PROGRAM

## 2025-02-13 PROCEDURE — C1769 GUIDE WIRE: HCPCS | Performed by: INTERNAL MEDICINE

## 2025-02-13 PROCEDURE — 25000003 PHARM REV CODE 250: Performed by: STUDENT IN AN ORGANIZED HEALTH CARE EDUCATION/TRAINING PROGRAM

## 2025-02-13 PROCEDURE — C1887 CATHETER, GUIDING: HCPCS | Performed by: INTERNAL MEDICINE

## 2025-02-13 RX ORDER — PHENYLEPHRINE HYDROCHLORIDE 10 MG/ML
INJECTION INTRAVENOUS CONTINUOUS PRN
Status: DISCONTINUED | OUTPATIENT
Start: 2025-02-13 | End: 2025-02-13

## 2025-02-13 RX ORDER — PROPOFOL 10 MG/ML
VIAL (ML) INTRAVENOUS
Status: DISCONTINUED | OUTPATIENT
Start: 2025-02-13 | End: 2025-02-13

## 2025-02-13 RX ORDER — PROCHLORPERAZINE EDISYLATE 5 MG/ML
5 INJECTION INTRAMUSCULAR; INTRAVENOUS EVERY 30 MIN PRN
Status: DISCONTINUED | OUTPATIENT
Start: 2025-02-13 | End: 2025-02-14 | Stop reason: HOSPADM

## 2025-02-13 RX ORDER — ONDANSETRON HYDROCHLORIDE 2 MG/ML
INJECTION, SOLUTION INTRAVENOUS
Status: DISCONTINUED | OUTPATIENT
Start: 2025-02-13 | End: 2025-02-13

## 2025-02-13 RX ORDER — LIDOCAINE HYDROCHLORIDE 20 MG/ML
INJECTION, SOLUTION EPIDURAL; INFILTRATION; INTRACAUDAL; PERINEURAL
Status: DISCONTINUED | OUTPATIENT
Start: 2025-02-13 | End: 2025-02-14 | Stop reason: HOSPADM

## 2025-02-13 RX ORDER — PROTAMINE SULFATE 10 MG/ML
INJECTION, SOLUTION INTRAVENOUS
Status: DISCONTINUED | OUTPATIENT
Start: 2025-02-13 | End: 2025-02-13

## 2025-02-13 RX ORDER — ACETAMINOPHEN 325 MG/1
650 TABLET ORAL EVERY 4 HOURS PRN
Status: DISCONTINUED | OUTPATIENT
Start: 2025-02-13 | End: 2025-02-14 | Stop reason: HOSPADM

## 2025-02-13 RX ORDER — HEPARIN SODIUM 10000 [USP'U]/100ML
INJECTION, SOLUTION INTRAVENOUS CONTINUOUS PRN
Status: DISCONTINUED | OUTPATIENT
Start: 2025-02-13 | End: 2025-02-13

## 2025-02-13 RX ORDER — METOPROLOL TARTRATE 25 MG/1
25 TABLET, FILM COATED ORAL 2 TIMES DAILY
Status: DISCONTINUED | OUTPATIENT
Start: 2025-02-13 | End: 2025-02-14 | Stop reason: HOSPADM

## 2025-02-13 RX ORDER — PANTOPRAZOLE SODIUM 40 MG/1
40 TABLET, DELAYED RELEASE ORAL DAILY
Status: DISCONTINUED | OUTPATIENT
Start: 2025-02-13 | End: 2025-02-14 | Stop reason: HOSPADM

## 2025-02-13 RX ORDER — DEXAMETHASONE SODIUM PHOSPHATE 4 MG/ML
INJECTION, SOLUTION INTRA-ARTICULAR; INTRALESIONAL; INTRAMUSCULAR; INTRAVENOUS; SOFT TISSUE
Status: DISCONTINUED | OUTPATIENT
Start: 2025-02-13 | End: 2025-02-13

## 2025-02-13 RX ORDER — HYDROMORPHONE HYDROCHLORIDE 1 MG/ML
0.2 INJECTION, SOLUTION INTRAMUSCULAR; INTRAVENOUS; SUBCUTANEOUS EVERY 5 MIN PRN
Status: DISCONTINUED | OUTPATIENT
Start: 2025-02-13 | End: 2025-02-14 | Stop reason: HOSPADM

## 2025-02-13 RX ORDER — ONDANSETRON HYDROCHLORIDE 2 MG/ML
4 INJECTION, SOLUTION INTRAVENOUS ONCE AS NEEDED
Status: DISCONTINUED | OUTPATIENT
Start: 2025-02-13 | End: 2025-02-14 | Stop reason: HOSPADM

## 2025-02-13 RX ORDER — ATROPINE SULFATE 0.1 MG/ML
INJECTION INTRAVENOUS
Status: DISCONTINUED | OUTPATIENT
Start: 2025-02-13 | End: 2025-02-13

## 2025-02-13 RX ORDER — LIDOCAINE HYDROCHLORIDE 20 MG/ML
INJECTION INTRAVENOUS
Status: DISCONTINUED | OUTPATIENT
Start: 2025-02-13 | End: 2025-02-13

## 2025-02-13 RX ORDER — FENTANYL CITRATE 50 UG/ML
INJECTION, SOLUTION INTRAMUSCULAR; INTRAVENOUS
Status: DISCONTINUED | OUTPATIENT
Start: 2025-02-13 | End: 2025-02-13

## 2025-02-13 RX ORDER — HEPARIN SODIUM 1000 [USP'U]/ML
INJECTION, SOLUTION INTRAVENOUS; SUBCUTANEOUS
Status: DISCONTINUED | OUTPATIENT
Start: 2025-02-13 | End: 2025-02-13

## 2025-02-13 RX ORDER — ROCURONIUM BROMIDE 10 MG/ML
INJECTION, SOLUTION INTRAVENOUS
Status: DISCONTINUED | OUTPATIENT
Start: 2025-02-13 | End: 2025-02-13

## 2025-02-13 RX ORDER — HEPARIN SOD,PORCINE/0.9 % NACL 1000/500ML
INTRAVENOUS SOLUTION INTRAVENOUS
Status: DISCONTINUED | OUTPATIENT
Start: 2025-02-13 | End: 2025-02-14

## 2025-02-13 RX ORDER — FENTANYL CITRATE 50 UG/ML
25 INJECTION, SOLUTION INTRAMUSCULAR; INTRAVENOUS EVERY 5 MIN PRN
Status: DISCONTINUED | OUTPATIENT
Start: 2025-02-13 | End: 2025-02-14 | Stop reason: HOSPADM

## 2025-02-13 RX ORDER — DIPHENHYDRAMINE HYDROCHLORIDE 50 MG/ML
25 INJECTION INTRAMUSCULAR; INTRAVENOUS EVERY 6 HOURS PRN
Status: DISCONTINUED | OUTPATIENT
Start: 2025-02-13 | End: 2025-02-14 | Stop reason: HOSPADM

## 2025-02-13 RX ADMIN — ACETAMINOPHEN 650 MG: 325 TABLET ORAL at 08:02

## 2025-02-13 RX ADMIN — DEXAMETHASONE SODIUM PHOSPHATE 8 MG: 4 INJECTION, SOLUTION INTRAMUSCULAR; INTRAVENOUS at 10:02

## 2025-02-13 RX ADMIN — ATROPINE SULFATE 0.5 MG: 0.1 INJECTION INTRAVENOUS at 11:02

## 2025-02-13 RX ADMIN — ROCURONIUM BROMIDE 20 MG: 10 INJECTION, SOLUTION INTRAVENOUS at 12:02

## 2025-02-13 RX ADMIN — METOPROLOL TARTRATE 25 MG: 25 TABLET, FILM COATED ORAL at 08:02

## 2025-02-13 RX ADMIN — PANTOPRAZOLE SODIUM 40 MG: 40 TABLET, DELAYED RELEASE ORAL at 02:02

## 2025-02-13 RX ADMIN — SODIUM CHLORIDE: 0.9 INJECTION, SOLUTION INTRAVENOUS at 10:02

## 2025-02-13 RX ADMIN — ROCURONIUM BROMIDE 20 MG: 10 INJECTION, SOLUTION INTRAVENOUS at 11:02

## 2025-02-13 RX ADMIN — HEPARIN SODIUM 14000 UNITS: 1000 INJECTION, SOLUTION INTRAVENOUS; SUBCUTANEOUS at 11:02

## 2025-02-13 RX ADMIN — ROCURONIUM BROMIDE 60 MG: 10 INJECTION, SOLUTION INTRAVENOUS at 10:02

## 2025-02-13 RX ADMIN — PHENYLEPHRINE HYDROCHLORIDE 0.3 MCG/KG/MIN: 10 INJECTION INTRAVENOUS at 11:02

## 2025-02-13 RX ADMIN — LIDOCAINE HYDROCHLORIDE 50 MG: 20 INJECTION INTRAVENOUS at 10:02

## 2025-02-13 RX ADMIN — SODIUM CHLORIDE, SODIUM GLUCONATE, SODIUM ACETATE, POTASSIUM CHLORIDE, MAGNESIUM CHLORIDE, SODIUM PHOSPHATE, DIBASIC, AND POTASSIUM PHOSPHATE: .53; .5; .37; .037; .03; .012; .00082 INJECTION, SOLUTION INTRAVENOUS at 11:02

## 2025-02-13 RX ADMIN — SUGAMMADEX 200 MG: 100 INJECTION, SOLUTION INTRAVENOUS at 12:02

## 2025-02-13 RX ADMIN — DRONEDARONE 400 MG: 400 TABLET, FILM COATED ORAL at 05:02

## 2025-02-13 RX ADMIN — GLYCOPYRROLATE 0.2 MG: 0.2 INJECTION, SOLUTION INTRAMUSCULAR; INTRAVENOUS at 10:02

## 2025-02-13 RX ADMIN — ONDANSETRON 4 MG: 2 INJECTION INTRAMUSCULAR; INTRAVENOUS at 10:02

## 2025-02-13 RX ADMIN — PROTAMINE SULFATE 70 MG: 10 INJECTION, SOLUTION INTRAVENOUS at 12:02

## 2025-02-13 RX ADMIN — APIXABAN 5 MG: 5 TABLET, FILM COATED ORAL at 08:02

## 2025-02-13 RX ADMIN — HEPARIN SODIUM 2000 UNITS: 1000 INJECTION, SOLUTION INTRAVENOUS; SUBCUTANEOUS at 11:02

## 2025-02-13 RX ADMIN — FENTANYL CITRATE 50 MCG: 50 INJECTION, SOLUTION INTRAMUSCULAR; INTRAVENOUS at 12:02

## 2025-02-13 RX ADMIN — ACETAMINOPHEN 650 MG: 325 TABLET ORAL at 02:02

## 2025-02-13 RX ADMIN — FENTANYL CITRATE 50 MCG: 50 INJECTION, SOLUTION INTRAMUSCULAR; INTRAVENOUS at 10:02

## 2025-02-13 RX ADMIN — HEPARIN SODIUM AND DEXTROSE 12 UNITS/KG/HR: 10000; 5 INJECTION INTRAVENOUS at 11:02

## 2025-02-13 RX ADMIN — DEXMEDETOMIDINE HYDROCHLORIDE 12 MCG: 100 INJECTION, SOLUTION, CONCENTRATE INTRAVENOUS at 12:02

## 2025-02-13 RX ADMIN — PROPOFOL 100 MG: 10 INJECTION, EMULSION INTRAVENOUS at 10:02

## 2025-02-13 RX ADMIN — PROPOFOL 20 MG: 10 INJECTION, EMULSION INTRAVENOUS at 12:02

## 2025-02-13 NOTE — H&P
[facility] [place]  Electrophysiology  H&P      Windy Lindo   YOB: 1950   Medical Record Number: [id number]   Attending Physician:    Date of Admission: 02/13/2025       Hospital Day:  0  Current Principal Problem:  <principal problem not specified>      History     Cc: atrial fibrillation     HPI  Mrs Windy Lindo is a 74 year old female with PMH of paroxysmal atrial fibrillation, HTN, aortic atherosclerosis who presents to [facility] for pulmonary vein isolation with Dr. Temple. She has a history of episodic shortness of breath and near syncope, found to be in AF following endoscopy procedure 10/2024 which correlated with symptoms. Reports weekly paroxysms of AF. She was offered PVI by Dr. Temple and agreed to proceed.       Presenting EKG: NSR  CHADS-VASc: 5 (HTN, aortic atherosclerosis/aneurysm, gender, age)  Anticoagulants: Apixaban 5 mg BID   Antiarrhythmics: Dronedarone 400 mg BID   LV EF: 60-65% (TTE 11/2024)   Pertinent labs: Hgb 13.2, INR 1.9, Cr 0.9         Medications - Outpatient  Prior to Admission medications    Medication Sig Start Date End Date Taking? Authorizing Provider   acetaminophen (TYLENOL) 650 MG TbSR Take 1 tablet (650 mg total) by mouth every 6 (six) hours as needed. 4/16/24   Renetta Roy PA-C   amoxicillin (AMOXIL) 500 MG Tab Take 500 mg by mouth as needed.    Provider, Historical   apixaban (ELIQUIS) 5 mg Tab Take 1 tablet (5 mg total) by mouth 2 (two) times daily. 2/3/25 5/4/25  Trisha Higginbotham MD   b complex vitamins capsule Take 1 capsule by mouth once daily.    Provider, Historical   denosumab (PROLIA) 60 mg/mL Syrg Inject 60 mg into the skin every 6 (six) months.    Provider, Historical   dronedarone (MULTAQ) 400 mg Tab Take 400 mg by mouth 2 (two) times daily with meals.    Provider, Historical   metoprolol tartrate (LOPRESSOR) 25 MG tablet Take 1 tablet (25 mg total) by mouth 2 (two) times daily. 1/24/25 1/24/26  Carlos Temple MD    multivit-min-FA-lycopen-lutein 0.4-300-250 mg-mcg-mcg Tab Take 1 tablet by mouth once daily.    Provider, Historical   pantoprazole (PROTONIX) 40 MG tablet Take 1 tablet (40 mg total) by mouth once daily. 10/11/24 10/11/25  Kirstie Hirsch PA-C   vitamin D (VITAMIN D3) 1000 units Tab Take 1,000 Units by mouth once daily.    Provider, Historical   vitamin E 100 UNIT capsule Take 100 Units by mouth once daily.    Provider, Historical         Medications - Current  Scheduled Meds:  Continuous Infusions:  PRN Meds:.      Allergies  Review of patient's allergies indicates:  No Known Allergies      Past Medical History  Past Medical History:   Diagnosis Date    Arthritis     Cataract     Eye injury 4 yrs    hit od    Hypertension 04/04/2023    Nuclear sclerosis, bilateral 02/13/2019    Osteoporosis          Past Surgical History  Past Surgical History:   Procedure Laterality Date    APPENDECTOMY      COLONOSCOPY N/A 09/07/2017    Procedure: COLONOSCOPY;  Surgeon: Albino Fenton MD;  Location: [address]);  Service: Endoscopy;  Laterality: N/A;    COLONOSCOPY N/A 10/09/2024    Procedure: COLONOSCOPY;  Surgeon: Albino Fenton MD;  Location: Replaced by [facility] ENDOSCOPY;  Service: Endoscopy;  Laterality: N/A;    ESOPHAGOGASTRODUODENOSCOPY  09/07/2017    ESOPHAGOGASTRODUODENOSCOPY N/A 10/09/2024    Procedure: EGD (ESOPHAGOGASTRODUODENOSCOPY);  Surgeon: Albino Fenton MD;  Location: Replaced by [facility] ENDOSCOPY;  Service: Endoscopy;  Laterality: N/A;  Ref By:solange Jaramillo suprep.  10/1/24- pc complete. DBM    KNEE SURGERY Right 12/05/2017    TKR    LASIK Bilateral     TONSILLECTOMY           Social History  Social History     Socioeconomic History    Marital status: Single   Occupational History     [place] [employer]   Tobacco Use    Smoking status: Never    Smokeless tobacco: Never   Substance and Sexual Activity    Alcohol use: Not Currently     Alcohol/week: 1.0 standard drink of alcohol     Types: 1 Glasses of wine per week      Comment: glass of wine a night     Drug use: No    Sexual activity: Never     Social Drivers of Health     Financial Resource Strain: Low Risk  (11/29/2024)    Overall Financial Resource Strain (CARDIA)     Difficulty of Paying Living Expenses: Not very hard   Food Insecurity: No Food Insecurity (11/29/2024)    Hunger Vital Sign     Worried About Running Out of Food in the Last Year: Never true     Ran Out of Food in the Last Year: Never true   Transportation Needs: No Transportation Needs (11/29/2024)    TRANSPORTATION NEEDS     Transportation : No   Physical Activity: Inactive (11/29/2024)    Exercise Vital Sign     Days of Exercise per Week: 0 days     Minutes of Exercise per Session: 40 min   Stress: No Stress Concern Present (11/29/2024)    [place] [employer] - Occupational Stress Questionnaire     Feeling of Stress : Only a little   Housing Stability: Low Risk  (11/29/2024)    Housing Stability Vital Sign     Unable to Pay for Housing in the Last Year: No     Number of Times Moved in the Last Year: 1     Homeless in the Last Year: No         ROS  10 point ROS performed and negative except as stated in HPI     Physical Examination         Vital Signs             24 Hour VS Range       No intake or output data in the 24 hours ending 02/13/25 0748        Physical Exam:   Constitutional: no acute distress  HEENT: NCAT, EOMI, no scleral icterus  Cardiovascular: Regular rate and rhythm  Pulmonary: Normal respiratory effort   Abdomen: nontender, non-distended   Neuro: alert and oriented, no focal deficits  Extremities: warm, no edema   MSK: no deformities  Integument: intact, no rashes       Data       Recent Labs   Lab 02/07/25  0917   WBC 9.34   HGB 13.2   HCT 41.8           Recent Labs   Lab 02/07/25  0917   INR 1.0        Recent Labs   Lab 02/07/25  0917      K 4.7      CO2 27   BUN 25*   CREATININE 0.9   ANIONGAP 7*   CALCIUM 10.0        No results for input(s):  ""PROT", "ALBUMIN", "BILITOT", "ALKPHOS", "AST", "ALT" in the last 168 hours.     No results for input(s): "TROPONINI" in the last 168 hours.     BNP (pg/mL)   Date Value   11/28/2024 <10   11/25/2024 14   11/13/2024 11   10/09/2024 41   10/01/2024 18       No results for input(s): "LABBLOO" in the last 168 hours.         Assessment & Plan   74 year old female with PMH of paroxysmal atrial fibrillation, HTN, aortic atherosclerosis who presents to [facility] for pulmonary vein isolation with Dr. Temple.    Paroxysmal atrial fibrillation:   Risks/benefits/alternatives discussed with patient and she agrees to proceed. Consents signed.   -To EP lab for PFA pulmonary vein isolation  -Cancel JANE       Perico Zhang MD  [facility]  Electrophysiology, PGY-VII      "

## 2025-02-13 NOTE — ANESTHESIA PROCEDURE NOTES
Intubation    Date/Time: 2/13/2025 10:40 AM    Performed by: Alcides Nix CRNA  Authorized by: Johan Velasquez MD    Intubation:     Induction:  Intravenous    Intubated:  Postinduction    Mask Ventilation:  Easy with oral airway    Attempts:  1    Attempted By:  CRNA    Method of Intubation:  Video laryngoscopy    Blade:  Caputo 3    Laryngeal View Grade: Grade I - full view of cords      Difficult Airway Encountered?: No      Complications:  None    Airway Device:  Oral endotracheal tube    Airway Device Size:  7.0    Style/Cuff Inflation:  Cuffed (inflated to minimal occlusive pressure)    Tube secured:  21    Secured at:  The lips    Placement Verified By:  Capnometry    Complicating Factors:  None    Findings Post-Intubation:  BS equal bilateral

## 2025-02-13 NOTE — ANESTHESIA PREPROCEDURE EVALUATION
02/13/2025  Windy Lindo is a 74 y.o., female.  Patient Active Problem List   Diagnosis    Right knee pain    Varicose veins of both lower extremities    Primary osteoarthritis of right knee    Chronic pain of left ankle    Nuclear sclerosis, bilateral    Refractive error    S/P LASIK (laser assisted in situ keratomileusis) of both eyes    Hyperlipidemia    Primary hypertension    Age-related osteoporosis without current pathological fracture    Thoracic compression fracture    Aortic atherosclerosis    Iron deficiency anemia    Paroxysmal atrial fibrillation    History of GI bleed    Thoracic aortic aneurysm (TAA)    Aortic valve disease    Vision loss of right eye    Chronic diastolic heart failure    Hypercoagulable state due to atrial fibrillation    Chronic anticoagulation    Constipation    Syncope           Pre-op Assessment    I have reviewed the Patient Summary Reports.       I have reviewed the Medications.     Review of Systems  Anesthesia Hx:  No problems with previous Anesthesia               Denies Personal Hx of Anesthesia complications.                    Cardiovascular:     Hypertension                                    Hypertension     Atrial Fibrillation     Musculoskeletal:  Arthritis        Arthritis          Neurological:      Arthritis                               Physical Exam    Airway:  No airway management difficulties anticipated  Dental:  No active dental issues noted  Chest/Lungs:  Clear to auscultation    Heart:  Rate: Normal  Rhythm: Regular Rhythm  Sounds: Normal        Anesthesia Plan  Type of Anesthesia, risks & benefits discussed:    Anesthesia Type: Gen ETT  Intra-op Monitoring Plan: Art Line  Informed Consent: Informed consent signed with the Patient and all parties understand the risks and agree with anesthesia plan.  All questions answered.   ASA Score:  3  Anesthesia Plan Notes: Chart reviewed. Patient seen and examined. Anesthesia plan discussed and questions answered. E-consent signed. Johan Velasquez MD    Ready For Surgery From Anesthesia Perspective.     .

## 2025-02-13 NOTE — NURSING
BL groin sutures and stopcock removed, gauze and Tegaderm applied. Groin sites without bleeding hematoma. Patient completed bed rest and ambulated to bathroom independently.     Plan of care reviewed with patient. Patient is AOX4 and VS stable. Patient remained free of falls and trauma, fall precautions are in place. Patient is ambulating independently. Patient has no complaints of pain. Patient has no questions at this time. Wheels are locked and the bed is in lowest position. The call bell is within reach. Telemetry is on.

## 2025-02-13 NOTE — TRANSFER OF CARE
"Anesthesia Transfer of Care Note    Patient: Windy Lindo    Procedure(s) Performed: Procedure(s) (LRB):  Ablation atrial fibrillation (N/A)    Patient location: PACU    Anesthesia Type: general    Transport from OR: Transported from OR on 6-10 L/min O2 by face mask with adequate spontaneous ventilation    Post pain: adequate analgesia    Post assessment: no apparent anesthetic complications    Post vital signs: stable    Level of consciousness: lethargic    Nausea/Vomiting: no nausea/vomiting    Complications: none    Transfer of care protocol was followed      Last vitals: Visit Vitals  BP (!) 99/55 (Patient Position: Lying)   Pulse 84   Temp 36.2 °C (97.2 °F) (Temporal)   Resp 16   Ht 5' 2" (1.575 m)   Wt 70.3 kg (155 lb)   SpO2 99%   Breastfeeding No   BMI 28.35 kg/m²     "

## 2025-02-13 NOTE — ANESTHESIA PROCEDURE NOTES
Arterial    Diagnosis: a fib    Patient location during procedure: done in OR    Staffing  Authorizing Provider: Johan Vleasquez MD  Performing Provider: Johan Velasquez MD    Staffing  Performed by: Johan Velasquez MD  Authorized by: Johan Velasquez MD    Anesthesiologist was present at the time of the procedure.  Arterial  Skin Prep: chlorhexidine gluconate  Local Infiltration: none  Orientation: left  Location: radial    Catheter Size: 22 G  Catheter placement by Ultrasound guidance. Heme positive aspiration all ports.   Vessel Caliber: patent  Needle advanced into vessel with real time Ultrasound guidance.Insertion Attempts: 1  Assessment  Dressing: secured with tape and tegaderm  Additional Notes  Initially placed on right side with 22 catheter, 1st attempt. Catheter somehow dislodged from vessel leading to hematoma formation.

## 2025-02-13 NOTE — PLAN OF CARE
"Vss. Sr noted on cm. S/p pvi ablation. Pt arrived with beka groins sutures/stopcock x1 each groin angel, well approx. Sutures placed at 1245, removal time at 1545, bedrest done at 1645. No hematoma or drainage noted. Palpable pulses noted. 12 lead EKG done and in chart. Pt's niece updated over text messaging system. Given new room number 302. Tele box on confirmed by tele tech. Pt due to void later denies bladder pressure or pain. Pt complaints of "low back pain". Beka blanket rolls placed under both knees, prn tylenol given per md order. At this time "tolerable". Pt does have hx of osteoporosis. Pt tolerating water in ep pacu 3. Awaiting hospital transport to bring pt to room. Pt on 2l nc sats 99%.  On room air, sats 91%. Csu rn to wean oxygen as tolerated.   "

## 2025-02-13 NOTE — NURSING TRANSFER
Nursing Transfer Note      2/13/2025   2:53 PM    Nurse giving handoff:hanna Barraganrn   Nurse receiving handoff:marcie moya rn    Reason patient is being transferred: ep pacu 3 to 302    Transfer To: ep pacu 3 to 302    Transfer via stretcher    Transfer with cardiac monitoring, tele box on confirmed by tele tech, 2lnc portable oxygen    Transported by [facility] lab transporter    Transfer Vital Signs:  Blood Pressure:135/98  Heart Rate:82  O2:100% 2l nc  Temperature:97.8  Respirations:18    Telemetry: Box Number [id number], Rate 78, Rhythm sr, and Telemetry  denenmaa  Order for Tele Monitor? Yes    Additional Lines: Oxygen 2lnc    Medicines sent: see mar, meds due at 1715    Any special needs or follow-up needed: sutures/stopcock removal at 1545, bedrest done at 1645    Patient belongings transferred with patient: Yes short stay locker belongings bag x1, cane in bag    Chart send with patient: Yes    Notified: migue    Patient reassessed at: 2/13/25 1445. Next due 1515  Upon arrival to floor: cardiac monitor applied, patient oriented to room, call bell in reach, and bed in lowest position, 2lnc wall oxygen.  Groin check done with nurse upon arrival to room.

## 2025-02-14 VITALS
HEIGHT: 62 IN | OXYGEN SATURATION: 95 % | TEMPERATURE: 98 F | DIASTOLIC BLOOD PRESSURE: 56 MMHG | WEIGHT: 155 LBS | RESPIRATION RATE: 16 BRPM | SYSTOLIC BLOOD PRESSURE: 101 MMHG | HEART RATE: 79 BPM | BODY MASS INDEX: 28.52 KG/M2

## 2025-02-14 DIAGNOSIS — H25.11 NUCLEAR SCLEROTIC CATARACT OF RIGHT EYE: Primary | ICD-10-CM

## 2025-02-14 PROCEDURE — 25000003 PHARM REV CODE 250: Performed by: INTERNAL MEDICINE

## 2025-02-14 RX ADMIN — APIXABAN 5 MG: 5 TABLET, FILM COATED ORAL at 07:02

## 2025-02-14 RX ADMIN — PANTOPRAZOLE SODIUM 40 MG: 40 TABLET, DELAYED RELEASE ORAL at 07:02

## 2025-02-14 RX ADMIN — DRONEDARONE 400 MG: 400 TABLET, FILM COATED ORAL at 07:02

## 2025-02-14 NOTE — DISCHARGE INSTRUCTIONS
"Medications:  Make sure to continue taking your blood thinner apixiban (trade name: Eliquis) after your procedure as you would normally take  If given a prescription of furosemide (trade name: Lasix), which is a diuretic (fluid pill), you can take it daily or twice daily as needed for fluid retention or shortness of breath following your ablation  You may experience chest discomfort (also known as "pericarditis") with deep breaths, coughing, and/or laying down which is typically normal following your procedure. If this occurs, you can take ibuprofen (Motrin) 800 mg every 8 hours for 2-3 days. If the chest pain is persistent or severe please visit the nearest emergency department.    Groin site management, precautions, and restrictions:  Remove the bandages over your groin area the morning after your procedure. You can shower after you remove these bandages. Keep the groin sites clean and dry. You do not need to apply ointments or bandages to the area.   If oozing from groin site occurs, apply pressure without letting up for 15 minutes and lay flat for 1 hour. If bleeding has resolved, you can continue to monitor. If the bleeding continues or there is significant swelling or pain in the groin area, please visit the nearest ER for evaluation and treatment. DO NOT STOP TAKING YOUR BLOOD THINNER UNLESS INSTRUCTED BY A PHYSICIAN.   Do not take baths or submerge your groin area or at least 1 week or when the puncture sites in your groin have completely healed  Do not lift anything over 5 lbs for the first week after your procedure, and avoid strenuous activity during this time to allow for the groin sites to heal. After 1 week, there are no activity restrictions.  Please contact the electrophysiology clinic or go to the ER if you experience: severe chest pain, shortness of breath, bleeding or swelling of the groin sites, or any other concerns.    "

## 2025-02-14 NOTE — PLAN OF CARE
Pt educated on fall risk and remained free from falls/trauma/injury. Denies chest pain, SOB, palpitations, dizziness, pain, or discomfort. Plan of care reviewed with pt, all questions answered. Bed locked in lowest position, call bell within reach, no acute distress noted, will continue to monitor. VSS, afebrile.     Problem: Adult Inpatient Plan of Care  Goal: Plan of Care Review  Outcome: Progressing  Goal: Patient-Specific Goal (Individualized)  Outcome: Progressing  Goal: Absence of Hospital-Acquired Illness or Injury  Outcome: Progressing  Goal: Optimal Comfort and Wellbeing  Outcome: Progressing  Goal: Readiness for Transition of Care  Outcome: Progressing     Problem: Fall Injury Risk  Goal: Absence of Fall and Fall-Related Injury  Outcome: Progressing     Problem: Wound  Goal: Optimal Coping  Outcome: Progressing  Goal: Optimal Functional Ability  Outcome: Progressing  Goal: Absence of Infection Signs and Symptoms  Outcome: Progressing  Goal: Improved Oral Intake  Outcome: Progressing  Goal: Optimal Pain Control and Function  Outcome: Progressing  Goal: Skin Health and Integrity  Outcome: Progressing  Goal: Optimal Wound Healing  Outcome: Progressing

## 2025-02-14 NOTE — DISCHARGE SUMMARY
[facility] - Cardiology  Cardiac Electrophysiology  Discharge Summary        Patient Name: Windy Lindo   MRN: [id number]   Admission Date: 2/13/2025    Hospital Length of Stay: 0   Discharge Date: 02/14/2025    Attending Physician: Carlos Temple MD   Discharging Provider: Perico Zhang MD      HPI:   Mrs Windy Linod is a 74 year old female with PMH of paroxysmal atrial fibrillation, HTN, aortic atherosclerosis who presents to [facility] for pulmonary vein isolation with Dr. Temple. She has a history of episodic shortness of breath and near syncope, found to be in AF following endoscopy procedure 10/2024 which correlated with symptoms. Reports weekly paroxysms of AF. She was offered PVI by Dr. Temple and agreed to proceed.         Presenting EKG: NSR  CHADS-VASc: 5 (HTN, aortic atherosclerosis/aneurysm, gender, age)  Anticoagulants: Apixaban 5 mg BID   Antiarrhythmics: Dronedarone 400 mg BID   LV EF: 60-65% (TTE 11/2024)   Pertinent labs: Hgb 13.2, INR 1.9, Cr 0.9     Hospital Course:   Mrs Lindo underwent successful pulmonary vein and posterior wall isolation with PFA. She tolerated the procedure well with no immediate complications. She was monitored overnight and discharged home in stable condition the morning following procedure.     Follow up:   Follow up with Dr. Temple in 4-6 weeks     Disposition:   Home or Self Care         Medication List        CONTINUE taking these medications      acetaminophen 650 MG Tbsr  Commonly known as: TYLENOL  Take 1 tablet (650 mg total) by mouth every 6 (six) hours as needed.     amoxicillin 500 MG Tab  Commonly known as: AMOXIL     b complex vitamins capsule     ELIQUIS 5 mg Tab  Generic drug: apixaban  Take 1 tablet (5 mg total) by mouth 2 (two) times daily.     metoprolol tartrate 25 MG tablet  Commonly known as: LOPRESSOR  Take 1 tablet (25 mg total) by mouth 2 (two) times daily.     MULTAQ 400 mg Tab  Generic drug: dronedarone     multivit-min-FA-lycopen-lutein 0.4 mg-300 mcg-  250 mcg Tab     pantoprazole 40 MG tablet  Commonly known as: PROTONIX  Take 1 tablet (40 mg total) by mouth once daily.     PROLIA 60 mg/mL Syrg  Generic drug: denosumab     vitamin D 1000 units Tab  Commonly known as: VITAMIN D3     vitamin E 100 UNIT capsule              Perico Zhang MD  [facility]  Electrophysiology, PGY-VII

## 2025-02-14 NOTE — ANESTHESIA POSTPROCEDURE EVALUATION
Anesthesia Post Evaluation    Patient: Windy Lindo    Procedure(s) Performed: Procedure(s) (LRB):  Ablation atrial fibrillation (N/A)    Final Anesthesia Type: general      Patient participation: Yes- Able to Participate  Level of consciousness: awake and alert  Post-procedure vital signs: reviewed and stable  Pain control: Pain has been treated.  Airway patency: patent    PONV status: Absent or treated.  Anesthetic complications: no      Cardiovascular status: hemodynamically stable  Respiratory status: unassisted  Hydration status: euvolemic  Follow-up not needed.              Vitals Value Taken Time   /85 02/13/25 1600   Temp 36.7 °C (98.1 °F) 02/13/25 1556   Pulse 96 02/13/25 1800   Resp 19 02/13/25 1556   SpO2 96 % 02/13/25 1715         No case tracking events are documented in the log.      Pain/Cortney Score: Pain Rating Prior to Med Admin: 3 (2/13/2025  2:07 PM)  Pain Rating Post Med Admin: 3 (2/13/2025  2:45 PM)  Cortney Score: 9 (2/13/2025  2:45 PM)

## 2025-02-16 ENCOUNTER — HOSPITAL ENCOUNTER (EMERGENCY)
Facility: HOSPITAL | Age: 75
Discharge: HOME | End: 2025-02-16
Attending: EMERGENCY MEDICINE
Payer: MEDICARE

## 2025-02-16 VITALS
HEART RATE: 76 BPM | HEIGHT: 62 IN | BODY MASS INDEX: 28.52 KG/M2 | SYSTOLIC BLOOD PRESSURE: 146 MMHG | OXYGEN SATURATION: 98 % | TEMPERATURE: 98 F | WEIGHT: 155 LBS | DIASTOLIC BLOOD PRESSURE: 77 MMHG | RESPIRATION RATE: 18 BRPM

## 2025-02-16 DIAGNOSIS — R79.89 ELEVATED TROPONIN: ICD-10-CM

## 2025-02-16 DIAGNOSIS — M79.89 RIGHT LEG SWELLING: ICD-10-CM

## 2025-02-16 DIAGNOSIS — M25.561 RIGHT KNEE PAIN: Primary | ICD-10-CM

## 2025-02-16 LAB
ALBUMIN SERPL-MCNC: 3.5 G/DL (ref 3.3–5.5)
ALP SERPL-CCNC: 63 U/L (ref 42–141)
BILIRUB SERPL-MCNC: 0.6 MG/DL (ref 0.2–1.6)
BUN SERPL-MCNC: 20 MG/DL (ref 7–22)
CALCIUM SERPL-MCNC: 10.3 MG/DL (ref 8–10.3)
CHLORIDE SERPL-SCNC: 112 MMOL/L (ref 98–108)
CREAT SERPL-MCNC: 0.8 MG/DL (ref 0.6–1.2)
GLUCOSE SERPL-MCNC: 106 MG/DL (ref 73–118)
HCT, POC: NORMAL
HGB, POC: NORMAL (ref 14–18)
MCH, POC: NORMAL
MCHC, POC: NORMAL
MCV, POC: NORMAL
MPV, POC: NORMAL
POC ALT (SGPT): 10 U/L (ref 10–47)
POC AST (SGOT): 26 U/L (ref 11–38)
POC B-TYPE NATRIURETIC PEPTIDE: 29.9 PG/ML (ref 0–100)
POC CARDIAC TROPONIN I: 0.52 NG/ML (ref 0–0.08)
POC CARDIAC TROPONIN I: 0.6 NG/ML (ref 0–0.08)
POC PLATELET COUNT: NORMAL
POC PTINR: 1.4 (ref 0.9–1.2)
POC PTWBT: 14 SEC (ref 9.7–14.3)
POC TCO2: 28 MMOL/L (ref 18–33)
POTASSIUM BLD-SCNC: 5 MMOL/L (ref 3.6–5.1)
PROTEIN, POC: 6.2 G/DL (ref 6.4–8.1)
RBC, POC: NORMAL
RDW, POC: NORMAL
SAMPLE: ABNORMAL
SODIUM BLD-SCNC: 146 MMOL/L (ref 128–145)
WBC, POC: NORMAL

## 2025-02-16 PROCEDURE — 25000003 PHARM REV CODE 250: Mod: ER

## 2025-02-16 PROCEDURE — 93005 ELECTROCARDIOGRAM TRACING: CPT | Mod: ER

## 2025-02-16 PROCEDURE — 80053 COMPREHEN METABOLIC PANEL: CPT | Mod: ER

## 2025-02-16 PROCEDURE — 85025 COMPLETE CBC W/AUTO DIFF WBC: CPT | Mod: ER

## 2025-02-16 PROCEDURE — 99285 EMERGENCY DEPT VISIT HI MDM: CPT | Mod: 25,ER

## 2025-02-16 PROCEDURE — 83880 ASSAY OF NATRIURETIC PEPTIDE: CPT | Mod: ER

## 2025-02-16 PROCEDURE — 84484 ASSAY OF TROPONIN QUANT: CPT | Mod: ER

## 2025-02-16 RX ORDER — OXYCODONE AND ACETAMINOPHEN 5; 325 MG/1; MG/1
1 TABLET ORAL
Refills: 0 | Status: COMPLETED | OUTPATIENT
Start: 2025-02-16 | End: 2025-02-16

## 2025-02-16 RX ORDER — ACETAMINOPHEN AND CODEINE PHOSPHATE 300; 30 MG/1; MG/1
1 TABLET ORAL EVERY 6 HOURS PRN
Qty: 10 TABLET | Refills: 0 | Status: SHIPPED | OUTPATIENT
Start: 2025-02-16

## 2025-02-16 RX ADMIN — OXYCODONE HYDROCHLORIDE AND ACETAMINOPHEN 1 TABLET: 5; 325 TABLET ORAL at 05:02

## 2025-02-16 NOTE — ED PROVIDER NOTES
Encounter Date: 2/16/2025    SCRIBE #1 NOTE: Symone TROTTER am scribing for, and in the presence of,  Cait Valera PA-C. Other sections scribed: JENNY,TRINITY.       History     Chief Complaint   Patient presents with    Joint Swelling     Pt reports nontraumatic swelling to (right) knee. Pt had an ablation last Thursday. Pt had (right) knee replacement 7 years prior.      74 y.o. female with a PMHx of HTN and afib presents to the ED for evaluation of joint swelling to the right knee. PSHx includes ablation atrial fibrillation 3 days ago and knee replacement of the right knee 7 years ago.  Patient states surgery 3 days ago went well and incisions are healing. Patient reports associated knee pain, numbness, and difficulty bending the knee that started 3 hours PTA.  Patient denies any recent trauma to that knee.  Patient denies any chest pain, nausea, vomiting, fever, abdominal pain. She denies any history of CHF. Symptoms are worsened by attempting to walk. Daily medications include Eliquis 5 mg, Metoprlol 25mg, Protonix 40mg, and Multaq 400mg. Patient has not attempted treatment.     The history is provided by medical records and the patient.     Review of patient's allergies indicates:  No Known Allergies  Past Medical History:   Diagnosis Date    Arthritis     Cataract     Eye injury 4 yrs    hit od    Hypertension 04/04/2023    Nuclear sclerosis, bilateral 02/13/2019    Osteoporosis      Past Surgical History:   Procedure Laterality Date    ABLATION OF ARRHYTHMOGENIC FOCUS FOR ATRIAL FIBRILLATION N/A 2/13/2025    Procedure: Ablation atrial fibrillation;  Surgeon: Carlos Temple MD;  Location: [facility] EP LAB;  Service: Cardiology;  Laterality: N/A;  AF, JANE (Cx if SR), PVI, PFA, CHAYA, BSci, Gen, MD, 3 Prep    APPENDECTOMY      COLONOSCOPY N/A 09/07/2017    Procedure: COLONOSCOPY;  Surgeon: Albino Fenton MD;  Location: [facility] ENDO ([address] [place] [address]);  Service: Endoscopy;  Laterality: N/A;    COLONOSCOPY N/A 10/09/2024     Procedure: COLONOSCOPY;  Surgeon: Albino Fenton MD;  Location: [facility] ENDOSCOPY;  Service: Endoscopy;  Laterality: N/A;    ESOPHAGOGASTRODUODENOSCOPY  09/07/2017    ESOPHAGOGASTRODUODENOSCOPY N/A 10/09/2024    Procedure: EGD (ESOPHAGOGASTRODUODENOSCOPY);  Surgeon: Albino Fenton MD;  Location: [facility] ENDOSCOPY;  Service: Endoscopy;  Laterality: N/A;  Ref By:solange Jaramillo suprep.  10/1/24-  complete. DBM    KNEE SURGERY Right 12/05/2017    TKR    kyphosplasty      LASIK Bilateral     TONSILLECTOMY       Family History   Problem Relation Name Age of Onset    Hypertension Mother      Glaucoma Mother      Diabetes Mother      Cataracts Mother      Cancer Mother  74        lung    Heart disease Mother  55    Hypertension Father      Heart disease Father          onset early 60;s, Aortic valve replacement ,Rheumatic fever as a child     No Known Problems Sister      Diabetes Brother      Cancer Brother  66        mesothelioma    No Known Problems Maternal Aunt      No Known Problems Maternal Uncle      No Known Problems Paternal Aunt      No Known Problems Paternal Uncle      No Known Problems Maternal Grandmother      No Known Problems Maternal Grandfather      No Known Problems Paternal Grandmother      No Known Problems Paternal Grandfather      No Known Problems Other      Amblyopia Neg Hx      Blindness Neg Hx      Macular degeneration Neg Hx      Retinal detachment Neg Hx      Strabismus Neg Hx      Stroke Neg Hx      Thyroid disease Neg Hx      Melanoma Neg Hx       Social History[1]  Review of Systems   Constitutional:  Negative for fever.   HENT:  Negative for sore throat.    Eyes:  Negative for pain.   Respiratory:  Negative for shortness of breath.    Cardiovascular:  Negative for chest pain.   Gastrointestinal:  Negative for abdominal pain.   Genitourinary:  Negative for dysuria.   Musculoskeletal:  Positive for arthralgias and joint swelling. Negative for back pain.   Skin:  Negative for rash.    Neurological:  Positive for numbness. Negative for headaches.   Hematological:         No bleeding       Physical Exam     Initial Vitals [02/16/25 1537]   BP Pulse Resp Temp SpO2   (!) 162/78 77 18 97.6 °F (36.4 °C) 96 %      MAP       --         Physical Exam    Nursing note and vitals reviewed.  Constitutional: She appears well-developed and well-nourished.   HENT:   Head: Normocephalic and atraumatic.   Eyes: Conjunctivae are normal.   Cardiovascular:  Normal rate, regular rhythm, normal heart sounds and intact distal pulses.     Exam reveals no gallop and no friction rub.       No murmur heard.  Pulmonary/Chest: Breath sounds normal. She has no wheezes. She has no rhonchi. She has no rales.   Abdominal: Abdomen is soft. Bowel sounds are normal. She exhibits no distension. There is no abdominal tenderness. There is no rebound, no guarding, no tenderness at McBurney's point and negative Zhang's sign.   Musculoskeletal:         General: Normal range of motion.      Comments: Right thigh and knee swelling noted.  Tenderness to palpation of superior patella.  Patient has full range motion with pain.  5/5 strength in all extremities.  Sensation intact.  2+ DP, PT pulses.  No calf tenderness noted.  Patient able to ambulate with the assistance of a cane.  No tenderness to palpation of right hip.  There are bilateral healing bruises noted in the groin from ablation procedure 3 days prior.     Lymphadenopathy:     She has no cervical adenopathy.   Neurological: She is alert. She has normal strength. No cranial nerve deficit or sensory deficit.   Skin: Skin is warm and dry. No pallor.   Psychiatric: She has a normal mood and affect.         ED Course   Procedures  Labs Reviewed   TROPONIN ISTAT - Abnormal       Result Value    POC Cardiac Troponin I 0.60 (*)     Sample unknown     TROPONIN ISTAT - Abnormal    POC Cardiac Troponin I 0.52 (*)     Sample unknown     ISTAT PROCEDURE - Abnormal    POC PTWBT 14.0      POC  PTINR 1.4 (*)     Sample unknown     POCT CMP - Abnormal    Albumin, POC 3.5      Alkaline Phosphatase, POC 63      ALT (SGPT), POC 10      AST (SGOT), POC 26      POC BUN 20      Calcium, POC 10.3      POC Chloride 112 (*)     POC Creatinine 0.8      POC Glucose 106      POC Potassium 5.0      POC Sodium 146 (*)     Bilirubin, POC 0.6      POC TCO2 28      Protein, POC 6.2 (*)    POCT CBC    Hematocrit        Hemoglobin        RBC        WBC        MCV        MCH, POC        MCHC        RDW-CV        Platelet Count, POC        MPV       POCT CMP   POCT PROTIME-INR   POCT TROPONIN   POCT B-TYPE NATRIURETIC PEPTIDE (BNP)   POCT B-TYPE NATRIURETIC PEPTIDE (BNP)    POC B-Type Natriuretic Peptide 29.9     POCT TROPONIN          Imaging Results              X-Ray Knee 3 View Right (Final result)  Result time 02/16/25 17:18:46   Procedure changed from X-Ray Knee 1 or 2 View Right     Final result by Sherman Peng MD (02/16/25 17:18:46)                   Impression:      Stable total knee arthroplasty on the right with moderate suprapatellar bursa effusion is suggested.      Electronically signed by: Sherman Peng  Date:    02/16/2025  Time:    17:18               Narrative:    EXAMINATION:  XR KNEE 3 VIEW RIGHT    CLINICAL HISTORY:  pain; Pain in right knee    TECHNIQUE:  AP, lateral, and Merchant views of the right knee were performed.    COMPARISON:  None    FINDINGS:  Bones, joint spaces and soft tissues appear intact.  Total knee arthroplasty is stable without evidence of fracture loosening or malfunction.  Soft tissues unremarkable.  Moderate suprapatellar bursa effusion suggested.                                       US Lower Extremity Veins Right (Final result)  Result time 02/16/25 16:46:44      Final result by Jean Claude Verdugo MD (02/16/25 16:46:44)                   Impression:      No evidence of deep venous thrombosis in the right lower extremity.      Electronically signed by: Jean Claude Verdugo  MD  Date:    02/16/2025  Time:    16:46               Narrative:    EXAMINATION:  US LOWER EXTREMITY VEINS RIGHT    CLINICAL HISTORY:  Other specified soft tissue disorders    TECHNIQUE:  Duplex and color flow Doppler evaluation and graded compression of the right lower extremity veins was performed.    COMPARISON:  None    FINDINGS:  Duplex and color flow Doppler evaluation does not reveal any evidence of acute venous thrombosis in the common femoral, superficial femoral, greater saphenous, popliteal, peroneal, anterior tibial and posterior tibial veins of the right lower extremity.  There is no reflux to suggest valvular incompetence.                                       Medications   oxyCODONE-acetaminophen 5-325 mg per tablet 1 tablet (1 tablet Oral Given 2/16/25 1705)     Medical Decision Making  74-year-old female presents secondary to right knee pain and leg swelling.  Patient previously had a cardiac ablation 3 days prior.  She denies any trauma to that leg.  Patient does walk with a cane due to arthritis in her left knee.   Differential diagnosis includes but isn't limited to:  DVT, cellulitis, septic joint, fracture, compartment syndrome, soft tissue contusion, PE, ACS/mi CHF exacerbation, osteoarthritis, and others.      On physical exam the right thigh and knee swollen.  There is no pitting edema noted.  Leg is warm with no pallor.  Sensation intact.  2+ DP PT pulses.  Patient has range motion of knee but with pain.  Patient has tenderness to palpation of knee, mostly superior patella.  No obvious effusion noted on exam.  Bilateral healing bruises noted in the groin from previous ablation procedure.  Patient able to ambulate with minimal pain.  No leukocytosis or anemia noted.  Troponin was elevated at 0.6, repeat troponin 2 hours later was decreasing and now at 0.52.  EKG showed normal sinus rhythm rate 72 with no STEMI, repeat EKG showed normal sinus rhythm rate 75, no STEMI.  PT INR within  therapeutic range.  Ultrasound of right lower extremity was negative for a DVT.  Knee x-ray showed no fracture dislocation, a stable total knee arthroplasty was noted as well as a moderate suprapatellar bursa effusion.  Patient given pain medication in ED which did help improve pain.    Patient had a negative workup with decreasing repeat troponin and stable EKGs.  I feel the slight elevation of her troponin could be related to her cardiac ablation that occurred 3 days prior.  Will send home with pain medication.  Will wrap knee with Ace wrap and told her she can use compression stockings to help with the swelling.  Instructed her to ice and elevate leg.  Discussed about following up with Cardiology to monitor troponin as well as follow up with PCP.  We discussed strict return precautions.  Patient and family were understanding agreeable treatment plan.  Answered all questions patient's/family had the best of my ability.    This case/treatment plan was discussed with my attending Dr. Barnes who is in agreement with my assessment and plan.       Amount and/or Complexity of Data Reviewed  External Data Reviewed: notes.  Labs: ordered. Decision-making details documented in ED Course.  Radiology: ordered. Decision-making details documented in ED Course.  ECG/medicine tests:  Decision-making details documented in ED Course.    Risk  Prescription drug management.            Scribe Attestation:   Scribe #1: I performed the above scribed service and the documentation accurately describes the services I performed. I attest to the accuracy of the note.        ED Course as of 02/16/25 2106   Sun Feb 16, 2025   1654 POC PTINR(!): 1.4 [MB]   1655 ISTAT Cardiac Troponin I(!): 0.60  Elevation likely due to cardiac ablation 3 days prior.  EKG showed normal sinus rhythm rate 72, no STEMI.  Will get 2 hour repeat EKG and troponin [MB]   1700 US Lower Extremity Veins Right  No DVT noted [MB]   1702 POC B-Type Natriuretic Peptide: 29.9  [MB]   1729 EKG interpreted by Dr. Barnes.  No STEMI.  Normal sinus rhythm, ventricular rate of 72.  Leftward axis.  Normal EKG.  QTC within normal limits at 4:31 a.m..  When compared to previous EKG completed on 02/13/2025 rate has decreased by 16 beats per minute today [RF]   1738 X-Ray Knee 3 View Right  Stable total knee arthroplasty on the right with moderate suprapatellar bursa effusion is suggested. [MB]   1836 EKG 12-lead  Repeat EKG showed normal sinus rhythm rate 75, no STEMI [MB]   1837 Independent Interpretation of EKG:  Rhythm: Sinus   Rate: 75  QTC: 422  No STEMI  [AS]   1841 ISTAT Cardiac Troponin I(!): 0.52  Repeat troponin is decreasing [MB]   1847 POCT CMP(!)  No transaminitis, electrolyte abnormality, CHARLENE [MB]      ED Course User Index  [AS] Lorie Chavarria MD  [MB] Cait Valera PA-C  [RF] Camelia Barnes DO I, Cait Valera PA-C, personally performed the services described in this documentation. All medical record entries made by the scribe were at my direction and in my presence. I have reviewed the chart and agree that the record reflects my personal performance and is accurate and complete.     Clinical Impression:  Final diagnoses:  [M79.89] Right leg swelling  [M25.561] Right knee pain (Primary)  [R79.89] Elevated troponin          ED Disposition Condition    Discharge Stable          ED Prescriptions       Medication Sig Dispense Start Date End Date Auth. Provider    acetaminophen-codeine 300-30mg (TYLENOL #3) 300-30 mg Tab Take 1 tablet by mouth every 6 (six) hours as needed (pain). 10 tablet 2/16/2025 -- Cait Valera PA-C          Follow-up Information       Follow up With Specialties Details Why Contact Info    Trisha Higginbotham MD Family Medicine, Wound Care Schedule an appointment as soon as possible for a visit in 3 days for follow up [address] [facility]  [place] [address]  [phone]      [facility] ED Emergency Medicine Go to  If symptoms  worsen, As needed, shortness of breath, chest pain, fever, worsening cough, nausea, vomiting, abdominal pain [address] [facility] [address] [phone]                 [1]   Social History  Tobacco Use    Smoking status: Never    Smokeless tobacco: Never   Substance Use Topics    Alcohol use: Not Currently     Alcohol/week: 1.0 standard drink of alcohol     Types: 1 Glasses of wine per week     Comment: glass of wine a night     Drug use: No        Cait Valera PA-C  02/16/25 1190

## 2025-02-17 ENCOUNTER — PATIENT OUTREACH (OUTPATIENT)
Facility: OTHER | Age: 75
End: 2025-02-17
Payer: MEDICARE

## 2025-02-17 RX ORDER — OFLOXACIN 3 MG/ML
1 SOLUTION/ DROPS OPHTHALMIC 3 TIMES DAILY
Qty: 5 ML | Refills: 3 | Status: SHIPPED | OUTPATIENT
Start: 2025-02-17

## 2025-02-17 RX ORDER — KETOROLAC TROMETHAMINE 5 MG/ML
1 SOLUTION OPHTHALMIC 3 TIMES DAILY
Qty: 5 ML | Refills: 3 | Status: SHIPPED | OUTPATIENT
Start: 2025-02-17

## 2025-02-17 RX ORDER — PREDNISOLONE ACETATE 10 MG/ML
1 SUSPENSION/ DROPS OPHTHALMIC 3 TIMES DAILY
Qty: 5 ML | Refills: 3 | Status: SHIPPED | OUTPATIENT
Start: 2025-02-17

## 2025-02-17 NOTE — DISCHARGE INSTRUCTIONS
Problem Specific Instructions:  Any bone/joint/muscle injury, regardless of broken bones or not may take between 4-6 weeks to heal.  You may ice it throughout the day, compress it with something like an Ace wrap or compression sleeve and elevate it above your heart regularly to prevent or reduce swelling. If you are not improving in that time, follow-up with your primary care provider or orthopedics for re-evaluation. Return to the Emergency Department if you experience worsening pain, numbness, tingling, change of color in the body part, or any other concerning symptoms.     If you would like to follow up with the [facility] for further care of your fracture, please call the [facility] Scheduling Department at [phone] during business hours. Please let the  know you need a fracture follow-up appointment with Orthopedics, and you will be scheduled in the Orthopedic Clinic. Please bring your original Emergency Department discharge papers and disc with you to the clinic appointment.     If you received or are discharged with pain medicine or muscle relaxers, understand that they can make you sleepy or impair your judgement. Do not make important decisions, drink, drive, swim or perform any other tasks you would not otherwise perform while impaired for at least 24 hours after your last dose.      Ensure you follow up with your Primary Care Provider or any additional providers listed on this discharge sheet. While you may be healthy enough to go home today, I cannot predict the exact course of your diagnoses. It is important to remember that some problems are difficult to diagnose and may not be found during your first visit. As such, it is your responsibility to monitor symptoms, follow-up with another healthcare provider, or return to the emergency room for new or worsening concerns. Unless otherwise instructed, continue all home medications and any new medications prescribed to  you in the Emergency Department.

## 2025-02-18 ENCOUNTER — PATIENT MESSAGE (OUTPATIENT)
Dept: FAMILY MEDICINE | Facility: CLINIC | Age: 75
End: 2025-02-18
Payer: MEDICARE

## 2025-02-18 LAB
OHS QRS DURATION: 84 MS
OHS QRS DURATION: 88 MS
OHS QTC CALCULATION: 422 MS
OHS QTC CALCULATION: 431 MS

## 2025-02-18 NOTE — PROGRESS NOTES
Patient was seen in the ED on 2/16/25. Patient was unavailable. Encounter closed.  Faizan Gutierrez

## 2025-02-19 ENCOUNTER — PATIENT MESSAGE (OUTPATIENT)
Dept: ELECTROPHYSIOLOGY | Facility: CLINIC | Age: 75
End: 2025-02-19
Payer: MEDICARE

## 2025-02-19 LAB
POC ACTIVATED CLOTTING TIME K: 135 SEC (ref 74–137)
POC ACTIVATED CLOTTING TIME K: 141 SEC (ref 74–137)
POC ACTIVATED CLOTTING TIME K: 314 SEC (ref 74–137)
POC ACTIVATED CLOTTING TIME K: 366 SEC (ref 74–137)
SAMPLE: ABNORMAL
SAMPLE: NORMAL

## 2025-02-20 ENCOUNTER — OFFICE VISIT (OUTPATIENT)
Dept: FAMILY MEDICINE | Facility: CLINIC | Age: 75
End: 2025-02-20
Payer: MEDICARE

## 2025-02-20 ENCOUNTER — TELEPHONE (OUTPATIENT)
Dept: ORTHOPEDICS | Facility: CLINIC | Age: 75
End: 2025-02-20
Payer: MEDICARE

## 2025-02-20 VITALS
OXYGEN SATURATION: 95 % | DIASTOLIC BLOOD PRESSURE: 60 MMHG | TEMPERATURE: 98 F | HEART RATE: 70 BPM | SYSTOLIC BLOOD PRESSURE: 114 MMHG | BODY MASS INDEX: 29.96 KG/M2 | HEIGHT: 62 IN | WEIGHT: 162.81 LBS

## 2025-02-20 DIAGNOSIS — M25.561 PAIN AND SWELLING OF RIGHT KNEE: ICD-10-CM

## 2025-02-20 DIAGNOSIS — Z09 HOSPITAL DISCHARGE FOLLOW-UP: Primary | ICD-10-CM

## 2025-02-20 DIAGNOSIS — M25.461 PAIN AND SWELLING OF RIGHT KNEE: ICD-10-CM

## 2025-02-20 NOTE — Clinical Note
Wagner Higginbotham! I just a quick question on her... she had ablation done on 2/13 and went to the ED on 2/16 for knee pain. They checked troponins in the ED which were a little elevated likely as a result of her recent ablation. I was just wondering if you thought I should recheck a troponin on her just to see if it's gone down, or just let it be and assume it has gone down as it should? Or check with her cardiologist about it? Thanks in advance for your help!  -jeanette

## 2025-02-20 NOTE — PROGRESS NOTES
HPI     Chief Complaint:  Hospital follow up    Windy Lindo is a 74 y.o. female with multiple medical diagnoses as listed in the medical history and problem list that presents for hospital follow up. PCP Dr. Higginbotham with last visit in this clinic on 12/6/24.       HPI    Recent hospital encounter. See below encounter note from 2/16/2025:            Pt reports nontraumatic swelling to (right) knee. Pt had an ablation last Thursday. Pt had (right) knee replacement 7 years prior.       74 y.o. female with a PMHx of HTN and afib presents to the ED for evaluation of joint swelling to the right knee. PSHx includes ablation atrial fibrillation 3 days ago and knee replacement of the right knee 7 years ago.  Patient states surgery 3 days ago went well and incisions are healing. Patient reports associated knee pain, numbness, and difficulty bending the knee that started 3 hours PTA.  Patient denies any recent trauma to that knee.  Patient denies any chest pain, nausea, vomiting, fever, abdominal pain. She denies any history of CHF. Symptoms are worsened by attempting to walk. Daily medications include Eliquis 5 mg, Metoprlol 25mg, Protonix 40mg, and Multaq 400mg. Patient has not attempted treatment.      The history is provided by medical records and the patient.      Medical Decision Making  74-year-old female presents secondary to right knee pain and leg swelling.  Patient previously had a cardiac ablation 3 days prior.  She denies any trauma to that leg.  Patient does walk with a cane due to arthritis in her left knee.   Differential diagnosis includes but isn't limited to:  DVT, cellulitis, septic joint, fracture, compartment syndrome, soft tissue contusion, PE, ACS/mi CHF exacerbation, osteoarthritis, and others.       On physical exam the right thigh and knee swollen.  There is no pitting edema noted.  Leg is warm with no pallor.  Sensation intact.  2+ DP PT pulses.  Patient has range motion of knee but with pain.   Patient has tenderness to palpation of knee, mostly superior patella.  No obvious effusion noted on exam.  Bilateral healing bruises noted in the groin from previous ablation procedure.  Patient able to ambulate with minimal pain.  No leukocytosis or anemia noted.  Troponin was elevated at 0.6, repeat troponin 2 hours later was decreasing and now at 0.52.  EKG showed normal sinus rhythm rate 72 with no STEMI, repeat EKG showed normal sinus rhythm rate 75, no STEMI.  PT INR within therapeutic range.  Ultrasound of right lower extremity was negative for a DVT.  Knee x-ray showed no fracture dislocation, a stable total knee arthroplasty was noted as well as a moderate suprapatellar bursa effusion.  Patient given pain medication in ED which did help improve pain.     Patient had a negative workup with decreasing repeat troponin and stable EKGs.  I feel the slight elevation of her troponin could be related to her cardiac ablation that occurred 3 days prior.  Will send home with pain medication.  Will wrap knee with Ace wrap and told her she can use compression stockings to help with the swelling.  Instructed her to ice and elevate leg.  Discussed about following up with Cardiology to monitor troponin as well as follow up with PCP.  We discussed strict return precautions.  Patient and family were understanding agreeable treatment plan.  Answered all questions patient's/family had the best of my ability.     This case/treatment plan was discussed with my attending Dr. Barnes who is in agreement with my assessment and plan.            Assessment & Plan     1. Hospital discharge follow-up    Family and/or Caretaker present at visit? No  Medication Reconciliation:  Completed  New Prescriptions filled after discharge: Yes  Hospital encounter notes, objective/subjective data, diagnostics, and plan of care from recent hospital encounter reviewed: Yes  Discharge summary reviewed:  Yes  Disease/illness education: completed  Follow  "up appointments scheduled:  Yes. Encouraged to follow up with Cardiologist after recent cardiac ablation with elevated troponin in ED.  Follow up labs/tests ordered: No  Home Health ordered on discharge: Patient does not have home health established from hospital visit.  They do not need home health.  If needed, we will set up home health for the patient  Establishment or re-establishment of referral orders for community resources: No  DME ordered at discharge: No  How patient is feeling since discharge from the hospital?  Reports symptoms have not changed.  Discussion with other health care providers: No    2. Pain and swelling of right knee (see picture)    Right medial knee pain and swelling at joint line; no redness or warmth noted. Recent imaging reviewed. Patient reports swelling has increased since ED visit on Sunday. Able to walk with use of cane, although notes weakness to right knee, feeling like it might "give out". Has been icing and using tylenol for pain relief. History of R TKA 7 years ago with Dr. Brice. Given symptoms and clinical appearance, encouraged to follow up with orthopedic surgeon. Appointment scheduled for tomorrow with Ortho.               Discussed condition, and treatment.   Education sent to patient portal/included in after visit summary.  ED precautions given.   Notify provider if symptoms do not resolve or increase in severity.   Patient verbalizes understanding and agrees with plan of care.  --------------------------------------------      Health Maintenance:  Health Maintenance         Date Due Completion Date    High Dose Statin Never done ---    RSV Vaccine (Age 60+ and Pregnant patients) (1 - 1-dose 75+ series) Never done ---    COVID-19 Vaccine (4 - 2024-25 season) 09/01/2024 10/1/2021    DEXA Scan 03/15/2026 3/15/2024    Lipid Panel 03/28/2029 3/28/2024    Override on 2/4/2013: Done    Colorectal Cancer Screening 10/09/2029 10/9/2024    TETANUS VACCINE 09/10/2033 9/10/2023 "            Discussed the importance of overdue vaccines which were offered during this encounter. Patient declined overdue vaccines at this time and Advised patient on the importance of completing overdue health maintenance items    Follow Up:  Follow up in about 3 months (around 5/20/2025), or if symptoms worsen or fail to improve, for with PCP.    Exam     Review of Systems:  (as noted above)  Review of Systems   Constitutional:  Negative for fever.   HENT:  Negative for trouble swallowing.    Respiratory:  Negative for shortness of breath.    Cardiovascular:  Negative for chest pain.   Gastrointestinal:  Negative for blood in stool and vomiting.   Genitourinary:  Negative for hematuria.   Musculoskeletal:  Positive for arthralgias and joint swelling.   Skin:  Negative for rash.       Physical Exam:   Physical Exam  Constitutional:       General: She is not in acute distress.     Appearance: Normal appearance. She is not ill-appearing.   HENT:      Head: Normocephalic and atraumatic.   Cardiovascular:      Rate and Rhythm: Normal rate and regular rhythm.      Pulses: Normal pulses.      Heart sounds: Normal heart sounds. No murmur heard.  Pulmonary:      Effort: Pulmonary effort is normal. No respiratory distress.      Breath sounds: Normal breath sounds. No wheezing.   Musculoskeletal:         General: Swelling and tenderness present.      Right knee: Swelling present. No erythema or ecchymosis. Decreased range of motion. Tenderness present over the medial joint line.   Skin:     General: Skin is warm and dry.      Capillary Refill: Capillary refill takes less than 2 seconds.   Neurological:      General: No focal deficit present.      Mental Status: She is alert and oriented to person, place, and time.   Psychiatric:         Mood and Affect: Mood normal.         Behavior: Behavior normal.       Vitals:    02/20/25 1312   BP: 114/60   BP Location: Right arm   Patient Position: Sitting   Pulse: 70   Temp: 97.6 °F  "(36.4 °C)   TempSrc: Oral   SpO2: 95%   Weight: 73.9 kg (162 lb 13 oz)   Height: 5' 2" (1.575 m)      Body mass index is 29.78 kg/m².        History     Past Medical History:  Past Medical History:   Diagnosis Date    Arthritis     Cataract     Eye injury 4 yrs    hit od    Hypertension 04/04/2023    Nuclear sclerosis, bilateral 02/13/2019    Osteoporosis        Past Surgical History:  Past Surgical History:   Procedure Laterality Date    ABLATION OF ARRHYTHMOGENIC FOCUS FOR ATRIAL FIBRILLATION N/A 2/13/2025    Procedure: Ablation atrial fibrillation;  Surgeon: Carlos Temple MD;  Location: [facility] EP LAB;  Service: Cardiology;  Laterality: N/A;  AF, JANE (Cx if SR), PVI, PFA, CHAYA, BSci, Gen, RI, 3 Prep    APPENDECTOMY      COLONOSCOPY N/A 09/07/2017    Procedure: COLONOSCOPY;  Surgeon: Albino Fenton MD;  [facility]  Service: Endoscopy;  Laterality: N/A;    COLONOSCOPY N/A 10/09/2024    Procedure: COLONOSCOPY;  Surgeon: Albino Fenton MD;  Location: [facility] ENDOSCOPY;  Service: Endoscopy;  Laterality: N/A;    ESOPHAGOGASTRODUODENOSCOPY  09/07/2017    ESOPHAGOGASTRODUODENOSCOPY N/A 10/09/2024    Procedure: EGD (ESOPHAGOGASTRODUODENOSCOPY);  Surgeon: Albino Fenton MD;  Location: [facility] ENDOSCOPY;  Service: Endoscopy;  Laterality: N/A;  Ref By:[email or web address] [email or web address]  10/1/24- pc complete. DBM    KNEE SURGERY Right 12/05/2017    TKR    kyphosplasty      LASIK Bilateral     TONSILLECTOMY         Social History:  Social History[1]    Family History:  Family History   Problem Relation Name Age of Onset    Hypertension Mother      Glaucoma Mother      Diabetes Mother      Cataracts Mother      Cancer Mother  74        lung    Heart disease Mother  55    Hypertension Father      Heart disease Father          onset early 60;s, Aortic valve replacement ,Rheumatic fever as a child     No Known Problems Sister      Diabetes Brother      Cancer Brother  66        mesothelioma    No Known Problems " Maternal Aunt      No Known Problems Maternal Uncle      No Known Problems Paternal Aunt      No Known Problems Paternal Uncle      No Known Problems Maternal Grandmother      No Known Problems Maternal Grandfather      No Known Problems Paternal Grandmother      No Known Problems Paternal Grandfather      No Known Problems Other      Amblyopia Neg Hx      Blindness Neg Hx      Macular degeneration Neg Hx      Retinal detachment Neg Hx      Strabismus Neg Hx      Stroke Neg Hx      Thyroid disease Neg Hx      Melanoma Neg Hx         Allergies and Medications: (updated and reviewed)  Review of patient's allergies indicates:  No Known Allergies  Current Medications[2]    Patient Care Team:  Trisha Higginbotham MD as PCP - General (Family Medicine)  Trisha Higginbotham MD as Hypertension Digital Medicine Responsible Provider (Family Medicine)  Deyanira Ruby PharmD as Hypertension Digital Medicine Clinician  Medicare, Digital Medicine as Hypertension Digital Medicine Contract  Nicolle Geller LPN as Licensed Practical Nurse         - The patient is given an After Visit Summary that lists all medications with directions, allergies, education, orders placed during this encounter and follow-up instructions.      - I have reviewed the patient's medical information including past medical, family, and social history sections including the medications and allergies.      - We discussed the patient's current medications.     This note was created by combination of typed  and MModal dictation.  Transcription errors may be present.  If there are any questions, please contact me.                   MIKAELA Machado         [1]   Social History  Socioeconomic History    Marital status: Single   Occupational History     Employer: [employer]   Tobacco Use    Smoking status: Never    Smokeless tobacco: Never   Substance and Sexual Activity    Alcohol use: Not Currently     Alcohol/week: 1.0 standard drink of alcohol     Types: 1 Glasses of wine  per week     Comment: glass of wine a night     Drug use: No    Sexual activity: Never     Social Drivers of Health     Financial Resource Strain: Low Risk  (2/19/2025)    Overall Financial Resource Strain (CARDIA)     Difficulty of Paying Living Expenses: Not hard at all   Food Insecurity: No Food Insecurity (2/19/2025)    Hunger Vital Sign     Worried About Running Out of Food in the Last Year: Never true     Ran Out of Food in the Last Year: Never true   Transportation Needs: Unmet Transportation Needs (2/19/2025)    PRAPARE - Transportation     Lack of Transportation (Medical): Yes     Lack of Transportation (Non-Medical): Yes   Physical Activity: Inactive (2/19/2025)    Exercise Vital Sign     Days of Exercise per Week: 0 days     Minutes of Exercise per Session: 0 min   Stress: No Stress Concern Present (2/19/2025)    [employer] - Occupational Stress Questionnaire     Feeling of Stress : Not at all   Housing Stability: Low Risk  (2/19/2025)    Housing Stability Vital Sign     Unable to Pay for Housing in the Last Year: No     Number of Times Moved in the Last Year: 0     Homeless in the Last Year: No   [2]   Current Outpatient Medications   Medication Sig Dispense Refill    acetaminophen (TYLENOL) 650 MG TbSR Take 1 tablet (650 mg total) by mouth every 6 (six) hours as needed. 20 tablet 0    acetaminophen-codeine 300-30mg (TYLENOL #3) 300-30 mg Tab Take 1 tablet by mouth every 6 (six) hours as needed (pain). 10 tablet 0    amoxicillin (AMOXIL) 500 MG Tab Take 500 mg by mouth as needed.      apixaban (ELIQUIS) 5 mg Tab Take 1 tablet (5 mg total) by mouth 2 (two) times daily. 60 tablet 2    b complex vitamins capsule Take 1 capsule by mouth once daily.      denosumab (PROLIA) 60 mg/mL Syrg Inject 60 mg into the skin every 6 (six) months.      dronedarone (MULTAQ) 400 mg Tab Take 400 mg by mouth 2 (two) times daily with meals.      ketorolac 0.5% (ACULAR) 0.5 % Drop Place 1 drop into  the right eye 3 (three) times daily. 5 mL 3    metoprolol tartrate (LOPRESSOR) 25 MG tablet Take 1 tablet (25 mg total) by mouth 2 (two) times daily. 180 tablet 3    multivit-min-FA-lycopen-lutein 0.4-300-250 mg-mcg-mcg Tab Take 1 tablet by mouth once daily.      ofloxacin (OCUFLOX) 0.3 % ophthalmic solution Place 1 drop into the right eye 3 (three) times daily. 5 mL 3    pantoprazole (PROTONIX) 40 MG tablet Take 1 tablet (40 mg total) by mouth once daily. 90 tablet 3    prednisoLONE acetate (PRED FORTE) 1 % DrpS Place 1 drop into the right eye 3 (three) times daily. 5 mL 3    vitamin D (VITAMIN D3) 1000 units Tab Take 1,000 Units by mouth once daily.      vitamin E 100 UNIT capsule Take 100 Units by mouth once daily.       No current facility-administered medications for this visit.

## 2025-02-21 ENCOUNTER — OFFICE VISIT (OUTPATIENT)
Dept: ORTHOPEDICS | Facility: CLINIC | Age: 75
End: 2025-02-21
Payer: MEDICARE

## 2025-02-21 VITALS — WEIGHT: 162.94 LBS | BODY MASS INDEX: 29.98 KG/M2 | HEIGHT: 62 IN

## 2025-02-21 DIAGNOSIS — Z96.651 PRESENCE OF RIGHT ARTIFICIAL KNEE JOINT: Primary | ICD-10-CM

## 2025-02-21 NOTE — PROGRESS NOTES
SUBJECTIVE:     Chief Complaint & History of Present Illness:  History of Present Illness    CHIEF COMPLAINT:  - Right knee swelling and pain    HPI:  Ms. Lindo presents with right knee swelling that began suddenly on Sunday. She reports being unable to put weight on the knee, which feels stiff and heavy. The swelling has decreased steadily. Ms. Lindo visited the ER where X-rays were taken. The ER advised her to return if the problem persisted. US was negative for DVT at that time. Subsequently, she saw her primary care physician who recommended she see Dr. Brice. S/p R TKA 7 years ago.     She has been managing the symptoms by icing, applying heat, and elevating the leg, which have provided minimal benefit as the knee still feels stiff and heavy. Ms. Lindo denies redness in the knee but notes that it gets heated. She reports feeling numerous needles in a specific area of the knee.    Ms. Lindo mentions that she had an ablation procedure a week ago, which limits her activity. She reports having multiple bruises, which may be related to her use of Eliquis. She expresses concern about how any current treatments might affect her upcoming left knee replacement and cataract surgeries.    Ms. Lindo denies any accidents, injuries, trauma, or scratches on the knee. Ms. Lindo denies any signs of infection.    PREVIOUS TREATMENTS:  - Icing the right knee  - Applying heat to the right knee  - Elevating the right knee    MEDICATIONS:  - Eliquis  - Tylenol    SURGICAL HISTORY:  - Right knee replacement: 7 years ago, performed by Dr. Busch  - Upcoming left knee replacement: scheduled for May 2025  - Upcoming cataract surgery: scheduled for next month (April 2025)  - Recent cardiac ablation: performed 1 week ago      ROS:  Musculoskeletal: +joint pain, +joint swelling  Integumentary: -rash          Past Medical History:   Diagnosis Date    Arthritis     Cataract     Eye injury 4 yrs    hit od    Hypertension  04/04/2023    Nuclear sclerosis, bilateral 02/13/2019    Osteoporosis        Past Surgical History:   Procedure Laterality Date    ABLATION OF ARRHYTHMOGENIC FOCUS FOR ATRIAL FIBRILLATION N/A 2/13/2025    Procedure: Ablation atrial fibrillation;  Surgeon: Carlos Temple MD;  Location: [facility] EP LAB;  Service: Cardiology;  Laterality: N/A;  AF, JANE (Cx if SR), PVI, PFA, CHAYA, BSci, Gen, SD, 3 Prep    APPENDECTOMY      COLONOSCOPY N/A 09/07/2017    Procedure: COLONOSCOPY;  Surgeon: Albino Fenton MD;  Location: [facility] ENDO (4TH FLR);  Service: Endoscopy;  Laterality: N/A;    COLONOSCOPY N/A 10/09/2024    Procedure: COLONOSCOPY;  Surgeon: Albino Fenton MD;  Location: [facility] ENDOSCOPY;  Service: Endoscopy;  Laterality: N/A;    ESOPHAGOGASTRODUODENOSCOPY  09/07/2017    ESOPHAGOGASTRODUODENOSCOPY N/A 10/09/2024    Procedure: EGD (ESOPHAGOGASTRODUODENOSCOPY);  Surgeon: Albino Fenton MD;  Location: [facility] ENDOSCOPY;  Service: Endoscopy;  Laterality: N/A;  Ref By:solange Jaramillo suprep.  10/1/24- pc complete. DBM    KNEE SURGERY Right 12/05/2017    TKR    kyphosplasty      LASIK Bilateral     TONSILLECTOMY         Family History   Problem Relation Name Age of Onset    Hypertension Mother      Glaucoma Mother      Diabetes Mother      Cataracts Mother      Cancer Mother  74        lung    Heart disease Mother  55    Hypertension Father      Heart disease Father          onset early 60;s, Aortic valve replacement ,Rheumatic fever as a child     No Known Problems Sister      Diabetes Brother      Cancer Brother  66        mesothelioma    No Known Problems Maternal Aunt      No Known Problems Maternal Uncle      No Known Problems Paternal Aunt      No Known Problems Paternal Uncle      No Known Problems Maternal Grandmother      No Known Problems Maternal Grandfather      No Known Problems Paternal Grandmother      No Known Problems Paternal Grandfather      No Known Problems Other      Amblyopia Neg Hx      Blindness  "Neg Hx      Macular degeneration Neg Hx      Retinal detachment Neg Hx      Strabismus Neg Hx      Stroke Neg Hx      Thyroid disease Neg Hx      Melanoma Neg Hx         Review of patient's allergies indicates:  No Known Allergies      Current Medications[1]      OBJECTIVE:     PHYSICAL EXAM:  Ht 5' 2" (1.575 m)   Wt 73.9 kg (162 lb 14.7 oz)   BMI 29.80 kg/m²   General: Pleasant, cooperative, NAD.  HEENT: NCAT, sclera nonicteric.  Lungs: Respirations are equal and unlabored.   Abdomen: Soft and non-tender.  CV: 2+ bilateral upper and lower extremity pulses.  Neuro: Sensation intact to light touch.  Skin: Intact throughout LE with no rashes, erythema, or lesions.  Extremities: No LE edema, NVI lower extremities. antalgic gait.    right Knee Exam:  Knee Range of Motion: 0-110   Effusion: none  Condition of skin: intact, well healed incision without signs of warmth/erythema. Generalized edema without effusion  Location of tenderness: Medial joint line and Lateral joint line   Strength: 5/5 quadriceps strength, 5/5 gastroc-soleus strength, 5/5 hamstring strength, and 5/5 tibialis anterior strength  Stability: stable to testing  Knee Alignment: normal    RADIOGRAPHS:  X-rays of the right knee taken previously were personally reviewed. Imaging reveals no acute fractures or dislocations. Prosthesis with satisfactory placement and alignment without evidence of loosening.      ASSESSMENT:       ICD-10-CM ICD-9-CM   1. Presence of right artificial knee joint  Z96.651 V43.65       PLAN:     Assessment & Plan    - Discussed potential Medrol Dose Pack (oral steroid) treatment to reduce inflammation.  - Considered labs to check inflammatory markers (ESR and CRP) to rule out infection.  - Use ice on the affected knee.  - Elevate the affected knee.  - Apply Ace Wrap or compression sleeve to the affected knee for support and to help reduce swelling.  - Take Tylenol as needed for pain.     - If symptoms persist or worsen over the " weekend, we can begin infectious workup with labwork.          This note was generated with the assistance of ambient listening technology. Verbal consent was obtained by the patient and accompanying visitor(s) for the recording of patient appointment to facilitate this note. I attest to having reviewed and edited the generated note for accuracy, though some syntax or spelling errors may persist. Please contact the author of this note for any clarification.         Isidoro Faye PA-C         [1]   Current Outpatient Medications:     acetaminophen (TYLENOL) 650 MG TbSR, Take 1 tablet (650 mg total) by mouth every 6 (six) hours as needed., Disp: 20 tablet, Rfl: 0    acetaminophen-codeine 300-30mg (TYLENOL #3) 300-30 mg Tab, Take 1 tablet by mouth every 6 (six) hours as needed (pain)., Disp: 10 tablet, Rfl: 0    amoxicillin (AMOXIL) 500 MG Tab, Take 500 mg by mouth as needed., Disp: , Rfl:     apixaban (ELIQUIS) 5 mg Tab, Take 1 tablet (5 mg total) by mouth 2 (two) times daily., Disp: 60 tablet, Rfl: 2    b complex vitamins capsule, Take 1 capsule by mouth once daily., Disp: , Rfl:     denosumab (PROLIA) 60 mg/mL Syrg, Inject 60 mg into the skin every 6 (six) months., Disp: , Rfl:     dronedarone (MULTAQ) 400 mg Tab, Take 400 mg by mouth 2 (two) times daily with meals., Disp: , Rfl:     ketorolac 0.5% (ACULAR) 0.5 % Drop, Place 1 drop into the right eye 3 (three) times daily., Disp: 5 mL, Rfl: 3    metoprolol tartrate (LOPRESSOR) 25 MG tablet, Take 1 tablet (25 mg total) by mouth 2 (two) times daily., Disp: 180 tablet, Rfl: 3    multivit-min-FA-lycopen-lutein 0.4-300-250 mg-mcg-mcg Tab, Take 1 tablet by mouth once daily., Disp: , Rfl:     ofloxacin (OCUFLOX) 0.3 % ophthalmic solution, Place 1 drop into the right eye 3 (three) times daily., Disp: 5 mL, Rfl: 3    pantoprazole (PROTONIX) 40 MG tablet, Take 1 tablet (40 mg total) by mouth once daily., Disp: 90 tablet, Rfl: 3    prednisoLONE acetate (PRED FORTE) 1 %  DrpS, Place 1 drop into the right eye 3 (three) times daily., Disp: 5 mL, Rfl: 3    vitamin D (VITAMIN D3) 1000 units Tab, Take 1,000 Units by mouth once daily., Disp: , Rfl:     vitamin E 100 UNIT capsule, Take 100 Units by mouth once daily., Disp: , Rfl:

## 2025-02-24 ENCOUNTER — PATIENT MESSAGE (OUTPATIENT)
Dept: ORTHOPEDICS | Facility: CLINIC | Age: 75
End: 2025-02-24
Payer: MEDICARE

## 2025-03-05 ENCOUNTER — TELEPHONE (OUTPATIENT)
Dept: ELECTROPHYSIOLOGY | Facility: CLINIC | Age: 75
End: 2025-03-05
Payer: MEDICARE

## 2025-03-05 ENCOUNTER — TELEPHONE (OUTPATIENT)
Dept: OPHTHALMOLOGY | Facility: CLINIC | Age: 75
End: 2025-03-05
Payer: MEDICARE

## 2025-03-10 ENCOUNTER — PATIENT MESSAGE (OUTPATIENT)
Dept: ORTHOPEDICS | Facility: CLINIC | Age: 75
End: 2025-03-10
Payer: MEDICARE

## 2025-03-10 DIAGNOSIS — Z96.651 PRESENCE OF RIGHT ARTIFICIAL KNEE JOINT: Primary | ICD-10-CM

## 2025-03-10 RX ORDER — METHYLPREDNISOLONE 4 MG/1
TABLET ORAL
Qty: 1 EACH | Refills: 0 | Status: SHIPPED | OUTPATIENT
Start: 2025-03-10

## 2025-03-12 ENCOUNTER — TELEPHONE (OUTPATIENT)
Dept: OPHTHALMOLOGY | Facility: CLINIC | Age: 75
End: 2025-03-12
Payer: MEDICARE

## 2025-03-12 ENCOUNTER — PATIENT MESSAGE (OUTPATIENT)
Dept: OPHTHALMOLOGY | Facility: CLINIC | Age: 75
End: 2025-03-12
Payer: MEDICARE

## 2025-03-13 ENCOUNTER — TELEPHONE (OUTPATIENT)
Dept: OPHTHALMOLOGY | Facility: CLINIC | Age: 75
End: 2025-03-13
Payer: MEDICARE

## 2025-03-13 NOTE — TELEPHONE ENCOUNTER
[facility]  arrival time of 9:30 for sx on 3/19/25 with Dr. Varghese @ [place].

## 2025-03-18 NOTE — PRE-PROCEDURE INSTRUCTIONS
PCP: Keenan Morrow PA-C     Last appt:  3/4/2024      Future Appointments   Date Time Provider Department Center   7/22/2024  8:00 AM Keenan Morrow PA-C [facility] BS AMB          Requested Prescriptions     Pending Prescriptions Disp Refills    sertraline (ZOLOFT) 25 MG tablet [Pharmacy Med Name: SERTRALINE HCL 25 MG TABLET] 90 tablet 1     Sig: TAKE 1 TABLET BY MOUTH EVERY DAY       Patient reviewed on 3/18/2025.  Okay to proceed at [place]. The following pre-procedure instructions and arrival time have been sent to patient portal for review.  Patient confirmed receiving pre-procedure instructions via [facility] plus portal.     Dear Windy     Below you will find basic pre-procedure instructions in preparation for your procedure on 3/19/25 with Dr. Varghese              You should receive your arrival time within 24-48 hours of your scheduled procedure date from the  at your surgeon's office.     -Nothing to eat or drink after midnight the night before your procedure until after your procedure, except AM meds with small sips of water.     - HOLD all oral Diabetic medications night before and morning of procedure  - HOLD all Insulin morning of procedure  - HOLD all Fluid pills morning of procedure  - HOLD all non-insulin shots until after surgery (Ozempic, Mounjaro, Trulicity, Victoza, Byetta, Wegovy and Adlyxin) (7 days prior)  - HOLD all vitamins, minerals and herbal supplements morning of procedure   - TAKE all B/P meds, EXCEPT those that contain a fluid pill (ex. Lasix, Hydroclorothiazide/HCTZ, Spirnolactone)  - USE inhalers as needed and bring AM of surgery  - USE EYE DROPS as directed  -TAKE blood thinner meds AM of surgery unless otherwise instructed by your provider to not take     - Shower and wash face with antibacterial soap (ex. Dial) for 3 mins PM prior and AM of surgery  - No powder, lotions, creams, oils, gels, ointments, makeup,  or jewelry    - Wear comfortable clothing (button up shirt)     (Patient is required to have a responsible ride to transport home, ride may not leave while patient is in surgery)     -- [facility], 2nd floor Surgery Center, located   @ [address] [place] [id number]  2nd Floor Registration        If you have any questions or concerns please feel free to contact your surgeon's office.     In the event that you are running  late or need to reschedule on the day of your procedure, please contact the pre-op desk at [phone].          Please reply to this message as receipt of delivery.     Catina, LPN [facility]  Pre-Admit - Anesthesia Dept

## 2025-03-19 ENCOUNTER — HOSPITAL ENCOUNTER (OUTPATIENT)
Facility: HOSPITAL | Age: 75
Discharge: HOME | End: 2025-03-19
Attending: OPHTHALMOLOGY | Admitting: OPHTHALMOLOGY
Payer: MEDICARE

## 2025-03-19 VITALS
HEART RATE: 62 BPM | OXYGEN SATURATION: 96 % | RESPIRATION RATE: 20 BRPM | DIASTOLIC BLOOD PRESSURE: 75 MMHG | TEMPERATURE: 98 F | SYSTOLIC BLOOD PRESSURE: 142 MMHG

## 2025-03-19 DIAGNOSIS — H25.12 NUCLEAR SCLEROTIC CATARACT OF LEFT EYE: Primary | ICD-10-CM

## 2025-03-19 DIAGNOSIS — H25.10 SENILE NUCLEAR SCLEROSIS: ICD-10-CM

## 2025-03-19 PROCEDURE — 36000707: Performed by: OPHTHALMOLOGY

## 2025-03-19 PROCEDURE — 66984 XCAPSL CTRC RMVL W/O ECP: CPT | Mod: RT,,, | Performed by: OPHTHALMOLOGY

## 2025-03-19 PROCEDURE — 99900035 HC TECH TIME PER 15 MIN (STAT)

## 2025-03-19 PROCEDURE — 36000706: Performed by: OPHTHALMOLOGY

## 2025-03-19 PROCEDURE — 99152 MOD SED SAME PHYS/QHP 5/>YRS: CPT | Performed by: OPHTHALMOLOGY

## 2025-03-19 PROCEDURE — 27201423 OPTIME MED/SURG SUP & DEVICES STERILE SUPPLY: Performed by: OPHTHALMOLOGY

## 2025-03-19 PROCEDURE — 66999 UNLISTED PX ANT SEGMENT EYE: CPT | Mod: CSM,,, | Performed by: OPHTHALMOLOGY

## 2025-03-19 PROCEDURE — 25000003 PHARM REV CODE 250: Performed by: OPHTHALMOLOGY

## 2025-03-19 PROCEDURE — 63600175 PHARM REV CODE 636 W HCPCS: Performed by: OPHTHALMOLOGY

## 2025-03-19 PROCEDURE — 94761 N-INVAS EAR/PLS OXIMETRY MLT: CPT

## 2025-03-19 PROCEDURE — 71000015 HC POSTOP RECOV 1ST HR: Performed by: OPHTHALMOLOGY

## 2025-03-19 PROCEDURE — 99152 MOD SED SAME PHYS/QHP 5/>YRS: CPT | Mod: ,,, | Performed by: OPHTHALMOLOGY

## 2025-03-19 PROCEDURE — V2632 POST CHMBR INTRAOCULAR LENS: HCPCS | Performed by: OPHTHALMOLOGY

## 2025-03-19 DEVICE — LENS EYHANCE +18.0D: Type: IMPLANTABLE DEVICE | Site: EYE | Status: FUNCTIONAL

## 2025-03-19 RX ORDER — LIDOCAINE HYDROCHLORIDE 40 MG/ML
INJECTION, SOLUTION RETROBULBAR
Status: DISCONTINUED | OUTPATIENT
Start: 2025-03-19 | End: 2025-03-19 | Stop reason: HOSPADM

## 2025-03-19 RX ORDER — CYCLOP/TROP/PROPA/PHEN/KET/WAT 1-1-0.1%
1 DROPS (EA) OPHTHALMIC (EYE)
Status: COMPLETED | OUTPATIENT
Start: 2025-03-19 | End: 2025-03-19

## 2025-03-19 RX ORDER — FENTANYL CITRATE 50 UG/ML
25 INJECTION, SOLUTION INTRAMUSCULAR; INTRAVENOUS EVERY 5 MIN PRN
Status: DISCONTINUED | OUTPATIENT
Start: 2025-03-19 | End: 2025-03-19 | Stop reason: HOSPADM

## 2025-03-19 RX ORDER — MIDAZOLAM HYDROCHLORIDE 1 MG/ML
2 INJECTION, SOLUTION INTRAMUSCULAR; INTRAVENOUS
Status: DISCONTINUED | OUTPATIENT
Start: 2025-03-19 | End: 2025-03-19 | Stop reason: HOSPADM

## 2025-03-19 RX ORDER — ACETAMINOPHEN 325 MG/1
650 TABLET ORAL EVERY 4 HOURS PRN
Status: DISCONTINUED | OUTPATIENT
Start: 2025-03-19 | End: 2025-03-19 | Stop reason: HOSPADM

## 2025-03-19 RX ORDER — PROPARACAINE HYDROCHLORIDE 5 MG/ML
1 SOLUTION/ DROPS OPHTHALMIC
Status: DISCONTINUED | OUTPATIENT
Start: 2025-03-19 | End: 2025-03-19 | Stop reason: HOSPADM

## 2025-03-19 RX ORDER — CYCLOP/TROP/PROPA/PHEN/KET/WAT 1-1-0.1%
1 DROPS (EA) OPHTHALMIC (EYE)
Status: DISCONTINUED | OUTPATIENT
Start: 2025-03-19 | End: 2025-03-19

## 2025-03-19 RX ORDER — MOXIFLOXACIN 5 MG/ML
1 SOLUTION/ DROPS OPHTHALMIC EVERY 5 MIN PRN
Status: COMPLETED | OUTPATIENT
Start: 2025-03-19 | End: 2025-03-19

## 2025-03-19 RX ORDER — PHENYLEPHRINE HYDROCHLORIDE 100 MG/ML
1 SOLUTION/ DROPS OPHTHALMIC
Status: DISCONTINUED | OUTPATIENT
Start: 2025-03-19 | End: 2025-03-19

## 2025-03-19 RX ORDER — PROPARACAINE HYDROCHLORIDE 5 MG/ML
SOLUTION/ DROPS OPHTHALMIC
Status: DISCONTINUED | OUTPATIENT
Start: 2025-03-19 | End: 2025-03-19 | Stop reason: HOSPADM

## 2025-03-19 RX ORDER — MOXIFLOXACIN 5 MG/ML
1 SOLUTION/ DROPS OPHTHALMIC
Status: DISCONTINUED | OUTPATIENT
Start: 2025-03-19 | End: 2025-03-19

## 2025-03-19 RX ORDER — LIDOCAINE HYDROCHLORIDE 10 MG/ML
INJECTION, SOLUTION EPIDURAL; INFILTRATION; INTRACAUDAL; PERINEURAL
Status: DISCONTINUED | OUTPATIENT
Start: 2025-03-19 | End: 2025-03-19 | Stop reason: HOSPADM

## 2025-03-19 RX ORDER — MOXIFLOXACIN 5 MG/ML
SOLUTION/ DROPS OPHTHALMIC
Status: DISCONTINUED | OUTPATIENT
Start: 2025-03-19 | End: 2025-03-19 | Stop reason: HOSPADM

## 2025-03-19 RX ORDER — MOXIFLOXACIN 5 MG/ML
1 SOLUTION/ DROPS OPHTHALMIC
Status: COMPLETED | OUTPATIENT
Start: 2025-03-19 | End: 2025-03-19

## 2025-03-19 RX ORDER — TETRACAINE HYDROCHLORIDE 5 MG/ML
1 SOLUTION OPHTHALMIC EVERY 5 MIN PRN
Status: DISCONTINUED | OUTPATIENT
Start: 2025-03-19 | End: 2025-03-19

## 2025-03-19 RX ORDER — PHENYLEPHRINE HYDROCHLORIDE 100 MG/ML
1 SOLUTION/ DROPS OPHTHALMIC
Status: DISCONTINUED | OUTPATIENT
Start: 2025-03-19 | End: 2025-03-19 | Stop reason: HOSPADM

## 2025-03-19 RX ORDER — PREDNISOLONE ACETATE 10 MG/ML
SUSPENSION/ DROPS OPHTHALMIC
Status: DISCONTINUED | OUTPATIENT
Start: 2025-03-19 | End: 2025-03-19 | Stop reason: HOSPADM

## 2025-03-19 RX ADMIN — Medication 1 DROP: at 10:03

## 2025-03-19 RX ADMIN — MOXIFLOXACIN OPHTHALMIC 1 DROP: 5 SOLUTION/ DROPS OPHTHALMIC at 10:03

## 2025-03-19 RX ADMIN — MOXIFLOXACIN 1 DROP: 5 SOLUTION/ DROPS OPHTHALMIC at 11:03

## 2025-03-19 RX ADMIN — MIDAZOLAM HYDROCHLORIDE 2 MG: 1 INJECTION, SOLUTION INTRAMUSCULAR; INTRAVENOUS at 11:03

## 2025-03-19 NOTE — OP NOTE
DATE OF PROCEDURE: 03/19/2025    SURGEON: CONSTANZA CORONA MD    PREOPERATIVE DIAGNOSIS:  Senile nuclear sclerotic cataract right eye.     POSTOPERATIVE DIAGNOSIS: Senile nuclear sclerotic cataract right eye.     PROCEDURES:    Phacoemulsification with  intraocular lens, RIGHT eye (65394)  With moderate sedation >10min (23341)    IMPLANT:  DIBOO  18.0    ANESTHESIA:  Moderate sedation with topical Lidocaine. Under my direct supervision, intravenous moderate sedation was administered during the course of this procedure, with continuous monitoring of hemodynamic parameters. Total time of sedation and amount of sedatives are documented in the nursing logs. Patient ID confirmed and re-evaluated the patient and anesthesia plan confirming it is suitable for the patient's condition and procedure.     COMPLICATIONS: none    ESTIMATED BLOOD LOSS: <1cc    SPECIMENS: none    INDICATIONS FOR PROCEDURE:   The patient has a history of painless progressive vision loss.  The patient has described difficulties with activities of daily living, which specifically include driving, which is secondary to cataract formation and progression. After we had a thorough discussion about risks, benefits, and alternatives to cataract surgery, the patient agreed to proceed with phacoemulsification and implantation of a lens in the right eye.  These risks include, but are not limited to, hemorrhage, pain, infection, need for additional surgery, need for glasses or contacts, loss of vision, or even loss of the eye.    PROCEDURE IN DETAIL:  The patient was met in the preop holding area.  Consent was confirmed to be signed.  The operative site was marked.  The patient was brought into the operating room by the anesthesia team and placed under monitored anesthesia care.  The right eye was prepped and draped in a sterile ophthalmic fashion.  A Renny speculum was placed into the right eye.   A paracentesis site was made and 1% preservative-free  lidocaine was injected into the anterior chamber.  Viscoelastic  material was injected into the anterior chamber.  A keratome blade was used to make a clear corneal incision.  A cystotome was used to initiate the continuous curvilinear capsulorrhexis which was completed with Utrata forceps.  BSS on a lu cannula was used to perform hydrodissection.  The phacoemulsification tip was introduced into the eye and the nucleus was removed in a standard divide-and-conquer fashion.  Remaining cortical material was removed from the eye using irrigation-aspiration.  The capsular bag was filled with viscoelastic material and the intraocular lens was injected and positioned into place. Remaining viscoelastic material was removed from the eye using irrigation and aspiration.  The corneal wounds were hydrated.  The eye was filled to physiologic pressure. The wounds were found to be watertight. Drops of Vigamox and prednisilone were placed into the eye.  The eye was washed, dried, and shielded.  The patient tolerated the procedure well and knows to follow up with me tomorrow morning, sooner if needed.      Intraoperative aberrometer (ORA) was used to confirm calculations.    The Catalys femtosecond laser was used prior to the cataract surgery portion in the laser suite to perform the capsulorhexis and lens fragmentation.

## 2025-03-19 NOTE — DISCHARGE INSTRUCTIONS
Dr. Varghese     Cataract Post-Operative Instructions       Day of surgery:     -Resume drops THREE times daily into the operative eye.     -Do not rub your eye     -Wear protective sunglasses during the day.     -Resume moderate activity.     -Bathe/shower/wash face normally     -Do not apply makeup around the operative eye for 1 week.     -You should expect:     Blurry vision and halos for 24-48 hours     Dilated pupil for 24-48 hours     Scratchy feeling in the eye for 1-2 days     Curved shadow in your peripheral vision for 2-3 weeks     Occasional flickering of lights for up to 1 week     -If you experience severe pain or nausea, call Dr. Varghese or the on-call doctor at [phone].       Plan to see Dr. Varghese at:     [facility] [address]    [address] [place]  [address]    **Most patients can drive the next morning.  If you do not feel comfortable driving, please arrange for transportation. **

## 2025-03-19 NOTE — H&P
History    Chief complaint:  Painless progressive vision loss    Present Ilness/Diagnosis: Nuclear sclerotic Cataract    Past Medical History:  has a past medical history of Arthritis, Cataract, Eye injury (4 yrs), Hypertension (04/04/2023), Nuclear sclerosis, bilateral (02/13/2019), and Osteoporosis.    Family History/Social History: refer to chart    Allergies: Review of patient's allergies indicates:  No Known Allergies    Current Medications: Current Medications[1]    ASA Score: II     Mallampati Score: II     Plan for Sedation: Moderate Sedation     Patient or Family History of Anesthesia problems : No    Physical Exam    BP: Vital signs stable  General: No apparent distress  HEENT: nuclear sclerotic cataract  Lungs: adequate respirations  Heart: + pulses  Abdomen: soft  Rectal/pelvic: deferred    Impression: Visually significant Cataract.    See previous clinic notes for surgical indications.    Plan: Phacoemulsification with implantation of Intraocular lens               [1] No current facility-administered medications for this encounter.    Current Outpatient Medications:     acetaminophen (TYLENOL) 650 MG TbSR, Take 1 tablet (650 mg total) by mouth every 6 (six) hours as needed., Disp: 20 tablet, Rfl: 0    acetaminophen-codeine 300-30mg (TYLENOL #3) 300-30 mg Tab, Take 1 tablet by mouth every 6 (six) hours as needed (pain)., Disp: 10 tablet, Rfl: 0    amoxicillin (AMOXIL) 500 MG Tab, Take 500 mg by mouth as needed., Disp: , Rfl:     apixaban (ELIQUIS) 5 mg Tab, Take 1 tablet (5 mg total) by mouth 2 (two) times daily., Disp: 60 tablet, Rfl: 2    b complex vitamins capsule, Take 1 capsule by mouth once daily., Disp: , Rfl:     denosumab (PROLIA) 60 mg/mL Syrg, Inject 60 mg into the skin every 6 (six) months., Disp: , Rfl:     dronedarone (MULTAQ) 400 mg Tab, Take 400 mg by mouth 2 (two) times daily with meals., Disp: , Rfl:     ketorolac 0.5% (ACULAR) 0.5 % Drop, Place 1 drop into the right eye 3 (three)  times daily., Disp: 5 mL, Rfl: 3    methylPREDNISolone (MEDROL DOSEPACK) 4 mg tablet, use as directed, Disp: 1 each, Rfl: 0    metoprolol tartrate (LOPRESSOR) 25 MG tablet, Take 1 tablet (25 mg total) by mouth 2 (two) times daily., Disp: 180 tablet, Rfl: 3    multivit-min-FA-lycopen-lutein 0.4-300-250 mg-mcg-mcg Tab, Take 1 tablet by mouth once daily., Disp: , Rfl:     ofloxacin (OCUFLOX) 0.3 % ophthalmic solution, Place 1 drop into the right eye 3 (three) times daily., Disp: 5 mL, Rfl: 3    pantoprazole (PROTONIX) 40 MG tablet, Take 1 tablet (40 mg total) by mouth once daily., Disp: 90 tablet, Rfl: 3    prednisoLONE acetate (PRED FORTE) 1 % DrpS, Place 1 drop into the right eye 3 (three) times daily., Disp: 5 mL, Rfl: 3    vitamin D (VITAMIN D3) 1000 units Tab, Take 1,000 Units by mouth once daily., Disp: , Rfl:     vitamin E 100 UNIT capsule, Take 100 Units by mouth once daily., Disp: , Rfl:

## 2025-03-19 NOTE — DISCHARGE SUMMARY
[facility]  Discharge Note  Short Stay    Procedure(s) (LRB):  PHACOEMULSIFICATION, CATARACT, WITH IOL INSERTION (Right)  BRIEF DISCHARGE NOTE:    Date of discharge: 03/19/2025    Reason for hospitalization -  Cataract surgery     Final Diagnosis - Visually significant Cataract    Procedures and treatment provided - Status post phacoemulsification with placement of intraocular lens     Diet - Advance to regular as tolerated    Activity - as tolerated    Disposition at the end of the case - Good.    Discharge: to home    The patient tolerated the procedure well and knows to follow up with me tomorrow in the eye clinic, sooner if needed.    Patient and family instructions (as appropriate) - Given to patient on discharge    Flaca Varghese MD

## 2025-03-20 ENCOUNTER — OFFICE VISIT (OUTPATIENT)
Dept: OPHTHALMOLOGY | Facility: CLINIC | Age: 75
End: 2025-03-20
Payer: MEDICARE

## 2025-03-20 DIAGNOSIS — H25.12 NUCLEAR SCLEROSIS OF LEFT EYE: Primary | ICD-10-CM

## 2025-03-20 DIAGNOSIS — Z96.1 STATUS POST CATARACT EXTRACTION AND INSERTION OF INTRAOCULAR LENS, RIGHT: Primary | ICD-10-CM

## 2025-03-20 DIAGNOSIS — H25.12 NUCLEAR SCLEROSIS OF LEFT EYE: ICD-10-CM

## 2025-03-20 DIAGNOSIS — Z98.41 STATUS POST CATARACT EXTRACTION AND INSERTION OF INTRAOCULAR LENS, RIGHT: Primary | ICD-10-CM

## 2025-03-20 PROCEDURE — 99999 PR PBB SHADOW E&M-EST. PATIENT-LVL III: CPT | Mod: PBBFAC,,, | Performed by: OPHTHALMOLOGY

## 2025-03-20 PROCEDURE — 99024 POSTOP FOLLOW-UP VISIT: CPT | Mod: S$GLB,,, | Performed by: OPHTHALMOLOGY

## 2025-03-20 PROCEDURE — 1101F PT FALLS ASSESS-DOCD LE1/YR: CPT | Mod: CPTII,S$GLB,, | Performed by: OPHTHALMOLOGY

## 2025-03-20 PROCEDURE — 1126F AMNT PAIN NOTED NONE PRSNT: CPT | Mod: CPTII,S$GLB,, | Performed by: OPHTHALMOLOGY

## 2025-03-20 PROCEDURE — 3288F FALL RISK ASSESSMENT DOCD: CPT | Mod: CPTII,S$GLB,, | Performed by: OPHTHALMOLOGY

## 2025-03-20 RX ORDER — KETOROLAC TROMETHAMINE 5 MG/ML
1 SOLUTION OPHTHALMIC 3 TIMES DAILY
Qty: 5 ML | Refills: 3 | Status: SHIPPED | OUTPATIENT
Start: 2025-03-20

## 2025-03-20 RX ORDER — OFLOXACIN 3 MG/ML
1 SOLUTION/ DROPS OPHTHALMIC 3 TIMES DAILY
Qty: 5 ML | Refills: 3 | Status: SHIPPED | OUTPATIENT
Start: 2025-03-20

## 2025-03-20 RX ORDER — PREDNISOLONE ACETATE 10 MG/ML
1 SUSPENSION/ DROPS OPHTHALMIC 3 TIMES DAILY
Qty: 5 ML | Refills: 3 | Status: SHIPPED | OUTPATIENT
Start: 2025-03-20

## 2025-03-21 RX ORDER — KETOROLAC TROMETHAMINE 5 MG/ML
1 SOLUTION OPHTHALMIC 3 TIMES DAILY
Qty: 5 ML | Refills: 3 | Status: SHIPPED | OUTPATIENT
Start: 2025-03-21

## 2025-03-23 ENCOUNTER — PATIENT MESSAGE (OUTPATIENT)
Dept: ELECTROPHYSIOLOGY | Facility: CLINIC | Age: 75
End: 2025-03-23
Payer: MEDICARE

## 2025-03-24 DIAGNOSIS — Z01.818 PRE-OP TESTING: ICD-10-CM

## 2025-03-24 DIAGNOSIS — M79.609 PAIN IN EXTREMITY, UNSPECIFIED EXTREMITY: Primary | ICD-10-CM

## 2025-03-24 NOTE — ANESTHESIA PAT ROS NOTE
03/24/2025  Windy Lindo is a 74 y.o., female.      Pre-op Assessment          Review of Systems           Anesthesia Assessment: Preoperative EQUATION    Planned Procedure: Procedure(s) (LRB):  ARTHROPLASTY, KNEE, TOTAL, USING Front Up COMPUTER-ASSISTED NAVIGATION: LEFT: DEPUY - ATTUNE (Left)  Requested Anesthesia Type:Regional  Surgeon: Mac Brice III, MD  Service: Orthopedics  Known or anticipated Date of Surgery:5/19/2025    Surgeon notes: reviewed    Electronic QUestionnaire Assessment completed via nurse interview with patient.        Triage considerations:     The patient has no apparent active cardiac condition (No unstable coronary Syndrome such as severe unstable angina or recent [<1 month] myocardial infarction, decompensated CHF, severe valvular   disease or significant arrhythmia)    Previous anesthesia records:GETA, Spinal Anesthesia , and Complications noted  2/13/25   Ablation atrial fibrillation   Airway:  No airway management difficulties anticipated   Method of Intubation: Video Laryngoscopy Mask Ventilation: Easy - oral Intubated: Postinduction Blade: Caputo #3 Airway Device Size: 7.0 Cuff Inflation: Minimal occlusive pressure Placement Verified By: Capnometry Complicating Factors: None Findings Post-Intubation: Bilateral breath sounds Secured at: Lips Complications: None     10/9/24 EGD (ESOPHAGOGASTRODUODENOSCOPY) (Abdomen)   COLONOSCOPY (Abdomen)   Comments: On arrival to PACU patient found to be in a fib with rapid rate, 140's. Discussed case with cardiology at [facility] who suggested IV metoprolol and po metoprolol. After greater than 2 hours patient heart rate still in 120's low 130's. Cardiology at [facility] recommended ER. Discussed with Dr Fenton and we felt patient was stable enough for private transport to ER. Long discussion held with patient regarding recommendations  and she expressed understanding. All questions answered     Last PCP note: within 3 months , within [facility]   Subspecialty notes: Cardiology: EP and General, Gastroenterology, Neurosurgery    Other important co-morbidities: CHF, HLD, HTN, and AFIB (Eliquis, ablation 2/13/25), TAA (4.2cm), Aortic valve Disease (mild regurgitation), Grade 1 DD, BICA stenosis <50%, ELIEL, GI Bleed 2017, L3-4 STENOSIS      Tests already available:  Available tests,  within [facility] .   2/16/25 EKG  2/7/25 CBC, BMP, PT/INR, APTT  11/28/24 C-SPINE CT  11/14/24 CAROTID US, TTE, CTA Head and Neck  10/3/24 LUMBAR & THORACIC CT            Instructions given. (See in Nurse's note)    Optimization:  Anesthesia Preop Clinic Assessment  Indicated POC    Medical Opinion Indicated Cardiology        Sub-specialist consult indicated:   [facility] Pre Op Center NP       Plan:    Testing:  CMP, Hematology Profile, and PT/INR   Pre-anesthesia  visit       Visit focus: possible regional anesthesia and/or nerve block      Consultation:Pre Op Center NP for medical and anesthesia optimization       Patient  has previously scheduled Medical Appointment: 4/22    Navigation: Tests Scheduled.              Consults scheduled.             Results will be tracked by Preop Clinic.      5/6/25 Dr. Jasmine:   Patient went into a fib with RVR requiring transfer to [facility] after EGD at [facility] - she is now s/p ablation and 12 weeks of anticoag.  Cleared for [place]           5/9/25 Cardiology:     Message from Evans Vernon MD sent at 5/9/2025 11:26 AM CDT -----  Regarding: RE: CArdiology clearance  The patient has a history of paroxysmal AFib.  Yes she is cleared for knee surgery no further cardiac testing required.  ----- Message -----  From: Tony Lion NP  Sent: 5/5/2025   4:12 PM CDT  To: Evans Vernon MD; Jessy Finn RN; Ashely  Subject: CArdiology clearance                              Piter sharp, Ms. Lindo is scheduled for surgery 5/19/25 for Dr. Brice  for left knee replacement.  I am  following up on a Cardiology E Consult request for clearance placed 4/20/25.  Is she Ok to proceed with this procedure?  Thank you, Tony Lion, Perioperative Clinic        5/9/25 Cardiology:   Message from Evans Vernon MD sent at 5/9/2025 11:27 AM CDT -----  Regarding: RE: CArdiology clearance  She is cleared cardiac-wise for planned surgery without further testing.  She has a history of paroxysmal AFib and had ablation previously.  Three day hold on DOAC.  Therapy  ----- Message -----  From: Tony Lion NP  Sent: 5/5/2025   4:12 PM CDT  To: Evans Vernon MD; Jessy Finn, MEGAN; Ashely  Subject: CArdiology clearance                              Piter sharp, Ms. Lindo is scheduled for surgery 5/19/25 for Dr. Brice for left knee replacement.  I am  following up on a Cardiology E Consult request for clearance placed 4/20/25.  Is she Ok to proceed with this procedure?  Thank you, Tony Lion, Perioperative Clinic        5/9/25 Patient is optimized     Cards clearance  Evans Hernandez MD Lavoie, Rene W., NP; SAMMY Krueger Staff  Cc: Jessy Finn, MEGAN; Reyna Evans LPN  Caller: Unspecified (4 days ago,  4:05 PM)  She is cleared cardiac-wise for planned surgery without further testing.  She has a history of paroxysmal AFib and had ablation previously.  Three day hold on DOAC.      I spent a total of 66 minutes on the day of the visit.This includes face to face time and non-face to face time preparing to see the patient (eg, review of tests), obtaining and/or reviewing separately obtained history, documenting clinical information in the electronic or other health record, independently interpreting results and communicating results to the patient/family/caregiver, or care coordinator.     Tony Lion NP  Perioperative Medicine  [facility]   Pager [phone]

## 2025-03-25 ENCOUNTER — TELEPHONE (OUTPATIENT)
Dept: ELECTROPHYSIOLOGY | Facility: CLINIC | Age: 75
End: 2025-03-25
Payer: MEDICARE

## 2025-03-25 ENCOUNTER — TELEPHONE (OUTPATIENT)
Dept: PREADMISSION TESTING | Facility: HOSPITAL | Age: 75
End: 2025-03-25
Payer: MEDICARE

## 2025-03-25 ENCOUNTER — TELEPHONE (OUTPATIENT)
Dept: CARDIOLOGY | Facility: CLINIC | Age: 75
End: 2025-03-25
Payer: MEDICARE

## 2025-03-25 DIAGNOSIS — I48.0 PAROXYSMAL ATRIAL FIBRILLATION: Primary | ICD-10-CM

## 2025-03-25 NOTE — TELEPHONE ENCOUNTER
----- Message from MEGAN Jiménez sent at 3/25/2025  8:52 AM CDT -----  Mandi,Can you get her scheduled in clinic closer to surgery so we can get cardiac clearance.Thanks,Jessy  ----- Message -----  From: Mandi Mooney MA  Sent: 3/25/2025   8:16 AM CDT  To: Jessy Finn RN      ----- Message -----  From: Evans Vernon MD  Sent: 3/25/2025   8:06 AM CDT  To: Jessy Finn RN; Saulo Krueger Staff    The patient had recent atrial fibrillation ablation procedure.  She is still having periods of AFib.  By May things may look more stable.  ----- Message -----  From: Jessy Finn RN  Sent: 3/24/2025  12:26 PM CDT  To: Evans Vernon MD; Saulo Krueger Staff    Dr. Vernon,  This patient is scheduled for Total Knee Arthroplasty on 5/19/25 with Dr. Brice using neuraxial anesthesia.  Requesting pre-op risk stratification and management prior to this procedure, along with medication (ELIQUIS) instructions.  Eliquis requires a 3 day hold per anesthesia guidelines for neuraxial anesthesia.   Aspirin 81mg does not need to be held for this surgery.  Please advise. Will await your response.Thanks in advance,Jessy Finn RN                                                                              [facility]

## 2025-03-25 NOTE — TELEPHONE ENCOUNTER
No sooner appointment available at that location, attempted to reach patient to schedule at a different location.  LVM.

## 2025-03-25 NOTE — TELEPHONE ENCOUNTER
----- Message from Evans Vernon MD sent at 3/25/2025  8:05 AM CDT -----  The patient had recent atrial fibrillation ablation procedure.  She is still having periods of AFib.  By May things may look more stable.  ----- Message -----  From: Jessy Finn RN  Sent: 3/24/2025  12:26 PM CDT  To: Evans Vernon MD; Saulo Krueger Staff    Dr. Vernon,  This patient is scheduled for Total Knee Arthroplasty on 5/19/25 with Dr. Brice using neuraxial anesthesia.  Requesting pre-op risk stratification and management prior to this procedure, along with medication (ELIQUIS) instructions.  Eliquis requires a 3 day hold per anesthesia guidelines for neuraxial anesthesia.   Aspirin 81mg does not need to be held for this surgery.  Please advise. Will await your response.Thanks in advance,Jessy Finn RN                                                                              [facility]

## 2025-03-28 ENCOUNTER — OFFICE VISIT (OUTPATIENT)
Dept: OPHTHALMOLOGY | Facility: CLINIC | Age: 75
End: 2025-03-28
Payer: MEDICARE

## 2025-03-28 ENCOUNTER — LAB VISIT (OUTPATIENT)
Dept: LAB | Facility: HOSPITAL | Age: 75
End: 2025-03-28
Attending: ANESTHESIOLOGY
Payer: MEDICARE

## 2025-03-28 DIAGNOSIS — H25.12 NUCLEAR SCLEROSIS OF LEFT EYE: ICD-10-CM

## 2025-03-28 DIAGNOSIS — Z98.41 STATUS POST CATARACT EXTRACTION AND INSERTION OF INTRAOCULAR LENS, RIGHT: Primary | ICD-10-CM

## 2025-03-28 DIAGNOSIS — Z01.818 PRE-OP TESTING: ICD-10-CM

## 2025-03-28 DIAGNOSIS — M79.609 PAIN IN EXTREMITY, UNSPECIFIED EXTREMITY: ICD-10-CM

## 2025-03-28 DIAGNOSIS — Z96.1 STATUS POST CATARACT EXTRACTION AND INSERTION OF INTRAOCULAR LENS, RIGHT: Primary | ICD-10-CM

## 2025-03-28 LAB
ABSOLUTE EOSINOPHIL (OHS): 0.03 K/UL
ABSOLUTE MONOCYTE (OHS): 0.65 K/UL (ref 0.3–1)
ABSOLUTE NEUTROPHIL COUNT (OHS): 5.93 K/UL (ref 1.8–7.7)
ALBUMIN SERPL BCP-MCNC: 3.9 G/DL (ref 3.5–5.2)
ALP SERPL-CCNC: 62 UNIT/L (ref 40–150)
ALT SERPL W/O P-5'-P-CCNC: 9 UNIT/L (ref 10–44)
ANION GAP (OHS): 12 MMOL/L (ref 8–16)
AST SERPL-CCNC: 14 UNIT/L (ref 11–45)
BASOPHILS # BLD AUTO: 0.01 K/UL
BASOPHILS NFR BLD AUTO: 0.1 %
BILIRUB SERPL-MCNC: 0.7 MG/DL (ref 0.1–1)
BUN SERPL-MCNC: 22 MG/DL (ref 8–23)
CALCIUM SERPL-MCNC: 9.6 MG/DL (ref 8.7–10.5)
CHLORIDE SERPL-SCNC: 107 MMOL/L (ref 95–110)
CO2 SERPL-SCNC: 23 MMOL/L (ref 23–29)
CREAT SERPL-MCNC: 0.8 MG/DL (ref 0.5–1.4)
ERYTHROCYTE [DISTWIDTH] IN BLOOD BY AUTOMATED COUNT: 13.4 % (ref 11.5–14.5)
GFR SERPLBLD CREATININE-BSD FMLA CKD-EPI: >60 ML/MIN/1.73/M2
GLUCOSE SERPL-MCNC: 99 MG/DL (ref 70–110)
HCT VFR BLD AUTO: 43 % (ref 37–48.5)
HGB BLD-MCNC: 13.5 GM/DL (ref 12–16)
IMM GRANULOCYTES # BLD AUTO: 0.02 K/UL (ref 0–0.04)
IMM GRANULOCYTES NFR BLD AUTO: 0.2 % (ref 0–0.5)
INR PPP: 1 (ref 0.8–1.2)
LYMPHOCYTES # BLD AUTO: 1.42 K/UL (ref 1–4.8)
MCH RBC QN AUTO: 30.8 PG (ref 27–50)
MCHC RBC AUTO-ENTMCNC: 31.4 G/DL (ref 32–36)
MCV RBC AUTO: 98 FL (ref 82–98)
NUCLEATED RBC (/100WBC) (OHS): 0 /100 WBC
PLATELET # BLD AUTO: 207 K/UL (ref 150–450)
PMV BLD AUTO: 12.8 FL (ref 9.2–12.9)
POTASSIUM SERPL-SCNC: 4.4 MMOL/L (ref 3.5–5.1)
PROT SERPL-MCNC: 6.9 GM/DL (ref 6–8.4)
PROTHROMBIN TIME: 11.1 SECONDS (ref 9–12.5)
RBC # BLD AUTO: 4.39 M/UL (ref 4–5.4)
RELATIVE EOSINOPHIL (OHS): 0.4 %
RELATIVE LYMPHOCYTE (OHS): 17.6 % (ref 18–48)
RELATIVE MONOCYTE (OHS): 8.1 % (ref 4–15)
RELATIVE NEUTROPHIL (OHS): 73.6 % (ref 38–73)
SODIUM SERPL-SCNC: 142 MMOL/L (ref 136–145)
WBC # BLD AUTO: 8.06 K/UL (ref 3.9–12.7)

## 2025-03-28 PROCEDURE — 3288F FALL RISK ASSESSMENT DOCD: CPT | Mod: CPTII,S$GLB,, | Performed by: OPHTHALMOLOGY

## 2025-03-28 PROCEDURE — 99024 POSTOP FOLLOW-UP VISIT: CPT | Mod: S$GLB,,, | Performed by: OPHTHALMOLOGY

## 2025-03-28 PROCEDURE — 1159F MED LIST DOCD IN RCRD: CPT | Mod: CPTII,S$GLB,, | Performed by: OPHTHALMOLOGY

## 2025-03-28 PROCEDURE — 1101F PT FALLS ASSESS-DOCD LE1/YR: CPT | Mod: CPTII,S$GLB,, | Performed by: OPHTHALMOLOGY

## 2025-03-28 PROCEDURE — 1126F AMNT PAIN NOTED NONE PRSNT: CPT | Mod: CPTII,S$GLB,, | Performed by: OPHTHALMOLOGY

## 2025-03-28 PROCEDURE — 85610 PROTHROMBIN TIME: CPT

## 2025-03-28 PROCEDURE — 99999 PR PBB SHADOW E&M-EST. PATIENT-LVL III: CPT | Mod: PBBFAC,,, | Performed by: OPHTHALMOLOGY

## 2025-03-28 PROCEDURE — 80053 COMPREHEN METABOLIC PANEL: CPT

## 2025-03-28 PROCEDURE — 36415 COLL VENOUS BLD VENIPUNCTURE: CPT

## 2025-03-28 PROCEDURE — 92136 OPHTHALMIC BIOMETRY: CPT | Mod: 26,LT,S$GLB, | Performed by: OPHTHALMOLOGY

## 2025-03-28 PROCEDURE — 85025 COMPLETE CBC W/AUTO DIFF WBC: CPT

## 2025-03-28 NOTE — PROGRESS NOTES
HPI    Dr. Vela     S/p phaco OD 03/19/2025   Cataracts OS   S/p H Lasik OU - monoVA OD near / OS distance.     PF TID OD  Ketorolac TID OD  Ofloxacin TID OD    Patient here for 1 wk phaco OD. Patient states OD doing well- no pain or   discomfort. Ready to move forward with OS cataract surgery.    Last edited by Leonor Trotter MA on 3/28/2025  1:30 PM.            Assessment /Plan     For exam results, see Encounter Report.    Status post cataract extraction and insertion of intraocular lens, right    Nuclear sclerosis of left eye      PO week #1 s/p phaco/IOL -    - doing well, no issues    Continue combo drops for a total of 1 month versus: d/c abx gtt, continue PF/ketorolocTID for total of 1 month    - f/up 3-4 wks for MRx, DFE with optom, sooner PRN.             Pt was in hospital for LOV OD... see consult note 11/14/24.    Visually significant nuclear sclerotic cataract   - Interfering with activities of daily living.  Pt desires cataract surgery for Va rehabilitation.   - R/B/A discussed and pt agrees to proceed with surgery.   - IOL options discussed according to patient's goals and concomitant ocular pathology; and pt content with mf  lens.    - Target: plano.    The patient expresses a desire to reduce spectacle dependence. I reviewed various IOL and LASER refractive surgical options and we will attempt to minimize spectacle dependence by managing astigmatism and optimizing IOL selection. Femtosecond LASER assisted cataract surgery (FLACS) technology and Intraoperative aberrometer (ORA) was explained to the patient.  The patient voices understanding and wishes to implement this technology during the cataract procedure.  I explained the increased precision of the LASER versus manual techniques, especially as it relates to astigmatism reduction with arcuate incisions.  I emphasized that although our goal is to reduce the need for refractive correction after surgery, there may still be a need for spectacle  correction to achieve optimal visual acuity, and that a reasonable range of functional vision should be the expectation.  No guarantees are made about post operative refraction or visual acuity, as the eye may heal in unpredictable ways, and the standard risks, benefits, and alternatives to cataract surgery were explained.  The patient understands that the refractive portions of this cataract procedure are not covered by insurance, and that there is an out of pocket expense of $1400 per eye. I also explained that even though our pre-operative plan is to utilize advanced refractive technologies during surgery, that I may decide to eliminate part or all of this plan if surgical challenges or complications arise, or I feel that it is not in the patient's best interest. Consent forms were given to the patient to review.     CATALYS Parameters:    Right Eye:   MARIA INES:  12mm              Need to nba patient sitting up: NO  Capsulotomy: Scanned Capsule  stGstrstastdstest:st st1st Arcuate: OFF  Incisions: OFF  Left Eye:              MARIA INES:  12mm              Need to nba patient sitting up: NO  Capsulotomy: Scanned Capsule  stGstrstastdstest:st st1st Arcuate: OFF  Incisions:  OFF            Diboo  20.5 OS range -- distance OU / okay with readers for near  Ora -- H/o H lasik    H.o - Near OS / distance OD

## 2025-03-30 NOTE — PROGRESS NOTES
HPI    Dr. Vela    S/p phaco OD 03/19/2025  Cataracts OS  S/p H Lasik OU - monoVA OD near / OS distance.    Eye Meds: Pred Forte TID OD                    Ofloxacin TID OD                    Ketorolac TID OD     Patient is here today for 1 day phaco OD. OD is doing well. No pain or   discomfort.   Last edited by Naya Nguyễn on 3/20/2025  2:28 PM.            Assessment /Plan     For exam results, see Encounter Report.    Status post cataract extraction and insertion of intraocular lens, right    Nuclear sclerosis of left eye      Slit Lamp Exam  L/L - normal  C/s - quiet  Cornea - clear  A/C - 1+ cell  Lens - PCIOL    POD #1 s/p phaco/IOL  - doing well  - continue the following drops:    vigamox or ocuflox TID x 1 wk then stop  Pred forte TID x  4 wks  Ketorolac TID until runs out    Versus:    Combination drop - 1 drop TID x total of 1 month    Appropriate precautions and post op medications reviewed.  Patient instructed to call or come in if symptoms of redness, decreased vision, or pain are experienced.    -f/up 1-2 wks, sooner PRN. Or 4 wks with optom for mrx if needed.

## 2025-04-01 ENCOUNTER — PATIENT MESSAGE (OUTPATIENT)
Dept: ORTHOPEDICS | Facility: CLINIC | Age: 75
End: 2025-04-01
Payer: MEDICARE

## 2025-04-01 DIAGNOSIS — M17.12 PRIMARY OSTEOARTHRITIS OF LEFT KNEE: Primary | ICD-10-CM

## 2025-04-02 ENCOUNTER — PATIENT OUTREACH (OUTPATIENT)
Dept: ADMINISTRATIVE | Facility: HOSPITAL | Age: 75
End: 2025-04-02
Payer: MEDICARE

## 2025-04-02 VITALS — SYSTOLIC BLOOD PRESSURE: 127 MMHG | DIASTOLIC BLOOD PRESSURE: 83 MMHG

## 2025-04-08 ENCOUNTER — OFFICE VISIT (OUTPATIENT)
Dept: FAMILY MEDICINE | Facility: CLINIC | Age: 75
End: 2025-04-08
Payer: MEDICARE

## 2025-04-08 ENCOUNTER — PATIENT MESSAGE (OUTPATIENT)
Dept: FAMILY MEDICINE | Facility: CLINIC | Age: 75
End: 2025-04-08
Payer: MEDICARE

## 2025-04-08 VITALS
DIASTOLIC BLOOD PRESSURE: 78 MMHG | HEIGHT: 62 IN | HEART RATE: 73 BPM | OXYGEN SATURATION: 97 % | SYSTOLIC BLOOD PRESSURE: 116 MMHG | BODY MASS INDEX: 29.33 KG/M2 | WEIGHT: 159.38 LBS

## 2025-04-08 DIAGNOSIS — I10 PRIMARY HYPERTENSION: Primary | ICD-10-CM

## 2025-04-08 DIAGNOSIS — I48.0 PAROXYSMAL ATRIAL FIBRILLATION: ICD-10-CM

## 2025-04-08 DIAGNOSIS — Z87.19 HISTORY OF GI BLEED: ICD-10-CM

## 2025-04-08 DIAGNOSIS — M81.0 AGE-RELATED OSTEOPOROSIS WITHOUT CURRENT PATHOLOGICAL FRACTURE: ICD-10-CM

## 2025-04-08 DIAGNOSIS — E66.3 OVERWEIGHT (BMI 25.0-29.9): ICD-10-CM

## 2025-04-08 DIAGNOSIS — Z79.01 CHRONIC ANTICOAGULATION: ICD-10-CM

## 2025-04-08 DIAGNOSIS — M17.11 PRIMARY OSTEOARTHRITIS OF RIGHT KNEE: ICD-10-CM

## 2025-04-08 DIAGNOSIS — Z99.89 AMBULATES WITH CANE: ICD-10-CM

## 2025-04-08 DIAGNOSIS — E78.5 HYPERLIPIDEMIA, UNSPECIFIED HYPERLIPIDEMIA TYPE: ICD-10-CM

## 2025-04-08 DIAGNOSIS — I70.0 AORTIC ATHEROSCLEROSIS: ICD-10-CM

## 2025-04-08 DIAGNOSIS — D50.9 IRON DEFICIENCY ANEMIA, UNSPECIFIED IRON DEFICIENCY ANEMIA TYPE: ICD-10-CM

## 2025-04-08 PROCEDURE — 99999 PR PBB SHADOW E&M-EST. PATIENT-LVL III: CPT | Mod: PBBFAC,,, | Performed by: FAMILY MEDICINE

## 2025-04-08 PROCEDURE — 3288F FALL RISK ASSESSMENT DOCD: CPT | Mod: CPTII,S$GLB,, | Performed by: FAMILY MEDICINE

## 2025-04-08 PROCEDURE — 3074F SYST BP LT 130 MM HG: CPT | Mod: CPTII,S$GLB,, | Performed by: FAMILY MEDICINE

## 2025-04-08 PROCEDURE — 1159F MED LIST DOCD IN RCRD: CPT | Mod: CPTII,S$GLB,, | Performed by: FAMILY MEDICINE

## 2025-04-08 PROCEDURE — G2211 COMPLEX E/M VISIT ADD ON: HCPCS | Mod: S$GLB,,, | Performed by: FAMILY MEDICINE

## 2025-04-08 PROCEDURE — 3078F DIAST BP <80 MM HG: CPT | Mod: CPTII,S$GLB,, | Performed by: FAMILY MEDICINE

## 2025-04-08 PROCEDURE — 1101F PT FALLS ASSESS-DOCD LE1/YR: CPT | Mod: CPTII,S$GLB,, | Performed by: FAMILY MEDICINE

## 2025-04-08 PROCEDURE — 1160F RVW MEDS BY RX/DR IN RCRD: CPT | Mod: CPTII,S$GLB,, | Performed by: FAMILY MEDICINE

## 2025-04-08 PROCEDURE — 99214 OFFICE O/P EST MOD 30 MIN: CPT | Mod: S$GLB,,, | Performed by: FAMILY MEDICINE

## 2025-04-08 NOTE — PROGRESS NOTES
"Routine Office Visit     Patient Name: Windy Lindo    : 1950  MRN: [id number]    Subjective     History of Present Illness    CHIEF COMPLAINT:  Patient presents for a follow-up visit to discuss multiple recent health issues, including atrial fibrillation (AFib), recent hospitalizations, and ongoing fatigue.  Atrial fibrillation   S/p cataract surgery   Osteoarthritis knee       HPI:  Patient reports a significant change in her health status since her last visit in October. She has had two hospital stays, initially for a GI bleed and then for AFib. She underwent a colonoscopy and endoscopy, during which she went into AFib while in recovery. She was hospitalized again around [facility] after losing consciousness and developing a rash from medication. In February, she underwent a PVI (pulmonary vein isolation) ablation for AFib and is currently post-procedure.    She reports ongoing symptoms including palpitations on the left side of her heart where the ablation was performed. In the last 2 weeks, she has had cold sweats, palpitations, and nausea upon waking and when eating. She is unsure if these symptoms are part of the healing process from the ablation.    She uses a CardiaMobile device to monitor her heart rhythm and reports possible AFib most of the time, along with tachycardia. She sent a strip to Dr. Temple, who informed her she was having PACs (premature atrial contractions). A 48-hour heart monitor is scheduled for tomorrow to further evaluate her condition.    She reports significant fatigue, sleeping more than she is awake during the day. Her heart rate in the mornings has been in the low 40s, even after being taken off metoprolol about 3 weeks ago. She also mentions episodes that come and go quickly, where she feels disoriented, which she attributes to her medication (possibly referring to "Multaq").    During her recent cataract surgery last month, her heart rate reached 191 bpm, which she " captured on her watch. She has not been driving since April of last year due to these episodes.    She also mentions ongoing orthopedic issues. Her knee replacement surgery scheduled for last year was postponed. She reports her leg condition has significantly worsened, causing her to walk on the outside of her foot, which is also becoming bone-on-bone. She had a kyphoplasty done after fracturing two vertebrae in a fall.    She is scheduled for a pre-op evaluation on April 22nd for the potential knee replacement, which includes appointments with an orthopedic surgeon, anesthesiologist, and internist. The cardiac issues may further delay this surgery.    She denies feeling AFib episodes, stating she does not know when she is in AFib. She denies any history of significant illness prior to turning 74 years old.    MEDICATIONS:  Patient is on Maltac to control arrhythmias and Eloquence, both of which have fatigue as a side effect. She receives Prolia injections every 6 months for osteoporosis and bone health. Metoprolol, previously used for heart rate control, was discontinued about 3 weeks ago.    MEDICAL HISTORY:  Patient has a history of atrial fibrillation (AFib), osteoporosis, and cataracts.    SURGICAL HISTORY:  La  st month, the patient underwent cataract surgery on one eye, during which she experienced tachycardia with her heart rate reaching 191 bpm. In February, she had a PVI ablation for atrial fibrillation. A kyphoplasty was performed after a fall that fractured two vertebrae. Patient also had a colonoscopy and endoscopy, during which she went into AFib during recovery.    TEST RESULTS:  Blood work was conducted 2 weeks ago, showing normal liver and kidney function. Patient is not anemic. Patient has undergone Holter monitor tests twice in the past, with no significant findings. She is currently using CardiaMobile, which frequently shows possible AFib, PACs, or tachycardia.    IMAGING:  Patient underwent  "cataract surgery on one eye last month. She also had a kyphoplasty performed due to fractured vertebrae from a fall.    ROS:  General: no fever, no chills, +fatigue, no weight gain, no weight loss, +cold sweats, +night sweats  Eyes: no vision changes, no redness, no discharge  ENT: no ear pain, no nasal congestion, no sore throat  Cardiovascular: +chest pain, +palpitations, no lower extremity edema  Respiratory: no cough, no shortness of breath  Gastrointestinal: no abdominal pain, +nausea, no vomiting, no diarrhea, no constipation, no blood in stool  Genitourinary: no dysuria, no hematuria, no frequency  Musculoskeletal: no joint pain, no muscle pain, +difficulty walking, +bone pain  Skin: no rash, no lesion  Neurological: no headache, no dizziness, no numbness, no tingling, +nearno syncope  Psychiatric: no anxiety, no depression, no sleep difficulty           Objective     /78 (BP Location: Left arm, Patient Position: Sitting)   Pulse 73   Ht 5' 2" (1.575 m)   Wt 72.3 kg (159 lb 6.3 oz)   SpO2 97%   BMI 29.15 kg/m²   Physical Exam  Constitutional:       Appearance: She is well-developed.   HENT:      Head: Normocephalic and atraumatic.   Eyes:      Conjunctiva/sclera: Conjunctivae normal.      Pupils: Pupils are equal, round, and reactive to light.   Cardiovascular:      Rate and Rhythm: Normal rate and regular rhythm.      Heart sounds: Normal heart sounds. No murmur heard.     No friction rub. No gallop.   Pulmonary:      Effort: No respiratory distress.      Breath sounds: Normal breath sounds.   Abdominal:      General: Bowel sounds are normal. There is no distension.      Palpations: Abdomen is soft.      Tenderness: There is no abdominal tenderness.   Musculoskeletal:         General: Normal range of motion.      Cervical back: Normal range of motion and neck supple.   Lymphadenopathy:      Cervical: No cervical adenopathy.   Skin:     General: Skin is warm.   Neurological:      Mental Status: She " is alert and oriented to person, place, and time.           Assessment     Assessment & Plan      FOLLOW-UP ENCOUNTER:   Follow up in 6 months for reassessment.         Problem List Items Addressed This Visit          Cardiac/Vascular    Aortic atherosclerosis (Chronic)    Overview   CT chest 9/2024  Aortoiliac calcific atherosclerosis   Patient with Atherosclerosis of the Aorta.  Stable/asymptomatic. Currently stable on lipid and b/p monitoring.           Hyperlipidemia (Chronic)  The current medical regimen is effective;  continue present plan and medications.       Paroxysmal atrial fibrillation    Overview   On eliquis         Primary hypertension - Primary  The current medical regimen is effective;  continue present plan and medications.          Hematology    Chronic anticoagulation    Overview   On eliquis            Endocrine    Age-related osteoporosis without current pathological fracture   Patient to continue using cane for stability when walking.   Patient to be cautious to prevent falls, especially due to delayed Prolia injection.         GI    History of GI bleed (Chronic)       Orthopedic    Primary osteoarthritis of right knee   Considered implications of ongoing cardiac issues on planned knee replacement surgery.       Other Visit Diagnoses         Ambulates with cane       Continue using cane for stability when walking   Be cautious to prevent falls, especially due to delayed Prolia injection        Iron deficiency anemia, unspecified iron deficiency anemia type      Continue protonix   Continue to monitor         Overweight (BMI 25.0-29.9)                  This note was generated with the assistance of ambient listening technology. Verbal consent was obtained by the patient and accompanying visitor(s) for the recording of patient appointment to facilitate this note. I attest to having reviewed and edited the generated note for accuracy, though some syntax or spelling errors may persist. Please  contact the author of this note for any clarification.

## 2025-04-09 ENCOUNTER — CLINICAL SUPPORT (OUTPATIENT)
Dept: CARDIOLOGY | Facility: HOSPITAL | Age: 75
End: 2025-04-09
Attending: INTERNAL MEDICINE
Payer: MEDICARE

## 2025-04-09 DIAGNOSIS — I48.0 PAROXYSMAL ATRIAL FIBRILLATION: ICD-10-CM

## 2025-04-09 PROCEDURE — 93225 XTRNL ECG REC<48 HRS REC: CPT

## 2025-04-10 ENCOUNTER — TELEPHONE (OUTPATIENT)
Dept: OPHTHALMOLOGY | Facility: CLINIC | Age: 75
End: 2025-04-10
Payer: MEDICARE

## 2025-04-10 NOTE — TELEPHONE ENCOUNTER
[facility] with  arrival time of 8:30 for sx on 4/23/25 with Dr. Varghese @ [place].

## 2025-04-14 ENCOUNTER — PATIENT MESSAGE (OUTPATIENT)
Dept: OPHTHALMOLOGY | Facility: CLINIC | Age: 75
End: 2025-04-14
Payer: MEDICARE

## 2025-04-15 ENCOUNTER — TELEPHONE (OUTPATIENT)
Dept: OPHTHALMOLOGY | Facility: CLINIC | Age: 75
End: 2025-04-15
Payer: MEDICARE

## 2025-04-15 PROBLEM — D50.9 IRON DEFICIENCY ANEMIA: Status: RESOLVED | Noted: 2024-10-07 | Resolved: 2025-04-15

## 2025-04-15 NOTE — ASSESSMENT & PLAN NOTE
Pt has No s/s of heart failure this visit  She denies any symptoms of chest pain, shortness of breath at rest, peripheral edema, orthopnea, palpitations, lightheadedness, presyncope, or syncope.      Echo 11/14/2411/14/24  Summary  Show Result Comparison       Left Ventricle: The left ventricle is normal in size. Basal septal thickening. Normal wall motion. There is normal systolic function with a visually estimated ejection fraction of 60 - 65%. Grade I diastolic dysfunction.    Right Ventricle: Normal right ventricular cavity size. Wall thickness is normal. Systolic function is normal.    The left atrium is mildly dilated.    Aortic Valve: There is moderate aortic valve sclerosis. Mildly restricted motion. There is mild aortic regurgitation.    Tricuspid Valve: There is mild regurgitation.    Pulmonary Artery: The estimated pulmonary artery systolic pressure is 32 mmHg.    IVC/SVC: Normal venous pressure at 3 mmHg.    Cards clearance requested from Dr. Epstein

## 2025-04-15 NOTE — ASSESSMENT & PLAN NOTE
Multaq, Apixaban  Recent h/o afib ablation 2/13/25  Card clearance requested from Dr. Katherine Dyson to proceed with surgery

## 2025-04-15 NOTE — ASSESSMENT & PLAN NOTE
Thoracic aortic aneurysm (TAA) (Chronic)       Ascending aortic measurement was 4.2 cm by CT chest 2024 October.  Fusiform aneurysmal dilatation reported.

## 2025-04-15 NOTE — ASSESSMENT & PLAN NOTE
Current /78  today.  134/64  Taking Metoprolol  Patient reports home BP readings- Digital HTN program     4/2/2025 4/4/2025 4/15/2025 4/17/2025 4/19/2025 4/21/2025   Recent Readings         SBP (mmHg) 127  114  132  130  134  109    DBP (mmHg) 83  66  90  74  89  71            Lifestyle changes to reduce systolic BP:  Smoking cessation; exercise 30 minutes per day,  5 days per week or 150 minutes weekly; sodium reduction and avoidance of high salt foods such as processed meats, frozen meals and  fast foods.   Keeping a healthy weight/BMI can help with better BP control    BP acceptable for surgery. I recommend monitoring BP during perioperative period as uncontrolled pain can elevate blood pressure.

## 2025-04-16 ENCOUNTER — PATIENT MESSAGE (OUTPATIENT)
Dept: FAMILY MEDICINE | Facility: CLINIC | Age: 75
End: 2025-04-16
Payer: MEDICARE

## 2025-04-16 DIAGNOSIS — I48.0 PAROXYSMAL ATRIAL FIBRILLATION: Primary | ICD-10-CM

## 2025-04-16 NOTE — TELEPHONE ENCOUNTER
No care due was identified.  [facility] [place] Embedded Care Due Messages. Reference number: [id number].   4/16/2025 3:59:41 PM CDT

## 2025-04-20 ENCOUNTER — PATIENT MESSAGE (OUTPATIENT)
Dept: ADMINISTRATIVE | Facility: OTHER | Age: 75
End: 2025-04-20
Payer: MEDICARE

## 2025-04-21 ENCOUNTER — PATIENT MESSAGE (OUTPATIENT)
Dept: ELECTROPHYSIOLOGY | Facility: CLINIC | Age: 75
End: 2025-04-21
Payer: MEDICARE

## 2025-04-22 ENCOUNTER — E-CONSULT (OUTPATIENT)
Dept: CARDIOLOGY | Facility: HOSPITAL | Age: 75
End: 2025-04-22
Payer: MEDICARE

## 2025-04-22 ENCOUNTER — TELEPHONE (OUTPATIENT)
Dept: PREADMISSION TESTING | Facility: HOSPITAL | Age: 75
End: 2025-04-22
Payer: MEDICARE

## 2025-04-22 ENCOUNTER — OFFICE VISIT (OUTPATIENT)
Dept: ORTHOPEDICS | Facility: CLINIC | Age: 75
End: 2025-04-22
Payer: MEDICARE

## 2025-04-22 ENCOUNTER — OFFICE VISIT (OUTPATIENT)
Dept: INTERNAL MEDICINE | Facility: CLINIC | Age: 75
End: 2025-04-22
Payer: MEDICARE

## 2025-04-22 ENCOUNTER — PATIENT MESSAGE (OUTPATIENT)
Dept: OPHTHALMOLOGY | Facility: CLINIC | Age: 75
End: 2025-04-22
Payer: MEDICARE

## 2025-04-22 ENCOUNTER — HOSPITAL ENCOUNTER (OUTPATIENT)
Dept: RADIOLOGY | Facility: HOSPITAL | Age: 75
Discharge: HOME | End: 2025-04-22
Attending: ORTHOPAEDIC SURGERY
Payer: MEDICARE

## 2025-04-22 VITALS
OXYGEN SATURATION: 95 % | SYSTOLIC BLOOD PRESSURE: 134 MMHG | DIASTOLIC BLOOD PRESSURE: 64 MMHG | HEART RATE: 88 BPM | HEIGHT: 62 IN | WEIGHT: 158.38 LBS | BODY MASS INDEX: 29.15 KG/M2 | TEMPERATURE: 98 F

## 2025-04-22 VITALS — BODY MASS INDEX: 29.15 KG/M2 | WEIGHT: 158.38 LBS | HEIGHT: 62 IN

## 2025-04-22 DIAGNOSIS — E78.5 HYPERLIPIDEMIA, UNSPECIFIED HYPERLIPIDEMIA TYPE: Chronic | ICD-10-CM

## 2025-04-22 DIAGNOSIS — I48.19 PERSISTENT ATRIAL FIBRILLATION: Primary | ICD-10-CM

## 2025-04-22 DIAGNOSIS — R55 SYNCOPE, UNSPECIFIED SYNCOPE TYPE: ICD-10-CM

## 2025-04-22 DIAGNOSIS — M17.12 PRIMARY OSTEOARTHRITIS OF LEFT KNEE: Primary | ICD-10-CM

## 2025-04-22 DIAGNOSIS — I50.32 CHRONIC DIASTOLIC HEART FAILURE: ICD-10-CM

## 2025-04-22 DIAGNOSIS — M17.12 PRIMARY OSTEOARTHRITIS OF LEFT KNEE: ICD-10-CM

## 2025-04-22 DIAGNOSIS — I48.0 PAROXYSMAL ATRIAL FIBRILLATION: ICD-10-CM

## 2025-04-22 DIAGNOSIS — I10 PRIMARY HYPERTENSION: Primary | ICD-10-CM

## 2025-04-22 DIAGNOSIS — Z79.01 CHRONIC ANTICOAGULATION: ICD-10-CM

## 2025-04-22 PROBLEM — K59.00 CONSTIPATION: Status: RESOLVED | Noted: 2024-11-15 | Resolved: 2025-04-22

## 2025-04-22 PROCEDURE — 99999 PR PBB SHADOW E&M-EST. PATIENT-LVL III: CPT | Mod: PBBFAC,,, | Performed by: ORTHOPAEDIC SURGERY

## 2025-04-22 PROCEDURE — 99024 POSTOP FOLLOW-UP VISIT: CPT | Mod: S$GLB,,,

## 2025-04-22 PROCEDURE — 99999 PR PBB SHADOW E&M-EST. PATIENT-LVL IV: CPT | Mod: PBBFAC,,, | Performed by: NURSE PRACTITIONER

## 2025-04-22 PROCEDURE — 73560 X-RAY EXAM OF KNEE 1 OR 2: CPT | Mod: TC,LT

## 2025-04-22 PROCEDURE — 1159F MED LIST DOCD IN RCRD: CPT | Mod: CPTII,S$GLB,,

## 2025-04-22 PROCEDURE — 99999 PR PBB SHADOW E&M-EST. PATIENT-LVL III: CPT | Mod: PBBFAC,,,

## 2025-04-22 PROCEDURE — 73560 X-RAY EXAM OF KNEE 1 OR 2: CPT | Mod: 26,LT,, | Performed by: RADIOLOGY

## 2025-04-22 PROCEDURE — 1160F RVW MEDS BY RX/DR IN RCRD: CPT | Mod: CPTII,S$GLB,,

## 2025-04-22 RX ORDER — PREGABALIN 75 MG/1
75 CAPSULE ORAL NIGHTLY
OUTPATIENT
Start: 2025-04-22

## 2025-04-22 RX ORDER — OXYCODONE HYDROCHLORIDE 5 MG/1
10 TABLET ORAL
Refills: 0 | OUTPATIENT
Start: 2025-04-22

## 2025-04-22 RX ORDER — ACETAMINOPHEN 500 MG
1000 TABLET ORAL EVERY 6 HOURS
OUTPATIENT
Start: 2025-04-22

## 2025-04-22 RX ORDER — SODIUM CHLORIDE 9 MG/ML
INJECTION, SOLUTION INTRAVENOUS CONTINUOUS
OUTPATIENT
Start: 2025-04-22 | End: 2025-04-23

## 2025-04-22 RX ORDER — FENTANYL CITRATE 50 UG/ML
25 INJECTION, SOLUTION INTRAMUSCULAR; INTRAVENOUS EVERY 5 MIN PRN
Refills: 0 | OUTPATIENT
Start: 2025-04-22

## 2025-04-22 RX ORDER — PROCHLORPERAZINE EDISYLATE 5 MG/ML
5 INJECTION INTRAMUSCULAR; INTRAVENOUS EVERY 6 HOURS PRN
OUTPATIENT
Start: 2025-04-22

## 2025-04-22 RX ORDER — MIDAZOLAM HYDROCHLORIDE 1 MG/ML
4 INJECTION, SOLUTION INTRAMUSCULAR; INTRAVENOUS
OUTPATIENT
Start: 2025-04-22 | End: 2025-04-23

## 2025-04-22 RX ORDER — ASPIRIN 81 MG/1
81 TABLET ORAL 2 TIMES DAILY
OUTPATIENT
Start: 2025-04-22

## 2025-04-22 RX ORDER — PREGABALIN 75 MG/1
75 CAPSULE ORAL
OUTPATIENT
Start: 2025-04-22

## 2025-04-22 RX ORDER — SODIUM CHLORIDE 0.9 % (FLUSH) 0.9 %
10 SYRINGE (ML) INJECTION
OUTPATIENT
Start: 2025-04-22

## 2025-04-22 RX ORDER — NALOXONE HCL 0.4 MG/ML
0.02 VIAL (ML) INJECTION
OUTPATIENT
Start: 2025-04-22

## 2025-04-22 RX ORDER — BETHANECHOL CHLORIDE 10 MG/1
10 TABLET ORAL
OUTPATIENT
Start: 2025-04-22 | End: 2025-04-22

## 2025-04-22 RX ORDER — BISACODYL 10 MG/1
10 SUPPOSITORY RECTAL EVERY 12 HOURS PRN
OUTPATIENT
Start: 2025-04-22

## 2025-04-22 RX ORDER — MUPIROCIN 20 MG/G
1 OINTMENT TOPICAL 2 TIMES DAILY
OUTPATIENT
Start: 2025-04-22 | End: 2025-04-27

## 2025-04-22 RX ORDER — LIDOCAINE HYDROCHLORIDE 10 MG/ML
1 INJECTION, SOLUTION EPIDURAL; INFILTRATION; INTRACAUDAL; PERINEURAL
OUTPATIENT
Start: 2025-04-22

## 2025-04-22 RX ORDER — TALC
6 POWDER (GRAM) TOPICAL NIGHTLY PRN
OUTPATIENT
Start: 2025-04-22

## 2025-04-22 RX ORDER — FENTANYL CITRATE 50 UG/ML
100 INJECTION, SOLUTION INTRAMUSCULAR; INTRAVENOUS
Refills: 0 | OUTPATIENT
Start: 2025-04-22 | End: 2025-04-23

## 2025-04-22 RX ORDER — MORPHINE SULFATE 2 MG/ML
2 INJECTION, SOLUTION INTRAMUSCULAR; INTRAVENOUS
Refills: 0 | OUTPATIENT
Start: 2025-04-22

## 2025-04-22 RX ORDER — POLYETHYLENE GLYCOL 3350 17 G/17G
17 POWDER, FOR SOLUTION ORAL DAILY
OUTPATIENT
Start: 2025-04-23

## 2025-04-22 RX ORDER — METHOCARBAMOL 750 MG/1
750 TABLET, FILM COATED ORAL 3 TIMES DAILY
OUTPATIENT
Start: 2025-04-22

## 2025-04-22 RX ORDER — SODIUM CHLORIDE 9 MG/ML
INJECTION, SOLUTION INTRAVENOUS
OUTPATIENT
Start: 2025-04-22

## 2025-04-22 RX ORDER — FAMOTIDINE 20 MG/1
20 TABLET, FILM COATED ORAL 2 TIMES DAILY
OUTPATIENT
Start: 2025-04-22

## 2025-04-22 RX ORDER — AMOXICILLIN 250 MG
1 CAPSULE ORAL 2 TIMES DAILY
OUTPATIENT
Start: 2025-04-22

## 2025-04-22 RX ORDER — OXYCODONE HYDROCHLORIDE 5 MG/1
5 TABLET ORAL
Refills: 0 | OUTPATIENT
Start: 2025-04-22

## 2025-04-22 RX ORDER — ACETAMINOPHEN 500 MG
1000 TABLET ORAL
OUTPATIENT
Start: 2025-04-22

## 2025-04-22 RX ORDER — MUPIROCIN 20 MG/G
1 OINTMENT TOPICAL
OUTPATIENT
Start: 2025-04-22

## 2025-04-22 RX ORDER — ONDANSETRON HYDROCHLORIDE 2 MG/ML
4 INJECTION, SOLUTION INTRAVENOUS EVERY 8 HOURS PRN
OUTPATIENT
Start: 2025-04-22

## 2025-04-22 NOTE — H&P (VIEW-ONLY)
CC:  Left knee pain    Windy Lindo is a 74 y.o. female with history of Left knee pain. Pain is worse with activity and weight bearing.  Patient has experienced interference of activities of daily living due to decreased range of motion and an increase in joint pain and swelling.  Patient has failed non-operative treatment including NSAIDs, corticosteroid injections, viscosupplement injections, and activity modification.  Windy Lindo currently ambulates using assistive device .     Relevant medical conditions of significance in perioperative period:  HTN- monitored by PCP  HLD- monitored by PCP  Paroxysmal atrial fibrillation- recent ablation on 02/13/2025 currently taking Multaq and Eliquis 5 mg monitored by Cardiology  Thoracic aortic aneurysm- monitored by Cardiology  Osteoporosis- on medication managed by PCP  Hx of GI bleed- monitored by PCP       Given documented medical comorbidities including those listed above and based off of CMS criteria patient would meet inpatient admission status guidelines. This case has been approved as inpatient.     Past Medical History:   Diagnosis Date    Arthritis     Atrial fibrillation     Cataract     Constipation 11/15/2024    Noted on xray  Bowel regimen      Eye injury 4 yrs    hit od    GERD (gastroesophageal reflux disease)     Heart murmur     Hyperlipidemia     Hypertension 04/04/2023    Iron deficiency anemia 10/07/2024    Nuclear sclerosis, bilateral 02/13/2019    Osteoporosis        Past Surgical History:   Procedure Laterality Date    ABLATION OF ARRHYTHMOGENIC FOCUS FOR ATRIAL FIBRILLATION N/A 2/13/2025    Procedure: Ablation atrial fibrillation;  Surgeon: Carlos Temple MD;  Location: [facility] EP LAB;  Service: Cardiology;  Laterality: N/A;  AF, JANE (Cx if SR), PVI, PFA, CHAYA, BSci, Gen, NV, 3 Prep    APPENDECTOMY      COLONOSCOPY N/A 09/07/2017    Procedure: COLONOSCOPY;  Surgeon: Albino Fenton MD;  Location: [facility] ENDO ([address] [place] [address]);  Service: Endoscopy;   Laterality: N/A;    COLONOSCOPY N/A 10/09/2024    Procedure: COLONOSCOPY;  Surgeon: Albino Fenton MD;  Location: [facility] ENDOSCOPY;  Service: Endoscopy;  Laterality: N/A;    ESOPHAGOGASTRODUODENOSCOPY  09/07/2017    ESOPHAGOGASTRODUODENOSCOPY N/A 10/09/2024    Procedure: EGD (ESOPHAGOGASTRODUODENOSCOPY);  Surgeon: Albino Fenton MD;  Location: [facility] ENDOSCOPY;  Service: Endoscopy;  Laterality: N/A;  Ref By:solange Jaramillo suprep.  10/1/24- pc complete. DBM    KNEE SURGERY Right 12/05/2017    TKR    kyphosplasty      LASIK Bilateral     PHACOEMULSIFICATION, CATARACT, WITH IOL INSERTION Right 3/19/2025    Procedure: PHACOEMULSIFICATION, CATARACT, WITH IOL INSERTION;  Surgeon: Flaca Varghese MD;  Location: [facility] OR;  Service: Ophthalmology;  Laterality: Right;    TONSILLECTOMY         Family History   Problem Relation Name Age of Onset    Hypertension Mother      Glaucoma Mother      Diabetes Mother      Cataracts Mother      Cancer Mother  74        lung    Heart disease Mother  55    Hypertension Father      Heart disease Father          onset early 60;s, Aortic valve replacement ,Rheumatic fever as a child     No Known Problems Sister      Diabetes Brother      Cancer Brother  66        mesothelioma    No Known Problems Maternal Aunt      No Known Problems Maternal Uncle      No Known Problems Paternal Aunt      No Known Problems Paternal Uncle      No Known Problems Maternal Grandmother      No Known Problems Maternal Grandfather      No Known Problems Paternal Grandmother      No Known Problems Paternal Grandfather      No Known Problems Other      Amblyopia Neg Hx      Blindness Neg Hx      Macular degeneration Neg Hx      Retinal detachment Neg Hx      Strabismus Neg Hx      Stroke Neg Hx      Thyroid disease Neg Hx      Melanoma Neg Hx         Review of patient's allergies indicates:  No Known Allergies    Current Medications[1]    Review of Systems:  Constitutional: no fever or chills  Eyes: no  visual changes  ENT: no nasal congestion or sore throat  Respiratory: no cough or shortness of breath  Cardiovascular: no chest pain or palpitations  Gastrointestinal: no nausea or vomiting, tolerating diet  Genitourinary: no hematuria or dysuria  Integument/Breast: no rash or pruritis  Hematologic/Lymphatic: no easy bruising or lymphadenopathy  Musculoskeletal: positive for knee pain  Neurological: no seizures or tremors  Behavioral/Psych: no auditory or visual hallucinations  Endocrine: no heat or cold intolerance    PE:  There were no vitals taken for this visit.  General: Pleasant, cooperative, NAD   Gait: antalgic  HEENT: NCAT, sclera nonicteric   Lungs: Respirations clear bilaterally; equal and unlabored.   CV: S1S2; 2+ bilateral upper and lower extremity pulses.   Skin: Intact throughout with no rashes, erythema, or lesions  Extremities: No LE edema,  no erythema or warmth of the skin in either lower extremity.    Left knee exam:  Knee Range of Motion:  5-120, pain with passive range of motion  Effusion:  Mild  Condition of skin:intact  Location of tenderness:Lateral joint line   Strength:normal  Stability: stable to testing    Hip Examination: painless PROM of hip     Radiographs: Radiographs reveal advanced degenerative changes including subchondral cyst formation, subchondral sclerosis, osteophyte formation, joint space narrowing.     Knee Alignment:  Significiant valgus    Diagnosis: Primary osteoarthritis Left knee    Plan: Left total knee arthroplasty    Due to the serious nature of total joint infection and the high prevalence of community acquired MRSA, vancomycin will be used perioperatively.                 [1]   Current Outpatient Medications:     acetaminophen (TYLENOL) 650 MG TbSR, Take 1 tablet (650 mg total) by mouth every 6 (six) hours as needed., Disp: 20 tablet, Rfl: 0    acetaminophen-codeine 300-30mg (TYLENOL #3) 300-30 mg Tab, Take 1 tablet by mouth every 6 (six) hours as needed (pain).,  Disp: 10 tablet, Rfl: 0    amoxicillin (AMOXIL) 500 MG Tab, Take 500 mg by mouth as needed (as needed for dental work)., Disp: , Rfl:     apixaban (ELIQUIS) 5 mg Tab, Take 1 tablet (5 mg total) by mouth 2 (two) times daily., Disp: 60 tablet, Rfl: 2    b complex vitamins capsule, Take 1 capsule by mouth once daily., Disp: , Rfl:     denosumab (PROLIA) 60 mg/mL Syrg, Inject 60 mg into the skin every 6 (six) months., Disp: , Rfl:     dronedarone (MULTAQ) 400 mg Tab, Take 400 mg by mouth 2 (two) times daily with meals., Disp: , Rfl:     ketorolac 0.5% (ACULAR) 0.5 % Drop, Place 1 drop into the right eye 3 (three) times daily., Disp: 5 mL, Rfl: 3    ketorolac 0.5% (ACULAR) 0.5 % Drop, Place 1 drop into the left eye 3 (three) times daily., Disp: 5 mL, Rfl: 3    ketorolac 0.5% (ACULAR) 0.5 % Drop, Place 1 drop into the left eye 3 (three) times daily., Disp: 5 mL, Rfl: 3    multivit-min-FA-lycopen-lutein 0.4-300-250 mg-mcg-mcg Tab, Take 1 tablet by mouth once daily., Disp: , Rfl:     ofloxacin (OCUFLOX) 0.3 % ophthalmic solution, Place 1 drop into the right eye 3 (three) times daily., Disp: 5 mL, Rfl: 3    ofloxacin (OCUFLOX) 0.3 % ophthalmic solution, Place 1 drop into the left eye 3 (three) times daily., Disp: 5 mL, Rfl: 3    pantoprazole (PROTONIX) 40 MG tablet, Take 1 tablet (40 mg total) by mouth once daily., Disp: 90 tablet, Rfl: 3    prednisoLONE acetate (PRED FORTE) 1 % DrpS, Place 1 drop into the right eye 3 (three) times daily., Disp: 5 mL, Rfl: 3    prednisoLONE acetate (PRED FORTE) 1 % DrpS, Place 1 drop into the left eye 3 (three) times daily., Disp: 5 mL, Rfl: 3    vitamin D (VITAMIN D3) 1000 units Tab, Take 1,000 Units by mouth once daily., Disp: , Rfl:     vitamin E 100 UNIT capsule, Take 100 Units by mouth once daily., Disp: , Rfl:

## 2025-04-22 NOTE — TELEPHONE ENCOUNTER
Spoke to patient, instructed her to hold Eliquis for 3 days prior to surgery per A-fib guidelines. Verbalized understanding.

## 2025-04-22 NOTE — PROGRESS NOTES
[facility]  Progress Note    Patient Name: Windy Lindo  MRN: [id number]  Date of Evaluation- 04/23/2025  PCP- Trisha Higginbotham MD    Future cases for Windy Lindo [[id number]]       Case ID Status Date Time Win Procedure Provider Location    [id number] [facility] 5/19/2025  1:08  ARTHROPLASTY, KNEE, TOTAL, USING Veracode COMPUTER-ASSISTED NAVIGATION: LEFT: DEPUY - [facility] [[id number]] [facility]            HPI:  This is a 74 y.o. female  who presents today for a preoperative evaluation in preparation for left knee replacement  Surgery is indicated for  osteoarthritis left knee   .   Patient is new to me.    The history has been obtained by speaking with the patient and reviewing the electronic medical record and/or outside health information. Significant health conditions for the perioperative period are discussed below in assessment and plan.     Patient reports current health status to be Good.  Denies any new symptoms before surgery.       Subjective/ Objective:     Chief Complaint: Preoperative evaulation, perioperative medical management, and complication reduction plan.     Functional Capacity:  Able to climb a flight of stairs   Able to meet 4 METs Walked from parking lot to clinic without CP or Syncope.  States she would have SOB      Anesthesia issues: None    Difficulty mouth opening: none    Steroid use in the last 12 months: none     Dental Issues: none    Family anesthesia difficulty: None     Family Hx of Thrombosis  none    Past Medical History:   Diagnosis Date    Arthritis     Atrial fibrillation     Cataract     Eye injury 4 yrs    hit od    GERD (gastroesophageal reflux disease)     Heart murmur     Hyperlipidemia     Hypertension 04/04/2023    Iron deficiency anemia 10/07/2024    Nuclear sclerosis, bilateral 02/13/2019    Osteoporosis      Past Surgical History:   Procedure Laterality Date    ABLATION OF ARRHYTHMOGENIC FOCUS FOR ATRIAL FIBRILLATION N/A 2/13/2025     Procedure: Ablation atrial fibrillation;  Surgeon: Carlos Temple MD;  Location: [facility] EP LAB;  Service: Cardiology;  Laterality: N/A;  AF, JANE (Cx if SR), PVI, PFA, CHAYA, BSci, Gen, HI, 3 Prep    APPENDECTOMY      COLONOSCOPY N/A 09/07/2017    Procedure: COLONOSCOPY;  Surgeon: Albino Fenton MD;  Location: [facility] ENDO (4TH FLR);  Service: Endoscopy;  Laterality: N/A;    COLONOSCOPY N/A 10/09/2024    Procedure: COLONOSCOPY;  Surgeon: Albino Fenton MD;  Location: [facility] ENDOSCOPY;  Service: Endoscopy;  Laterality: N/A;    ESOPHAGOGASTRODUODENOSCOPY  09/07/2017    ESOPHAGOGASTRODUODENOSCOPY N/A 10/09/2024    Procedure: EGD (ESOPHAGOGASTRODUODENOSCOPY);  Surgeon: Albino Fenton MD;  Location: [facility] ENDOSCOPY;  Service: Endoscopy;  Laterality: N/A;  Ref By:solange Jaramillo suprep.  10/1/24- pc complete. DBM    KNEE SURGERY Right 12/05/2017    TKR    kyphosplasty      LASIK Bilateral     PHACOEMULSIFICATION, CATARACT, WITH IOL INSERTION Right 3/19/2025    Procedure: PHACOEMULSIFICATION, CATARACT, WITH IOL INSERTION;  Surgeon: Flaca Varghese MD;  Location: [facility] OR;  Service: Ophthalmology;  Laterality: Right;    TONSILLECTOMY         Review of Systems   Constitutional:  Negative for chills, fatigue and fever.   HENT:  Negative for trouble swallowing and voice change.    Eyes:  Negative for photophobia and visual disturbance.        No acute visual changes   Respiratory:  Negative for apnea, cough, chest tightness, shortness of breath and wheezing.         STOP bang  Score  Low risk SAHUNNA  High risk SHAUNNA   Cardiovascular:  Negative for chest pain, palpitations and leg swelling.   Gastrointestinal:  Negative for abdominal distention, abdominal pain, blood in stool, constipation, diarrhea, nausea and vomiting.        NO FLD, hepatitis, cirrhosis  No BRB or black tarry stool     Genitourinary:  Negative for difficulty urinating, dysuria, flank pain, frequency, hematuria and urgency.   Musculoskeletal:  Negative  "for arthralgias, back pain, gait problem, joint swelling, myalgias, neck pain and neck stiffness.   Neurological:  Positive for dizziness, tremors and syncope. Negative for seizures, weakness, light-headedness, numbness and headaches.   Psychiatric/Behavioral:  Negative for suicidal ideas.           VITALS  Visit Vitals  BP (!) 189/78 (BP Location: Right arm, Patient Position: Sitting)   Pulse 88   Temp 98 °F (36.7 °C)   Ht 5' 2" (1.575 m)   Wt 71.9 kg (158 lb 6.4 oz)   SpO2 95%   BMI 28.97 kg/m²          Physical Exam     Significant Labs:  Lab Results   Component Value Date    WBC 8.06 03/28/2025    HGB 13.5 03/28/2025    HCT 43.0 03/28/2025     03/28/2025    CHOL 213 (H) 03/28/2024    TRIG 91 03/28/2024    HDL 57 03/28/2024    ALT 9 (L) 03/28/2025    AST 14 03/28/2025     03/28/2025    K 4.4 03/28/2025     03/28/2025    CREATININE 0.8 03/28/2025    BUN 22 03/28/2025    CO2 23 03/28/2025    TSH 2.100 11/13/2024    INR 1.0 03/28/2025    HGBA1C 4.9 10/10/2024       Diagnostic Studies: No relevant studies.    EKG:   Results for orders placed or performed during the hospital encounter of 02/16/25   EKG 12-lead    Collection Time: 02/16/25  6:32 PM   Result Value Ref Range    QRS Duration 88 ms    [facility] QTC Calculation 422 ms    Narrative    Test Reason : R79.89,    Vent. Rate :  75 BPM     Atrial Rate :  75 BPM     P-R Int : 170 ms          QRS Dur :  88 ms      QT Int : 378 ms       P-R-T Axes :  12  -3  12 degrees    QTcB Int : 422 ms    Normal sinus rhythm  Low voltage QRS  Borderline Abnormal ECG  When compared with ECG of 16-Feb-2025 16:49,  T wave inversion now evident in Inferior leads  Confirmed by Otoniel Walker ([id number]) on 2/18/2025 4:06:43 PM    Referred By: AAAREFERRAL SELF           Confirmed By: Otoniel Walker       2D ECHO:  TTE:  Results for orders placed or performed during the hospital encounter of 11/13/24   Echo   Result Value Ref Range    LV Diastolic Volume 94.81 mL    Echo EF " "Estimated 71 %    LV Systolic Volume 27.42 mL    IVS 1.0 0.6 - 1.1 cm    LVIDd 4.6 3.5 - 6.0 cm    LVIDs 2.7 2.1 - 4.0 cm    LVOT diameter 2.2 cm    PW 0.8 0.6 - 1.1 cm    IVRT 176.97 ms    AV LVOT peak gradient 7 mmHg    LVOT mn grad 4 mmHg    LVOT peak manny 1.4 m/s    LVOT peak VTI 30.4 cm    RV- pearce basal diam 3.3 cm    RV S' 8.12 cm/s    LA size 3.88 cm    Left Atrium Major Axis 4.81 cm    Left Atrium Minor Axis 5.05 cm    LA Vol (MOD) 64.41 mL    RA Major Axis 4.38 cm    RA Area 12.7 cm2    AV valve area 3.1 cm2    AV area by cont VTI 3.2 cm2    AV peak gradient 17.6 mmHg    AV mean gradient 6.2 mmHg    Ao peak manny 2.1 m/s    Ao VTI 36.7 cm    MV Peak A Manny 0.97 m/s    E wave deceleration time 163.30 ms    MV Peak E Manny 0.79 m/s    E/A ratio 0.81     LV LATERAL E/E' RATIO 13.17     LV SEPTAL E/E' RATIO 15.80     TDI LATERAL 0.06 m/s    TDI SEPTAL 0.05 m/s    TV peak gradient 29 mmHg    TR Max Manny 2.70 m/s    Ascending aorta 3.16 cm    STJ 2.45 cm    P vein A 0.2 m/s    MV "A" wave duration 154.14 ms    Pulm vein "A" wave 154.14 ms    PV Peak D Manny 0.34 m/s    PV Peak S Manny 0.44 m/s    IVC diameter 1.46 cm    Sinus 2.81 cm    RA Width 3.80 cm    LA WIDTH 4.40 cm    TAPSE 1.51 cm    BSA 1.8 m2    LVOT stroke volume 115.5 cm3    LV Systolic Volume Index 15.5 mL/m2    LV Diastolic Volume Index 53.56 mL/m2    LVOT area 3.8 cm2    FS 41.3 28 - 44 %    Left Ventricle Relative Wall Thickness 0.35 cm    LV mass 137.7 g    LV Mass Index 77.8 g/m2    E/E' ratio 14.36 m/s    LILLIAN 40.4 mL/m2    LA Vol 71.50 cm3    Pulm vein S/D ratio 1.29     RV/LV Ratio 0.72 cm    LILLIAN (MOD) 36.4 mL/m2    AV Velocity Ratio 0.67     AV index (prosthetic) 0.83     RICK by Velocity Ratio 2.5 cm²    Triscuspid Valve Regurgitation Peak Gradient 29 mmHg    Mean e' 0.06 m/s    ZLVIDS -0.90     ZLVIDD -0.62     TV resting pulmonary artery pressure 32 mmHg    RV TB RVSP 6 mmHg    Est. RA pres 3 mmHg    Narrative      Left Ventricle: The left " ventricle is normal in size. Basal septal   thickening. Normal wall motion. There is normal systolic function with a   visually estimated ejection fraction of 60 - 65%. Grade I diastolic   dysfunction.    Right Ventricle: Normal right ventricular cavity size. Wall thickness   is normal. Systolic function is normal.    The left atrium is mildly dilated.    Aortic Valve: There is moderate aortic valve sclerosis. Mildly   restricted motion. There is mild aortic regurgitation.    Tricuspid Valve: There is mild regurgitation.    Pulmonary Artery: The estimated pulmonary artery systolic pressure is   32 mmHg.    IVC/SVC: Normal venous pressure at 3 mmHg.         JANE:  No results found for this or any previous visit.     Imaging     Active Cardiac Conditions: None      Revised Cardiac Risk Index   High -Risk Surgery  Intraperitoneal; Intrathoracic; suprainguinal vascular Yes- + 1 No- 0   History of Ischemic Heart Disease   (Hx of MI/positive exercise test/current chest pain due to ischemia/use of nitrate therapy/EKG with pathological Q waves) Yes- + 1 No- 0   History of CHF  (Pulmonary edema/bilateral rales or S3 gallop/PND/CXR showing pulmonary vascular redistribution) Yes- + 1 No- 0   History of CVA   (Prior stroke or TIA) Yes- + 1 No- 0   Pre-operative treatment with insulin Yes- + 1 No- 0   Pre-operative creatinine > 2mg/dl Yes- + 1 No- 0   Total:    Risk Status:  Estimated risk of cardiac complications after non-cardiac surgery using the Revised Cardiac Risk Index for Preoperative risk is 3.9 %      ARISCAT (Canet) risk index: Low: 1.6% risk of post-op pulmonary complications.    American Society of Anesthesiologists Physical Status classification (ASA): 3             Preoperative cardiac risk assessment-  The patient does not have any active cardiac conditions . Revised cardiac risk index predictors- ---.Functional capacity is more than 4 Mets. She will be undergoing a Orthopedic procedure that carries a Moderate  Risk risk     The estimated risk of the rate of adverse cardiac outcomes  3.9    No further cardiac work up is indicated prior to proceeding with the surgery     Orders Placed This Encounter    E-Consult to General Cardiology    E-Consult to General Cardiology       American Society of Anesthesiologists Physical status classification ( ASA ) class: 3     Postoperative pulmonary complication risk assessment: 1.6     ARISCAT ( Canet) risk index- risk class -  Low, if duration of surgery is under 3 hours, intermediate, if duration of surgery is over 3 hours          Assessment/Plan:     Thoracic aortic aneurysm (TAA)  Thoracic aortic aneurysm (TAA) (Chronic)       Ascending aortic measurement was 4.2 cm by CT chest 2024 October.  Fusiform aneurysmal dilatation reported.       Primary hypertension  Current /78  today.  134/64  Taking Metoprolol  Patient reports home BP readings- Digital HTN program     4/2/2025 4/4/2025 4/15/2025 4/17/2025 4/19/2025 4/21/2025   Recent Readings         SBP (mmHg) 127  114  132  130  134  109    DBP (mmHg) 83  66  90  74  89  71            Lifestyle changes to reduce systolic BP:  Smoking cessation; exercise 30 minutes per day,  5 days per week or 150 minutes weekly; sodium reduction and avoidance of high salt foods such as processed meats, frozen meals and  fast foods.   Keeping a healthy weight/BMI can help with better BP control    BP acceptable for surgery. I recommend monitoring BP during perioperative period as uncontrolled pain can elevate blood pressure.         Paroxysmal atrial fibrillation  Multaq, Apixaban  Recent h/o afib ablation 2/13/25  Card clearance requested from Dr. Katherine Dyson to proceed with surgery    Chronic diastolic heart failure  Pt has No s/s of heart failure this visit  She denies any symptoms of chest pain, shortness of breath at rest, peripheral edema, orthopnea, palpitations, lightheadedness, presyncope, or syncope.      Echo  "11/14/2411/14/24  Summary  Show Result Comparison       Left Ventricle: The left ventricle is normal in size. Basal septal thickening. Normal wall motion. There is normal systolic function with a visually estimated ejection fraction of 60 - 65%. Grade I diastolic dysfunction.    Right Ventricle: Normal right ventricular cavity size. Wall thickness is normal. Systolic function is normal.    The left atrium is mildly dilated.    Aortic Valve: There is moderate aortic valve sclerosis. Mildly restricted motion. There is mild aortic regurgitation.    Tricuspid Valve: There is mild regurgitation.    Pulmonary Artery: The estimated pulmonary artery systolic pressure is 32 mmHg.    IVC/SVC: Normal venous pressure at 3 mmHg.    Cards clearance requested from Dr. Epstein    Chronic anticoagulation  Apixaban for h/o afib    Thoracic compression fracture  Surgery performed Kyphoplasty    Syncope  Happened on Thanksgiving  Likely cause was afib  D/C summary 11/14/24  "Observation Course:    Patient placed in observation for syncope. Patient was monitored on telemetry for > 24 hours without acute events.  She received 2 doses of Multaq per cardiology recommendations.  No additional episodes of syncope were noted.  She had a recent echocardiogram done that was not repeated.  She was discharged in stable condition.  She will follow up in electrophysiology Clinic. "    Afib Ablation performed 2/12/25    Hyperlipidemia  No meds at this time      Preventive perioperative care    Thromboembolic prophylaxis:  Her risk factors for thrombosis include surgical procedure, age, and reduced mobility.I suggest  thromboembolic prophylaxis ( mechanical/pharmacological, weighing the risk benefits of pharmacological agent use considering yessica procedural bleeding )  during the perioperative period.I suggested being active in the post operative period.      Postoperative pulmonary complication prophylaxis-Risk factors for post operative pulmonary " complications include age over 65 years and ASA class >2- I suggest incentive spirometry use, early ambulation, and pain control so as to avoid diaphragmatic splinting  Brush teeth twice per day, oral rinses, sleep with the head of the bed up 30 degrees     Renal complication prophylaxis-Risk factors for renal complications include age and hypertension . I suggest keeping her well hydrated and avoidance/ minimizing the use of  NSAID's,DUFFY 2 Inhibitors ,IV contrast if possible in the perioperative period.I suggested drinking 2 litre's of water a day      Surgical site Infection Prophylaxis-I  suggest appropriate antibiotic for Prophylaxis against Surgical site infections Shower with Hibiclens in the night before surgery and the morning of surgery         In view of Spine procedure the patient  is at risk of postoperative urinary retention.  I suggest avoidance / minimizing the of  Benzodiazepines,Anticholinergic medication,antihistamines ( Benadryl) , if possible in the perioperative period. I suggest using the minimum possible use of opioids for the minimum period of time in the perioperative period. Benadryl avoidance suggested      This visit was focused on Preoperative evaluation, Perioperative Medical management, complication reduction plans. I suggest that the patient follows up with primary care or relevant sub specialists for ongoing health care.    I appreciate the opportunity to be involved in this patients care. Please feel free to contact me if there were any questions about this consultation.    Patient is pending optimization    Cards clearance  Saulo    I spent a total of 66 minutes on the day of the visit.This includes face to face time and non-face to face time preparing to see the patient (eg, review of tests), obtaining and/or reviewing separately obtained history, documenting clinical information in the electronic or other health record, independently interpreting results and communicating  results to the patient/family/caregiver, or care coordinator.      Tony Lion NP  Perioperative Medicine  [facility]   Pager [phone]

## 2025-04-22 NOTE — ASSESSMENT & PLAN NOTE
"Happened on Thanksgiving  Likely cause was afib  D/C summary 11/14/24  "Observation Course:    Patient placed in observation for syncope. Patient was monitored on telemetry for > 24 hours without acute events.  She received 2 doses of Multaq per cardiology recommendations.  No additional episodes of syncope were noted.  She had a recent echocardiogram done that was not repeated.  She was discharged in stable condition.  She will follow up in electrophysiology Clinic. "    Afib Ablation performed 2/12/25  "

## 2025-04-22 NOTE — DISCHARGE INSTRUCTIONS
.Your surgery has been scheduled for:__________________________________________    You should report to:  ____[facility], located on the [place] side of the first floor of the           [facility] ([phone])  ____[facility], located on the [facility] side of the            Second floor of the [facility] ([phone])  ____3rd Floor [facility] located on the [facility] side of the [facility] [phone]  ____[facility]: [address] [address]     Please Note   Tell your doctor if you take Aspirin, products containing Aspirin, herbal medications  or blood thinners, such as Coumadin, Ticlid, or Plavix.  (Consult your provider regarding holding or stopping before surgery).  Arrange for someone to drive you home following surgery.  You will not be allowed to leave the surgical facility alone or drive yourself home following sedation and anesthesia.    Before Surgery  Stop taking all herbal medications, vitamins, and supplements 7 days prior to surgery  No Motrin/Advil (Ibuprofen) 7 days before surgery  No Aleve (Naproxen) 7 days before surgery   No Goody's/BC Powder 7 days before surgery  Refrain from drinking alcoholic beverages for 24 hours before and after surgery  Stop or limit smoking at least 24 hours prior to surgery  You may take Tylenol for pain    Night before Surgery  Do not eat or drink after midnight  Take a shower or bath (shower is recommended).  Bathe with Hibiclens soap or an antibacterial soap from the neck down.  If not supplied by your surgeon, hibiclens soap will need to be purchased over the counter in pharmacy.  Rinse soap off thoroughly.  Shampoo your hair with your regular shampoo    The Day of Surgery  Take another bath or shower with hibiclens or any antibacterial soap, to reduce the chance of infection.  Take heart and blood  pressure medications with a small sip of water, as advised by the perioperative team.  Do not take fluid pills  You may brush your teeth and rinse your mouth, but do not swallow any additional water.   Do not apply perfumes, powder, body lotions or deodorant on the day of surgery.  Nail polish should be removed.  Do not wear makeup or moisturizer  Wear comfortable clothes, such as a button front shirt and loose fitting pants.  Leave all jewelry, including body piercings, and valuables at home.    Bring any devices you will need after surgery such as crutches or canes.  If you have sleep apnea, please bring your CPAP machine  In the event that your physical condition changes including the onset of a cold or respiratory illness, or if you have to delay or cancel your surgery, please notify your surgeon.
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
## 2025-04-22 NOTE — OUTPATIENT SUBJECTIVE & OBJECTIVE
Outpatient Subjective & Objective      Chief Complaint: Preoperative evaulation, perioperative medical management, and complication reduction plan.     Functional Capacity:  Able to climb a flight of stairs   Able to meet 4 METs Walked from parking lot to clinic without CP or Syncope.  States she would have SOB      Anesthesia issues: None    Difficulty mouth opening: none    Steroid use in the last 12 months: none     Dental Issues: none    Family anesthesia difficulty: None     Family Hx of Thrombosis  none    Past Medical History:   Diagnosis Date    Arthritis     Atrial fibrillation     Cataract     Eye injury 4 yrs    hit od    GERD (gastroesophageal reflux disease)     Heart murmur     Hyperlipidemia     Hypertension 04/04/2023    Iron deficiency anemia 10/07/2024    Nuclear sclerosis, bilateral 02/13/2019    Osteoporosis      Past Surgical History:   Procedure Laterality Date    ABLATION OF ARRHYTHMOGENIC FOCUS FOR ATRIAL FIBRILLATION N/A 2/13/2025    Procedure: Ablation atrial fibrillation;  Surgeon: Carlos Temple MD;  Location: [facility] EP LAB;  Service: Cardiology;  Laterality: N/A;  AF, JANE (Cx if SR), PVI, PFA, CHAYA, BSci, Gen, MD, 3 Prep    APPENDECTOMY      COLONOSCOPY N/A 09/07/2017    Procedure: COLONOSCOPY;  Surgeon: Albino Fenton MD;  Location: [facility] ENDO ([facility] FLR);  Service: Endoscopy;  Laterality: N/A;    COLONOSCOPY N/A 10/09/2024    Procedure: COLONOSCOPY;  Surgeon: Albino Fenton MD;  [facility];  Service: Endoscopy;  Laterality: N/A;    ESOPHAGOGASTRODUODENOSCOPY  09/07/2017    ESOPHAGOGASTRODUODENOSCOPY N/A 10/09/2024    Procedure: EGD (ESOPHAGOGASTRODUODENOSCOPY);  Surgeon: Albino Fenton MD;  [facility];  Service: Endoscopy;  Laterality: N/A;  Ref By:solange [email or web address] [email or web address]  10/1/24- pc complete. DBM    KNEE SURGERY Right 12/05/2017    TKR    kyphosplasty      LASIK Bilateral     PHACOEMULSIFICATION, CATARACT, WITH IOL INSERTION Right 3/19/2025     "Procedure: PHACOEMULSIFICATION, CATARACT, WITH IOL INSERTION;  Surgeon: Flaca Varghese MD;  Location: [facility];  Service: Ophthalmology;  Laterality: Right;    TONSILLECTOMY         Review of Systems   Constitutional:  Negative for chills, fatigue and fever.   HENT:  Negative for trouble swallowing and voice change.    Eyes:  Negative for photophobia and visual disturbance.        No acute visual changes   Respiratory:  Negative for apnea, cough, chest tightness, shortness of breath and wheezing.         STOP bang  Score  Low risk SHAUNNA  High risk SHAUNNA   Cardiovascular:  Negative for chest pain, palpitations and leg swelling.   Gastrointestinal:  Negative for abdominal distention, abdominal pain, blood in stool, constipation, diarrhea, nausea and vomiting.        NO FLD, hepatitis, cirrhosis  No BRB or black tarry stool     Genitourinary:  Negative for difficulty urinating, dysuria, flank pain, frequency, hematuria and urgency.   Musculoskeletal:  Negative for arthralgias, back pain, gait problem, joint swelling, myalgias, neck pain and neck stiffness.   Neurological:  Positive for dizziness, tremors and syncope. Negative for seizures, weakness, light-headedness, numbness and headaches.   Psychiatric/Behavioral:  Negative for suicidal ideas.           VITALS  Visit Vitals  BP (!) 189/78 (BP Location: Right arm, Patient Position: Sitting)   Pulse 88   Temp 98 °F (36.7 °C)   Ht 5' 2" (1.575 m)   Wt 71.9 kg (158 lb 6.4 oz)   SpO2 95%   BMI 28.97 kg/m²          Physical Exam     Significant Labs:  Lab Results   Component Value Date    WBC 8.06 03/28/2025    HGB 13.5 03/28/2025    HCT 43.0 03/28/2025     03/28/2025    CHOL 213 (H) 03/28/2024    TRIG 91 03/28/2024    HDL 57 03/28/2024    ALT 9 (L) 03/28/2025    AST 14 03/28/2025     03/28/2025    K 4.4 03/28/2025     03/28/2025    CREATININE 0.8 03/28/2025    BUN 22 03/28/2025    CO2 23 03/28/2025    TSH 2.100 11/13/2024    INR 1.0 03/28/2025    HGBA1C " 4.9 10/10/2024       Diagnostic Studies: No relevant studies.    EKG:   Results for orders placed or performed during the hospital encounter of 02/16/25   EKG 12-lead    Collection Time: 02/16/25  6:32 PM   Result Value Ref Range    QRS Duration 88 ms    OHS QTC Calculation 422 ms    Narrative    Test Reason : R79.89,    Vent. Rate :  75 BPM     Atrial Rate :  75 BPM     P-R Int : 170 ms          QRS Dur :  88 ms      QT Int : 378 ms       P-R-T Axes :  12  -3  12 degrees    QTcB Int : 422 ms    Normal sinus rhythm  Low voltage QRS  Borderline Abnormal ECG  When compared with ECG of 16-Feb-2025 16:49,  T wave inversion now evident in Inferior leads  Confirmed by Otoniel Walker ([id number]) on 2/18/2025 4:06:43 PM    Referred By: AAAREFERRAL SELF           Confirmed By: Otoniel Walker       2D ECHO:  TTE:  Results for orders placed or performed during the hospital encounter of 11/13/24   Echo   Result Value Ref Range    LV Diastolic Volume 94.81 mL    Echo EF Estimated 71 %    LV Systolic Volume 27.42 mL    IVS 1.0 0.6 - 1.1 cm    LVIDd 4.6 3.5 - 6.0 cm    LVIDs 2.7 2.1 - 4.0 cm    LVOT diameter 2.2 cm    PW 0.8 0.6 - 1.1 cm    IVRT 176.97 ms    AV LVOT peak gradient 7 mmHg    LVOT mn grad 4 mmHg    LVOT peak manny 1.4 m/s    LVOT peak VTI 30.4 cm    RV- pearce basal diam 3.3 cm    RV S' 8.12 cm/s    LA size 3.88 cm    Left Atrium Major Axis 4.81 cm    Left Atrium Minor Axis 5.05 cm    LA Vol (MOD) 64.41 mL    RA Major Axis 4.38 cm    RA Area 12.7 cm2    AV valve area 3.1 cm2    AV area by cont VTI 3.2 cm2    AV peak gradient 17.6 mmHg    AV mean gradient 6.2 mmHg    Ao peak manny 2.1 m/s    Ao VTI 36.7 cm    MV Peak A Manny 0.97 m/s    E wave deceleration time 163.30 ms    MV Peak E Manny 0.79 m/s    E/A ratio 0.81     LV LATERAL E/E' RATIO 13.17     LV SEPTAL E/E' RATIO 15.80     TDI LATERAL 0.06 m/s    TDI SEPTAL 0.05 m/s    TV peak gradient 29 mmHg    TR Max Manny 2.70 m/s    Ascending aorta 3.16 cm    STJ 2.45 cm    P vein A 0.2 m/s  "   MV "A" wave duration 154.14 ms    Pulm vein "A" wave 154.14 ms    PV Peak D Manny 0.34 m/s    PV Peak S Manny 0.44 m/s    IVC diameter 1.46 cm    Sinus 2.81 cm    RA Width 3.80 cm    LA WIDTH 4.40 cm    TAPSE 1.51 cm    BSA 1.8 m2    LVOT stroke volume 115.5 cm3    LV Systolic Volume Index 15.5 mL/m2    LV Diastolic Volume Index 53.56 mL/m2    LVOT area 3.8 cm2    FS 41.3 28 - 44 %    Left Ventricle Relative Wall Thickness 0.35 cm    LV mass 137.7 g    LV Mass Index 77.8 g/m2    E/E' ratio 14.36 m/s    LILLIAN 40.4 mL/m2    LA Vol 71.50 cm3    Pulm vein S/D ratio 1.29     RV/LV Ratio 0.72 cm    LILLIAN (MOD) 36.4 mL/m2    AV Velocity Ratio 0.67     AV index (prosthetic) 0.83     RICK by Velocity Ratio 2.5 cm²    Triscuspid Valve Regurgitation Peak Gradient 29 mmHg    Mean e' 0.06 m/s    ZLVIDS -0.90     ZLVIDD -0.62     TV resting pulmonary artery pressure 32 mmHg    RV TB RVSP 6 mmHg    Est. RA pres 3 mmHg    Narrative      Left Ventricle: The left ventricle is normal in size. Basal septal   thickening. Normal wall motion. There is normal systolic function with a   visually estimated ejection fraction of 60 - 65%. Grade I diastolic   dysfunction.    Right Ventricle: Normal right ventricular cavity size. Wall thickness   is normal. Systolic function is normal.    The left atrium is mildly dilated.    Aortic Valve: There is moderate aortic valve sclerosis. Mildly   restricted motion. There is mild aortic regurgitation.    Tricuspid Valve: There is mild regurgitation.    Pulmonary Artery: The estimated pulmonary artery systolic pressure is   32 mmHg.    IVC/SVC: Normal venous pressure at 3 mmHg.         JANE:  No results found for this or any previous visit.     Imaging     Active Cardiac Conditions: None      Revised Cardiac Risk Index   High -Risk Surgery  Intraperitoneal; Intrathoracic; suprainguinal vascular Yes- + 1 No- 0   History of Ischemic Heart Disease   (Hx of MI/positive exercise test/current chest pain due to " ischemia/use of nitrate therapy/EKG with pathological Q waves) Yes- + 1 No- 0   History of CHF  (Pulmonary edema/bilateral rales or S3 gallop/PND/CXR showing pulmonary vascular redistribution) Yes- + 1 No- 0   History of CVA   (Prior stroke or TIA) Yes- + 1 No- 0   Pre-operative treatment with insulin Yes- + 1 No- 0   Pre-operative creatinine > 2mg/dl Yes- + 1 No- 0   Total:    Risk Status:  Estimated risk of cardiac complications after non-cardiac surgery using the Revised Cardiac Risk Index for Preoperative risk is 3.9 %      ARISCAT (Canet) risk index: Low: 1.6% risk of post-op pulmonary complications.    American Society of Anesthesiologists Physical Status classification (ASA): 3         Outpatient Subjective & Objective

## 2025-04-22 NOTE — HPI
This is a 74 y.o. female  who presents today for a preoperative evaluation in preparation for left knee replacement  Surgery is indicated for  osteoarthritis left knee   .   Patient is new to me.    The history has been obtained by speaking with the patient and reviewing the electronic medical record and/or outside health information. Significant health conditions for the perioperative period are discussed below in assessment and plan.     Patient reports current health status to be Good.  Denies any new symptoms before surgery.

## 2025-04-22 NOTE — PROGRESS NOTES
Windy Lindo is a 74 y.o. year old here today for pre surgery optimization visit  in preparation for a Left total knee arthroplasty to be performed by Dr. Brcie on 5/19/2025.  she was last seen and treated in the clinic on 4/22/2025. she will be medically optimized by the pre op center. There has been no significant change in medical status since last visit. No fever, chills, malaise, or unexplained weight change.      Allergies, Medications, past medical and surgical history reviewed.    Focused examination performed.    Patient saw surgeon in clinic today. All questions answered. Patient encouraged to call with questions. Contact information given.     Pre, yessica, and post operative procedures and expectations discussed. Goals of successful surgery reviewed and include manageable pain levels, surgical site free of infection, medication management, and ambulation with PT/OT assistance. Healthy weight management discussed with patient and caregiver who were receptive to eduction of healthy diet and activity. No other necessary lifestyle changes identified. Educated patient about signs and symptoms of infection, medication management, anticoagulation therapy, risk of tobacco and alcohol use, and self-care to promote healing. Surgical guide given for future reference. Hibiclens given to patient with instructions. All questions were answered.     Windy Lindo verbalized an understanding to the education and goals. Patient has displayed readiness to engage in care and is ready to proceed with surgery.  Patient presents to clinic with and reports her cousin is able and ready to provide assistance at home after discharge.    Surgical and blood consents signed.    DME will be arranged. The mobility limitation cannot be sufficiently resolved by the use of a cane. Patient's functional mobility deficit can be sufficiently resolved with the use of a (Rolling Walker or Walker). Patient's mobility limitation significantly  "impairs their ability to participate in one of more activities of daily living. The use of a (Rolling Walker or Walker) will significantly improve the patient's ability to participate in MRADLS and the patient will use it on regular basis in the home."     Windy Lindo will contact us if there are any questions, concerns, or changes in medical status prior to surgery.       Joint class:  Right TKA in 2017    Patient has discussed discharge planning with surgeon. Patient will be discharged to home following surgery.   patient will be scheduled with [facility]. PT will be done at the [place] location.    30 minutes of time was spent on patient education, review of records, templating, H&P, , appointment scheduling and optimizing patient for surgery.      "

## 2025-04-22 NOTE — CONSULTS
[facility] CARDIOLOGY  Response for E-Consult     Patient Name: Windy Lindo  MRN: [id number]  Primary Care Provider: Trisha Higginbotham MD   Requesting Provider: Tony Lion NP  Consults    Recommendation: No contraindication from a heart rhythm standpoint to proceed with planned surgery. Can hold anticoagulation 2 days prior and resume after when safe from a bleeding standpoint.     Additional future steps to consider: Cardiology consult if specific cardiac clearance is desired.    Total time of Consultation: 5 minute    I did not speak to the requesting provider verbally about this.     *This eConsult is based on the clinical data available to me and is furnished without benefit of a physical examination. The eConsult will need to be interpreted in light of any clinical issues or changes in patient status not available to me at the time of filing this eConsults. Significant changes in patient condition or level of acuity should result in immediate formal consultation and reevaluation. Please alert me if you have further questions.    Thank you for this eConsult referral.     Carlos Temple MD  [facility] CARDIOLOGY

## 2025-04-23 ENCOUNTER — TELEPHONE (OUTPATIENT)
Dept: OPHTHALMOLOGY | Facility: CLINIC | Age: 75
End: 2025-04-23
Payer: MEDICARE

## 2025-04-23 DIAGNOSIS — H25.12 NUCLEAR SCLEROTIC CATARACT OF LEFT EYE: Primary | ICD-10-CM

## 2025-04-24 ENCOUNTER — TELEPHONE (OUTPATIENT)
Dept: ORTHOPEDICS | Facility: CLINIC | Age: 75
End: 2025-04-24
Payer: MEDICARE

## 2025-04-24 NOTE — TELEPHONE ENCOUNTER
I called the patient today regarding his voice message. Patient is postponing cataract surgery until 3 months after her surgery with Dr. Brice. The patient verbalized understanding and has no further questions.           ----- Message from Med Assistant Yue sent at 4/23/2025  4:02 PM CDT -----  Regarding: FW: Cataract Sx  Please Advise.Sincerely,Yue Anglin, [facility] Certified Clinical Medical Assistant to Dr. Mac Brice lllPhone: [phone]: [phone]  ----- Message -----  From: Inna Hussein  Sent: 4/23/2025   2:48 PM CDT  To: Yuniel VANESSA Staff  Subject: Cataract Sx                                      Hello I'm  reaching out to Dr. Watts in reference to pt having cataract sx on 5/1/25 with Dr. Varghese. Pt wanted to make sure it doesn't interfere.

## 2025-04-25 ENCOUNTER — PATIENT MESSAGE (OUTPATIENT)
Dept: FAMILY MEDICINE | Facility: CLINIC | Age: 75
End: 2025-04-25
Payer: MEDICARE

## 2025-04-26 ENCOUNTER — TELEPHONE (OUTPATIENT)
Dept: ORTHOPEDICS | Facility: CLINIC | Age: 75
End: 2025-04-26
Payer: MEDICARE

## 2025-04-26 NOTE — TELEPHONE ENCOUNTER
I think the reason she was cancelled is due to her post op complication of a fib with rvr.  She is now s/p ablation.     See note   : On arrival to PACU patient found to be in a fib with rapid rate, 140's. Discussed case with cardiology at [facility] who suggested IV metoprolol and po metoprolol. After greater than 2 hours patient heart rate still in 120's low 130's. Cardiology at [facility] recommended ER. Discussed with Dr Fenton and we felt patient was stable enough for private transport to ER. Long discussion held with patient regarding recommendations and she expressed understanding. All questions answered     ----- Message from Zelda Jasmine sent at 4/22/2025  8:47 PM CDT -----  Regarding: RE: ?TKA at [facility] 5/19/25  Yes I think she meets our criteria.  She had an EGD last October at [facility] [facility] so I am unsure why she was cancelled there tomorrow. She has a fib s/p ablationTAA 4.2 cm Normal EF, mod aortic sclerosis.Ok to proceed at [place].  ----- Message -----  From: Mac Brice III, MD  Sent: 4/22/2025   9:45 AM CDT  To: Zelda Jasmine MD; Tony Lion NP; Chang#  Subject: ?TKA at [facility] 5/19/25                          Planned L TKA at [facility] 5/19 12 weeks from cardiac ablation She appears to meet [facility] [place] [facility] one [facility] done in march at [facility] and was supposed to have the other one done tomorrow but they cancelled it and said that she has to have it done at [facility] can we do her TKA at [facility]? Thank youWill

## 2025-04-28 ENCOUNTER — TELEPHONE (OUTPATIENT)
Dept: FAMILY MEDICINE | Facility: CLINIC | Age: 75
End: 2025-04-28
Payer: MEDICARE

## 2025-04-28 ENCOUNTER — CLINICAL SUPPORT (OUTPATIENT)
Dept: REHABILITATION | Facility: HOSPITAL | Age: 75
End: 2025-04-28
Attending: ORTHOPAEDIC SURGERY
Payer: MEDICARE

## 2025-04-28 DIAGNOSIS — M25.562 ACUTE PAIN OF LEFT KNEE: ICD-10-CM

## 2025-04-28 DIAGNOSIS — R26.89 DECREASED FUNCTIONAL MOBILITY: Primary | ICD-10-CM

## 2025-04-28 PROCEDURE — 97530 THERAPEUTIC ACTIVITIES: CPT

## 2025-04-28 PROCEDURE — 97161 PT EVAL LOW COMPLEX 20 MIN: CPT

## 2025-04-28 NOTE — PROGRESS NOTES
"  Outpatient Rehab    Physical Therapy Evaluation    Patient Name: Windy Lindo  MRN: [id number]  YOB: 1950  Encounter Date: 4/28/2025    Therapy Diagnosis:   Encounter Diagnoses   Name Primary?    Acute pain of left knee     Decreased functional mobility Yes     Physician: Mac Brice III, *    Physician Orders: Eval and Treat  Medical Diagnosis: Acute pain of left knee    Visit # / Visits Authorized:  1 / 1  Insurance Authorization Period: 1/28/2025 to 1/28/2026  Date of Evaluation: 4/28/2025  Plan of Care Certification: 4/28/2025 to 5/16/2025     Time In: 1005   Time Out: 1059  Total Time: 54   Total Billable Time: 54    Intake Outcome Measure for FOTO Survey    Therapist reviewed FOTO scores for Windy Lindo on 4/28/2025.   FOTO report - see Media section or FOTO account episode details.     Intake Score:  % see media section         Subjective   History of Present Illness  Windy is a 74 y.o. female who reports to physical therapy with a chief concern of prehab L TKA.                 Dominant Hand: Right  History of Present Condition/Illness: Patient reports she will have a L TKA on 5/19/2025 by Dr. Brice. Patient reports she had a R TKA 7 years ago. Patient reports in April 2024 she had a fall where she fx'd several vertebra and had a subsequent kyphoplasty. Patient reports she has also been diagnosed with A-fib and had a recent ablation. Patient reports she is unable to lay flat due to shortness of breath. Patient reports she ambulates with a SPC consistently. Patient reports her goal is to travel to [place] to see her niece in November 2025.     Pain     Patient reports a current pain level of 5/10. Pain at best is reported as 0/10. Pain at worst is reported as 10/10.   Location: left TKA  Clinical Progression (since onset): Stable  Pain Qualities: Aching, Other (Comment)  Other Pain Qualities: "feels heavy"  Pain-Relieving Factors: Activity modification, Change in position, Rest, " Pt informed Medications - over-the-counter  Pain-Aggravating Factors: Sitting, Squatting, Standing, Walking, Transfers         Review of Systems  Patient reports: Cardiac History and Osteoarthritis  Patient denies: Cancer History and Diabetes        Treatment History  Treatments  Previously Received Treatments: No  Currently Receiving Treatments: No    Living Arrangements  Living Situation  Housing: Home independently  Living Arrangements: Alone  Support Systems: Family members, Friends/neighbors    Home Setup  Type of Structure: House  Home Access: Stairs with rails  Entrance Stairs - Number of Steps: 4  Entrance Stairs - Rails: Right  Number of Levels in Home: One level        Employment  Employment Status: Retired   Retired from [employer]      Past Medical History/Physical Systems Review:   Windy Lindo  has a past medical history of Arthritis, Atrial fibrillation, Cataract, Constipation, Eye injury, GERD (gastroesophageal reflux disease), Heart murmur, Hyperlipidemia, Hypertension, Iron deficiency anemia, Nuclear sclerosis, bilateral, and Osteoporosis.    Windy Lindo  has a past surgical history that includes LASIK (Bilateral); Appendectomy; Tonsillectomy; Colonoscopy (N/A, 09/07/2017); Esophagogastroduodenoscopy (09/07/2017); Knee surgery (Right, 12/05/2017); Esophagogastroduodenoscopy (N/A, 10/09/2024); Colonoscopy (N/A, 10/09/2024); kyphosplasty; Ablation of arrhythmogenic focus for atrial fibrillation (N/A, 2/13/2025); and phacoemulsification, cataract, with iol insertion (Right, 3/19/2025).    Windy has a current medication list which includes the following prescription(s): acetaminophen, acetaminophen-codeine 300-30mg, amoxicillin, apixaban, b complex vitamins, prolia, multaq, ketorolac 0.5%, ketorolac 0.5%, ketorolac 0.5%, multivit-min-fa-lycopen-lutein, ofloxacin, ofloxacin, pantoprazole, prednisolone acetate, prednisolone acetate, vitamin d, and vitamin e.    Review of patient's allergies  indicates:  No Known Allergies     Objective            Treatment:  Balance/Neuromuscular Re-Education  NMR 1: Patient education: HEP compliance, swelling management, ROM expectations post TKA, importance of extension ROM for proper gait cycle  NMR 2: quad sets with towel roll 5 seconds, 20x SAQs with medium bolster 5 seconds, 20x each, SAQs with 1/2 foam 5 seconds, 20x, LAQs at EOM 5 seconds, 20x each  Therapeutic Activity  TA 1: Patient education: plan of care post op and prehab, HEP, prognosis, treatment options, and expectations post op  TA 2: All exercises performed per HEP: please see patient instructions    Time Entry(in minutes):  PT Evaluation (Low) Time Entry: 54  Therapeutic Activity Time Entry: 23    Assessment & Plan   Assessment  Windy presents with a condition of Low complexity.   Presentation of Symptoms: Stable  Will Comorbidities Impact Care: No       Functional Limitations: Activity tolerance, Completing self-care activities, Proprioception, Range of motion, Participating in leisure activities, Pain with ADLs/IADLs, Gross motor coordination  Impairments: Pain with functional activity, Impaired physical strength  Personal Factors Affecting Prognosis: Pain    Prognosis: Fair  Assessment Details: Patient presents to initial evaluation for prehabiltation in anticipation of L total knee arthroplasty scheduled for 5/19/2025. Patient presents with chronic L knee pain, reduced strength and poor motor control of L quadriceps, and reduced overall lower extremity strength. Patient would benefit from skilled outpatient physical therapy to address quadriceps motor control and lower extremity strength deficits so that she may return to full independence with all household and personal ADL's, and improve overall functional mobility prior to surgery.     Plan  From a physical therapy perspective, the patient would benefit from: Skilled Rehab Services    Planned therapy interventions include: Therapeutic exercise,  Therapeutic activities, Neuromuscular re-education, Manual therapy, ADLs/IADLs, Other (Comment), and Gait training. Dry Needling (prn)  Planned modalities to include: Biofeedback, Electrical stimulation - attended, Electrical stimulation - passive/unattended, Thermotherapy (hot pack), and Cryotherapy (cold pack).        Visit Frequency: 2 times Per Week for 3 Weeks.       This plan was discussed with Patient.   Discussion participants: Agreed Upon Plan of Care  Plan details: Frequency and duration of treatment to be adjusted as needed          Patient's spiritual, cultural, and educational needs considered and patient agreeable to plan of care and goals.           Goals:   Active       long term goals       Pt will be independent with HEP to supplement physical therapy treatment in improving functional status.  (Progressing)       Start:  04/30/25    Expected End:  05/16/25            Pt will demonstrate improved impaired lower extremity strength by 1/2 grade to promote functional mobility.  (Progressing)       Start:  04/30/25    Expected End:  05/16/25            Patient will require <2 verbal and tactile cue to engage quadricep without compensation to demonstrate improved quadriceps control and awareness.  (Progressing)       Start:  04/30/25    Expected End:  05/16/25                Naya Brown, PT, DPT, OCS

## 2025-04-29 NOTE — PROGRESS NOTES
Miss Lindo is a patient of Dr. Temple and was last seen in clinic 11/2024.      Subjective:   Patient ID:  Windy Lindo is a 74 y.o. female who presents for follow up of Atrial Fibrillation  .     HPI:    Miss Lindo is a 74 y.o. female with HTN, aortic atherosclerotic disease, prior GI bleed in setting of taking NSAIDs, and paroxysmal AF s/p PFA PVI/PWI 2/2025.    Background:  HPI  I had the pleasure of seeing Mrs Lindo today in our electrophysiology clinic in consultation for her atrial fibrillation. As you are aware she is a pleasant 74 year-old woman with hypertension, aortic atherosclerotic disease, prior GI bleed in setting of taking NSAIDs, and paroxysmal AF. She was in her usual state of health until September 2024 when she had 2 days of dark stools also with red blood clots in the setting of taking Mobic. Mobic was stopped and protonix was started. She underwent EGD/colonoscopy on 10/9/2024 which showed an erythematous gastric antrum without bleeding and diverticulosis. She was observed to be in atrial fibrillation with RVR. She was given IV metoprolol and sent to the ER. She reported to the ER that since April 2024 she has had intermittent episodes of light-headedness, shortness of breath and near syncope. She was admitted to observation. She spontaneously converted. She was started on eliquis and metoprolol. Subsequent holter monitor noted sinus rhythm without AF. She then presented in November 2024 with painless vision loss. Work-up was unrevealing including CTA head/neck, MRI, and normal CRP/ESR. She reports weekly episodes of symptoms that she corresponds with AF.   Holter 4/2023: sinus rhythm with 12% PAC burden  ECHO 11/2024: normal LVEF, mild LA dilation    [id number] score is 4 and oral anticoagulation is indicated. She is tolerating eliquis with stable H/H. She should refrain from NSAID usage. I also discussed the goal to reduce symptomatic arrhythmic episodes by pharmacologic and/or  procedural methods and utilizing a rhythm versus a rate control strategy. She elects for rhythm control. Discussed anti-arrhythmic drug therapy versus PVI.   Start multaq 400mg bid  She is thinking about ablation and would like a date, may cancel if multaq is effective  PVI with PFA    Update (05/14/2025):  2/14/25: Mrs Lindo underwent successful pulmonary vein and posterior wall isolation with PFA   3/25/25: Pt feels she have been in Afib with HR ranging from 40s-100s. Having SOB, fatigue, and dizziness. Episodes last a few minutes, not like before ablation and not as often. Chest flutters and is felt in her back when lying down. Reccommended stopping metoprolol and 48 hr holter.    Today she states she feels overall good. Denies CP, SOB, dizziness, syncope, palpitations. Now 3 mo post PFA PVI/PWI, without reoccurrence. Pt had feeling of AF and apple watch showing possible AF, but strips looked to be SR with PACs. Holter showed some episodes of frequent PACs, but no sustained arrhythmias or AF.    She is currently taking Eliquis 5mg BID for stroke prophylaxis and denies significant bleeding episodes. She is currently being treated with multaq 400mg BID for rhythm control.  Kidney function is stable, with a creatinine of 0.8 on 3/2025.     I have personally reviewed the patient's EKG today, which shows SR with PAC at 86 bpm. CT interval is 192. QRS is 78. QTc is 368/440.    Relevant Cardiac Test Results:    Monitor 4/9/25:  Sinus rhythm avg HR 77 bpm.  No sustained arrhythmias.  Six sx episodes, correlating with NSR 63-86 bpm. On 4 of the strips, frequent PACs are seen.    2D Echo (11/2024):    Left Ventricle: The left ventricle is normal in size. Basal septal thickening. Normal wall motion. There is normal systolic function with a visually estimated ejection fraction of 60 - 65%. Grade I diastolic dysfunction.    Right Ventricle: Normal right ventricular cavity size. Wall thickness is normal. Systolic function is  normal.    The left atrium is mildly dilated.    Aortic Valve: There is moderate aortic valve sclerosis. Mildly restricted motion. There is mild aortic regurgitation.    Tricuspid Valve: There is mild regurgitation.    Pulmonary Artery: The estimated pulmonary artery systolic pressure is 32 mmHg.    IVC/SVC: Normal venous pressure at 3 mmHg.    Current Outpatient Medications   Medication Sig    acetaminophen (TYLENOL) 650 MG TbSR Take 1 tablet (650 mg total) by mouth every 6 (six) hours as needed.    acetaminophen-codeine 300-30mg (TYLENOL #3) 300-30 mg Tab Take 1 tablet by mouth every 6 (six) hours as needed (pain).    amoxicillin (AMOXIL) 500 MG Tab Take 500 mg by mouth as needed (as needed for dental work).    apixaban (ELIQUIS) 5 mg Tab Take 1 tablet (5 mg total) by mouth 2 (two) times daily.    b complex vitamins capsule Take 1 capsule by mouth once daily.    denosumab (PROLIA) 60 mg/mL Syrg Inject 60 mg into the skin every 6 (six) months.    dronedarone (MULTAQ) 400 mg Tab Take 400 mg by mouth 2 (two) times daily with meals.    ketorolac 0.5% (ACULAR) 0.5 % Drop Place 1 drop into the right eye 3 (three) times daily.    ketorolac 0.5% (ACULAR) 0.5 % Drop Place 1 drop into the left eye 3 (three) times daily.    ketorolac 0.5% (ACULAR) 0.5 % Drop Place 1 drop into the left eye 3 (three) times daily.    multivit-min-FA-lycopen-lutein 0.4-300-250 mg-mcg-mcg Tab Take 1 tablet by mouth once daily.    ofloxacin (OCUFLOX) 0.3 % ophthalmic solution Place 1 drop into the right eye 3 (three) times daily.    ofloxacin (OCUFLOX) 0.3 % ophthalmic solution Place 1 drop into the left eye 3 (three) times daily.    pantoprazole (PROTONIX) 40 MG tablet Take 1 tablet (40 mg total) by mouth once daily.    prednisoLONE acetate (PRED FORTE) 1 % DrpS Place 1 drop into the right eye 3 (three) times daily.    prednisoLONE acetate (PRED FORTE) 1 % DrpS Place 1 drop into the left eye 3 (three) times daily.    vitamin D (VITAMIN D3) 1000  "units Tab Take 1,000 Units by mouth once daily.    vitamin E 100 UNIT capsule Take 100 Units by mouth once daily.     No current facility-administered medications for this visit.       Review of Systems   Constitutional: Negative for chills, diaphoresis, fever and malaise/fatigue.   HENT: Negative.     Eyes:  Negative for pain and visual disturbance.   Cardiovascular:  Positive for palpitations (intermittnet, short, self limiting). Negative for chest pain, dyspnea on exertion, leg swelling, near-syncope, orthopnea and syncope.   Respiratory:  Negative for cough, hemoptysis, shortness of breath and wheezing.    Endocrine: Negative.    Hematologic/Lymphatic: Negative.    Skin: Negative.  Negative for rash.   Musculoskeletal:  Negative for falls, joint swelling and myalgias.   Gastrointestinal: Negative.  Negative for hematemesis and melena.   Genitourinary: Negative.  Negative for hematuria.   Neurological:  Negative for dizziness, focal weakness, headaches and light-headedness.   Psychiatric/Behavioral:  Negative for altered mental status.        Objective:          /70   Pulse 86   Ht 5' 2" (1.575 m)   Wt 71.9 kg (158 lb 8.2 oz)   BMI 28.99 kg/m²     Physical Exam  Vitals reviewed.   Constitutional:       General: She is not in acute distress.     Appearance: She is not ill-appearing.   HENT:      Head: Atraumatic.      Nose: No congestion.   Eyes:      Extraocular Movements: Extraocular movements intact.   Cardiovascular:      Rate and Rhythm: Normal rate and regular rhythm.      Pulses: Normal pulses.   Pulmonary:      Effort: Pulmonary effort is normal. No respiratory distress.   Abdominal:      General: Abdomen is flat.      Palpations: Abdomen is soft.   Musculoskeletal:      Cervical back: Normal range of motion.      Right lower leg: No edema.      Left lower leg: No edema.      Comments: Uses a cane   Skin:     General: Skin is warm and dry.      Findings: No rash.   Neurological:      General: No " focal deficit present.      Mental Status: She is alert and oriented to person, place, and time. Mental status is at baseline.   Psychiatric:         Mood and Affect: Mood normal.         Behavior: Behavior normal. Behavior is cooperative.           Lab Results   Component Value Date     03/28/2025     02/07/2025    K 4.4 03/28/2025    K 4.7 02/07/2025    MG 2.1 11/28/2024    BUN 22 03/28/2025    CREATININE 0.8 03/28/2025    ALT 9 (L) 03/28/2025    ALT 11 11/28/2024    AST 14 03/28/2025    AST 13 11/28/2024    HGB 13.5 03/28/2025    HGB 13.2 02/07/2025    HCT 43.0 03/28/2025    HCT 41.8 02/07/2025    TSH 2.100 11/13/2024    LDLCALC 137.8 03/28/2024       Recent Labs   Lab 10/09/24  2229 02/07/25  0917 02/16/25  1637 03/28/25  1307   INR 1.0 1.0 1.4 H 1.0   PT  --   --   --  11.1       Assessment:     1. Paroxysmal atrial fibrillation    2. Primary hypertension    3. Aortic atherosclerosis        Plan:     In summary, Miss Lindo is a 74 y.o. female with hypertension, aortic atherosclerotic disease, prior GI bleed in setting of taking NSAIDs, and paroxysmal AF s/p PFA PVI/PWI 2/2025.  Now 3 mo post PFA PVI/PWI, without reoccurrence. Pt had feeling of AF and apple watch showing possible AF, but strips appear to be SR with PACs. Holter showed some episodes of frequent PACs, but no sustained arrhythmias or AF.  EKG today SR and stable QRS/QT. Has upcoming knee replacement next week. Continue eliquis and multaq. Continue lifestyle modifications to help reduce reoccurrence of AF. Continue to monitor with smart watch device.  F/u 6 mos    *A copy of this note has been sent to Dr. Temple*    Follow up in about 6 months (around 11/14/2025), or if symptoms worsen or fail to improve.    ------------------------------------------------------------------    Dotty Graff DNP  Cardiac Electrophysiology

## 2025-04-30 ENCOUNTER — CLINICAL SUPPORT (OUTPATIENT)
Dept: REHABILITATION | Facility: HOSPITAL | Age: 75
End: 2025-04-30
Payer: MEDICARE

## 2025-04-30 DIAGNOSIS — R26.89 DECREASED FUNCTIONAL MOBILITY: Primary | ICD-10-CM

## 2025-04-30 PROCEDURE — 97112 NEUROMUSCULAR REEDUCATION: CPT

## 2025-04-30 PROCEDURE — 97014 ELECTRIC STIMULATION THERAPY: CPT

## 2025-04-30 NOTE — PROGRESS NOTES
Outpatient Rehab    Physical Therapy Visit    Patient Name: Windy Lindo  MRN: [id number]  YOB: 1950  Encounter Date: 4/30/2025    Therapy Diagnosis:   Encounter Diagnosis   Name Primary?    Decreased functional mobility Yes     Physician: Mac Brice III, *    Physician Orders: Eval and Treat  Medical Diagnosis: Pain in left knee    Visit # / Visits Authorized:  1 / 20  Insurance Authorization Period: 4/22/2025 to 4/22/2026  Date of Evaluation: 4/28/2025  Plan of Care Certification: 4/28/2025 to 5/16/2025         PT/PTA:     Number of PTA visits since last PT visit:   Time In: 1000   Time Out: 1058  Total Time: 58   Total Billable Time: 53    FOTO:  Intake Score:  %  Survey Score 1:  %  Survey Score 2:  %         Subjective   reports she was able to perform her HEP; notes left quad soreness.  Pain reported as 5/10.      Objective            Treatment:  Balance/Neuromuscular Re-Education  NMR 1: Patient education: HEP compliance, swelling management, ROM expectations post TKA, importance of extension ROM for proper gait cycle  NMR 2: NMES [place] 10 seconds on/ 20 second off: quad sets 10 minutes SAQs with 1/2 foam 10 mintes  NMR 3: SAQ with medium bolster with 2 lbs: 5 seconds, 3x10 each  NMR 4: bridges: 5 seconds, 3x10 - with red theraband for hip ABD iso  NMR 5: sidelying clams: 5 seconds, 2x8 each red theraband    Time Entry(in minutes):  E-Stim (Unattended) Time Entry: 20  Neuromuscular Re-Education Time Entry: 58    Assessment & Plan   Assessment: Patient presents to first prehab visit since initial evaluation. Emphasis on quadriceps strength and motor control. Challenged with posterolateral hip activities including bridges and clams.  Evaluation/Treatment Tolerance: Patient tolerated treatment well    Patient will continue to benefit from skilled outpatient physical therapy to address the deficits listed in the problem list box on initial evaluation, provide pt/family education and to  maximize pt's level of independence in the home and community environment.     Patient's spiritual, cultural, and educational needs considered and patient agreeable to plan of care and goals.           Plan: Will continue to progress as tolerated per POC.    Goals:   Active       long term goals       Pt will be independent with HEP to supplement physical therapy treatment in improving functional status.  (Progressing)       Start:  04/30/25    Expected End:  05/16/25            Pt will demonstrate improved impaired lower extremity strength by 1/2 grade to promote functional mobility.  (Progressing)       Start:  04/30/25    Expected End:  05/16/25            Patient will require <2 verbal and tactile cue to engage quadricep without compensation to demonstrate improved quadriceps control and awareness.  (Progressing)       Start:  04/30/25    Expected End:  05/16/25                Naya Brown, PT, DPT, OCS

## 2025-05-05 ENCOUNTER — CLINICAL SUPPORT (OUTPATIENT)
Dept: REHABILITATION | Facility: HOSPITAL | Age: 75
End: 2025-05-05
Payer: MEDICARE

## 2025-05-05 ENCOUNTER — TELEPHONE (OUTPATIENT)
Dept: PREADMISSION TESTING | Facility: HOSPITAL | Age: 75
End: 2025-05-05
Payer: MEDICARE

## 2025-05-05 DIAGNOSIS — R26.89 DECREASED FUNCTIONAL MOBILITY: Primary | ICD-10-CM

## 2025-05-05 PROCEDURE — 97530 THERAPEUTIC ACTIVITIES: CPT

## 2025-05-05 PROCEDURE — 97112 NEUROMUSCULAR REEDUCATION: CPT

## 2025-05-05 PROCEDURE — 97014 ELECTRIC STIMULATION THERAPY: CPT

## 2025-05-05 NOTE — TELEPHONE ENCOUNTER
----- Message from Tony Lion NP sent at 5/5/2025  4:05 PM CDT -----  Regarding: CArdiology clearance  Good day, Ms. Lindo is scheduled for surgery 5/19/25 for Dr. Brice for left knee replacement.  I am  following up on a Cardiology E Consult request for clearance placed 4/20/25.  Is she Ok to proceed with this procedure?  Thank you, Tony Lion, Perioperative Clinic

## 2025-05-06 ENCOUNTER — ANESTHESIA EVENT (OUTPATIENT)
Dept: SURGERY | Facility: HOSPITAL | Age: 75
End: 2025-05-06
Payer: MEDICARE

## 2025-05-06 NOTE — PROGRESS NOTES
Outpatient Rehab    Physical Therapy Visit    Patient Name: Windy Lindo  MRN: [id number]  YOB: 1950  Encounter Date: 5/5/2025    Therapy Diagnosis:   Encounter Diagnosis   Name Primary?    Decreased functional mobility Yes     Physician: Mac Brice III, *    Physician Orders: Eval and Treat  Medical Diagnosis: Pain in left knee    Visit # / Visits Authorized:  2 / 12  Insurance Authorization Period: 4/22/2025 to 6/30/2025  Date of Evaluation: 4/28/2025  Plan of Care Certification: 4/28/2025 to 5/16/2025     PT/PTA:     Number of PTA visits since last PT visit:   Time In: 1330   Time Out: 1430  Total Time (in minutes): 60   Total Billable Time (in minutes): 30    FOTO:  Intake Score:  %  Survey Score 2:  %  Survey Score 3:  %         Subjective   Patient reports her nephew is in town. Patient reports her right ankle is very swollen..  Pain reported as 5/10.      Objective            Treatment:  Balance/Neuromuscular Re-Education  NMR 1: Patient education: HEP compliance, swelling management, ROM expectations post TKA, importance of extension ROM for proper gait cycle  NMR 2: NMES [place] 10 seconds on/ 20 second off: SAQ with medium bolster 10 minutes SAQs with 1/2 foam 10 mintes LAQ 10 minutes  NMR 4: bridges: 5 seconds, 3x10 - with red theraband for hip ABD iso  NMR 5: hookyling clams: 5 seconds, 2x8 each red theraband  Therapeutic Activity  TA 3: shuttle double leg press: 50 lbs 3x10; shuttle single leg press: no weight 3x10    Time Entry(in minutes):  E-Stim (Unattended) Time Entry: 30  Neuromuscular Re-Education Time Entry: 45  Therapeutic Activity Time Entry: 15    Assessment & Plan   Assessment: Educated patient to minitor her ankle swelling and to reach out to MD if swelling does not decrease with elevation given her cardiac history. Patient progressed to perform shuttle leg press for functional LE strengthening to improve capacity for sit to stands. Unable to tolerate any resistance  on shuttle for single leg at this time.  Evaluation/Treatment Tolerance: Patient tolerated treatment well    Patient will continue to benefit from skilled outpatient physical therapy to address the deficits listed in the problem list box on initial evaluation, provide pt/family education and to maximize pt's level of independence in the home and community environment.     Patient's spiritual, cultural, and educational needs considered and patient agreeable to plan of care and goals.           Plan: Will continue to progress as tolerated per POC.    Goals:   Active       long term goals       Pt will be independent with HEP to supplement physical therapy treatment in improving functional status.  (Progressing)       Start:  04/30/25    Expected End:  05/16/25            Pt will demonstrate improved impaired lower extremity strength by 1/2 grade to promote functional mobility.  (Progressing)       Start:  04/30/25    Expected End:  05/16/25            Patient will require <2 verbal and tactile cue to engage quadricep without compensation to demonstrate improved quadriceps control and awareness.  (Progressing)       Start:  04/30/25    Expected End:  05/16/25                Naya Brown, PT, DPT, OCS

## 2025-05-06 NOTE — ANESTHESIA PREPROCEDURE EVALUATION
Patient went into a fib with RVR requiring transfer to [facility] after EGD at [facility] - she is now s/p ablation and 12 weeks of anticoag.  [facility]                                                                                              05/06/2025  Windy Lindo is a 74 y.o., female CHF, HLD, HTN, and AFIB (Eliquis, ablation 2/13/25), TAA (4.2cm), Aortic valve Disease (mild regurgitation), Grade 1 DD, BICA stenosis <50%, ELIEL, GI Bleed 2017, L3-4 STENOSIS     Pre-operative evaluation for Procedure(s) (LRB):  ARTHROPLASTY, KNEE, TOTAL, USING [employer] COMPUTER-ASSISTED NAVIGATION: LEFT: [employer] (Left)    Windy Lindo is a 74 y.o. female     Problem List[1]    Review of patient's allergies indicates:  No Known Allergies    Medications Ordered Prior to Encounter[2]    Past Surgical History:   Procedure Laterality Date    ABLATION OF ARRHYTHMOGENIC FOCUS FOR ATRIAL FIBRILLATION N/A 2/13/2025    Procedure: Ablation atrial fibrillation;  Surgeon: Carlos Temple MD;  Location: [facility] EP LAB;  Service: Cardiology;  Laterality: N/A;  AF, JANE (Cx if SR), PVI, PFA, CHAYA, BSci, Gen, NV, 3 Prep    APPENDECTOMY      COLONOSCOPY N/A 09/07/2017    Procedure: COLONOSCOPY;  Surgeon: Albino Fenton MD;  Location: [facility] ENDO ([address] [place] [address]);  Service: Endoscopy;  Laterality: N/A;    COLONOSCOPY N/A 10/09/2024    Procedure: COLONOSCOPY;  Surgeon: Albino Fenton MD;  Location: [facility] ENDOSCOPY;  Service: Endoscopy;  Laterality: N/A;    ESOPHAGOGASTRODUODENOSCOPY  09/07/2017    ESOPHAGOGASTRODUODENOSCOPY N/A 10/09/2024    Procedure: EGD (ESOPHAGOGASTRODUODENOSCOPY);  Surgeon: Albino Fenton MD;  Location: [facility] ENDOSCOPY;  Service: Endoscopy;  Laterality: N/A;  Ref By:[email or web address] [email or web address]  10/1/24- pc complete. DBM    KNEE SURGERY Right 12/05/2017    TKR    kyphosplasty      LASIK Bilateral     PHACOEMULSIFICATION, CATARACT, WITH IOL INSERTION Right 3/19/2025    Procedure: PHACOEMULSIFICATION, CATARACT,  "WITH IOL INSERTION;  Surgeon: Flaca Varghese MD;  Location: [facility] OR;  Service: Ophthalmology;  Laterality: Right;    TONSILLECTOMY           CBC:  Lab Results   Component Value Date    WBC 8.06 03/28/2025    RBC 4.39 03/28/2025    HGB 13.5 03/28/2025    HCT 43.0 03/28/2025     03/28/2025    MCV 98 03/28/2025    MCH 30.8 03/28/2025    MCHC 31.4 (L) 03/28/2025       CMP:   Lab Results   Component Value Date     03/28/2025    K 4.4 03/28/2025     03/28/2025    CO2 23 03/28/2025    BUN 22 03/28/2025    CREATININE 0.8 03/28/2025    GLU 99 03/28/2025    MG 2.1 11/28/2024    CALCIUM 9.6 03/28/2025    ALBUMIN 3.9 03/28/2025    PROT 6.9 03/28/2025    ALKPHOS 62 03/28/2025    ALT 9 (L) 03/28/2025    AST 14 03/28/2025    BILITOT 0.7 03/28/2025       INR:  Lab Results   Component Value Date    INR 1.0 03/28/2025    PROTIME 11.1 03/28/2025    APTT 29.2 02/07/2025         Diagnostic Studies:      EKG:   Results for orders placed or performed during the hospital encounter of 02/16/25   EKG 12-lead    Collection Time: 02/16/25  6:32 PM   Result Value Ref Range    QRS Duration 88 ms    OHS QTC Calculation 422 ms    Narrative    Test Reason : R79.89,    Vent. Rate :  75 BPM     Atrial Rate :  75 BPM     P-R Int : 170 ms          QRS Dur :  88 ms      QT Int : 378 ms       P-R-T Axes :  12  -3  12 degrees    QTcB Int : 422 ms    Normal sinus rhythm  Low voltage QRS  Borderline Abnormal ECG  When compared with ECG of 16-Feb-2025 16:49,  T wave inversion now evident in Inferior leads  Confirmed by Otoniel Walker ([id number]) on 2/18/2025 4:06:43 PM    Referred By: AAAREFERRAL SELF           Confirmed By: Otoniel Walker        Pre-op Vitals [05/19/25 1049]   BP Pulse Resp Temp SpO2   (!) 158/80 85 16 36.5 °C (97.7 °F) 98 %      Height Weight BMI (Calculated)     5' 2" 158 lb 28.9              Pre-op Assessment          Review of Systems         Anesthesia Plan  Type of Anesthesia, risks & benefits discussed:    Anesthesia " Type: Spinal  Intra-op Monitoring Plan: Standard ASA Monitors  Post Op Pain Control Plan: multimodal analgesia and IV/PO Opioids PRN  Induction:  IV  Informed Consent: Informed consent signed with the Patient and all parties understand the risks and agree with anesthesia plan.  All questions answered.   ASA Score: 3    Ready For Surgery From Anesthesia Perspective.     .           [1]   Patient Active Problem List  Diagnosis    Right knee pain    Varicose veins of both lower extremities    Primary osteoarthritis of right knee    Chronic pain of left ankle    Nuclear sclerosis, bilateral    Refractive error    S/P LASIK (laser assisted in situ keratomileusis) of both eyes    Hyperlipidemia    Primary hypertension    Age-related osteoporosis without current pathological fracture    Thoracic compression fracture    Aortic atherosclerosis    Paroxysmal atrial fibrillation    History of GI bleed    Thoracic aortic aneurysm (TAA)    Aortic valve disease    Vision loss of right eye    Chronic diastolic heart failure    Hypercoagulable state due to atrial fibrillation    Chronic anticoagulation    Syncope    Decreased functional mobility    Carotid artery stenosis    Preop cardiovascular risk asessment    Primary osteoarthritis of left knee   [2]   No current facility-administered medications on file prior to encounter.     Current Outpatient Medications on File Prior to Encounter   Medication Sig Dispense Refill    b complex vitamins capsule Take 1 capsule by mouth once daily.      dronedarone (MULTAQ) 400 mg Tab Take 400 mg by mouth 2 (two) times daily with meals.      multivit-min-FA-lycopen-lutein 0.4-300-250 mg-mcg-mcg Tab Take 1 tablet by mouth once daily.      vitamin D (VITAMIN D3) 1000 units Tab Take 1,000 Units by mouth once daily.      vitamin E 100 UNIT capsule Take 100 Units by mouth once daily.      denosumab (PROLIA) 60 mg/mL Syrg Inject 60 mg into the skin every 6 (six) months.

## 2025-05-07 ENCOUNTER — CLINICAL SUPPORT (OUTPATIENT)
Dept: REHABILITATION | Facility: HOSPITAL | Age: 75
End: 2025-05-07
Payer: MEDICARE

## 2025-05-07 DIAGNOSIS — R26.89 DECREASED FUNCTIONAL MOBILITY: Primary | ICD-10-CM

## 2025-05-07 PROCEDURE — 97530 THERAPEUTIC ACTIVITIES: CPT

## 2025-05-07 PROCEDURE — 97014 ELECTRIC STIMULATION THERAPY: CPT

## 2025-05-07 PROCEDURE — 97112 NEUROMUSCULAR REEDUCATION: CPT

## 2025-05-07 NOTE — PROGRESS NOTES
Outpatient Rehab    Physical Therapy Visit    Patient Name: Windy Lindo  MRN: [id number]  YOB: 1950  Encounter Date: 5/7/2025    Therapy Diagnosis:   Encounter Diagnosis   Name Primary?    Decreased functional mobility Yes     Physician: Mac Brice III, *    Physician Orders: Eval and Treat  Medical Diagnosis: Pain in left knee    Visit # / Visits Authorized:  3 / 12  Insurance Authorization Period: 4/22/2025 to 6/30/2025  Date of Evaluation: 4/28/2025  Plan of Care Certification: 4/28/2025 to 5/16/2025     PT/PTA:     Number of PTA visits since last PT visit:   Time In: 1030   Time Out: 1130  Total Time (in minutes): 60   Total Billable Time (in minutes): 60    FOTO:  Intake Score:  %  Survey Score 2:  %  Survey Score 3:  %         Subjective   reports she felt good after last visit..  Pain reported as 5/10.      Objective            Treatment:  Balance/Neuromuscular Re-Education  NMR 1: Patient education: HEP compliance, swelling management, ROM expectations post TKA, importance of extension ROM for proper gait cycle  NMR 2: NMES [place] Islander 10 seconds on/ 20 second off: quad sets 10 m inutes SAQ with medium bolster 10 minutes SAQs with 1/2 foam 10 mintes  NMR 4: bridges: 5 seconds, 3x10 - with red theraband for hip ABD iso  NMR 5: hookyling clams: 5 seconds, 2x8 each red theraband  Therapeutic Activity  TA 1: Patient education: plan of care post op and prehab, HEP, prognosis, treatment options, and expectations post op  TA 3: shuttle double leg press: 32.5 lbs 3x10; shuttle single leg press: no weight on L, 12 lbs on R 3x10    Time Entry(in minutes):  E-Stim (Unattended) Time Entry: 30  Neuromuscular Re-Education Time Entry: 50  Therapeutic Activity Time Entry: 10    Assessment & Plan   Assessment: Tolerated today's interventions well; no exacerbation of pain. Progressing well. Will continue to porgress as tolerated.  Evaluation/Treatment Tolerance: Patient tolerated treatment well    Patient  will continue to benefit from skilled outpatient physical therapy to address the deficits listed in the problem list box on initial evaluation, provide pt/family education and to maximize pt's level of independence in the home and community environment.     Patient's spiritual, cultural, and educational needs considered and patient agreeable to plan of care and goals.           Plan: Will continue to progress as tolerated per POC.    Goals:   Active       long term goals       Pt will be independent with HEP to supplement physical therapy treatment in improving functional status.  (Progressing)       Start:  04/30/25    Expected End:  05/16/25            Pt will demonstrate improved impaired lower extremity strength by 1/2 grade to promote functional mobility.  (Progressing)       Start:  04/30/25    Expected End:  05/16/25            Patient will require <2 verbal and tactile cue to engage quadricep without compensation to demonstrate improved quadriceps control and awareness.  (Progressing)       Start:  04/30/25    Expected End:  05/16/25                Naya Brown, PT, DPT, OCS

## 2025-05-09 ENCOUNTER — TELEPHONE (OUTPATIENT)
Dept: CARDIOLOGY | Facility: CLINIC | Age: 75
End: 2025-05-09
Payer: MEDICARE

## 2025-05-09 ENCOUNTER — PATIENT MESSAGE (OUTPATIENT)
Dept: ORTHOPEDICS | Facility: CLINIC | Age: 75
End: 2025-05-09
Payer: MEDICARE

## 2025-05-09 ENCOUNTER — TELEPHONE (OUTPATIENT)
Dept: PREADMISSION TESTING | Facility: HOSPITAL | Age: 75
End: 2025-05-09
Payer: MEDICARE

## 2025-05-09 PROBLEM — Z01.810 PREOP CARDIOVASCULAR EXAM: Status: ACTIVE | Noted: 2025-05-09

## 2025-05-09 PROBLEM — I65.29 CAROTID ARTERY STENOSIS: Status: ACTIVE | Noted: 2025-05-09

## 2025-05-09 NOTE — TELEPHONE ENCOUNTER
"I received the following message via teams this morning:    "Wagner Luciano, this is Reyna, a nurse from pre-op. We have been trying to get in touch with  and have sent numerous e consults and messages requesting clearance for patient Windy Lindo, [birth year]. Would you be able to ask  if he will clear this patient by chart for an upcoming cataract surgery? She was last seen by  on 2/7/25. If so, would he please document in her in chart the clearance. Please advise. Thank you!"    Please review patients chart and advise.      "

## 2025-05-09 NOTE — TELEPHONE ENCOUNTER
----- Message from Evans Vernon MD sent at 5/9/2025 11:26 AM CDT -----  Regarding: RE: CArdiology clearance  The patient has a history of paroxysmal AFib.  Yes she is cleared for knee surgery no further cardiac testing required.  ----- Message -----  From: Tony Lion NP  Sent: 5/5/2025   4:12 PM CDT  To: Evans Vernon MD; Jessy Finn RN; Ashley  Subject: CArdiology clearance                             Good day, Ms. Lindo is scheduled for surgery 5/19/25 for Dr. Brice for left knee replacement.  I am  following up on a Cardiology E Consult request for clearance placed 4/20/25.  Is she Ok to proceed with this procedure?  Thank you, Tony Lion, [facility]

## 2025-05-12 ENCOUNTER — CLINICAL SUPPORT (OUTPATIENT)
Dept: REHABILITATION | Facility: HOSPITAL | Age: 75
End: 2025-05-12
Payer: MEDICARE

## 2025-05-12 DIAGNOSIS — R26.89 DECREASED FUNCTIONAL MOBILITY: Primary | ICD-10-CM

## 2025-05-12 PROCEDURE — 97530 THERAPEUTIC ACTIVITIES: CPT | Mod: CQ

## 2025-05-12 PROCEDURE — 97014 ELECTRIC STIMULATION THERAPY: CPT | Mod: CQ

## 2025-05-12 PROCEDURE — 97112 NEUROMUSCULAR REEDUCATION: CPT | Mod: CQ

## 2025-05-12 NOTE — PROGRESS NOTES
"  Outpatient Rehab    Physical Therapy Visit    Patient Name: Windy Lindo  MRN: [id number]  YOB: 1950  Encounter Date: 5/12/2025    Therapy Diagnosis:   Encounter Diagnosis   Name Primary?    Decreased functional mobility Yes     Physician: Mac Brice III, *    Physician Orders: Eval and Treat  Medical Diagnosis: Pain in left knee    Visit # / Visits Authorized:  4 / 12  Insurance Authorization Period: 4/22/2025 to 6/30/2025  Date of Evaluation: 4/28/2025  Plan of Care Certification: 4/30/2025 to 5/16/2025      PT/PTA: PTA   Number of PTA visits since last PT visit:1  Time In: 1000   Time Out: 1100  Total Time (in minutes): 60   Total Billable Time (in minutes): 60    FOTO:  Intake Score: 40%  Survey Score 2: 42%  Survey Score 3:  %    Precautions: none    Subjective   Patient reports her knee is "so so".  Pain reported as 5/10. Left knee    Objective  Objective Measures updated at progress report unless specified.     Treatment:  Balance/Neuromuscular Re-Education  NMR 1: Patient education: HEP compliance, swelling management, ROM expectations post TKA, importance of extension ROM for proper gait cycle  NMR 2: NMES [place]e 10 seconds on/20 second off: quad sets x 10 minutes -- SAQ with medium bolster x 10 minutes -- SAQs with 1/2 foam x 10 mintes  NMR 4: HL bridges: 5 second hold, 3 x 10 reps with red theraband for hip ABD iso  NMR 5: HL clams: 5 second hold, 2 x 8 reps each with red theraband  Therapeutic Activity  TA 1: Patient education: plan of care post op and prehab, HEP, prognosis, treatment options, and expectations post op  TA 3: shuttle double leg press: 25 lbs, 3 x 10 reps -- shuttle single leg press: no weight on L, 12.5 lbs on R 3 x 10 reps each    Time Entry(in minutes):  E-Stim (Unattended) Time Entry: 30  Neuromuscular Re-Education Time Entry: 48  Therapeutic Activity Time Entry: 12    Assessment & Plan   Assessment: Good tolerance overall noting adequate muscle response " throughout. Continued NMES for quad strengthening and motor control.  Evaluation/Treatment Tolerance: Patient tolerated treatment well    Patient will continue to benefit from skilled outpatient physical therapy to address the deficits listed in the problem list box on initial evaluation, provide pt/family education and to maximize pt's level of independence in the home and community environment.     Patient's spiritual, cultural, and educational needs considered and patient agreeable to plan of care and goals.           Plan: Will continue per POC towards treatment goals. PT/PTA met face to face to discuss patient's treatment plan and progress towards established goals. Patient will be seen by physical therapist every sixth visit and minimally once per month.    Goals:   Active       long term goals       Pt will be independent with HEP to supplement physical therapy treatment in improving functional status.  (Progressing)       Start:  04/30/25    Expected End:  05/16/25            Pt will demonstrate improved impaired lower extremity strength by 1/2 grade to promote functional mobility.  (Progressing)       Start:  04/30/25    Expected End:  05/16/25            Patient will require <2 verbal and tactile cue to engage quadricep without compensation to demonstrate improved quadriceps control and awareness.  (Progressing)       Start:  04/30/25    Expected End:  05/16/25                Ankita Rodrigez PTA

## 2025-05-14 ENCOUNTER — OFFICE VISIT (OUTPATIENT)
Dept: ELECTROPHYSIOLOGY | Facility: CLINIC | Age: 75
End: 2025-05-14
Payer: MEDICARE

## 2025-05-14 ENCOUNTER — HOSPITAL ENCOUNTER (OUTPATIENT)
Dept: CARDIOLOGY | Facility: CLINIC | Age: 75
Discharge: HOME | End: 2025-05-14
Payer: MEDICARE

## 2025-05-14 VITALS
SYSTOLIC BLOOD PRESSURE: 122 MMHG | DIASTOLIC BLOOD PRESSURE: 70 MMHG | HEART RATE: 86 BPM | HEIGHT: 62 IN | WEIGHT: 158.5 LBS | BODY MASS INDEX: 29.17 KG/M2

## 2025-05-14 DIAGNOSIS — I48.0 PAROXYSMAL ATRIAL FIBRILLATION: ICD-10-CM

## 2025-05-14 DIAGNOSIS — I70.0 AORTIC ATHEROSCLEROSIS: Chronic | ICD-10-CM

## 2025-05-14 DIAGNOSIS — I48.0 PAROXYSMAL ATRIAL FIBRILLATION: Primary | ICD-10-CM

## 2025-05-14 DIAGNOSIS — I10 PRIMARY HYPERTENSION: ICD-10-CM

## 2025-05-14 LAB
OHS QRS DURATION: 78 MS
OHS QTC CALCULATION: 440 MS

## 2025-05-14 PROCEDURE — 1160F RVW MEDS BY RX/DR IN RCRD: CPT | Mod: CPTII,S$GLB,,

## 2025-05-14 PROCEDURE — 99999 PR PBB SHADOW E&M-EST. PATIENT-LVL IV: CPT | Mod: PBBFAC,,,

## 2025-05-14 PROCEDURE — 1125F AMNT PAIN NOTED PAIN PRSNT: CPT | Mod: CPTII,S$GLB,,

## 2025-05-14 PROCEDURE — 93005 ELECTROCARDIOGRAM TRACING: CPT | Mod: S$GLB,,, | Performed by: INTERNAL MEDICINE

## 2025-05-14 PROCEDURE — 1159F MED LIST DOCD IN RCRD: CPT | Mod: CPTII,S$GLB,,

## 2025-05-14 PROCEDURE — 3074F SYST BP LT 130 MM HG: CPT | Mod: CPTII,S$GLB,,

## 2025-05-14 PROCEDURE — 3288F FALL RISK ASSESSMENT DOCD: CPT | Mod: CPTII,S$GLB,,

## 2025-05-14 PROCEDURE — 93010 ELECTROCARDIOGRAM REPORT: CPT | Mod: S$GLB,,, | Performed by: INTERNAL MEDICINE

## 2025-05-14 PROCEDURE — 1101F PT FALLS ASSESS-DOCD LE1/YR: CPT | Mod: CPTII,S$GLB,,

## 2025-05-14 PROCEDURE — 99214 OFFICE O/P EST MOD 30 MIN: CPT | Mod: S$GLB,,,

## 2025-05-14 PROCEDURE — 3078F DIAST BP <80 MM HG: CPT | Mod: CPTII,S$GLB,,

## 2025-05-15 ENCOUNTER — CLINICAL SUPPORT (OUTPATIENT)
Dept: REHABILITATION | Facility: HOSPITAL | Age: 75
End: 2025-05-15
Payer: MEDICARE

## 2025-05-15 DIAGNOSIS — R26.89 DECREASED FUNCTIONAL MOBILITY: Primary | ICD-10-CM

## 2025-05-15 PROCEDURE — 97014 ELECTRIC STIMULATION THERAPY: CPT

## 2025-05-15 PROCEDURE — 97112 NEUROMUSCULAR REEDUCATION: CPT

## 2025-05-15 PROCEDURE — 97530 THERAPEUTIC ACTIVITIES: CPT

## 2025-05-15 NOTE — PROGRESS NOTES
Outpatient Rehab    Physical Therapy Visit    Patient Name: Windy Lindo  MRN: [id number]  YOB: 1950  Encounter Date: 5/15/2025    Therapy Diagnosis:   Encounter Diagnosis   Name Primary?    Decreased functional mobility Yes     Physician: Mac Brice III, *    Physician Orders: Eval and Treat  Medical Diagnosis: Pain in left knee    Visit # / Visits Authorized:  5 / 12  Insurance Authorization Period: 4/22/2025 to 6/30/2025  Date of Evaluation: 4/28/2025  Plan of Care Certification: 4/30/2025 to 5/16/2025      PT/PTA:     Number of PTA visits since last PT visit:   Time In: 1000   Time Out: 1058  Total Time (in minutes): 58   Total Billable Time (in minutes): 58    FOTO:  Intake Score:  %  Survey Score 2:  %  Survey Score 3:  %    Precautions:       Subjective   reports she is ready for her surgery, but upset she has to remove her purple nail polish.  Pain reported as 5/10. Left knee    Objective            Treatment:  Balance/Neuromuscular Re-Education  NMR 1: Patient education: HEP compliance, swelling management, ROM expectations post TKA, importance of extension ROM for proper gait cycle  NMR 2: NMES [place]an 10 seconds on/20 second off: SAQ with medium bolster x 10 minutes -- SAQs with 1/2 foam x 10 mintes; LAQ 10 minutes  Therapeutic Activity  TA 3: shuttle double leg press: 25 lbs, 3 x 10 reps -- shuttle single leg press: no weight  3 x 10 reps each    Time Entry(in minutes):  Neuromuscular Re-Education Time Entry: 48  Therapeutic Activity Time Entry: 10    Assessment & Plan   Assessment: Patient presents for last treatment session before surgery. Held resistance for single leg press on shuttle with good tolerance. Will re-evaluate post suregry and post Home Health PT.  Evaluation/Treatment Tolerance: Patient tolerated treatment well    Patient will continue to benefit from skilled outpatient physical therapy to address the deficits listed in the problem list box on initial evaluation,  provide pt/family education and to maximize pt's level of independence in the home and community environment.     Patient's spiritual, cultural, and educational needs considered and patient agreeable to plan of care and goals.           Plan: Will continue per POC towards treatment goals. PT/PTA met face to face to discuss patient's treatment plan and progress towards established goals. Patient will be seen by physical therapist every sixth visit and minimally once per month.    Goals:   Resolved       long term goals       Pt will be independent with HEP to supplement physical therapy treatment in improving functional status.  (Met)       Start:  04/30/25    Expected End:  05/16/25    Resolved:  05/15/25         Pt will demonstrate improved impaired lower extremity strength by 1/2 grade to promote functional mobility.  (Met)       Start:  04/30/25    Expected End:  05/16/25    Resolved:  05/15/25         Patient will require <2 verbal and tactile cue to engage quadricep without compensation to demonstrate improved quadriceps control and awareness.  (Met)       Start:  04/30/25    Expected End:  05/16/25    Resolved:  05/15/25             Naya Brown, PT, DPT, OCS

## 2025-05-16 ENCOUNTER — TELEPHONE (OUTPATIENT)
Dept: ORTHOPEDICS | Facility: CLINIC | Age: 75
End: 2025-05-16
Payer: MEDICARE

## 2025-05-16 DIAGNOSIS — Z96.652 S/P TOTAL KNEE REPLACEMENT, LEFT: Primary | ICD-10-CM

## 2025-05-16 RX ORDER — PANTOPRAZOLE SODIUM 40 MG/1
40 TABLET, DELAYED RELEASE ORAL DAILY
Qty: 30 TABLET | Refills: 0 | Status: SHIPPED | OUTPATIENT
Start: 2025-05-16 | End: 2025-06-15

## 2025-05-16 RX ORDER — POLYETHYLENE GLYCOL 3350 17 G/17G
17 POWDER, FOR SOLUTION ORAL DAILY
Qty: 119 G | Refills: 0 | Status: SHIPPED | OUTPATIENT
Start: 2025-05-16

## 2025-05-16 RX ORDER — CELECOXIB 200 MG/1
200 CAPSULE ORAL DAILY
Qty: 30 CAPSULE | Refills: 0 | Status: SHIPPED | OUTPATIENT
Start: 2025-05-16

## 2025-05-16 RX ORDER — METHOCARBAMOL 750 MG/1
750 TABLET, FILM COATED ORAL 4 TIMES DAILY PRN
Qty: 40 TABLET | Refills: 0 | Status: SHIPPED | OUTPATIENT
Start: 2025-05-16 | End: 2025-05-26

## 2025-05-16 RX ORDER — OXYCODONE HYDROCHLORIDE 5 MG/1
TABLET ORAL
Qty: 50 TABLET | Refills: 0 | Status: SHIPPED | OUTPATIENT
Start: 2025-05-16

## 2025-05-16 RX ORDER — DEXTROMETHORPHAN HYDROBROMIDE, GUAIFENESIN 5; 100 MG/5ML; MG/5ML
650 LIQUID ORAL EVERY 8 HOURS
Qty: 120 TABLET | Refills: 0 | Status: SHIPPED | OUTPATIENT
Start: 2025-05-16

## 2025-05-16 RX ORDER — CEFADROXIL 500 MG/1
500 CAPSULE ORAL EVERY 12 HOURS
Qty: 14 CAPSULE | Refills: 0 | Status: SHIPPED | OUTPATIENT
Start: 2025-05-16

## 2025-05-16 NOTE — TELEPHONE ENCOUNTER
Patient contacted regarding surgical report time for 5/19/25. Advised to arrive at 1030 for a 1230 surgery time, nothing by mouth after midnight the night before and avoid shaving or any cuts or open skin areas for 3 days prior to surgery. VU

## 2025-05-19 ENCOUNTER — HOSPITAL ENCOUNTER (OUTPATIENT)
Facility: HOSPITAL | Age: 75
Discharge: HOME | End: 2025-05-20
Attending: ORTHOPAEDIC SURGERY | Admitting: ORTHOPAEDIC SURGERY
Payer: MEDICARE

## 2025-05-19 ENCOUNTER — ANESTHESIA (OUTPATIENT)
Dept: SURGERY | Facility: HOSPITAL | Age: 75
End: 2025-05-19
Payer: MEDICARE

## 2025-05-19 DIAGNOSIS — M17.12 PRIMARY OSTEOARTHRITIS OF LEFT KNEE: Primary | ICD-10-CM

## 2025-05-19 PROCEDURE — 25000003 PHARM REV CODE 250: Performed by: ORTHOPAEDIC SURGERY

## 2025-05-19 PROCEDURE — C1776 JOINT DEVICE (IMPLANTABLE): HCPCS | Performed by: ORTHOPAEDIC SURGERY

## 2025-05-19 PROCEDURE — 94761 N-INVAS EAR/PLS OXIMETRY MLT: CPT

## 2025-05-19 PROCEDURE — 20985 CPTR-ASST DIR MS PX: CPT | Mod: ,,, | Performed by: ORTHOPAEDIC SURGERY

## 2025-05-19 PROCEDURE — 63600175 PHARM REV CODE 636 W HCPCS: Performed by: NURSE ANESTHETIST, CERTIFIED REGISTERED

## 2025-05-19 PROCEDURE — 97535 SELF CARE MNGMENT TRAINING: CPT

## 2025-05-19 PROCEDURE — 71000033 HC RECOVERY, INTIAL HOUR: Performed by: ORTHOPAEDIC SURGERY

## 2025-05-19 PROCEDURE — 97165 OT EVAL LOW COMPLEX 30 MIN: CPT

## 2025-05-19 PROCEDURE — 36000713 HC OR TIME LEV V EA ADD 15 MIN: Performed by: ORTHOPAEDIC SURGERY

## 2025-05-19 PROCEDURE — 99900035 HC TECH TIME PER 15 MIN (STAT)

## 2025-05-19 PROCEDURE — 25000003 PHARM REV CODE 250

## 2025-05-19 PROCEDURE — 27201423 OPTIME MED/SURG SUP & DEVICES STERILE SUPPLY: Performed by: ORTHOPAEDIC SURGERY

## 2025-05-19 PROCEDURE — 37000008 HC ANESTHESIA 1ST 15 MINUTES: Performed by: ORTHOPAEDIC SURGERY

## 2025-05-19 PROCEDURE — 63600175 PHARM REV CODE 636 W HCPCS

## 2025-05-19 PROCEDURE — 25000003 PHARM REV CODE 250: Performed by: ANESTHESIOLOGY

## 2025-05-19 PROCEDURE — 27447 TOTAL KNEE ARTHROPLASTY: CPT | Mod: AS,LT,,

## 2025-05-19 PROCEDURE — 25000003 PHARM REV CODE 250: Performed by: NURSE ANESTHETIST, CERTIFIED REGISTERED

## 2025-05-19 PROCEDURE — 36000712 HC OR TIME LEV V 1ST 15 MIN: Performed by: ORTHOPAEDIC SURGERY

## 2025-05-19 PROCEDURE — 27447 TOTAL KNEE ARTHROPLASTY: CPT | Mod: LT,,, | Performed by: ORTHOPAEDIC SURGERY

## 2025-05-19 PROCEDURE — 63600175 PHARM REV CODE 636 W HCPCS: Performed by: ANESTHESIOLOGY

## 2025-05-19 PROCEDURE — 63600175 PHARM REV CODE 636 W HCPCS: Performed by: ORTHOPAEDIC SURGERY

## 2025-05-19 PROCEDURE — C1713 ANCHOR/SCREW BN/BN,TIS/BN: HCPCS | Performed by: ORTHOPAEDIC SURGERY

## 2025-05-19 PROCEDURE — 37000009 HC ANESTHESIA EA ADD 15 MINS: Performed by: ORTHOPAEDIC SURGERY

## 2025-05-19 PROCEDURE — 71000039 HC RECOVERY, EACH ADD'L HOUR: Performed by: ORTHOPAEDIC SURGERY

## 2025-05-19 DEVICE — ATTUNE PATELLA MEDIALIZED DOME 35MM CEMENTED AOX
Type: IMPLANTABLE DEVICE | Site: KNEE | Status: FUNCTIONAL
Brand: ATTUNE

## 2025-05-19 DEVICE — ATTUNE KNEE SYSTEM FEMORAL POSTERIOR STABILIZED SIZE 5 LEFT CEMENTED
Type: IMPLANTABLE DEVICE | Site: KNEE | Status: FUNCTIONAL
Brand: ATTUNE

## 2025-05-19 DEVICE — ATTUNE KNEE SYSTEM ALL POLY TIBIAL IMPLANT POSTERIOR STABILIZED SIZE 5, 6MM AOX
Type: IMPLANTABLE DEVICE | Site: KNEE | Status: FUNCTIONAL
Brand: ATTUNE

## 2025-05-19 DEVICE — CEMENT BONE SURG SMPLX P RADPQ: Type: IMPLANTABLE DEVICE | Site: KNEE | Status: FUNCTIONAL

## 2025-05-19 RX ORDER — NALOXONE HCL 0.4 MG/ML
0.02 VIAL (ML) INJECTION
Status: DISCONTINUED | OUTPATIENT
Start: 2025-05-19 | End: 2025-05-20 | Stop reason: HOSPADM

## 2025-05-19 RX ORDER — ROPIVACAINE/EPI/CLONIDINE/KET 2.46-0.005
SYRINGE (ML) INJECTION
Status: DISCONTINUED | OUTPATIENT
Start: 2025-05-19 | End: 2025-05-19 | Stop reason: HOSPADM

## 2025-05-19 RX ORDER — AMOXICILLIN 250 MG
1 CAPSULE ORAL 2 TIMES DAILY
Status: DISCONTINUED | OUTPATIENT
Start: 2025-05-19 | End: 2025-05-20 | Stop reason: HOSPADM

## 2025-05-19 RX ORDER — METHOCARBAMOL 750 MG/1
750 TABLET, FILM COATED ORAL 3 TIMES DAILY
Status: DISCONTINUED | OUTPATIENT
Start: 2025-05-19 | End: 2025-05-19

## 2025-05-19 RX ORDER — SODIUM CHLORIDE 0.9 % (FLUSH) 0.9 %
10 SYRINGE (ML) INJECTION
Status: DISCONTINUED | OUTPATIENT
Start: 2025-05-19 | End: 2025-05-19 | Stop reason: HOSPADM

## 2025-05-19 RX ORDER — PREGABALIN 75 MG/1
75 CAPSULE ORAL
Status: COMPLETED | OUTPATIENT
Start: 2025-05-19 | End: 2025-05-19

## 2025-05-19 RX ORDER — ACETAMINOPHEN 500 MG
1000 TABLET ORAL
Status: COMPLETED | OUTPATIENT
Start: 2025-05-19 | End: 2025-05-19

## 2025-05-19 RX ORDER — FENTANYL CITRATE 50 UG/ML
100 INJECTION, SOLUTION INTRAMUSCULAR; INTRAVENOUS
Status: DISCONTINUED | OUTPATIENT
Start: 2025-05-19 | End: 2025-05-19 | Stop reason: HOSPADM

## 2025-05-19 RX ORDER — DEXAMETHASONE SODIUM PHOSPHATE 4 MG/ML
INJECTION, SOLUTION INTRA-ARTICULAR; INTRALESIONAL; INTRAMUSCULAR; INTRAVENOUS; SOFT TISSUE
Status: DISCONTINUED | OUTPATIENT
Start: 2025-05-19 | End: 2025-05-19

## 2025-05-19 RX ORDER — FENTANYL CITRATE 50 UG/ML
25 INJECTION, SOLUTION INTRAMUSCULAR; INTRAVENOUS EVERY 5 MIN PRN
Status: COMPLETED | OUTPATIENT
Start: 2025-05-19 | End: 2025-05-19

## 2025-05-19 RX ORDER — CEFAZOLIN 2 G/1
2 INJECTION, POWDER, FOR SOLUTION INTRAMUSCULAR; INTRAVENOUS
Status: DISCONTINUED | OUTPATIENT
Start: 2025-05-19 | End: 2025-05-19 | Stop reason: HOSPADM

## 2025-05-19 RX ORDER — POVIDONE-IODINE 5 %
SOLUTION, NON-ORAL OPHTHALMIC (EYE)
Status: DISCONTINUED | OUTPATIENT
Start: 2025-05-19 | End: 2025-05-19 | Stop reason: HOSPADM

## 2025-05-19 RX ORDER — LIDOCAINE HYDROCHLORIDE 20 MG/ML
INJECTION, SOLUTION EPIDURAL; INFILTRATION; INTRACAUDAL; PERINEURAL
Status: DISCONTINUED | OUTPATIENT
Start: 2025-05-19 | End: 2025-05-19

## 2025-05-19 RX ORDER — POLYETHYLENE GLYCOL 3350 17 G/17G
17 POWDER, FOR SOLUTION ORAL DAILY
Status: DISCONTINUED | OUTPATIENT
Start: 2025-05-20 | End: 2025-05-20 | Stop reason: HOSPADM

## 2025-05-19 RX ORDER — PHENYLEPHRINE HYDROCHLORIDE 10 MG/ML
INJECTION INTRAVENOUS
Status: DISCONTINUED | OUTPATIENT
Start: 2025-05-19 | End: 2025-05-19

## 2025-05-19 RX ORDER — DEXMEDETOMIDINE HYDROCHLORIDE 100 UG/ML
INJECTION, SOLUTION INTRAVENOUS
Status: DISCONTINUED | OUTPATIENT
Start: 2025-05-19 | End: 2025-05-19

## 2025-05-19 RX ORDER — PROCHLORPERAZINE EDISYLATE 5 MG/ML
5 INJECTION INTRAMUSCULAR; INTRAVENOUS EVERY 6 HOURS PRN
Status: DISCONTINUED | OUTPATIENT
Start: 2025-05-19 | End: 2025-05-20 | Stop reason: HOSPADM

## 2025-05-19 RX ORDER — MUPIROCIN 20 MG/G
1 OINTMENT TOPICAL 2 TIMES DAILY
Status: DISCONTINUED | OUTPATIENT
Start: 2025-05-19 | End: 2025-05-20 | Stop reason: HOSPADM

## 2025-05-19 RX ORDER — SODIUM CHLORIDE 9 MG/ML
INJECTION, SOLUTION INTRAVENOUS
Status: COMPLETED | OUTPATIENT
Start: 2025-05-19 | End: 2025-05-19

## 2025-05-19 RX ORDER — FAMOTIDINE 20 MG/1
20 TABLET, FILM COATED ORAL 2 TIMES DAILY
Status: DISCONTINUED | OUTPATIENT
Start: 2025-05-19 | End: 2025-05-20 | Stop reason: HOSPADM

## 2025-05-19 RX ORDER — ACETAMINOPHEN 500 MG
1000 TABLET ORAL EVERY 6 HOURS
Status: DISCONTINUED | OUTPATIENT
Start: 2025-05-19 | End: 2025-05-20 | Stop reason: HOSPADM

## 2025-05-19 RX ORDER — BETHANECHOL CHLORIDE 10 MG/1
10 TABLET ORAL
Status: COMPLETED | OUTPATIENT
Start: 2025-05-19 | End: 2025-05-19

## 2025-05-19 RX ORDER — VANCOMYCIN HYDROCHLORIDE 1 G/20ML
INJECTION, POWDER, LYOPHILIZED, FOR SOLUTION INTRAVENOUS
Status: DISCONTINUED | OUTPATIENT
Start: 2025-05-19 | End: 2025-05-19 | Stop reason: HOSPADM

## 2025-05-19 RX ORDER — PREGABALIN 75 MG/1
75 CAPSULE ORAL NIGHTLY
Status: DISCONTINUED | OUTPATIENT
Start: 2025-05-19 | End: 2025-05-20 | Stop reason: HOSPADM

## 2025-05-19 RX ORDER — FENTANYL CITRATE 50 UG/ML
INJECTION, SOLUTION INTRAMUSCULAR; INTRAVENOUS
Status: DISCONTINUED | OUTPATIENT
Start: 2025-05-19 | End: 2025-05-19

## 2025-05-19 RX ORDER — ASPIRIN 81 MG/1
81 TABLET ORAL ONCE
Status: COMPLETED | OUTPATIENT
Start: 2025-05-19 | End: 2025-05-19

## 2025-05-19 RX ORDER — TALC
6 POWDER (GRAM) TOPICAL NIGHTLY PRN
Status: DISCONTINUED | OUTPATIENT
Start: 2025-05-19 | End: 2025-05-19 | Stop reason: HOSPADM

## 2025-05-19 RX ORDER — SODIUM CHLORIDE 9 MG/ML
INJECTION, SOLUTION INTRAVENOUS CONTINUOUS
Status: DISCONTINUED | OUTPATIENT
Start: 2025-05-19 | End: 2025-05-19

## 2025-05-19 RX ORDER — PROPOFOL 10 MG/ML
VIAL (ML) INTRAVENOUS
Status: DISCONTINUED | OUTPATIENT
Start: 2025-05-19 | End: 2025-05-19

## 2025-05-19 RX ORDER — ONDANSETRON HYDROCHLORIDE 2 MG/ML
4 INJECTION, SOLUTION INTRAVENOUS EVERY 8 HOURS PRN
Status: DISCONTINUED | OUTPATIENT
Start: 2025-05-19 | End: 2025-05-20 | Stop reason: HOSPADM

## 2025-05-19 RX ORDER — METHOCARBAMOL 750 MG/1
750 TABLET, FILM COATED ORAL 3 TIMES DAILY
Status: DISCONTINUED | OUTPATIENT
Start: 2025-05-19 | End: 2025-05-20 | Stop reason: HOSPADM

## 2025-05-19 RX ORDER — KETAMINE HCL IN 0.9 % NACL 50 MG/5 ML
SYRINGE (ML) INTRAVENOUS
Status: DISCONTINUED | OUTPATIENT
Start: 2025-05-19 | End: 2025-05-19

## 2025-05-19 RX ORDER — OXYCODONE HYDROCHLORIDE 10 MG/1
10 TABLET ORAL
Status: DISCONTINUED | OUTPATIENT
Start: 2025-05-19 | End: 2025-05-20 | Stop reason: HOSPADM

## 2025-05-19 RX ORDER — ELECTROLYTES/DEXTROSE
400 SOLUTION, ORAL ORAL
Status: DISCONTINUED | OUTPATIENT
Start: 2025-05-19 | End: 2025-05-20 | Stop reason: HOSPADM

## 2025-05-19 RX ORDER — MORPHINE SULFATE 2 MG/ML
2 INJECTION, SOLUTION INTRAMUSCULAR; INTRAVENOUS
Status: DISCONTINUED | OUTPATIENT
Start: 2025-05-19 | End: 2025-05-20 | Stop reason: HOSPADM

## 2025-05-19 RX ORDER — ONDANSETRON HYDROCHLORIDE 2 MG/ML
INJECTION, SOLUTION INTRAVENOUS
Status: DISCONTINUED | OUTPATIENT
Start: 2025-05-19 | End: 2025-05-19

## 2025-05-19 RX ORDER — MIDAZOLAM HYDROCHLORIDE 1 MG/ML
4 INJECTION, SOLUTION INTRAMUSCULAR; INTRAVENOUS
Status: DISCONTINUED | OUTPATIENT
Start: 2025-05-19 | End: 2025-05-19 | Stop reason: HOSPADM

## 2025-05-19 RX ORDER — CEFAZOLIN SODIUM 1 G/3ML
INJECTION, POWDER, FOR SOLUTION INTRAMUSCULAR; INTRAVENOUS
Status: DISCONTINUED | OUTPATIENT
Start: 2025-05-19 | End: 2025-05-19

## 2025-05-19 RX ORDER — LIDOCAINE HYDROCHLORIDE 10 MG/ML
1 INJECTION, SOLUTION EPIDURAL; INFILTRATION; INTRACAUDAL; PERINEURAL
Status: DISCONTINUED | OUTPATIENT
Start: 2025-05-19 | End: 2025-05-19 | Stop reason: HOSPADM

## 2025-05-19 RX ORDER — MUPIROCIN 20 MG/G
1 OINTMENT TOPICAL
Status: DISCONTINUED | OUTPATIENT
Start: 2025-05-19 | End: 2025-05-19 | Stop reason: HOSPADM

## 2025-05-19 RX ORDER — CEFAZOLIN 2 G/1
2 INJECTION, POWDER, FOR SOLUTION INTRAMUSCULAR; INTRAVENOUS
Status: COMPLETED | OUTPATIENT
Start: 2025-05-19 | End: 2025-05-20

## 2025-05-19 RX ORDER — BISACODYL 10 MG/1
10 SUPPOSITORY RECTAL EVERY 12 HOURS PRN
Status: DISCONTINUED | OUTPATIENT
Start: 2025-05-19 | End: 2025-05-20 | Stop reason: HOSPADM

## 2025-05-19 RX ORDER — OXYCODONE HYDROCHLORIDE 5 MG/1
5 TABLET ORAL
Status: DISCONTINUED | OUTPATIENT
Start: 2025-05-19 | End: 2025-05-20 | Stop reason: HOSPADM

## 2025-05-19 RX ADMIN — CEFAZOLIN 2 G: 2 INJECTION, POWDER, FOR SOLUTION INTRAMUSCULAR; INTRAVENOUS at 08:05

## 2025-05-19 RX ADMIN — PHENYLEPHRINE HYDROCHLORIDE 100 MCG: 10 INJECTION INTRAVENOUS at 01:05

## 2025-05-19 RX ADMIN — SODIUM CHLORIDE, SODIUM GLUCONATE, SODIUM ACETATE, POTASSIUM CHLORIDE, MAGNESIUM CHLORIDE, SODIUM PHOSPHATE, DIBASIC, AND POTASSIUM PHOSPHATE: .53; .5; .37; .037; .03; .012; .00082 INJECTION, SOLUTION INTRAVENOUS at 01:05

## 2025-05-19 RX ADMIN — BETHANECHOL CHLORIDE 10 MG: 10 TABLET ORAL at 03:05

## 2025-05-19 RX ADMIN — PROPOFOL 70 MCG/KG/MIN: 10 INJECTION, EMULSION INTRAVENOUS at 12:05

## 2025-05-19 RX ADMIN — BETHANECHOL CHLORIDE 10 MG: 10 TABLET ORAL at 04:05

## 2025-05-19 RX ADMIN — SODIUM CHLORIDE 1000 MG: 9 INJECTION, SOLUTION INTRAVENOUS at 10:05

## 2025-05-19 RX ADMIN — SENNOSIDES AND DOCUSATE SODIUM 1 TABLET: 50; 8.6 TABLET ORAL at 08:05

## 2025-05-19 RX ADMIN — OXYCODONE 5 MG: 5 TABLET ORAL at 03:05

## 2025-05-19 RX ADMIN — ONDANSETRON 4 MG: 2 INJECTION INTRAMUSCULAR; INTRAVENOUS at 12:05

## 2025-05-19 RX ADMIN — DRONEDARONE 400 MG: 400 TABLET, FILM COATED ORAL at 07:05

## 2025-05-19 RX ADMIN — DEXAMETHASONE SODIUM PHOSPHATE 8 MG: 4 INJECTION, SOLUTION INTRAMUSCULAR; INTRAVENOUS at 12:05

## 2025-05-19 RX ADMIN — FENTANYL CITRATE 25 MCG: 50 INJECTION INTRAMUSCULAR; INTRAVENOUS at 03:05

## 2025-05-19 RX ADMIN — METHOCARBAMOL 750 MG: 750 TABLET ORAL at 03:05

## 2025-05-19 RX ADMIN — LIDOCAINE HYDROCHLORIDE 70 MG: 20 INJECTION, SOLUTION EPIDURAL; INFILTRATION; INTRACAUDAL; PERINEURAL at 12:05

## 2025-05-19 RX ADMIN — ACETAMINOPHEN 1000 MG: 500 TABLET ORAL at 05:05

## 2025-05-19 RX ADMIN — METHOCARBAMOL 750 MG: 750 TABLET ORAL at 08:05

## 2025-05-19 RX ADMIN — CEFAZOLIN 2 G: 330 INJECTION, POWDER, FOR SOLUTION INTRAMUSCULAR; INTRAVENOUS at 12:05

## 2025-05-19 RX ADMIN — FENTANYL CITRATE 50 MCG: 50 INJECTION, SOLUTION INTRAMUSCULAR; INTRAVENOUS at 12:05

## 2025-05-19 RX ADMIN — PREGABALIN 75 MG: 75 CAPSULE ORAL at 10:05

## 2025-05-19 RX ADMIN — TRANEXAMIC ACID 1000 MG: 100 INJECTION INTRAVENOUS at 12:05

## 2025-05-19 RX ADMIN — BETHANECHOL CHLORIDE 10 MG: 10 TABLET ORAL at 05:05

## 2025-05-19 RX ADMIN — PREGABALIN 75 MG: 75 CAPSULE ORAL at 08:05

## 2025-05-19 RX ADMIN — ASPIRIN 81 MG: 81 TABLET, COATED ORAL at 08:05

## 2025-05-19 RX ADMIN — GLYCOPYRROLATE 0.1 MG: 0.2 INJECTION, SOLUTION INTRAMUSCULAR; INTRAVENOUS at 12:05

## 2025-05-19 RX ADMIN — Medication 400 ML: at 09:05

## 2025-05-19 RX ADMIN — DEXMEDETOMIDINE 10 MCG: 100 INJECTION, SOLUTION, CONCENTRATE INTRAVENOUS at 02:05

## 2025-05-19 RX ADMIN — Medication 25 MG: at 01:05

## 2025-05-19 RX ADMIN — Medication 25 MG: at 02:05

## 2025-05-19 RX ADMIN — MUPIROCIN 1 G: 20 OINTMENT TOPICAL at 08:05

## 2025-05-19 RX ADMIN — DEXMEDETOMIDINE 12 MCG: 100 INJECTION, SOLUTION, CONCENTRATE INTRAVENOUS at 12:05

## 2025-05-19 RX ADMIN — MEPIVACAINE HYDROCHLORIDE 3 ML: 15 INJECTION, SOLUTION EPIDURAL; INFILTRATION at 12:05

## 2025-05-19 RX ADMIN — SODIUM CHLORIDE: 9 INJECTION, SOLUTION INTRAVENOUS at 10:05

## 2025-05-19 RX ADMIN — FAMOTIDINE 20 MG: 20 TABLET, FILM COATED ORAL at 08:05

## 2025-05-19 RX ADMIN — TRANEXAMIC ACID 1000 MG: 100 INJECTION INTRAVENOUS at 01:05

## 2025-05-19 RX ADMIN — PROPOFOL 30 MG: 10 INJECTION, EMULSION INTRAVENOUS at 12:05

## 2025-05-19 RX ADMIN — ACETAMINOPHEN 1000 MG: 500 TABLET ORAL at 10:05

## 2025-05-19 RX ADMIN — ACETAMINOPHEN 1000 MG: 500 TABLET ORAL at 11:05

## 2025-05-19 NOTE — NURSING
Report received from MEGAN Higuera. Care assumed. Patient arrived to unit AAOx4 in hospital bed from PACU. VSS, IV saline locked. Dressing to left knee, CDI.  Pt lying supine in bed, c/o 6/10 pain, PRN meds reviewed.  . Pt questions answered and denies any other concerns at this time. See assessment. Patient oriented to room. Bed in lowest position, side rails up x2, bed wheels locked and call light within reach.  Pt instructed to call for assistance, verbalized understanding. NADN. Will continue to observe and report any changes.

## 2025-05-19 NOTE — INTERVAL H&P NOTE
The patient has been examined and the H&P has been reviewed:    I concur with the findings and no changes have occurred since H&P was written.    Surgery and Anesthesia risks, benefits and alternative options discussed and understood by patient/family.          Active Hospital Problems    Diagnosis  POA    *Primary osteoarthritis of left knee [M17.12]  Yes      Resolved Hospital Problems   No resolved problems to display.

## 2025-05-19 NOTE — PLAN OF CARE
Need update and anesthesia consent.    Patient has walker in the car.    Bed in lowest, locked position with call light within reach. Side rails up X2. Belongings to be locked in a locker. Questions answered. No apparent distress noted. Ongoing monitoring in place.

## 2025-05-19 NOTE — ANESTHESIA PROCEDURE NOTES
CSE    Patient location during procedure: OR  Start time: 5/19/2025 12:24 PM  Timeout: 5/19/2025 12:22 PM  End time: 5/19/2025 12:38 PM      Staffing  Authorizing Provider: Zelda Jasmine MD  Performing Provider: Zelda Jasmine MD    Staffing  Performed by: Zelda Jasmine MD  Authorized by: Zelda Jasmine MD    Preanesthetic Checklist  Completed: patient identified, IV checked, site marked, risks and benefits discussed, surgical consent, monitors and equipment checked, pre-op evaluation and timeout performed  CSE  Patient position: sitting  Prep: ChloraPrep  Patient monitoring: heart rate, cardiac monitor, continuous pulse ox, continuous capnometry and frequent blood pressure checks  Approach: left paramedian  Epidural Needle  Injection technique: DEB air  Needle type: Tuohy   Needle gauge: 17 G  Needle length: 3.5 in  Needle insertion depth: 7 cm  Location: L2-3  Needle localization: anatomical landmarks   Catheter  Catheter type: springwTimeline Labs / TLL  Catheter size: 19 G  Catheter at skin depth: 11 cm  Assessment  Intrathecal Medications:   administered: primary anesthetic mcg of    Additional Notes  Very difficult spinal placement - converted to DEB to find epidural and intrathecal space - DURAL puncture with Tuohy and spinal dose injected through Tuohy - epidural catheter threaded - will hold off on test dose -   Medications:    Medications: Intrathecal - mepivacaine (CARBOCAINE) injection 15 mg/mL (1.5%) - Intrathecal   3 mL - 5/19/2025 12:35:00 PM

## 2025-05-19 NOTE — OPERATIVE NOTE ADDENDUM
Certification of Assistant at Surgery       Surgery Date: 5/19/2025     Participating Surgeons:  Surgeons and Role:     * Mac Brice III, MD - Primary    Procedures:  Procedure(s) (LRB):  ARTHROPLASTY, KNEE, TOTAL, USING VELYS COMPUTER-ASSISTED NAVIGATION: LEFT: RONNIEUY - CANDICE (Left)    Assistant Surgeon's Certification of Necessity:  I understand that section 1842 (b) (6) (d) of the Social Security Act generally prohibits Medicare Part B reasonable charge payment for the services of assistants at surgery in teaching hospitals when qualified residents are available to furnish such services. I certify that the services for which payment is claimed were medically necessary, and that no qualified resident was available to perform the services. I further understand that these services are subject to post-payment review by the Medicare carrier.      Isidoro Faye PA-C    05/19/2025  2:51 PM

## 2025-05-19 NOTE — PT/OT/SLP PROGRESS
Physical Therapy      Patient Name:  Windy Lindo   MRN:  [id number]    Patient not seen today secondary to participating in OT session. Will follow-up again in AM.

## 2025-05-19 NOTE — NURSING TRANSFER
Nursing Transfer Note      5/19/2025   4:10 PM    Nurse giving handoff:Davida JUNE RN   Nurse receiving handoff:MEGAN Higuera    Transfer To: [id number]    Transfer via bed    Transported by Davida JUNE RN and SELENE Michaud    Patient belongings transferred with patient: Yes    Chart send with patient: Yes    Patient reassessed at: 5/19/2025 4:10 PM    Upon arrival to floor: patient oriented to room, call bell in reach, and bed in lowest position

## 2025-05-19 NOTE — ANESTHESIA POSTPROCEDURE EVALUATION
Anesthesia Post Evaluation    Patient: Windy Lindo    Procedure(s) Performed: Procedure(s) (LRB):  ARTHROPLASTY, KNEE, TOTAL, USING [employer] COMPUTER-ASSISTED NAVIGATION: LEFT: DEPUY - CANDICE (Left)    Final Anesthesia Type: general      Patient location during evaluation: PACU  Patient participation: Yes- Able to Participate  Level of consciousness: awake and alert  Post-procedure vital signs: reviewed and stable  Pain management: adequate  Airway patency: patent  SHAUNNA mitigation strategies: Multimodal analgesia  PONV status at discharge: No PONV  Anesthetic complications: no      Cardiovascular status: blood pressure returned to baseline  Respiratory status: unassisted  Hydration status: euvolemic  Follow-up not needed.              Vitals Value Taken Time   /60 05/19/25 15:16   Temp 36.5 °C (97.7 °F) 05/19/25 14:54   Pulse 78 05/19/25 15:16   Resp 19 05/19/25 15:16   SpO2 95 % 05/19/25 15:16   Vitals shown include unfiled device data.      No case tracking events are documented in the log.      Pain/Cortney Score: Pain Rating Prior to Med Admin: 10 (5/19/2025  3:05 PM)  Cortney Score: 9 (5/19/2025  3:00 PM)

## 2025-05-19 NOTE — OP NOTE
Yuniel Left TKA  OPERATIVE NOTE    Date of Operation:   5/19/2025    Providers Performing:   Surgeon(s):  Mac Brice III, MD  Assistant: Isidoro Faye PA-C, I certify that the services for which payment is claimed were medically necessary, and that no qualified resident was available to perform the services.      Operative Procedure:   Left Total Knee Arthroplasty, using Depuy QPSoftwareYS robotic assisted solution, CPT 17715  Computer-assisted surgical navigational procedure for musculoskeletal procedures, image-less, CPT 26983  Surgical techniques requiring use of robotic surgical system, CPT     Preoperative Diagnosis:   Left Knee Osteoarthritis, ICD-10 M17.12    Postoperative Diagnosis:   SAME    Components Used:   Depuy  Femur: Attune PS Size 5  Tibia: Attune all poly AOX PS Size 5 x  6mm  Patella: Attune Medialized Dome 35 mm    2 packs cement: Simplex P    Implant Name Type Inv. Item Serial No.  Lot No. LRB No. Used Action   CEMENT BONE SURG SMPLX P RADPQ - [id number]  CEMENT BONE SURG SMPLX P RADPQ  Probity. [id number] Left 2 Implanted   COMP FEM POS ATTUNE SZ5 L - [id number]  COMP FEM POS ATTUNE SZ5 L  [employer] [id number] Left 1 Implanted   PATELLA ATTUNE MEDLZD DOME 35 - [id number]  PATELLA ATTUNE MEDLZD DOME 35  [employer] [id number] Left 1 Implanted   INSERT ATTUNE POST 5MM SZ6 - [id number]  INSERT ATTUNE POST 5MM SZ6  [employer] [id number] Left 1 Implanted       Anesthesia:   Spinal    TERRY cocktail: MARCIO    Estimated Blood Loss:   350 cc    Specimens:   None    Complications:   None    Indications:   The patient has failed non-operative measures including medications, injections and activity modifications for their knee.  After an explanation of risks and benefits of knee arthroplasty surgery, the patient wishes to proceed with surgery.    Operative Notes:   The patient was greeted in the pre-op holding area.  The patients knee was marked with a surgical marker by the surgeon.   Anesthesia per above technique was administered by the anesthesia team.  The patient was placed in the supine position on the OR table with all bony prominences padded.  A tourniquet was not used.  IV antibiotics were administered.  The leg was prepped and draped in the usual sterile fashion.  A timeout was performed and the correct patient, site and procedure were identified.    A midline incision was made with a standard medial parapatellar arthrotomy.  The standard medial capsular release was performed along with the lateral gutter.  The patella was mobilized from the lateral parapatellar ligament and bony osteophytes were removed.  The intercondylar notch was cleared of the ACL and PCL.    The femoral and tibial guide pins where placed and the arrays were positioned and tightened to the pins. The hip was then brought through a range of motion and the hip center was identified, medial and lateral malleolus were identified and the tibia and femur were registered.     Using a tibia first with tensioner technique the joint was first tensioned manually in extension and flexion and through the full range of motion to define our gaps. Provisional cuts/implants were positioned virtually for appropriate size gaps and implant placement.    The VELYS robot was then brought into position and checkpoints were confirmed. Care was taken during the burring process to protect the soft tissue. We cut the tibia. The tensioner was then placed in the joint and the knee was again extension and flexion and through the full range of motion to define our gaps. The cuts/implants were positioned virtually for appropriate size gaps and implant placement and the femoral cuts were then made.     The PS box was cut. Meniscal remnants were removed and the knee was brought into flexion and posterior osteophytes were removed.      At this time the trial implants were placed and knee assessed for stability, and flexion arc. The patella was  prepared using free-hand technique and the three-holed lug drill guide was sized and appropriately assessed. Patella tracking was found to be acceptable. The tibial component rotation was marked. The trials and guide pins were removed. The tibial component was positioned and the keel was drilled and punched.    The wound was copiously irrigated with pulsitile lavage and the bony surfaces dried.  Cement was mixed on the back table and applied to all components.  Cementation of the tibial, femoral, and patellar components was performed sequentially with removal of all excess cement. While the cement dried and a 0.35% betadine solution was left to soak in the surgical field.     After the cement was dry hemostasis was obtained with bovie electrocautery.  The knee was again copiously irrigated with pulsatile lavage.     1 gram of vancomycin powder was placed in the knee. The arthrotomy was closed with interrupted #1 vicryl suture and #2 quill, the subcuticular layer was closed with 2-0 vicryl suture and a running 4-0 monocryl was used to closed the skin surface.  Pin sites were closed with 4-0 monocryl and skin glue. Surgicel sealant was placed over the top of the incision and sterile dressing placed over the wound. Appropriately sized TEDs hose were placed for edema control.     Sponge and needle counts were correct.    The first-assistant was critical to all steps of the operation, including retraction and leg stabilization during exposure and bone preparation, as well as the deep and superficial wound closure.  Dr. Brice performed and/or supervised the key portions of this surgical procedure, including evaluation of the bone cuts, reshaping of the bony elements, and insertion of the provisional and final components.    Postoperative Plan:  DVT Prophylaxis: The patient will be anticoagulated with 81mg aspirin x1 dose @ 2100 on POD#0 then resume Eliquis 5mg BID x30 days starting 0900 POD#1    They will receive 24 hours  of post-operative antibiotics.    Activity will be weight bearing as tolerated.    HH PT  Pour/bethanechol      Due patient and or surgical factors the patient will receive an Rx for cefadroxil 500mg BID x7 days after discharge per [place] data:   Nash MM, Terrance WHITE, Harini M, Alejandra HEMPHILL. The [facility] Clinical Research Award: Extended Oral Antibiotics Prevent Periprosthetic Joint Infection in High-Risk Cases: 3855 Patients With 1-Year Follow-Up. J Arthroplasty. 2021 Jul;36(7S):S18-S25. doi: [email or web address]. Epub 2021 Jan 23. PMID: [id number].      Signed by: Mac Brice III, MD

## 2025-05-19 NOTE — PLAN OF CARE
Problem: Pain Acute  Goal: Optimal Pain Control and Function  Intervention: Develop Pain Management Plan  Flowsheets (Taken 5/19/2025 1624)  Pain Management Interventions:   care clustered   cold applied   pain management plan reviewed with patient/caregiver     Problem: Pain Acute  Goal: Optimal Pain Control and Function  Intervention: Prevent or Manage Pain  Flowsheets (Taken 5/19/2025 1624)  Sensory Stimulation Regulation:   auditory stimulation minimized   care clustered   quiet environment promoted  Medication Review/Management: medications reviewed     Problem: Pain Acute  Goal: Optimal Pain Control and Function  Intervention: Optimize Psychosocial Wellbeing  Flowsheets (Taken 5/19/2025 1624)  Supportive Measures:   active listening utilized   positive reinforcement provided     Problem: Wound  Goal: Absence of Infection Signs and Symptoms  Intervention: Prevent or Manage Infection  Flowsheets (Taken 5/19/2025 1624)  Infection Management: aseptic technique maintained     Problem: Wound  Goal: Optimal Pain Control and Function  Intervention: Prevent or Manage Pain  Flowsheets (Taken 5/19/2025 1624)  Pain Management Interventions:   care clustered   cold applied   pain management plan reviewed with patient/caregiver     Problem: Infection  Goal: Absence of Infection Signs and Symptoms  Intervention: Prevent or Manage Infection  Flowsheets (Taken 5/19/2025 1624)  Infection Management: aseptic technique maintained     Problem: Knee Arthroplasty  Goal: Optimal Coping  Intervention: Support Psychosocial Response to Surgery and Mobility Changes  Flowsheets (Taken 5/19/2025 1624)  Supportive Measures:   active listening utilized   positive reinforcement provided     Problem: Knee Arthroplasty  Goal: Absence of Bleeding  Intervention: Monitor and Manage Bleeding  Flowsheets (Taken 5/19/2025 1624)  Bleeding Management: dressing monitored     Problem: Knee Arthroplasty  Goal: Absence of Infection Signs and  Symptoms  Intervention: Prevent or Manage Infection  Flowsheets (Taken 5/19/2025 1624)  Infection Management: aseptic technique maintained     Problem: Knee Arthroplasty  Goal: Optimal Pain Control and Function  Intervention: Prevent or Manage Pain  Flowsheets (Taken 5/19/2025 1624)  Pain Management Interventions:   care clustered   cold applied   pain management plan reviewed with patient/caregiver     Problem: Fall Injury Risk  Goal: Absence of Fall and Fall-Related Injury  Intervention: Identify and Manage Contributors  Flowsheets (Taken 5/19/2025 1624)  Medication Review/Management: medications reviewed     Problem: Fall Injury Risk  Goal: Absence of Fall and Fall-Related Injury  Intervention: Promote Injury-Free Environment  Flowsheets (Taken 5/19/2025 1624)  Safety Promotion/Fall Prevention:   assistive device/personal item within reach   Fall Risk reviewed with patient/family   nonskid shoes/socks when out of bed   side rails raised x 2   instructed to call staff for mobility   lighting adjusted   medications reviewed   Plan of care reviewed with pt, verbalized understanding. Medications and possible side effects reviewed and administered as ordered.  Purposeful rounding completed.  Safety precautions maintained.  Call light placed within reach.

## 2025-05-19 NOTE — TRANSFER OF CARE
"Anesthesia Transfer of Care Note    Patient: Windy Lnido    Procedure(s) Performed: Procedure(s) (LRB):  ARTHROPLASTY, KNEE, TOTAL, USING [employer] COMPUTER-ASSISTED NAVIGATION: LEFT: DEPUY - CANDICE (Left)    Patient location: PACU    Anesthesia Type: general and CSE    Transport from OR: Transported from OR on 6-10 L/min O2 by face mask with adequate spontaneous ventilation    Post pain: adequate analgesia    Post assessment: no apparent anesthetic complications and tolerated procedure well    Post vital signs: stable    Level of consciousness: lethargic and responds to stimulation    Nausea/Vomiting: no nausea/vomiting    Complications: none    Transfer of care protocol was followed    Last vitals: Visit Vitals  BP (!) 121/57 (BP Location: Right arm, Patient Position: Lying)   Pulse 86   Temp 36.5 °C (97.7 °F) (Temporal)   Resp 18   Ht 5' 2" (1.575 m)   Wt 71.7 kg (158 lb)   SpO2 98%   Breastfeeding No   BMI 28.90 kg/m²     "

## 2025-05-20 VITALS
HEART RATE: 77 BPM | SYSTOLIC BLOOD PRESSURE: 114 MMHG | BODY MASS INDEX: 29.08 KG/M2 | RESPIRATION RATE: 16 BRPM | HEIGHT: 62 IN | DIASTOLIC BLOOD PRESSURE: 53 MMHG | WEIGHT: 158 LBS | OXYGEN SATURATION: 96 % | TEMPERATURE: 97 F

## 2025-05-20 PROCEDURE — 99499 UNLISTED E&M SERVICE: CPT | Mod: FS,,, | Performed by: ANESTHESIOLOGY

## 2025-05-20 PROCEDURE — 99900035 HC TECH TIME PER 15 MIN (STAT)

## 2025-05-20 PROCEDURE — 94761 N-INVAS EAR/PLS OXIMETRY MLT: CPT

## 2025-05-20 PROCEDURE — 25000003 PHARM REV CODE 250: Performed by: ORTHOPAEDIC SURGERY

## 2025-05-20 PROCEDURE — 97535 SELF CARE MNGMENT TRAINING: CPT

## 2025-05-20 PROCEDURE — 97530 THERAPEUTIC ACTIVITIES: CPT

## 2025-05-20 PROCEDURE — 63600175 PHARM REV CODE 636 W HCPCS

## 2025-05-20 PROCEDURE — 25000003 PHARM REV CODE 250

## 2025-05-20 PROCEDURE — 25000003 PHARM REV CODE 250: Performed by: ANESTHESIOLOGY

## 2025-05-20 RX ADMIN — MUPIROCIN 1 G: 20 OINTMENT TOPICAL at 08:05

## 2025-05-20 RX ADMIN — CEFAZOLIN 2 G: 2 INJECTION, POWDER, FOR SOLUTION INTRAMUSCULAR; INTRAVENOUS at 05:05

## 2025-05-20 RX ADMIN — METHOCARBAMOL 750 MG: 750 TABLET ORAL at 08:05

## 2025-05-20 RX ADMIN — SENNOSIDES AND DOCUSATE SODIUM 1 TABLET: 50; 8.6 TABLET ORAL at 08:05

## 2025-05-20 RX ADMIN — POLYETHYLENE GLYCOL 3350 17 G: 17 POWDER, FOR SOLUTION ORAL at 08:05

## 2025-05-20 RX ADMIN — APIXABAN 5 MG: 5 TABLET, FILM COATED ORAL at 08:05

## 2025-05-20 RX ADMIN — DRONEDARONE 400 MG: 400 TABLET, FILM COATED ORAL at 08:05

## 2025-05-20 RX ADMIN — Medication 400 ML: at 05:05

## 2025-05-20 RX ADMIN — Medication 400 ML: at 01:05

## 2025-05-20 RX ADMIN — FAMOTIDINE 20 MG: 20 TABLET, FILM COATED ORAL at 08:05

## 2025-05-20 RX ADMIN — ACETAMINOPHEN 1000 MG: 500 TABLET ORAL at 05:05

## 2025-05-20 RX ADMIN — Medication 400 ML: at 09:05

## 2025-05-20 RX ADMIN — OXYCODONE 5 MG: 5 TABLET ORAL at 06:05

## 2025-05-20 NOTE — PROGRESS NOTES
"[facility])  Orthopedics  Progress Note    Patient Name: Windy Lindo  MRN: [id number]  Admission Date: 5/19/2025  Hospital Length of Stay: 0 days  Attending Provider: Mac Brice III, MD  Primary Care Provider: Trisha Higginbotham MD  Follow-up For: Procedure(s) (LRB):  ARTHROPLASTY, KNEE, TOTAL, USING [employer] COMPUTER-ASSISTED NAVIGATION: LEFT: DEPUY - ATTUNE (Left)    Post-Operative Day: 1 Day Post-Op  Subjective:     Principal Problem:Primary osteoarthritis of left knee    Principal Orthopedic Problem: same, s/p L TKA 5/19    Interval History: Pt seen and examined at bedside. NAEON, VSS, AF. Pain controlled. Denies fevers, chills, chest pain, SOB, N/V/D. Has been able to void. Pending PT/OT eval.      Review of patient's allergies indicates:  No Known Allergies    Current Facility-Administered Medications   Medication    acetaminophen tablet 1,000 mg    apixaban tablet 5 mg    bisacodyL suppository 10 mg    dronedarone tablet 400 mg    electrolytes-dextrose (Pedialyte) oral solution 400 mL    famotidine tablet 20 mg    methocarbamoL tablet 750 mg    morphine injection 2 mg    mupirocin 2 % ointment 1 g    naloxone 0.4 mg/mL injection 0.02 mg    ondansetron injection 4 mg    oxyCODONE immediate release tablet 5 mg    oxyCODONE immediate release tablet Tab 10 mg    polyethylene glycol packet 17 g    pregabalin capsule 75 mg    prochlorperazine injection Soln 5 mg    senna-docusate 8.6-50 mg per tablet 1 tablet     Objective:     Vital Signs (Most Recent):  Temp: 97.1 °F (36.2 °C) (05/20/25 0333)  Pulse: 67 (05/20/25 0333)  Resp: 18 (05/20/25 0600)  BP: (!) 110/58 (05/20/25 0333)  SpO2: 97 % (05/20/25 0333) Vital Signs (24h Range):  Temp:  [97 °F (36.1 °C)-97.9 °F (36.6 °C)] 97.1 °F (36.2 °C)  Pulse:  [67-86] 67  Resp:  [12-21] 18  SpO2:  [94 %-99 %] 97 %  BP: (110-158)/(56-80) 110/58     Weight: 71.7 kg (158 lb)  Height: 5' 2" (157.5 cm)  Body mass index is 28.9 kg/m².      Intake/Output Summary " (Last 24 hours) at 5/20/2025 0654  Last data filed at 5/20/2025 0540  Gross per 24 hour   Intake 2110 ml   Output 2100 ml   Net 10 ml        Ortho/SPM Exam   AAOx4  NAD  Reg rate  No increased WOB    LLE:  Dressing c/d/i  SILT T/SP/DP/[place]/Sa  Motor intact T/SP/DP  WWP extremities  FCDs in place and functioning    Significant Labs: All pertinent labs within the past 24 hours have been reviewed.    Significant Imaging: I have reviewed all pertinent imaging results/findings.  Assessment/Plan:     * Primary osteoarthritis of left knee  Windy Lindo is a 74 y.o. female is s/p L TKA on 5/19/25.    Surgical dressing C/D/I, bulky dressing taken down today, polar ice in place  Pain control: multimodal, Anesthesia Surgical Home following  PT/OT: WBAT LLE  DVT PPx: 81mg aspirin x1 dose @ 2100 on POD#0 then resume Eliquis 5mg BID x30 days starting 0900 POD#1, FCDs at all times when not ambulating  Abx: postop Ancef, cefadroxil at discharge per [place] data  Drain: none  Benjamin: none   PT  Pour/bethanechol    Dispo: plan to dc to home today once cleared by PT/OT               Raimundo Sears MD  Orthopedics  [facility] [facility] ([facility])

## 2025-05-20 NOTE — ASSESSMENT & PLAN NOTE
Windy Lindo is a 74 y.o. female is s/p L TKA on 5/19/25.    Surgical dressing C/D/I, bulky dressing taken down today, polar ice in place  Pain control: multimodal, Anesthesia Surgical Home following  PT/OT: WBAT LLE  DVT PPx: 81mg aspirin x1 dose @ 2100 on POD#0 then resume Eliquis 5mg BID x30 days starting 0900 POD#1, FCDs at all times when not ambulating  Abx: postop Ancef, cefadroxil at discharge per [place] data  Drain: none  Benjamin: none   PT  Pour/bethanechol    Dispo: plan to dc to home today once cleared by PT/OT

## 2025-05-20 NOTE — PROGRESS NOTES
Acute Pain Service and Perioperative Surgical Home Progress Note    Interval history  Windy Lindo is a 74 y.o., female, status post arthroplasty of the left knee with no acute events overnight.  Pain well controlled overnight.  Patient tolerating p.o. and voiding well.  Reassuring physical exam.  Vital signs stable throughout the evening.    Surgery:  Procedure(s) (LRB):  ARTHROPLASTY, KNEE, TOTAL, USING [employer] COMPUTER-ASSISTED NAVIGATION: LEFT: DEPUY - ATTUNE (Left)    Post Op Day #: 1    Problem List:    Active Hospital Problems    Diagnosis  POA    *Primary osteoarthritis of left knee [M17.12]  Yes      Resolved Hospital Problems   No resolved problems to display.       Subjective:       General appearance of alert, oriented, no complaints   Pain with rest: 2    Numbers   Pain with movement: 3    Numbers   Side Effects    1. Pruritis No    2. Nausea Yes    3. Motor Blockade No, 0=Ability to raise lower extremities off bed    4. Sedation No, 1=awake and alert    Schedule Medications:    acetaminophen  1,000 mg Oral Q6H    apixaban  5 mg Oral BID    ceFAZolin (Ancef) IV (PEDS and ADULTS)  2 g Intravenous Q8H    dronedarone  400 mg Oral BID WM    electrolytes-dextrose  400 mL Oral Q4H    famotidine  20 mg Oral BID    methocarbamoL  750 mg Oral TID    mupirocin  1 g Nasal BID    polyethylene glycol  17 g Oral Daily    pregabalin  75 mg Oral QHS    senna-docusate  1 tablet Oral BID        Continuous Infusions:       PRN Medications:    Current Facility-Administered Medications:     bisacodyL, 10 mg, Rectal, Q12H PRN    morphine, 2 mg, Intravenous, Q3H PRN    naloxone, 0.02 mg, Intravenous, PRN    ondansetron, 4 mg, Intravenous, Q8H PRN    oxyCODONE, 5 mg, Oral, Q3H PRN    oxyCODONE, 10 mg, Oral, Q3H PRN    prochlorperazine, 5 mg, Intravenous, Q6H PRN       Antibiotics:  Antibiotics (From admission, onward)      Start     Stop Route Frequency Ordered    05/19/25 2100  ceFAZolin 2 g  (MEDICATIONS - ANTIBIOTICS NO  "PENICILLIN ALLERGY TOTAL KNEE PATHWAY POST-OP)         05/20/25 1259 IV Every 8 hours (non-standard times) 05/19/25 1447    05/19/25 2100  mupirocin 2 % ointment 1 g         05/24/25 2059 Nasl 2 times daily 05/19/25 1624               Objective:         Vital Signs (Most Recent):  Temp: 97.1 °F (36.2 °C) (05/20/25 0333)  Pulse: 67 (05/20/25 0333)  Resp: 16 (05/20/25 0333)  BP: (!) 110/58 (05/20/25 0333)  SpO2: 97 % (05/20/25 0333) Vital Signs Range (Last 24H):  Temp:  [97 °F (36.1 °C)-97.9 °F (36.6 °C)]   Pulse:  [67-86]   Resp:  [12-21]   BP: (110-158)/(56-80)   SpO2:  [94 %-99 %]          I & O (Last 24H):  Intake/Output Summary (Last 24 hours) at 5/20/2025 0535  Last data filed at 5/19/2025 2220  Gross per 24 hour   Intake 1910 ml   Output 1650 ml   Net 260 ml       Physical Exam:    GA: Alert, comfortable, no acute distress.   Pulmonary: Clear to auscultation . Normal respiratory ratei.  Cardiac: regular rate and rhythm.          Laboratory: reviewed in chart  CBC: No results for input(s): "WBC", "RBC", "HGB", "HCT", "PLT", "MCV", "MCH", "MCHC" in the last 72 hours.    BMP: No results for input(s): "NA", "K", "CO2", "CL", "BUN", "CREATININE", "GLU", "MG", "PHOS", "CALCIUM" in the last 72 hours.    No results for input(s): "PT", "INR", "PROTIME", "APTT" in the last 72 hours.          Assessment:         Pain control adequate    Plan:  1) Pain: Multimodal pain regimen ordered which includes acetaminophen, celecoxib, pregabalin, and prn oxycodone.  Well controlled on current regimen.  Will continue to monitor.    2) atrial fibrillation, continue home regimen, begin Eliquis today   3) hypertension and GERD, continue home regimen   4) FEN/GI: Tolerating regular diet.     5) Dispo: Pt working well with PT/OT. Case management and SW following along for setting up home health and physical therapy. Plan to discharge home this am.              Evaluator Jean Claude Flores PA-C                "

## 2025-05-20 NOTE — SUBJECTIVE & OBJECTIVE
"Principal Problem:Primary osteoarthritis of left knee    Principal Orthopedic Problem: same, s/p L TKA 5/19    Interval History: Pt seen and examined at bedside. NAEON, VSS, AF. Pain controlled. Denies fevers, chills, chest pain, SOB, N/V/D. Has been able to void. Pending PT/OT eval.      Review of patient's allergies indicates:  No Known Allergies    Current Facility-Administered Medications   Medication    acetaminophen tablet 1,000 mg    apixaban tablet 5 mg    bisacodyL suppository 10 mg    dronedarone tablet 400 mg    electrolytes-dextrose (Pedialyte) oral solution 400 mL    famotidine tablet 20 mg    methocarbamoL tablet 750 mg    morphine injection 2 mg    mupirocin 2 % ointment 1 g    naloxone 0.4 mg/mL injection 0.02 mg    ondansetron injection 4 mg    oxyCODONE immediate release tablet 5 mg    oxyCODONE immediate release tablet Tab 10 mg    polyethylene glycol packet 17 g    pregabalin capsule 75 mg    prochlorperazine injection Soln 5 mg    senna-docusate 8.6-50 mg per tablet 1 tablet     Objective:     Vital Signs (Most Recent):  Temp: 97.1 °F (36.2 °C) (05/20/25 0333)  Pulse: 67 (05/20/25 0333)  Resp: 18 (05/20/25 0600)  BP: (!) 110/58 (05/20/25 0333)  SpO2: 97 % (05/20/25 0333) Vital Signs (24h Range):  Temp:  [97 °F (36.1 °C)-97.9 °F (36.6 °C)] 97.1 °F (36.2 °C)  Pulse:  [67-86] 67  Resp:  [12-21] 18  SpO2:  [94 %-99 %] 97 %  BP: (110-158)/(56-80) 110/58     Weight: 71.7 kg (158 lb)  Height: 5' 2" (157.5 cm)  Body mass index is 28.9 kg/m².      Intake/Output Summary (Last 24 hours) at 5/20/2025 0654  Last data filed at 5/20/2025 0540  Gross per 24 hour   Intake 2110 ml   Output 2100 ml   Net 10 ml        Ortho/SPM Exam   AAOx4  NAD  Reg rate  No increased WOB    LLE:  Dressing c/d/i  SILT T/SP/DP/[place]/Sa  Motor intact T/SP/DP  WWP extremities  FCDs in place and functioning    Significant Labs: All pertinent labs within the past 24 hours have been reviewed.    Significant Imaging: I have reviewed all " pertinent imaging results/findings.

## 2025-05-20 NOTE — NURSING
Has met unit/department guidelines for discharge from each phase of the post procedure continuum. Patient discharged.  Instructions, placed in dc folder and Prescriptions given.  IV removed, tolerated well, w/ catheter intact, no redness or swelling to area. Dressing to left knee remains CDI. Patient verbalized understanding instructions.  AAOx3, VSS, NADN, no complaints of pain noted at this time.  Wheelchair to private vehicle in care of cousinTerra.  All personal belongings sent with pt.  Blue Bracelet given applied to pts wrist and instructions given to call # on bracelet w/any surgery related issues.

## 2025-05-20 NOTE — NURSING
Night shift, RN reported the patient's tele monitor displayed an episode of A fib overnight, but patient remained asymptomatic. During morning rounds, Dr. Jasmine with APS was notified about the episode and was ok to proceed with discharge given the patient's cardiac history. Pt  received home med of Dronedarone 400 mg at 0835. Pt also tolerated PT and OT well while remaining in NSR with HR 70s-80s.

## 2025-05-20 NOTE — PT/OT/SLP PROGRESS
"Occupational Therapy   Treatment and Discharge Note    Name: Windy Lindo  MRN: [id number]  Admitting Diagnosis:  Primary osteoarthritis of left knee  1 Day Post-Op    Recommendations:     Discharge Recommendations: Low Intensity Therapy  Discharge Equipment Recommendations:  bedside commode  Barriers to discharge:  None    Assessment:     Windy Lindo is a 74 y.o. female with a medical diagnosis of Primary osteoarthritis of left knee.  She presents with L TKA. Performance deficits affecting function are impaired self care skills, impaired functional mobility, orthopedic precautions. Pt was able to perform supine/sit T/F c S and Sit <> Stand Transfer with modified independence with rolling walker Bed <> Chair Transfer using Stand Pivot technique with modified independence with rolling walker Toilet Transfer Stand Pivot technique with modified independence with rolling walker and bedside commode.  Able to perform UB/LB dressing c modified independence  Educated pt on bathing, car transfers, and cryotherapy.       Rehab Prognosis:  Good; patient would benefit from acute skilled OT services to address these deficits and reach maximum level of function.       Plan:     Patient to be seen daily to address the above listed problems via self-care/home management, therapeutic activities, therapeutic exercises  Plan of Care Expires: 05/20/25  Plan of Care Reviewed with: patient, family    Subjective     Chief Complaint: L TKA  Patient/Family Comments/goals: "I feel better."  Pain/Comfort:  Pain Rating 1: 0/10    Objective:     Communicated with: RN prior to session.  Patient found supine with cryotherapy, FCD upon OT entry to room.    General Precautions: Standard, fall    Orthopedic Precautions:LLE weight bearing as tolerated  Braces: N/A  Respiratory Status: Room air     Occupational Performance:     Bed Mobility:    Patient completed Supine to Sit with supervision     Functional Mobility/Transfers:  Patient completed " Sit <> Stand Transfer with contact guard assistance  with  rolling walker   Patient completed Bed <> Chair Transfer using Stand Pivot technique with contact guard assistance with rolling walker  Patient completed Toilet Transfer Stand Pivot technique with contact guard assistance with  rolling walker and bedside commode  Functional Mobility: Pt was able to walk from bed to bathroom and then to chair c CGA and RW.    Activities of Daily Living:  Grooming: contact guard assistance to brush hair, brush teeth, and wash off face while standing at sink c RW.  Upper Body Dressing: modified independence to don shirt.  Lower Body Dressing: modified independence to don underwear, pants, socks, and shoes.      [facility] 6 Click ADL: 24    Patient left up in chair with all lines intact, call button in reach, and RN notified    GOALS:   Multidisciplinary Problems       Occupational Therapy Goals          Problem: Occupational Therapy    Goal Priority Disciplines Outcome Interventions   Occupational Therapy Goal     OT, PT/OT Progressing    Description: Goals to be met by: 5/20/25     Patient will increase functional independence with ADLs by performing:    UE Dressing with Modified [place].  LE Dressing with Modified [place] and Assistive Devices as needed.  Grooming while standing at sink with Modified [place].  Toileting from bedside commode with Modified [place] for hygiene and clothing management.   Bathing from  shower chair/bench with Modified [place].  Toilet transfer to bedside commode with Modified [place].                         DME Justifications:  No DME recommended requiring DME justifications    Time Tracking:     OT Date of Treatment: 05/20/25  OT Start Time: 0755  OT Stop Time: 0822  OT Total Time (min): 27 min    Billable Minutes:Self Care/Home Management 14  Therapeutic Activity 13               5/20/2025

## 2025-05-20 NOTE — DISCHARGE SUMMARY
[facility])  Orthopedics  Discharge Summary      Patient Name: Windy Lindo  MRN: [id number]  Admission Date: 5/19/2025  Hospital Length of Stay: 0 days  Discharge Date and Time: 5/20/2025 10:56 AM  Attending Physician: Christina alejandre. providers found   Discharging Provider: Raimundo Sears MD  Primary Care Provider: Trisha Higginbotham MD    HPI:   Windy Lindo is a 74 y.o. female with history of Left knee pain. Pain is worse with activity and weight bearing.  Patient has experienced interference of activities of daily living due to decreased range of motion and an increase in joint pain and swelling.  Patient has failed non-operative treatment including NSAIDs, corticosteroid injections, viscosupplement injections, and activity modification.  Windy Lindo currently ambulates using assistive device.       Relevant medical conditions of significance in perioperative period:  HTN- monitored by PCP  HLD- monitored by PCP  Paroxysmal atrial fibrillation- recent ablation on 02/13/2025 currently taking Multaq and Eliquis 5 mg monitored by Cardiology  Thoracic aortic aneurysm- monitored by Cardiology  Osteoporosis- on medication managed by PCP  Hx of GI bleed- monitored by PCP    Procedure(s) (LRB):  ARTHROPLASTY, KNEE, TOTAL, USING [employer] COMPUTER-ASSISTED NAVIGATION: LEFT: DEPUY - ATTVY (Left)      Hospital Course:  On 5/19/25, the patient arrived to the [facility] for proper pre-operative management.  Upon completion of pre-operative preparation, the patient was taken back to the operative theatre. L TKA was performed without complication and the patient was transported to the post anesthesia care unit in stable condition.  After appropriate recovery from the anaesthetic agents used during the surgery, the patient was then transported to the hospital inpatient floor.  The interim of the hospital stay from arrival on the floor up to discharge has been uncomplicated. The patient has tolerated  regular diet.  The patient's pain has been controlled using a multimodal approach. Currently, the patient's pain is well controlled on an oral regimen.  The patient has been voiding without difficulty.  The patient began participation in physical therapy after surgery and has progressed throughout the entire hospital stay.  Currently, the patient's progress is sufficient to allow the them to be discharged to home safely.  The patient agrees with this assessment and desires a discharge today.      Goals of Care Treatment Preferences:  Code Status: Full Code          Significant Diagnostic Studies: Labs: All labs within the past 24 hours have been reviewed    Pending Diagnostic Studies:       None          Final Active Diagnoses:    Diagnosis Date Noted POA    PRINCIPAL PROBLEM:  Primary osteoarthritis of left knee [M17.12] 05/19/2025 Yes      Problems Resolved During this Admission:      Discharged Condition: good    Disposition: Home or Self Care    Follow Up:   Follow-up Information       Mac Brice III, MD Follow up in 2 week(s).    Specialty: Orthopedic Surgery  Why: For wound re-check  Contact information:  [address] [place] [id number]  [phone]                           Patient Instructions:      Activity as tolerated     Sponge bath only until clinic visit     Keep surgical extremity elevated     Lifting restrictions   Order Comments: No strenuous exercise or lifting of > 10 lbs     Weight bearing as tolerated     No driving, operating heavy equipment or signing legal documents while taking pain medication     Leave dressing on - Keep it clean, dry, and intact until clinic visit   Order Comments: Do not remove surgical dressing for 2 weeks post-op. This will be done only by MD at initial post-op visit. If dressing is completely saturated, replace with identical dressing - silver-impregnated hydrocolloid dressing.     Do not get dressings wet. Do not shower.     If dressing continues to  be saturated or there are signs of infection, please call [facility] [phone] for further instructions and to make appt to be seen.     Call MD for:  temperature >100.4     Call MD for:  persistent nausea and vomiting     Call MD for:  severe uncontrolled pain     Call MD for:  difficulty breathing, headache or visual disturbances     Call MD for:  redness, tenderness, or signs of infection (pain, swelling, redness, odor or green/yellow discharge around incision site)     Call MD for:  hives     Call MD for:  persistent dizziness or light-headedness     Call MD for:  extreme fatigue     Notify your health care provider if you experience any of the following:  temperature >100.4     Notify your health care provider if you experience any of the following:  persistent nausea and vomiting or diarrhea     Notify your health care provider if you experience any of the following:  severe uncontrolled pain     Notify your health care provider if you experience any of the following:  redness, tenderness, or signs of infection (pain, swelling, redness, odor or green/yellow discharge around incision site)     Notify your health care provider if you experience any of the following:  difficulty breathing or increased cough     Notify your health care provider if you experience any of the following:  severe persistent headache     Notify your health care provider if you experience any of the following:  worsening rash     Notify your health care provider if you experience any of the following:  persistent dizziness, light-headedness, or visual disturbances     Notify your health care provider if you experience any of the following:  increased confusion or weakness     Leave dressing on - Keep it clean, dry, and intact until clinic visit     Activity as tolerated     Medications:  Reconciled Home Medications:      Medication List        CHANGE how you take these medications      acetaminophen 650 MG Tbsr  Commonly known as:  TYLENOL  Take 1 tablet (650 mg total) by mouth every 8 (eight) hours.  What changed: Another medication with the same name was removed. Continue taking this medication, and follow the directions you see here.     ofloxacin 0.3 % ophthalmic solution  Commonly known as: OCUFLOX  Place 1 drop into the left eye 3 (three) times daily.  What changed: Another medication with the same name was removed. Continue taking this medication, and follow the directions you see here.     pantoprazole 40 MG tablet  Commonly known as: PROTONIX  Take 1 tablet (40 mg total) by mouth once daily.  What changed: Another medication with the same name was removed. Continue taking this medication, and follow the directions you see here.     prednisoLONE acetate 1 % Drps  Commonly known as: PRED FORTE  Place 1 drop into the left eye 3 (three) times daily.  What changed: Another medication with the same name was removed. Continue taking this medication, and follow the directions you see here.            CONTINUE taking these medications      apixaban 5 mg Tab  Commonly known as: ELIQUIS  Take 1 tablet (5 mg total) by mouth 2 (two) times daily.     b complex vitamins capsule  Take 1 capsule by mouth once daily.     cefadroxil 500 MG Cap  Commonly known as: DURICEF  Take 1 capsule (500 mg total) by mouth every 12 (twelve) hours.     celecoxib 200 MG capsule  Commonly known as: CeleBREX  Take 1 capsule (200 mg total) by mouth once daily.     * ketorolac 0.5% 0.5 % Drop  Commonly known as: ACULAR  Place 1 drop into the right eye 3 (three) times daily.     * ketorolac 0.5% 0.5 % Drop  Commonly known as: ACULAR  Place 1 drop into the left eye 3 (three) times daily.     * ketorolac 0.5% 0.5 % Drop  Commonly known as: ACULAR  Place 1 drop into the left eye 3 (three) times daily.     methocarbamoL 750 MG Tab  Commonly known as: ROBAXIN  Take 1 tablet (750 mg total) by mouth 4 (four) times daily as needed (muscle spasms).     MULTAQ 400 mg Tab  Generic  drug: dronedarone  Take 400 mg by mouth 2 (two) times daily with meals.     multivit-min-FA-lycopen-lutein 0.4 mg-300 mcg- 250 mcg Tab  Take 1 tablet by mouth once daily.     oxyCODONE 5 MG immediate release tablet  Commonly known as: ROXICODONE  Take 1-2 tablets every 4-6 hours as needed for pain     polyethylene glycol 17 gram/dose powder  Commonly known as: GLYCOLAX  Take 17 g by mouth once daily.     PROLIA 60 mg/mL Syrg  Generic drug: denosumab  Inject 60 mg into the skin every 6 (six) months.     vitamin D 1000 units Tab  Commonly known as: VITAMIN D3  Take 1,000 Units by mouth once daily.     vitamin E 100 UNIT capsule  Take 100 Units by mouth once daily.           * This list has 3 medication(s) that are the same as other medications prescribed for you. Read the directions carefully, and ask your doctor or other care provider to review them with you.                STOP taking these medications      acetaminophen-codeine 300-30mg 300-30 mg Tab  Commonly known as: TYLENOL #3     amoxicillin 500 MG Tab  Commonly known as: AMOXIL              Raimundo Sears MD  Orthopedics  [facility])

## 2025-05-20 NOTE — PLAN OF CARE
Problem: Occupational Therapy  Goal: Occupational Therapy Goal  Description: Goals to be met by: 5/20/25     Patient will increase functional independence with ADLs by performing:    UE Dressing with Modified [place].  LE Dressing with Modified [place] and Assistive Devices as needed.  Grooming while standing at sink with Modified [place].  Toileting from bedside commode with Modified [place] for hygiene and clothing management.   Bathing from  shower chair/bench with Modified [place].  Toilet transfer to bedside commode with Modified [place].    Outcome: Progressing

## 2025-05-20 NOTE — PT/OT/SLP EVAL
"Occupational Therapy Evaluation and Treatment    Name: Windy Lindo  MRN: [id number]  Admitting Diagnosis: Primary osteoarthritis of left knee * Day of Surgery *  Recent Surgery: Procedure(s) (LRB):  ARTHROPLASTY, KNEE, TOTAL, USING [employer] COMPUTER-ASSISTED NAVIGATION: LEFT: DEPUY - ATTUNE (Left) * Day of Surgery *    Recommendations:     Discharge Recommendations: Low Intensity Therapy  Level of Assistance Recommended: 24 hours supervision  Discharge Equipment Recommendations: bedside commode  Barriers to discharge: None    Assessment:     Windy Lindo is a 74 y.o. female with a medical diagnosis of Primary osteoarthritis of left knee. She presents with performance deficits affecting function including impaired self care skills, impaired functional mobility, orthopedic precautions. Pt was able to perform supine/sit T/F c S and sit/stand, stand pivot to BSC over toilet, and stand pivot to chair c CGA and RW.  Was able to walk from bed to bathroom and then back to chair c CGA and RW.  Pt is progressing well.      Rehab Prognosis: Good; patient would benefit from acute OT services to address these deficits and reach maximum level of function.    Plan:     Patient to be seen daily to address the above listed problems via self-care/home management, therapeutic activities, therapeutic exercises  Plan of Care Expires: 05/20/25  Plan of Care Reviewed with: patient, family    Subjective     Chief Complaint: L TKA  Patient Comments/Goals: "I've been walking on the side of my foot for awhile."  Pain/Comfort:  Pain Rating 1: 0/10    Patients cultural, spiritual, Latter-day conflicts given the current situation: no    Social History:  Living Environment: Patient lives alone in a single story home with number of outside stair(s): 3 and walk-in shower  Prior Level of Function: Prior to admission, patient was independent.  Roles and Routines: Patient was driving and retired prior to admission.  Equipment Used at Home: walker, " rolling, shower chair  DME owned (not currently used): rolling walker and shower chair  Assistance Upon Discharge: Pt's cousin and neighbors will assist her.    Objective:     Communicated with RN prior to session. Patient found supine with cryotherapy, FCD, peripheral IV upon OT entry to room.    General Precautions: Standard, fall   Orthopedic Precautions: LLE weight bearing as tolerated   Braces: N/A    Respiratory Status: Room air    Occupational Performance    Gait belt applied - Yes    Bed Mobility:   Supine to sit from left side of bed with supervision    Functional Mobility/Transfers:  Sit <> Stand Transfer with contact guard assistance with rolling walker  Bed <> Chair Transfer using Stand Pivot technique with contact guard assistance with rolling walker  Toilet Transfer Stand Pivot technique with contact guard assistance with rolling walker and bedside commode  Functional Mobility: Pt was able to walk from bed to bathroom and then back to chair c CGA and RW.    Activities of Daily Living:  Upper Body Dressing: minimum assistance to don hospital gown.      Physical Exam:  Upper Extremity Range of Motion:     -       Right Upper Extremity: WFL  -       Left Upper Extremity: WFL  Upper Extremity Strength:    -       Right Upper Extremity: WFL  -       Left Upper Extremity: WFL    AMPAC 6 Click ADL:  AMPAC Total Score: 16    Treatment & Education:  Educated on the importance of mobility to maximize recovery  Educated on the importance of OOB mobility within safe range in order to decrease adverse effects of prolonged bedrest  Educated on safety with functional mobility; hand placement to ensure safe transfers to various surfaces in prep for ADLs  Educated on performing functional mobility and ADLs in adherence to orthopedic precautions  Educated on weight bearing status  Will continue to educate as needed      Patient clear to ambulate to/from bathroom with RN/PCT, assist x1.    Patient left up in chair with  all lines intact, call button in reach, and RN notified.    GOALS:   Multidisciplinary Problems       Occupational Therapy Goals          Problem: Occupational Therapy    Goal Priority Disciplines Outcome Interventions   Occupational Therapy Goal     OT, PT/OT Progressing    Description: Goals to be met by: 5/20/25     Patient will increase functional independence with ADLs by performing:    UE Dressing with Modified [place].  LE Dressing with Modified [place] and Assistive Devices as needed.  Grooming while standing at sink with Modified [place].  Toileting from bedside commode with Modified [place] for hygiene and clothing management.   Bathing from  shower chair/bench with Modified [place].  Toilet transfer to bedside commode with Modified [place].                         DME Justifications:   Windy requires a commode for home use because she is confined to one level of the home environment and there is no toilet on that level.    History:     Past Medical History:   Diagnosis Date    Arthritis     Atrial fibrillation     Cataract     Constipation 11/15/2024    Noted on xray  Bowel regimen      Eye injury 4 yrs    hit od    GERD (gastroesophageal reflux disease)     Heart murmur     Hyperlipidemia     Hypertension 04/04/2023    Iron deficiency anemia 10/07/2024    Nuclear sclerosis, bilateral 02/13/2019    Osteoporosis          Past Surgical History:   Procedure Laterality Date    ABLATION OF ARRHYTHMOGENIC FOCUS FOR ATRIAL FIBRILLATION N/A 2/13/2025    Procedure: Ablation atrial fibrillation;  Surgeon: Carlos Temple MD;  Location: [facility] EP LAB;  Service: Cardiology;  Laterality: N/A;  AF, JANE (Cx if SR), PVI, PFA, CHAYA, BSci, Gen, NM, 3 Prep    APPENDECTOMY      COLONOSCOPY N/A 09/07/2017    Procedure: COLONOSCOPY;  Surgeon: Albino Fenton MD;  Location: [facility] ENDO ([address] [place] [address]);  Service: Endoscopy;  Laterality: N/A;    COLONOSCOPY N/A 10/09/2024    Procedure: COLONOSCOPY;  Surgeon:  Albino Fenton MD;  Location: [facility] of [facility] ENDOSCOPY;  Service: Endoscopy;  Laterality: N/A;    ESOPHAGOGASTRODUODENOSCOPY  09/07/2017    ESOPHAGOGASTRODUODENOSCOPY N/A 10/09/2024    Procedure: EGD (ESOPHAGOGASTRODUODENOSCOPY);  Surgeon: Albino Fenton MD;  Location: [facility] of [facility] ENDOSCOPY;  Service: Endoscopy;  Laterality: N/A;  Ref By:solange Jaramillo suprep.  10/1/24- pc complete. DBM    KNEE SURGERY Right 12/05/2017    TKR    kyphosplasty      LASIK Bilateral     PHACOEMULSIFICATION, CATARACT, WITH IOL INSERTION Right 3/19/2025    Procedure: PHACOEMULSIFICATION, CATARACT, WITH IOL INSERTION;  Surgeon: Flaca Varghese MD;  Location: [facility] of [facility] OR;  Service: Ophthalmology;  Laterality: Right;    TONSILLECTOMY         Time Tracking:     OT Date of Treatment: 05/19/25  OT Start Time: 1615  OT Stop Time: 1640  OT Total Time (min): 25 min    Billable Minutes: Evaluation 12 and Self Care/Home Management 13    5/19/2025

## 2025-05-20 NOTE — PLAN OF CARE
Plan of care reviewed with patient; verbalized understanding.   Medications reviewed and administered as ordered. Safety precautions maintained throughout shift, rounding for safety and patient care per policy, remained free of falls/injury.    Pain managed with medications as seen on MAR. Call light within reach, bed wheels locked, bed in lowest position, side rails up x2, safety maintained. NADN or further needs voiced.     Problem: Knee Arthroplasty  Goal: Effective Urinary Elimination  Outcome: Progressing  Intervention: Monitor and Manage Urinary Retention  Flowsheets (Taken 5/20/2025 0621)  Urinary Elimination Promotion:   voiding relaxation promoted   toileting offered   positioned for ease of voiding   frequent voiding encouraged     Problem: Fall Injury Risk  Goal: Absence of Fall and Fall-Related Injury  Outcome: Progressing  Intervention: Identify and Manage Contributors  Flowsheets (Taken 5/20/2025 0621)  Medication Review/Management:   medications reviewed   high-risk medications identified  Intervention: Promote Injury-Free Environment  Flowsheets (Taken 5/20/2025 0621)  Safety Promotion/Fall Prevention:   assistive device/personal item within reach   Fall Risk reviewed with patient/family   instructed to call staff for mobility   lighting adjusted   medications reviewed   muscle strengthening facilitated   nonskid shoes/socks when out of bed   high risk medications identified   patient expresses understanding of fall risk and prevention     Problem: Comorbidity Management  Goal: Blood Pressure in Desired Range  Outcome: Progressing  Intervention: Maintain Blood Pressure Management  Flowsheets (Taken 5/20/2025 0621)  Medication Review/Management:   medications reviewed   high-risk medications identified

## 2025-05-20 NOTE — HOSPITAL COURSE
On 5/19/25, the patient arrived to the [facility] for proper pre-operative management.  Upon completion of pre-operative preparation, the patient was taken back to the operative theatre. L TKA was performed without complication and the patient was transported to the post anesthesia care unit in stable condition.  After appropriate recovery from the anaesthetic agents used during the surgery, the patient was then transported to the hospital inpatient floor.  The interim of the hospital stay from arrival on the floor up to discharge has been uncomplicated. The patient has tolerated regular diet.  The patient's pain has been controlled using a multimodal approach. Currently, the patient's pain is well controlled on an oral regimen.  The patient has been voiding without difficulty.  The patient began participation in physical therapy after surgery and has progressed throughout the entire hospital stay.  Currently, the patient's progress is sufficient to allow the them to be discharged to home safely.  The patient agrees with this assessment and desires a discharge today.

## 2025-05-20 NOTE — PLAN OF CARE
Problem: Adult Inpatient Plan of Care  Goal: Plan of Care Review  Outcome: Met  Flowsheets (Taken 5/20/2025 0921)  Plan of Care Reviewed With: patient  Goal: Patient-Specific Goal (Individualized)  Outcome: Met  Flowsheets (Taken 5/20/2025 0921)  Individualized Care Needs: manage pain  Goal: Absence of Hospital-Acquired Illness or Injury  Outcome: Met  Goal: Optimal Comfort and Wellbeing  Outcome: Met  Goal: Readiness for Transition of Care  Outcome: Met     Problem: Pain Acute  Goal: Optimal Pain Control and Function  Outcome: Met  Intervention: Develop Pain Management Plan  Flowsheets (Taken 5/20/2025 0921)  Pain Management Interventions:   around-the-clock dosing utilized   breathing exercises utilized   care clustered   pillow support provided   pain management plan reviewed with patient/caregiver   quiet environment facilitated   relaxation techniques promoted   warm blanket provided  Intervention: Prevent or Manage Pain  Flowsheets (Taken 5/20/2025 0921)  Medication Review/Management:   medications reviewed   high-risk medications identified     Problem: Wound  Goal: Optimal Coping  Outcome: Met  Goal: Optimal Functional Ability  Outcome: Met  Goal: Absence of Infection Signs and Symptoms  Outcome: Met  Goal: Improved Oral Intake  Outcome: Met  Goal: Optimal Pain Control and Function  Outcome: Met  Goal: Skin Health and Integrity  Outcome: Met  Goal: Optimal Wound Healing  Outcome: Met     Problem: Infection  Goal: Absence of Infection Signs and Symptoms  Outcome: Met     Problem: Knee Arthroplasty  Goal: Optimal Coping  Outcome: Met  Goal: Absence of Bleeding  Outcome: Met  Intervention: Monitor and Manage Bleeding  Flowsheets (Taken 5/20/2025 0921)  Bleeding Management: dressing monitored  Goal: Effective Bowel Elimination  Outcome: Met  Goal: Fluid and Electrolyte Balance  Outcome: Met  Goal: Optimal Functional Ability  Outcome: Met  Goal: Absence of Infection Signs and Symptoms  Outcome: Met  Goal:  Intact Neurovascular Status  Outcome: Met  Goal: Anesthesia/Sedation Recovery  Outcome: Met  Goal: Optimal Pain Control and Function  Outcome: Met  Goal: Nausea and Vomiting Relief  Outcome: Met  Intervention: Prevent or Manage Nausea and Vomiting  Flowsheets (Taken 5/20/2025 0921)  Nausea/Vomiting Interventions:   stimuli minimized   nausea triggers minimized  Goal: Effective Urinary Elimination  Outcome: Met  Goal: Effective Oxygenation and Ventilation  Outcome: Met     Problem: Fall Injury Risk  Goal: Absence of Fall and Fall-Related Injury  Outcome: Met  Intervention: Identify and Manage Contributors  Flowsheets (Taken 5/20/2025 0921)  Medication Review/Management:   medications reviewed   high-risk medications identified  Intervention: Promote Injury-Free Environment  Flowsheets (Taken 5/20/2025 0921)  Safety Promotion/Fall Prevention:   assistive device/personal item within reach   Fall Risk reviewed with patient/family   family expresses understanding of fall risk and prevention   high risk medications identified   instructed to call staff for mobility   lighting adjusted   medications reviewed   nonskid shoes/socks when out of bed   patient expresses understanding of fall risk and prevention   side rails raised x 2   Supervised toileting - stay within arms reach     Problem: Comorbidity Management  Goal: Blood Pressure in Desired Range  Outcome: Met  Intervention: Maintain Blood Pressure Management  Flowsheets (Taken 5/20/2025 0921)  Medication Review/Management:   medications reviewed   high-risk medications identified       Plan of care reviewed with patient, verbalized understanding. Medications reviewed and given as ordered. Rounding for safety and patient care per policy. Safety precautions maintained. Call light within reach, bed wheels locked, bed in lowest position, side rails up x2, patient instructed to call for assistance, DME at bedside.

## 2025-05-20 NOTE — HPI
Windy Lindo is a 74 y.o. female with history of Left knee pain. Pain is worse with activity and weight bearing.  Patient has experienced interference of activities of daily living due to decreased range of motion and an increase in joint pain and swelling.  Patient has failed non-operative treatment including NSAIDs, corticosteroid injections, viscosupplement injections, and activity modification.  Windy Lindo currently ambulates using assistive device.       Relevant medical conditions of significance in perioperative period:  HTN- monitored by PCP  HLD- monitored by PCP  Paroxysmal atrial fibrillation- recent ablation on 02/13/2025 currently taking Multaq and Eliquis 5 mg monitored by Cardiology  Thoracic aortic aneurysm- monitored by Cardiology  Osteoporosis- on medication managed by PCP  Hx of GI bleed- monitored by PCP

## 2025-05-21 ENCOUNTER — CLINICAL SUPPORT (OUTPATIENT)
Dept: ORTHOPEDICS | Facility: CLINIC | Age: 75
End: 2025-05-21
Payer: MEDICARE

## 2025-05-21 DIAGNOSIS — Z96.652 S/P TOTAL KNEE REPLACEMENT, LEFT: Primary | ICD-10-CM

## 2025-05-21 NOTE — PROGRESS NOTES
I called the patient today regarding her  surgery with Dr. Brice. The patient had a left TKA on 5/19/25.     Pain Scale: 10 / 10    Any issues with Fever: Yes: , reports one temp of 100.1    Any issues with medications (specifically DVT prophylaxis): No.    Pain medication: no;  Celebrex : no  Protonix : no  Resume home meds : Yes    Any issues with:   Bowel movements: Yes: , no bm since sx, taking stool softener  Passing jordy: No.  Urination: No.    Completing at home exercises: Yes    Any concerns regarding their dressing/bandage:  No.    Patient confirmed first HH-pt appointment:  has not set up yet, I will reach out after this visit    Any other concerns: No.    Blue Bracelet : yes    The patient was informed that if they have any urgent issues with their bandage, medications or any other health concerns regarding their surgery to call the 24/7 Orthopedic Post-op Hot Line at [phone]. The patient was reminded that if they have any chest pain or shortness of breath to call 911 or go to the ER.    The patient verbalized understanding and does not have any other questions

## 2025-05-22 ENCOUNTER — PATIENT MESSAGE (OUTPATIENT)
Dept: ADMINISTRATIVE | Facility: OTHER | Age: 75
End: 2025-05-22
Payer: MEDICARE

## 2025-06-03 ENCOUNTER — OFFICE VISIT (OUTPATIENT)
Dept: ORTHOPEDICS | Facility: CLINIC | Age: 75
End: 2025-06-03
Payer: MEDICARE

## 2025-06-03 VITALS — BODY MASS INDEX: 28.8 KG/M2 | WEIGHT: 156.5 LBS | HEIGHT: 62 IN

## 2025-06-03 DIAGNOSIS — Z96.652 S/P TOTAL KNEE REPLACEMENT, LEFT: Primary | ICD-10-CM

## 2025-06-03 PROCEDURE — 1125F AMNT PAIN NOTED PAIN PRSNT: CPT | Mod: CPTII,S$GLB,,

## 2025-06-03 PROCEDURE — 1101F PT FALLS ASSESS-DOCD LE1/YR: CPT | Mod: CPTII,S$GLB,,

## 2025-06-03 PROCEDURE — 99999 PR PBB SHADOW E&M-EST. PATIENT-LVL III: CPT | Mod: PBBFAC,,,

## 2025-06-03 PROCEDURE — 1160F RVW MEDS BY RX/DR IN RCRD: CPT | Mod: CPTII,S$GLB,,

## 2025-06-03 PROCEDURE — 3288F FALL RISK ASSESSMENT DOCD: CPT | Mod: CPTII,S$GLB,,

## 2025-06-03 PROCEDURE — 1159F MED LIST DOCD IN RCRD: CPT | Mod: CPTII,S$GLB,,

## 2025-06-03 PROCEDURE — 99024 POSTOP FOLLOW-UP VISIT: CPT | Mod: S$GLB,,,

## 2025-06-03 RX ORDER — OXYCODONE HYDROCHLORIDE 5 MG/1
TABLET ORAL
Qty: 30 TABLET | Refills: 0 | Status: SHIPPED | OUTPATIENT
Start: 2025-06-03

## 2025-06-03 RX ORDER — OXYCODONE HYDROCHLORIDE 5 MG/1
TABLET ORAL
Qty: 50 TABLET | Refills: 0 | Status: CANCELLED | OUTPATIENT
Start: 2025-06-03

## 2025-06-06 ENCOUNTER — CLINICAL SUPPORT (OUTPATIENT)
Dept: REHABILITATION | Facility: HOSPITAL | Age: 75
End: 2025-06-06
Payer: MEDICARE

## 2025-06-06 DIAGNOSIS — R26.89 DECREASED FUNCTIONAL MOBILITY: Primary | ICD-10-CM

## 2025-06-06 PROCEDURE — 97014 ELECTRIC STIMULATION THERAPY: CPT

## 2025-06-06 PROCEDURE — 97164 PT RE-EVAL EST PLAN CARE: CPT

## 2025-06-06 PROCEDURE — 97112 NEUROMUSCULAR REEDUCATION: CPT

## 2025-06-09 ENCOUNTER — DOCUMENTATION ONLY (OUTPATIENT)
Dept: REHABILITATION | Facility: HOSPITAL | Age: 75
End: 2025-06-09
Payer: MEDICARE

## 2025-06-09 ENCOUNTER — PATIENT MESSAGE (OUTPATIENT)
Dept: FAMILY MEDICINE | Facility: CLINIC | Age: 75
End: 2025-06-09
Payer: MEDICARE

## 2025-06-09 NOTE — PROGRESS NOTES
Physical Therapy: No show/Cancellation of Visit  Date: 06/09/2025    Patient was a cancel to today's PT appointment. Reason for cancellation: Patient not feeling well via phone call. Patient's next scheduled appointment is 6/11/2025 at 10 AM.    Cancel: 1 (consecutive)  No show: 0     Therapist: Payal Foster PT

## 2025-06-10 ENCOUNTER — INFUSION (OUTPATIENT)
Dept: INFECTIOUS DISEASES | Facility: HOSPITAL | Age: 75
End: 2025-06-10
Payer: MEDICARE

## 2025-06-10 VITALS
SYSTOLIC BLOOD PRESSURE: 128 MMHG | HEIGHT: 62 IN | TEMPERATURE: 98 F | OXYGEN SATURATION: 98 % | HEART RATE: 73 BPM | WEIGHT: 160.63 LBS | RESPIRATION RATE: 20 BRPM | DIASTOLIC BLOOD PRESSURE: 60 MMHG | BODY MASS INDEX: 29.56 KG/M2

## 2025-06-10 DIAGNOSIS — M81.0 AGE-RELATED OSTEOPOROSIS WITHOUT CURRENT PATHOLOGICAL FRACTURE: Primary | ICD-10-CM

## 2025-06-10 PROCEDURE — 96372 THER/PROPH/DIAG INJ SC/IM: CPT

## 2025-06-10 PROCEDURE — 63600175 PHARM REV CODE 636 W HCPCS: Mod: JZ,TB | Performed by: FAMILY MEDICINE

## 2025-06-10 RX ADMIN — DENOSUMAB 60 MG: 60 INJECTION SUBCUTANEOUS at 09:06

## 2025-06-11 ENCOUNTER — CLINICAL SUPPORT (OUTPATIENT)
Dept: REHABILITATION | Facility: HOSPITAL | Age: 75
End: 2025-06-11
Payer: MEDICARE

## 2025-06-11 ENCOUNTER — HOSPITAL ENCOUNTER (OUTPATIENT)
Dept: RADIOLOGY | Facility: HOSPITAL | Age: 75
Discharge: HOME | End: 2025-06-11
Attending: NURSE PRACTITIONER
Payer: MEDICARE

## 2025-06-11 DIAGNOSIS — Z96.652 S/P TOTAL KNEE REPLACEMENT, LEFT: ICD-10-CM

## 2025-06-11 DIAGNOSIS — R60.0 LOCALIZED EDEMA: ICD-10-CM

## 2025-06-11 DIAGNOSIS — R60.0 LOCALIZED EDEMA: Primary | ICD-10-CM

## 2025-06-11 DIAGNOSIS — R26.89 DECREASED FUNCTIONAL MOBILITY: Primary | ICD-10-CM

## 2025-06-11 PROCEDURE — 93971 EXTREMITY STUDY: CPT | Mod: TC,LT

## 2025-06-11 PROCEDURE — 93971 EXTREMITY STUDY: CPT | Mod: 26,LT,, | Performed by: RADIOLOGY

## 2025-06-11 PROCEDURE — 97014 ELECTRIC STIMULATION THERAPY: CPT | Mod: CQ

## 2025-06-11 PROCEDURE — 97112 NEUROMUSCULAR REEDUCATION: CPT | Mod: CQ

## 2025-06-11 NOTE — PROGRESS NOTES
Outpatient Rehab    Physical Therapy Visit    Patient Name: Windy Lindo  MRN: [id number]  YOB: 1950  Encounter Date: 6/11/2025    Therapy Diagnosis:   Encounter Diagnosis   Name Primary?    Decreased functional mobility Yes     Physician: Mac Brice III, *    Physician Orders: Eval and Treat  Medical Diagnosis: Pain in left knee  Surgical Diagnosis: Not applicable for this Episode   Surgical Date: Not applicable for this Episode    Visit # / Visits Authorized:  7 / 12  Insurance Authorization Period: 4/22/2025 to 6/30/2025  Date of Evaluation: 4/28/2025  Plan of Care Certification: 6/6/2025 to 7/18/2025      PT/PTA: PTA   Number of PTA visits since last PT visit:1  Time In: 1000   Time Out: 1115  Total Time (in minutes): 75   Total Billable Time (in minutes): 30    FOTO:  Intake Score: 42%  Survey Score 2:  %  Survey Score 3:  %    Precautions: none    Subjective   Patient reports her hip and back have been bothering her. She presents today with increased swelling in her ankle and lower leg, she also endorses medial calf pain.  Pain reported as 5/10. Left knee    Objective  Objective Measures updated at progress report unless specified.     Treatment:  Balance/Neuromuscular Re-Education  NMR 1: Patient education: HEP compliance, swelling management, ROM expectations post TKA, importance of extension ROM for proper gait cycle, size F Tubigrip for swelling reduction  NMR 2: Russian NMES 10 sec on/20 sec off including: quad sets with towel roll x 12 minutes, SAQs with medium bolster x 12 minutes, SAQs with 1/2 foam x 8 minutes, LAQs at EOM x 00 minutes  NMR 3: elevation on double wedge x 10 minutes at beginning of session for swelling reduction    Time Entry(in minutes):  E-Stim (Unattended) Time Entry: 32  Hot/Cold Pack Time Entry: 5  Neuromuscular Re-Education Time Entry: 70    Assessment & Plan   Assessment: Patient presents with pitting edema present in Left ankle and lower leg along with  medial calf tenderness, positive Pedro's sign observed. Contacted MD's office regarding recent change in status. Initiated use of NMES for quadriceps strengthening and motor control with good tolerance. Provided size F Tubigrip for swelling reduction with instruction on leaving applied throughout the day, she verbalized understanding.  Evaluation/Treatment Tolerance: Patient tolerated treatment well    The patient will continue to benefit from skilled outpatient physical therapy in order to address the deficits listed in the problem list on the initial evaluation, provide patient and family education, and maximize the patients level of independence in the home and community environments.     The patient's spiritual, cultural, and educational needs were considered, and the patient is agreeable to the plan of care and goals.           Plan: Will continue per POC towards treatment goals. PT/PTA met face to face to discuss patient's treatment plan and progress towards established goals. Patient will be seen by physical therapist every sixth visit and minimally once per month.    Goals:   Active       long term goals       Pt will improve impaired lower extremity manual muscle tests to >/= 4+/5 to improve dynamic L knee support for closed chain tasks. (Progressing)       Start:  06/06/25    Expected End:  07/18/25            Patient will improve the total FOTO Knee Survey Score to </= 40% limited to demonstrate increased perceived functional mobility. (Progressing)       Start:  06/06/25    Expected End:  07/18/25            Patient will perform timed up and go with least restrictive assistive device in < 10 seconds to improve gait speed and safety with community ambulation. (Progressing)       Start:  06/06/25    Expected End:  07/18/25            Patient will perform at least 8-10 sit to stands in 30 seconds without UE support to demonstrate increased functional strength.  (Progressing)       Start:  06/06/25     Expected End:  07/18/25               short term goals       Patient will be independent with home exercise program to supplement physical therapy treatment in improving functional status. (Progressing)       Start:  06/06/25    Expected End:  06/27/25            Pt will improve impaired lower extremity manual muscle tests to >/= 4/5 to improve dynamic L knee support for closed chain tasks.  (Progressing)       Start:  06/06/25    Expected End:  06/27/25            Patient will improve (L) knee active range of motion to 0-90 degrees to promote increased ease of sit<>stand transfers. (Progressing)       Start:  06/06/25    Expected End:  06/27/25            Patient will perform timed up and go with less restrictive assistive device in < 15 seconds to improve gait speed and safety with community ambulation. (Progressing)       Start:  06/06/25              Resolved       long term goals       Pt will be independent with HEP to supplement physical therapy treatment in improving functional status.  (Met)       Start:  04/30/25    Expected End:  05/16/25    Resolved:  05/15/25         Pt will demonstrate improved impaired lower extremity strength by 1/2 grade to promote functional mobility.  (Met)       Start:  04/30/25    Expected End:  05/16/25    Resolved:  05/15/25         Patient will require <2 verbal and tactile cue to engage quadricep without compensation to demonstrate improved quadriceps control and awareness.  (Met)       Start:  04/30/25    Expected End:  05/16/25    Resolved:  05/15/25             Ankita Rodrigez, PTA

## 2025-06-12 ENCOUNTER — PATIENT MESSAGE (OUTPATIENT)
Dept: ORTHOPEDICS | Facility: CLINIC | Age: 75
End: 2025-06-12
Payer: MEDICARE

## 2025-06-13 ENCOUNTER — CLINICAL SUPPORT (OUTPATIENT)
Dept: REHABILITATION | Facility: HOSPITAL | Age: 75
End: 2025-06-13
Payer: MEDICARE

## 2025-06-13 DIAGNOSIS — R26.89 DECREASED FUNCTIONAL MOBILITY: Primary | ICD-10-CM

## 2025-06-13 PROCEDURE — 97014 ELECTRIC STIMULATION THERAPY: CPT | Mod: CQ

## 2025-06-13 PROCEDURE — 97112 NEUROMUSCULAR REEDUCATION: CPT | Mod: CQ

## 2025-06-13 NOTE — PROGRESS NOTES
Outpatient Rehab    Physical Therapy Visit    Patient Name: Windy Lindo  MRN: [id number]  YOB: 1950  Encounter Date: 6/13/2025    Therapy Diagnosis:   Encounter Diagnosis   Name Primary?    Decreased functional mobility Yes     Physician: Mac Brice III, *    Physician Orders: Eval and Treat  Medical Diagnosis: Pain in left knee  Surgical Diagnosis: Not applicable for this Episode   Surgical Date: Not applicable for this Episode    Visit # / Visits Authorized:  8 / 12  Insurance Authorization Period: 4/22/2025 to 6/30/2025  Date of Evaluation: 4/28/2025  Plan of Care Certification: 6/6/2025 to 7/18/2025      PT/PTA: PTA   Number of PTA visits since last PT visit:2  Time In: 1323   Time Out: 1426  Total Time (in minutes): 63   Total Billable Time (in minutes): 23    FOTO:  Intake Score: 42%  Survey Score 2:  %  Survey Score 3:  %    Precautions: none    Subjective   Patient reports there is no DVT, but she does have a Baker's Cyst. She reports improvement in her swelling compared to previous session. She denies pain at rest, but has increased posterior knee/calf pain with bending.  Pain reported as 5/10. Left knee    Objective  Objective Measures updated at progress report unless specified.     Treatment:  Balance/Neuromuscular Re-Education  NMR 1: Patient education: HEP compliance, swelling management, ROM expectations post TKA, importance of extension ROM for proper gait cycle, size F Tubigrip for swelling reduction  NMR 2: Russian NMES 10 sec on/20 sec off including: quad sets with towel roll x 8 minutes, SAQs with medium bolster + 2 lb AW x 12 minutes, SAQs with 1/2 foam x 12 minutes, LAQs at EOM + 2 lb AW x 10 minutes  Therapeutic Activity  TA 1: Patient education: plan of care post op and prehab, HEP, prognosis, treatment options, load progression, and expectations post op  TA 2: step ups onto 4-inch step: 1 x 12 reps with cueing to avoid UE use    Time Entry(in minutes):  E-Stim  (Unattended) Time Entry: 42  Neuromuscular Re-Education Time Entry: 55  Therapeutic Activity Time Entry: 8    Assessment & Plan   Assessment: Addition of step ups this session to address functional strength deficits. Good tolerance to addition of 2 lb AW with short and long arc quads noting adequate muscle response throughout.  Evaluation/Treatment Tolerance: Patient tolerated treatment well    The patient will continue to benefit from skilled outpatient physical therapy in order to address the deficits listed in the problem list on the initial evaluation, provide patient and family education, and maximize the patients level of independence in the home and community environments.     The patient's spiritual, cultural, and educational needs were considered, and the patient is agreeable to the plan of care and goals.           Plan: Will continue per POC towards treatment goals. PT/PTA met face to face to discuss patient's treatment plan and progress towards established goals. Patient will be seen by physical therapist every sixth visit and minimally once per month.    Goals:   Active       long term goals       Pt will improve impaired lower extremity manual muscle tests to >/= 4+/5 to improve dynamic L knee support for closed chain tasks. (Progressing)       Start:  06/06/25    Expected End:  07/18/25            Patient will improve the total FOTO Knee Survey Score to </= 40% limited to demonstrate increased perceived functional mobility. (Progressing)       Start:  06/06/25    Expected End:  07/18/25            Patient will perform timed up and go with least restrictive assistive device in < 10 seconds to improve gait speed and safety with community ambulation. (Progressing)       Start:  06/06/25    Expected End:  07/18/25            Patient will perform at least 8-10 sit to stands in 30 seconds without UE support to demonstrate increased functional strength.  (Progressing)       Start:  06/06/25    Expected End:   07/18/25               short term goals       Patient will be independent with home exercise program to supplement physical therapy treatment in improving functional status. (Progressing)       Start:  06/06/25    Expected End:  06/27/25            Pt will improve impaired lower extremity manual muscle tests to >/= 4/5 to improve dynamic L knee support for closed chain tasks.  (Progressing)       Start:  06/06/25    Expected End:  06/27/25            Patient will improve (L) knee active range of motion to 0-90 degrees to promote increased ease of sit<>stand transfers. (Progressing)       Start:  06/06/25    Expected End:  06/27/25            Patient will perform timed up and go with less restrictive assistive device in < 15 seconds to improve gait speed and safety with community ambulation. (Progressing)       Start:  06/06/25              Resolved       long term goals       Pt will be independent with HEP to supplement physical therapy treatment in improving functional status.  (Met)       Start:  04/30/25    Expected End:  05/16/25    Resolved:  05/15/25         Pt will demonstrate improved impaired lower extremity strength by 1/2 grade to promote functional mobility.  (Met)       Start:  04/30/25    Expected End:  05/16/25    Resolved:  05/15/25         Patient will require <2 verbal and tactile cue to engage quadricep without compensation to demonstrate improved quadriceps control and awareness.  (Met)       Start:  04/30/25    Expected End:  05/16/25    Resolved:  05/15/25             Ankita Rodrigez, PTA

## 2025-06-16 ENCOUNTER — CLINICAL SUPPORT (OUTPATIENT)
Dept: REHABILITATION | Facility: HOSPITAL | Age: 75
End: 2025-06-16
Payer: MEDICARE

## 2025-06-16 DIAGNOSIS — R26.89 DECREASED FUNCTIONAL MOBILITY: Primary | ICD-10-CM

## 2025-06-16 PROCEDURE — 97112 NEUROMUSCULAR REEDUCATION: CPT

## 2025-06-16 PROCEDURE — 97014 ELECTRIC STIMULATION THERAPY: CPT

## 2025-06-16 PROCEDURE — 97530 THERAPEUTIC ACTIVITIES: CPT

## 2025-06-17 ENCOUNTER — PATIENT MESSAGE (OUTPATIENT)
Dept: REHABILITATION | Facility: HOSPITAL | Age: 75
End: 2025-06-17
Payer: MEDICARE

## 2025-06-18 ENCOUNTER — CLINICAL SUPPORT (OUTPATIENT)
Dept: REHABILITATION | Facility: HOSPITAL | Age: 75
End: 2025-06-18
Payer: MEDICARE

## 2025-06-18 ENCOUNTER — PATIENT MESSAGE (OUTPATIENT)
Dept: ADMINISTRATIVE | Facility: OTHER | Age: 75
End: 2025-06-18
Payer: MEDICARE

## 2025-06-18 DIAGNOSIS — R26.89 DECREASED FUNCTIONAL MOBILITY: Primary | ICD-10-CM

## 2025-06-18 PROCEDURE — 97112 NEUROMUSCULAR REEDUCATION: CPT | Mod: CQ

## 2025-06-18 PROCEDURE — 97014 ELECTRIC STIMULATION THERAPY: CPT | Mod: CQ

## 2025-06-18 NOTE — PROGRESS NOTES
Outpatient Rehab    Physical Therapy Visit    Patient Name: Windy Lindo  MRN: [id number]  YOB: 1950  Encounter Date: 6/18/2025    Therapy Diagnosis:   Encounter Diagnosis   Name Primary?    Decreased functional mobility Yes     Physician: Mac Brice III, *    Physician Orders: Eval and Treat  Medical Diagnosis: Pain in left knee  Surgical Diagnosis: Left TKA   Surgical Date: 5/19/2025  Days Since Last Surgery: 30    Visit # / Visits Authorized:  10 / 20  Insurance Authorization Period: 4/22/2025 to 7/18/2025  Date of Evaluation: 4/28/2025  Plan of Care Certification: 6/6/2025 to 7/18/2025      PT/PTA: PTA   Number of PTA visits since last PT visit:1  Time In: 1000   Time Out: 1111  Total Time (in minutes): 71   Total Billable Time (in minutes): 30    FOTO:  Intake Score: 42%  Survey Score 2: 59%  Survey Score 3:  %    Precautions: none    Subjective   Patient reports no new complaints today.  Pain reported as 1/10. Left knee    Objective  Objective Measures updated at progress report unless specified.     Treatment:  Balance/Neuromuscular Re-Education  NMR 1: Patient education: HEP compliance, swelling management, ROM expectations post TKA, importance of extension ROM for proper gait cycle, size F Tubigrip for swelling reduction  NMR 2: Russian NMES 10 sec on/20 sec off including: SAQs with medium bolster + 3 lb AW x 12 minutes, SAQs with 1/2 foam x 12 minutes, LAQs at EOM + 3 lb AW x 12 minutes  NMR 3: ambulation in clinic with SPC  feet - emphasis on quad activiation and cane coordination  Therapeutic Activity  TA 1: Patient education: plan of care post op and prehab, HEP, prognosis, treatment options, load progression, and expectations post op  TA 2: step ups onto 4-inch step: 2x10 reps with cueing to avoid UE use - SBA    Time Entry(in minutes):  E-Stim (Unattended) Time Entry: 36  Hot/Cold Pack Time Entry: 10  Neuromuscular Re-Education Time Entry: 49  Therapeutic Activity Time  Entry: 12    Assessment & Plan   Assessment: Windy is 4 weeks, 2 days s/p Left TKA. She presents ambulating with RW. Ambulation with SPC performed in clinic with decreased stance time on Left LE secondary to noted posterior knee discomfort. Continued NMES for quad strengthening and motor control.   Evaluation/Treatment Tolerance: Patient tolerated treatment well    The patient will continue to benefit from skilled outpatient physical therapy in order to address the deficits listed in the problem list on the initial evaluation, provide patient and family education, and maximize the patients level of independence in the home and community environments.     The patient's spiritual, cultural, and educational needs were considered, and the patient is agreeable to the plan of care and goals.           Plan: Will continue per POC towards treatment goals. PT/PTA met face to face to discuss patient's treatment plan and progress towards established goals. Patient will be seen by physical therapist every sixth visit and minimally once per month.    Goals:   Active       long term goals       Pt will improve impaired lower extremity manual muscle tests to >/= 4+/5 to improve dynamic L knee support for closed chain tasks. (Progressing)       Start:  06/06/25    Expected End:  07/18/25            Patient will improve the total FOTO Knee Survey Score to </= 40% limited to demonstrate increased perceived functional mobility. (Progressing)       Start:  06/06/25    Expected End:  07/18/25            Patient will perform timed up and go with least restrictive assistive device in < 10 seconds to improve gait speed and safety with community ambulation. (Progressing)       Start:  06/06/25    Expected End:  07/18/25            Patient will perform at least 8-10 sit to stands in 30 seconds without UE support to demonstrate increased functional strength.  (Progressing)       Start:  06/06/25    Expected End:  07/18/25               short  term goals       Patient will be independent with home exercise program to supplement physical therapy treatment in improving functional status. (Progressing)       Start:  06/06/25    Expected End:  06/27/25            Pt will improve impaired lower extremity manual muscle tests to >/= 4/5 to improve dynamic L knee support for closed chain tasks.  (Progressing)       Start:  06/06/25    Expected End:  06/27/25            Patient will improve (L) knee active range of motion to 0-90 degrees to promote increased ease of sit<>stand transfers. (Progressing)       Start:  06/06/25    Expected End:  06/27/25            Patient will perform timed up and go with less restrictive assistive device in < 15 seconds to improve gait speed and safety with community ambulation. (Progressing)       Start:  06/06/25              Resolved       long term goals       Pt will be independent with HEP to supplement physical therapy treatment in improving functional status.  (Met)       Start:  04/30/25    Expected End:  05/16/25    Resolved:  05/15/25         Pt will demonstrate improved impaired lower extremity strength by 1/2 grade to promote functional mobility.  (Met)       Start:  04/30/25    Expected End:  05/16/25    Resolved:  05/15/25         Patient will require <2 verbal and tactile cue to engage quadricep without compensation to demonstrate improved quadriceps control and awareness.  (Met)       Start:  04/30/25    Expected End:  05/16/25    Resolved:  05/15/25             Ankita Rodrigez, PTA

## 2025-06-18 NOTE — PROGRESS NOTES
Outpatient Rehab    Physical Therapy Visit    Patient Name: Windy Lindo  MRN: [id number]  YOB: 1950  Encounter Date: 6/16/2025    Therapy Diagnosis:   Encounter Diagnosis   Name Primary?    Decreased functional mobility Yes     Physician: Mac Brice III, *    Physician Orders: Eval and Treat  Medical Diagnosis: Pain in left knee  Surgical Diagnosis: Not applicable for this Episode   Surgical Date: Not applicable for this Episode  Days Since Last Surgery: Not applicable for this Episode    Visit # / Visits Authorized:  9 / 12  Insurance Authorization Period: 4/22/2025 to 6/30/2025  Date of Evaluation: 4/28/2025  Plan of Care Certification: 6/6/2025 to 7/18/2025      PT/PTA:     Number of PTA visits since last PT visit:   Time In: 1001   Time Out: 1120  Total Time (in minutes): 79   Total Billable Time (in minutes): 23    FOTO:  Intake Score:  %  Survey Score 2:  %  Survey Score 3:  %    Precautions:       Subjective   She has been elevating 4-5 hours every day..  Pain reported as 5/10. Left knee    Objective            Treatment:  Balance/Neuromuscular Re-Education  NMR 1: Patient education: HEP compliance, swelling management, ROM expectations post TKA, importance of extension ROM for proper gait cycle, size F Tubigrip for swelling reduction  NMR 2: Russian NMES 10 sec on/20 sec off including: quad sets with towel roll x 8 minutes, SAQs with medium bolster + 2 lb AW x 12 minutes, SAQs with 1/2 foam x 12 minutes, LAQs at EOM + 2 lb AW x 10 minutes  NMR 3: ambulation in clinic with SPC  feet - emphasis on quad activiation and cane coordination  Therapeutic Activity  TA 2: step ups onto 4-inch step: 2x10 reps with cueing to avoid UE use - SBA    Time Entry(in minutes):  E-Stim (Unattended) Time Entry: 42  Hot/Cold Pack Time Entry: 10  Neuromuscular Re-Education Time Entry: 57  Therapeutic Activity Time Entry: 12    Assessment & Plan   Assessment: Resumed step ups this session with some  difficulty notably fear of falling. Demonstrates 6-0-90 degrees of AROM. Heavy education regarding consistency with HEP and challenging her self at home like she does in clinic.   Evaluation/Treatment Tolerance: Patient tolerated treatment well    The patient will continue to benefit from skilled outpatient physical therapy in order to address the deficits listed in the problem list on the initial evaluation, provide patient and family education, and maximize the patients level of independence in the home and community environments.     The patient's spiritual, cultural, and educational needs were considered, and the patient is agreeable to the plan of care and goals.           Plan: Will continue per POC towards treatment goals. PT/PTA met face to face to discuss patient's treatment plan and progress towards established goals. Patient will be seen by physical therapist every sixth visit and minimally once per month.    Goals:   Active       long term goals       Pt will improve impaired lower extremity manual muscle tests to >/= 4+/5 to improve dynamic L knee support for closed chain tasks. (Progressing)       Start:  06/06/25    Expected End:  07/18/25            Patient will improve the total FOTO Knee Survey Score to </= 40% limited to demonstrate increased perceived functional mobility. (Progressing)       Start:  06/06/25    Expected End:  07/18/25            Patient will perform timed up and go with least restrictive assistive device in < 10 seconds to improve gait speed and safety with community ambulation. (Progressing)       Start:  06/06/25    Expected End:  07/18/25            Patient will perform at least 8-10 sit to stands in 30 seconds without UE support to demonstrate increased functional strength.  (Progressing)       Start:  06/06/25    Expected End:  07/18/25               short term goals       Patient will be independent with home exercise program to supplement physical therapy treatment in  improving functional status. (Progressing)       Start:  06/06/25    Expected End:  06/27/25            Pt will improve impaired lower extremity manual muscle tests to >/= 4/5 to improve dynamic L knee support for closed chain tasks.  (Progressing)       Start:  06/06/25    Expected End:  06/27/25            Patient will improve (L) knee active range of motion to 0-90 degrees to promote increased ease of sit<>stand transfers. (Progressing)       Start:  06/06/25    Expected End:  06/27/25            Patient will perform timed up and go with less restrictive assistive device in < 15 seconds to improve gait speed and safety with community ambulation. (Progressing)       Start:  06/06/25              Resolved       long term goals       Pt will be independent with HEP to supplement physical therapy treatment in improving functional status.  (Met)       Start:  04/30/25    Expected End:  05/16/25    Resolved:  05/15/25         Pt will demonstrate improved impaired lower extremity strength by 1/2 grade to promote functional mobility.  (Met)       Start:  04/30/25    Expected End:  05/16/25    Resolved:  05/15/25         Patient will require <2 verbal and tactile cue to engage quadricep without compensation to demonstrate improved quadriceps control and awareness.  (Met)       Start:  04/30/25    Expected End:  05/16/25    Resolved:  05/15/25             Naya Brown, PT, DPT, OCS

## 2025-06-20 ENCOUNTER — CLINICAL SUPPORT (OUTPATIENT)
Dept: REHABILITATION | Facility: HOSPITAL | Age: 75
End: 2025-06-20
Payer: MEDICARE

## 2025-06-20 DIAGNOSIS — R26.89 DECREASED FUNCTIONAL MOBILITY: Primary | ICD-10-CM

## 2025-06-20 PROCEDURE — 97530 THERAPEUTIC ACTIVITIES: CPT

## 2025-06-20 NOTE — PROGRESS NOTES
Outpatient Rehab    Physical Therapy Visit    Patient Name: Windy Lindo  MRN: [id number]  YOB: 1950  Encounter Date: 6/20/2025    Therapy Diagnosis:   Encounter Diagnosis   Name Primary?    Decreased functional mobility Yes     Physician: Mac Brice III, *    Physician Orders: Eval and Treat  Medical Diagnosis: Pain in left knee  Surgical Diagnosis: Left TKA   Surgical Date: 5/19/2025  Days Since Last Surgery: 32    Visit # / Visits Authorized:  11 / 20  Insurance Authorization Period: 4/22/2025 to 7/18/2025  Date of Evaluation: 4/28/2025  Plan of Care Certification: 6/6/2025 to 7/18/2025      PT/PTA:     Number of PTA visits since last PT visit:   Time In: 1000   Time Out: 1100  Total Time (in minutes): 60   Total Billable Time (in minutes): 30    FOTO:  Intake Score:  %  Survey Score 2:  %  Survey Score 3:  %    Precautions:       Subjective   reports doing okay today, but ongoing knee pain and swelling.  Pain reported as 5/10. Left knee    Objective            Treatment:  Balance/Neuromuscular Re-Education  NMR 1: Patient education: HEP compliance, swelling management, ROM expectations post TKA, importance of extension ROM for proper gait cycle, size F Tubigrip for swelling reduction  NMR 2: LAQ: 3 lbs 5 seconds, 3x10; short arc quad with 1/2 foam: 10 seconds, 20x  NMR 6: recumebent boke 1/2 circles for normal gait mechanics and active knee flexion  Therapeutic Activity  TA 3: shuttle double leg press: 50 lbs, 3 x 10 reps -- shuttle single leg press: 12.5 weight 3 x 10 reps each  TA 4: sit to stands 2x10    Time Entry(in minutes):  Neuromuscular Re-Education Time Entry: 20  Therapeutic Activity Time Entry: 40    Assessment & Plan   Assessment: Windy is 4 weeks, 4 days s/p Left TKA. She presents ambulating with RW; however encouraged to bring SPC to each PT visit to gait train. Addition of shuttle leg press for functional LE strengthening followed by sit to stands. Greatly challenged with  "sit to stands using "Noes over toes" technique.  Evaluation/Treatment Tolerance: Patient tolerated treatment well    The patient will continue to benefit from skilled outpatient physical therapy in order to address the deficits listed in the problem list on the initial evaluation, provide patient and family education, and maximize the patients level of independence in the home and community environments.     The patient's spiritual, cultural, and educational needs were considered, and the patient is agreeable to the plan of care and goals.           Plan: Will continue per POC towards treatment goals. PT/PTA met face to face to discuss patient's treatment plan and progress towards established goals. Patient will be seen by physical therapist every sixth visit and minimally once per month.    Goals:   Active       long term goals       Pt will improve impaired lower extremity manual muscle tests to >/= 4+/5 to improve dynamic L knee support for closed chain tasks. (Progressing)       Start:  06/06/25    Expected End:  07/18/25            Patient will improve the total FOTO Knee Survey Score to </= 40% limited to demonstrate increased perceived functional mobility. (Progressing)       Start:  06/06/25    Expected End:  07/18/25            Patient will perform timed up and go with least restrictive assistive device in < 10 seconds to improve gait speed and safety with community ambulation. (Progressing)       Start:  06/06/25    Expected End:  07/18/25            Patient will perform at least 8-10 sit to stands in 30 seconds without UE support to demonstrate increased functional strength.  (Progressing)       Start:  06/06/25    Expected End:  07/18/25               short term goals       Patient will be independent with home exercise program to supplement physical therapy treatment in improving functional status. (Progressing)       Start:  06/06/25    Expected End:  06/27/25            Pt will improve impaired lower " extremity manual muscle tests to >/= 4/5 to improve dynamic L knee support for closed chain tasks.  (Progressing)       Start:  06/06/25    Expected End:  06/27/25            Patient will improve (L) knee active range of motion to 0-90 degrees to promote increased ease of sit<>stand transfers. (Progressing)       Start:  06/06/25    Expected End:  06/27/25            Patient will perform timed up and go with less restrictive assistive device in < 15 seconds to improve gait speed and safety with community ambulation. (Progressing)       Start:  06/06/25              Resolved       long term goals       Pt will be independent with HEP to supplement physical therapy treatment in improving functional status.  (Met)       Start:  04/30/25    Expected End:  05/16/25    Resolved:  05/15/25         Pt will demonstrate improved impaired lower extremity strength by 1/2 grade to promote functional mobility.  (Met)       Start:  04/30/25    Expected End:  05/16/25    Resolved:  05/15/25         Patient will require <2 verbal and tactile cue to engage quadricep without compensation to demonstrate improved quadriceps control and awareness.  (Met)       Start:  04/30/25    Expected End:  05/16/25    Resolved:  05/15/25             Naya Brown, PT, DPT, OCS

## 2025-06-23 ENCOUNTER — CLINICAL SUPPORT (OUTPATIENT)
Dept: REHABILITATION | Facility: HOSPITAL | Age: 75
End: 2025-06-23
Payer: MEDICARE

## 2025-06-23 DIAGNOSIS — R26.89 DECREASED FUNCTIONAL MOBILITY: Primary | ICD-10-CM

## 2025-06-23 PROCEDURE — 97530 THERAPEUTIC ACTIVITIES: CPT | Mod: CQ

## 2025-06-23 NOTE — PROGRESS NOTES
Outpatient Rehab    Physical Therapy Visit    Patient Name: Windy Lindo  MRN: [id number]  YOB: 1950  Encounter Date: 6/23/2025    Therapy Diagnosis:   Encounter Diagnosis   Name Primary?    Decreased functional mobility Yes     Physician: Mac Brice III, *    Physician Orders: Eval and Treat  Medical Diagnosis: Pain in left knee  Surgical Diagnosis: Left TKA   Surgical Date: 5/19/2025  Days Since Last Surgery: 35    Visit # / Visits Authorized:  12 / 20  Insurance Authorization Period: 4/22/2025 to 7/18/2025  Date of Evaluation: 4/28/2025  Plan of Care Certification: 6/6/2025 to 7/18/2025      PT/PTA: PTA   Number of PTA visits since last PT visit:1  Time In: 1000   Time Out: 1105  Total Time (in minutes): 65   Total Billable Time (in minutes): 30    FOTO:  Intake Score: 42%  Survey Score 2: 59%  Survey Score 3:  %    Precautions: none    Subjective   Patient reports increased pain today in her knee and ankle, she notes forgetting to take a pain pill prior to therapy. She reports an 8 out of 10 pain level in her knee and ankle, a 5 out of 10 in just her knee joint.  Pain reported as 5/10. Left knee    Objective  Objective Measures updated at progress report unless specified.     Treatment:  Balance/Neuromuscular Re-Education  NMR 1: Patient education: HEP compliance, swelling management, ROM expectations post TKA, importance of extension ROM for proper gait cycle, size F Tubigrip for swelling reduction  NMR 2: LAQ at EOM + 3 lb AW: 5-10 sec hold, 3 x 10 reps; short arc quads with 1/2 foam: 10 seconds, 20 reps  NMR 3: ambulation in clinic with SPC  feet - emphasis on quad activiation and cane coordination  NMR 6: Recumbent Bike 1/2 circles for 5 minutes, full revolutions for 5 minutes normal gait mechanics and active knee flexion (seat 10)  Therapeutic Activity  TA 1: Patient education: plan of care post op and prehab, HEP, prognosis, treatment options, load progression, and  expectations post op  TA 3: shuttle double leg press: 50 lbs, 3 x 10 reps -- shuttle single leg press: 12.5 lbs, 3 x 10 reps each  Modalities  Cryotherapy (Minutes\Location): cold pack for 10 minutes to Left knee post session with LE elevated    Time Entry(in minutes):  Hot/Cold Pack Time Entry: 10  Neuromuscular Re-Education Time Entry: 32  Therapeutic Activity Time Entry: 23    Assessment & Plan   Assessment: Windy is 5 weeks, 0 days s/p Left TKA. Presents ambulating with cane. Notes increased shortness of breath throughout session requiring frequent seated rest breaks. Held sit to stands and step ups per patient request. Consider resuming next visit pending patient presentation and pain level.  Evaluation/Treatment Tolerance: Patient limited by pain    The patient will continue to benefit from skilled outpatient physical therapy in order to address the deficits listed in the problem list on the initial evaluation, provide patient and family education, and maximize the patients level of independence in the home and community environments.     The patient's spiritual, cultural, and educational needs were considered, and the patient is agreeable to the plan of care and goals.           Plan: Will continue per POC towards treatment goals. PT/PTA met face to face to discuss patient's treatment plan and progress towards established goals. Patient will be seen by physical therapist every sixth visit and minimally once per month.    Goals:   Active       long term goals       Pt will improve impaired lower extremity manual muscle tests to >/= 4+/5 to improve dynamic L knee support for closed chain tasks. (Progressing)       Start:  06/06/25    Expected End:  07/18/25            Patient will improve the total FOTO Knee Survey Score to </= 40% limited to demonstrate increased perceived functional mobility. (Progressing)       Start:  06/06/25    Expected End:  07/18/25            Patient will perform timed up and go with  least restrictive assistive device in < 10 seconds to improve gait speed and safety with community ambulation. (Progressing)       Start:  06/06/25    Expected End:  07/18/25            Patient will perform at least 8-10 sit to stands in 30 seconds without UE support to demonstrate increased functional strength.  (Progressing)       Start:  06/06/25    Expected End:  07/18/25               short term goals       Patient will be independent with home exercise program to supplement physical therapy treatment in improving functional status. (Progressing)       Start:  06/06/25    Expected End:  06/27/25            Pt will improve impaired lower extremity manual muscle tests to >/= 4/5 to improve dynamic L knee support for closed chain tasks.  (Progressing)       Start:  06/06/25    Expected End:  06/27/25            Patient will improve (L) knee active range of motion to 0-90 degrees to promote increased ease of sit<>stand transfers. (Progressing)       Start:  06/06/25    Expected End:  06/27/25            Patient will perform timed up and go with less restrictive assistive device in < 15 seconds to improve gait speed and safety with community ambulation. (Progressing)       Start:  06/06/25              Resolved       long term goals       Pt will be independent with HEP to supplement physical therapy treatment in improving functional status.  (Met)       Start:  04/30/25    Expected End:  05/16/25    Resolved:  05/15/25         Pt will demonstrate improved impaired lower extremity strength by 1/2 grade to promote functional mobility.  (Met)       Start:  04/30/25    Expected End:  05/16/25    Resolved:  05/15/25         Patient will require <2 verbal and tactile cue to engage quadricep without compensation to demonstrate improved quadriceps control and awareness.  (Met)       Start:  04/30/25    Expected End:  05/16/25    Resolved:  05/15/25             Ankita Rodrigez, PTA

## 2025-06-25 ENCOUNTER — CLINICAL SUPPORT (OUTPATIENT)
Dept: REHABILITATION | Facility: HOSPITAL | Age: 75
End: 2025-06-25
Payer: MEDICARE

## 2025-06-25 DIAGNOSIS — R26.89 DECREASED FUNCTIONAL MOBILITY: Primary | ICD-10-CM

## 2025-06-25 PROCEDURE — 97530 THERAPEUTIC ACTIVITIES: CPT

## 2025-06-25 NOTE — PROGRESS NOTES
Outpatient Rehab    Physical Therapy Visit    Patient Name: Windy Lindo  MRN: [id number]  YOB: 1950  Encounter Date: 6/25/2025    Therapy Diagnosis:   Encounter Diagnosis   Name Primary?    Decreased functional mobility Yes     Physician: Mac Brice III, *    Physician Orders: Eval and Treat  Medical Diagnosis: Pain in left knee  Surgical Diagnosis: Left TKA   Surgical Date: 5/19/2025  Days Since Last Surgery: 37    Visit # / Visits Authorized:  13 / 20  Insurance Authorization Period: 4/22/2025 to 7/18/2025  Date of Evaluation: 4/28/2025  Plan of Care Certification: 6/6/2025 to 7/18/2025      PT/PTA:     Number of PTA visits since last PT visit:   Time In: 1000   Time Out: 1115  Total Time (in minutes): 75   Total Billable Time (in minutes): 30    FOTO:  Intake Score:  %  Survey Score 2:  %  Survey Score 3:  %    Precautions:       Subjective   Reports last visit she believes she was having an A fib episode; PT and patient discussed patient reaching out to cardiology to report symptoms as this was her first since her ablation..  Pain reported as 5/10. Left knee    Objective            Treatment:  Balance/Neuromuscular Re-Education  NMR 2: LAQ at EOM + 4 lb AW: 5-10 sec hold, 3 x 10 reps; short arc quads with medium bolster: 4 lbs 5 seconds, 30 reps  Therapeutic Activity  TA 1: Patient education: plan of care post op and prehab, HEP, prognosis, treatment options, load progression, and expectations post op  TA 2: step ups onto 4-inch step: 2x10 reps with cueing to avoid UE use - SBA  TA 3: shuttle double leg press: 50 lbs, 3 x 10 reps -- shuttle single leg press: 25 lbs, 3 x 10 reps each  TA 4: sit to stands 3x10 no UE support  TA 5: Aerobic Activity: Recumbent bike 10 minutes    Time Entry(in minutes):  Neuromuscular Re-Education Time Entry: 25  Therapeutic Activity Time Entry: 40    Assessment & Plan   Assessment: Windy is 5 weeks, 2 days s/p Left TKA. Presents ambulating with RW due to left  ankle pain. Good tolerance for therapeutic activities including step ups and sit to stands. Educated patient to reach out to cardiologist regarding A fib episode.        The patient will continue to benefit from skilled outpatient physical therapy in order to address the deficits listed in the problem list on the initial evaluation, provide patient and family education, and maximize the patients level of independence in the home and community environments.     The patient's spiritual, cultural, and educational needs were considered, and the patient is agreeable to the plan of care and goals.           Plan: Will continue per POC towards treatment goals. PT/PTA met face to face to discuss patient's treatment plan and progress towards established goals. Patient will be seen by physical therapist every sixth visit and minimally once per month.    Goals:   Active       long term goals       Pt will improve impaired lower extremity manual muscle tests to >/= 4+/5 to improve dynamic L knee support for closed chain tasks. (Progressing)       Start:  06/06/25    Expected End:  07/18/25            Patient will improve the total FOTO Knee Survey Score to </= 40% limited to demonstrate increased perceived functional mobility. (Progressing)       Start:  06/06/25    Expected End:  07/18/25            Patient will perform timed up and go with least restrictive assistive device in < 10 seconds to improve gait speed and safety with community ambulation. (Progressing)       Start:  06/06/25    Expected End:  07/18/25            Patient will perform at least 8-10 sit to stands in 30 seconds without UE support to demonstrate increased functional strength.  (Progressing)       Start:  06/06/25    Expected End:  07/18/25               short term goals       Patient will be independent with home exercise program to supplement physical therapy treatment in improving functional status. (Progressing)       Start:  06/06/25    Expected  End:  06/27/25            Pt will improve impaired lower extremity manual muscle tests to >/= 4/5 to improve dynamic L knee support for closed chain tasks.  (Progressing)       Start:  06/06/25    Expected End:  06/27/25            Patient will improve (L) knee active range of motion to 0-90 degrees to promote increased ease of sit<>stand transfers. (Progressing)       Start:  06/06/25    Expected End:  06/27/25            Patient will perform timed up and go with less restrictive assistive device in < 15 seconds to improve gait speed and safety with community ambulation. (Progressing)       Start:  06/06/25              Resolved       long term goals       Pt will be independent with HEP to supplement physical therapy treatment in improving functional status.  (Met)       Start:  04/30/25    Expected End:  05/16/25    Resolved:  05/15/25         Pt will demonstrate improved impaired lower extremity strength by 1/2 grade to promote functional mobility.  (Met)       Start:  04/30/25    Expected End:  05/16/25    Resolved:  05/15/25         Patient will require <2 verbal and tactile cue to engage quadricep without compensation to demonstrate improved quadriceps control and awareness.  (Met)       Start:  04/30/25    Expected End:  05/16/25    Resolved:  05/15/25             Naya Brown, PT

## 2025-06-27 ENCOUNTER — CLINICAL SUPPORT (OUTPATIENT)
Dept: REHABILITATION | Facility: HOSPITAL | Age: 75
End: 2025-06-27
Payer: MEDICARE

## 2025-06-27 DIAGNOSIS — R26.89 DECREASED FUNCTIONAL MOBILITY: Primary | ICD-10-CM

## 2025-06-27 PROCEDURE — 97530 THERAPEUTIC ACTIVITIES: CPT | Mod: CQ

## 2025-06-27 NOTE — PROGRESS NOTES
Outpatient Rehab    Physical Therapy Visit    Patient Name: Windy Lindo  MRN: [id number]  YOB: 1950  Encounter Date: 6/27/2025    Therapy Diagnosis:   Encounter Diagnosis   Name Primary?    Decreased functional mobility Yes     Physician: Mac Brice III, *    Physician Orders: Eval and Treat  Medical Diagnosis: Pain in left knee  Surgical Diagnosis: Left TKA   Surgical Date: 5/19/2025  Days Since Last Surgery: 39    Visit # / Visits Authorized:  14 / 20  Insurance Authorization Period: 4/22/2025 to 7/18/2025  Date of Evaluation: 4/28/2025  Plan of Care Certification: 6/6/2025 to 7/18/2025      PT/PTA: PTA   Number of PTA visits since last PT visit:1  Time In: 1000   Time Out: 1108  Total Time (in minutes): 68   Total Billable Time (in minutes): 23    FOTO:  Intake Score: 42%  Survey Score 2: 59%  Survey Score 3:  %    Precautions: none    Subjective   Patient reports doing ok today.  Pain reported as 3/10. Left knee    Objective  Objective Measures updated at progress report unless specified.     Treatment:  Balance/Neuromuscular Re-Education  NMR 1: Patient education: HEP compliance, swelling management, ROM expectations post TKA, importance of extension ROM for proper gait cycle, size F Tubigrip for swelling reduction  NMR 2: LAQ at EOM + 4 lb AW: 5-10 sec hold, 3 x 10 reps; short arc quads with medium bolster: 4 lbs 5 seconds, 30 reps  Therapeutic Activity  TA 1: Patient education: plan of care post op and prehab, HEP, prognosis, treatment options, load progression, and expectations post op  TA 2: step ups onto 4-inch step: 2x10 reps with cueing to avoid UE use - SBA  TA 3: shuttle double leg press: 50 lbs, 3 x 10 reps -- shuttle single leg press: 25 lbs, 3 x 10 reps each  TA 4: sit to stands 3x10 no UE support  TA 5: Aerobic Activity: Recumbent bike 10 minutes  Modalities  Cryotherapy (Minutes\Location): cold pack for 10 minutes to Left knee post session with LE elevated    Time Entry(in  minutes):  Hot/Cold Pack Time Entry: 10  Neuromuscular Re-Education Time Entry: 23  Therapeutic Activity Time Entry: 35    Assessment & Plan   Assessment: Windy is 5 weeks, 4 days s/p Left TKA. Presents ambulating with rolling walker. Good tolerance overall noting adequate muscle response throughout. Continues to note ankle discomfort greater than her knee.  Evaluation/Treatment Tolerance: Patient tolerated treatment well    The patient will continue to benefit from skilled outpatient physical therapy in order to address the deficits listed in the problem list on the initial evaluation, provide patient and family education, and maximize the patients level of independence in the home and community environments.     The patient's spiritual, cultural, and educational needs were considered, and the patient is agreeable to the plan of care and goals.           Plan: Will continue per POC towards treatment goals. PT/PTA met face to face to discuss patient's treatment plan and progress towards established goals. Patient will be seen by physical therapist every sixth visit and minimally once per month.    Goals:   Active       long term goals       Pt will improve impaired lower extremity manual muscle tests to >/= 4+/5 to improve dynamic L knee support for closed chain tasks. (Progressing)       Start:  06/06/25    Expected End:  07/18/25            Patient will improve the total FOTO Knee Survey Score to </= 40% limited to demonstrate increased perceived functional mobility. (Progressing)       Start:  06/06/25    Expected End:  07/18/25            Patient will perform timed up and go with least restrictive assistive device in < 10 seconds to improve gait speed and safety with community ambulation. (Progressing)       Start:  06/06/25    Expected End:  07/18/25            Patient will perform at least 8-10 sit to stands in 30 seconds without UE support to demonstrate increased functional strength.  (Progressing)        Start:  06/06/25    Expected End:  07/18/25               short term goals       Patient will be independent with home exercise program to supplement physical therapy treatment in improving functional status. (Progressing)       Start:  06/06/25    Expected End:  06/27/25            Pt will improve impaired lower extremity manual muscle tests to >/= 4/5 to improve dynamic L knee support for closed chain tasks.  (Progressing)       Start:  06/06/25    Expected End:  06/27/25            Patient will improve (L) knee active range of motion to 0-90 degrees to promote increased ease of sit<>stand transfers. (Progressing)       Start:  06/06/25    Expected End:  06/27/25            Patient will perform timed up and go with less restrictive assistive device in < 15 seconds to improve gait speed and safety with community ambulation. (Progressing)       Start:  06/06/25              Resolved       long term goals       Pt will be independent with HEP to supplement physical therapy treatment in improving functional status.  (Met)       Start:  04/30/25    Expected End:  05/16/25    Resolved:  05/15/25         Pt will demonstrate improved impaired lower extremity strength by 1/2 grade to promote functional mobility.  (Met)       Start:  04/30/25    Expected End:  05/16/25    Resolved:  05/15/25         Patient will require <2 verbal and tactile cue to engage quadricep without compensation to demonstrate improved quadriceps control and awareness.  (Met)       Start:  04/30/25    Expected End:  05/16/25    Resolved:  05/15/25             Ankita Rodrigez, PTA

## 2025-06-29 DIAGNOSIS — I48.0 PAROXYSMAL ATRIAL FIBRILLATION: ICD-10-CM

## 2025-06-29 NOTE — TELEPHONE ENCOUNTER
No care due was identified.  [facility] Embedded Care Due Messages. Reference number: [id number].   6/29/2025 8:04:06 AM CDT

## 2025-06-30 RX ORDER — APIXABAN 5 MG/1
5 TABLET, FILM COATED ORAL 2 TIMES DAILY
Qty: 60 TABLET | Refills: 2 | Status: SHIPPED | OUTPATIENT
Start: 2025-06-30

## 2025-07-01 ENCOUNTER — DOCUMENTATION ONLY (OUTPATIENT)
Dept: REHABILITATION | Facility: HOSPITAL | Age: 75
End: 2025-07-01
Payer: MEDICARE

## 2025-07-01 ENCOUNTER — OFFICE VISIT (OUTPATIENT)
Dept: ORTHOPEDICS | Facility: CLINIC | Age: 75
End: 2025-07-01
Payer: MEDICARE

## 2025-07-01 ENCOUNTER — HOSPITAL ENCOUNTER (OUTPATIENT)
Dept: RADIOLOGY | Facility: HOSPITAL | Age: 75
Discharge: HOME | End: 2025-07-01
Attending: NURSE PRACTITIONER
Payer: MEDICARE

## 2025-07-01 VITALS — BODY MASS INDEX: 29.33 KG/M2 | WEIGHT: 160.38 LBS

## 2025-07-01 DIAGNOSIS — Z96.652 S/P TOTAL KNEE REPLACEMENT, LEFT: ICD-10-CM

## 2025-07-01 DIAGNOSIS — Z96.652 S/P TOTAL KNEE REPLACEMENT, LEFT: Primary | ICD-10-CM

## 2025-07-01 PROCEDURE — 1159F MED LIST DOCD IN RCRD: CPT | Mod: CPTII,S$GLB,, | Performed by: NURSE PRACTITIONER

## 2025-07-01 PROCEDURE — 3288F FALL RISK ASSESSMENT DOCD: CPT | Mod: CPTII,S$GLB,, | Performed by: NURSE PRACTITIONER

## 2025-07-01 PROCEDURE — 73562 X-RAY EXAM OF KNEE 3: CPT | Mod: 26,LT,, | Performed by: RADIOLOGY

## 2025-07-01 PROCEDURE — 73562 X-RAY EXAM OF KNEE 3: CPT | Mod: TC,LT

## 2025-07-01 PROCEDURE — 1160F RVW MEDS BY RX/DR IN RCRD: CPT | Mod: CPTII,S$GLB,, | Performed by: NURSE PRACTITIONER

## 2025-07-01 PROCEDURE — 1125F AMNT PAIN NOTED PAIN PRSNT: CPT | Mod: CPTII,S$GLB,, | Performed by: NURSE PRACTITIONER

## 2025-07-01 PROCEDURE — 99999 PR PBB SHADOW E&M-EST. PATIENT-LVL III: CPT | Mod: PBBFAC,,, | Performed by: NURSE PRACTITIONER

## 2025-07-01 PROCEDURE — 99024 POSTOP FOLLOW-UP VISIT: CPT | Mod: S$GLB,,, | Performed by: NURSE PRACTITIONER

## 2025-07-01 PROCEDURE — 1101F PT FALLS ASSESS-DOCD LE1/YR: CPT | Mod: CPTII,S$GLB,, | Performed by: NURSE PRACTITIONER

## 2025-07-01 RX ORDER — METHYLPREDNISOLONE 4 MG/1
TABLET ORAL
Qty: 1 EACH | Refills: 0 | Status: SHIPPED | OUTPATIENT
Start: 2025-07-01 | End: 2025-07-22

## 2025-07-01 NOTE — PROGRESS NOTES
Windy Lindo presents for 6 week post-operative visit following a left total knee arthroplasty performed by Dr. Brice on 5/19/2025.  Pt states that her knee feels great, but she is having left foot and ankle pain and extreme swelling    Exam:   Weight 72.7 kg (160 lb 6.2 oz).   Ambulating well with assistive device. Still using a walker due to c/o foot and ankle pain  Incision is healed without drainage or erythema.   ROM:0-100    Post-operative radiographs reviewed today revealing a well fixed and aligned prosthesis.    A/P:  6 weeks s/p left total knee arthroplasty  - steroid dose pack for foot and ankle pain and swelling  - Outpatient PT ongoing: at the [place] location  - Follow up in 6 weeks with Dr. Brice. Pt will call clinic with problems/concerns.

## 2025-07-01 NOTE — PROGRESS NOTES
Date: 07/01/2025  Time In: 1445  Time Out: 1455    Patient presents for follow up appointment. She would like to discharge today. She is 6 weeks, 1 day s/p Left TKA by Dr. Brice. Patient reports following up with MD's office for her 6 week visit earlier today and discussed being finished with PT to see if it helps with her swelling. She notes being referred to Dr. Cuenca regarding her swelling. She also notes new bruising along her Right foot. Patient educated on continuing HEP compliance and was provided contact information for any questions. Appointment was cancelled today and no charges were dropped.    Ankita Rodrigez, PTA  07/01/2025

## 2025-07-18 ENCOUNTER — PATIENT MESSAGE (OUTPATIENT)
Dept: ADMINISTRATIVE | Facility: OTHER | Age: 75
End: 2025-07-18
Payer: MEDICARE

## 2025-07-29 ENCOUNTER — EXTERNAL HOME HEALTH (OUTPATIENT)
Dept: HOME HEALTH SERVICES | Facility: HOSPITAL | Age: 75
End: 2025-07-29
Payer: MEDICARE

## 2025-07-29 ENCOUNTER — TELEPHONE (OUTPATIENT)
Dept: OPHTHALMOLOGY | Facility: CLINIC | Age: 75
End: 2025-07-29
Payer: MEDICARE

## 2025-07-30 ENCOUNTER — PATIENT MESSAGE (OUTPATIENT)
Dept: ORTHOPEDICS | Facility: CLINIC | Age: 75
End: 2025-07-30
Payer: MEDICARE

## 2025-07-31 ENCOUNTER — PATIENT MESSAGE (OUTPATIENT)
Dept: OPHTHALMOLOGY | Facility: CLINIC | Age: 75
End: 2025-07-31
Payer: MEDICARE

## 2025-07-31 ENCOUNTER — TELEPHONE (OUTPATIENT)
Dept: OPHTHALMOLOGY | Facility: CLINIC | Age: 75
End: 2025-07-31
Payer: MEDICARE

## 2025-07-31 DIAGNOSIS — I48.0 PAROXYSMAL ATRIAL FIBRILLATION: ICD-10-CM

## 2025-07-31 NOTE — TELEPHONE ENCOUNTER
No care due was identified.  [facility] Embedded Care Due Messages. Reference number: [id number].   7/31/2025 6:42:20 PM CDT

## 2025-08-01 RX ORDER — APIXABAN 5 MG/1
5 TABLET, FILM COATED ORAL 2 TIMES DAILY
Qty: 60 TABLET | Refills: 2 | Status: SHIPPED | OUTPATIENT
Start: 2025-08-01

## 2025-08-07 ENCOUNTER — TELEPHONE (OUTPATIENT)
Dept: OPHTHALMOLOGY | Facility: CLINIC | Age: 75
End: 2025-08-07
Payer: MEDICARE

## 2025-08-12 ENCOUNTER — OFFICE VISIT (OUTPATIENT)
Dept: ORTHOPEDICS | Facility: CLINIC | Age: 75
End: 2025-08-12
Payer: MEDICARE

## 2025-08-12 ENCOUNTER — PATIENT MESSAGE (OUTPATIENT)
Dept: ORTHOPEDICS | Facility: CLINIC | Age: 75
End: 2025-08-12

## 2025-08-12 VITALS — WEIGHT: 156.06 LBS | BODY MASS INDEX: 27.65 KG/M2 | HEIGHT: 63 IN

## 2025-08-12 DIAGNOSIS — Z96.652 S/P TOTAL KNEE REPLACEMENT, LEFT: Primary | ICD-10-CM

## 2025-08-12 PROCEDURE — 1125F AMNT PAIN NOTED PAIN PRSNT: CPT | Mod: CPTII,S$GLB,, | Performed by: ORTHOPAEDIC SURGERY

## 2025-08-12 PROCEDURE — 3288F FALL RISK ASSESSMENT DOCD: CPT | Mod: CPTII,S$GLB,, | Performed by: ORTHOPAEDIC SURGERY

## 2025-08-12 PROCEDURE — 99999 PR PBB SHADOW E&M-EST. PATIENT-LVL III: CPT | Mod: PBBFAC,,, | Performed by: ORTHOPAEDIC SURGERY

## 2025-08-12 PROCEDURE — 1159F MED LIST DOCD IN RCRD: CPT | Mod: CPTII,S$GLB,, | Performed by: ORTHOPAEDIC SURGERY

## 2025-08-12 PROCEDURE — 1101F PT FALLS ASSESS-DOCD LE1/YR: CPT | Mod: CPTII,S$GLB,, | Performed by: ORTHOPAEDIC SURGERY

## 2025-08-12 PROCEDURE — 99024 POSTOP FOLLOW-UP VISIT: CPT | Mod: S$GLB,,, | Performed by: ORTHOPAEDIC SURGERY

## 2025-08-12 RX ORDER — PREGABALIN 75 MG/1
75 CAPSULE ORAL 2 TIMES DAILY
Qty: 60 CAPSULE | Refills: 2 | Status: SHIPPED | OUTPATIENT
Start: 2025-08-12

## 2025-08-13 ENCOUNTER — PATIENT MESSAGE (OUTPATIENT)
Dept: FAMILY MEDICINE | Facility: CLINIC | Age: 75
End: 2025-08-13
Payer: MEDICARE

## 2025-08-13 ENCOUNTER — HOSPITAL ENCOUNTER (EMERGENCY)
Facility: HOSPITAL | Age: 75
Discharge: HOME | End: 2025-08-13
Attending: EMERGENCY MEDICINE
Payer: MEDICARE

## 2025-08-13 VITALS
HEART RATE: 83 BPM | RESPIRATION RATE: 19 BRPM | TEMPERATURE: 99 F | DIASTOLIC BLOOD PRESSURE: 67 MMHG | SYSTOLIC BLOOD PRESSURE: 155 MMHG | BODY MASS INDEX: 27.29 KG/M2 | HEIGHT: 63 IN | OXYGEN SATURATION: 99 % | WEIGHT: 154 LBS

## 2025-08-13 DIAGNOSIS — H25.12 NUCLEAR SCLEROTIC CATARACT OF LEFT EYE: Primary | ICD-10-CM

## 2025-08-13 DIAGNOSIS — Z96.652 S/P TOTAL KNEE REPLACEMENT, LEFT: ICD-10-CM

## 2025-08-13 DIAGNOSIS — R42 LIGHTHEADEDNESS: Primary | ICD-10-CM

## 2025-08-13 DIAGNOSIS — R00.2 PALPITATIONS: ICD-10-CM

## 2025-08-13 LAB
ALBUMIN SERPL-MCNC: 4.1 G/DL (ref 3.3–5.5)
ALP SERPL-CCNC: 68 U/L (ref 42–141)
BILIRUB SERPL-MCNC: 0.5 MG/DL (ref 0.2–1.6)
BILIRUBIN, POC UA: NEGATIVE
BLOOD, POC UA: NEGATIVE
BUN SERPL-MCNC: 16 MG/DL (ref 7–22)
CALCIUM SERPL-MCNC: 10.4 MG/DL (ref 8–10.3)
CHLORIDE SERPL-SCNC: 104 MMOL/L (ref 98–108)
CLARITY, UA: CLEAR
COLOR, UA: YELLOW
CREAT SERPL-MCNC: 1 MG/DL (ref 0.6–1.2)
GLUCOSE SERPL-MCNC: 110 MG/DL (ref 73–118)
GLUCOSE, POC UA: NEGATIVE
KETONES, POC UA: NEGATIVE
LEUKOCYTE EST, POC UA: ABNORMAL
Lab: 0.7 10 9/L (ref 0.1–1.5)
Lab: 1.4 10 9/L (ref 0.5–5)
Lab: 11.9 FL (ref 8–11)
Lab: 13.3 G/DL (ref 11.5–16.5)
Lab: 15.1 % (ref 11–16)
Lab: 19.6 % (ref 15–50)
Lab: 194 10 9/L (ref 100–400)
Lab: 29 PG (ref 25–35)
Lab: 33.2 G/DL (ref 31–38)
Lab: 4.58 10 12/L (ref 3.5–5.5)
Lab: 5.5 10 9/L (ref 1.2–8)
Lab: 6.9 % (ref 2–15)
Lab: 7.6 10 9/L (ref 3.5–10)
Lab: 73.5 % (ref 35–80)
Lab: 87.4 FL (ref 75–100)
NITRITE, POC UA: NEGATIVE
PH UR STRIP: 6 [PH] (ref 5–8)
POC ALT (SGPT): 21 U/L (ref 10–47)
POC AST (SGOT): 24 U/L (ref 11–38)
POC B-TYPE NATRIURETIC PEPTIDE: 25.2 PG/ML (ref 0–100)
POC CARDIAC TROPONIN I: 0.01 NG/ML (ref 0–0.08)
POC HCT: 40.1 % (ref 35–55)
POC PTINR: 1.2 (ref 0.9–1.2)
POC PTWBT: 12.2 SEC (ref 9.7–14.3)
POC TCO2: 26 MMOL/L (ref 18–33)
POTASSIUM BLD-SCNC: 4.5 MMOL/L (ref 3.6–5.1)
PROTEIN, POC UA: NEGATIVE
PROTEIN, POC: 7.2 G/DL (ref 6.4–8.1)
SAMPLE: NORMAL
SAMPLE: NORMAL
SODIUM BLD-SCNC: 141 MMOL/L (ref 128–145)
SPECIFIC GRAVITY, POC UA: 1.01 (ref 1–1.03)
UROBILINOGEN, POC UA: 0.2 E.U./DL

## 2025-08-13 PROCEDURE — 84484 ASSAY OF TROPONIN QUANT: CPT | Mod: ER

## 2025-08-13 PROCEDURE — 83880 ASSAY OF NATRIURETIC PEPTIDE: CPT | Mod: ER

## 2025-08-13 PROCEDURE — 85025 COMPLETE CBC W/AUTO DIFF WBC: CPT | Mod: ER

## 2025-08-13 PROCEDURE — 80053 COMPREHEN METABOLIC PANEL: CPT | Mod: ER

## 2025-08-13 PROCEDURE — 93010 ELECTROCARDIOGRAM REPORT: CPT | Mod: ,,, | Performed by: INTERNAL MEDICINE

## 2025-08-13 PROCEDURE — 87086 URINE CULTURE/COLONY COUNT: CPT

## 2025-08-13 PROCEDURE — 93005 ELECTROCARDIOGRAM TRACING: CPT | Mod: ER

## 2025-08-13 PROCEDURE — 99285 EMERGENCY DEPT VISIT HI MDM: CPT | Mod: 25,ER

## 2025-08-13 RX ORDER — CELECOXIB 200 MG/1
200 CAPSULE ORAL DAILY
Qty: 30 CAPSULE | Refills: 0 | Status: SHIPPED | OUTPATIENT
Start: 2025-08-13

## 2025-08-14 ENCOUNTER — OFFICE VISIT (OUTPATIENT)
Dept: FAMILY MEDICINE | Facility: CLINIC | Age: 75
End: 2025-08-14
Payer: MEDICARE

## 2025-08-14 ENCOUNTER — PATIENT OUTREACH (OUTPATIENT)
Facility: OTHER | Age: 75
End: 2025-08-14
Payer: MEDICARE

## 2025-08-14 VITALS
TEMPERATURE: 98 F | HEIGHT: 63 IN | DIASTOLIC BLOOD PRESSURE: 70 MMHG | OXYGEN SATURATION: 98 % | SYSTOLIC BLOOD PRESSURE: 116 MMHG | HEART RATE: 79 BPM | BODY MASS INDEX: 27.39 KG/M2 | WEIGHT: 154.56 LBS

## 2025-08-14 DIAGNOSIS — I48.0 PAROXYSMAL ATRIAL FIBRILLATION: Primary | ICD-10-CM

## 2025-08-14 LAB
OHS QRS DURATION: 84 MS
OHS QTC CALCULATION: 435 MS

## 2025-08-14 PROCEDURE — 99213 OFFICE O/P EST LOW 20 MIN: CPT | Mod: S$GLB,,,

## 2025-08-14 PROCEDURE — 3078F DIAST BP <80 MM HG: CPT | Mod: CPTII,S$GLB,,

## 2025-08-14 PROCEDURE — 99999 PR PBB SHADOW E&M-EST. PATIENT-LVL IV: CPT | Mod: PBBFAC,,,

## 2025-08-14 PROCEDURE — 3288F FALL RISK ASSESSMENT DOCD: CPT | Mod: CPTII,S$GLB,,

## 2025-08-14 PROCEDURE — 1126F AMNT PAIN NOTED NONE PRSNT: CPT | Mod: CPTII,S$GLB,,

## 2025-08-14 PROCEDURE — G2211 COMPLEX E/M VISIT ADD ON: HCPCS | Mod: S$GLB,,,

## 2025-08-14 PROCEDURE — 1100F PTFALLS ASSESS-DOCD GE2>/YR: CPT | Mod: CPTII,S$GLB,,

## 2025-08-14 PROCEDURE — 3074F SYST BP LT 130 MM HG: CPT | Mod: CPTII,S$GLB,,

## 2025-08-14 PROCEDURE — 1159F MED LIST DOCD IN RCRD: CPT | Mod: CPTII,S$GLB,,

## 2025-08-15 DIAGNOSIS — R00.2 PALPITATIONS: Primary | ICD-10-CM

## 2025-08-15 LAB — BACTERIA UR CULT: NORMAL

## 2025-08-17 ENCOUNTER — PATIENT MESSAGE (OUTPATIENT)
Dept: OPHTHALMOLOGY | Facility: CLINIC | Age: 75
End: 2025-08-17
Payer: MEDICARE

## 2025-08-18 ENCOUNTER — DOCUMENTATION ONLY (OUTPATIENT)
Dept: CARDIOLOGY | Facility: HOSPITAL | Age: 75
End: 2025-08-18
Payer: MEDICARE

## 2025-08-18 ENCOUNTER — CLINICAL SUPPORT (OUTPATIENT)
Dept: CARDIOLOGY | Facility: HOSPITAL | Age: 75
End: 2025-08-18
Attending: INTERNAL MEDICINE
Payer: MEDICARE

## 2025-08-18 DIAGNOSIS — R00.2 PALPITATIONS: ICD-10-CM

## 2025-08-18 PROCEDURE — 93246 EXT ECG>7D<15D RECORDING: CPT

## 2025-08-19 ENCOUNTER — TELEPHONE (OUTPATIENT)
Dept: OPHTHALMOLOGY | Facility: CLINIC | Age: 75
End: 2025-08-19
Payer: MEDICARE

## 2025-08-22 ENCOUNTER — PATIENT MESSAGE (OUTPATIENT)
Dept: FAMILY MEDICINE | Facility: CLINIC | Age: 75
End: 2025-08-22
Payer: MEDICARE

## 2025-08-25 ENCOUNTER — CLINICAL SUPPORT (OUTPATIENT)
Dept: FAMILY MEDICINE | Facility: CLINIC | Age: 75
End: 2025-08-25
Payer: MEDICARE

## 2025-08-25 VITALS — DIASTOLIC BLOOD PRESSURE: 82 MMHG | HEART RATE: 78 BPM | OXYGEN SATURATION: 98 % | SYSTOLIC BLOOD PRESSURE: 122 MMHG

## 2025-08-25 DIAGNOSIS — I10 PRIMARY HYPERTENSION: Primary | ICD-10-CM

## 2025-08-25 PROCEDURE — 99999 PR PBB SHADOW E&M-EST. PATIENT-LVL II: CPT | Mod: PBBFAC,,,

## (undated) DEVICE — NDL NRG TRNSPTAL 71CM

## (undated) DEVICE — GUIDEWIRE ROSEN VAS JTIP 180CM

## (undated) DEVICE — UNDERGLOVES BIOGEL PI SIZE 8.5

## (undated) DEVICE — KIT TOTAL KNEE TKOFG OMC

## (undated) DEVICE — R CATH ACUSON ACUNAV 8FR

## (undated) DEVICE — SYS REVOLUTION CEMENT MIXING

## (undated) DEVICE — SYR 50CC LL

## (undated) DEVICE — GOWN SMARTGOWN 3XL XLONG

## (undated) DEVICE — DRESSING TRANS 4X4 TEGADERM

## (undated) DEVICE — SOL NACL IRR 3000ML

## (undated) DEVICE — SOL NACL IRR 1000ML BTL

## (undated) DEVICE — DRAPE OPHTHALMIC 48X62 FEN

## (undated) DEVICE — TAPE SILK 3IN

## (undated) DEVICE — SUT 1 36IN COATED VICRYL UN

## (undated) DEVICE — TUBE SUCTION FRAZIER VENT 10FR

## (undated) DEVICE — SYS KNEE EPAK PIN ATTUNE
Type: IMPLANTABLE DEVICE | Site: KNEE | Status: NON-FUNCTIONAL
Removed: 2025-05-19

## (undated) DEVICE — R CATH BIDIRECTIONL DF CRV 7FR

## (undated) DEVICE — SOL BETADINE 5%

## (undated) DEVICE — SYR LUER LOCK 1CC

## (undated) DEVICE — KIT PROBE COVER WITH GEL

## (undated) DEVICE — GLOVE BIOGEL ECLIPSE SZ 6.5

## (undated) DEVICE — TOWEL OR DISP STRL BLUE 4/PK

## (undated) DEVICE — CABLE CONNECT CATH PFA RX HRD

## (undated) DEVICE — HOOD T7 W/ PEEL AWAY LENS

## (undated) DEVICE — SPONGE COTTON TRAY 4X4IN

## (undated) DEVICE — KIT TOTAL KNEE TKOFG

## (undated) DEVICE — GUIDEWIRE AMPLATZ XSTIFF 180CM

## (undated) DEVICE — HOOD T-5 TEAR AWAY STERILE

## (undated) DEVICE — COVER PRB TRNSDUC 7.6X183CM

## (undated) DEVICE — SYS IRRISEPT 450ML0.05% CHG

## (undated) DEVICE — GLOVE BIOGEL PI MICRO SZ 7

## (undated) DEVICE — SUT 2/0 36IN COATED VICRYL

## (undated) DEVICE — CATH MAP BI-D HD SENSOR ENABLE

## (undated) DEVICE — DRAPE THREE-QTR REINF 53X77IN

## (undated) DEVICE — DRESSING TRANS 2X2 TEGADERM

## (undated) DEVICE — LINE PRESSURE MONITORING 96IN

## (undated) DEVICE — PUMP COLD THERAPY

## (undated) DEVICE — PAD COLD THERAPY KNEE WRAP ON

## (undated) DEVICE — MARKER SKIN RULER STERILE

## (undated) DEVICE — WRAP KNEE ACCU THERM GEL PACK

## (undated) DEVICE — DILATOR COONS TAPER 14FR

## (undated) DEVICE — ELECTRODE REM PLYHSV RETURN 9

## (undated) DEVICE — BANDAGE ESMARK 6X12

## (undated) DEVICE — DRAPE INCISE IOBAN 2 23X33IN

## (undated) DEVICE — PAD DEFIB CADENCE ADULT R2

## (undated) DEVICE — TOURNIQUET SB QC DP 34X4IN

## (undated) DEVICE — DRAPE SURG W/TWL 17 5/8X23

## (undated) DEVICE — PAD CAST SPECIALIST STRL 6

## (undated) DEVICE — KIT ENSITE ELECTRODE SURFACE

## (undated) DEVICE — BOWL CEMENT

## (undated) DEVICE — NDL HYPO STD REG BVL 18GX1.5IN

## (undated) DEVICE — SEE MEDLINE ITEM 146271

## (undated) DEVICE — DRAPE VELYS DEVICE STERILE

## (undated) DEVICE — BLADE RECIP DS OFST 70X1X12.5

## (undated) DEVICE — SEE MEDLINE ITEM 154981

## (undated) DEVICE — PENCIL SMK EVAC CONNECTOR 10FT

## (undated) DEVICE — BLADE SAGITTAL 18 X 1.27 X 90M

## (undated) DEVICE — PIN VELYS ARRAY DRILL 4X175MM
Type: IMPLANTABLE DEVICE | Site: KNEE | Status: NON-FUNCTIONAL
Removed: 2025-05-19

## (undated) DEVICE — Device

## (undated) DEVICE — SUT QUILL 2T11 36IN

## (undated) DEVICE — INTRO FAST-CATH SL1 8.5FR 63CM

## (undated) DEVICE — TOWEL OR XRAY WHITE 17X26IN

## (undated) DEVICE — NDL 18GA X1 1/2 REG BEVEL

## (undated) DEVICE — SPONGE GAUZE 16PLY 4X4

## (undated) DEVICE — DRESSING AQUACEL AG ADV 3.5X12

## (undated) DEVICE — CATH ABLATION PULS FIELD 31MM

## (undated) DEVICE — BRUSH SCRUB HIBICLENS 4%

## (undated) DEVICE — PACK EP DRAPE OMC

## (undated) DEVICE — SEE MEDLINE ITEM 157117

## (undated) DEVICE — SHEATH STEERABLE CLEAR

## (undated) DEVICE — MASK FLYTE HOOD PEEL AWAY

## (undated) DEVICE — DRESSING TELFA N ADH 3X8

## (undated) DEVICE — BLADE DUAL CUT SAG 35X64X.89MM

## (undated) DEVICE — SEE MEDLINE ITEM 152487

## (undated) DEVICE — DRAPE VELYS SATELLITE STATION

## (undated) DEVICE — SHEATH INTRODUCER 9FR 11CM

## (undated) DEVICE — UNDERGLOVES BIOGEL PI SIZE 7.5

## (undated) DEVICE — GLOVE BIOGEL SKINSENSE PI 8.0

## (undated) DEVICE — DRAPE TOP 53X102IN

## (undated) DEVICE — ADHESIVE DERMABOND ADVANCED

## (undated) DEVICE — SYR 30CC LUER LOCK

## (undated) DEVICE — BASIN SPLASH SHLD W/PAD BLUE

## (undated) DEVICE — SUT MONOCRYL 4-0 PS-1 UND

## (undated) DEVICE — BLADE VELYS OSCILLATING SAW

## (undated) DEVICE — SUT VICRYL 3-0 OB 36 CT-1

## (undated) DEVICE — SHEATH HEMOSTASIS 8.5FR

## (undated) DEVICE — SET VELYS PURESIGHT ARRAY KNEE

## (undated) DEVICE — PIN VELYS ARRAY DRILL 4X125MM
Type: IMPLANTABLE DEVICE | Site: KNEE | Status: NON-FUNCTIONAL
Removed: 2025-05-19

## (undated) DEVICE — DRESSING AQUACEL AG RBBN 2X45

## (undated) DEVICE — SEE MEDLINE ITEM 152515

## (undated) DEVICE — PULSAVAC ZIMMER

## (undated) DEVICE — SOL IRR NACL .9% 3000ML

## (undated) DEVICE — SEE MEDLINE ITEM 146298

## (undated) DEVICE — ALCOHOL 70% ANTISEPTIC ISO 4OZ

## (undated) DEVICE — KIT IRR SUCTION HND PIECE

## (undated) DEVICE — DUOVISC

## (undated) DEVICE — INTRODUCER HEMOSTASIS 7.5F

## (undated) DEVICE — DRAPE STERI U-SHAPED 47X51IN